# Patient Record
Sex: MALE | Race: WHITE | NOT HISPANIC OR LATINO | Employment: OTHER | URBAN - METROPOLITAN AREA
[De-identification: names, ages, dates, MRNs, and addresses within clinical notes are randomized per-mention and may not be internally consistent; named-entity substitution may affect disease eponyms.]

---

## 2017-03-07 ENCOUNTER — OFFICE VISIT (OUTPATIENT)
Dept: FAMILY MEDICINE | Facility: CLINIC | Age: 77
End: 2017-03-07
Payer: MEDICARE

## 2017-03-07 VITALS
OXYGEN SATURATION: 98 % | WEIGHT: 177.25 LBS | HEIGHT: 71 IN | SYSTOLIC BLOOD PRESSURE: 136 MMHG | TEMPERATURE: 99 F | HEART RATE: 77 BPM | BODY MASS INDEX: 24.81 KG/M2 | DIASTOLIC BLOOD PRESSURE: 76 MMHG

## 2017-03-07 DIAGNOSIS — J01.00 ACUTE NON-RECURRENT MAXILLARY SINUSITIS: Primary | ICD-10-CM

## 2017-03-07 PROCEDURE — 1159F MED LIST DOCD IN RCRD: CPT | Mod: S$GLB,,, | Performed by: NURSE PRACTITIONER

## 2017-03-07 PROCEDURE — 3078F DIAST BP <80 MM HG: CPT | Mod: S$GLB,,, | Performed by: NURSE PRACTITIONER

## 2017-03-07 PROCEDURE — 1160F RVW MEDS BY RX/DR IN RCRD: CPT | Mod: S$GLB,,, | Performed by: NURSE PRACTITIONER

## 2017-03-07 PROCEDURE — 3075F SYST BP GE 130 - 139MM HG: CPT | Mod: S$GLB,,, | Performed by: NURSE PRACTITIONER

## 2017-03-07 PROCEDURE — 96372 THER/PROPH/DIAG INJ SC/IM: CPT | Mod: S$GLB,,, | Performed by: NURSE PRACTITIONER

## 2017-03-07 PROCEDURE — 1126F AMNT PAIN NOTED NONE PRSNT: CPT | Mod: S$GLB,,, | Performed by: NURSE PRACTITIONER

## 2017-03-07 PROCEDURE — 99213 OFFICE O/P EST LOW 20 MIN: CPT | Mod: 25,S$GLB,, | Performed by: NURSE PRACTITIONER

## 2017-03-07 PROCEDURE — 1157F ADVNC CARE PLAN IN RCRD: CPT | Mod: S$GLB,,, | Performed by: NURSE PRACTITIONER

## 2017-03-07 RX ORDER — TRIAMCINOLONE ACETONIDE 40 MG/ML
80 INJECTION, SUSPENSION INTRA-ARTICULAR; INTRAMUSCULAR
Status: COMPLETED | OUTPATIENT
Start: 2017-03-07 | End: 2017-03-07

## 2017-03-07 RX ORDER — AMOXICILLIN AND CLAVULANATE POTASSIUM 875; 125 MG/1; MG/1
1 TABLET, FILM COATED ORAL EVERY 12 HOURS
Qty: 20 TABLET | Refills: 0 | Status: SHIPPED | OUTPATIENT
Start: 2017-03-07 | End: 2017-10-11

## 2017-03-07 RX ADMIN — TRIAMCINOLONE ACETONIDE 80 MG: 40 INJECTION, SUSPENSION INTRA-ARTICULAR; INTRAMUSCULAR at 09:03

## 2017-03-07 NOTE — PROGRESS NOTES
Subjective:       Patient ID: Cale Harding is a 76 y.o. male.    Chief Complaint: Sinus Problem    Sinus Problem   This is a new problem. The current episode started 1 to 4 weeks ago (a month). The problem is unchanged. There has been no fever. He is experiencing no pain. Associated symptoms include congestion, coughing, headaches, a hoarse voice and sinus pressure. Pertinent negatives include no chills, diaphoresis, ear pain, neck pain, shortness of breath, sneezing, sore throat or swollen glands. Treatments tried: nyquill, tylenol. The treatment provided no relief.     Review of Systems   Constitutional: Positive for fatigue. Negative for chills, diaphoresis and fever.   HENT: Positive for congestion, hoarse voice, postnasal drip, rhinorrhea, sinus pressure and voice change. Negative for ear discharge, ear pain, sneezing and sore throat.    Eyes: Negative for photophobia and visual disturbance.   Respiratory: Positive for cough. Negative for chest tightness, shortness of breath and wheezing.    Cardiovascular: Negative for chest pain, palpitations and leg swelling.   Musculoskeletal: Negative for neck pain.   Skin: Negative for color change, pallor, rash and wound.   Neurological: Positive for headaches. Negative for dizziness, weakness and light-headedness.       Objective:      Physical Exam   Constitutional: He is oriented to person, place, and time. He appears well-developed and well-nourished. No distress.   HENT:   Right Ear: Tympanic membrane and external ear normal.   Left Ear: Tympanic membrane and external ear normal.   Nose: Mucosal edema and rhinorrhea present. Right sinus exhibits maxillary sinus tenderness. Right sinus exhibits no frontal sinus tenderness. Left sinus exhibits maxillary sinus tenderness. Left sinus exhibits no frontal sinus tenderness.   Mouth/Throat: No oropharyngeal exudate, posterior oropharyngeal edema or posterior oropharyngeal erythema.   Cardiovascular: Normal rate,  regular rhythm and normal heart sounds.    Pulmonary/Chest: Effort normal and breath sounds normal.   Neurological: He is alert and oriented to person, place, and time.   Skin: Skin is warm and dry. He is not diaphoretic.   Psychiatric: He has a normal mood and affect. His behavior is normal. Judgment and thought content normal.   Vitals reviewed.      Assessment:       1. Acute non-recurrent maxillary sinusitis        Plan:       Acute non-recurrent maxillary sinusitis  -     amoxicillin-clavulanate 875-125mg (AUGMENTIN) 875-125 mg per tablet; Take 1 tablet by mouth every 12 (twelve) hours.  Dispense: 20 tablet; Refill: 0    Other orders  -     triamcinolone acetonide injection 80 mg; Inject 2 mLs (80 mg total) into the muscle one time.

## 2017-04-12 NOTE — TELEPHONE ENCOUNTER
----- Message from Eliane Green sent at 4/12/2017  3:50 PM CDT -----  Patient is requesting a medication refill.     RX name: enalapril (VASOTEC) 5 MG tablet  Strength:   Quantity: 90 day with refill  Directions: Take 1 tablet (5 mg total) by mouth 2 (two) times daily    Pharmacy name: [facility]

## 2017-04-13 RX ORDER — ENALAPRIL MALEATE 5 MG/1
5 TABLET ORAL 2 TIMES DAILY
Qty: 180 TABLET | Refills: 3 | Status: SHIPPED | OUTPATIENT
Start: 2017-04-13 | End: 2018-05-02 | Stop reason: SDUPTHER

## 2017-04-19 ENCOUNTER — HOSPITAL ENCOUNTER (OUTPATIENT)
Dept: RADIOLOGY | Facility: HOSPITAL | Age: 77
Discharge: HOME | End: 2017-04-19
Attending: INTERNAL MEDICINE
Payer: MEDICARE

## 2017-04-19 DIAGNOSIS — R91.8 LUNG MASS: ICD-10-CM

## 2017-04-19 PROCEDURE — 71250 CT THORAX DX C-: CPT | Mod: TC,PO

## 2017-10-11 ENCOUNTER — OFFICE VISIT (OUTPATIENT)
Dept: FAMILY MEDICINE | Facility: CLINIC | Age: 77
End: 2017-10-11
Payer: MEDICARE

## 2017-10-11 VITALS
TEMPERATURE: 98 F | HEIGHT: 70 IN | SYSTOLIC BLOOD PRESSURE: 128 MMHG | WEIGHT: 176.19 LBS | OXYGEN SATURATION: 100 % | HEART RATE: 68 BPM | BODY MASS INDEX: 25.22 KG/M2 | DIASTOLIC BLOOD PRESSURE: 70 MMHG

## 2017-10-11 DIAGNOSIS — K63.5 POLYP OF COLON, UNSPECIFIED PART OF COLON, UNSPECIFIED TYPE: ICD-10-CM

## 2017-10-11 DIAGNOSIS — Z85.46 HISTORY OF PROSTATE CANCER: ICD-10-CM

## 2017-10-11 DIAGNOSIS — Z12.11 SCREEN FOR COLON CANCER: ICD-10-CM

## 2017-10-11 DIAGNOSIS — Z00.00 WELLNESS EXAMINATION: Primary | ICD-10-CM

## 2017-10-11 DIAGNOSIS — Z87.19 HISTORY OF COLONIC DIVERTICULITIS: ICD-10-CM

## 2017-10-11 DIAGNOSIS — Z87.828 HISTORY OF DISLOCATION OF ELBOW: ICD-10-CM

## 2017-10-11 DIAGNOSIS — R73.9 HYPERGLYCEMIA: ICD-10-CM

## 2017-10-11 DIAGNOSIS — R89.6 ABNORMAL BLADDER CYTOLOGY: ICD-10-CM

## 2017-10-11 DIAGNOSIS — I65.23 CALCIFICATION OF BOTH CAROTID ARTERIES: ICD-10-CM

## 2017-10-11 DIAGNOSIS — L30.8 OTHER ECZEMA: ICD-10-CM

## 2017-10-11 DIAGNOSIS — K21.9 GASTROESOPHAGEAL REFLUX DISEASE, ESOPHAGITIS PRESENCE NOT SPECIFIED: ICD-10-CM

## 2017-10-11 DIAGNOSIS — Z77.098 CONTACT WITH AND (SUSPECTED) EXPOSURE TO OTHER HAZARDOUS, CHIEFLY NONMEDICINAL, CHEMICALS: ICD-10-CM

## 2017-10-11 LAB
ALBUMIN SERPL-MCNC: 4.5 G/DL (ref 3.6–5.1)
ALBUMIN/GLOB SERPL: 1.7 (CALC) (ref 1–2.5)
ALP SERPL-CCNC: 52 U/L (ref 40–115)
ALT SERPL-CCNC: 15 U/L (ref 9–46)
AST SERPL-CCNC: 18 U/L (ref 10–35)
BASOPHILS # BLD AUTO: 51 CELLS/UL (ref 0–200)
BASOPHILS NFR BLD AUTO: 0.8 %
BILIRUB SERPL-MCNC: 0.7 MG/DL (ref 0.2–1.2)
BUN SERPL-MCNC: 15 MG/DL (ref 7–25)
BUN/CREAT SERPL: ABNORMAL (CALC) (ref 6–22)
CALCIUM SERPL-MCNC: 9.4 MG/DL (ref 8.6–10.3)
CHLORIDE SERPL-SCNC: 104 MMOL/L (ref 98–110)
CHOLEST SERPL-MCNC: 214 MG/DL
CHOLEST/HDLC SERPL: 4.4 (CALC)
CO2 SERPL-SCNC: 25 MMOL/L (ref 20–31)
CREAT SERPL-MCNC: 0.83 MG/DL (ref 0.7–1.18)
EOSINOPHIL # BLD AUTO: 77 CELLS/UL (ref 15–500)
EOSINOPHIL NFR BLD AUTO: 1.2 %
ERYTHROCYTE [DISTWIDTH] IN BLOOD BY AUTOMATED COUNT: 11.9 % (ref 11–15)
GFR SERPL CREATININE-BSD FRML MDRD: 85 ML/MIN/1.73M2
GLOBULIN SER CALC-MCNC: 2.6 G/DL (CALC) (ref 1.9–3.7)
GLUCOSE SERPL-MCNC: 116 MG/DL (ref 65–99)
HBA1C MFR BLD: 5.9 % OF TOTAL HGB
HCT VFR BLD AUTO: 42.1 % (ref 38.5–50)
HDLC SERPL-MCNC: 49 MG/DL
HGB BLD-MCNC: 14.3 G/DL (ref 13.2–17.1)
LDLC SERPL CALC-MCNC: 137 MG/DL (CALC)
LYMPHOCYTES # BLD AUTO: 1568 CELLS/UL (ref 850–3900)
LYMPHOCYTES NFR BLD AUTO: 24.5 %
MCH RBC QN AUTO: 30.8 PG (ref 27–33)
MCHC RBC AUTO-ENTMCNC: 34 G/DL (ref 32–36)
MCV RBC AUTO: 90.5 FL (ref 80–100)
MONOCYTES # BLD AUTO: 557 CELLS/UL (ref 200–950)
MONOCYTES NFR BLD AUTO: 8.7 %
NEUTROPHILS # BLD AUTO: 4147 CELLS/UL (ref 1500–7800)
NEUTROPHILS NFR BLD AUTO: 64.8 %
NONHDLC SERPL-MCNC: 165 MG/DL (CALC)
PLATELET # BLD AUTO: 194 THOUSAND/UL (ref 140–400)
PMV BLD REES-ECKER: 10.4 FL (ref 7.5–12.5)
POTASSIUM SERPL-SCNC: 4.6 MMOL/L (ref 3.5–5.3)
PROT SERPL-MCNC: 7.1 G/DL (ref 6.1–8.1)
RBC # BLD AUTO: 4.65 MILLION/UL (ref 4.2–5.8)
SODIUM SERPL-SCNC: 140 MMOL/L (ref 135–146)
TRIGL SERPL-MCNC: 152 MG/DL
WBC # BLD AUTO: 6.4 THOUSAND/UL (ref 3.8–10.8)

## 2017-10-11 PROCEDURE — 99499 UNLISTED E&M SERVICE: CPT | Mod: S$GLB,,, | Performed by: FAMILY MEDICINE

## 2017-10-11 PROCEDURE — 99397 PER PM REEVAL EST PAT 65+ YR: CPT | Mod: S$GLB,,, | Performed by: FAMILY MEDICINE

## 2017-10-11 PROCEDURE — 90662 IIV NO PRSV INCREASED AG IM: CPT | Mod: S$GLB,,, | Performed by: FAMILY MEDICINE

## 2017-10-11 PROCEDURE — 90732 PPSV23 VACC 2 YRS+ SUBQ/IM: CPT | Mod: S$GLB,,, | Performed by: FAMILY MEDICINE

## 2017-10-11 PROCEDURE — G0009 ADMIN PNEUMOCOCCAL VACCINE: HCPCS | Mod: S$GLB,,, | Performed by: FAMILY MEDICINE

## 2017-10-11 PROCEDURE — G0008 ADMIN INFLUENZA VIRUS VAC: HCPCS | Mod: S$GLB,,, | Performed by: FAMILY MEDICINE

## 2017-10-11 RX ORDER — HYDROCORTISONE VALERATE CREAM 2 MG/G
CREAM TOPICAL 2 TIMES DAILY
Qty: 60 G | Refills: 3 | Status: SHIPPED | OUTPATIENT
Start: 2017-10-11 | End: 2018-02-06

## 2017-10-11 RX ORDER — CIPROFLOXACIN 500 MG/1
500 TABLET ORAL 2 TIMES DAILY
Qty: 30 TABLET | Refills: 1 | Status: SHIPPED | OUTPATIENT
Start: 2017-10-11 | End: 2017-10-16

## 2017-10-11 NOTE — PROGRESS NOTES
Subjective:      Patient ID: Cale Harding is a 77 y.o. male.    Chief Complaint: Annual Exam    Wellness; diverticulits in August cleared with ABX; resolved; first visit last year; left lower leg rash persists    heres swooshing sound in left ear moving left foot; hist prostate cancer, sees Brogle; had polyps, due colonoscopy; needs immunizations, flu and prevnar 13      Review of Systems   Constitutional: Negative for appetite change, fatigue, fever and unexpected weight change.   HENT: Negative for congestion, ear pain, sinus pressure and sore throat.    Eyes: Negative for pain and visual disturbance.   Respiratory: Negative for shortness of breath.    Cardiovascular: Negative for chest pain.   Gastrointestinal: Negative for abdominal pain, constipation and diarrhea.   Endocrine: Negative for polyuria.   Genitourinary: Negative for difficulty urinating and frequency.   Musculoskeletal: Negative for arthralgias, back pain and myalgias.   Skin: Negative for color change.   Allergic/Immunologic: Negative.    Neurological: Negative for syncope, weakness and headaches.   Hematological: Does not bruise/bleed easily.   Psychiatric/Behavioral: Negative for dysphoric mood and suicidal ideas. The patient is not nervous/anxious.    All other systems reviewed and are negative.    Objective:     Physical Exam   Constitutional: He is oriented to person, place, and time. He appears well-developed and well-nourished. No distress.   HENT:   Head: Normocephalic and atraumatic.   Right Ear: External ear normal.   Left Ear: External ear normal.   Mouth/Throat: Oropharynx is clear and moist. No oropharyngeal exudate.   [place]   Eyes: Conjunctivae and EOM are normal. Pupils are equal, round, and reactive to light. Right eye exhibits no discharge. Left eye exhibits no discharge. No scleral icterus.   Neck: Normal range of motion. Neck supple. No JVD present. No tracheal deviation present. No thyromegaly present.   Cardiovascular:  Normal rate and regular rhythm.  Exam reveals no gallop and no friction rub.    No murmur heard.  Pulmonary/Chest: Effort normal and breath sounds normal. No stridor. No respiratory distress. He has no wheezes. He has no rales. He exhibits no tenderness.   Abdominal: Soft. He exhibits no distension and no mass. There is no tenderness. There is no rebound and no guarding.   Musculoskeletal: Normal range of motion. He exhibits no edema or tenderness.   Lymphadenopathy:     He has no cervical adenopathy.   Neurological: He is alert and oriented to person, place, and time. He has normal reflexes. He displays normal reflexes. No cranial nerve deficit. He exhibits normal muscle tone. Coordination normal.   Skin: Skin is warm and dry. No rash noted. He is not diaphoretic. No erythema. No pallor.   Psychiatric: He has a normal mood and affect. His behavior is normal. Judgment and thought content normal.   Nursing note and vitals reviewed.    Assessment:     1. Wellness examination    2. Other eczema    3. Abnormal bladder cytology    4. Contact with and (suspected) exposure to other hazardous, chiefly nonmedicinal, chemicals    5. History of colonic diverticulitis    6. Gastroesophageal reflux disease, esophagitis presence not specified    7. History of prostate cancer    8. Calcification of both carotid arteries    9. Hyperglycemia    10. History of dislocation of elbow    11. Screen for colon cancer    12. Polyp of colon, unspecified part of colon, unspecified type      Plan:     New Prescriptions    HYDROCORTISONE (WESTCORT) 0.2 % CREAM    Apply topically 2 (two) times daily.     Discontinued Medications    AMOXICILLIN-CLAVULANATE 875-125MG (AUGMENTIN) 875-125 MG PER TABLET    Take 1 tablet by mouth every 12 (twelve) hours.    CIPROFLOXACIN HCL (CIPRO) 500 MG TABLET    Take 1 tablet (500 mg total) by mouth 2 (two) times daily.    DESONIDE (DESOWEN) 0.05 % LOTION    Apply topically 2 (two) times daily.     Modified  Medications    No medications on file       Wellness examination    Other eczema  -     US Carotid Bilateral; Future; Expected date: 10/11/2017  -     CBC auto differential; Future; Expected date: 10/11/2017  -     Comprehensive metabolic panel; Future; Expected date: 10/11/2017  -     Hemoglobin A1c; Future  -     Lipid panel; Future    Abnormal bladder cytology  -     ciprofloxacin HCl (CIPRO) 500 MG tablet; Take 1 tablet (500 mg total) by mouth 2 (two) times daily.  Dispense: 30 tablet; Refill: 1  -     US Carotid Bilateral; Future; Expected date: 10/11/2017  -     CBC auto differential; Future; Expected date: 10/11/2017  -     Comprehensive metabolic panel; Future; Expected date: 10/11/2017  -     Hemoglobin A1c; Future  -     Lipid panel; Future    Contact with and (suspected) exposure to other hazardous, chiefly nonmedicinal, chemicals  -     ciprofloxacin HCl (CIPRO) 500 MG tablet; Take 1 tablet (500 mg total) by mouth 2 (two) times daily.  Dispense: 30 tablet; Refill: 1  -     US Carotid Bilateral; Future; Expected date: 10/11/2017  -     CBC auto differential; Future; Expected date: 10/11/2017  -     Comprehensive metabolic panel; Future; Expected date: 10/11/2017  -     Hemoglobin A1c; Future  -     Lipid panel; Future    History of colonic diverticulitis  -     Cancel: Ambulatory referral to Gastroenterology  -     US Carotid Bilateral; Future; Expected date: 10/11/2017  -     CBC auto differential; Future; Expected date: 10/11/2017  -     Comprehensive metabolic panel; Future; Expected date: 10/11/2017  -     Hemoglobin A1c; Future  -     Lipid panel; Future    Gastroesophageal reflux disease, esophagitis presence not specified  -     Cancel: Ambulatory referral to Gastroenterology  -     US Carotid Bilateral; Future; Expected date: 10/11/2017  -     CBC auto differential; Future; Expected date: 10/11/2017  -     Comprehensive metabolic panel; Future; Expected date: 10/11/2017  -     Hemoglobin A1c;  Future  -     Lipid panel; Future    History of prostate cancer  -     Ambulatory referral to Urology  -     US Carotid Bilateral; Future; Expected date: 10/11/2017  -     CBC auto differential; Future; Expected date: 10/11/2017  -     Comprehensive metabolic panel; Future; Expected date: 10/11/2017  -     Hemoglobin A1c; Future  -     Lipid panel; Future    Calcification of both carotid arteries  -     US Carotid Bilateral; Future; Expected date: 10/11/2017  -     CBC auto differential; Future; Expected date: 10/11/2017  -     Comprehensive metabolic panel; Future; Expected date: 10/11/2017  -     Hemoglobin A1c; Future  -     Lipid panel; Future    Hyperglycemia  -     US Carotid Bilateral; Future; Expected date: 10/11/2017  -     CBC auto differential; Future; Expected date: 10/11/2017  -     Comprehensive metabolic panel; Future; Expected date: 10/11/2017  -     Hemoglobin A1c; Future  -     Lipid panel; Future    History of dislocation of elbow  -     US Carotid Bilateral; Future; Expected date: 10/11/2017  -     CBC auto differential; Future; Expected date: 10/11/2017  -     Comprehensive metabolic panel; Future; Expected date: 10/11/2017  -     Hemoglobin A1c; Future  -     Lipid panel; Future    Screen for colon cancer  -     Cancel: Case request GI: COLONOSCOPY    Polyp of colon, unspecified part of colon, unspecified type  -     Cancel: Case request GI: COLONOSCOPY    Other orders  -     hydrocortisone (WESTCORT) 0.2 % cream; Apply topically 2 (two) times daily.  Dispense: 60 g; Refill: 3  -     Influenza - High Dose (65+) (PF) (IM)  -     Pneumococcal Polysaccharide Vaccine (23 Valent) (SQ/IM)

## 2017-10-25 ENCOUNTER — HOSPITAL ENCOUNTER (OUTPATIENT)
Dept: RADIOLOGY | Facility: HOSPITAL | Age: 77
Discharge: HOME | End: 2017-10-25
Attending: FAMILY MEDICINE
Payer: MEDICARE

## 2017-10-25 DIAGNOSIS — I65.23 CALCIFICATION OF BOTH CAROTID ARTERIES: ICD-10-CM

## 2017-10-25 DIAGNOSIS — Z85.46 HISTORY OF PROSTATE CANCER: ICD-10-CM

## 2017-10-25 DIAGNOSIS — Z87.828 HISTORY OF DISLOCATION OF ELBOW: ICD-10-CM

## 2017-10-25 DIAGNOSIS — K21.9 GASTROESOPHAGEAL REFLUX DISEASE, ESOPHAGITIS PRESENCE NOT SPECIFIED: ICD-10-CM

## 2017-10-25 DIAGNOSIS — R89.6 ABNORMAL BLADDER CYTOLOGY: ICD-10-CM

## 2017-10-25 DIAGNOSIS — Z77.098 CONTACT WITH AND (SUSPECTED) EXPOSURE TO OTHER HAZARDOUS, CHIEFLY NONMEDICINAL, CHEMICALS: ICD-10-CM

## 2017-10-25 DIAGNOSIS — Z87.19 HISTORY OF COLONIC DIVERTICULITIS: ICD-10-CM

## 2017-10-25 DIAGNOSIS — L30.8 OTHER ECZEMA: ICD-10-CM

## 2017-10-25 DIAGNOSIS — R73.9 HYPERGLYCEMIA: ICD-10-CM

## 2017-10-25 PROCEDURE — 93880 EXTRACRANIAL BILAT STUDY: CPT | Mod: TC,PO

## 2017-11-09 ENCOUNTER — TELEPHONE (OUTPATIENT)
Dept: FAMILY MEDICINE | Facility: CLINIC | Age: 77
End: 2017-11-09

## 2017-11-09 NOTE — TELEPHONE ENCOUNTER
----- Message from Jj Escobedo MD sent at 10/29/2017  4:50 PM CDT -----    U/S carotid - Some plaque, no blockage in carotid arteries    I left message for the pt to rtn call to discuss results ( pt wife has results also)

## 2017-11-16 ENCOUNTER — OFFICE VISIT (OUTPATIENT)
Dept: FAMILY MEDICINE | Facility: CLINIC | Age: 77
End: 2017-11-16
Payer: MEDICARE

## 2017-11-16 VITALS
HEART RATE: 90 BPM | WEIGHT: 176 LBS | TEMPERATURE: 99 F | BODY MASS INDEX: 25.2 KG/M2 | HEIGHT: 70 IN | SYSTOLIC BLOOD PRESSURE: 140 MMHG | DIASTOLIC BLOOD PRESSURE: 80 MMHG | OXYGEN SATURATION: 98 %

## 2017-11-16 DIAGNOSIS — R07.81 RIB PAIN ON LEFT SIDE: Primary | ICD-10-CM

## 2017-11-16 PROCEDURE — 99213 OFFICE O/P EST LOW 20 MIN: CPT | Mod: S$GLB,,, | Performed by: NURSE PRACTITIONER

## 2017-11-16 RX ORDER — NAPROXEN 500 MG/1
500 TABLET ORAL 2 TIMES DAILY PRN
Qty: 30 TABLET | Refills: 0 | Status: SHIPPED | OUTPATIENT
Start: 2017-11-16 | End: 2018-02-06 | Stop reason: ALTCHOICE

## 2017-11-16 NOTE — PROGRESS NOTES
Subjective:       Patient ID: Cale Harding is a 77 y.o. male.    Chief Complaint: Chest Pain    Chest Pain    This is a new problem. The current episode started in the past 7 days (sunday). The onset quality is undetermined. The problem occurs intermittently (only feels it when he is laying on his left side at night or he turns over from being on his left side). The problem has been unchanged. Pain location: left side under rib. The pain is at a severity of 2/10. The pain is mild. The quality of the pain is described as sharp. The pain does not radiate. Pertinent negatives include no abdominal pain, back pain, claudication, cough, diaphoresis, dizziness, exertional chest pressure, fever, headaches, hemoptysis, irregular heartbeat, leg pain, lower extremity edema, malaise/fatigue, nausea, near-syncope, numbness, orthopnea, palpitations, PND, shortness of breath, sputum production, syncope, vomiting or weakness. Associated with: laying on left side or moving from laying on the left side. He has tried nothing for the symptoms.     Review of Systems   Constitutional: Negative for chills, diaphoresis, fatigue, fever and malaise/fatigue.   Eyes: Negative for photophobia and visual disturbance.   Respiratory: Negative for cough, hemoptysis, sputum production, chest tightness and shortness of breath.    Cardiovascular: Positive for chest pain. Negative for palpitations, orthopnea, claudication, leg swelling, syncope, PND and near-syncope.        Left sided rib pain     Gastrointestinal: Negative for abdominal pain, nausea and vomiting.   Musculoskeletal: Negative for back pain.   Neurological: Negative for dizziness, weakness, numbness and headaches.       Objective:      Physical Exam   Constitutional: He is oriented to person, place, and time. He appears well-developed and well-nourished. No distress.   HENT:   Right Ear: External ear normal.   Left Ear: External ear normal.   Cardiovascular: Normal rate, regular  rhythm and normal heart sounds.    Pulmonary/Chest: Effort normal and breath sounds normal. No respiratory distress. He has no wheezes.   Musculoskeletal: Normal range of motion. He exhibits no edema, tenderness or deformity.        Arms:  Neurological: He is alert and oriented to person, place, and time.   Skin: Skin is warm and dry. No rash noted. He is not diaphoretic. No erythema. No pallor.   Psychiatric: He has a normal mood and affect. His behavior is normal. Judgment and thought content normal.   Vitals reviewed.      Assessment:       1. Rib pain on left side        Plan:       Rib pain on left side  -     X-Ray Ribs 3 Views Left; Future    Other orders  -     naproxen (NAPROSYN) 500 MG tablet; Take 1 tablet (500 mg total) by mouth 2 (two) times daily as needed.  Dispense: 30 tablet; Refill: 0      States he will try the anti inflammatory first if no relief from that he will have the xray done

## 2017-11-20 ENCOUNTER — TELEPHONE (OUTPATIENT)
Dept: FAMILY MEDICINE | Facility: CLINIC | Age: 77
End: 2017-11-20

## 2017-11-20 ENCOUNTER — PATIENT MESSAGE (OUTPATIENT)
Dept: FAMILY MEDICINE | Facility: CLINIC | Age: 77
End: 2017-11-20

## 2017-11-20 ENCOUNTER — HOSPITAL ENCOUNTER (OUTPATIENT)
Dept: RADIOLOGY | Facility: HOSPITAL | Age: 77
Discharge: HOME | End: 2017-11-20
Attending: NURSE PRACTITIONER
Payer: MEDICARE

## 2017-11-20 DIAGNOSIS — R93.89 ABNORMAL CHEST X-RAY: Primary | ICD-10-CM

## 2017-11-20 DIAGNOSIS — R07.81 RIB PAIN ON LEFT SIDE: ICD-10-CM

## 2017-11-20 PROCEDURE — 71101 X-RAY EXAM UNILAT RIBS/CHEST: CPT | Mod: TC,PO

## 2017-11-20 RX ORDER — PREDNISONE 20 MG/1
20 TABLET ORAL DAILY
Qty: 7 TABLET | Refills: 0 | Status: SHIPPED | OUTPATIENT
Start: 2017-11-20 | End: 2017-11-30

## 2017-11-20 NOTE — TELEPHONE ENCOUNTER
Spoke with patient let him know shows pleural effusion reviewed with Dr Escobedo recommend chest CT and prednisone to the pharmacy

## 2017-11-21 ENCOUNTER — HOSPITAL ENCOUNTER (OUTPATIENT)
Dept: RADIOLOGY | Facility: HOSPITAL | Age: 77
Discharge: HOME | End: 2017-11-21
Attending: NURSE PRACTITIONER
Payer: MEDICARE

## 2017-11-21 ENCOUNTER — PATIENT MESSAGE (OUTPATIENT)
Dept: FAMILY MEDICINE | Facility: CLINIC | Age: 77
End: 2017-11-21

## 2017-11-21 DIAGNOSIS — R93.89 ABNORMAL CHEST X-RAY: ICD-10-CM

## 2017-11-21 PROCEDURE — 71250 CT THORAX DX C-: CPT | Mod: TC,PO

## 2017-11-22 ENCOUNTER — PROCEDURE VISIT (OUTPATIENT)
Dept: UROLOGY | Facility: CLINIC | Age: 77
End: 2017-11-22
Payer: MEDICARE

## 2017-11-22 ENCOUNTER — LAB VISIT (OUTPATIENT)
Dept: LAB | Facility: HOSPITAL | Age: 77
End: 2017-11-22
Attending: UROLOGY
Payer: MEDICARE

## 2017-11-22 VITALS — HEIGHT: 70 IN | WEIGHT: 176 LBS | BODY MASS INDEX: 25.2 KG/M2

## 2017-11-22 DIAGNOSIS — R35.1 NOCTURIA: ICD-10-CM

## 2017-11-22 DIAGNOSIS — Z77.098 CONTACT WITH AND (SUSPECTED) EXPOSURE TO OTHER HAZARDOUS, CHIEFLY NONMEDICINAL, CHEMICALS: ICD-10-CM

## 2017-11-22 DIAGNOSIS — R35.0 URINARY FREQUENCY: ICD-10-CM

## 2017-11-22 DIAGNOSIS — R89.6 ABNORMAL BLADDER CYTOLOGY: Primary | ICD-10-CM

## 2017-11-22 DIAGNOSIS — R89.6 ABNORMAL BLADDER CYTOLOGY: ICD-10-CM

## 2017-11-22 LAB
BILIRUB UR QL STRIP: NEGATIVE
CLARITY UR REFRACT.AUTO: CLEAR
COLOR UR AUTO: YELLOW
GLUCOSE UR QL STRIP: NEGATIVE
HGB UR QL STRIP: NEGATIVE
KETONES UR QL STRIP: NEGATIVE
LEUKOCYTE ESTERASE UR QL STRIP: NEGATIVE
NITRITE UR QL STRIP: NEGATIVE
PH UR STRIP: 7 [PH] (ref 5–8)
PROT UR QL STRIP: NEGATIVE
SP GR UR STRIP: 1.01 (ref 1–1.03)
URN SPEC COLLECT METH UR: NORMAL
UROBILINOGEN UR STRIP-ACNC: NEGATIVE EU/DL

## 2017-11-22 PROCEDURE — 88120 CYTP URNE 3-5 PROBES EA SPEC: CPT

## 2017-11-22 PROCEDURE — 88112 CYTOPATH CELL ENHANCE TECH: CPT | Performed by: PATHOLOGY

## 2017-11-22 PROCEDURE — 81003 URINALYSIS AUTO W/O SCOPE: CPT

## 2017-11-22 NOTE — PROGRESS NOTES
Subjective:       Patient ID: Cale Harding is a 77 y.o. male.    Chief Complaint: Follow-up    76 yo WM with history of 4 ADP exposure at [place] here for Bladder Cancer screening program. 1 year and 5 months ago under went Cysto for abnormal cytology. Here for f/u. Wants to do urine tests and only do Cysto if abnormal.      Other   This is a chronic (History of Carcinogen exposure) problem. The current episode started more than 1 year ago. The problem occurs constantly. The problem has been unchanged. Associated symptoms include urinary symptoms (Nocturia x 1 and frequency, no dysuria or gross hematuria). Pertinent negatives include no abdominal pain, anorexia, arthralgias, change in bowel habit, chest pain, chills, congestion, coughing, diaphoresis, fatigue, fever, headaches, joint swelling, myalgias, nausea, neck pain, numbness, rash, sore throat, swollen glands, vertigo, visual change, vomiting or weakness. Nothing aggravates the symptoms. He has tried nothing for the symptoms.     Review of Systems   Constitutional: Negative for activity change, appetite change, chills, diaphoresis, fatigue, fever and unexpected weight change.   HENT: Negative for congestion, hearing loss, sinus pressure, sore throat and trouble swallowing.    Eyes: Negative for photophobia, pain, discharge and visual disturbance.   Respiratory: Negative for apnea, cough and shortness of breath.    Cardiovascular: Negative for chest pain, palpitations and leg swelling.   Gastrointestinal: Negative for abdominal distention, abdominal pain, anal bleeding, anorexia, blood in stool, change in bowel habit, constipation, diarrhea, nausea, rectal pain and vomiting.   Endocrine: Negative for cold intolerance, heat intolerance, polydipsia, polyphagia and polyuria.   Genitourinary: Negative for decreased urine volume, difficulty urinating, discharge, dysuria, enuresis, flank pain, frequency, genital sores, hematuria, penile pain, penile swelling,  scrotal swelling, testicular pain and urgency.   Musculoskeletal: Negative for arthralgias, back pain, joint swelling, myalgias and neck pain.   Skin: Negative for color change, pallor, rash and wound.   Allergic/Immunologic: Negative for environmental allergies, food allergies and immunocompromised state.   Neurological: Negative for dizziness, vertigo, seizures, weakness, numbness and headaches.   Hematological: Negative for adenopathy. Does not bruise/bleed easily.   Psychiatric/Behavioral: Negative.        Objective:      Physical Exam   Nursing note and vitals reviewed.  Constitutional: He is oriented to person, place, and time. He appears well-developed and well-nourished.   HENT:   Head: Normocephalic.   Nose: Nose normal.   Mouth/Throat: Oropharynx is clear and moist.   Eyes: Conjunctivae and EOM are normal. Pupils are equal, round, and reactive to light.   Neck: Normal range of motion. Neck supple.   Cardiovascular: Normal rate, regular rhythm, normal heart sounds and intact distal pulses.    Pulmonary/Chest: Effort normal and breath sounds normal.   Abdominal: Soft. Bowel sounds are normal.   Genitourinary: Testes normal and penis normal.   Genitourinary Comments: Prostate is surgically absent.   Musculoskeletal: Normal range of motion.   Neurological: He is alert and oriented to person, place, and time. He has normal reflexes.   Skin: Skin is warm and dry.     Psychiatric: He has a normal mood and affect. His behavior is normal. Judgment and thought content normal.       Assessment:       1. Abnormal bladder cytology    2. Contact with and (suspected) exposure to other hazardous, chiefly nonmedicinal, chemicals    3. Nocturia    4. Urinary frequency        Plan:       Patient Instructions   Check U/A, Urine Cytology and Urine FISH test.   F/U Yearly for evaluation and if symptoms occur.  Cysto if symptoms warrant.

## 2017-11-22 NOTE — PATIENT INSTRUCTIONS
Check U/A, Urine Cytology and Urine FISH test.   F/U Yearly for evaluation and if symptoms occur.  Cysto if symptoms warrant.

## 2017-11-28 ENCOUNTER — PATIENT MESSAGE (OUTPATIENT)
Dept: FAMILY MEDICINE | Facility: CLINIC | Age: 77
End: 2017-11-28

## 2017-12-01 LAB
FUROC - INTERPRETATION: NORMAL
FUROC - REASON FOR REFERRAL: NORMAL
FUROC - RELEASED BY: NORMAL
FUROC - RESULT SUMMARY: NEGATIVE
FUROC - RESULT: NORMAL
FUROC - SPECIMEN: NORMAL
SPECIMEN SOURCE: NORMAL

## 2017-12-07 ENCOUNTER — OFFICE VISIT (OUTPATIENT)
Dept: GASTROENTEROLOGY | Facility: CLINIC | Age: 77
End: 2017-12-07
Payer: MEDICARE

## 2017-12-07 VITALS
DIASTOLIC BLOOD PRESSURE: 86 MMHG | HEART RATE: 86 BPM | BODY MASS INDEX: 25.37 KG/M2 | WEIGHT: 177.19 LBS | HEIGHT: 70 IN | SYSTOLIC BLOOD PRESSURE: 160 MMHG

## 2017-12-07 DIAGNOSIS — Z86.010 HISTORY OF ADENOMATOUS POLYP OF COLON: ICD-10-CM

## 2017-12-07 DIAGNOSIS — R12 HEARTBURN SYMPTOM: Primary | ICD-10-CM

## 2017-12-07 PROCEDURE — 99999 PR PBB SHADOW E&M-EST. PATIENT-LVL III: CPT | Mod: PBBFAC,,, | Performed by: INTERNAL MEDICINE

## 2017-12-07 PROCEDURE — 99204 OFFICE O/P NEW MOD 45 MIN: CPT | Mod: S$GLB,,, | Performed by: INTERNAL MEDICINE

## 2017-12-07 NOTE — PROGRESS NOTES
"Subjective:      Patient ID: Cale Harding is a 77 y.o. male.    Chief Complaint: Colonoscopy    HPI:    Patient is 77-year-old male presenting for GI follow-up.  He is troubled with heartburn several times per week or less.  Takes Tums occasionally.  Recently treated for left lower quadrant pain presumed to be diverticulitis.  Symptoms have resolved.  Also has a history of colon adenomas.  3 colonoscopy reports dating back as far as 2007 reviewed.  He has had small adenomas on occasion.  Colonoscopy 2014 a 4 mm adenoma was removed.  I advised that he could wait for his follow-up colonoscopy until about October 2018.      This mildly obese white male presents for evaluation because of reports of a changes stool color. He indicates that on approximately 16 June the patient soft "dark gray stool" and this alternating with "normal color stool". His stool consistency and bowel pattern did not altered. Only the color has been changing. The changes are not associated with abdominal pain, or change in bowel habits. He also reports no significant change in his diet, or in his medications. He also reports no significant over-the-counter medications being taken.    The patient has a history of recurrent bouts of "diverticulitis". He is seen and evaluated by a surgeon in the past year but was told it is symptoms "weren't bad enough".    The patient denies any history of hepatitis, jaundice, or transfusion. He is status post treatment for prostate cancer 16 years ago; status post left her to recover para; status post left elbow dislocation repair; history of diverticulitis intermittently, with the last episode requiring treatment with antibiotics in April 2015. He has had no diverticulitis issues since that time.    The patient quit smoking 40 years ago, previously smoked 2 packs cigarettes per day for 10 years. He consumes one to 5 beers per day. 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  Currently under evaluation for a very small left pleural effusion    Review of patient's allergies indicates:   Allergen Reactions    Bactrim [sulfamethoxazole-trimethoprim]      Past Medical History:   Diagnosis Date    Cancer     prostate    Diverticulitis     Hypertension     Urinary tract infection      Past Surgical History:   Procedure Laterality Date    COLONOSCOPY  10/20/2012    COLONOSCOPY  11/19/2014    dr machado ( repeat in 3 years per the pt )    ELBOW SURGERY Left 2013    dislocation    PROSTATE SURGERY      SHOULDER ARTHROSCOPY W/ ROTATOR CUFF REPAIR Right      Family History   Problem Relation Age of Onset    Heart disease Father     Heart disease Brother     Cancer Mother      [facility]    No Known Problems Son     No Known Problems Son     No Known Problems Son     Prostate cancer Neg Hx     Kidney disease Neg Hx      Social History     Social History    Marital status:      Spouse name: N/A    Number of children: N/A    Years of education: N/A     Occupational History    Not on file.     Social History Main Topics    Smoking status: Former Smoker     Packs/day: 3.00     Types: Cigarettes     Quit date: 1970    Smokeless tobacco: Former User      Comment: pt quit 40 years ago    Alcohol use 16.8 oz/week     28 Cans of beer per week      Comment: occ    Drug use: No    Sexual activity: Not Currently     Other Topics Concern    Not on file     Social History Narrative    No narrative on file       Review of Systems:  Constitutional: Negative for appetite change.   HENT: Negative for trouble swallowing.   Eyes: Negative for photophobia.   Respiratory: Negative for cough and shortness of breath.   Cardiovascular: Negative for palpitations.   Gastrointestinal: See HPI for details.  Genitourinary: Negative for frequency and hematuria.   Skin: Negative for rash.   Neurological: Negative for weakness and headaches.  "  Hematological: Negative.   Psychiatric/Behavioral: Negative for suicidal ideas and behavioral problems.     Objective:     BP (!) 160/86 (BP Location: Right arm, Patient Position: Sitting)   Pulse 86   Ht 5' 10" (1.778 m)   Wt 80.4 kg (177 lb 3.2 oz)   BMI 25.43 kg/m²     Physical Exam:  Eyes: Pupils are equal, round, and reactive to light.   Neck: Supple. No mass  Cardiovascular: Regular rhythm . No murmur   Pulmonary/Chest: Lungs clear   Abdominal: Soft. No mass palpated. Nontender, no guarding. Positive bowel sounds   Musculoskeletal: No deformity. No edema.   Psychiatric: Alert and oriented    Assessment:     1. Heartburn symptom    2. History of adenomatous polyp of colon      Plan:     Cale was seen today for colonoscopy.    Diagnoses and all orders for this visit:    Heartburn symptom  -     ranitidine (ZANTAC) 300 MG tablet; Take 1 tablet (300 mg total) by mouth every evening.    History of adenomatous polyp of colon      Plan:  Antireflux measures  Ranitidine daily or when necessary  Follow-up colonoscopy October 2018    "

## 2017-12-07 NOTE — PATIENT INSTRUCTIONS

## 2017-12-12 DIAGNOSIS — J90 PLEURAL EFFUSION: Primary | ICD-10-CM

## 2017-12-22 ENCOUNTER — HOSPITAL ENCOUNTER (OUTPATIENT)
Facility: HOSPITAL | Age: 77
Discharge: HOME | End: 2017-12-22
Attending: INTERNAL MEDICINE | Admitting: INTERNAL MEDICINE
Payer: MEDICARE

## 2017-12-22 ENCOUNTER — HOSPITAL ENCOUNTER (OUTPATIENT)
Dept: INTERVENTIONAL RADIOLOGY/VASCULAR | Facility: HOSPITAL | Age: 77
Discharge: HOME | End: 2017-12-22
Attending: INTERNAL MEDICINE
Payer: MEDICARE

## 2017-12-22 VITALS
RESPIRATION RATE: 16 BRPM | OXYGEN SATURATION: 97 % | TEMPERATURE: 99 F | HEART RATE: 64 BPM | SYSTOLIC BLOOD PRESSURE: 102 MMHG | BODY MASS INDEX: 25.2 KG/M2 | WEIGHT: 176 LBS | HEIGHT: 70 IN | DIASTOLIC BLOOD PRESSURE: 55 MMHG

## 2017-12-22 DIAGNOSIS — J90 PLEURAL EFFUSION: ICD-10-CM

## 2017-12-22 DIAGNOSIS — J90 PLEURAL EFFUSION ON LEFT: ICD-10-CM

## 2017-12-22 LAB
APPEARANCE FLD: NORMAL
BODY FLD TYPE: NORMAL
COLOR FLD: NORMAL
GRAM STN SPEC: NORMAL
GRAM STN SPEC: NORMAL
LYMPHOCYTES NFR FLD MANUAL: 86 %
MONOS+MACROS NFR FLD MANUAL: 4 %
NEUTROPHILS NFR FLD MANUAL: 10 %
WBC # FLD: 2160 /CU MM

## 2017-12-22 PROCEDURE — 87102 FUNGUS ISOLATION CULTURE: CPT

## 2017-12-22 PROCEDURE — 87015 SPECIMEN INFECT AGNT CONCNTJ: CPT

## 2017-12-22 PROCEDURE — 89051 BODY FLUID CELL COUNT: CPT

## 2017-12-22 PROCEDURE — 87116 MYCOBACTERIA CULTURE: CPT

## 2017-12-22 PROCEDURE — 99000 SPECIMEN HANDLING OFFICE-LAB: CPT

## 2017-12-22 PROCEDURE — C1729 CATH, DRAINAGE: HCPCS

## 2017-12-22 PROCEDURE — 87205 SMEAR GRAM STAIN: CPT

## 2017-12-22 PROCEDURE — 87075 CULTR BACTERIA EXCEPT BLOOD: CPT

## 2017-12-22 PROCEDURE — 32555 ASPIRATE PLEURA W/ IMAGING: CPT | Mod: LT,,, | Performed by: RADIOLOGY

## 2017-12-22 PROCEDURE — 87070 CULTURE OTHR SPECIMN AEROBIC: CPT

## 2017-12-22 NOTE — H&P
Consult/H&P Note  Interventional Radiology    Reason for Consult: L pleural effusion    SUBJECTIVE:     Chief Complaint: L pleural effusion    History of Present Illness: 76 yo M with recently found pleural effusion on L.    Past Medical History:   Diagnosis Date    Cancer     prostate    Diverticulitis     Hypertension     Urinary tract infection      Past Surgical History:   Procedure Laterality Date    COLONOSCOPY  10/20/2012    COLONOSCOPY  11/19/2014    dr machado ( repeat in 3 years per the pt )    ELBOW SURGERY Left 2013    dislocation    PROSTATE SURGERY      SHOULDER ARTHROSCOPY W/ ROTATOR CUFF REPAIR Right      Family History   Problem Relation Age of Onset    Heart disease Father     Heart disease Brother     Cancer Mother      [facility]    No Known Problems Son     No Known Problems Son     No Known Problems Son     Prostate cancer Neg Hx     Kidney disease Neg Hx      Social History   Substance Use Topics    Smoking status: Former Smoker     Packs/day: 3.00     Types: Cigarettes     Quit date: 1970    Smokeless tobacco: Former User      Comment: pt quit 40 years ago    Alcohol use 16.8 oz/week     28 Cans of beer per week      Comment: occ       Review of Systems:  Constitutional/General:No fever, chills, change in appetite or weight loss.  Hematological/Immuno: no known coagulopathies  Respiratory: no shortness of breath  Cardiovascular: L lower chest pain  Gastrointestinal: no abdominal pain  Neurological: no TIA or stroke symptoms  Psychiatric: normal mood/affect, good insight/judgement      OBJECTIVE:     Vital Signs Range (Last 24H):  Temp:  [99 °F (37.2 °C)]   Pulse:  [83]   Resp:  [16]   BP: (152)/(66)   SpO2:  [98 %]     Physical Exam:  General- Patient alert and oriented x3 in NAD  ENT- PERRLA,  Neck- No masses  CV- Regular rate and rhythm  Resp-  Decreased BS at L lung base  GI- Non tender/non-distended  Extrem- No cyanosis, clubbing, edema.   Derm- No  rashes, masses, or lesions noted  Neuro-  No focal deficits noted.     Physical Exam  Body mass index is 25.25 kg/m².    Scheduled Meds:   Continuous Infusions:   PRN Meds:    Allergies:   Review of patient's allergies indicates:   Allergen Reactions    Bactrim [sulfamethoxazole-trimethoprim]        Labs:  No results for input(s): INR in the last 168 hours.    Invalid input(s):  PT,  PTT  No results for input(s): WBC, HGB, HCT, MCV, PLT in the last 168 hours. No results for input(s): GLU, NA, K, CL, CO2, BUN, CREATININE, CALCIUM, MG, ALT, AST, ALBUMIN, BILITOT, BILIDIR in the last 168 hours.    Vitals (Most Recent):  Temp: 99 °F (37.2 °C) (12/22/17 0905)  Pulse: 83 (12/22/17 0905)  Resp: 16 (12/22/17 0905)  BP: (!) 152/66 (12/22/17 0905)  SpO2: 98 % (12/22/17 0905)    ASA: 2  Mallampati: 2    Consent obtained    ASSESSMENT/PLAN:     L thoracentesis.  Local anesthesia.    Active Hospital Problems    Diagnosis  POA    Pleural effusion [J90]  Yes      Resolved Hospital Problems    Diagnosis Date Resolved POA   No resolved problems to display.           Alfredo Nagel MD

## 2017-12-22 NOTE — NURSING
Discharge instructions reviewed with patient. Verbalized understanding, no questions at this time.  Procedure sites are DSI. VSS. No acute distress noted. Staff at bedside to help pt change. Pt able to ambulate on his own.

## 2017-12-22 NOTE — DISCHARGE SUMMARY
[facility]  Discharge Summary      Admit Date: 12/22/2017    Discharge Date and Time:  12/22/2017 11:34 AM    Attending Physician: Karan Diamond MD     Reason for Admission: thoracentesis    Procedures Performed: * No surgery found *    Hospital Course (synopsis of major diagnoses, care, treatment, and services provided during the course of the hospital stay): L thoracentesis     Final Diagnoses:    Principal Problem: <principal problem not specified>   Secondary Diagnoses:   Active Hospital Problems    Diagnosis  POA    Pleural effusion [J90]  Yes      Resolved Hospital Problems    Diagnosis Date Resolved POA   No resolved problems to display.       Discharged Condition: good    Disposition: Home or Self Care    Follow Up/Patient Instructions:     Medications:  Reconciled Home Medications:   Patient's Medications   New Prescriptions    No medications on file   Previous Medications    ASPIRIN (ECOTRIN) 81 MG EC TABLET    Take 81 mg by mouth once daily.    ENALAPRIL (VASOTEC) 5 MG TABLET    Take 1 tablet (5 mg total) by mouth 2 (two) times daily.    HYDROCORTISONE (WESTCORT) 0.2 % CREAM    Apply topically 2 (two) times daily.    LACTOBACILLUS ACIDOPHILUS (ACIDOPHILUS ORAL)    Take 1 tablet by mouth once daily.    NAPROXEN (NAPROSYN) 500 MG TABLET    Take 1 tablet (500 mg total) by mouth 2 (two) times daily as needed.    PSYLLIUM (METAMUCIL) PACKET    Take 1 packet by mouth daily as needed.     RANITIDINE (ZANTAC) 300 MG TABLET    Take 1 tablet (300 mg total) by mouth every evening.    VITAMIN D 1000 UNITS TAB    Take 1,000 mg by mouth once daily.    Modified Medications    No medications on file   Discontinued Medications    No medications on file       Discharge Procedure Orders  Diet general     Activity as tolerated     Call MD for:  redness, tenderness, or signs of infection (pain, swelling, redness, odor or green/yellow discharge around incision site)     Remove dressing in 24 hours

## 2017-12-22 NOTE — PROCEDURES
Radiology Post-Procedure Note    Pre Op Diagnosis: L effusion  Post Op Diagnosis: Same    Procedure: thoracentesis.    Procedure performed by: Alfredo Nagel MD    Written Informed Consent Obtained: Yes  Specimen Removed: YES 1.25 L sanguinous effute  Estimated Blood Loss: Minimal    Findings:   Successful R thoracentesis.  1.25 L removed.  Drainage terminated prior to complete drainage due to significant chest discomfort    Patient tolerated procedure well.    @SIG@

## 2017-12-22 NOTE — NURSING
Pt had a left thoracentesis performed with bloody drainage.1.25L removed. Bandaid applied to site. Vitals stable and no noted distress. Pt to have chest xray and sent home following xray.

## 2017-12-24 ENCOUNTER — PATIENT MESSAGE (OUTPATIENT)
Dept: FAMILY MEDICINE | Facility: CLINIC | Age: 77
End: 2017-12-24

## 2017-12-26 LAB — BACTERIA SPEC AEROBE CULT: NO GROWTH

## 2017-12-29 ENCOUNTER — TELEPHONE (OUTPATIENT)
Dept: INTERVENTIONAL RADIOLOGY/VASCULAR | Facility: HOSPITAL | Age: 77
End: 2017-12-29

## 2017-12-29 LAB — BACTERIA SPEC ANAEROBE CULT: NORMAL

## 2018-01-01 ENCOUNTER — OFFICE VISIT (OUTPATIENT)
Dept: FAMILY MEDICINE | Facility: CLINIC | Age: 78
End: 2018-01-01
Payer: MEDICARE

## 2018-01-01 ENCOUNTER — PATIENT MESSAGE (OUTPATIENT)
Dept: FAMILY MEDICINE | Facility: CLINIC | Age: 78
End: 2018-01-01

## 2018-01-01 ENCOUNTER — HOSPITAL ENCOUNTER (OUTPATIENT)
Dept: RADIOLOGY | Facility: HOSPITAL | Age: 78
Discharge: HOME | End: 2018-12-13
Attending: INTERNAL MEDICINE
Payer: MEDICARE

## 2018-01-01 ENCOUNTER — INFUSION (OUTPATIENT)
Dept: INFUSION THERAPY | Facility: HOSPITAL | Age: 78
End: 2018-01-01
Attending: INTERNAL MEDICINE
Payer: MEDICARE

## 2018-01-01 ENCOUNTER — PATIENT MESSAGE (OUTPATIENT)
Dept: HEMATOLOGY/ONCOLOGY | Facility: CLINIC | Age: 78
End: 2018-01-01

## 2018-01-01 ENCOUNTER — LAB VISIT (OUTPATIENT)
Dept: LAB | Facility: HOSPITAL | Age: 78
End: 2018-01-01
Attending: INTERNAL MEDICINE
Payer: MEDICARE

## 2018-01-01 ENCOUNTER — OFFICE VISIT (OUTPATIENT)
Dept: GASTROENTEROLOGY | Facility: CLINIC | Age: 78
End: 2018-01-01
Payer: MEDICARE

## 2018-01-01 ENCOUNTER — PATIENT MESSAGE (OUTPATIENT)
Dept: GASTROENTEROLOGY | Facility: CLINIC | Age: 78
End: 2018-01-01

## 2018-01-01 ENCOUNTER — IMMUNIZATION (OUTPATIENT)
Dept: PHARMACY | Facility: HOSPITAL | Age: 78
End: 2018-01-01
Payer: MEDICARE

## 2018-01-01 ENCOUNTER — TELEPHONE (OUTPATIENT)
Dept: FAMILY MEDICINE | Facility: CLINIC | Age: 78
End: 2018-01-01

## 2018-01-01 ENCOUNTER — TELEPHONE (OUTPATIENT)
Dept: GASTROENTEROLOGY | Facility: CLINIC | Age: 78
End: 2018-01-01

## 2018-01-01 ENCOUNTER — OFFICE VISIT (OUTPATIENT)
Dept: HEMATOLOGY/ONCOLOGY | Facility: CLINIC | Age: 78
End: 2018-01-01
Payer: MEDICARE

## 2018-01-01 ENCOUNTER — PATIENT MESSAGE (OUTPATIENT)
Dept: ADMINISTRATIVE | Facility: HOSPITAL | Age: 78
End: 2018-01-01

## 2018-01-01 ENCOUNTER — TELEPHONE (OUTPATIENT)
Dept: HEMATOLOGY/ONCOLOGY | Facility: CLINIC | Age: 78
End: 2018-01-01

## 2018-01-01 ENCOUNTER — HOSPITAL ENCOUNTER (OUTPATIENT)
Dept: RADIOLOGY | Facility: HOSPITAL | Age: 78
Discharge: HOME | End: 2018-10-11
Attending: INTERNAL MEDICINE
Payer: MEDICARE

## 2018-01-01 VITALS
DIASTOLIC BLOOD PRESSURE: 66 MMHG | HEART RATE: 66 BPM | RESPIRATION RATE: 18 BRPM | SYSTOLIC BLOOD PRESSURE: 149 MMHG | HEIGHT: 67 IN | SYSTOLIC BLOOD PRESSURE: 153 MMHG | WEIGHT: 164.69 LBS | HEART RATE: 59 BPM | DIASTOLIC BLOOD PRESSURE: 75 MMHG | BODY MASS INDEX: 25.85 KG/M2 | RESPIRATION RATE: 18 BRPM

## 2018-01-01 VITALS
DIASTOLIC BLOOD PRESSURE: 82 MMHG | TEMPERATURE: 99 F | SYSTOLIC BLOOD PRESSURE: 152 MMHG | HEIGHT: 70 IN | HEART RATE: 98 BPM | WEIGHT: 162.13 LBS | BODY MASS INDEX: 23.21 KG/M2 | OXYGEN SATURATION: 97 %

## 2018-01-01 VITALS
HEIGHT: 67 IN | HEART RATE: 67 BPM | TEMPERATURE: 98 F | OXYGEN SATURATION: 99 % | BODY MASS INDEX: 24.53 KG/M2 | DIASTOLIC BLOOD PRESSURE: 83 MMHG | WEIGHT: 156.31 LBS | RESPIRATION RATE: 20 BRPM | SYSTOLIC BLOOD PRESSURE: 167 MMHG

## 2018-01-01 VITALS
OXYGEN SATURATION: 97 % | DIASTOLIC BLOOD PRESSURE: 80 MMHG | HEART RATE: 97 BPM | BODY MASS INDEX: 24.94 KG/M2 | TEMPERATURE: 99 F | WEIGHT: 158.94 LBS | SYSTOLIC BLOOD PRESSURE: 134 MMHG | HEIGHT: 67 IN

## 2018-01-01 VITALS
SYSTOLIC BLOOD PRESSURE: 180 MMHG | BODY MASS INDEX: 25.67 KG/M2 | DIASTOLIC BLOOD PRESSURE: 91 MMHG | HEIGHT: 67 IN | OXYGEN SATURATION: 97 % | TEMPERATURE: 98 F | HEART RATE: 57 BPM | WEIGHT: 163.56 LBS

## 2018-01-01 VITALS
RESPIRATION RATE: 18 BRPM | SYSTOLIC BLOOD PRESSURE: 135 MMHG | HEART RATE: 68 BPM | TEMPERATURE: 99 F | OXYGEN SATURATION: 100 % | DIASTOLIC BLOOD PRESSURE: 75 MMHG

## 2018-01-01 VITALS
HEART RATE: 59 BPM | RESPIRATION RATE: 18 BRPM | TEMPERATURE: 98 F | SYSTOLIC BLOOD PRESSURE: 153 MMHG | DIASTOLIC BLOOD PRESSURE: 69 MMHG

## 2018-01-01 VITALS
DIASTOLIC BLOOD PRESSURE: 81 MMHG | WEIGHT: 160.25 LBS | BODY MASS INDEX: 25.15 KG/M2 | TEMPERATURE: 98 F | SYSTOLIC BLOOD PRESSURE: 176 MMHG | HEIGHT: 67 IN | RESPIRATION RATE: 18 BRPM | HEART RATE: 61 BPM | OXYGEN SATURATION: 99 %

## 2018-01-01 VITALS
RESPIRATION RATE: 20 BRPM | HEIGHT: 67 IN | BODY MASS INDEX: 24.85 KG/M2 | SYSTOLIC BLOOD PRESSURE: 171 MMHG | WEIGHT: 160.25 LBS | DIASTOLIC BLOOD PRESSURE: 71 MMHG | BODY MASS INDEX: 25.15 KG/M2 | RESPIRATION RATE: 18 BRPM | HEART RATE: 63 BPM | DIASTOLIC BLOOD PRESSURE: 72 MMHG | OXYGEN SATURATION: 98 % | HEIGHT: 67 IN | WEIGHT: 158.31 LBS | TEMPERATURE: 98 F | TEMPERATURE: 98 F | OXYGEN SATURATION: 99 % | SYSTOLIC BLOOD PRESSURE: 172 MMHG | HEART RATE: 60 BPM

## 2018-01-01 VITALS
SYSTOLIC BLOOD PRESSURE: 135 MMHG | DIASTOLIC BLOOD PRESSURE: 75 MMHG | HEART RATE: 65 BPM | SYSTOLIC BLOOD PRESSURE: 156 MMHG | WEIGHT: 160.5 LBS | HEART RATE: 62 BPM | RESPIRATION RATE: 18 BRPM | BODY MASS INDEX: 25.14 KG/M2 | DIASTOLIC BLOOD PRESSURE: 63 MMHG

## 2018-01-01 VITALS
WEIGHT: 164.69 LBS | SYSTOLIC BLOOD PRESSURE: 140 MMHG | HEART RATE: 60 BPM | RESPIRATION RATE: 19 BRPM | BODY MASS INDEX: 25.85 KG/M2 | DIASTOLIC BLOOD PRESSURE: 78 MMHG | HEIGHT: 67 IN | OXYGEN SATURATION: 98 % | TEMPERATURE: 98 F

## 2018-01-01 DIAGNOSIS — Z09 CHEMOTHERAPY FOLLOW-UP EXAMINATION: ICD-10-CM

## 2018-01-01 DIAGNOSIS — C45.9 MESOTHELIOMA: Primary | ICD-10-CM

## 2018-01-01 DIAGNOSIS — C45.7 MESOTHELIOMA OF LEFT LUNG: ICD-10-CM

## 2018-01-01 DIAGNOSIS — J01.00 ACUTE NON-RECURRENT MAXILLARY SINUSITIS: ICD-10-CM

## 2018-01-01 DIAGNOSIS — C45.9 MESOTHELIOMA: ICD-10-CM

## 2018-01-01 DIAGNOSIS — C45.7 MESOTHELIOMA OF LEFT LUNG: Primary | ICD-10-CM

## 2018-01-01 DIAGNOSIS — K63.5 POLYP OF COLON, UNSPECIFIED PART OF COLON, UNSPECIFIED TYPE: ICD-10-CM

## 2018-01-01 DIAGNOSIS — Z87.19 HISTORY OF COLONIC DIVERTICULITIS: Primary | ICD-10-CM

## 2018-01-01 DIAGNOSIS — I10 ESSENTIAL HYPERTENSION: Primary | ICD-10-CM

## 2018-01-01 DIAGNOSIS — Z87.19 HISTORY OF COLONIC DIVERTICULITIS: ICD-10-CM

## 2018-01-01 DIAGNOSIS — K63.5 POLYP OF COLON, UNSPECIFIED PART OF COLON, UNSPECIFIED TYPE: Primary | ICD-10-CM

## 2018-01-01 LAB
ALBUMIN SERPL BCP-MCNC: 3.7 G/DL
ALBUMIN SERPL BCP-MCNC: 3.8 G/DL
ALBUMIN SERPL BCP-MCNC: 4.2 G/DL
ALBUMIN SERPL BCP-MCNC: 4.2 G/DL
ALP SERPL-CCNC: 62 U/L
ALP SERPL-CCNC: 70 U/L
ALP SERPL-CCNC: 70 U/L
ALP SERPL-CCNC: 74 U/L
ALT SERPL W/O P-5'-P-CCNC: 13 U/L
ALT SERPL W/O P-5'-P-CCNC: 22 U/L
ALT SERPL W/O P-5'-P-CCNC: 23 U/L
ALT SERPL W/O P-5'-P-CCNC: 33 U/L
ANION GAP SERPL CALC-SCNC: 6 MMOL/L
ANION GAP SERPL CALC-SCNC: 8 MMOL/L
ANION GAP SERPL CALC-SCNC: 8 MMOL/L
ANION GAP SERPL CALC-SCNC: 9 MMOL/L
AST SERPL-CCNC: 20 U/L
AST SERPL-CCNC: 32 U/L
AST SERPL-CCNC: 37 U/L
AST SERPL-CCNC: 47 U/L
BILIRUB SERPL-MCNC: 0.4 MG/DL
BILIRUB SERPL-MCNC: 0.5 MG/DL
BILIRUB SERPL-MCNC: 0.5 MG/DL
BILIRUB SERPL-MCNC: 0.6 MG/DL
BILIRUB UR QL STRIP: NEGATIVE
BUN SERPL-MCNC: 13 MG/DL
BUN SERPL-MCNC: 17 MG/DL
BUN SERPL-MCNC: 19 MG/DL
BUN SERPL-MCNC: 20 MG/DL
CALCIUM SERPL-MCNC: 9 MG/DL
CALCIUM SERPL-MCNC: 9.2 MG/DL
CALCIUM SERPL-MCNC: 9.2 MG/DL
CALCIUM SERPL-MCNC: 9.6 MG/DL
CHLORIDE SERPL-SCNC: 102 MMOL/L
CHLORIDE SERPL-SCNC: 102 MMOL/L
CHLORIDE SERPL-SCNC: 103 MMOL/L
CHLORIDE SERPL-SCNC: 104 MMOL/L
CLARITY UR REFRACT.AUTO: CLEAR
CO2 SERPL-SCNC: 24 MMOL/L
CO2 SERPL-SCNC: 28 MMOL/L
CO2 SERPL-SCNC: 28 MMOL/L
CO2 SERPL-SCNC: 29 MMOL/L
COLOR UR AUTO: ABNORMAL
CREAT SERPL-MCNC: 0.84 MG/DL
CREAT SERPL-MCNC: 0.87 MG/DL
CREAT SERPL-MCNC: 0.87 MG/DL
CREAT SERPL-MCNC: 0.9 MG/DL
ERYTHROCYTE [DISTWIDTH] IN BLOOD BY AUTOMATED COUNT: 16.4 %
ERYTHROCYTE [DISTWIDTH] IN BLOOD BY AUTOMATED COUNT: 16.8 %
ERYTHROCYTE [DISTWIDTH] IN BLOOD BY AUTOMATED COUNT: 17 %
ERYTHROCYTE [DISTWIDTH] IN BLOOD BY AUTOMATED COUNT: 17 %
EST. GFR  (AFRICAN AMERICAN): >60 ML/MIN/1.73 M^2
EST. GFR  (NON AFRICAN AMERICAN): >60 ML/MIN/1.73 M^2
GLUCOSE SERPL-MCNC: 107 MG/DL
GLUCOSE SERPL-MCNC: 108 MG/DL
GLUCOSE SERPL-MCNC: 130 MG/DL
GLUCOSE SERPL-MCNC: 90 MG/DL
GLUCOSE UR QL STRIP: NEGATIVE
HCT VFR BLD AUTO: 36.1 %
HCT VFR BLD AUTO: 36.7 %
HCT VFR BLD AUTO: 37.2 %
HCT VFR BLD AUTO: 37.5 %
HGB BLD-MCNC: 11 G/DL
HGB BLD-MCNC: 11.6 G/DL
HGB BLD-MCNC: 11.7 G/DL
HGB BLD-MCNC: 11.8 G/DL
HGB UR QL STRIP: NEGATIVE
IMM GRANULOCYTES # BLD AUTO: 0.04 K/UL
KETONES UR QL STRIP: NEGATIVE
LEUKOCYTE ESTERASE UR QL STRIP: ABNORMAL
MCH RBC QN AUTO: 29.9 PG
MCH RBC QN AUTO: 30.1 PG
MCH RBC QN AUTO: 30.2 PG
MCH RBC QN AUTO: 31 PG
MCHC RBC AUTO-ENTMCNC: 30.5 G/DL
MCHC RBC AUTO-ENTMCNC: 31.2 G/DL
MCHC RBC AUTO-ENTMCNC: 31.5 G/DL
MCHC RBC AUTO-ENTMCNC: 31.9 G/DL
MCV RBC AUTO: 96 FL
MCV RBC AUTO: 97 FL
MCV RBC AUTO: 97 FL
MCV RBC AUTO: 98 FL
MICROSCOPIC COMMENT: NORMAL
NEUTROPHILS # BLD AUTO: 3 K/UL
NEUTROPHILS # BLD AUTO: 3.3 K/UL
NEUTROPHILS # BLD AUTO: 4.2 K/UL
NEUTROPHILS # BLD AUTO: 5.1 K/UL
NITRITE UR QL STRIP: NEGATIVE
PH UR STRIP: 5 [PH] (ref 5–8)
PLATELET # BLD AUTO: 192 K/UL
PLATELET # BLD AUTO: 193 K/UL
PLATELET # BLD AUTO: 206 K/UL
PLATELET # BLD AUTO: 317 K/UL
PMV BLD AUTO: 9.6 FL
PMV BLD AUTO: 9.7 FL
PMV BLD AUTO: 9.7 FL
PMV BLD AUTO: 9.8 FL
POCT GLUCOSE: 105 MG/DL (ref 70–110)
POCT GLUCOSE: 93 MG/DL (ref 70–110)
POTASSIUM SERPL-SCNC: 3.8 MMOL/L
POTASSIUM SERPL-SCNC: 3.9 MMOL/L
POTASSIUM SERPL-SCNC: 4.2 MMOL/L
POTASSIUM SERPL-SCNC: 4.5 MMOL/L
PROT SERPL-MCNC: 7.2 G/DL
PROT SERPL-MCNC: 7.7 G/DL
PROT SERPL-MCNC: 7.8 G/DL
PROT SERPL-MCNC: 7.9 G/DL
PROT UR QL STRIP: ABNORMAL
RBC # BLD AUTO: 3.68 M/UL
RBC # BLD AUTO: 3.78 M/UL
RBC # BLD AUTO: 3.84 M/UL
RBC # BLD AUTO: 3.92 M/UL
SODIUM SERPL-SCNC: 135 MMOL/L
SODIUM SERPL-SCNC: 138 MMOL/L
SODIUM SERPL-SCNC: 139 MMOL/L
SODIUM SERPL-SCNC: 139 MMOL/L
SP GR UR STRIP: 1.02 (ref 1–1.03)
URN SPEC COLLECT METH UR: ABNORMAL
UROBILINOGEN UR STRIP-ACNC: NEGATIVE EU/DL
WBC # BLD AUTO: 4.6 K/UL
WBC # BLD AUTO: 5.22 K/UL
WBC # BLD AUTO: 5.51 K/UL
WBC # BLD AUTO: 7.82 K/UL
WBC #/AREA URNS AUTO: 3 /HPF (ref 0–5)

## 2018-01-01 PROCEDURE — 99999 PR PBB SHADOW E&M-EST. PATIENT-LVL III: CPT | Mod: PBBFAC,,, | Performed by: INTERNAL MEDICINE

## 2018-01-01 PROCEDURE — 96411 CHEMO IV PUSH ADDL DRUG: CPT

## 2018-01-01 PROCEDURE — 85027 COMPLETE CBC AUTOMATED: CPT | Mod: PO

## 2018-01-01 PROCEDURE — 25000003 PHARM REV CODE 250: Performed by: INTERNAL MEDICINE

## 2018-01-01 PROCEDURE — 99215 OFFICE O/P EST HI 40 MIN: CPT | Mod: S$GLB,,, | Performed by: INTERNAL MEDICINE

## 2018-01-01 PROCEDURE — A4216 STERILE WATER/SALINE, 10 ML: HCPCS | Performed by: INTERNAL MEDICINE

## 2018-01-01 PROCEDURE — 1101F PT FALLS ASSESS-DOCD LE1/YR: CPT | Mod: CPTII,S$GLB,, | Performed by: INTERNAL MEDICINE

## 2018-01-01 PROCEDURE — 81000 URINALYSIS NONAUTO W/SCOPE: CPT | Mod: PO

## 2018-01-01 PROCEDURE — 99213 OFFICE O/P EST LOW 20 MIN: CPT | Mod: S$GLB,,, | Performed by: FAMILY MEDICINE

## 2018-01-01 PROCEDURE — 3078F DIAST BP <80 MM HG: CPT | Mod: CPTII,S$GLB,, | Performed by: INTERNAL MEDICINE

## 2018-01-01 PROCEDURE — 96367 TX/PROPH/DG ADDL SEQ IV INF: CPT

## 2018-01-01 PROCEDURE — 99999 PR PBB SHADOW E&M-EST. PATIENT-LVL IV: CPT | Mod: PBBFAC,,, | Performed by: INTERNAL MEDICINE

## 2018-01-01 PROCEDURE — 99215 OFFICE O/P EST HI 40 MIN: CPT | Mod: S$PBB,,, | Performed by: INTERNAL MEDICINE

## 2018-01-01 PROCEDURE — 63600175 PHARM REV CODE 636 W HCPCS: Mod: JG | Performed by: INTERNAL MEDICINE

## 2018-01-01 PROCEDURE — A9552 F18 FDG: HCPCS

## 2018-01-01 PROCEDURE — 99213 OFFICE O/P EST LOW 20 MIN: CPT | Mod: PBBFAC | Performed by: INTERNAL MEDICINE

## 2018-01-01 PROCEDURE — 80053 COMPREHEN METABOLIC PANEL: CPT | Mod: PO

## 2018-01-01 PROCEDURE — 96413 CHEMO IV INFUSION 1 HR: CPT

## 2018-01-01 PROCEDURE — 96417 CHEMO IV INFUS EACH ADDL SEQ: CPT

## 2018-01-01 PROCEDURE — 3079F DIAST BP 80-89 MM HG: CPT | Mod: CPTII,,, | Performed by: INTERNAL MEDICINE

## 2018-01-01 PROCEDURE — 96365 THER/PROPH/DIAG IV INF INIT: CPT

## 2018-01-01 PROCEDURE — 1101F PT FALLS ASSESS-DOCD LE1/YR: CPT | Mod: CPTII,,, | Performed by: INTERNAL MEDICINE

## 2018-01-01 PROCEDURE — 3077F SYST BP >= 140 MM HG: CPT | Mod: CPTII,S$GLB,, | Performed by: INTERNAL MEDICINE

## 2018-01-01 PROCEDURE — 78815 PET IMAGE W/CT SKULL-THIGH: CPT | Mod: 26,PS,, | Performed by: RADIOLOGY

## 2018-01-01 PROCEDURE — 3074F SYST BP LT 130 MM HG: CPT | Mod: CPTII,S$GLB,, | Performed by: INTERNAL MEDICINE

## 2018-01-01 PROCEDURE — 3079F DIAST BP 80-89 MM HG: CPT | Mod: CPTII,S$GLB,, | Performed by: INTERNAL MEDICINE

## 2018-01-01 PROCEDURE — 63600175 PHARM REV CODE 636 W HCPCS: Performed by: INTERNAL MEDICINE

## 2018-01-01 PROCEDURE — 80053 COMPREHEN METABOLIC PANEL: CPT

## 2018-01-01 PROCEDURE — 3077F SYST BP >= 140 MM HG: CPT | Mod: CPTII,,, | Performed by: INTERNAL MEDICINE

## 2018-01-01 PROCEDURE — 3075F SYST BP GE 130 - 139MM HG: CPT | Mod: CPTII,S$GLB,, | Performed by: FAMILY MEDICINE

## 2018-01-01 PROCEDURE — 36415 COLL VENOUS BLD VENIPUNCTURE: CPT | Mod: PO

## 2018-01-01 PROCEDURE — 78815 PET IMAGE W/CT SKULL-THIGH: CPT | Mod: TC

## 2018-01-01 PROCEDURE — 3080F DIAST BP >= 90 MM HG: CPT | Mod: CPTII,,, | Performed by: INTERNAL MEDICINE

## 2018-01-01 PROCEDURE — 3077F SYST BP >= 140 MM HG: CPT | Mod: CPTII,S$GLB,, | Performed by: FAMILY MEDICINE

## 2018-01-01 PROCEDURE — 99214 OFFICE O/P EST MOD 30 MIN: CPT | Mod: S$GLB,,, | Performed by: INTERNAL MEDICINE

## 2018-01-01 PROCEDURE — 96372 THER/PROPH/DIAG INJ SC/IM: CPT | Mod: 59

## 2018-01-01 PROCEDURE — 96366 THER/PROPH/DIAG IV INF ADDON: CPT

## 2018-01-01 PROCEDURE — 1101F PT FALLS ASSESS-DOCD LE1/YR: CPT | Mod: CPTII,S$GLB,, | Performed by: FAMILY MEDICINE

## 2018-01-01 PROCEDURE — 99213 OFFICE O/P EST LOW 20 MIN: CPT | Mod: PBBFAC,25 | Performed by: INTERNAL MEDICINE

## 2018-01-01 PROCEDURE — 3079F DIAST BP 80-89 MM HG: CPT | Mod: CPTII,S$GLB,, | Performed by: FAMILY MEDICINE

## 2018-01-01 PROCEDURE — 85027 COMPLETE CBC AUTOMATED: CPT

## 2018-01-01 PROCEDURE — 99214 OFFICE O/P EST MOD 30 MIN: CPT | Mod: PBBFAC | Performed by: INTERNAL MEDICINE

## 2018-01-01 PROCEDURE — 3078F DIAST BP <80 MM HG: CPT | Mod: CPTII,,, | Performed by: INTERNAL MEDICINE

## 2018-01-01 PROCEDURE — 78815 PET IMAGE W/CT SKULL-THIGH: CPT | Mod: 26,PI,, | Performed by: RADIOLOGY

## 2018-01-01 PROCEDURE — 78815 PET IMAGE W/CT SKULL-THIGH: CPT | Mod: TC,PS

## 2018-01-01 PROCEDURE — 63600175 PHARM REV CODE 636 W HCPCS: Mod: JW,JG | Performed by: INTERNAL MEDICINE

## 2018-01-01 PROCEDURE — 36415 COLL VENOUS BLD VENIPUNCTURE: CPT

## 2018-01-01 RX ORDER — HEPARIN 100 UNIT/ML
500 SYRINGE INTRAVENOUS
Status: CANCELLED | OUTPATIENT
Start: 2018-01-01

## 2018-01-01 RX ORDER — DEXAMETHASONE 6 MG/1
TABLET ORAL
Qty: 30 TABLET | Refills: 3 | Status: ON HOLD | OUTPATIENT
Start: 2018-01-01 | End: 2019-01-01 | Stop reason: CLARIF

## 2018-01-01 RX ORDER — SODIUM CHLORIDE 0.9 % (FLUSH) 0.9 %
10 SYRINGE (ML) INJECTION
Status: DISCONTINUED | OUTPATIENT
Start: 2018-01-01 | End: 2018-01-01 | Stop reason: HOSPADM

## 2018-01-01 RX ORDER — HEPARIN 100 UNIT/ML
500 SYRINGE INTRAVENOUS
Status: DISCONTINUED | OUTPATIENT
Start: 2018-01-01 | End: 2018-01-01 | Stop reason: HOSPADM

## 2018-01-01 RX ORDER — AMOXICILLIN AND CLAVULANATE POTASSIUM 875; 125 MG/1; MG/1
1 TABLET, FILM COATED ORAL EVERY 12 HOURS
Qty: 20 TABLET | Refills: 0 | Status: SHIPPED | OUTPATIENT
Start: 2018-01-01 | End: 2018-01-01 | Stop reason: SDUPTHER

## 2018-01-01 RX ORDER — SODIUM CHLORIDE 0.9 % (FLUSH) 0.9 %
10 SYRINGE (ML) INJECTION
Status: CANCELLED | OUTPATIENT
Start: 2018-01-01

## 2018-01-01 RX ORDER — CLONIDINE HYDROCHLORIDE 0.1 MG/1
0.1 TABLET ORAL
Status: COMPLETED | OUTPATIENT
Start: 2018-01-01 | End: 2018-01-01

## 2018-01-01 RX ORDER — CYANOCOBALAMIN 1000 UG/ML
1000 INJECTION, SOLUTION INTRAMUSCULAR; SUBCUTANEOUS ONCE
Status: CANCELLED
Start: 2018-01-01

## 2018-01-01 RX ORDER — CYANOCOBALAMIN 1000 UG/ML
1000 INJECTION, SOLUTION INTRAMUSCULAR; SUBCUTANEOUS ONCE
Status: COMPLETED | OUTPATIENT
Start: 2018-01-01 | End: 2018-01-01

## 2018-01-01 RX ORDER — CIPROFLOXACIN 500 MG/1
TABLET ORAL
Qty: 30 TABLET | Refills: 0 | Status: SHIPPED | OUTPATIENT
Start: 2018-01-01 | End: 2018-01-01

## 2018-01-01 RX ORDER — AMOXICILLIN AND CLAVULANATE POTASSIUM 875; 125 MG/1; MG/1
1 TABLET, FILM COATED ORAL EVERY 12 HOURS
Qty: 20 TABLET | Refills: 0 | Status: SHIPPED | OUTPATIENT
Start: 2018-01-01 | End: 2018-01-01

## 2018-01-01 RX ORDER — FOLIC ACID 1 MG/1
1 TABLET ORAL DAILY
Qty: 60 TABLET | Refills: 3 | Status: SHIPPED | OUTPATIENT
Start: 2018-01-01 | End: 2019-01-01

## 2018-01-01 RX ADMIN — CYANOCOBALAMIN 1000 MCG: 1000 INJECTION, SOLUTION INTRAMUSCULAR at 11:09

## 2018-01-01 RX ADMIN — SODIUM CHLORIDE 950 MG: 9 INJECTION, SOLUTION INTRAVENOUS at 02:11

## 2018-01-01 RX ADMIN — SODIUM CHLORIDE: 0.9 INJECTION, SOLUTION INTRAVENOUS at 12:12

## 2018-01-01 RX ADMIN — PALONOSETRON HYDROCHLORIDE 0.25 MG: 0.25 INJECTION INTRAVENOUS at 12:12

## 2018-01-01 RX ADMIN — BEVACIZUMAB 1120 MG: 400 INJECTION, SOLUTION INTRAVENOUS at 01:11

## 2018-01-01 RX ADMIN — DEXAMETHASONE SODIUM PHOSPHATE 10 MG: 4 INJECTION, SOLUTION INTRA-ARTICULAR; INTRALESIONAL; INTRAMUSCULAR; INTRAVENOUS; SOFT TISSUE at 10:10

## 2018-01-01 RX ADMIN — DEXAMETHASONE SODIUM PHOSPHATE 10 MG: 4 INJECTION, SOLUTION INTRA-ARTICULAR; INTRALESIONAL; INTRAMUSCULAR; INTRAVENOUS; SOFT TISSUE at 12:12

## 2018-01-01 RX ADMIN — DEXAMETHASONE SODIUM PHOSPHATE 10 MG: 4 INJECTION, SOLUTION INTRA-ARTICULAR; INTRALESIONAL; INTRAMUSCULAR; INTRAVENOUS; SOFT TISSUE at 01:11

## 2018-01-01 RX ADMIN — PALONOSETRON HYDROCHLORIDE 0.25 MG: 0.25 INJECTION, SOLUTION INTRAVENOUS at 11:09

## 2018-01-01 RX ADMIN — SODIUM CHLORIDE 950 MG: 9 INJECTION, SOLUTION INTRAVENOUS at 01:12

## 2018-01-01 RX ADMIN — BEVACIZUMAB 1120 MG: 400 INJECTION, SOLUTION INTRAVENOUS at 11:10

## 2018-01-01 RX ADMIN — CYANOCOBALAMIN 1000 MCG: 1000 INJECTION, SOLUTION INTRAMUSCULAR at 12:12

## 2018-01-01 RX ADMIN — SODIUM CHLORIDE: 0.9 INJECTION, SOLUTION INTRAVENOUS at 01:11

## 2018-01-01 RX ADMIN — SODIUM CHLORIDE 950 MG: 9 INJECTION, SOLUTION INTRAVENOUS at 11:09

## 2018-01-01 RX ADMIN — PALONOSETRON HYDROCHLORIDE 0.25 MG: 0.25 INJECTION, SOLUTION INTRAVENOUS at 11:10

## 2018-01-01 RX ADMIN — CLONIDINE HYDROCHLORIDE 0.1 MG: 0.1 TABLET ORAL at 01:11

## 2018-01-01 RX ADMIN — Medication 10 ML: at 11:09

## 2018-01-01 RX ADMIN — SODIUM CHLORIDE 950 MG: 9 INJECTION, SOLUTION INTRAVENOUS at 12:10

## 2018-01-01 RX ADMIN — SODIUM CHLORIDE: 0.9 INJECTION, SOLUTION INTRAVENOUS at 09:09

## 2018-01-01 RX ADMIN — PALONOSETRON HYDROCHLORIDE 0.25 MG: 0.25 INJECTION, SOLUTION INTRAVENOUS at 10:09

## 2018-01-01 RX ADMIN — PALONOSETRON 0.25 MG: 0.25 INJECTION, SOLUTION INTRAVENOUS at 12:12

## 2018-01-01 RX ADMIN — DEXAMETHASONE SODIUM PHOSPHATE: 4 INJECTION, SOLUTION INTRA-ARTICULAR; INTRALESIONAL; INTRAMUSCULAR; INTRAVENOUS; SOFT TISSUE at 01:11

## 2018-01-01 RX ADMIN — BEVACIZUMAB 1120 MG: 400 INJECTION, SOLUTION INTRAVENOUS at 10:09

## 2018-01-01 RX ADMIN — DEXAMETHASONE SODIUM PHOSPHATE 10 MG: 4 INJECTION, SOLUTION INTRA-ARTICULAR; INTRALESIONAL; INTRAMUSCULAR; INTRAVENOUS; SOFT TISSUE at 09:09

## 2018-01-01 RX ADMIN — BEVACIZUMAB 1120 MG: 400 INJECTION, SOLUTION INTRAVENOUS at 01:12

## 2018-01-01 RX ADMIN — SODIUM CHLORIDE 950 MG: 9 INJECTION, SOLUTION INTRAVENOUS at 01:11

## 2018-01-01 RX ADMIN — PALONOSETRON HYDROCHLORIDE 0.25 MG: 0.25 INJECTION, SOLUTION INTRAVENOUS at 01:11

## 2018-01-01 RX ADMIN — BEVACIZUMAB 1120 MG: 400 INJECTION, SOLUTION INTRAVENOUS at 11:09

## 2018-01-01 RX ADMIN — DEXAMETHASONE SODIUM PHOSPHATE 10 MG: 4 INJECTION, SOLUTION INTRA-ARTICULAR; INTRALESIONAL; INTRAMUSCULAR; INTRAVENOUS; SOFT TISSUE at 11:09

## 2018-01-04 ENCOUNTER — PATIENT MESSAGE (OUTPATIENT)
Dept: FAMILY MEDICINE | Facility: CLINIC | Age: 78
End: 2018-01-04

## 2018-01-05 ENCOUNTER — HOSPITAL ENCOUNTER (EMERGENCY)
Facility: HOSPITAL | Age: 78
Discharge: HOME | End: 2018-01-05
Attending: EMERGENCY MEDICINE
Payer: MEDICARE

## 2018-01-05 VITALS
HEART RATE: 77 BPM | TEMPERATURE: 98 F | WEIGHT: 176 LBS | BODY MASS INDEX: 25.2 KG/M2 | DIASTOLIC BLOOD PRESSURE: 85 MMHG | HEIGHT: 70 IN | SYSTOLIC BLOOD PRESSURE: 150 MMHG | OXYGEN SATURATION: 97 % | RESPIRATION RATE: 17 BRPM

## 2018-01-05 DIAGNOSIS — J90 PLEURAL EFFUSION: Primary | ICD-10-CM

## 2018-01-05 DIAGNOSIS — R06.02 SHORTNESS OF BREATH: ICD-10-CM

## 2018-01-05 LAB
ANION GAP SERPL CALC-SCNC: 10 MMOL/L
BUN SERPL-MCNC: 13 MG/DL
CALCIUM SERPL-MCNC: 9.3 MG/DL
CHLORIDE SERPL-SCNC: 102 MMOL/L
CO2 SERPL-SCNC: 25 MMOL/L
CREAT SERPL-MCNC: 0.8 MG/DL
ERYTHROCYTE [DISTWIDTH] IN BLOOD BY AUTOMATED COUNT: 12.8 %
EST. GFR  (AFRICAN AMERICAN): >60 ML/MIN/1.73 M^2
EST. GFR  (NON AFRICAN AMERICAN): >60 ML/MIN/1.73 M^2
GLUCOSE SERPL-MCNC: 111 MG/DL
HCT VFR BLD AUTO: 35.1 %
HGB BLD-MCNC: 11.4 G/DL
MCH RBC QN AUTO: 28.6 PG
MCHC RBC AUTO-ENTMCNC: 32.5 G/DL
MCV RBC AUTO: 88 FL
PLATELET # BLD AUTO: 291 K/UL
PMV BLD AUTO: 9 FL
POTASSIUM SERPL-SCNC: 4.2 MMOL/L
RBC # BLD AUTO: 3.98 M/UL
SODIUM SERPL-SCNC: 137 MMOL/L
WBC # BLD AUTO: 7.09 K/UL

## 2018-01-05 PROCEDURE — 99284 EMERGENCY DEPT VISIT MOD MDM: CPT

## 2018-01-05 PROCEDURE — 80048 BASIC METABOLIC PNL TOTAL CA: CPT

## 2018-01-05 PROCEDURE — 85027 COMPLETE CBC AUTOMATED: CPT

## 2018-01-05 NOTE — H&P
Inpatient Radiology Pre-procedure Note    History of Present Illness:  Cale Harding is a 77 y.o. male who presents for thoracentesis.  Admission H&P reviewed.  Past Medical History:   Diagnosis Date    Cancer     prostate    Diverticulitis     Hypertension     Urinary tract infection      Past Surgical History:   Procedure Laterality Date    COLONOSCOPY  10/20/2012    COLONOSCOPY  11/19/2014    dr machado ( repeat in 3 years per the pt )    ELBOW SURGERY Left 2013    dislocation    PROSTATE SURGERY      SHOULDER ARTHROSCOPY W/ ROTATOR CUFF REPAIR Right        Review of Systems:   As documented in primary team H&P    Home Meds:   Prior to Admission medications    Medication Sig Start Date End Date Taking? Authorizing Provider   aspirin (ECOTRIN) 81 MG EC tablet Take 81 mg by mouth once daily.    Historical Provider, MD   enalapril (VASOTEC) 5 MG tablet Take 1 tablet (5 mg total) by mouth 2 (two) times daily. 4/13/17   Jj Escobedo MD   hydrocortisone (WESTCORT) 0.2 % cream Apply topically 2 (two) times daily. 10/11/17 10/21/17  Jj Escobedo MD   LACTOBACILLUS ACIDOPHILUS (ACIDOPHILUS ORAL) Take 1 tablet by mouth once daily.    Historical Provider, MD   naproxen (NAPROSYN) 500 MG tablet Take 1 tablet (500 mg total) by mouth 2 (two) times daily as needed. 11/16/17   Alma Mercado, NP   psyllium (METAMUCIL) packet Take 1 packet by mouth daily as needed.     Historical Provider, MD   ranitidine (ZANTAC) 300 MG tablet Take 1 tablet (300 mg total) by mouth every evening. 12/7/17 12/7/18  Sreedhar Padilla Jr., MD   vitamin D 1000 units Tab Take 1,000 mg by mouth once daily.     Historical Provider, MD     Scheduled Meds:   Continuous Infusions:   PRN Meds:  Anticoagulants/Antiplatelets: aspirin    Allergies:   Review of patient's allergies indicates:   Allergen Reactions    Bactrim [sulfamethoxazole-trimethoprim]      Sedation Hx: have not been any systemic reactions    Labs:  No results  for input(s): INR in the last 168 hours.    Invalid input(s):  PT,  PTT    Recent Labs  Lab 01/05/18  1550   WBC 7.09   HGB 11.4*   HCT 35.1*   MCV 88         Recent Labs  Lab 01/05/18  1550   *      K 4.2      CO2 25   BUN 13   CREATININE 0.8   CALCIUM 9.3         Vitals:  Temp: 98.2 °F (36.8 °C) (01/05/18 1157)  Pulse: 92 (01/05/18 1700)  Resp: 16 (01/05/18 1700)  BP: (!) 150/75 (01/05/18 1700)  SpO2: 99 % (01/05/18 1700)     Physical Exam:      General: no acute distress  Mental Status: alert and oriented to person, place and time  HEENT: normocephalic, atraumatic  Chest: unlabored breathing  Heart: regular heart rate  Abdomen: nondistended  Extremity: moves all extremities    Plan: US guided thoracentesis. Informed consent obtained.  Sedation Plan: Local    Yosef Abreu MD  Department of Radiology  Pager: [phone]

## 2018-01-05 NOTE — ED NOTES
Pt reports hx of pleural effusion, pt reports he had some fluid drained previously but procedure was stopped due to pain, pt reports increased sob on exertion for the past 2 weeks that is getting worse, pt denies chest pain currently, denies abd pain, denies fever, denies n/v/d, family in room with pt

## 2018-01-05 NOTE — CONSULTS
Radiology Post-Procedure Note    Pre Op Diagnosis: Left Pleural effusion  Post Op Diagnosis: Same    Procedure: US guided left thoracentesis    Procedure performed by: Yosef Abreu MD    Written Informed Consent Obtained: Yes  Specimen Removed:  cc sanguinous fluid  Estimated Blood Loss: Minimal    Findings:   US demonstrated a complex septated pleural fluid collection. Real time US guidance was performed and a 5 Fr catheter was advanced into the pleural space. A wire was advanced to try to break up the loculations. Only 400 cc of sanguinous fluid could be obtained likely secondary to the complex nature of the fluid. Case discussed with Dr. Singh of the emergency department.    Patient tolerated procedure well.    Yosef Abreu MD  Department of Radiology  Pager: [phone]

## 2018-01-05 NOTE — ED PROVIDER NOTES
Encounter Date: 1/5/2018    SCRIBE #1 NOTE: I, Kathleen Paiz, am scribing for, and in the presence of,  Dr. Singh. I have scribed the entire note.       History     Chief Complaint   Patient presents with    Fluid in Lungs     Had IR thoracenteisis by Dr Nagel on 12/22, 1.25L taken off but could not tolerate entire procedure.  Follow-up today, told to come to ED for repeat thoracentesis     Time patient was seen by the provider: 2:52 PM    The patient is a 77 y.o. male with hx of prostate cancer and HTN who presents to the ED as referred by PCP with thoracentesis follow up. Pt had IR thoracenteisis by Dr Nagel on 12/22. 1.25 L was taken off at that time. Pt states that he felt excruciating pain and could not finish the entire procedure. He notes that he was told to follow up with pulmonologist for next appointment. However, he has not been able to get in touch with his pulmonologist. He went to see his PCP today, who sent him here to the ED for further evaluation. Pt endorses shortness of breath that has been progressing over the last week after the procedure. Pt is not currently on O2 at home. Pt denies chest pain at this time. He denies abdominal pain, fever, or N/V/D.       The history is provided by the patient.     Review of patient's allergies indicates:   Allergen Reactions    Bactrim [sulfamethoxazole-trimethoprim]      Past Medical History:   Diagnosis Date    Cancer     prostate    Diverticulitis     Hypertension     Urinary tract infection      Past Surgical History:   Procedure Laterality Date    COLONOSCOPY  10/20/2012    COLONOSCOPY  11/19/2014    dr machado ( repeat in 3 years per the pt )    ELBOW SURGERY Left 2013    dislocation    PROSTATE SURGERY      SHOULDER ARTHROSCOPY W/ ROTATOR CUFF REPAIR Right      Family History   Problem Relation Age of Onset    Heart disease Father     Heart disease Brother     [facility] Mother      [facility]    No Known Problems Son      No Known Problems Son     No Known Problems Son     Prostate cancer Neg Hx     Kidney disease Neg Hx      Social History   Substance Use Topics    Smoking status: Former Smoker     Packs/day: 3.00     Types: Cigarettes     Quit date: 1970    Smokeless tobacco: Former User      Comment: pt quit 40 years ago    Alcohol use 16.8 oz/week     28 Cans of beer per week      Comment: occ     Review of Systems   Constitutional: Negative for chills, diaphoresis, fatigue and fever.   HENT: Negative for congestion, rhinorrhea, sneezing, sore throat, trouble swallowing and voice change.    Eyes: Negative for pain and visual disturbance.   Respiratory: Positive for shortness of breath. Negative for cough and choking.    Cardiovascular: Negative for chest pain, palpitations and leg swelling.   Gastrointestinal: Negative for abdominal distention, abdominal pain, constipation, diarrhea, nausea and vomiting.   Genitourinary: Negative for difficulty urinating, dysuria, frequency and hematuria.   Musculoskeletal: Negative for arthralgias, back pain, gait problem, myalgias, neck pain and neck stiffness.   Skin: Negative for color change, pallor and rash.   Neurological: Negative for dizziness, seizures, speech difficulty, weakness, numbness and headaches.   Hematological: Does not bruise/bleed easily.   Psychiatric/Behavioral: Negative for confusion.       Physical Exam     Initial Vitals [01/05/18 1157]   BP Pulse Resp Temp SpO2   137/87 98 19 98.2 °F (36.8 °C) 99 %      MAP       103.67         Physical Exam    Nursing note and vitals reviewed.  Constitutional: He appears well-developed and well-nourished. No distress.   HENT:   Head: Normocephalic and atraumatic.   Mouth/Throat: Oropharynx is clear and moist.   Eyes: EOM are normal. Pupils are equal, round, and reactive to light.   Neck: Normal range of motion. Neck supple. No tracheal deviation present.   Cardiovascular: Normal rate, regular rhythm, normal heart sounds and  intact distal pulses.   Pulmonary/Chest: No stridor. No respiratory distress. He has decreased breath sounds in the left middle field and the left lower field. He has no wheezes.   No acute distress. 2L O2 in place via NC.    Abdominal: Soft. Bowel sounds are normal. He exhibits no distension and no mass. There is no tenderness.   Musculoskeletal: Normal range of motion. He exhibits no edema.   Neurological: He is alert and oriented to person, place, and time. He has normal strength. No cranial nerve deficit or sensory deficit.   Skin: Skin is warm and dry. No rash noted.   Psychiatric: He has a normal mood and affect. His behavior is normal.         ED Course   Procedures  Labs Reviewed   CBC WITHOUT DIFFERENTIAL - Abnormal; Notable for the following:        Result Value    RBC 3.98 (*)     Hemoglobin 11.4 (*)     Hematocrit 35.1 (*)     MPV 9.0 (*)     All other components within normal limits   BASIC METABOLIC PANEL - Abnormal; Notable for the following:     Glucose 111 (*)     All other components within normal limits        Imaging Results          X-Ray Chest 1 View (In process)                X-Ray Chest PA And Lateral (Final result)  Result time 01/05/18 14:57:26    Final result by Jaime Herring MD (01/05/18 14:57:26)                 Impression:     Mild-to-moderate left pleural effusion, compression atelectasis left lower lobe, and lingula segment left upper lobe stable.      Electronically signed by: KERI HERRING MD  Date:     01/05/18  Time:    14:57              Narrative:    2 views of chest, comparison 2017.  Normal size heart obscured by left mild/moderate pleural effusion.  Compression atelectasis adjacent lung bases and base lingula upper lobe and base segment left lower lobe.  Right lung clear.                                 Medical Decision Making:   History:   Old Medical Records: I decided to obtain old medical records.  Initial Assessment:   77 y.o. Male presents to the ED as  referred by PCP for worsening SOB, evaluation for possible thoracentesis. Will order CXR to evaluate.   Differential Diagnosis:   Differential Diagnosis includes, but is not limited to:  PE, MI/ACS, pneumothorax, pericardial effusion/tamonade, pneumonia, lung abscess, pericarditis/myocarditis, pleural effusion, lung mass, CHF exacerbation, asthma exacerbation, COPD exacerbation, aspirated/ingested foreign body, airway obstruction, CO poisoning, anemia, metabolic derangement, allergy/atopy, influenza, viral URI, viral syndrome.    Clinical Tests:   Lab Tests: Ordered and Reviewed  Radiological Study: Ordered and Reviewed  ED Management:  CXR with persistent pleural effusion.     Given increasing symptoms, will discuss with IR for possible repeat thoracentesis.    5:24 PM   Pt taken to IR.   Per IR, only able to drain approximately 400 cc mostly bloody fluid.   Felt the pleural fluid was loculated and unable to completely drain.   Will order another CXR for further evaluation.      Repeat CXR with slight improvement in pleural effusion. No pneumothorax appreciated.    At time of shift change, patient's ED workup incomplete. Oncoming ED physician to continue care. All relevant details were discussed, including pending workup and planned disposition. See summary below.    Pending: ambulatory pulse ox  Disposition: pending patient's symptoms, place in observation vs discharge with close f/u                     ED Course      Clinical Impression:   The primary encounter diagnosis was Pleural effusion. A diagnosis of Shortness of breath was also pertinent to this visit.                           Lester Singh MD  01/23/18 1040

## 2018-01-05 NOTE — ED NOTES
IR reports 400ml fluid drained and pt remained stable during process on room air, X ray confirmed procedure successful

## 2018-01-05 NOTE — NURSING
Called report to [facility] (ED). Pt stable. See flowsheet. Pt transported back to ED 01 for discharge. Post-thoracentesis chest x-ray performed. Read by MD. No s/s of pleural effusion noted at this time. Pt. ok'd by MD for be transferred back to ED.

## 2018-01-06 ENCOUNTER — PATIENT MESSAGE (OUTPATIENT)
Dept: FAMILY MEDICINE | Facility: CLINIC | Age: 78
End: 2018-01-06

## 2018-01-06 NOTE — PROVIDER PROGRESS NOTES - EMERGENCY DEPT.
Encounter Date: 1/5/2018    ED Physician Progress Notes           I exited handoff of this patient from Dr. Singh for discharge.  Patient came to the ER today for recurrent pleural effusion.  He was seen by IR today and had fluid drained.  He has just completed a walking trial with pulse ox and his oxygen saturations did not go below 95%.  He denies any discomfort after a few laps around the emergency room. Dr. Singh had discussed discharge with the patient including follow-up with his primary care physician and pulmonologist.  I will include their contact information on his discharge reported.  Patient comfortable with discharge at this time.

## 2018-01-11 ENCOUNTER — PATIENT MESSAGE (OUTPATIENT)
Dept: FAMILY MEDICINE | Facility: CLINIC | Age: 78
End: 2018-01-11

## 2018-01-11 ENCOUNTER — TELEPHONE (OUTPATIENT)
Dept: FAMILY MEDICINE | Facility: CLINIC | Age: 78
End: 2018-01-11

## 2018-01-11 DIAGNOSIS — J90 PLEURAL EFFUSION: Primary | ICD-10-CM

## 2018-01-11 NOTE — TELEPHONE ENCOUNTER
----- Message from Eliane Green sent at 1/11/2018  1:31 PM CST -----  Patient requesting a returned call from Dr Escobedo. Would like to discuss plueral effusion

## 2018-01-15 ENCOUNTER — HOSPITAL ENCOUNTER (OUTPATIENT)
Dept: RADIOLOGY | Facility: HOSPITAL | Age: 78
Discharge: HOME | End: 2018-01-15
Attending: INTERNAL MEDICINE
Payer: MEDICARE

## 2018-01-15 ENCOUNTER — OFFICE VISIT (OUTPATIENT)
Dept: PULMONOLOGY | Facility: CLINIC | Age: 78
End: 2018-01-15
Payer: MEDICARE

## 2018-01-15 ENCOUNTER — HOSPITAL ENCOUNTER (OUTPATIENT)
Dept: PULMONOLOGY | Facility: CLINIC | Age: 78
Discharge: HOME | End: 2018-01-15
Payer: MEDICARE

## 2018-01-15 VITALS
BODY MASS INDEX: 24.83 KG/M2 | RESPIRATION RATE: 14 BRPM | WEIGHT: 163.81 LBS | SYSTOLIC BLOOD PRESSURE: 138 MMHG | DIASTOLIC BLOOD PRESSURE: 76 MMHG | HEART RATE: 106 BPM | HEIGHT: 68 IN | OXYGEN SATURATION: 95 %

## 2018-01-15 DIAGNOSIS — J92.9 PLEURAL PLAQUE: ICD-10-CM

## 2018-01-15 DIAGNOSIS — Z77.090 HISTORY OF ASBESTOS EXPOSURE: ICD-10-CM

## 2018-01-15 DIAGNOSIS — J90 PLEURAL EFFUSION: ICD-10-CM

## 2018-01-15 DIAGNOSIS — I10 ESSENTIAL HYPERTENSION: ICD-10-CM

## 2018-01-15 DIAGNOSIS — J90 PLEURAL EFFUSION: Primary | ICD-10-CM

## 2018-01-15 DIAGNOSIS — Z85.46 HISTORY OF PROSTATE CANCER: ICD-10-CM

## 2018-01-15 LAB
PRE FEV1 FVC: 75
PRE FEV1: 2.29
PRE FVC: 3.04
PREDICTED FEV1 FVC: 78
PREDICTED FEV1: 2.77
PREDICTED FVC: 3.52

## 2018-01-15 PROCEDURE — 99499 UNLISTED E&M SERVICE: CPT | Mod: S$GLB,,, | Performed by: INTERNAL MEDICINE

## 2018-01-15 PROCEDURE — 71250 CT THORAX DX C-: CPT | Mod: TC

## 2018-01-15 PROCEDURE — 99204 OFFICE O/P NEW MOD 45 MIN: CPT | Mod: 25,S$GLB,, | Performed by: INTERNAL MEDICINE

## 2018-01-15 PROCEDURE — 94729 DIFFUSING CAPACITY: CPT | Mod: S$GLB,,, | Performed by: INTERNAL MEDICINE

## 2018-01-15 PROCEDURE — 99999 PR PBB SHADOW E&M-EST. PATIENT-LVL III: CPT | Mod: PBBFAC,,, | Performed by: INTERNAL MEDICINE

## 2018-01-15 PROCEDURE — 71250 CT THORAX DX C-: CPT | Mod: 26,,, | Performed by: RADIOLOGY

## 2018-01-15 PROCEDURE — 94010 BREATHING CAPACITY TEST: CPT | Mod: S$GLB,,, | Performed by: INTERNAL MEDICINE

## 2018-01-15 NOTE — PATIENT INSTRUCTIONS
Spirometry, DLCO, and CT Chest (phone report).  Final plan regarding further investigation based on CT findings.

## 2018-01-15 NOTE — PROGRESS NOTES
Subjective:       Patient ID: Cale Harding is a 77 y.o. male.    Chief Complaint: Pleural Effusion    HPI HISTORY OF PRESENT ILLNESS:  Mr. Harding is a 77-year-old remote former smoker,   who comes for assessment of a left pleural effusion.  He reports left thoracic   pain beginning in November of last year.  Radiographic studies at that time   revealed a moderate left pleural effusion.  He had a thoracentesis in December   with drainage over a liter of fluid (procedure ended due to thoracic pain).  He   then had a second thoracentesis earlier this month due to recurrent shortness of   breath.  At this time, only 400 mL of fluid could be removed.    At present, Mr. Harding has only fleeting discomfort in the left side of his   chest.  He has been aware of shortness of breath with moderate exertion.  He   feels that he struggles more with his breathing at the end of the day compared   to the morning hours.  He also reports a cough without any associated sputum   production.  He has not had fever, chills, or night sweats.    Mr. Harding does not know of any proven past lung diseases.  He takes   medication for control of high blood pressure.  He has generalized arthritis   symptoms which have never been investigated in a formal way.  He does not know   of any  history for cardiac dysfunction.  He has not had any ongoing upper GI   problems.  He had past surgery for prostate cancer.  He has regular monitoring,    and there has been no suspicion for recurrent disease.    Mr. Harding estimates that he smoked as much as two packs of cigarettes per day   for 50 years.  He discontinued smoking in the 1970s.  He believes his family   history is negative for lung diseases.  His work history is of note in that he   worked at [employer] between the years 1970 and 2004.  He believes that he had   intermittent exposure to asbestos during that period.  He also was in the [employer]   prior to that, and believes that he may have  had asbestos exposure there as well.    DATA:  I reviewed the images from the chest x-ray done earlier this month along   with the CT scan from November.  The cardiac silhouette is not enlarged.  These   studies show opacification at the left lung base (seen posteriorly in the     lateral view).  The right lung appears clear.  Findings are consistent with   a chronic left pleural effusion.  The CT images show modest compressive   atelectasis of the left lower lobe.  This pleural effusion was not present in a   CT done in April of last year.  There are several small subcentimeter nodular   densities present in the lungs.  These have been noted to be stable over a   period of two to three years by serial scans.      CB/HN  dd: 01/15/2018 19:48:21 (CST)  td: 01/16/2018 10:10:09 (CST)  Doc ID   #[id number]  Job ID #[id number]    CC:       Review of Systems   Constitutional: Negative for fever and fatigue.   HENT: Negative for postnasal drip, sinus pressure, voice change and congestion.    Respiratory: Positive for cough, pleurisy and dyspnea on extertion. Negative for sputum production, shortness of breath and wheezing.    Cardiovascular: Negative for chest pain and leg swelling.   Genitourinary: Negative for difficulty urinating.   Musculoskeletal: Positive for arthralgias. Negative for back pain.   Skin: Negative for rash.   Gastrointestinal: Negative for abdominal pain and acid reflux.   Neurological: Negative for dizziness and weakness.   Hematological: Negative for adenopathy.       Objective:      Physical Exam   Constitutional: He is oriented to person, place, and time. He appears well-developed and well-nourished.   HENT:   Head: Normocephalic.   Mouth/Throat: Oropharynx is clear and moist. No oropharyngeal exudate.   Neck: Normal range of motion. Neck supple. No JVD present. No tracheal deviation present. No thyromegaly present.   Cardiovascular: Normal rate, regular rhythm and normal heart sounds.    No murmur  heard.  Pulmonary/Chest: Normal expansion. No stridor. He has decreased breath sounds (decreased at L base). He has no wheezes. He has no rhonchi. He has no rales. He exhibits no tenderness.   Abdominal: Soft.   Musculoskeletal: He exhibits no edema.   Lymphadenopathy:     He has no cervical adenopathy.   Neurological: He is alert and oriented to person, place, and time.   Skin: Skin is warm and dry. No rash noted. No erythema. Nails show no clubbing.   Psychiatric: He has a normal mood and affect.   Vitals reviewed.    Personal Diagnostic Review    No flowsheet data found.      Assessment:       1. Pleural effusion    2. Pleural plaque    3. History of prostate cancer    4. Essential hypertension    5. History of asbestos exposure        Outpatient Encounter Prescriptions as of 1/15/2018   Medication Sig Dispense Refill    aspirin (ECOTRIN) 81 MG EC tablet Take 81 mg by mouth once daily.      enalapril (VASOTEC) 5 MG tablet Take 1 tablet (5 mg total) by mouth 2 (two) times daily. 180 tablet 3    hydrocortisone (WESTCORT) 0.2 % cream Apply topically 2 (two) times daily. 60 g 3    LACTOBACILLUS ACIDOPHILUS (ACIDOPHILUS ORAL) Take 1 tablet by mouth once daily.      naproxen (NAPROSYN) 500 MG tablet Take 1 tablet (500 mg total) by mouth 2 (two) times daily as needed. 30 tablet 0    psyllium (METAMUCIL) packet Take 1 packet by mouth daily as needed.       ranitidine (ZANTAC) 300 MG tablet Take 1 tablet (300 mg total) by mouth every evening. 30 tablet 11    vitamin D 1000 units Tab Take 1,000 mg by mouth once daily.        No facility-administered encounter medications on file as of 1/15/2018.      Orders Placed This Encounter   Procedures    CT Chest Without Contrast     Standing Status:   Future     Standing Expiration Date:   1/15/2019    Spirometry without bronchodilator     Standing Status:   Future     Number of Occurrences:   1     Standing Expiration Date:   1/15/2019    DLCO-Carbon Monoxide Diffusing  Capacity     Standing Status:   Future     Number of Occurrences:   1     Standing Expiration Date:   1/15/2019     Plan:     Spirometry, DLCO, and CT Chest (phone report).  Final plan regarding further investigation based on CT findings.

## 2018-01-16 ENCOUNTER — PATIENT MESSAGE (OUTPATIENT)
Dept: FAMILY MEDICINE | Facility: CLINIC | Age: 78
End: 2018-01-16

## 2018-01-20 ENCOUNTER — PATIENT MESSAGE (OUTPATIENT)
Dept: PULMONOLOGY | Facility: CLINIC | Age: 78
End: 2018-01-20

## 2018-01-22 ENCOUNTER — TELEPHONE (OUTPATIENT)
Dept: PULMONOLOGY | Facility: CLINIC | Age: 78
End: 2018-01-22

## 2018-01-22 ENCOUNTER — PATIENT MESSAGE (OUTPATIENT)
Dept: FAMILY MEDICINE | Facility: CLINIC | Age: 78
End: 2018-01-22

## 2018-01-22 DIAGNOSIS — J94.8 PLEURAL SCARRING: ICD-10-CM

## 2018-01-22 DIAGNOSIS — R89.9 ABNORMAL PLEURAL FLUID: Primary | ICD-10-CM

## 2018-01-22 PROCEDURE — 99499 UNLISTED E&M SERVICE: CPT | Mod: S$GLB,,, | Performed by: INTERNAL MEDICINE

## 2018-01-22 NOTE — TELEPHONE ENCOUNTER
Findings from recent CT discussed with patient.  There is pleural thickening and a small collection of fluid present at L base.  I am asking IR to see for biopsy of pleural abnormalities.  May need thoracic surgery eval if this fails to yield a clear diagnosis.

## 2018-01-25 LAB — FUNGUS SPEC CULT: NORMAL

## 2018-01-26 ENCOUNTER — TELEPHONE (OUTPATIENT)
Dept: FAMILY MEDICINE | Facility: CLINIC | Age: 78
End: 2018-01-26

## 2018-01-26 NOTE — TELEPHONE ENCOUNTER
I spoke to pt; numbers are good; she had trouble hearing due to screaming children in the background

## 2018-01-29 DIAGNOSIS — R89.9 ABNORMAL PLEURAL FLUID: Primary | ICD-10-CM

## 2018-01-31 DIAGNOSIS — R89.9 ABNORMAL PLEURAL FLUID: Primary | ICD-10-CM

## 2018-02-01 ENCOUNTER — HOSPITAL ENCOUNTER (OUTPATIENT)
Facility: HOSPITAL | Age: 78
Discharge: HOME | End: 2018-02-01
Attending: INTERNAL MEDICINE | Admitting: INTERNAL MEDICINE
Payer: MEDICARE

## 2018-02-01 VITALS
BODY MASS INDEX: 26.07 KG/M2 | HEART RATE: 65 BPM | TEMPERATURE: 98 F | SYSTOLIC BLOOD PRESSURE: 118 MMHG | HEIGHT: 68 IN | DIASTOLIC BLOOD PRESSURE: 65 MMHG | WEIGHT: 172 LBS | RESPIRATION RATE: 20 BRPM | OXYGEN SATURATION: 98 %

## 2018-02-01 DIAGNOSIS — Z77.090 HISTORY OF ASBESTOS EXPOSURE: Primary | ICD-10-CM

## 2018-02-01 DIAGNOSIS — R89.9 ABNORMAL PLEURAL FLUID: ICD-10-CM

## 2018-02-01 LAB
INR PPP: 1
PROTHROMBIN TIME: 10.7 SEC

## 2018-02-01 PROCEDURE — 25000003 PHARM REV CODE 250: Performed by: INTERNAL MEDICINE

## 2018-02-01 PROCEDURE — 88342 IMHCHEM/IMCYTCHM 1ST ANTB: CPT | Mod: 26,,, | Performed by: PATHOLOGY

## 2018-02-01 PROCEDURE — 88305 TISSUE EXAM BY PATHOLOGIST: CPT | Performed by: PATHOLOGY

## 2018-02-01 PROCEDURE — 88112 CYTOPATH CELL ENHANCE TECH: CPT | Mod: 26,,, | Performed by: PATHOLOGY

## 2018-02-01 PROCEDURE — 85610 PROTHROMBIN TIME: CPT

## 2018-02-01 PROCEDURE — 88333 PATH CONSLTJ SURG CYTO XM 1: CPT | Mod: 26,,, | Performed by: PATHOLOGY

## 2018-02-01 PROCEDURE — 63600175 PHARM REV CODE 636 W HCPCS: Performed by: RADIOLOGY

## 2018-02-01 RX ORDER — MIDAZOLAM HYDROCHLORIDE 1 MG/ML
1 INJECTION INTRAMUSCULAR; INTRAVENOUS
Status: DISCONTINUED | OUTPATIENT
Start: 2018-02-01 | End: 2018-02-01 | Stop reason: HOSPADM

## 2018-02-01 RX ORDER — MIDAZOLAM HYDROCHLORIDE 1 MG/ML
INJECTION INTRAMUSCULAR; INTRAVENOUS CODE/TRAUMA/SEDATION MEDICATION
Status: DISCONTINUED | OUTPATIENT
Start: 2018-02-01 | End: 2018-02-01 | Stop reason: HOSPADM

## 2018-02-01 RX ORDER — FENTANYL CITRATE 50 UG/ML
50 INJECTION, SOLUTION INTRAMUSCULAR; INTRAVENOUS
Status: DISCONTINUED | OUTPATIENT
Start: 2018-02-01 | End: 2018-02-01 | Stop reason: HOSPADM

## 2018-02-01 RX ORDER — LIDOCAINE HYDROCHLORIDE 10 MG/ML
1 INJECTION, SOLUTION EPIDURAL; INFILTRATION; INTRACAUDAL; PERINEURAL ONCE
Status: COMPLETED | OUTPATIENT
Start: 2018-02-01 | End: 2018-02-01

## 2018-02-01 RX ORDER — SODIUM CHLORIDE 9 MG/ML
500 INJECTION, SOLUTION INTRAVENOUS ONCE
Status: DISCONTINUED | OUTPATIENT
Start: 2018-02-01 | End: 2018-02-01 | Stop reason: HOSPADM

## 2018-02-01 RX ORDER — FENTANYL CITRATE 50 UG/ML
INJECTION, SOLUTION INTRAMUSCULAR; INTRAVENOUS CODE/TRAUMA/SEDATION MEDICATION
Status: DISCONTINUED | OUTPATIENT
Start: 2018-02-01 | End: 2018-02-01 | Stop reason: HOSPADM

## 2018-02-01 RX ADMIN — FENTANYL CITRATE 50 MCG: 50 INJECTION, SOLUTION INTRAMUSCULAR; INTRAVENOUS at 09:02

## 2018-02-01 RX ADMIN — LIDOCAINE HYDROCHLORIDE 10 MG: 10 INJECTION, SOLUTION EPIDURAL; INFILTRATION; INTRACAUDAL; PERINEURAL at 07:02

## 2018-02-01 RX ADMIN — MIDAZOLAM HYDROCHLORIDE 0.5 MG: 1 INJECTION, SOLUTION INTRAMUSCULAR; INTRAVENOUS at 09:02

## 2018-02-01 NOTE — PROGRESS NOTES
Pt and wife verbalized understanding of discharge (AVS) instructions.VSS. No complaints at this time. IV Dc'd. Pt ambulated to garage to garage to travel home with wife.

## 2018-02-01 NOTE — PROGRESS NOTES
Pt to [facility]. Report received from JAJA Angeles. No complaints or signs of discomfort at this time. Will continue to monitor.  Family at bedside.

## 2018-02-01 NOTE — H&P
Radiology History & Physical      SUBJECTIVE:     Chief Complaint: Left pleural mass    History of Present Illness:  Cale Harding is a 77 y.o. male who presents for CT-guided left pleural lesion biopsy.     Past Medical History:   Diagnosis Date    Cancer     prostate    Diverticulitis     Hypertension     Urinary tract infection      Past Surgical History:   Procedure Laterality Date    COLONOSCOPY  10/20/2012    COLONOSCOPY  11/19/2014    dr machado ( repeat in 3 years per the pt )    ELBOW SURGERY Left 2013    dislocation    PROSTATE SURGERY      SHOULDER ARTHROSCOPY W/ ROTATOR CUFF REPAIR Right        Home Meds:   Prior to Admission medications    Medication Sig Start Date End Date Taking? Authorizing Provider   aspirin (ECOTRIN) 81 MG EC tablet Take 81 mg by mouth once daily.   Yes Historical Provider, MD   enalapril (VASOTEC) 5 MG tablet Take 1 tablet (5 mg total) by mouth 2 (two) times daily. 4/13/17  Yes Jj Escobedo MD   LACTOBACILLUS ACIDOPHILUS (ACIDOPHILUS ORAL) Take 1 tablet by mouth once daily.   Yes Historical Provider, MD   naproxen (NAPROSYN) 500 MG tablet Take 1 tablet (500 mg total) by mouth 2 (two) times daily as needed. 11/16/17  Yes Alma Mercado NP   psyllium (METAMUCIL) packet Take 1 packet by mouth daily as needed.    Yes Historical Provider, MD   ranitidine (ZANTAC) 300 MG tablet Take 1 tablet (300 mg total) by mouth every evening. 12/7/17 12/7/18 Yes Sreedhar Padilla Jr., MD   hydrocortisone (WESTCORT) 0.2 % cream Apply topically 2 (two) times daily. 10/11/17 10/21/17  Jj Escobedo MD   vitamin D 1000 units Tab Take 1,000 mg by mouth once daily.     Historical Provider, MD     Anticoagulants/Antiplatelets: aspirin 81 mg - held >5 days    Allergies:   Review of patient's allergies indicates:   Allergen Reactions    Bactrim [sulfamethoxazole-trimethoprim]      Sedation History:  no adverse reactions    Review of Systems:   Hematological: no known  coagulopathies  Respiratory: no shortness of breath  Cardiovascular: no chest pain  Gastrointestinal: no abdominal pain  Genito-Urinary: no dysuria  Musculoskeletal: negative  Neurological: no TIA or stroke symptoms         OBJECTIVE:     Vital Signs (Most Recent)  Temp: 98.9 °F (37.2 °C) (02/01/18 0650)  Pulse: 76 (02/01/18 0650)  Resp: 18 (02/01/18 0650)  BP: 107/64 (02/01/18 0650)  SpO2: 99 % (02/01/18 0650)    Physical Exam:  ASA: 2  Mallampati: 2    General: no acute distress  Mental Status: alert and oriented to person, place and time  HEENT: normocephalic, atraumatic  Chest: unlabored breathing  Abdomen: nondistended  Extremity: moves all extremities    Laboratory  Lab Results   Component Value Date    INR 1.0 02/01/2018       Lab Results   Component Value Date    WBC 7.09 01/05/2018    HGB 11.4 (L) 01/05/2018    HCT 35.1 (L) 01/05/2018    MCV 88 01/05/2018     01/05/2018      Lab Results   Component Value Date     (H) 01/05/2018     01/05/2018    K 4.2 01/05/2018     01/05/2018    CO2 25 01/05/2018    BUN 13 01/05/2018    CREATININE 0.8 01/05/2018    CALCIUM 9.3 01/05/2018    ALT 15 10/11/2017    AST 18 10/11/2017    ALBUMIN 4.5 10/11/2017    BILITOT 0.7 10/11/2017       ASSESSMENT/PLAN:     Sedation Plan: Moderate.   Patient will undergo CT-guided left pleural lesion biopsy.    Prateek Wolfe M.D.  Radiology, PGY-V  [phone]

## 2018-02-01 NOTE — DISCHARGE SUMMARY
Radiology Discharge Summary      Hospital Course: No complications    Admit Date: 2/1/2018  Discharge Date: 02/01/2018     Instructions Given to Patient: Yes  Diet: Resume prior diet  Activity: activity as tolerated    Description of Condition on Discharge: Stable  Vital Signs (Most Recent): Temp: 98.9 °F (37.2 °C) (02/01/18 0650)  Pulse: 96 (02/01/18 0950)  Resp: 14 (02/01/18 0950)  BP: 123/64 (02/01/18 0950)  SpO2: (!) 94 % (02/01/18 0950)    Discharge Disposition: Home    Discharge Diagnosis: pleural mass     Follow-up: per referring    @SIG@

## 2018-02-01 NOTE — DISCHARGE INSTRUCTIONS
Interventional Radiology [phone]  Mon-Fri  8A-4P  24hr Radiology Resident on call [phone]

## 2018-02-04 ENCOUNTER — NURSE TRIAGE (OUTPATIENT)
Dept: ADMINISTRATIVE | Facility: CLINIC | Age: 78
End: 2018-02-04

## 2018-02-06 ENCOUNTER — OFFICE VISIT (OUTPATIENT)
Dept: FAMILY MEDICINE | Facility: CLINIC | Age: 78
End: 2018-02-06
Payer: MEDICARE

## 2018-02-06 ENCOUNTER — PATIENT MESSAGE (OUTPATIENT)
Dept: FAMILY MEDICINE | Facility: CLINIC | Age: 78
End: 2018-02-06

## 2018-02-06 VITALS
DIASTOLIC BLOOD PRESSURE: 80 MMHG | WEIGHT: 165.38 LBS | OXYGEN SATURATION: 95 % | HEART RATE: 101 BPM | SYSTOLIC BLOOD PRESSURE: 132 MMHG | TEMPERATURE: 99 F | HEIGHT: 68 IN | BODY MASS INDEX: 25.07 KG/M2

## 2018-02-06 DIAGNOSIS — J92.9 PLEURAL PLAQUE: ICD-10-CM

## 2018-02-06 DIAGNOSIS — G57.10 MERALGIA PARESTHETICA, UNSPECIFIED LATERALITY: Primary | ICD-10-CM

## 2018-02-06 DIAGNOSIS — J90 PLEURAL EFFUSION: ICD-10-CM

## 2018-02-06 DIAGNOSIS — R50.81 FEVER IN OTHER DISEASES: ICD-10-CM

## 2018-02-06 DIAGNOSIS — R63.4 WEIGHT LOSS: ICD-10-CM

## 2018-02-06 PROCEDURE — 1125F AMNT PAIN NOTED PAIN PRSNT: CPT | Mod: S$GLB,,, | Performed by: FAMILY MEDICINE

## 2018-02-06 PROCEDURE — 3008F BODY MASS INDEX DOCD: CPT | Mod: S$GLB,,, | Performed by: FAMILY MEDICINE

## 2018-02-06 PROCEDURE — 99214 OFFICE O/P EST MOD 30 MIN: CPT | Mod: S$GLB,,, | Performed by: FAMILY MEDICINE

## 2018-02-06 PROCEDURE — 1159F MED LIST DOCD IN RCRD: CPT | Mod: S$GLB,,, | Performed by: FAMILY MEDICINE

## 2018-02-06 NOTE — PROGRESS NOTES
Subjective:      Patient ID: Cale Harding is a 77 y.o. male.    Chief Complaint: Fever    Low grade fever around 100, sweats of head and chest, minial left pleuritici type pain; had a recent thoracentesisi; VALENCIA, lost weight; dry cough; poor appetite; lost weight; 12 pound weight loss since November; pt has a themomter to check temp;     Numbness right anterlateral thigh; left OK; no back pain; reviewed cytology neg; bx pending      Fever        Review of Systems   Constitutional: Positive for fever and unexpected weight change.   Respiratory: Positive for shortness of breath.    All other systems reviewed and are negative.    Objective:     Physical Exam   Constitutional: He is oriented to person, place, and time. He appears well-developed and well-nourished. No distress.   HENT:   Head: Normocephalic and atraumatic.   Right Ear: External ear normal.   Left Ear: External ear normal.   Mouth/Throat: Oropharynx is clear and moist. No oropharyngeal exudate.   Eyes: Conjunctivae and EOM are normal. Pupils are equal, round, and reactive to light. Right eye exhibits no discharge. Left eye exhibits no discharge. No scleral icterus.   Neck: Normal range of motion. Neck supple. No JVD present. No tracheal deviation present. No thyromegaly present.   Cardiovascular: Normal rate and regular rhythm.  Exam reveals no gallop and no friction rub.    No murmur heard.  Pulmonary/Chest: Effort normal. No stridor. No respiratory distress. He has no wheezes. He has no rales. He exhibits no tenderness.   dminished left lower   Abdominal: Soft. He exhibits no distension and no mass. There is no tenderness. There is no rebound and no guarding.   Musculoskeletal: Normal range of motion. He exhibits no edema or tenderness.   Lymphadenopathy:     He has no cervical adenopathy.   Neurological: He is alert and oriented to person, place, and time. He has normal reflexes. He displays normal reflexes. No cranial nerve deficit. He  exhibits normal muscle tone. Coordination normal.   Skin: Skin is warm and dry. No rash noted. He is not diaphoretic. No erythema. No pallor.   Psychiatric: He has a normal mood and affect. His behavior is normal. Judgment and thought content normal.   Nursing note and vitals reviewed.    Assessment:     1. Meralgia paresthetica, unspecified laterality    2. Pleural plaque    3. Pleural effusion    4. Fever in other diseases    5. Weight loss      Plan:     New Prescriptions    No medications on file     Discontinued Medications    HYDROCORTISONE (WESTCORT) 0.2 % CREAM    Apply topically 2 (two) times daily.    NAPROXEN (NAPROSYN) 500 MG TABLET    Take 1 tablet (500 mg total) by mouth 2 (two) times daily as needed.     Modified Medications    No medications on file       Meralgia paresthetica, unspecified laterality  Comments:  right  Orders:  -     CBC auto differential; Future; Expected date: 02/06/2018  -     Comprehensive metabolic panel; Future; Expected date: 02/06/2018  -     Sedimentation rate, manual; Future  -     TSH; Future  -     Urinalysis; Future  -     Hepatitis C antibody; Future; Expected date: 02/06/2018  -     Sedimentation rate, manual; Future; Expected date: 02/06/2018  -     APOORVA; Future; Expected date: 02/06/2018  -     C-reactive protein; Future; Expected date: 02/06/2018    Pleural plaque  -     CBC auto differential; Future; Expected date: 02/06/2018  -     Comprehensive metabolic panel; Future; Expected date: 02/06/2018  -     Sedimentation rate, manual; Future  -     TSH; Future  -     Urinalysis; Future  -     Hepatitis C antibody; Future; Expected date: 02/06/2018  -     Sedimentation rate, manual; Future; Expected date: 02/06/2018  -     APOORVA; Future; Expected date: 02/06/2018  -     C-reactive protein; Future; Expected date: 02/06/2018    Pleural effusion  -     CBC auto differential; Future; Expected date: 02/06/2018  -     Comprehensive metabolic panel; Future; Expected date:  02/06/2018  -     Sedimentation rate, manual; Future  -     TSH; Future  -     Urinalysis; Future  -     Hepatitis C antibody; Future; Expected date: 02/06/2018  -     Sedimentation rate, manual; Future; Expected date: 02/06/2018  -     APOORVA; Future; Expected date: 02/06/2018  -     C-reactive protein; Future; Expected date: 02/06/2018    Fever in other diseases  -     CBC auto differential; Future; Expected date: 02/06/2018  -     Comprehensive metabolic panel; Future; Expected date: 02/06/2018  -     Sedimentation rate, manual; Future  -     TSH; Future  -     Urinalysis; Future  -     Hepatitis C antibody; Future; Expected date: 02/06/2018  -     Sedimentation rate, manual; Future; Expected date: 02/06/2018  -     APOORVA; Future; Expected date: 02/06/2018  -     C-reactive protein; Future; Expected date: 02/06/2018    Weight loss

## 2018-02-07 ENCOUNTER — PATIENT MESSAGE (OUTPATIENT)
Dept: FAMILY MEDICINE | Facility: CLINIC | Age: 78
End: 2018-02-07

## 2018-02-07 ENCOUNTER — PATIENT MESSAGE (OUTPATIENT)
Dept: PULMONOLOGY | Facility: CLINIC | Age: 78
End: 2018-02-07

## 2018-02-07 ENCOUNTER — TELEPHONE (OUTPATIENT)
Dept: PULMONOLOGY | Facility: CLINIC | Age: 78
End: 2018-02-07

## 2018-02-07 ENCOUNTER — LAB VISIT (OUTPATIENT)
Dept: LAB | Facility: HOSPITAL | Age: 78
End: 2018-02-07
Attending: FAMILY MEDICINE
Payer: MEDICARE

## 2018-02-07 DIAGNOSIS — G57.10 MERALGIA PARESTHETICA, UNSPECIFIED LATERALITY: ICD-10-CM

## 2018-02-07 DIAGNOSIS — R50.81 FEVER IN OTHER DISEASES: ICD-10-CM

## 2018-02-07 DIAGNOSIS — J92.9 PLEURAL PLAQUE: ICD-10-CM

## 2018-02-07 DIAGNOSIS — J90 PLEURAL EFFUSION: ICD-10-CM

## 2018-02-07 DIAGNOSIS — J94.8 PLEURAL MASS: ICD-10-CM

## 2018-02-07 LAB
ALBUMIN SERPL BCP-MCNC: 4 G/DL
ALP SERPL-CCNC: 77 U/L
ALT SERPL W/O P-5'-P-CCNC: 29 U/L
ANION GAP SERPL CALC-SCNC: 12 MMOL/L
AST SERPL-CCNC: 19 U/L
BASOPHILS # BLD AUTO: 0.03 K/UL
BASOPHILS NFR BLD: 0.5 %
BILIRUB SERPL-MCNC: 0.4 MG/DL
BUN SERPL-MCNC: 16 MG/DL
CALCIUM SERPL-MCNC: 8.9 MG/DL
CHLORIDE SERPL-SCNC: 100 MMOL/L
CO2 SERPL-SCNC: 28 MMOL/L
CREAT SERPL-MCNC: 0.73 MG/DL
DIFFERENTIAL METHOD: ABNORMAL
EOSINOPHIL # BLD AUTO: 0.1 K/UL
EOSINOPHIL NFR BLD: 0.8 %
ERYTHROCYTE [DISTWIDTH] IN BLOOD BY AUTOMATED COUNT: 14.1 %
ERYTHROCYTE [SEDIMENTATION RATE] IN BLOOD BY WESTERGREN METHOD: 119 MM/HR
ERYTHROCYTE [SEDIMENTATION RATE] IN BLOOD BY WESTERGREN METHOD: 119 MM/HR
EST. GFR  (AFRICAN AMERICAN): >60 ML/MIN/1.73 M^2
EST. GFR  (NON AFRICAN AMERICAN): >60 ML/MIN/1.73 M^2
GLUCOSE SERPL-MCNC: 93 MG/DL
HCT VFR BLD AUTO: 36 %
HGB BLD-MCNC: 10.9 G/DL
LYMPHOCYTES # BLD AUTO: 1.2 K/UL
LYMPHOCYTES NFR BLD: 18.5 %
MCH RBC QN AUTO: 26.7 PG
MCHC RBC AUTO-ENTMCNC: 30.3 G/DL
MCV RBC AUTO: 88 FL
MONOCYTES # BLD AUTO: 0.8 K/UL
MONOCYTES NFR BLD: 12.6 %
NEUTROPHILS # BLD AUTO: 4.4 K/UL
NEUTROPHILS NFR BLD: 67.3 %
PLATELET # BLD AUTO: 324 K/UL
PMV BLD AUTO: 10.3 FL
POTASSIUM SERPL-SCNC: 3.9 MMOL/L
PROT SERPL-MCNC: 7.7 G/DL
RBC # BLD AUTO: 4.09 M/UL
SODIUM SERPL-SCNC: 140 MMOL/L
TSH SERPL DL<=0.005 MIU/L-ACNC: 2.31 UIU/ML
WBC # BLD AUTO: 6.49 K/UL

## 2018-02-07 PROCEDURE — 36415 COLL VENOUS BLD VENIPUNCTURE: CPT | Mod: PO

## 2018-02-07 PROCEDURE — 85652 RBC SED RATE AUTOMATED: CPT

## 2018-02-07 PROCEDURE — 86140 C-REACTIVE PROTEIN: CPT | Mod: PO

## 2018-02-07 PROCEDURE — 86038 ANTINUCLEAR ANTIBODIES: CPT | Mod: PO

## 2018-02-07 PROCEDURE — 84443 ASSAY THYROID STIM HORMONE: CPT | Mod: PO

## 2018-02-07 PROCEDURE — 80053 COMPREHEN METABOLIC PANEL: CPT | Mod: PO

## 2018-02-07 PROCEDURE — 85025 COMPLETE CBC W/AUTO DIFF WBC: CPT | Mod: PO

## 2018-02-07 PROCEDURE — 86803 HEPATITIS C AB TEST: CPT | Mod: PO

## 2018-02-07 NOTE — TELEPHONE ENCOUNTER
Pt informed that the recent pleural biopsy shows evidence for malignancy.  Special stains in progress to determine the histology (discussed with Pathology today).  I am asking him to do PET scan as a staging study.  Final plan regarding management will be determined after this and final path studies completed.

## 2018-02-08 ENCOUNTER — TELEPHONE (OUTPATIENT)
Dept: FAMILY MEDICINE | Facility: CLINIC | Age: 78
End: 2018-02-08

## 2018-02-08 LAB
ANA SER QL IF: NORMAL
HCV AB SERPL QL IA: NEGATIVE

## 2018-02-08 NOTE — TELEPHONE ENCOUNTER
Patient reviewed results via my oc[facility]ner    ----- Message from Jj Escobedo MD sent at 2/8/2018  7:06 AM CST -----  CALL PT TESTS ARE NORMAL

## 2018-02-09 ENCOUNTER — HOSPITAL ENCOUNTER (OUTPATIENT)
Dept: RADIOLOGY | Facility: HOSPITAL | Age: 78
Discharge: HOME | End: 2018-02-09
Attending: INTERNAL MEDICINE
Payer: MEDICARE

## 2018-02-09 DIAGNOSIS — J94.8 PLEURAL MASS: ICD-10-CM

## 2018-02-09 LAB — CRP SERPL-MCNC: >9 MG/DL

## 2018-02-09 PROCEDURE — 78815 PET IMAGE W/CT SKULL-THIGH: CPT | Mod: TC,PI

## 2018-02-09 PROCEDURE — 78815 PET IMAGE W/CT SKULL-THIGH: CPT | Mod: 26,PI,, | Performed by: RADIOLOGY

## 2018-02-09 PROCEDURE — A9552 F18 FDG: HCPCS

## 2018-02-11 ENCOUNTER — PATIENT MESSAGE (OUTPATIENT)
Dept: UROLOGY | Facility: CLINIC | Age: 78
End: 2018-02-11

## 2018-02-11 ENCOUNTER — PATIENT MESSAGE (OUTPATIENT)
Dept: PULMONOLOGY | Facility: CLINIC | Age: 78
End: 2018-02-11

## 2018-02-11 ENCOUNTER — PATIENT MESSAGE (OUTPATIENT)
Dept: FAMILY MEDICINE | Facility: CLINIC | Age: 78
End: 2018-02-11

## 2018-02-12 ENCOUNTER — TELEPHONE (OUTPATIENT)
Dept: PULMONOLOGY | Facility: CLINIC | Age: 78
End: 2018-02-12

## 2018-02-12 NOTE — TELEPHONE ENCOUNTER
----- Message from Sirena Figueroa sent at 2/12/2018  3:43 PM CST -----  Contact: pt  Pt calling for results      -          Did biopsy   About 2 weeks ago  -    Has cancer    Pt upset  Nothing be done    Was suppose to have an appt with oncology?      Last week    Labs  And  PetScan     Please call   [phone]    Thanks

## 2018-02-12 NOTE — TELEPHONE ENCOUNTER
Discussed results of PET study in detail.  Areas of abnormality in L pleural cavity, but elsewhere no abnormal findings.  Biopsy submitted by Pathology for outside opinion regarding histology, and this has not returned.  Will need consult with CTS or Medical Oncology once this is available.

## 2018-02-15 ENCOUNTER — TELEPHONE (OUTPATIENT)
Dept: CARDIOTHORACIC SURGERY | Facility: CLINIC | Age: 78
End: 2018-02-15

## 2018-02-15 ENCOUNTER — TELEPHONE (OUTPATIENT)
Dept: PULMONOLOGY | Facility: CLINIC | Age: 78
End: 2018-02-15

## 2018-02-15 ENCOUNTER — PATIENT MESSAGE (OUTPATIENT)
Dept: FAMILY MEDICINE | Facility: CLINIC | Age: 78
End: 2018-02-15

## 2018-02-15 DIAGNOSIS — C45.7 MESOTHELIOMA OF LEFT LUNG: ICD-10-CM

## 2018-02-15 NOTE — TELEPHONE ENCOUNTER
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  Special stains are somewhat equivocal, but the radiographic features and pet findings are much more consistent with this than a primary lung carcinoma metastatic to pleural cavity.  He has good lung function and no pet evidence for disease outside the chest.  I am askking him to have CT Surgery consult to consider surgical treatment.

## 2018-02-15 NOTE — TELEPHONE ENCOUNTER
"Received a referral from Dr. Moreno to Dr. Fang re: epithelioid mesothelioma. Pt presented in 2017 with a pleural effusion,  thoracentesis performed on 12/22/17 and 1/5/18.   CT chest performed on 11/21/17 revealed "Pleura: Small left-sided pleural effusion with mild left lower lobe atelectasis. Noncalcified pleural plaque is seen within the right hemithorax anteriorly."  Repeat CT chest on 1/15/18 revealed "Left pleural fluid with thickening of left parietal pleura concerning for malignant effusion, mesothelioma, or empyema."  IR biopsy ordered by Dr. Moreno on 2/1/18, positive for epithelioid mesothelioma. Path reviewed at [facility] and confirmed.  Pt had PFT's on 1/15/18. PET on 2/9/18 revealed "There are 3 left pleural-based masses. The index lesion posteriorly SUV max 7.61. There is a left pleural effusion possibly malignant there is left lung base consolidation be chronic."    Spoke with the pt, he is agreeable to meet with Dr. Fang on 2/21/18 at 1015a. Notified Dr. Moreno' staff of the confirmed appt.   "

## 2018-02-21 ENCOUNTER — DOCUMENTATION ONLY (OUTPATIENT)
Dept: CARDIOTHORACIC SURGERY | Facility: CLINIC | Age: 78
End: 2018-02-21

## 2018-02-21 ENCOUNTER — OFFICE VISIT (OUTPATIENT)
Dept: CARDIOTHORACIC SURGERY | Facility: CLINIC | Age: 78
End: 2018-02-21
Payer: MEDICARE

## 2018-02-21 VITALS
HEART RATE: 79 BPM | OXYGEN SATURATION: 96 % | BODY MASS INDEX: 24.59 KG/M2 | DIASTOLIC BLOOD PRESSURE: 70 MMHG | WEIGHT: 162.25 LBS | SYSTOLIC BLOOD PRESSURE: 123 MMHG | HEIGHT: 68 IN

## 2018-02-21 DIAGNOSIS — Z01.818 PREOP TESTING: Primary | ICD-10-CM

## 2018-02-21 DIAGNOSIS — C45.9 MESOTHELIOMA, MALIGNANT: ICD-10-CM

## 2018-02-21 PROCEDURE — 1159F MED LIST DOCD IN RCRD: CPT | Mod: S$GLB,,, | Performed by: THORACIC SURGERY (CARDIOTHORACIC VASCULAR SURGERY)

## 2018-02-21 PROCEDURE — 3008F BODY MASS INDEX DOCD: CPT | Mod: S$GLB,,, | Performed by: THORACIC SURGERY (CARDIOTHORACIC VASCULAR SURGERY)

## 2018-02-21 PROCEDURE — 99499 UNLISTED E&M SERVICE: CPT | Mod: S$GLB,,, | Performed by: THORACIC SURGERY (CARDIOTHORACIC VASCULAR SURGERY)

## 2018-02-21 PROCEDURE — 99204 OFFICE O/P NEW MOD 45 MIN: CPT | Mod: S$GLB,,, | Performed by: THORACIC SURGERY (CARDIOTHORACIC VASCULAR SURGERY)

## 2018-02-21 PROCEDURE — 1126F AMNT PAIN NOTED NONE PRSNT: CPT | Mod: S$GLB,,, | Performed by: THORACIC SURGERY (CARDIOTHORACIC VASCULAR SURGERY)

## 2018-02-21 PROCEDURE — 99999 PR PBB SHADOW E&M-EST. PATIENT-LVL IV: CPT | Mod: PBBFAC,,, | Performed by: THORACIC SURGERY (CARDIOTHORACIC VASCULAR SURGERY)

## 2018-02-21 NOTE — LETTER
[facility]  [address]  [facility] [address]  Phone: [phone]  Fax: [phone] February 21, 2018        PACO Moreno MD  [address]  [facility] [address]    Patient: Cale Harding   MR Number: [id number]   YOB: 1940   Date of Visit: 2/21/2018     Dear Dr. Moreno:    Thank you for referring Cale Harding to me for evaluation.     I evaluated Mr. Harding in the office today. 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  I recommend a left VATS to r/o muscular extension of pleural disease and a pleural biopsy with frozen section analysis.  If positive for malignant mesothelioma and no evidence of extension into the chest wall muscle, I will convert to a left  thoracotomy, radical pleurectomy/decortication with HIPEC. Mr. Harding's case will be presented at multidisciplinary lung conference and medical oncology will be consulted to provide the chemotherapy bath.    If you have questions, please do not hesitate to call me. I look forward to following Cale along with you.    Sincerely,      Galdino Fang MD   Department of Surgery  Section of Cardiothoracic Surgery        CC  Jj Escobedo MD

## 2018-02-21 NOTE — PATIENT CARE CONFERENCE
[facility]      THORACIC MULTIDISCIPLINARY TUMOR BOARD  PATIENT REVIEW FORM  ________________________________________________________________________    CLINIC #: [id number]  DATE: 02/21/2018    TUMOR SITE: Mesothelioma    PRESENTER: Dr. Fang     PATIENT SUMMARY:   77 y.o. male presents with HTN, and hx prostate cancer s/p prostatectomy and radiation presenting with left epithelioid mesothelioma. History dates back Nov 2017 with onset of left chest wall discomfort which prompted CXR. After several thoracenteses for recurrent effusions,   IR biopsy of left pleural thickening positive for epithelioid mesothelioma (confirmed at [facility]). PET 2/9/18 with 3 left pleural based masses with SUV max 7.61 and small pleural effusion. Patient notes occasional drenching night sweats, 20lb weight loss over the last 3 months, and intermittent low grade fevers over last month but none in the last week. Also c/o VALENCIA. Infrequent chest wall sharp pain per patient but does not seem to be constant. Former smoker. [employer]ired [employer]. Worked on ships with asbestos. 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    BOARD RECOMMENDATIONS: L VATS to r/o muscular extension of pleural disease and pleural bx with frozen section analysis.  If positive for malignant mesothelioma and no evidence of extension into the chest wall muscle, convert to Left  thoracotomy, radical pleurectomy/decortication with HIPEC.  If there is evidence of extensive chest wall invasion, the procedure will be aborted. Refer to medical oncology as well.     CONSULT NEEDED:     [] Surgery    [x] Hem/Onc    [] Rad/Onc    [] Dietary                 [] Social Service    [] Psychology       [] Pulmonology    Clinical Stage: Tumor  Node(s)  Metastasis   Pathologic Stage: Tumor  Node(s)  Metastasis      # of nodes removed  stations sampled     Estrogen receptor (ER)  Progesterone receptor (LA)  Ki 67      EGFR  status   EML/ALK  Thyroid transcription factor (TTF)  CK7      Other     GROUP STAGE:     [] O    [] 1A    [] IB    [] IIA    [] IIB     [] IIIA     [] IIIB     [] IIIC    [] IV                               [] Local recurrence     [] Regional recurrence     [] Distant recurrence                   [] NSCLC     [] SCLC     Tumor type Mesothelioma    Unstageable:      [] Yes     [] No  Metastatic site(s):          [x] Rashida'l Treatment Guidelines reviewed and care planned is consistent with guidelines.         (i.e., NCCN, NCI, PD, ACO, AUA, etc.)    PRESENTATION AT CANCER CONFERENCE:         [] Prospective    [] Retrospective     [] Follow-Up          [] Eligible for clinical trial

## 2018-02-21 NOTE — PROGRESS NOTES
History & Physical    SUBJECTIVE:     History of Present Illness:    Patient is a 77 y.o. male presents with HTN, and hx prostate cancer s/p prostatectomy and radiation presenting with left epithelioid mesothelioma. History dates back Nov 2017 with onset of left chest wall discomfort which prompted CXR. CXR with pleural effusion. He was referred to IR for thoracentesis which removed apx 1.2L (cytology negative). Noted increasing SOB and again noted to have pleural effusion. Repeat thoracentesis with ~400cc removed. IR biopsy of left pleural thickening positive for epithelioid mesothelioma (confirmed at [facility]). PET 2/9/18 with 3 left pleural based masses with SUV max 7.61 and small pleural effusion. Patient notes occasional drenching night sweats, 20lb weight loss over the last 3 months, and intermittent low grade fevers over last month but none in the last week. Also c/o VALENCIA. Infrequent chest wall sharp pain per patient but does not seem to be constant. He denies chills, cardiac CP, SOB at rest, nausea, vomiting, dysphagia, appetite changes, and changes in bowel/bladder function. No prior cardiac history. ASA 81. No other blood thinners     Former smoker. 30 pack year history. Prior nightly beer drinker but has not consumed beer since onset of effusion.   PSH: prostatectomy, Left elbow surgery, Right rotator cuff repair  [employer]ired [employer]. Worked on ships with asbestos. 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     Chief Complaint   Patient presents with    Consult       Review of patient's allergies indicates:   Allergen Reactions    Bactrim [sulfamethoxazole-trimethoprim]        Current Outpatient Prescriptions   Medication Sig Dispense Refill    aspirin (ECOTRIN) 81 MG EC tablet Take 81 mg by mouth once daily.      enalapril (VASOTEC) 5 MG tablet Take 1 tablet (5 mg total) by mouth 2 (two) times daily. 180 tablet 3    LACTOBACILLUS ACIDOPHILUS  (ACIDOPHILUS ORAL) Take 1 tablet by mouth once daily.      psyllium (METAMUCIL) packet Take 1 packet by mouth daily as needed.       ranitidine (ZANTAC) 300 MG tablet Take 1 tablet (300 mg total) by mouth every evening. 30 tablet 11    vitamin D 1000 units Tab Take 1,000 mg by mouth once daily.        No current facility-administered medications for this visit.        Past Medical History:   Diagnosis Date    Cancer     prostate    Diverticulitis     Hypertension     Urinary tract infection      Past Surgical History:   Procedure Laterality Date    COLONOSCOPY  10/20/2012    COLONOSCOPY  11/19/2014    dr machado ( repeat in 3 years per the pt )    ELBOW SURGERY Left 2013    dislocation    PROSTATE SURGERY      SHOULDER ARTHROSCOPY W/ ROTATOR CUFF REPAIR Right      Family History   Problem Relation Age of Onset    Heart disease Father     Heart disease Brother     Cancer Mother      [facility]    No Known Problems Son     No Known Problems Son     No Known Problems Son     Prostate cancer Neg Hx     Kidney disease Neg Hx     Asthma Neg Hx     Emphysema Neg Hx      Social History   Substance Use Topics    Smoking status: Former Smoker     Packs/day: 2.00     Years: 15.00     Types: Cigarettes     Quit date: 1970    Smokeless tobacco: Never Used      Comment: pt quit 40 years ago    Alcohol use 16.8 oz/week     28 Cans of beer per week      Comment: occ        Review of Systems:  Review of Systems   Constitutional: Positive for diaphoresis (night sweats), fatigue, fever (low grade, intermittent ) and unexpected weight change. Negative for chills.   HENT: Negative for congestion.    Eyes: Negative for pain.   Respiratory: Positive for cough (non-productive) and shortness of breath.    Cardiovascular: Positive for chest pain (infrequent chest wall sharp pain ). Negative for palpitations.   Gastrointestinal: Negative for abdominal pain, nausea and vomiting.   Genitourinary:  "Negative for difficulty urinating.   Musculoskeletal: Positive for arthralgias.   Skin: Negative for color change and rash.   Neurological: Negative for syncope.   Psychiatric/Behavioral: Negative for agitation. The patient is not nervous/anxious.        OBJECTIVE:     Vital Signs (Most Recent)  Pulse: 79 (02/21/18 1008)  BP: 123/70 (02/21/18 1008)  SpO2: 96 % (02/21/18 1008)  5' 8" (1.727 m)  73.6 kg (162 lb 4.1 oz)     Physical Exam:  Physical Exam   Constitutional: He is oriented to person, place, and time. He appears well-developed and well-nourished.   HENT:   Head: Normocephalic and atraumatic.   Eyes: EOM are normal. Pupils are equal, round, and reactive to light.   Neck: Normal range of motion. Neck supple. No tracheal deviation present.   Cardiovascular: Normal rate, regular rhythm and normal heart sounds.    Pulmonary/Chest: Effort normal. He has decreased breath sounds in the left lower field. He has no wheezes.   Abdominal: Soft. Bowel sounds are normal.   Musculoskeletal: Normal range of motion.   Lymphadenopathy:     He has no cervical adenopathy.   Neurological: He is alert and oriented to person, place, and time.   Skin: Skin is warm and dry.   Psychiatric: He has a normal mood and affect. Thought content normal.   Vitals reviewed.      Laboratory  CBC: Reviewed  BMP: Reviewed    Chest CT 1/15/18:  Left pleural fluid with thickening of left parietal pleura concerning for malignant effusion, mesothelioma, or empyema.  Multiple pulmonary nodules, stable since prior exam.    PFTs   FEV 1 - 2.29  83%  DLCO - 17.10 85%    Pathology   Lung, left, needle core biopsy ([id number]; 2/1/2018):  Malignant epithelioid neoplasm with an indistinct immunophenotype (see letter).    PET 2/9/18:   There are 3 left pleural-based masses. The index lesion posteriorly SUV max 7.61. There is a left pleural effusion possibly malignant there is left lung base consolidation be chronic.    ASSESSMENT/PLAN:     Patient is a 77 " y.o. male presents with HTN and hx prostate cancer s/p prostatectomy/radiation presenting left epithelioid mesothelioma here today for surgical consultation.     PLAN:Plan      Will present at multidisciplinary lung tumor board.  Needs DSE prior to surgery.   Referral to Dr. Foster  Pending above, proceed to OR for left VATS, Left thoracotomy with radical pleurectomy, decortication and HIPEC.  Appropriate patient education regarding the tana-operative period as well as intraperative details were discussed. Risks, including but not limited to, bleeding, infection, pain and anesthetic complication were discussed. Patient was given the opportunity to ask questions and to have those questions answered to their satisfaction. Patient verbalized understanding to both procedure and associated risks. Consent was obtained.      ATTENDING ATTESTATION:    I evaluated the patient and I agree with the assessment and plan.  I recommend L VATS to r/o muscular extension of pleural disease and pleural bx with frozen section analysis.  If positive for malignant mesothelioma and no evidence of extension into the chest wall muscle, convert to Left  thoracotomy, radical pleurectomy/decortication with HIPEC.  If there is evidence of extensive chest wall invasion, the procedure will be aborted.  There is a risk of catastrophic intraoperative hemorrhage, postop pneumonia, cardiac arrhythmia, wound infection, renal failure and respiratory failure.  The patient will require ICU admission postop and we will request epidural placement.  Adjuvant chemotherapy will be needed.  The patient's case will be presented at multidisciplinary lung conference and medical oncology will be consulted.

## 2018-02-21 NOTE — PROGRESS NOTES
Distress Screening Results: Psychosocial Distress screening score of Distress Score: 0 {AMB ONC DISTRESS SCORE:14323}

## 2018-02-22 ENCOUNTER — PATIENT MESSAGE (OUTPATIENT)
Dept: FAMILY MEDICINE | Facility: CLINIC | Age: 78
End: 2018-02-22

## 2018-02-22 ENCOUNTER — PATIENT MESSAGE (OUTPATIENT)
Dept: UROLOGY | Facility: CLINIC | Age: 78
End: 2018-02-22

## 2018-02-22 ENCOUNTER — ANESTHESIA EVENT (OUTPATIENT)
Dept: SURGERY | Facility: HOSPITAL | Age: 78
End: 2018-02-22

## 2018-02-22 DIAGNOSIS — Z01.818 PREOP TESTING: Primary | ICD-10-CM

## 2018-02-22 NOTE — ANESTHESIA PREPROCEDURE EVALUATION
Anesthesia Assessment: Preoperative EQUATION    Planned Procedure: Procedure(s) (LRB):  THORACOSCOPY-VIDEO ASSISTED (VATS) W/PLEURECTOMY (Left)  CHEMOTHERAPY-HEATED-INTRAOPERATIVE (Left)  DECORTICATION-LUNG (Left)  Requested Anesthesia Type:Epidural  Surgeon: Galdino Fang MD  Service: Thoracic  Known or anticipated Date of Surgery:3/19/2018    Surgeon notes: reviewed and Mesothelioma, malignant  Previous anesthesia records:Not available- in EPIC-Media tab-s/p Colonoscopy-11/19/14, done at [facility]  -No Anesthesia records found in [facility] system-patient has had previous surgeries:s/px2 Colonoscopies/ s/p-Left Elbow Dislocation Surgery/  S/P-Prostate surgery/ S/P-right Shoulder A-Scope w/Rotator Cuff Repair    Last PCP note: within 1 month , within [facility] , focused visit   Subspecialty notes: Gastroenterology, Pulmonary, Urology  Tests already available:  Results have been reviewed.Labs-2/7/18  &  1/5/18/ CXR-2/1/18                Plan:    Testing:  PTT, T&S and EKG   Pre-anesthesia  visit       Visit focus: concerns in complex and/or prolonged anesthesia, SUKHJINDER Beckford     Consultation:IM Perioperative Hospitalist SUKHJINDER Beckford     Patient  has previously scheduled Medical Appointment:Appointment on 2/26/18 & 3/8/18, prior to surgery date.    Navigation: Tests Scheduled. Labs-APTT & T&S & EKG on 3/8/18 @ 12:15p &2:30p             Consults scheduled.POC & Perioperative IM Consult on 3/8/18 @ 11a & 1p             Results will be tracked by Preop Clinic.               Corry Garcia RN  2/22/18 02/22/2018  Cale Harding is a 77 y.o., male.    Pre-op Assessment         Review of Systems

## 2018-02-23 LAB
ACID FAST MOD KINY STN SPEC: NORMAL
MYCOBACTERIUM SPEC QL CULT: NORMAL

## 2018-02-26 ENCOUNTER — HOSPITAL ENCOUNTER (OUTPATIENT)
Dept: CARDIOLOGY | Facility: CLINIC | Age: 78
Discharge: HOME | End: 2018-02-26
Attending: THORACIC SURGERY (CARDIOTHORACIC VASCULAR SURGERY)
Payer: MEDICARE

## 2018-02-26 DIAGNOSIS — I35.9 NONRHEUMATIC AORTIC VALVE DISORDER: ICD-10-CM

## 2018-02-26 DIAGNOSIS — Z01.818 PREOP TESTING: ICD-10-CM

## 2018-02-26 LAB
DIASTOLIC DYSFUNCTION: NO
ESTIMATED PA SYSTOLIC PRESSURE: 26.83
RETIRED EF AND QEF - SEE NOTES: 55 (ref 55–65)
TRICUSPID VALVE REGURGITATION: NORMAL

## 2018-02-26 PROCEDURE — 93351 STRESS TTE COMPLETE: CPT | Mod: S$GLB,,, | Performed by: INTERNAL MEDICINE

## 2018-02-26 PROCEDURE — 93320 DOPPLER ECHO COMPLETE: CPT | Mod: S$GLB,,, | Performed by: INTERNAL MEDICINE

## 2018-02-26 PROCEDURE — 93352 ADMIN ECG CONTRAST AGENT: CPT | Mod: S$GLB,,, | Performed by: INTERNAL MEDICINE

## 2018-02-26 PROCEDURE — 93325 DOPPLER ECHO COLOR FLOW MAPG: CPT | Mod: S$GLB,,, | Performed by: INTERNAL MEDICINE

## 2018-02-26 NOTE — PROGRESS NOTES
Patient identified by 2 patient identifiers. Denies reactions to blood transfusions. Procedure explained and informed consent obtained.  IV started to right a/c, positive blood return and flushed. 3cc optison administered and echo images obtained for DSE..  IV discontinued intact with pressure dsg applied. Patient tolerated well.

## 2018-02-27 ENCOUNTER — PATIENT MESSAGE (OUTPATIENT)
Dept: SURGERY | Facility: HOSPITAL | Age: 78
End: 2018-02-27

## 2018-02-28 ENCOUNTER — TELEPHONE (OUTPATIENT)
Dept: CARDIOTHORACIC SURGERY | Facility: CLINIC | Age: 78
End: 2018-02-28

## 2018-02-28 DIAGNOSIS — J94.8 PLEURAL MASS: Primary | ICD-10-CM

## 2018-03-01 ENCOUNTER — HOSPITAL ENCOUNTER (OUTPATIENT)
Dept: RADIOLOGY | Facility: HOSPITAL | Age: 78
Discharge: HOME | End: 2018-03-01
Attending: THORACIC SURGERY (CARDIOTHORACIC VASCULAR SURGERY)
Payer: MEDICARE

## 2018-03-01 DIAGNOSIS — J94.8 PLEURAL MASS: ICD-10-CM

## 2018-03-01 PROCEDURE — 71552 MRI CHEST W/O & W/DYE: CPT | Mod: 26,,, | Performed by: RADIOLOGY

## 2018-03-01 PROCEDURE — 25500020 PHARM REV CODE 255: Performed by: THORACIC SURGERY (CARDIOTHORACIC VASCULAR SURGERY)

## 2018-03-01 PROCEDURE — 71552 MRI CHEST W/O & W/DYE: CPT | Mod: TC

## 2018-03-01 PROCEDURE — A9585 GADOBUTROL INJECTION: HCPCS | Performed by: THORACIC SURGERY (CARDIOTHORACIC VASCULAR SURGERY)

## 2018-03-01 RX ORDER — GADOBUTROL 604.72 MG/ML
8 INJECTION INTRAVENOUS
Status: COMPLETED | OUTPATIENT
Start: 2018-03-01 | End: 2018-03-01

## 2018-03-01 RX ADMIN — GADOBUTROL 8 ML: 604.72 INJECTION INTRAVENOUS at 11:03

## 2018-03-03 ENCOUNTER — PATIENT MESSAGE (OUTPATIENT)
Dept: SURGERY | Facility: HOSPITAL | Age: 78
End: 2018-03-03

## 2018-03-05 DIAGNOSIS — J94.8 PLEURAL MASS: Primary | ICD-10-CM

## 2018-03-08 ENCOUNTER — ANESTHESIA EVENT (OUTPATIENT)
Dept: SURGERY | Facility: HOSPITAL | Age: 78
End: 2018-03-08
Payer: MEDICARE

## 2018-03-08 ENCOUNTER — INITIAL CONSULT (OUTPATIENT)
Dept: HEMATOLOGY/ONCOLOGY | Facility: CLINIC | Age: 78
End: 2018-03-08
Payer: MEDICARE

## 2018-03-08 ENCOUNTER — INITIAL CONSULT (OUTPATIENT)
Dept: INTERNAL MEDICINE | Facility: CLINIC | Age: 78
End: 2018-03-08
Payer: MEDICARE

## 2018-03-08 ENCOUNTER — HOSPITAL ENCOUNTER (OUTPATIENT)
Dept: CARDIOLOGY | Facility: CLINIC | Age: 78
Discharge: HOME | End: 2018-03-08
Payer: MEDICARE

## 2018-03-08 ENCOUNTER — HOSPITAL ENCOUNTER (OUTPATIENT)
Dept: PREADMISSION TESTING | Facility: HOSPITAL | Age: 78
Discharge: HOME | End: 2018-03-08
Attending: ANESTHESIOLOGY
Payer: MEDICARE

## 2018-03-08 VITALS
OXYGEN SATURATION: 98 % | BODY MASS INDEX: 24.52 KG/M2 | DIASTOLIC BLOOD PRESSURE: 65 MMHG | HEART RATE: 84 BPM | WEIGHT: 161.81 LBS | SYSTOLIC BLOOD PRESSURE: 138 MMHG | HEIGHT: 68 IN | TEMPERATURE: 99 F | RESPIRATION RATE: 19 BRPM

## 2018-03-08 VITALS
DIASTOLIC BLOOD PRESSURE: 70 MMHG | TEMPERATURE: 98 F | HEART RATE: 81 BPM | HEIGHT: 70 IN | WEIGHT: 161.19 LBS | OXYGEN SATURATION: 97 % | SYSTOLIC BLOOD PRESSURE: 121 MMHG | BODY MASS INDEX: 23.07 KG/M2

## 2018-03-08 DIAGNOSIS — G89.3 NEOPLASM RELATED PAIN: ICD-10-CM

## 2018-03-08 DIAGNOSIS — Z01.818 PREOP TESTING: ICD-10-CM

## 2018-03-08 DIAGNOSIS — R42 POSTURAL DIZZINESS: ICD-10-CM

## 2018-03-08 DIAGNOSIS — C45.7 MESOTHELIOMA OF LEFT LUNG: Primary | ICD-10-CM

## 2018-03-08 DIAGNOSIS — R07.89 ATYPICAL CHEST PAIN: ICD-10-CM

## 2018-03-08 DIAGNOSIS — I10 ESSENTIAL HYPERTENSION: ICD-10-CM

## 2018-03-08 DIAGNOSIS — R63.4 WEIGHT LOSS: ICD-10-CM

## 2018-03-08 DIAGNOSIS — R61 NIGHT SWEATS: ICD-10-CM

## 2018-03-08 DIAGNOSIS — C45.7 MESOTHELIOMA OF LEFT LUNG: ICD-10-CM

## 2018-03-08 DIAGNOSIS — R73.03 PREDIABETES: ICD-10-CM

## 2018-03-08 DIAGNOSIS — K21.9 GASTROESOPHAGEAL REFLUX DISEASE, ESOPHAGITIS PRESENCE NOT SPECIFIED: ICD-10-CM

## 2018-03-08 DIAGNOSIS — Z01.818 PREOP EXAMINATION: Primary | ICD-10-CM

## 2018-03-08 PROCEDURE — 99499 UNLISTED E&M SERVICE: CPT | Mod: S$GLB,,, | Performed by: HOSPITALIST

## 2018-03-08 PROCEDURE — 99999 PR PBB SHADOW E&M-EST. PATIENT-LVL III: CPT | Mod: PBBFAC,,, | Performed by: HOSPITALIST

## 2018-03-08 PROCEDURE — 99999 PR PBB SHADOW E&M-EST. PATIENT-LVL III: CPT | Mod: PBBFAC,,, | Performed by: INTERNAL MEDICINE

## 2018-03-08 PROCEDURE — 99499 UNLISTED E&M SERVICE: CPT | Mod: S$GLB,,, | Performed by: INTERNAL MEDICINE

## 2018-03-08 PROCEDURE — 93000 ELECTROCARDIOGRAM COMPLETE: CPT | Mod: S$GLB,,, | Performed by: INTERNAL MEDICINE

## 2018-03-08 PROCEDURE — 3074F SYST BP LT 130 MM HG: CPT | Mod: S$GLB,,, | Performed by: HOSPITALIST

## 2018-03-08 PROCEDURE — 99214 OFFICE O/P EST MOD 30 MIN: CPT | Mod: S$GLB,,, | Performed by: HOSPITALIST

## 2018-03-08 PROCEDURE — 3078F DIAST BP <80 MM HG: CPT | Mod: S$GLB,,, | Performed by: HOSPITALIST

## 2018-03-08 PROCEDURE — 3078F DIAST BP <80 MM HG: CPT | Mod: S$GLB,,, | Performed by: INTERNAL MEDICINE

## 2018-03-08 PROCEDURE — 99205 OFFICE O/P NEW HI 60 MIN: CPT | Mod: S$GLB,,, | Performed by: INTERNAL MEDICINE

## 2018-03-08 PROCEDURE — 3075F SYST BP GE 130 - 139MM HG: CPT | Mod: S$GLB,,, | Performed by: INTERNAL MEDICINE

## 2018-03-08 RX ORDER — CALCIUM CARBONATE 200(500)MG
1 TABLET,CHEWABLE ORAL
Status: ON HOLD | COMMUNITY
End: 2019-01-01 | Stop reason: HOSPADM

## 2018-03-08 RX ORDER — ACETAMINOPHEN 500 MG
500 TABLET ORAL EVERY 6 HOURS PRN
COMMUNITY
End: 2018-07-17

## 2018-03-08 RX ORDER — HYDROCODONE BITARTRATE AND ACETAMINOPHEN 5; 325 MG/1; MG/1
1 TABLET ORAL EVERY 6 HOURS PRN
Qty: 60 TABLET | Refills: 0 | Status: SHIPPED | OUTPATIENT
Start: 2018-03-08 | End: 2019-01-01 | Stop reason: SDUPTHER

## 2018-03-08 NOTE — DISCHARGE INSTRUCTIONS
Your surgery has been scheduled for:__________________________________________    You should report to:  ____[facility], located on the [place] side of the first floor of the           [facility] ([phone])  ____[facility], located on the [facility] side of the            Second floor of the [facility] ([phone])  ____3rd Floor [facility] located on the [facility] side of the [facility] [phone]  Please Note   - Tell your doctor if you take Aspirin, products containing Aspirin, herbal medications  or blood thinners, such as Coumadin, Ticlid, or Plavix.  (Consult your provider regarding holding or stopping before surgery).  - Arrange for someone to drive you home following surgery.  You will not be allowed to leave the surgical facility alone or drive yourself home following sedation and anesthesia.  Before Surgery  - Stop taking all herbal medications 14days prior to surgery  - No Motrin/Advil (Ibuprofen) 7 days before surgery  - No Aleve (Naproxen) 7 days before surgery  - Stop Taking Asprin, products containing Asprin _____days before surgery  - Stop taking blood thinners_______days before surgery  - Refrain from drinking alcoholic beverages for 24hours before and after surgery  - Stop or limit smoking _________days before surgery  Night before Surgery  - DO NOT EAT OR DRINK ANYTHING AFTER MIDNIGHT, INCLUDING GUM, HARD CANDY, MINTS, OR CHEWING TOBACCO.  - Take a shower or bath (shower is recommended).  Bathe with Hibiclens soap or an antibacterial soap from the neck down.  If not supplied by your surgeon, hibiclens soap will need to be purchased over the counter in pharmacy.  Rinse soap off thoroughly.  - Shampoo your hair with your regular shampoo  The Day of Surgery  - Take another bath or shower with hibiclens or any antibacterial soap, to reduce the chance of infection.  - Take heart and blood  pressure medications with a small sip of water, as advised by the perioperative team.  - Do not take fluid pills  - You may brush your teeth and rinse your mouth, but do not swall any additional water.   - Do not apply perfumes, powder, body lotions or deodorant on the day of surgery.  - Nail polish should be removed.  - Do not wear makeup or moisturizer  - Wear comfortable clothes, such as a button front shirt and loose fitting pants.  - Leave all jewelry, including body piercings, and valuables at home.    - Bring any devices you will neeed after surgery such as crutches or canes.  - If you have sleep apnea, please bring your CPAP machine  In the event that your physical condition changes including the onset of a cold or respiratory illness, or if you have to delay or cancel your surgery, please notify your surgeon.Anesthesia: General Anesthesia  Youre due to have surgery. During surgery, youll be given medication called anesthesia. (It is also called anesthetic.) This will keep you comfortable and pain-free. Your anesthesia provider will use general anesthesia. This sheet tells you more about it.  What is general anesthesia?     You are watched continuously during your procedure by the anesthesia provider   General anesthesia puts you into a state like deep sleep. It goes into the bloodstream (IV anesthetics), into the lungs (gas anesthetics), or both. You feel nothing during the procedure. You will not remember it. During the procedure, the anesthesia provider monitors you continuously. He or she checks your heart rate and rhythm, blood pressure, breathing, and blood oxygen.  · IV Anesthetics. IV anesthetics are given through an IV line in your arm. Theyre often given first. This is so you are asleep before a gas anesthetic is started. Some kinds of IV anesthetics relieve pain. Others relax you. Your doctor will decide which kind is best in your case.  · Gas Anesthetics. Gas anesthetics are breathed into the  lungs. They are often used to keep you asleep. They can be given through a facemask or a tube placed in your larynx or trachea (breathing tube).  ? If you have a facemask, your anesthesia provider will most likely place it over your nose and mouth while youre still awake. Youll breathe oxygen through the mask as your IV anesthetic is started. Gas anesthetic may be added through the mask.  ? If you have a tube in the larynx or trachea, it will be inserted into your throat after youre asleep.  Anesthesia tools and medications  You will likely have:  · IV anesthetics. These are put into an IV line into your bloodstream.  · Gas anesthetics. You breathe these anesthetics into your lungs, where they pass into your bloodstream.  · Pulse oximeter. This is a small clip that is attached to the end of your finger. This measures your blood oxygen level.  · Electrocardiography leads (electrodes). These are small sticky pads that are placed on your chest. They record your heart rate and rhythm.  · Blood pressure cuff. This reads your blood pressure.  Risks and possible complications  General anesthesia has some risks. These include:  · Breathing problems  · Nausea and vomiting  · Sore throat or hoarseness (usually temporary)  · Allergic reaction to the anesthetic  · Irregular heartbeat (rare)  · Cardiac arrest (rare)   Anesthesia safety  · Follow all instructions you are given for how long not to eat or drink before your procedure.  · Be sure your doctor knows what medications and drugs you take. This includes over-the-counter medications, herbs, supplements, alcohol or other drugs. You will be asked when those were last taken.  · Have an adult family member or friend drive you home after the procedure.  · For the first 24 hours after your surgery:  ? Do not drive or use heavy equipment.  ? Have a trusted family member or spouse make important decisions or sign documents.  ? Avoid alcohol.  ? Have a responsible adult stay with  you. He or she can watch for problems and help keep you safe.  Date Last Reviewed: 10/16/2014  © 5269-9217 [employer]. [address] [place] [address], [place] [address]. All rights reserved. This information is not intended as a substitute for professional medical care. Always follow your healthcare professional's instructions.

## 2018-03-08 NOTE — ANESTHESIA PREPROCEDURE EVALUATION
03/08/2018  Cale Harding is a 77 y.o., male.    Pre-operative evaluation for Procedure(s) (LRB):  diagnostic laparoscopy (N/A)  THORACOSCOPY-VIDEO ASSISTED (VATS) W/PLEURAL BIOPSY - exploratory (Left)    Cale Harding is a 77 y.o. male w/ h/o controlled HTN, prediabetes and prostate cancer s/p prostatectomy about 16 years ago and radiation 1 year later w/ h/o asbestos exposure with left epithelioid mesothelioma going for the above procedure.     Prev airway: none in system    Patient Active Problem List   Diagnosis    Abnormal bladder cytology    History of prostate cancer    Contact with and (suspected) exposure to other hazardous, chiefly nonmedicinal, chemicals    Pleural plaque    Eczema    Essential hypertension    Gastroesophageal reflux disease    History of colonic diverticulitis    Prediabetes    History of dislocation of elbow    Nocturia    Urinary frequency    Pleural effusion    History of asbestos exposure    Pleural scarring    Abnormal pleural fluid    Pleural mass    Mesothelioma of left lung    Atypical chest pain    Night sweats    Weight loss    Postural dizziness       Review of patient's allergies indicates:   Allergen Reactions    Bactrim [sulfamethoxazole-trimethoprim] Other (See Comments)     Joint pains        No current facility-administered medications on file prior to encounter.      Current Outpatient Prescriptions on File Prior to Encounter   Medication Sig Dispense Refill    aspirin (ECOTRIN) 81 MG EC tablet Take 81 mg by mouth every morning.       enalapril (VASOTEC) 5 MG tablet Take 1 tablet (5 mg total) by mouth 2 (two) times daily. 180 tablet 3    LACTOBACILLUS ACIDOPHILUS (ACIDOPHILUS ORAL) Take 1 tablet by mouth once daily.      psyllium (METAMUCIL) packet Take 1 packet by mouth daily as needed.       ranitidine (ZANTAC) 300 MG  tablet Take 1 tablet (300 mg total) by mouth every evening. (Patient taking differently: Take 300 mg by mouth nightly as needed. ) 30 tablet 11    vitamin D 1000 units Tab Take 2,000 Units by mouth once daily.          Past Surgical History:   Procedure Laterality Date    COLONOSCOPY  10/20/2012    COLONOSCOPY  11/19/2014    dr machado ( repeat in 3 years per the pt )    ELBOW SURGERY Left 2013    dislocation    PROCTECTOMY  2002    SHOULDER ARTHROSCOPY W/ ROTATOR CUFF REPAIR Right 2008       Social History     Social History    Marital status:      Spouse name: N/A    Number of children: N/A    Years of education: N/A     Occupational History    Not on file.     Social History Main Topics    Smoking status: Former Smoker     Packs/day: 2.00     Years: 15.00     Types: Cigarettes     Quit date: 1970    Smokeless tobacco: Never Used      Comment: pt quit 40 years ago    Alcohol use No      Comment: None since Nov 15 th 2017    Drug use: No    Sexual activity: Not Currently     Other Topics Concern    Not on file     Social History Narrative    No narrative on file         Vital Signs Range (Last 24H):           Diagnostic Studies:      EKG:  Vent. Rate : 068 BPM     Atrial Rate : 068 BPM     P-R Int : 144 ms          QRS Dur : 082 ms      QT Int : 394 ms       P-R-T Axes : 055 044 058 degrees     QTc Int : 418 ms    Normal sinus rhythm  Normal ECG  When compared with ECG of 26-FEB-2018 14:55,  No significant change was found  Confirmed by BASSEM WEBB MD ([id number]) on 3/8/2018 5:56:27 PM    Pharm Stress Echo (2/26/18):  EKG Conclusions:    1. The EKG portion of this study is negative for ischemia at a peak heart rate of 131 bpm (91% of predicted).   2. Blood pressure remained stable throughout the protocol  (Presenting BP: 137/69 Peak BP: 137/40).   3. The following arrhythmias were present: occasional PACs & PVCs.   4. There were no symptoms of chest discomfort or significant dyspnea throughout  the protocol.     CONCLUSIONS     1 - Normal left ventricular systolic function (EF 55-60%).     2 - Normal right ventricular systolic function .     3 - Normal left ventricular diastolic function.     No evidence of stress induced myocardial ischemia.     Anesthesia Evaluation    I have reviewed the Patient Summary Reports.    I have reviewed the Nursing Notes.   I have reviewed the Medications.     Review of Systems  Anesthesia Hx:  No problems with previous Anesthesia  History of prior surgery of interest to airway management or planning: Previous anesthesia: General Prostatectomy 16 yrs ago with general anesthesia.   Denies Personal Hx of Anesthesia complications.   Social:  Former Smoker, No Alcohol Use    Hematology/Oncology:  Hematology Normal       -- Cancer in past history (prostated):  surgery and radiation    EENT/Dental:   Glasses, [place] (doesn't wear his hearing aids)   Cardiovascular:   Hypertension    Pulmonary:   Denies Asthma. Shortness of breath Dry cough   Hepatic/GI:   GERD (states no problems since stopped drinking beer)    Musculoskeletal:   Arthritis (left elbow)     Neurological:   Denies CVA. Denies Seizures.  Pain , onset is acute , location of left chest  , quality of sharp , severity is a 10    Endocrine:   Denies Diabetes.    Psych:  Psychiatric Normal           Physical Exam  General:  Well nourished    Airway/Jaw/Neck:  Airway Findings: Mouth Opening: Normal Tongue: Normal  General Airway Assessment: Adult  Mallampati: II  Improves to I with phonation.  TM Distance: Normal, at least 6 cm  Jaw/Neck Findings:     Neck ROM: Normal ROM      Dental:  Dental Findings: molar caps, In tact    Chest/Lungs:  Chest/Lungs Findings: Clear to auscultation, Normal Respiratory Rate     Heart/Vascular:  Heart Findings: Rate: Normal  Rhythm: Regular Rhythm     Abdomen:  Abdomen Findings: Normal    Musculoskeletal:  Musculoskeletal Findings: Normal   Skin:  Skin Findings: Normal    Mental Status:  Mental  Status Findings:  Cooperative, Alert and Oriented       Pt was seen in POC 3/8/18; Medical optimization: please see EPIC notes for recommendations of pre-op medical consultant, Dr Anthony, for perioperative medical management./rIma Summers RN           Anesthesia Plan  Type of Anesthesia, risks & benefits discussed:  Anesthesia Type:  general  Patient's Preference:   Intra-op Monitoring Plan: standard ASA monitors and arterial line  Intra-op Monitoring Plan Comments:   Post Op Pain Control Plan: multimodal analgesia and per primary service following discharge from PACU  Post Op Pain Control Plan Comments:   Induction:   IV  Beta Blocker:  Patient is not currently on a Beta-Blocker (No further documentation required).       Informed Consent: Patient understands risks and agrees with Anesthesia plan.  Questions answered. Anesthesia consent signed with patient.  ASA Score: 3     Day of Surgery Review of History & Physical:    H&P update referred to the surgeon.         Ready For Surgery From Anesthesia Perspective.

## 2018-03-08 NOTE — HPI
"History of present illness- I had the pleasure of meeting this pleasant 77 y.o. gentleman in the pre op clinic prior to his elective chest  surgery. The patient is new to me . Cale was accompanied by wife Irma.    I have obtained the history by speaking to the patient and by reviewing the electronic health records.    Events leading up to surgery / History of presenting illness -    Woke up one morning with Left sided chest pain in the lower rib cage in Non 2017    which prompted CXR. CXR with pleural effusion. He was referred to IR for thoracentesis which removed apx 1.2L (cytology negative). Noted increasing SOB and again noted to have pleural effusion. Repeat thoracentesis with ~400cc removed. 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   Has been having chest pain for 2-3 weeks between lower rib cage to the precordial area ( some times sharp severe  rest of the times dull ) relief with Acetaminophen   No radiation . Pain increases increases on deep breathing , sneezing     Appetite low     History of Asbestos exposure as a         Relevant health conditions of significance for the perioperative period/ History of presenting illness -      Essential hypertension - usual -130/ 80    Gastroesophageal reflux disease   Since quitting beer , in Nov 2017 , no longer having as much of  acid reflux  Some times certain foods cause acid reflux    History of " Border line Diabetes" about 20 years ago   Was not on treatment . Gets annual physicals and to his understanding no diabetes    History of prostate cancer s/p prostatectomy about 16 years ago and radiation 1 year later ( Not seeds)     ASA - Preventive reason   Can hold 1 week pre op  Had held for procedures in the recent past with " no problem     Not known to have heart disease

## 2018-03-08 NOTE — LETTER
March 8, 2018      Galdino Fang MD  [address]  [facility] [address]           [place] [facility] Hematology Oncology  [address]  [facility] [address]  Phone: [phone]          Patient: Cale Harding   MR Number: [id number]   YOB: 1940   Date of Visit: 3/8/2018       Dear Dr. Galdino Fang:    Thank you for referring Cale Harding to me for evaluation. Attached you will find relevant portions of my assessment and plan of care.    If you have questions, please do not hesitate to call me. I look forward to following Cale Harding along with you.    Sincerely,    Sintia Foster MD    Enclosure  CC:  No Recipients    If you would like to receive this communication electronically, please contact [email or web address] or [phone] to request more information on [email or web address] Link access.    For providers and/or their staff who would like to refer a patient to [facility], please contact us through our one-stop-shop provider referral line, [facility], at [phone].    If you feel you have received this communication in error or would no longer like to receive these types of communications, please e-mail [email or web address]

## 2018-03-08 NOTE — PATIENT INSTRUCTIONS
PreOp Instructions given:    -- Medication information (what to hold and what to take)   -- NPO guidelines -- DO NOT EAT ANYTHING AFTER MIDNIGHT, INCLUDING GUM, HARD CANDY, MINTS, OR CHEWING TOBACCO, unless otherwise instructed by surgeon.  -- Arrival place and directions given; time to be given the day before procedure by the Surgeon's Office   -- Bathing with antibacterial soap   -- Don't wear any jewelry or bring any valuables AM of surgery   -- No makeup or moisturizer to face   -- No perfume/cologne, powder, lotions or aftershave     Pt and wife verbalized understanding.

## 2018-03-08 NOTE — ASSESSMENT & PLAN NOTE
Hypertension-  Blood pressure is acceptable .I suggest holding Enalapril on the morning of the surgery and can continue that  post operatively under blood pressure, electrolyte and renal function monitoring as long as they are acceptable.I suggest addressing pain control as uncontrolled pain can increased blood pressure

## 2018-03-08 NOTE — PROGRESS NOTES
REASON FOR VISIT:  New diagnosis of mesothelioma.    REFERRING PHYSICIAN:  Galdino Fang M.D. with Thoracic Surgery.    HISTORY OF PRESENT ILLNESS:  Mr. Cale Harding is a 77-year-old male with a   history of prostate cancer status post prostatectomy and radiation, now with a   new diagnosis of left epithelioid mesothelioma.  He started having left chest   wall discomfort in November 2017, which prompted a chest x-ray.  He underwent a   thoracentesis with 1.2 liters removed and apparently cytology was negative;   although, I do not have that path in the system and then he noted increasing   shortness of breath and repeat pleural effusion and fluid was removed.  He then   underwent IR biopsy of the left pleural thickening, which was positive for   epithelioid mesothelioma confirmed at [facility].  He underwent PET scan on   02/09/2018, which revealed three left pleural based masses with an SUV max of   7.6 and a small pleural effusion.  He has had night sweats and about 20-pound   weight loss in the last three months, low-grade fevers also, occasional   shortness of breath and he has chest wall pain, which is not frequent.  Plan   originally was to proceed with VATS, left thoracotomy and radical pleurectomy,   decortication and HIPEC.  However, after the patient complained of worsening   pain, an MRI of the chest was done on 03/01/2018 and that revealed loculated   complex pleural fluid collection with marked pleural thickening and internal   septation.  The largest and more superior anterior of the two pleural masses   measured 7 x 4.3 cm.  The smaller and more inferior one measured 3.9 x 2.2.  The   mass abuts the pleural surface and the larger of the two masses abuts the   pericardium and the chest wall, involvement or invasion of the structures is not   excluded.  The lower mass abuts and possibly invades the diaphragm.  He comes   in to discuss further management.  As discussed above, all he has is  some   intermittent chest wall pain, which does not last long, but it is pretty severe   when it happens.  He is accompanied to the clinic with his wife.  ECOG   performance status is 0.    REVIEW OF SYSTEMS:  CONSTITUTIONAL:  Reports decreased weight and fatigued.  Denies any appetite   issues.  HEENT:  Negative for mouth sores.  EYES:  Negative for visual disturbance.  RESPIRATORY:  Reports some shortness of breath and chest wall pain.  CARDIOVASCULAR:  Negative for chest pain.  GASTROINTESTINAL:  Negative for diarrhea, nausea or vomiting.  GENITOURINARY:  Negative for frequency.  MUSCULOSKELETAL:  Negative for back pain.  SKIN:  Negative for rash.  NEUROLOGIC:  Negative for headaches.  HEMATOLOGIC:  Negative for adenopathy.  PSYCHIATRIC AND BEHAVIORAL:  He is not nervous or anxious.    COMORBID MEDICAL CONDITIONS:  Include diverticulosis, hypertension, prostate   cancer, urinary tract infection.    PAST SURGICAL HISTORY:  Colonoscopy, elbow surgery, shoulder arthroscopy, cuff   repair, colonoscopy and proctectomy.    FAMILY HISTORY:  Brother  with heart disease and prostate cancer.    Father  with heart problems.  Mother  with brain tumor in   [facility].  Sister with heart problems.    SOCIAL HISTORY:  Former smoker, quit in , smoked 2 packs a day for 15 years.    Drinks alcohol occasionally.  He did work in asbestos related activities.  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    PHYSICAL EXAMINATION:  VITAL SIGNS:  Review in EPIC.  GENERAL:  The patient is oriented to person, place and time, appears moderately   built, moderately nourished, in no acute distress.  HEENT:  External ears are normal.  Oropharynx is clear and moist.  No   oropharyngeal  exudate.  Teeth, gums and lips are normal.  No sinus tenderness.    Palate, tongue, posterior pharynx are normal.  EYES:  Conjunctivae and lids are normal.  NECK:  Supple, no JVD.  No thyromegaly.  CARDIOVASCULAR:  Regular rate and rhythm.  Intact distal pulses.  No murmurs   heard.  No edema or tenderness in the extremities.  PULMONARY AND CHEST:  Bilateral equal breath sounds heard.  There is some   tenderness to palpation in the left mid chest on the anterior aspect.  No   accessory muscle usage, no wheezes.  ABDOMEN:  Soft, normal appearance.  Bowel sounds are normal.  No distention, no   mass. No hepatomegaly.  Gait is normal.  No clubbing or cyanosis.  LYMPHADENOPATHY:  No submental, submandibular, cervical, supraclavicular lymph   nodes noted.  NEUROLOGIC:  Alert and oriented to person, place and time.  He has normal   strength and normal reflexes.  No sensory deficit.  Gait is normal.  SKIN:  Warm, dry and intact.  No bruising, no lesions, no rashes.  No cyanosis.    Nails show no clubbing.    LABORATORY DATA:  From 02/07/2018 reveals a CBC with hemoglobin of 10.9,   hematocrit 36, WBC 6.4, and platelets 324.  CMP is within normal limits.    Sedimentation rate is 119.    IMAGING:  As discussed above, which includes PET scan as well as MRI chest.    ASSESSMENT AND PLAN:  Mr. Cale Harding is a 77-year-old male with a new   diagnosis of mesothelioma involving his left chest.  MRI chest is definitely   concerning for pericardial as well as diaphragm invasion.  He is being taken for   a VATS procedure as well as laparoscopy on 03/19/2018 and if there is evidence   of diaphragm involvement as well as pericardial involvement, then he would not   undergo pleurectomy and would be referred for systemic chemotherapy with   platinum and Alimta, most likely carboplatin given his age.  However, if it   appears to be resectable then a second procedure will be scheduled to undergo   resection, pleurectomy, decortication,  and at that time, he will receive HIPEC   cisplatin.  The rationale for both these approaches was extensively discussed   with the patient.  I will plan on hearing back from Thoracic Surgery on the 19th   to make a final decision.  I did have him sign consents for HIPEC cisplatin,   which would be used in case that approach is going to be taken to make it   convenient for the patient to not drive back to sign consents.  All of this was   extensively discussed with the patient and his accompanying wife.  Also   e-scribed Merna for his neoplasm related pain.  Distress score is 0 and no   intervention is indicated.      SPS/HN  dd: 03/08/2018 10:24:32 (CST)  td: 03/09/2018 01:52:28 (CST)  Doc ID   #[id number]  Job ID #[id number]    CC:       Dictated # [id number]  Distress Score    Distress Score: 0        Practical Problems Physical Problems                                                   Family Problems                                         Emotional Problems                                                         Spiritual/Religions Concerns               Other Problems

## 2018-03-08 NOTE — PROGRESS NOTES
[facility] - Pre Op Consult  Progress Note    Patient Name: Cale Harding  MRN: [id number]  Date of Evaluation- 03/08/2018  PCP- Jj Escobedo MD    Future cases for Cale Harding [[id number]]     Case ID Status Date Time Carlos Alberto Procedure Provider Location    [id number] [facility] 3/19/2018  7:00  diagnostic laparoscopy Galdino Fang MD [[id number]] [facility] OR 2ND FLR          HPI:  History of present illness- I had the pleasure of meeting this pleasant 77 y.o. gentleman in the pre op clinic prior to his elective chest  surgery. The patient is new to me . Cale was accompanied by wife Irma.    I have obtained the history by speaking to the patient and by reviewing the electronic health records.    Events leading up to surgery / History of presenting illness -    Woke up one morning with Left sided chest pain in the lower rib cage in Non 2017    which prompted CXR. CXR with pleural effusion. He was referred to IR for thoracentesis which removed apx 1.2L (cytology negative). Noted increasing SOB and again noted to have pleural effusion. Repeat thoracentesis with ~400cc removed. 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   Has been having chest pain for 2-3 weeks between lower rib cage to the precordial area ( some times sharp severe  rest of the times dull ) relief with Acetaminophen   No radiation . Pain increases increases on deep breathing , sneezing     Appetite low     History of Asbestos exposure as a         Relevant health conditions of significance for the perioperative period/ History of presenting illness -      Essential hypertension - usual -130/ 80    Gastroesophageal reflux disease   Since quitting beer , in Nov 2017 , no longer having as much of  acid reflux  Some  "times certain foods cause acid reflux    History of " Border line Diabetes" about 20 years ago   Was not on treatment . Gets annual physicals and to his understanding no diabetes    History of prostate cancer s/p prostatectomy about 16 years ago and radiation 1 year later ( Not seeds)     ASA - Preventive reason   Can hold 1 week pre op  Had held for procedures in the recent past with no problem     Not known to have heart disease        Subjective/ Objective:          Chief complaint-Preoperative evaluation, Perioperative Medical management, complication reduction plan     Active cardiac conditions- none    Revised cardiac risk index predictors- high-risk type of surgery    Functional capacity -Examples of physical activity , was very active until Nov 2017 , did remodeling of kitchen, put up cabinets,  can take 1 flight of stairs----- He can undertake all the above activities without  chest pain,chest tightness making his exercise tolerance more  than 4 Mets.   Getting light headed on changing positions fast- Hydration , slow position changes discussed       Review of Systems   Constitutional: Positive for chills and fever.   HENT:        STOPBANG 4/8     Witnessed stopping of breathing ? Suggested follow up   Elevated BP  Age over 50 years  Male gender   Eyes:        No unusual vision changes   Respiratory:        Dry Cough   No hemotysis   Cardiovascular:        As noted   Gastrointestinal:        Bowels- Regular  No overt GI/ blood losses   Endocrine:        Recent steroid use- None   Genitourinary: Negative for dysuria.        No urinary hesitancy   Under yearly urology follow up    Musculoskeletal:        Arthritis   Skin: Negative for rash.   Neurological: Negative for syncope.        No unilateral weakness   Hematological:        Current use of Anticoagulants- none    Psychiatric/Behavioral:        Depression  Anxiety    None     No vascular stenting   No past medical history pertinent negatives.    Past " Surgical History:   Procedure Laterality Date    COLONOSCOPY  10/20/2012    COLONOSCOPY  11/19/2014    dr machado ( repeat in 3 years per the pt )    ELBOW SURGERY Left 2013    dislocation    PROCTECTOMY  2002    SHOULDER ARTHROSCOPY W/ ROTATOR CUFF REPAIR Right 2008       No anesthesia, bleeding, cardiac problems, PONV with previous surgeries/procedures.  Medications and Allergies reviewed in epic.   FH- No anesthesia, thrombosis/ bleeding   in family   Lives with wife , help available post op   Had breast surgery - benign to his understanding     Physical Exam   HENT:   Head: Normocephalic.       Physical Exam   HENT:   Head: Normocephalic.     Constitutional- Vitals - Body mass index is 23.13 kg/m².,   Vitals:    03/08/18 1103   BP: 121/70   Pulse: 81   Temp: 98.4 °F (36.9 °C)     General appearance-Conscious,Coherent  Eyes- No conjunctival icterus,pupils  round  and reactive to light   ENT-Oral cavity- moist  , Hearing grossly normal   Neck- No thyromegaly ,Trachea -central, No jugular venous distension,   No Carotid Bruit   Cardiovascular -Heart Sounds- Normal  and  no murmur   , No gallop rhythm   Respiratory - decreased air entry ;eft side , Decreased vocal resonance , fremitus , Reduced percussion Left side , Normal Respiratory Effort,  no wheeze  and  no forced expiratory wheeze    Peripheral pitting pedal edema-- none  and  bilateral lower extremity telangiectasia , no calf pain   Gastrointestinal -Soft abdomen, No palpable masses, Non Tender,Liver,Spleen not palpable. No-- free fluid and shifting dullness  Musculoskeletal- No finger Clubbing. Strength grossly normal   Lymphatic-No Palpable cervical, axillary,Inguinal lymphadenopathy   Psychiatric - normal effect,Orientation  Rt Dorsalis pedis pulses-palpable    Lt Dorsalis pedis pulses- palpable   Rt Posterior tibial pulses -palpable   Left posterior tibial pulses -palpable   Miscellaneous -  no breast lumps and  no renal bruit  Blood pressure  "121/70, pulse 81, temperature 98.4 °F (36.9 °C), temperature source Oral, height 5' 10" (1.778 m), weight 73.1 kg (161 lb 3.2 oz), SpO2 97 %.      Investigations  Lab and Imaging have been reviewed in [facility].    Review of Medicine tests    EKG- I had independently reviewed the EKG from--2/26/2018     Oct 2017     No hemodynamically significant stenosis.  Mild plaque formation is present bilaterally    Nil acute     Stress test 2/26/2018      1 - Normal left ventricular systolic function (EF 55-60%).     2 - Normal right ventricular systolic function .     3 - Normal left ventricular diastolic function.     No evidence of stress induced myocardial ischemia.     Review of clinical lab tests-Date--2/7/2018 - Creatinine-0.73  Date--2/7/2018 Hemoglobin--10.9  Platelet count--N      Review of old records- Was done and information gathered regards to events leading to surgery and health conditions of significance in the perioperative period.        Preoperative cardiac risk assessment-  The patient does not have any active cardiac conditions . Revised cardiac risk index predictors- 1---.Functional capacity is more than 4 Mets. He will be undergoing a chest  procedure that carries a high risk     The estimated risk of the rate of adverse cardiac outcomes  0.9%    No further cardiac work up is indicated prior to proceeding with the surgery          American Society of Anesthesiologists Physical status classification ( ASA ) class: 3     Postoperative pulmonary complication risk assessment:      ARISCAT ( Canet) risk index- risk class -  Intermediate , if duration of surgery is under 3 hours, higher, if duration of surgery is over 3 hours      Felicity Respiratory failure index- percentage risk of respiratory failure: 4.2 %     Assessment/Plan:     Essential hypertension  Hypertension-  Blood pressure is acceptable .I suggest holding Enalapril on the morning of the surgery and can continue that  post operatively under blood " pressure, electrolyte and renal function monitoring as long as they are acceptable.I suggest addressing pain control as uncontrolled pain can increased blood pressure     Mesothelioma of left lung  Planned for surgery   Had Oncology evaluation    Prediabetes  Prediabetes- I suggest monitoring the glucose in the perioperative period as  glucose may be high from stress hyperglycemia in which case Insulin may be required      Gastroesophageal reflux disease  Controlled    I suggest aspiration precautions    Atypical chest pain  Does not sound cardiac in nature     Night sweats  Likely from cancer     Weight loss  Likely from cancer   Having protein supplements    Postural dizziness  Getting light headed on changing positions fast- Hydration , slow position changes discussed         Preventive perioperative care    Thromboembolic prophylaxis:  His risk factors for thrombosis include cancer surgical procedure and age.I suggest  thromboembolic prophylaxis ( mechanical/pharmacological, weighing the risk benefits of pharmacological agent use considering tana procedural bleeding )  during the perioperative period.I suggested being active in the post operative period.      Postoperative pulmonary complication prophylaxis-Risk factors for post operative pulmonary complications include age over 65 years, surgery lasting over 3 hours, ASA class >2 and proximity of the surgical site to the lungs- I suggest incentive spirometry use, early ambulation, end tidal carbon dioxide monitoring and pain control so as to avoid diaphragmatic splinting  , oral care , head end of bed elevation     Renal complication prophylaxis-Risk factors for renal complications include hypertension . I suggest keeping him well hydrated.I suggested drinking 2 litre's of water a day      Surgical site Infection Prophylaxis-I  suggest appropriate antibiotic for Prophylaxis against Surgical site infections     Delirium prophylaxis-Risk factors - Advanced Age -  I suggest avoidance / minimizing the use of  Benzodiazepines ( unless the patient has been taking it on a regular basis ),Anticholinergic medication,Antihistamines ( like  Benadryl).I suggest minimizing the use of opioid medication and use of IV tylenol,if it is appropriate. I suggest using the lowest possible dose of opioids for the shortest duration possible in the perioperative period. I suggest to Keep shades/blinds open during the day, lights off and shades closed at night to encourage normal sleep/wake cycle.I encourage the presence of the family member with the patient at all times, if at all possible as mental status changes can be picked up early by the family members and they help with reorientation. I encouraged the presence of family to help with orientation in the perioperative period. Benadryl avoidance suggested     This visit was focused on Preoperative evaluation, Perioperative Medical management, complication reduction plans. I suggest that the patient follows up with primary care or relevant sub specialists for ongoing health care.    I appreciate the opportunity to be involved in this patients care. Please feel free to contact me if there were any questions about this consultation.    Patient is optimized     Patient was instructed to call and update me about any changes to health,Testing out side of pre op center changes to medication, office visits , hospitalizations between now and surgery     Marina Anthony MD  Perioperative Medicine  [facility]   Pager [phone]    Abiodun 15 th 2018     Normal spirometry. Normal diffusion capacity  --------    3/8- 16 57        APTT -N  CMP   EKG   -------  3/9- 18 47     EKG from 3/8/2018 reportedly showed   Normal sinus rhythm  Normal ECG  When compared with ECG of 26-FEB-2018 14:55,  No significant change was found    Had thoracic surgery evaluation on 3/9     Plan is to  begin with a laparoscopy to r/o transdiaphragmatic tumor  extension.  If it is present, there is no need to perform a VATS as the malignancy will be deemed unresectable.  He will be managed with chemotherapy +/- targeted radiation therapy.  If there is no intraabdominal tumor extension and no multifocal  Extension into the chest wall muscle identified at thoracoscopy, the patient will undergo a formal left thoracotomy radical pleurectomy with decortication and heated intrapleural chemolavage under a separate OR date.  ----    3/16- 14 23     CMP 2/7/2018 - un remarkable   Called to follow up   Last took ASA yesterday   No vascular stenting   Low grade fever yesterday afternoon    None today   Feels much better today   No phlegm, no dysuria,no rash, diarrhea   Fever could be from Mesothelioma   No changes to Medication, Health   Taking stool softener for constipation  Enalapril tana op Instructions discussed

## 2018-03-08 NOTE — ASSESSMENT & PLAN NOTE
Prediabetes- I suggest monitoring the glucose in the perioperative period as  glucose may be high from stress hyperglycemia in which case Insulin may be required

## 2018-03-08 NOTE — LETTER
March 8, 2018      Rhonda G Leopold, MD  [address]  [place] [address]           [facility] - Pre Op Consult  [address] [facility] [address]  Phone: [phone]          Patient: Cale Harding   MR Number: [id number]   YOB: 1940   Date of Visit: 3/8/2018       Dear Dr. Rhonda G Leopold:    Thank you for referring Cale Harding to me for evaluation. Attached you will find relevant portions of my assessment and plan of care.    If you have questions, please do not hesitate to call me. I look forward to following Cale Harding along with you.    Sincerely,    Marina Anthony MD    Enclosure  CC:  MD Sintia López MD    If you would like to receive this communication electronically, please contact [email or web address] or [phone] to request more information on [employer] Link access.    For providers and/or their staff who would like to refer a patient to [facility], please contact us through our one-stop-shop provider referral line, [facility], at [phone].    If you feel you have received this communication in error or would no longer like to receive these types of communications, please e-mail [email or web address]

## 2018-03-08 NOTE — OUTPATIENT SUBJECTIVE & OBJECTIVE
Outpatient Subjective & Objective     Chief complaint-Preoperative evaluation, Perioperative Medical management, complication reduction plan     Active cardiac conditions- none    Revised cardiac risk index predictors- high-risk type of surgery    Functional capacity -Examples of physical activity , was very active until Nov 2017 , did remodeling of kitchen, put up cabinets,  can take 1 flight of stairs----- He can undertake all the above activities without  chest pain,chest tightness making his exercise tolerance more  than 4 Mets.   Getting light headed on changing positions fast- Hydration , slow position changes discussed       Review of Systems   Constitutional: Positive for chills and fever.   HENT:        STOPBANG 4/8     Witnessed stopping of breathing ? Suggested follow up   Elevated BP  Age over 50 years  Male gender   Eyes:        No unusual vision changes   Respiratory:        Dry Cough   No hemotysis   Cardiovascular:        As noted   Gastrointestinal:        Bowels- Regular  No overt GI/ blood losses   Endocrine:        Recent steroid use- None   Genitourinary: Negative for dysuria.        No urinary hesitancy   Under yearly urology follow up    Musculoskeletal:        Arthritis   Skin: Negative for rash.   Neurological: Negative for syncope.        No unilateral weakness   Hematological:        Current use of Anticoagulants- none    Psychiatric/Behavioral:        Depression  Anxiety    None     No vascular stenting   No past medical history pertinent negatives.    Past Surgical History:   Procedure Laterality Date    COLONOSCOPY  10/20/2012    COLONOSCOPY  11/19/2014    dr machado ( repeat in 3 years per the pt )    ELBOW SURGERY Left 2013    dislocation    PROCTECTOMY  2002    SHOULDER ARTHROSCOPY W/ ROTATOR CUFF REPAIR Right 2008       No anesthesia, bleeding, cardiac problems, PONV with previous surgeries/procedures.  Medications and Allergies reviewed in epic.   FH- No anesthesia,  "thrombosis/ bleeding   in family   Lives with wife , help available post op   Had breast surgery - benign to his understanding     Physical Exam   HENT:   Head: Normocephalic.       Physical Exam   HENT:   Head: Normocephalic.     Constitutional- Vitals - Body mass index is 23.13 kg/m².,   Vitals:    03/08/18 1103   BP: 121/70   Pulse: 81   Temp: 98.4 °F (36.9 °C)     General appearance-Conscious,Coherent  Eyes- No conjunctival icterus,pupils  round  and reactive to light   ENT-Oral cavity- moist  , Hearing grossly normal   Neck- No thyromegaly ,Trachea -central, No jugular venous distension,   No Carotid Bruit   Cardiovascular -Heart Sounds- Normal  and  no murmur   , No gallop rhythm   Respiratory - decreased air entry ;eft side , Decreased vocal resonance , fremitus , Reduced percussion Left side , Normal Respiratory Effort,  no wheeze  and  no forced expiratory wheeze    Peripheral pitting pedal edema-- none  and  bilateral lower extremity telangiectasia , no calf pain   Gastrointestinal -Soft abdomen, No palpable masses, Non Tender,Liver,Spleen not palpable. No-- free fluid and shifting dullness  Musculoskeletal- No finger Clubbing. Strength grossly normal   Lymphatic-No Palpable cervical, axillary,Inguinal lymphadenopathy   Psychiatric - normal effect,Orientation  Rt Dorsalis pedis pulses-palpable    Lt Dorsalis pedis pulses- palpable   Rt Posterior tibial pulses -palpable   Left posterior tibial pulses -palpable   Miscellaneous -  no breast lumps and  no renal bruit  Blood pressure 121/70, pulse 81, temperature 98.4 °F (36.9 °C), temperature source Oral, height 5' 10" (1.778 m), weight 73.1 kg (161 lb 3.2 oz), SpO2 97 %.      Investigations  Lab and Imaging have been reviewed in [facility].    Review of Medicine tests    EKG- I had independently reviewed the EKG from--2/26/2018     Oct 2017     No hemodynamically significant stenosis.  Mild plaque formation is present bilaterally    Nil acute     Stress test " 2/26/2018      1 - Normal left ventricular systolic function (EF 55-60%).     2 - Normal right ventricular systolic function .     3 - Normal left ventricular diastolic function.     No evidence of stress induced myocardial ischemia.     Review of clinical lab tests-Date--2/7/2018 - Creatinine-0.73  Date--2/7/2018 Hemoglobin--10.9  Platelet count--N      Review of old records- Was done and information gathered regards to events leading to surgery and health conditions of significance in the perioperative period.    Outpatient Subjective & Objective

## 2018-03-09 ENCOUNTER — OFFICE VISIT (OUTPATIENT)
Dept: CARDIOTHORACIC SURGERY | Facility: CLINIC | Age: 78
End: 2018-03-09
Payer: MEDICARE

## 2018-03-09 VITALS
HEIGHT: 68 IN | DIASTOLIC BLOOD PRESSURE: 70 MMHG | BODY MASS INDEX: 24.63 KG/M2 | WEIGHT: 162.5 LBS | OXYGEN SATURATION: 95 % | TEMPERATURE: 100 F | SYSTOLIC BLOOD PRESSURE: 126 MMHG | HEART RATE: 85 BPM

## 2018-03-09 DIAGNOSIS — C45.9 MESOTHELIOMA, MALIGNANT: Primary | ICD-10-CM

## 2018-03-09 PROCEDURE — 99999 PR PBB SHADOW E&M-EST. PATIENT-LVL III: CPT | Mod: PBBFAC,,, | Performed by: THORACIC SURGERY (CARDIOTHORACIC VASCULAR SURGERY)

## 2018-03-09 PROCEDURE — 3074F SYST BP LT 130 MM HG: CPT | Mod: S$GLB,,, | Performed by: THORACIC SURGERY (CARDIOTHORACIC VASCULAR SURGERY)

## 2018-03-09 PROCEDURE — 99499 UNLISTED E&M SERVICE: CPT | Mod: S$GLB,,, | Performed by: THORACIC SURGERY (CARDIOTHORACIC VASCULAR SURGERY)

## 2018-03-09 PROCEDURE — 99213 OFFICE O/P EST LOW 20 MIN: CPT | Mod: S$GLB,,, | Performed by: THORACIC SURGERY (CARDIOTHORACIC VASCULAR SURGERY)

## 2018-03-09 PROCEDURE — 3078F DIAST BP <80 MM HG: CPT | Mod: S$GLB,,, | Performed by: THORACIC SURGERY (CARDIOTHORACIC VASCULAR SURGERY)

## 2018-03-09 NOTE — PROGRESS NOTES
Subjective:       Patient ID: Cale Harding is a 77 y.o. male.    Chief Complaint: Follow-up    HPI   Patient is a 77 y.o. male presents with HTN, and hx prostate cancer s/p prostatectomy and radiation presenting with left epithelioid mesothelioma. History dates back Nov 2017 with onset of left chest wall discomfort which prompted CXR. CXR with pleural effusion. He was referred to IR for thoracentesis which removed apx 1.2L (cytology negative). Noted increasing SOB and again noted to have pleural effusion. Repeat thoracentesis with ~400cc removed. IR biopsy of left pleural thickening positive for epithelioid mesothelioma (confirmed at [facility]). PET 2/9/18 with 3 left pleural based masses with SUV max 7.61 and small pleural effusion. Patient notes occasional drenching night sweats, 20lb weight loss over the last 3 months, and intermittent low grade fevers over last month but none in the last week. Also c/o VALENCIA. Infrequent chest wall sharp pain per patient but does not seem to be constant. He denies chills, cardiac CP, SOB at rest, nausea, vomiting, dysphagia, appetite changes, and changes in bowel/bladder function. No prior cardiac history. ASA 81. No other blood thinners      Former smoker. 30 pack year history. Prior nightly beer drinker but has not consumed beer since onset of effusion.   PSH: prostatectomy, Left elbow surgery, Right rotator cuff repair  Retired [employer]. Worked on ships with asbestos. 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     Interval:   Patient called and reported slight increase in chest wall pain. Thus, scheduled him for an MRI chest which is concerning for possible chest wall, pericardium, and diaphragm invasion. He is here today to discuss exploratory VATS and laparoscopy. He met with Dr. Foster yesterday to discuss both HIPEC (sgned consents) as well as standard chemo regimen.     Review of Systems   Constitutional:  "Negative for activity change, chills and fever.   HENT: Negative for congestion.    Eyes: Negative for pain.   Respiratory: Positive for chest tightness (intermittent chest wall pain). Negative for cough and shortness of breath.    Cardiovascular: Negative for palpitations and leg swelling.   Gastrointestinal: Negative for abdominal distention, abdominal pain, nausea and vomiting.   Genitourinary: Negative for difficulty urinating.   Musculoskeletal: Negative for arthralgias.   Skin: Negative for color change and rash.   Neurological: Negative for dizziness and syncope.   Psychiatric/Behavioral: Negative for agitation. The patient is not nervous/anxious.          Objective:       Visit Vitals  /70 (BP Location: Right arm, Patient Position: Sitting, BP Method: Medium (Automatic))   Pulse 85   Temp 99.6 °F (37.6 °C) (Oral)   Ht 5' 8" (1.727 m)   Wt 73.7 kg (162 lb 7.7 oz)   SpO2 95%   BMI 24.70 kg/m²         Physical Exam   Constitutional: He is oriented to person, place, and time. He appears well-developed and well-nourished.   HENT:   Head: Normocephalic and atraumatic.   Eyes: EOM are normal.   Neck: Normal range of motion. Neck supple. No tracheal deviation present.   Cardiovascular: Normal rate, regular rhythm, normal heart sounds and intact distal pulses.    Pulmonary/Chest: Effort normal and breath sounds normal.   Abdominal: Soft. Bowel sounds are normal. There is no tenderness.   Musculoskeletal: Normal range of motion.   Lymphadenopathy:     He has no cervical adenopathy.   Neurological: He is alert and oriented to person, place, and time.   Skin: Skin is warm and dry.   Psychiatric: He has a normal mood and affect. Thought content normal.   Vitals reviewed.      Chest CT 1/15/18:  Left pleural fluid with thickening of left parietal pleura concerning for malignant effusion, mesothelioma, or empyema.  Multiple pulmonary nodules, stable since prior exam.     PFTs   FEV 1 - 2.29  83%  DLCO - 17.10 " 85%     Pathology   Lung, left, needle core biopsy ([id number]; 2/1/2018):  Malignant epithelioid neoplasm with an indistinct immunophenotype (see letter).     PET 2/9/18:   There are 3 left pleural-based masses. The index lesion posteriorly SUV max 7.61. There is a left pleural effusion possibly malignant there is left lung base consolidation be chronic.    MRI chest:   Loculated complex left pleural fluid collection with marked pleural thickening and internal septation.  Two solid pleural based masses within the collection with abutment and possible invasion of pericardium, chest wall, and diaphragm.  There is adjacent left lung consolidation/atelectasis as well as several pulmonary nodules.  Overall appearance consistent with malignancy, possibly mesothelioma.    Assessment:       Patient is a 77 y.o. male presents with HTN and hx prostate cancer s/p prostatectomy/radiation presenting left epithelioid mesothelioma here today to discuss surgery. Recent imaging concerning for invasion of pericardium, chest wall, and diaphragm. Functionally, good candidate for resection.     Plan:       Previously clearance for surgery.   Will need to assess for resectability with imaging concerning for invasion through diaphragm.   Will proceed to OR on 3/19/18 for exploratory left VATS and exploratory laparoscopy for possible biopsy.   Appropriate patient education regarding the tana-operative period as well as intraperative details were discussed. Risks, including but not limited to, bleeding, infection, pain and anesthetic complication were discussed. Patient was given the opportunity to ask questions and to have those questions answered to their satisfaction. Patient verbalized understanding to both procedure and associated risks. Consent was obtained.    If still deemed a resection candidate will plan to proceed to OR on 3/29/18 for left thoracotomy with radial pleurectomy, decortication, HIPEC, possible chest wall  reconstruction, possible diaphragm reconstruction.    ATTENDING ATTESTATION:    I evaluated the patient and I agree with the assessment and plan.  I will begin with a laparoscopy to r/o transdiaphragmatic tumor extension.  If it is present, there is no need to perform a VATS as the malignancy will be deemed unresectable.  He will be managed with chemotherapy +/- targeted radiation therapy.  If there is no intraabdominal tumor extension and no multifocal  Extension into the chest wall muscle identified at thoracoscopy, the patient will undergo a formal left thoracotomy radical pleurectomy with decortication and heated intrapleural chemolavage under a separate OR date.

## 2018-03-16 ENCOUNTER — TELEPHONE (OUTPATIENT)
Dept: CARDIOTHORACIC SURGERY | Facility: CLINIC | Age: 78
End: 2018-03-16

## 2018-03-19 ENCOUNTER — ANESTHESIA (OUTPATIENT)
Dept: SURGERY | Facility: HOSPITAL | Age: 78
End: 2018-03-19

## 2018-03-19 ENCOUNTER — ANESTHESIA (OUTPATIENT)
Dept: SURGERY | Facility: HOSPITAL | Age: 78
End: 2018-03-19
Payer: MEDICARE

## 2018-03-19 ENCOUNTER — SURGERY (OUTPATIENT)
Age: 78
End: 2018-03-19

## 2018-03-19 ENCOUNTER — HOSPITAL ENCOUNTER (OUTPATIENT)
Facility: HOSPITAL | Age: 78
LOS: 1 days | Discharge: HOME | End: 2018-03-20
Attending: THORACIC SURGERY (CARDIOTHORACIC VASCULAR SURGERY) | Admitting: THORACIC SURGERY (CARDIOTHORACIC VASCULAR SURGERY)
Payer: MEDICARE

## 2018-03-19 ENCOUNTER — TELEPHONE (OUTPATIENT)
Dept: HEMATOLOGY/ONCOLOGY | Facility: CLINIC | Age: 78
End: 2018-03-19

## 2018-03-19 DIAGNOSIS — C45.9 MESOTHELIOMA: ICD-10-CM

## 2018-03-19 DIAGNOSIS — J92.9 PLEURAL PLAQUE: ICD-10-CM

## 2018-03-19 DIAGNOSIS — C45.7 MESOTHELIOMA OF LEFT LUNG: Primary | ICD-10-CM

## 2018-03-19 PROCEDURE — 63600175 PHARM REV CODE 636 W HCPCS: Performed by: STUDENT IN AN ORGANIZED HEALTH CARE EDUCATION/TRAINING PROGRAM

## 2018-03-19 PROCEDURE — 94799 UNLISTED PULMONARY SVC/PX: CPT

## 2018-03-19 PROCEDURE — 88305 TISSUE EXAM BY PATHOLOGIST: CPT | Performed by: PATHOLOGY

## 2018-03-19 PROCEDURE — 71000039 HC RECOVERY, EACH ADD'L HOUR: Performed by: THORACIC SURGERY (CARDIOTHORACIC VASCULAR SURGERY)

## 2018-03-19 PROCEDURE — 25000003 PHARM REV CODE 250: Performed by: SURGERY

## 2018-03-19 PROCEDURE — 88331 PATH CONSLTJ SURG 1 BLK 1SPC: CPT | Mod: 26,,, | Performed by: PATHOLOGY

## 2018-03-19 PROCEDURE — 71000033 HC RECOVERY, INTIAL HOUR: Performed by: THORACIC SURGERY (CARDIOTHORACIC VASCULAR SURGERY)

## 2018-03-19 PROCEDURE — 36000711: Performed by: THORACIC SURGERY (CARDIOTHORACIC VASCULAR SURGERY)

## 2018-03-19 PROCEDURE — 25000003 PHARM REV CODE 250: Performed by: STUDENT IN AN ORGANIZED HEALTH CARE EDUCATION/TRAINING PROGRAM

## 2018-03-19 PROCEDURE — C9290 INJ, BUPIVACAINE LIPOSOME: HCPCS | Performed by: THORACIC SURGERY (CARDIOTHORACIC VASCULAR SURGERY)

## 2018-03-19 PROCEDURE — 63600175 PHARM REV CODE 636 W HCPCS: Performed by: THORACIC SURGERY (CARDIOTHORACIC VASCULAR SURGERY)

## 2018-03-19 PROCEDURE — A4216 STERILE WATER/SALINE, 10 ML: HCPCS | Performed by: SURGERY

## 2018-03-19 PROCEDURE — 36000710: Performed by: THORACIC SURGERY (CARDIOTHORACIC VASCULAR SURGERY)

## 2018-03-19 PROCEDURE — D9220A PRA ANESTHESIA: Mod: ,,, | Performed by: ANESTHESIOLOGY

## 2018-03-19 PROCEDURE — 88305 TISSUE EXAM BY PATHOLOGIST: CPT | Mod: 26,,, | Performed by: PATHOLOGY

## 2018-03-19 PROCEDURE — 63600175 PHARM REV CODE 636 W HCPCS: Performed by: PHYSICIAN ASSISTANT

## 2018-03-19 PROCEDURE — 37000009 HC ANESTHESIA EA ADD 15 MINS: Performed by: THORACIC SURGERY (CARDIOTHORACIC VASCULAR SURGERY)

## 2018-03-19 PROCEDURE — 37000008 HC ANESTHESIA 1ST 15 MINUTES: Performed by: THORACIC SURGERY (CARDIOTHORACIC VASCULAR SURGERY)

## 2018-03-19 PROCEDURE — 32609 THORACOSCOPY W/BX PLEURA: CPT | Mod: 51,,, | Performed by: THORACIC SURGERY (CARDIOTHORACIC VASCULAR SURGERY)

## 2018-03-19 PROCEDURE — 94761 N-INVAS EAR/PLS OXIMETRY MLT: CPT

## 2018-03-19 PROCEDURE — 49320 DIAG LAPARO SEPARATE PROC: CPT | Mod: ,,, | Performed by: THORACIC SURGERY (CARDIOTHORACIC VASCULAR SURGERY)

## 2018-03-19 RX ORDER — FENTANYL CITRATE 50 UG/ML
INJECTION, SOLUTION INTRAMUSCULAR; INTRAVENOUS
Status: DISCONTINUED | OUTPATIENT
Start: 2018-03-19 | End: 2018-03-19

## 2018-03-19 RX ORDER — FAMOTIDINE 20 MG/1
20 TABLET, FILM COATED ORAL 2 TIMES DAILY
Status: DISCONTINUED | OUTPATIENT
Start: 2018-03-19 | End: 2018-03-20 | Stop reason: HOSPADM

## 2018-03-19 RX ORDER — CEFAZOLIN SODIUM 1 G/3ML
2 INJECTION, POWDER, FOR SOLUTION INTRAMUSCULAR; INTRAVENOUS
Status: COMPLETED | OUTPATIENT
Start: 2018-03-19 | End: 2018-03-19

## 2018-03-19 RX ORDER — CALCIUM CARBONATE 200(500)MG
1 TABLET,CHEWABLE ORAL
Status: DISCONTINUED | OUTPATIENT
Start: 2018-03-19 | End: 2018-03-20 | Stop reason: HOSPADM

## 2018-03-19 RX ORDER — HYDROCODONE BITARTRATE AND ACETAMINOPHEN 5; 325 MG/1; MG/1
TABLET ORAL
Status: COMPLETED
Start: 2018-03-19 | End: 2018-03-19

## 2018-03-19 RX ORDER — ENALAPRIL MALEATE 5 MG/1
5 TABLET ORAL 2 TIMES DAILY
Status: DISCONTINUED | OUTPATIENT
Start: 2018-03-19 | End: 2018-03-20 | Stop reason: HOSPADM

## 2018-03-19 RX ORDER — SODIUM CHLORIDE 0.9 % (FLUSH) 0.9 %
3 SYRINGE (ML) INJECTION
Status: DISCONTINUED | OUTPATIENT
Start: 2018-03-19 | End: 2018-03-19

## 2018-03-19 RX ORDER — GLYCOPYRROLATE 0.2 MG/ML
INJECTION INTRAMUSCULAR; INTRAVENOUS
Status: DISCONTINUED | OUTPATIENT
Start: 2018-03-19 | End: 2018-03-19

## 2018-03-19 RX ORDER — MIDAZOLAM HYDROCHLORIDE 5 MG/ML
INJECTION INTRAMUSCULAR; INTRAVENOUS
Status: DISCONTINUED | OUTPATIENT
Start: 2018-03-19 | End: 2018-03-19

## 2018-03-19 RX ORDER — PROPOFOL 10 MG/ML
VIAL (ML) INTRAVENOUS
Status: DISCONTINUED | OUTPATIENT
Start: 2018-03-19 | End: 2018-03-19

## 2018-03-19 RX ORDER — LIDOCAINE HYDROCHLORIDE 10 MG/ML
1 INJECTION, SOLUTION EPIDURAL; INFILTRATION; INTRACAUDAL; PERINEURAL ONCE
Status: DISCONTINUED | OUTPATIENT
Start: 2018-03-19 | End: 2018-03-19

## 2018-03-19 RX ORDER — SODIUM CHLORIDE 0.9 % (FLUSH) 0.9 %
3 SYRINGE (ML) INJECTION EVERY 8 HOURS
Status: DISCONTINUED | OUTPATIENT
Start: 2018-03-19 | End: 2018-03-20 | Stop reason: HOSPADM

## 2018-03-19 RX ORDER — ENOXAPARIN SODIUM 100 MG/ML
40 INJECTION SUBCUTANEOUS DAILY
Status: DISCONTINUED | OUTPATIENT
Start: 2018-03-20 | End: 2018-03-20 | Stop reason: HOSPADM

## 2018-03-19 RX ORDER — ROCURONIUM BROMIDE 10 MG/ML
INJECTION, SOLUTION INTRAVENOUS
Status: DISCONTINUED | OUTPATIENT
Start: 2018-03-19 | End: 2018-03-19

## 2018-03-19 RX ORDER — SODIUM CHLORIDE 9 MG/ML
INJECTION, SOLUTION INTRAVENOUS CONTINUOUS
Status: DISCONTINUED | OUTPATIENT
Start: 2018-03-19 | End: 2018-03-19

## 2018-03-19 RX ORDER — MEPERIDINE HYDROCHLORIDE 50 MG/ML
25 INJECTION INTRAMUSCULAR; INTRAVENOUS; SUBCUTANEOUS EVERY 4 HOURS PRN
Status: DISCONTINUED | OUTPATIENT
Start: 2018-03-19 | End: 2018-03-20 | Stop reason: HOSPADM

## 2018-03-19 RX ORDER — LIDOCAINE HCL/PF 100 MG/5ML
SYRINGE (ML) INTRAVENOUS
Status: DISCONTINUED | OUTPATIENT
Start: 2018-03-19 | End: 2018-03-19

## 2018-03-19 RX ORDER — ONDANSETRON 2 MG/ML
4 INJECTION INTRAMUSCULAR; INTRAVENOUS EVERY 6 HOURS PRN
Status: DISCONTINUED | OUTPATIENT
Start: 2018-03-19 | End: 2018-03-20 | Stop reason: HOSPADM

## 2018-03-19 RX ORDER — FENTANYL CITRATE 50 UG/ML
25 INJECTION, SOLUTION INTRAMUSCULAR; INTRAVENOUS EVERY 5 MIN PRN
Status: COMPLETED | OUTPATIENT
Start: 2018-03-19 | End: 2018-03-19

## 2018-03-19 RX ORDER — ASPIRIN 81 MG/1
81 TABLET ORAL EVERY MORNING
Status: DISCONTINUED | OUTPATIENT
Start: 2018-03-20 | End: 2018-03-20 | Stop reason: HOSPADM

## 2018-03-19 RX ORDER — HYDROCODONE BITARTRATE AND ACETAMINOPHEN 5; 325 MG/1; MG/1
1 TABLET ORAL EVERY 6 HOURS PRN
Status: DISCONTINUED | OUTPATIENT
Start: 2018-03-19 | End: 2018-03-20 | Stop reason: HOSPADM

## 2018-03-19 RX ORDER — HYDROCODONE BITARTRATE AND ACETAMINOPHEN 5; 325 MG/1; MG/1
2 TABLET ORAL EVERY 4 HOURS PRN
Status: DISCONTINUED | OUTPATIENT
Start: 2018-03-19 | End: 2018-03-20 | Stop reason: HOSPADM

## 2018-03-19 RX ORDER — ONDANSETRON 2 MG/ML
8 INJECTION INTRAMUSCULAR; INTRAVENOUS ONCE
Status: COMPLETED | OUTPATIENT
Start: 2018-03-19 | End: 2018-03-19

## 2018-03-19 RX ORDER — NEOSTIGMINE METHYLSULFATE 1 MG/ML
INJECTION, SOLUTION INTRAVENOUS
Status: DISCONTINUED | OUTPATIENT
Start: 2018-03-19 | End: 2018-03-19

## 2018-03-19 RX ADMIN — LIDOCAINE HYDROCHLORIDE 100 MG: 20 INJECTION, SOLUTION INTRAVENOUS at 07:03

## 2018-03-19 RX ADMIN — SODIUM CHLORIDE, SODIUM GLUCONATE, SODIUM ACETATE, POTASSIUM CHLORIDE, MAGNESIUM CHLORIDE, SODIUM PHOSPHATE, DIBASIC, AND POTASSIUM PHOSPHATE: .53; .5; .37; .037; .03; .012; .00082 INJECTION, SOLUTION INTRAVENOUS at 08:03

## 2018-03-19 RX ADMIN — GLYCOPYRROLATE 0.6 MG: 0.2 INJECTION, SOLUTION INTRAMUSCULAR; INTRAVENOUS at 09:03

## 2018-03-19 RX ADMIN — PROPOFOL 50 MG: 10 INJECTION, EMULSION INTRAVENOUS at 09:03

## 2018-03-19 RX ADMIN — FENTANYL CITRATE 50 MCG: 50 INJECTION, SOLUTION INTRAMUSCULAR; INTRAVENOUS at 08:03

## 2018-03-19 RX ADMIN — Medication 3 ML: at 10:03

## 2018-03-19 RX ADMIN — PROPOFOL 30 MG: 10 INJECTION, EMULSION INTRAVENOUS at 07:03

## 2018-03-19 RX ADMIN — CEFAZOLIN 2 G: 330 INJECTION, POWDER, FOR SOLUTION INTRAMUSCULAR; INTRAVENOUS at 07:03

## 2018-03-19 RX ADMIN — PROPOFOL 120 MG: 10 INJECTION, EMULSION INTRAVENOUS at 07:03

## 2018-03-19 RX ADMIN — HYDROCODONE BITARTRATE AND ACETAMINOPHEN 2 TABLET: 5; 325 TABLET ORAL at 01:03

## 2018-03-19 RX ADMIN — ROCURONIUM BROMIDE 30 MG: 10 INJECTION, SOLUTION INTRAVENOUS at 07:03

## 2018-03-19 RX ADMIN — HYDROCODONE BITARTRATE AND ACETAMINOPHEN 2 TABLET: 5; 325 TABLET ORAL at 06:03

## 2018-03-19 RX ADMIN — ROCURONIUM BROMIDE 10 MG: 10 INJECTION, SOLUTION INTRAVENOUS at 08:03

## 2018-03-19 RX ADMIN — ENALAPRIL MALEATE 5 MG: 5 TABLET ORAL at 10:03

## 2018-03-19 RX ADMIN — Medication 3 ML: at 01:03

## 2018-03-19 RX ADMIN — FENTANYL CITRATE 25 MCG: 50 INJECTION, SOLUTION INTRAMUSCULAR; INTRAVENOUS at 10:03

## 2018-03-19 RX ADMIN — SODIUM CHLORIDE 10 ML/HR: 0.9 INJECTION, SOLUTION INTRAVENOUS at 06:03

## 2018-03-19 RX ADMIN — FENTANYL CITRATE 25 MCG: 50 INJECTION, SOLUTION INTRAMUSCULAR; INTRAVENOUS at 11:03

## 2018-03-19 RX ADMIN — ROCURONIUM BROMIDE 10 MG: 10 INJECTION, SOLUTION INTRAVENOUS at 07:03

## 2018-03-19 RX ADMIN — SODIUM CHLORIDE: 0.9 INJECTION, SOLUTION INTRAVENOUS at 07:03

## 2018-03-19 RX ADMIN — BUPIVACAINE 20 ML: 13.3 INJECTION, SUSPENSION, LIPOSOMAL INFILTRATION at 09:03

## 2018-03-19 RX ADMIN — FAMOTIDINE 20 MG: 20 TABLET, FILM COATED ORAL at 10:03

## 2018-03-19 RX ADMIN — ONDANSETRON HYDROCHLORIDE 8 MG: 2 INJECTION, SOLUTION INTRAMUSCULAR; INTRAVENOUS at 09:03

## 2018-03-19 RX ADMIN — NEOSTIGMINE METHYLSULFATE 5 MG: 1 INJECTION INTRAVENOUS at 09:03

## 2018-03-19 RX ADMIN — FENTANYL CITRATE 50 MCG: 50 INJECTION, SOLUTION INTRAMUSCULAR; INTRAVENOUS at 07:03

## 2018-03-19 RX ADMIN — MIDAZOLAM 1 MG: 5 INJECTION INTRAMUSCULAR; INTRAVENOUS at 07:03

## 2018-03-19 RX ADMIN — HYDROCODONE BITARTRATE AND ACETAMINOPHEN 2 TABLET: 5; 325 TABLET ORAL at 09:03

## 2018-03-19 RX ADMIN — FAMOTIDINE 20 MG: 20 TABLET, FILM COATED ORAL at 09:03

## 2018-03-19 NOTE — ANESTHESIA RELEASE NOTE
"Anesthesia Release from PACU Note    Patient: Cale Harding    Procedure(s) Performed: Procedure(s) (LRB):  LAPAROSCOPY-DIAGNOSTIC (N/A)  THORACOSCOPY-VIDEO ASSISTED (VATS) W/PLEURAL BIOPSY (Left)  BIOPSY-DIAPHRAGM (N/A)    Anesthesia type: general    Post pain: Adequate analgesia    Post assessment: no apparent anesthetic complications    Last Vitals:   Visit Vitals  BP (!) 162/75 (BP Location: Left arm, Patient Position: Lying)   Pulse 72   Temp 36.6 °C (97.8 °F) (Oral)   Resp 16   Ht 5' 10" (1.778 m)   Wt 73.5 kg (162 lb)   SpO2 95%   BMI 23.24 kg/m²       Post vital signs: stable    Level of consciousness: awake, alert  and oriented    Nausea/Vomiting: no nausea/no vomiting    Complications: none    Airway Patency: patent    Respiratory: unassisted    Cardiovascular: stable and blood pressure at baseline    Hydration: euvolemic  "

## 2018-03-19 NOTE — TRANSFER OF CARE
"Anesthesia Transfer of Care Note    Patient: Cale Harding    Procedure(s) Performed: Procedure(s) (LRB):  LAPAROSCOPY-DIAGNOSTIC (N/A)  THORACOSCOPY-VIDEO ASSISTED (VATS) W/PLEURAL BIOPSY (Left)  BIOPSY-DIAPHRAGM (N/A)    Patient location: PACU    Anesthesia Type: general    Transport from OR: Transported from OR on 6-10 L/min O2 by face mask with adequate spontaneous ventilation    Post pain: adequate analgesia    Post assessment: no apparent anesthetic complications    Post vital signs: stable    Level of consciousness: awake, alert and oriented    Nausea/Vomiting: no nausea/vomiting    Complications: none    Transfer of care protocol was followed      Last vitals:   Visit Vitals  BP (!) 144/66   Pulse 73   Temp 36.2 °C (97.2 °F) (Temporal)   Resp (!) 24   Ht 5' 10" (1.778 m)   Wt 73.5 kg (162 lb)   SpO2 100%   BMI 23.24 kg/m²     "

## 2018-03-19 NOTE — NURSING TRANSFER
Nursing Transfer Note      3/19/2018     Transfer To: 658    Transfer via bed    Transfer with n/a    Transported by JAJA Lai and BRIANNA Packer    Medicines sent: none    Chart send with patient: Yes    Notified: spouse    Patient reassessed at: 1300 3/19/18

## 2018-03-19 NOTE — OP NOTE
DATE OF PROCEDURE:  03/19/2018    PREOPERATIVE DIAGNOSIS:  Epithelioid mesothelioma, left.    POSTOPERATIVE DIAGNOSIS:  Epithelioid mesothelioma, left.    PROCEDURES PERFORMED:  1.  Diagnostic laparoscopy with biopsy of diaphragm.  2.  Left VATS thoracoscopy.    SURGEON:  Galdino Fang M.D.    ASSISTANT:  Simba Ha M.D. (RES)    ANESTHESIA:  General endotracheal anesthesia.    ESTIMATED BLOOD LOSS:  75 mL.    SPECIMENS:  1.  Left diaphragm biopsy.  2.  Left chest wall biopsy.    COMPLICATIONS:  None.    INDICATIONS:  Mr. Harding is a pleasant 77-year-old gentleman with biopsy   proven left epithelioid mesothelioma with a history pertinent for asbestos   exposure, shortness of breath and recurrent left-sided pleural effusions.  He   presents today for abdominal and thoracic exploration to determine candidacy for   potential future definitive surgical options and HIPEC.  The proposed procedure   was discussed with him and his family in detail, informed consent was obtained   and he elected to proceed.    DESCRIPTION OF PROCEDURE IN DETAIL:  The patient was identified in the   preoperative holding area by name, medical record number and date of birth and   his left chest wall marked.  He was taken to the Operating Room and placed on   the OR table in supine position.  General endotracheal anesthesia was induced   without difficulty by the Anesthesia Service.  Antibiotics were confirmed to   have been administered.  His abdomen was prepped and draped in standard sterile   surgical fashion and a timeout called.    A small incision was made in the left upper quadrant in the midclavicular line   with a scalpel following which, a 5 mm Optiview trocar was introduced into the peritoneal   cavity under direct visualization. Pneumoperitoneum was established without difficulty and the abdomen surveyed and we confirmed no injury to abdominal structures.  An additional 5 mm trocar was   placed in the supraumbilical  midline.  The abdomen was surveyed and no gross   evidence of malignancy identified.  Owing to body habitus and omentum in the   left upper quadrant, an additional 5 mm port was placed laterally in the left   upper quadrant.  The left hemidiaphragm just superior to the spleen appeared   mildly thickened; however, without discrete mass or nodularity.  A small biopsy   was obtained for frozen section, keeping the specimen limited owing to proximity   of the pericardium.  At this time, trocars were removed under direct vision.    Port sites were infiltrated with Exparel and closed with 4-0 Monocryl sutures.    Sterile dressings were applied.  The patient was then repositioned in right   lateral decubitus with the left chest up.  He was padded and prepped   appropriately following which his left chest wall was prepped and draped in   standard sterile surgical fashion.  At this time, Pathology reported frozen   section to have a group of atypical cells that could be reactive.  However,   owing to the small specimen size, malignancy was difficult to exclude.  We   proceeded with thoracoscopy.  A small incision was made in the sixth intercostal   space in the midaxillary line with a scalpel and taken to the level of the   intercostal muscles under direct vision.  Of note, the chest wall was quite   contracted owing to the underlying mesothelioma and space between ribs was very   limited.  During dissection down to the level of the intercostals, we   encountered a chunk of grossly abnormal appearing tissue concerning for   malignancy spread through the chest wall.  The specimen was sent for pathology which subsequently confirmed this to be malignant. A port was placed. Visualization was extremely limited due to the   dense burden of disease and bleeding from friable tissue.  A second thoracoscopy   incision was made more posteriorly in the sixth intercostal space from where we   were able to pass a suction catheter  anteriorly in the direction of our first   port to create a space for our camera.  We were able to eventually find a small   operating space, which demonstrated a significant tumor burden.  However, we   were unable to adequately survey the rest of the chest.  We placed a red rubber   catheter into the chest and performed an air leak test with Valsalva maneuvers,   which was negative.  Both port sites were infiltrated with Exparel and closed in multiple layers, cleaned and   covered with sterile dressings.  The patient was awoken from general anesthesia   without having suffered any apparent immediate postoperative complications.    DICTATED BY:  Simba Ha M.D. (RES)      SH/IN  dd: 03/19/2018 10:13:41 (CDT)  td: 03/19/2018 14:31:47 (CDT)  Doc ID   #[id number]  Job ID #[id number]    CC:

## 2018-03-19 NOTE — TELEPHONE ENCOUNTER
So he needs to start Carboplatin and ALimta. Orders are in needs auth,. He needs B12, folic acid and his other scripts.  So can you get auth and maybe bring him in this week to get scripts

## 2018-03-19 NOTE — PLAN OF CARE
Problem: Patient Care Overview  Goal: Plan of Care Review  Outcome: Ongoing (interventions implemented as appropriate)  POC reviewed with patient who verbalized understanding. AAOX4. Pt O2 sats stable on RA. Right wrist IV intact and patent. Pt has voided 150cc since arriving on my floor @ 1400. Pt tolerating diet well with no nausea. Pt walked in room.  Lateral lap sites with occlusive dressing intact with small amount of serosanguinous drainage. Fluid is pooling around that site. MD aware. Pain being controlled with PRN pain medication. Pt remained free from falls throughout the shift. VSS. Will continue to monitor.

## 2018-03-19 NOTE — TELEPHONE ENCOUNTER
We can see on Friday at 230pm---3/23    I see he is in the hospital still---will the team let him know?  errol

## 2018-03-19 NOTE — ANESTHESIA POSTPROCEDURE EVALUATION
"Anesthesia Post Evaluation    Patient: Cale Harding    Procedure(s) Performed: Procedure(s) (LRB):  LAPAROSCOPY-DIAGNOSTIC (N/A)  THORACOSCOPY-VIDEO ASSISTED (VATS) W/PLEURAL BIOPSY (Left)  BIOPSY-DIAPHRAGM (N/A)    Final Anesthesia Type: general  Patient location during evaluation: PACU  Patient participation: Yes- Able to Participate  Level of consciousness: awake and alert  Post-procedure vital signs: reviewed and stable  Pain management: adequate  Airway patency: patent  PONV status at discharge: No PONV  Anesthetic complications: no      Cardiovascular status: blood pressure returned to baseline  Respiratory status: unassisted  Hydration status: euvolemic  Follow-up not needed.        Visit Vitals  BP (!) 162/75 (BP Location: Left arm, Patient Position: Lying)   Pulse 72   Temp 36.6 °C (97.8 °F) (Oral)   Resp 16   Ht 5' 10" (1.778 m)   Wt 73.5 kg (162 lb)   SpO2 95%   BMI 23.24 kg/m²       Pain/Theresa Score: Pain Assessment Performed: Yes (3/19/2018  1:35 PM)  Presence of Pain: complains of pain/discomfort (3/19/2018  1:35 PM)  Pain Rating Prior to Med Admin: 7 (3/19/2018  1:19 PM)  Pain Rating Post Med Admin: 0 (3/19/2018  5:25 AM)  Theresa Score: 10 (3/19/2018  1:00 PM)      "

## 2018-03-19 NOTE — BRIEF OP NOTE
[facility]  Brief Operative Note    SUMMARY     Surgery Date: 3/19/2018     Surgeon(s) and Role:     * Galdino Fang MD - Primary     * Simba Ha MD - Resident - Assisting      Pre-op Diagnosis:  Pleural mass [J94.9]    Post-op Diagnosis:  Post-Op Diagnosis Codes:     * Pleural mass [J94.9]    Procedure(s) (LRB):  LAPAROSCOPY-DIAGNOSTIC (N/A)  THORACOSCOPY-VIDEO ASSISTED (VATS) W/PLEURAL BIOPSY (Left)  BIOPSY-DIAPHRAGM (N/A)    Anesthesia: General    Description of Procedure: Diagnostic laparoscopy with biopsy of left hemidiaphragm.  VATS thoracoscopy with biopsy of chest wall.    Description of the findings of the procedure:   Laparoscopy without omental / liver lesions or gross evidence of malignancy. Some peritoneal thickening of the undersurface of the left hemidiaphragm without nodularity. Frozen section reportedly with a group of atypical cells that may be malignant but could be reactive.  Left VATS with very limited field of view due to gross tumor burden. Specimen obtained from chest wall superficial to intercostals at the time of port insertion consistent with cancer.    Estimated Blood Loss: 75 mL         Specimens:   Specimen (12h ago through future)    Start     Ordered    03/19/18 0935  Specimen to Pathology - Surgery  Once     Comments:  1. Left diaphragm - frozen2. Chest wall tissue - frozen3. Chest wall tissue - perm      03/19/18 0934    03/19/18 0915  Specimen to Pathology - Surgery  Once     Comments:  1. Left diaphragm - frozen2. Chest wall tissue - frozen      03/19/18 0915

## 2018-03-19 NOTE — TELEPHONE ENCOUNTER
----- Message from Rach Brown RN sent at 3/19/2018  1:12 PM CDT -----  Mr. Harding will need a f/u appointment with Dr. Foster to further discuss treatment options.  He had surgery today and will not be a candidate for additional surgery.    Thanks  Rach

## 2018-03-19 NOTE — H&P (VIEW-ONLY)
Subjective:       Patient ID: Cale Harding is a 77 y.o. male.    Chief Complaint: Follow-up    HPI   Patient is a 77 y.o. male presents with HTN, and hx prostate cancer s/p prostatectomy and radiation presenting with left epithelioid mesothelioma. History dates back Nov 2017 with onset of left chest wall discomfort which prompted CXR. CXR with pleural effusion. He was referred to IR for thoracentesis which removed apx 1.2L (cytology negative). Noted increasing SOB and again noted to have pleural effusion. Repeat thoracentesis with ~400cc removed. IR biopsy of left pleural thickening positive for epithelioid mesothelioma (confirmed at [facility]). PET 2/9/18 with 3 left pleural based masses with SUV max 7.61 and small pleural effusion. Patient notes occasional drenching night sweats, 20lb weight loss over the last 3 months, and intermittent low grade fevers over last month but none in the last week. Also c/o VALENCIA. Infrequent chest wall sharp pain per patient but does not seem to be constant. He denies chills, cardiac CP, SOB at rest, nausea, vomiting, dysphagia, appetite changes, and changes in bowel/bladder function. No prior cardiac history. ASA 81. No other blood thinners      Former smoker. 30 pack year history. Prior nightly beer drinker but has not consumed beer since onset of effusion.   PSH: prostatectomy, Left elbow surgery, Right rotator cuff repair  Retired [employer]. Worked on ships with asbestos. 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     Interval:   Patient called and reported slight increase in chest wall pain. Thus, scheduled him for an MRI chest which is concerning for possible chest wall, pericardium, and diaphragm invasion. He is here today to discuss exploratory VATS and laparoscopy. He met with Dr. Foster yesterday to discuss both HIPEC (sgned consents) as well as standard chemo regimen.     Review of Systems   Constitutional:  "Negative for activity change, chills and fever.   HENT: Negative for congestion.    Eyes: Negative for pain.   Respiratory: Positive for chest tightness (intermittent chest wall pain). Negative for cough and shortness of breath.    Cardiovascular: Negative for palpitations and leg swelling.   Gastrointestinal: Negative for abdominal distention, abdominal pain, nausea and vomiting.   Genitourinary: Negative for difficulty urinating.   Musculoskeletal: Negative for arthralgias.   Skin: Negative for color change and rash.   Neurological: Negative for dizziness and syncope.   Psychiatric/Behavioral: Negative for agitation. The patient is not nervous/anxious.          Objective:       Visit Vitals  /70 (BP Location: Right arm, Patient Position: Sitting, BP Method: Medium (Automatic))   Pulse 85   Temp 99.6 °F (37.6 °C) (Oral)   Ht 5' 8" (1.727 m)   Wt 73.7 kg (162 lb 7.7 oz)   SpO2 95%   BMI 24.70 kg/m²         Physical Exam   Constitutional: He is oriented to person, place, and time. He appears well-developed and well-nourished.   HENT:   Head: Normocephalic and atraumatic.   Eyes: EOM are normal.   Neck: Normal range of motion. Neck supple. No tracheal deviation present.   Cardiovascular: Normal rate, regular rhythm, normal heart sounds and intact distal pulses.    Pulmonary/Chest: Effort normal and breath sounds normal.   Abdominal: Soft. Bowel sounds are normal. There is no tenderness.   Musculoskeletal: Normal range of motion.   Lymphadenopathy:     He has no cervical adenopathy.   Neurological: He is alert and oriented to person, place, and time.   Skin: Skin is warm and dry.   Psychiatric: He has a normal mood and affect. Thought content normal.   Vitals reviewed.      Chest CT 1/15/18:  Left pleural fluid with thickening of left parietal pleura concerning for malignant effusion, mesothelioma, or empyema.  Multiple pulmonary nodules, stable since prior exam.     PFTs   FEV 1 - 2.29  83%  DLCO - 17.10 " 85%     Pathology   Lung, left, needle core biopsy ([id number]; 2/1/2018):  Malignant epithelioid neoplasm with an indistinct immunophenotype (see letter).     PET 2/9/18:   There are 3 left pleural-based masses. The index lesion posteriorly SUV max 7.61. There is a left pleural effusion possibly malignant there is left lung base consolidation be chronic.    MRI chest:   Loculated complex left pleural fluid collection with marked pleural thickening and internal septation.  Two solid pleural based masses within the collection with abutment and possible invasion of pericardium, chest wall, and diaphragm.  There is adjacent left lung consolidation/atelectasis as well as several pulmonary nodules.  Overall appearance consistent with malignancy, possibly mesothelioma.    Assessment:       Patient is a 77 y.o. male presents with HTN and hx prostate cancer s/p prostatectomy/radiation presenting left epithelioid mesothelioma here today to discuss surgery. Recent imaging concerning for invasion of pericardium, chest wall, and diaphragm. Functionally, good candidate for resection.     Plan:       Previously clearance for surgery.   Will need to assess for resectability with imaging concerning for invasion through diaphragm.   Will proceed to OR on 3/19/18 for exploratory left VATS and exploratory laparoscopy for possible biopsy.   Appropriate patient education regarding the tana-operative period as well as intraperative details were discussed. Risks, including but not limited to, bleeding, infection, pain and anesthetic complication were discussed. Patient was given the opportunity to ask questions and to have those questions answered to their satisfaction. Patient verbalized understanding to both procedure and associated risks. Consent was obtained.    If still deemed a resection candidate will plan to proceed to OR on 3/29/18 for left thoracotomy with radial pleurectomy, decortication, HIPEC, possible chest wall  reconstruction, possible diaphragm reconstruction.    ATTENDING ATTESTATION:    I evaluated the patient and I agree with the assessment and plan.  I will begin with a laparoscopy to r/o transdiaphragmatic tumor extension.  If it is present, there is no need to perform a VATS as the malignancy will be deemed unresectable.  He will be managed with chemotherapy +/- targeted radiation therapy.  If there is no intraabdominal tumor extension and no multifocal  Extension into the chest wall muscle identified at thoracoscopy, the patient will undergo a formal left thoracotomy radical pleurectomy with decortication and heated intrapleural chemolavage under a separate OR date.

## 2018-03-19 NOTE — PLAN OF CARE
VSS. Patient states pain is tolerable. No N&V. Teds/SCD's in place throughout duration in PACU. Transferred toroom [id number], report given to RN. Pts wife and family aware of transfer.

## 2018-03-19 NOTE — NURSING TRANSFER
Nursing Transfer Note      3/19/2018     Transfer From: PACU    Transfer via stretcher    Transfer with N/A    Transported by Transport    Medicines sent: No    Chart send with patient: Yes    Notified: spouse    Patient reassessed at: 1400 on 3/19/2018    Upon arrival to floor: patient oriented to room, call bell in reach and bed in lowest position

## 2018-03-20 VITALS
HEART RATE: 74 BPM | TEMPERATURE: 98 F | HEIGHT: 70 IN | WEIGHT: 162 LBS | DIASTOLIC BLOOD PRESSURE: 62 MMHG | OXYGEN SATURATION: 95 % | BODY MASS INDEX: 23.19 KG/M2 | RESPIRATION RATE: 18 BRPM | SYSTOLIC BLOOD PRESSURE: 120 MMHG

## 2018-03-20 PROCEDURE — 63600175 PHARM REV CODE 636 W HCPCS: Performed by: PHYSICIAN ASSISTANT

## 2018-03-20 PROCEDURE — 25000003 PHARM REV CODE 250: Performed by: SURGERY

## 2018-03-20 RX ADMIN — HYDROCODONE BITARTRATE AND ACETAMINOPHEN 1 TABLET: 5; 325 TABLET ORAL at 04:03

## 2018-03-20 RX ADMIN — ENALAPRIL MALEATE 5 MG: 5 TABLET ORAL at 10:03

## 2018-03-20 RX ADMIN — ASPIRIN 81 MG: 81 TABLET, COATED ORAL at 08:03

## 2018-03-20 RX ADMIN — HYDROCODONE BITARTRATE AND ACETAMINOPHEN 2 TABLET: 5; 325 TABLET ORAL at 10:03

## 2018-03-20 RX ADMIN — FAMOTIDINE 20 MG: 20 TABLET, FILM COATED ORAL at 08:03

## 2018-03-20 RX ADMIN — ENOXAPARIN SODIUM 40 MG: 100 INJECTION SUBCUTANEOUS at 08:03

## 2018-03-20 NOTE — PLAN OF CARE
Problem: Patient Care Overview  Goal: Plan of Care Review  Outcome: Ongoing (interventions implemented as appropriate)  Plan of care reviewed, patient verbalizes understanding. AAOx4. VSS at this time. Pain managed with PRN meds throughout shift. Patient on regular diet, denies N/V. Patient is up ad josie and remains free of falls/injuries. No acute distress at this time. Will continue to monitor.

## 2018-03-20 NOTE — DISCHARGE SUMMARY
[facility]  Thoracic Surgery  Discharge Summary    Patient Name: Cale Harding  MRN: [id number]  Admission Date: 3/19/2018  Hospital Length of Stay: 1 days  Discharge Date and Time:  03/20/2018 7:40 AM  Attending Physician: Galdino Fang MD   Discharging Provider: Anju Fuller PA-C  Primary Care Provider: Jj Escobedo MD    HPI:   Patient is a 77 y.o. male presents with HTN, and hx prostate cancer s/p prostatectomy and radiation presenting with left epithelioid mesothelioma. History dates back Nov 2017 with onset of left chest wall discomfort which prompted CXR. CXR with pleural effusion. He was referred to IR for thoracentesis which removed apx 1.2L (cytology negative). Noted increasing SOB and again noted to have pleural effusion. Repeat thoracentesis with ~400cc removed. IR biopsy of left pleural thickening positive for epithelioid mesothelioma (confirmed at [facility]). PET 2/9/18 with 3 left pleural based masses with SUV max 7.61 and small pleural effusion. Patient notes occasional drenching night sweats, 20lb weight loss over the last 3 months, and intermittent low grade fevers over last month but none in the last week. Also c/o VALENCIA. Chest wall sharp pain per patient but is not always constant. He denies chills, cardiac CP, SOB at rest, nausea, vomiting, dysphagia, appetite changes, and changes in bowel/bladder function. No prior cardiac history. ASA 81. No other blood thinners      Former smoker. 30 pack year history. Prior nightly beer drinker but has not consumed beer since onset of effusion.   PSH: prostatectomy, Left elbow surgery, Right rotator cuff repair  Retired [employer]. 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 CLD. Advanced to regular diet.   3/20/18 - Ambulating and voiding. Tolerating regular diet. Pain controlled. VSS. Stable for discharge.         Significant Diagnostic Studies: Radiology: X-Ray: CXR: X-Ray Chest 1 View (CXR):   Results for orders placed or performed during the hospital encounter of 03/19/18   X-Ray Chest 1 View    Narrative    EXAMINATION:  XR CHEST 1 VIEW    CLINICAL HISTORY:  s/p left VATS. h/o mesothelioma;    COMPARISON:  Comparison is made to 03/19/2018 at 10:30 a.m..    FINDINGS:  Opacity in the inferior hemothorax on the left side is again noted, representing a combination of basilar airspace consolidation/volume loss and pleural fluid.  Heart size remains normal.  Right lung and the mid and upper lung zones on the left side remain clear, and are free of significant airspace consolidation or volume loss.  No pleural fluid on the right.  No pneumothorax.      Impression    Continued demonstration of opacity in the inferior hemothorax on the left consistent with a combination of basilar airspace consolidation/volume loss and pleural fluid on this side.  There has been little interval change in the appearance of the chest since 03/19/2018 at 10:30 a.m., with the current examination demonstrating an improved inspiratory depth.      Electronically signed by: Sreedhar Stacy MD  Date:    03/20/2018  Time:    07:16       Pending Diagnostic Studies:     None        Final Active Diagnoses:    Diagnosis Date Noted POA    PRINCIPAL PROBLEM:  Mesothelioma [C45.9] 03/19/2018 Yes      Problems Resolved During this Admission:    Diagnosis Date Noted Date Resolved POA      Discharged Condition: good    Disposition: Home or Self Care    Follow Up:  Follow-up Information     Sintia Foster MD.    Specialties:  Hematology and Oncology, Hematology  Contact information:  [address] [facility] [id number] [phone]                  Patient Instructions:     Diet Adult Regular     Activity as tolerated     Shower on day dressing removed (No bath)     Notify your health care provider if you experience any of the following:  temperature >100.4     Notify your health care provider if you experience any of the following:  persistent nausea and vomiting or diarrhea     Notify your health care provider if you experience any of the following:  severe uncontrolled pain     Notify your health care provider if you experience any of the following:  redness, tenderness, or signs of infection (pain, swelling, redness, odor or green/yellow discharge around incision site)     Notify your health care provider if you experience any of the following:  difficulty breathing or increased cough     Notify your health care provider if you experience any of the following:  severe persistent headache     Notify your health care provider if you experience any of the following:  worsening rash     Notify your health care provider if you experience any of the following:  persistent dizziness, light-headedness, or visual disturbances     Notify your health care provider if you experience any of the following:  increased confusion or weakness     Remove dressing in 24 hours       Medications:  Reconciled Home Medications:   Current Discharge Medication List      CONTINUE these medications which have NOT CHANGED    Details   acetaminophen (TYLENOL) 500 MG tablet Take 500 mg by mouth every 6 (six) hours as needed for Pain.      aspirin (ECOTRIN) 81 MG EC tablet Take 81 mg by mouth every morning.       calcium carbonate (TUMS) 200 mg calcium (500 mg) chewable tablet Take 1 tablet by mouth as needed for Heartburn.      enalapril (VASOTEC) 5 MG tablet Take 1 tablet (5 mg total) by mouth 2 (two) times daily.  Qty: 180 tablet, Refills: 3      LACTOBACILLUS ACIDOPHILUS (ACIDOPHILUS ORAL) Take 1 tablet by mouth once daily.      psyllium (METAMUCIL) packet Take 1 packet by mouth  daily as needed.       ranitidine (ZANTAC) 300 MG tablet Take 1 tablet (300 mg total) by mouth every evening.  Qty: 30 tablet, Refills: 11    Associated Diagnoses: Heartburn symptom      vitamin D 1000 units Tab Take 2,000 Units by mouth once daily.       hydrocodone-acetaminophen 5-325mg (NORCO) 5-325 mg per tablet Take 1 tablet by mouth every 6 (six) hours as needed for Pain.  Qty: 60 tablet, Refills: 0    Associated Diagnoses: Neoplasm related pain             Anju Fuller PA-C  Thoracic Surgery  [facility]

## 2018-03-20 NOTE — PROGRESS NOTES
Discharge instructions given to Pt and spouse. They verbalized understanding. Right wrist IV removed. Pt denied transport and walked out with spouse.

## 2018-03-20 NOTE — HPI
Patient is a 77 y.o. male presents with HTN, and hx prostate cancer s/p prostatectomy and radiation presenting with left epithelioid mesothelioma. History dates back Nov 2017 with onset of left chest wall discomfort which prompted CXR. CXR with pleural effusion. He was referred to IR for thoracentesis which removed apx 1.2L (cytology negative). Noted increasing SOB and again noted to have pleural effusion. Repeat thoracentesis with ~400cc removed. IR biopsy of left pleural thickening positive for epithelioid mesothelioma (confirmed at [facility]). PET 2/9/18 with 3 left pleural based masses with SUV max 7.61 and small pleural effusion. Patient notes occasional drenching night sweats, 20lb weight loss over the last 3 months, and intermittent low grade fevers over last month but none in the last week. Also c/o VALENCIA. Chest wall sharp pain per patient but is not always constant. He denies chills, cardiac CP, SOB at rest, nausea, vomiting, dysphagia, appetite changes, and changes in bowel/bladder function. No prior cardiac history. ASA 81. No other blood thinners      Former smoker. 30 pack year history. Prior nightly beer drinker but has not consumed beer since onset of effusion.   PSH: prostatectomy, Left elbow surgery, Right rotator cuff repair  Retired [employer]. 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

## 2018-03-20 NOTE — HOSPITAL COURSE
3/19/18 - Laparoscopy with diaphragm biopsy, thoracoscopy with chest wall biopsy. CLD. Advanced to regular diet.   3/20/18 - Ambulating and voiding. Tolerating regular diet. Pain controlled. VSS. Stable for discharge.

## 2018-03-21 ENCOUNTER — PATIENT MESSAGE (OUTPATIENT)
Dept: CARDIOTHORACIC SURGERY | Facility: CLINIC | Age: 78
End: 2018-03-21

## 2018-03-21 ENCOUNTER — PATIENT MESSAGE (OUTPATIENT)
Dept: HEMATOLOGY/ONCOLOGY | Facility: CLINIC | Age: 78
End: 2018-03-21

## 2018-03-22 ENCOUNTER — NURSE TRIAGE (OUTPATIENT)
Dept: ADMINISTRATIVE | Facility: CLINIC | Age: 78
End: 2018-03-22

## 2018-03-22 NOTE — TELEPHONE ENCOUNTER
Pt had surgical procedure on Monday and has been taking Norco, and is constipated.  Last BM was on Sunday, pt has the urge to go, but is unable to produce stool, some liquid stool leaking when he attempts to have a bowel movement.  He has tried a suppository and an enema this am without success.    Warm transfer to Dr. Jj Escobedo's office, there is a nurse practitioner with availability today, but pt has other commitments today,  if they cannot find an appt that works, then the pt was directed to go to the [facility].      Reason for Disposition   Last bowel movement (BM) > 4 days ago    Protocols used: ST CONSTIPATION-A-[place]

## 2018-03-23 ENCOUNTER — LAB VISIT (OUTPATIENT)
Dept: LAB | Facility: HOSPITAL | Age: 78
End: 2018-03-23
Attending: INTERNAL MEDICINE
Payer: MEDICARE

## 2018-03-23 ENCOUNTER — OFFICE VISIT (OUTPATIENT)
Dept: HEMATOLOGY/ONCOLOGY | Facility: CLINIC | Age: 78
End: 2018-03-23
Payer: MEDICARE

## 2018-03-23 VITALS
RESPIRATION RATE: 16 BRPM | OXYGEN SATURATION: 99 % | WEIGHT: 159.63 LBS | BODY MASS INDEX: 25.06 KG/M2 | TEMPERATURE: 98 F | HEART RATE: 97 BPM | SYSTOLIC BLOOD PRESSURE: 142 MMHG | DIASTOLIC BLOOD PRESSURE: 71 MMHG | HEIGHT: 67 IN

## 2018-03-23 DIAGNOSIS — C45.9 MESOTHELIOMA: Primary | ICD-10-CM

## 2018-03-23 DIAGNOSIS — C45.9 MESOTHELIOMA: ICD-10-CM

## 2018-03-23 DIAGNOSIS — G89.3 NEOPLASM RELATED PAIN: ICD-10-CM

## 2018-03-23 LAB
ALBUMIN SERPL BCP-MCNC: 3.1 G/DL
ALP SERPL-CCNC: 72 U/L
ALT SERPL W/O P-5'-P-CCNC: 9 U/L
ANION GAP SERPL CALC-SCNC: 10 MMOL/L
AST SERPL-CCNC: 13 U/L
BILIRUB SERPL-MCNC: 0.7 MG/DL
BUN SERPL-MCNC: 13 MG/DL
CALCIUM SERPL-MCNC: 9.5 MG/DL
CHLORIDE SERPL-SCNC: 100 MMOL/L
CO2 SERPL-SCNC: 25 MMOL/L
CREAT SERPL-MCNC: 0.7 MG/DL
ERYTHROCYTE [DISTWIDTH] IN BLOOD BY AUTOMATED COUNT: 16 %
EST. GFR  (AFRICAN AMERICAN): >60 ML/MIN/1.73 M^2
EST. GFR  (NON AFRICAN AMERICAN): >60 ML/MIN/1.73 M^2
GLUCOSE SERPL-MCNC: 116 MG/DL
HCT VFR BLD AUTO: 33.2 %
HGB BLD-MCNC: 10.3 G/DL
IMM GRANULOCYTES # BLD AUTO: 0.09 K/UL
MCH RBC QN AUTO: 25.2 PG
MCHC RBC AUTO-ENTMCNC: 31 G/DL
MCV RBC AUTO: 81 FL
NEUTROPHILS # BLD AUTO: 10.2 K/UL
PLATELET # BLD AUTO: 344 K/UL
PMV BLD AUTO: 9.2 FL
POTASSIUM SERPL-SCNC: 4 MMOL/L
PROT SERPL-MCNC: 8 G/DL
RBC # BLD AUTO: 4.08 M/UL
SODIUM SERPL-SCNC: 135 MMOL/L
WBC # BLD AUTO: 12.23 K/UL

## 2018-03-23 PROCEDURE — 99999 PR PBB SHADOW E&M-EST. PATIENT-LVL IV: CPT | Mod: PBBFAC,,, | Performed by: INTERNAL MEDICINE

## 2018-03-23 PROCEDURE — 96372 THER/PROPH/DIAG INJ SC/IM: CPT | Mod: S$GLB,,, | Performed by: INTERNAL MEDICINE

## 2018-03-23 PROCEDURE — 85027 COMPLETE CBC AUTOMATED: CPT

## 2018-03-23 PROCEDURE — 3078F DIAST BP <80 MM HG: CPT | Mod: CPTII,S$GLB,, | Performed by: INTERNAL MEDICINE

## 2018-03-23 PROCEDURE — 3077F SYST BP >= 140 MM HG: CPT | Mod: CPTII,S$GLB,, | Performed by: INTERNAL MEDICINE

## 2018-03-23 PROCEDURE — 80053 COMPREHEN METABOLIC PANEL: CPT

## 2018-03-23 PROCEDURE — 36415 COLL VENOUS BLD VENIPUNCTURE: CPT

## 2018-03-23 PROCEDURE — 99215 OFFICE O/P EST HI 40 MIN: CPT | Mod: 25,S$GLB,, | Performed by: INTERNAL MEDICINE

## 2018-03-23 RX ORDER — SODIUM CHLORIDE 0.9 % (FLUSH) 0.9 %
10 SYRINGE (ML) INJECTION
Status: CANCELLED | OUTPATIENT
Start: 2018-03-23

## 2018-03-23 RX ORDER — ONDANSETRON HYDROCHLORIDE 8 MG/1
8 TABLET, FILM COATED ORAL 4 TIMES DAILY PRN
Qty: 60 TABLET | Refills: 2 | Status: SHIPPED | OUTPATIENT
Start: 2018-03-23 | End: 2018-03-26 | Stop reason: SDUPTHER

## 2018-03-23 RX ORDER — FOLIC ACID 1 MG/1
1 TABLET ORAL DAILY
Qty: 60 TABLET | Refills: 3 | Status: SHIPPED | OUTPATIENT
Start: 2018-03-23 | End: 2018-01-01 | Stop reason: SDUPTHER

## 2018-03-23 RX ORDER — DEXAMETHASONE 6 MG/1
6 TABLET ORAL 2 TIMES DAILY WITH MEALS
Qty: 42 TABLET | Refills: 3 | Status: SHIPPED | OUTPATIENT
Start: 2018-03-23 | End: 2018-03-23 | Stop reason: SDUPTHER

## 2018-03-23 RX ORDER — HEPARIN 100 UNIT/ML
500 SYRINGE INTRAVENOUS
Status: CANCELLED | OUTPATIENT
Start: 2018-03-23

## 2018-03-23 RX ORDER — DEXAMETHASONE 6 MG/1
6 TABLET ORAL 2 TIMES DAILY WITH MEALS
Qty: 42 TABLET | Refills: 3 | Status: SHIPPED | OUTPATIENT
Start: 2018-03-23 | End: 2018-03-26 | Stop reason: SDUPTHER

## 2018-03-23 RX ORDER — PROMETHAZINE HYDROCHLORIDE 25 MG/1
25 TABLET ORAL EVERY 6 HOURS PRN
Qty: 60 TABLET | Refills: 7 | Status: SHIPPED | OUTPATIENT
Start: 2018-03-23 | End: 2018-03-26 | Stop reason: SDUPTHER

## 2018-03-23 RX ORDER — CYANOCOBALAMIN 1000 UG/ML
1000 INJECTION, SOLUTION INTRAMUSCULAR; SUBCUTANEOUS ONCE
Status: COMPLETED | OUTPATIENT
Start: 2018-03-23 | End: 2018-03-23

## 2018-03-23 RX ADMIN — CYANOCOBALAMIN 1000 MCG: 1000 INJECTION, SOLUTION INTRAMUSCULAR; SUBCUTANEOUS at 11:03

## 2018-03-23 NOTE — PROGRESS NOTES
Subjective:       Patient ID: Cale Harding is a 77 y.o. male.    Chief Complaint: Mesothelioma of left lung  ONCOLOGIC HISTORY: chidi Harding is a 77-year-old male with a history of prostate cancer status post prostatectomy and radiation, now with a new diagnosis of left epithelioid mesothelioma.  He started having left chest wall discomfort in November 2017, which prompted a chest x-ray.  He underwent a thoracentesis with 1.2 liters removed and apparently cytology was negative; although, I do not have that path in the system and then he noted increasing shortness of breath and repeat pleural effusion and fluid was removed.  He then underwent IR biopsy of the left pleural thickening, which was positive for epithelioid mesothelioma confirmed at [facility].  He underwent PET scan on 02/09/2018, which revealed three left pleural based masses with an SUV max of 7.6 and a small pleural effusion.  He has had night sweats and about 20-pound weight loss in the last three months, low-grade fevers also, occasional shortness of breath and he has chest wall pain, which is not frequent.  Plan originally was to proceed with VATS, left thoracotomy and radical pleurectomy, decortication and HIPEC.  However, after the patient complained of worsening pain, an MRI of the chest was done on 03/01/2018 and that revealed loculated complex pleural fluid collection with marked pleural thickening and internal septation.  The largest and more superior anterior of the two pleural masses measured 7 x 4.3 cm.  The smaller and more inferior one measured 3.9 x 2.2.  The mass abuts the pleural surface and the larger of the two masses abuts the pericardium and the chest wall, involvement or invasion of the structures is not excluded.  The lower mass abuts and possibly invades the diaphragm.      HPISince initial visit with me he underwent Diagnostic laparoscopy with biopsy of diaphragm. Left VATS thoracoscopy on 3/19/18. Final  path is pending. Due to amount of involvemt surgery was not done so he comes in to discuss starting Carboplatin and ALimta      Review of Systems   Constitutional: Positive for appetite change and unexpected weight change.   Eyes: Negative for visual disturbance.   Respiratory: Positive for cough and shortness of breath.    Cardiovascular: Positive for chest pain.   Gastrointestinal: Positive for abdominal pain and diarrhea.   Genitourinary: Positive for frequency.   Musculoskeletal: Positive for back pain.   Skin: Negative for rash.   Neurological: Positive for headaches.   Hematological: Negative for adenopathy.   Psychiatric/Behavioral: The patient is not nervous/anxious.        PMFSH: all information reviewed and updated as relevant to today's visit  Objective:      Physical Exam   Constitutional: He is oriented to person, place, and time. He appears well-developed and well-nourished.   HENT:   Mouth/Throat: No oropharyngeal exudate.   Cardiovascular: Normal rate and normal heart sounds.    Pulmonary/Chest: Effort normal and breath sounds normal. He has no wheezes.   Abdominal: Soft. Bowel sounds are normal. There is no tenderness.   Musculoskeletal: He exhibits no edema or tenderness.   Lymphadenopathy:     He has no cervical adenopathy.   Neurological: He is alert and oriented to person, place, and time. Coordination normal.   Skin: Skin is warm and dry. No rash noted.   Psychiatric: He has a normal mood and affect. Judgment and thought content normal.   Vitals reviewed.        LABS:  WBC   Date Value Ref Range Status   03/23/2018 12.23 3.90 - 12.70 K/uL Final     Hemoglobin   Date Value Ref Range Status   03/23/2018 10.3 (L) 14.0 - 18.0 g/dL Final     Hematocrit   Date Value Ref Range Status   03/23/2018 33.2 (L) 40.0 - 54.0 % Final     Platelets   Date Value Ref Range Status   03/23/2018 344 150 - 350 K/uL Final     Gran # (ANC)   Date Value Ref Range Status   03/23/2018 10.2 (H) 1.8 - 7.7 K/uL Final      Comment:     The ANC is based on a white cell differential from an   automated cell counter. It has not been microscopically   reviewed for the presence of abnormal cells. Clinical   correlation is required.         Chemistry        Component Value Date/Time     (L) 03/23/2018 1124    K 4.0 03/23/2018 1124     03/23/2018 1124    CO2 25 03/23/2018 1124    BUN 13 03/23/2018 1124    CREATININE 0.7 03/23/2018 1124     (H) 03/23/2018 1124        Component Value Date/Time    CALCIUM 9.5 03/23/2018 1124    ALKPHOS 72 03/23/2018 1124    AST 13 03/23/2018 1124    ALT 9 (L) 03/23/2018 1124    BILITOT 0.7 03/23/2018 1124    ESTGFRAFRICA >60.0 03/23/2018 1124    EGFRNONAA >60.0 03/23/2018 1124          Assessment:       1. Mesothelioma    2. Neoplasm related pain        Plan:        1,2. He will proceed with Carboplatin and ALimta as soon as it is approved. He also received B12 injection and scripts for folic acid, dexamethasone, Zofran and Pheneregan   Patient was consented for chemotherapy today 3/23/2018 .   An extensive discussion was had which included a thorough discussion of the risk and benefits of treatment and alternatives.  Risks, including but not limited to, possible hair loss, bone marrow damage (anemia, thrombocytopenia, immune suppression, neutropenia), damage to body organs (brain, heart, liver, kidney, lungs, nervous system, skin, and others), allergic reactions, sterility, nausea/vomiting, constipation/diarrhea, sores in the mouth, secondary cancers, local damage at possible injection sites, and rarely death were all discussed.  The patient agrees with the plan, and all questions have been answered to their satisfaction.  Consent was signed the patient, provider, and a third party witness.     He will return in 3 weeks for next cycle    Above care plan was discussed with patient and accompanying wife and all questions were addressed to their satisfaction

## 2018-03-23 NOTE — PATIENT INSTRUCTIONS
TAKE FOLIC ACID 1 TABLET DAILY  DEXAMETHASONE: TAKE 1 TABLET TWICE A DAY ON DAY THE DAY BEFORE AND FOR 2 DAYS AFTER CHEMO.  DO NOT TAKE ON THE DAY OF CHEMO  TAKE ZOFRAN OR PHENERGAN FOR NAUSEA/VOITING  TAKE ZOFRAN FIRST AND THEN PHENERGAN IF ZOFRAN DOES NOT HELP IN 30 minutes.  YOU CAN REPEAT ZOFRAN AND PHENERGAN EVERY 6 HOURS AS NEEDED   Carboplatin injection  What is this medicine?  CARBOPLATIN (AUDI nel damaris tin) is a chemotherapy drug. It targets fast dividing cells, like cancer cells, and causes these cells to die. This medicine is used to treat ovarian cancer and many other cancers.  How should I use this medicine?  This drug is usually given as an infusion into a vein. It is administered in a hospital or clinic by a specially trained health care professional.  Talk to your pediatrician regarding the use of this medicine in children. Special care may be needed.  What side effects may I notice from receiving this medicine?  Side effects that you should report to your doctor or health care professional as soon as possible:  · allergic reactions like skin rash, itching or hives, swelling of the face, lips, or tongue  · signs of infection - fever or chills, cough, sore throat, pain or difficulty passing urine  · signs of decreased platelets or bleeding - bruising, pinpoint red spots on the skin, black, tarry stools, nosebleeds  · signs of decreased red blood cells - unusually weak or tired, fainting spells, lightheadedness  · breathing problems  · changes in hearing  · changes in vision  · chest pain  · high blood pressure  · low blood counts - This drug may decrease the number of white blood cells, red blood cells and platelets. You may be at increased risk for infections and bleeding.  · nausea and vomiting  · pain, swelling, redness or irritation at the injection site  · pain, tingling, numbness in the hands or feet  · problems with balance, talking, walking  · trouble passing urine or change in the amount of  urine  Side effects that usually do not require medical attention (report to your doctor or health care professional if they continue or are bothersome):  · hair loss  · loss of appetite  · metallic taste in the mouth or changes in taste  What may interact with this medicine?  · medicines for seizures  · medicines to increase blood counts like filgrastim, pegfilgrastim, sargramostim  · some antibiotics like amikacin, gentamicin, neomycin, streptomycin, tobramycin  · vaccines  Talk to your doctor or health care professional before taking any of these medicines:  · acetaminophen  · aspirin  · ibuprofen  · ketoprofen  · naproxen  What if I miss a dose?  It is important not to miss a dose. Call your doctor or health care professional if you are unable to keep an appointment.  Where should I keep my medicine?  This drug is given in a hospital or clinic and will not be stored at home.  What should I tell my health care provider before I take this medicine?  They need to know if you have any of these conditions:  · blood disorders  · hearing problems  · kidney disease  · recent or ongoing radiation therapy  · an unusual or allergic reaction to carboplatin, cisplatin, other chemotherapy, other medicines, foods, dyes, or preservatives  · pregnant or trying to get pregnant  · breast-feeding  What should I watch for while using this medicine?  Your condition will be monitored carefully while you are receiving this medicine. You will need important blood work done while you are taking this medicine.  This drug may make you feel generally unwell. This is not uncommon, as chemotherapy can affect healthy cells as well as cancer cells. Report any side effects. Continue your course of treatment even though you feel ill unless your doctor tells you to stop.  In some cases, you may be given additional medicines to help with side effects. Follow all directions for their use.  Call your doctor or health care professional for advice if  you get a fever, chills or sore throat, or other symptoms of a cold or flu. Do not treat yourself. This drug decreases your body's ability to fight infections. Try to avoid being around people who are sick.  This medicine may increase your risk to bruise or bleed. Call your doctor or health care professional if you notice any unusual bleeding.  Be careful brushing and flossing your teeth or using a toothpick because you may get an infection or bleed more easily. If you have any dental work done, tell your dentist you are receiving this medicine.  Avoid taking products that contain aspirin, acetaminophen, ibuprofen, naproxen, or ketoprofen unless instructed by your doctor. These medicines may hide a fever.  Do not become pregnant while taking this medicine. Women should inform their doctor if they wish to become pregnant or think they might be pregnant. There is a potential for serious side effects to an unborn child. Talk to your health care professional or pharmacist for more information. Do not breast-feed an infant while taking this medicine.  NOTE:This sheet is a summary. It may not cover all possible information. If you have questions about this medicine, talk to your doctor, pharmacist, or health care provider. Copyright© 2017 [employer]        Pemetrexed injection  What is this medicine?  PEMETREXED (PEM e TREX ed) is a chemotherapy drug. This medicine affects cells that are rapidly growing, such as cancer cells and cells in your mouth and stomach. It is usually used to treat lung cancers like non-small cell lung cancer and mesothelioma. It may also be used to treat other cancers.  How should I use this medicine?  This drug is given as an infusion into a vein. It is administered in a hospital or clinic by a specially trained health care professional.  Talk to your pediatrician regarding the use of this medicine in children. Special care may be needed.  What side effects may I notice from receiving this  medicine?  Side effects that you should report to your doctor or health care professional as soon as possible:  · allergic reactions like skin rash, itching or hives, swelling of the face, lips, or tongue  · low blood counts - this medicine may decrease the number of white blood cells, red blood cells and platelets. You may be at increased risk for infections and bleeding.  · signs of infection - fever or chills, cough, sore throat, pain or difficulty passing urine  · signs of decreased platelets or bleeding - bruising, pinpoint red spots on the skin, black, tarry stools, blood in the urine  · signs of decreased red blood cells - unusually weak or tired, fainting spells, lightheadedness  · breathing problems, like a dry cough  · changes in emotions or moods  · chest pain  · confusion  · diarrhea  · high blood pressure  · mouth or throat sores or ulcers  · pain, swelling, warmth in the leg  · pain on swallowing  · swelling of the ankles, feet, hands  · trouble passing urine or change in the amount of urine  · vomiting  · yellowing of the eyes or skin  Side effects that usually do not require medical attention (report to your doctor or health care professional if they continue or are bothersome):  · hair loss  · loss of appetite  · nausea  · stomach upset  What may interact with this medicine?  · aspirin and aspirin-like medicines  · medicines to increase blood counts like filgrastim, pegfilgrastim, sargramostim  · methotrexate  · NSAIDS, medicines for pain and inflammation, like ibuprofen or naproxen  · probenecid  · pyrimethamine  · vaccines  Talk to your doctor or health care professional before taking any of these medicines:  · acetaminophen  · aspirin  · ibuprofen  · ketoprofen  · naproxen  What if I miss a dose?  It is important not to miss your dose. Call your doctor or health care professional if you are unable to keep an appointment.  Where should I keep my medicine?  This drug is given in a hospital or  clinic and will not be stored at home.  What should I tell my health care provider before I take this medicine?  They need to know if you have any of these conditions:  · if you frequently drink alcohol containing beverages  · infection (especially a virus infection such as chickenpox, cold sores, or herpes)  · kidney disease  · liver disease  · low blood counts, like low platelets, red bloods, or white blood cells  · an unusual or allergic reaction to pemetrexed, mannitol, other medicines, foods, dyes, or preservatives  · pregnant or trying to get pregnant  · breast-feeding  What should I watch for while using this medicine?  Visit your doctor for checks on your progress. This drug may make you feel generally unwell. This is not uncommon, as chemotherapy can affect healthy cells as well as cancer cells. Report any side effects. Continue your course of treatment even though you feel ill unless your doctor tells you to stop.  In some cases, you may be given additional medicines to help with side effects. Follow all directions for their use.  Call your doctor or health care professional for advice if you get a fever, chills or sore throat, or other symptoms of a cold or flu. Do not treat yourself. This drug decreases your body's ability to fight infections. Try to avoid being around people who are sick.  This medicine may increase your risk to bruise or bleed. Call your doctor or health care professional if you notice any unusual bleeding.  Be careful brushing and flossing your teeth or using a toothpick because you may get an infection or bleed more easily. If you have any dental work done, tell your dentist you are receiving this medicine.  Avoid taking products that contain aspirin, acetaminophen, ibuprofen, naproxen, or ketoprofen unless instructed by your doctor. These medicines may hide a fever.  Call your doctor or health care professional if you get diarrhea or mouth sores. Do not treat yourself.  To protect  your kidneys, drink water or other fluids as directed while you are taking this medicine.  Men and women must use effective birth control while taking this medicine. You may also need to continue using effective birth control for a time after stopping this medicine. Do not become pregnant while taking this medicine. Tell your doctor right away if you think that you or your partner might be pregnant. There is a potential for serious side effects to an unborn child. Talk to your health care professional or pharmacist for more information. Do not breast-feed an infant while taking this medicine. This medicine may lower sperm counts.  NOTE:This sheet is a summary. It may not cover all possible information. If you have questions about this medicine, talk to your doctor, pharmacist, or health care provider. Copyright© 2017 [employer]        Nutrition During Chemotherapy     Drink plenty of liquids, such as water.     During chemotherapy, the energy provided by a healthy diet can help you rebuild normal cells. It can also help you keep up your strength and fight infection. As a result, you may feel better and be more able to cope with side effects. Ask your doctor about your nutrition needs.  Drink plenty of fluids  · Fluids help the body produce urine and decrease constipation. They help prevent kidney and bladder problems. They also help replace fluids lost from vomiting and diarrhea.  · Try water, unsweetened juices, and other flavored drinks without caffeine. They flush toxins from the body.  Get enough calories  · Calories are fuel. The body uses this fuel to perform all of its functions, including healing.  · Its OK to be lean, but be sure you are not underweight. If you are, try eating more calories.  · Eat calorie-dense foods such as avocados, peanut butter, eggs, and ice cream.  · If you need extra calories, add butter, gravy, and sauces to foods (if tolerated).  · If you don't need the extra calories, try to  limit foods that are fried, greasy, or high in fat or added sugar.  Eat protein, fruits, and vegetables  · Protein builds muscle, bone, skin, and blood. It helps your body heal and fight infection. It also helps boost your energy level.  · Good protein choices include yogurt, eggs, chicken, lean meats, beans, and peanut butter.  · Fruits and vegetables are full of important vitamins, minerals, and fiber to help your body function properly.  · Try to eat a variety of vegetables, fruits, whole grains, and beans.  · Ask your doctor about instant protein powder or other supplements.  Eating right during treatment  Side effects may make it a little harder to eat well on some days. The following tips will help you continue to get the nutrition you need:  · Be open to new foods and recipes.  · Eat small portions often and slowly.  · Have a healthy snack instead of a meal if you are not very hungry.  · Try eating in a new setting.  · Physical activity, such as walking, can help increase your appetite. Try to be active for at least 30 minutes each day.  · Boost your diet by getting the vitamins and minerals you need from fruits, vegetables, and whole grains.  · If you live alone and are not up to cooking, ask your healthcare provider about Meals on Wheels or other outreach programs.  · Sometimes, it is best to follow your appetitie. Eat when you are hungry, but when you ar enot, forcing yourself to eat can make you feel bad, nauseated, or even cause you to vomit.   Date Last Reviewed: 1/6/2016  © 7695-6998 [employer]. [address] [place] [address], [place] [address]. All rights reserved. This information is not intended as a substitute for professional medical care. Always follow your healthcare professional's instructions.        Mouth Care During Chemotherapy     Brush gently with an extra soft toothbrush and mild toothpaste.     Mouth sores (stomatitis) and dry mouth are common side effects of chemotherapy and  radiation therapy. These side effects occur because these treatments affect normal cells as well as cancer cells. Using the tips on this handout may help you feel better.   Remedies that help  · Rinse with 1/2 teaspoon baking soda and 1/4 teaspoon salt mixed in 1 cup of warm water. This helps keep your mouth free of germs.  · Use products that coat and protect the mouth and throat. Or use medications that coat and soothe mouth sores themselves.  · Numb your mouth and throat with special sprays or lozenges to make eating easier.  Prevent mouth sores  · Buy an extra soft toothbrush and mild toothpaste.  · Gently brush your teeth and gums.  · Have your dentist treat any dental problems before your therapy begins.  Moisten a dry mouth  · Drink plenty of water. Take frequent sips or suck on ice chips.  · Suck on sugar-free candy and lozenges. Chew sugar-free gum.  · Use products that moisten the mouth if your doctor prescribes them.  · Apply lip balm to help prevent dry lips.  · Avoid mouthwash that contains alcohol.  Choose foods less likely to irritate  Try foods that are:  · Soft and go down smoothly, such as a milkshake or food puréed with a   · Served cold or at room temperature  · Cooked until tender and cut into small pieces  Avoid foods that are:  · Sharp or crunchy  · Hot, salty, or spicy  · Acidic, such as citrus juices  When to seek medical advice  · You develop mouth sores  · Mouth pain keeps you from eating or resting   Date Last Reviewed: 1/5/2016  © 0474-6377 [employer]. [address] [place] [address], [place] [address]. All rights reserved. This information is not intended as a substitute for professional medical care. Always follow your healthcare professional's instructions.        Discharge Instructions for Chemotherapy  Your healthcare provider prescribed a type of medicine therapy for you called chemotherapy. Healthcare providers prescribe chemotherapy for many different types of  illnesses, including cancer. There are many types of chemotherapy. This sheet provides general guidelines on how you can take care of yourself after your chemotherapy.  Mouth care  Dont be discouraged if you get mouth sores, even if you are following all your healthcare providers instructions. Many people get mouth sores as a side effect of chemotherapy. Heres what you can do to prevent mouth sores:  · Keep your mouth clean. Brush your teeth with a soft-bristle toothbrush after every meal.  · Ask if you should use a toothpaste with fluoride, or a mixture of 1 teaspoon of salt in 8-ounces of water to brush your teeth.   · Use an oral swab or special soft toothbrush if your gums bleed during regular brushing.  · Don't use dental floss if it causes your gums to bleed.  · Use any mouthwashes given to you as directed.  · If you cant tolerate regular methods, use salt and baking soda to clean your mouth. Mix 1 teaspoon of salt and 1 teaspoon of baking soda into an 8-ounce glass of warm water. Swish and spit.  · If you wear dentures, you may be told to wear them only when you eat, ask your healthcare provider. Clean dentures twice a day and soak in antimicrobial solution when you aren't wearing them. Rinse your mouth after each meal.   · Watch your mouth and tongue for white patches. This may be a sign of a type of yeast infection (thrush), a common side effect of chemotherapy. Be sure to tell your healthcare provider about these patches. Medicine can be prescribed to treat it.  Other home care  Here's what else you can do:  · Try to exercise. Exercise keeps you strong and keeps your heart and lungs active. Walking and yoga are good types of exercise.   · Keep clean. During chemotherapy, your body cant fight infection very well. Take short baths or showers.  ¨ Wash your hands before you eat and after going to the bathroom.  ¨ Use moisturizing soap. Chemotherapy can make your skin dry.  ¨ Apply moisturizing lotion  several times a day to help relieve dry skin.  ¨ Dont take very hot or very cold showers or baths.  · Dont be surprised if your chemotherapy causes slight burns to your skin--usually on the hands and feet. Some medicines used in high doses cause this to happen. Ask for a special cream to help relieve the burn and protect your skin.  · Avoid people who are sick with illnesses and diseases you could catch, such as colds, flu, measles, or chicken pox as well as people who have recently had vaccinations for these illnesses.   · Let your healthcare provider know if your throat is sore. You may have an infection that needs treatment.  · Remember, many patients feel sick and lose their appetites during treatment. Eat small meals several times a day to keep your strength up:  ¨ Choose bland foods with little taste or smell if you are reacting strongly to food.  ¨ Be sure to cook all food thoroughly. This kills bacteria and helps you avoid infection.  ¨ Eat foods that are soft. Soft foods are less likely to cause stomach irritation.  ¨ Try to eat a variety of foods for a well-balanced diet. Drink plenty of fluids and eat foods with fiber to avoid constipation.      When to call your healthcare provider  Call your healthcare provider right away if you have any of the following:  · Unexplained bleeding  · Trouble concentrating  · Ongoing fatigue  · Shortness of breath, wheezing, trouble breathing, or bad cough  · Rapid, irregular heartbeat, or chest pain  · Dizziness, lightheadedness  · Constant feeling of being cold  · Hives or a cut or rash that swells, turns red, feels hot or painful, or begins to ooze  · Burning when you urinate  · Fever of 100.4°F (38°C) or higher, or chills   Date Last Reviewed: 5/1/2016  © 7487-4933 [employer]. [address] [place] [address], [place] [address]. All rights reserved. This information is not intended as a substitute for professional medical care. Always follow your healthcare  professional's instructions.        Chemotherapy: Common Questions About Activity During Treatment     Working from home may be an option.   You may have questions about how chemotherapy could affect the things you take for granted in everyday life. Here are some answers to common questions, and some of the adjustments you may need to make.  Will I still be able to work?  Many people do still work during chemotherapy. If you find you have less energy, you may need to talk with your employer about adjusting your schedule:  · Work at home or reduce the number of hours you work.  · Plan time off that fits best with your treatment cycle.  Should I exercise?  Ask your healthcare provider about starting an exercise program. It may help you sleep better and sometimes even helps control your appetite. It is also good for your sense of well-being:  · Exercise when you feel most energetic.  · Keep the pace moderate. Even small amounts of exercise can help. Instead of jogging, walk or ride a stationary bike.  Will I be affected sexually?  Chemotherapy can cause sexual changes in men and women:  · You may notice changes in your desire to have sex. Hugging and cuddling may seem more important now.  · Chemotherapy can cause short-term or permanent infertility. Talk to your healthcare provider if you are planning on having children. Men may want to bank, or freeze, sperm.  · Most chemotherapy drugs can cause birth defects if taken during pregnancy. Talk to your healthcare provider about whether you need to use birth control throughout treatment.  Date Last Reviewed: 12/27/2015  © 0397-3262 [employer]. [address] [place] [address], [place] [address]. All rights reserved. This information is not intended as a substitute for professional medical care. Always follow your healthcare professional's instructions.

## 2018-03-23 NOTE — Clinical Note
Schedule CARBOPLATIN AND ALIMTA as soon as insurance approves Labs today for CBC, CMP  Schedule CBC,CMP and see me in 3 weeks after above chemo and for CArboplatin and Alimta.

## 2018-03-26 ENCOUNTER — PATIENT MESSAGE (OUTPATIENT)
Dept: HEMATOLOGY/ONCOLOGY | Facility: CLINIC | Age: 78
End: 2018-03-26

## 2018-03-26 ENCOUNTER — TELEPHONE (OUTPATIENT)
Dept: HEMATOLOGY/ONCOLOGY | Facility: CLINIC | Age: 78
End: 2018-03-26

## 2018-03-26 DIAGNOSIS — C45.9 MESOTHELIOMA: ICD-10-CM

## 2018-03-26 RX ORDER — DEXAMETHASONE 6 MG/1
6 TABLET ORAL 2 TIMES DAILY WITH MEALS
Qty: 42 TABLET | Refills: 3 | Status: SHIPPED | OUTPATIENT
Start: 2018-03-26 | End: 2018-03-27 | Stop reason: SDUPTHER

## 2018-03-26 RX ORDER — PROMETHAZINE HYDROCHLORIDE 25 MG/1
25 TABLET ORAL EVERY 6 HOURS PRN
Qty: 60 TABLET | Refills: 7 | Status: SHIPPED | OUTPATIENT
Start: 2018-03-26 | End: 2018-03-27 | Stop reason: SDUPTHER

## 2018-03-26 RX ORDER — ONDANSETRON HYDROCHLORIDE 8 MG/1
8 TABLET, FILM COATED ORAL 4 TIMES DAILY PRN
Qty: 60 TABLET | Refills: 2 | Status: SHIPPED | OUTPATIENT
Start: 2018-03-26 | End: 2018-03-27 | Stop reason: SDUPTHER

## 2018-03-26 NOTE — TELEPHONE ENCOUNTER
Been communicating with patient via [employer].  Waiting on response from bro lawrence in pre service before calling patient to discuss.

## 2018-03-26 NOTE — TELEPHONE ENCOUNTER
"Spoke with patient. He states this is not workers comp---he is not certain how that came about. He is requesting an urgent request to be sent to [employer] [facility] for chemo authorization. "i do not want to wait another 2 weeks."  Nurse Jenifer lawrence and anjelica doyle notifying each of them of the update.    Message routed to pre-sevice   "

## 2018-03-26 NOTE — TELEPHONE ENCOUNTER
----- Message from Vangie Noriega sent at 3/26/2018  9:55 AM CDT -----  Contact: self  Pt is calling in regards to having an urgent message sent to the office. Per pt is calling to have a request put to [employer] for Chemotherapy as soon as possible. Pt would like a call back in regards to this matter.    Pt can be reached at [phone].    Thank you

## 2018-03-27 ENCOUNTER — PATIENT MESSAGE (OUTPATIENT)
Dept: HEMATOLOGY/ONCOLOGY | Facility: CLINIC | Age: 78
End: 2018-03-27

## 2018-03-27 RX ORDER — PROMETHAZINE HYDROCHLORIDE 25 MG/1
25 TABLET ORAL EVERY 6 HOURS PRN
Qty: 60 TABLET | Refills: 7 | Status: SHIPPED | OUTPATIENT
Start: 2018-03-27 | End: 2018-04-03

## 2018-03-27 RX ORDER — DEXAMETHASONE 6 MG/1
6 TABLET ORAL 2 TIMES DAILY WITH MEALS
Qty: 42 TABLET | Refills: 3 | Status: SHIPPED | OUTPATIENT
Start: 2018-03-27 | End: 2018-04-06

## 2018-03-27 RX ORDER — ONDANSETRON HYDROCHLORIDE 8 MG/1
8 TABLET, FILM COATED ORAL 4 TIMES DAILY PRN
Qty: 60 TABLET | Refills: 2 | Status: SHIPPED | OUTPATIENT
Start: 2018-03-27 | End: 2019-01-01

## 2018-03-27 NOTE — TELEPHONE ENCOUNTER
----- Message from Will Cuellar sent at 3/27/2018  9:03 AM CDT -----  Contact: [facility]   Will like a call regarding prior auth for Rx Zofran and Promethazine     Contact:[phone]

## 2018-03-28 ENCOUNTER — PATIENT MESSAGE (OUTPATIENT)
Dept: HEMATOLOGY/ONCOLOGY | Facility: CLINIC | Age: 78
End: 2018-03-28

## 2018-03-28 ENCOUNTER — TELEPHONE (OUTPATIENT)
Dept: HEMATOLOGY/ONCOLOGY | Facility: CLINIC | Age: 78
End: 2018-03-28

## 2018-03-28 NOTE — TELEPHONE ENCOUNTER
----- Message from Judy Steele sent at 3/28/2018  2:17 PM CDT -----  Contact: [facility] requesting a medical necessity form and clinicals to approve chemo    Fax number [phone]    Mika call back number [phone]

## 2018-03-28 NOTE — TELEPHONE ENCOUNTER
Spoke with [employer]. Provided them with all the medical records and medical necessity form they needed.  Will follow up with [employer] tomorrow.

## 2018-03-29 ENCOUNTER — PATIENT MESSAGE (OUTPATIENT)
Dept: HEMATOLOGY/ONCOLOGY | Facility: CLINIC | Age: 78
End: 2018-03-29

## 2018-03-29 NOTE — TELEPHONE ENCOUNTER
Spoke with patient on phone.  Offered to send him copy of urgent request form---that we sent his insurance company. Patient declined for nurse to send it.  Explained insurance process to patient as best as nurse could.  Patient understands nurse will contact him once his chemo is approved.

## 2018-03-30 ENCOUNTER — PATIENT MESSAGE (OUTPATIENT)
Dept: HEMATOLOGY/ONCOLOGY | Facility: CLINIC | Age: 78
End: 2018-03-30

## 2018-04-02 ENCOUNTER — PATIENT MESSAGE (OUTPATIENT)
Dept: HEMATOLOGY/ONCOLOGY | Facility: CLINIC | Age: 78
End: 2018-04-02

## 2018-04-02 NOTE — TELEPHONE ENCOUNTER
Echo,  Please schedule him asap.    Pre-service team,  Please confirm what the patient is saying is accurate. Our referral shows pending.    ~lexis

## 2018-04-03 ENCOUNTER — PATIENT MESSAGE (OUTPATIENT)
Dept: HEMATOLOGY/ONCOLOGY | Facility: CLINIC | Age: 78
End: 2018-04-03

## 2018-04-04 ENCOUNTER — PATIENT MESSAGE (OUTPATIENT)
Dept: HEMATOLOGY/ONCOLOGY | Facility: CLINIC | Age: 78
End: 2018-04-04

## 2018-04-05 ENCOUNTER — INFUSION (OUTPATIENT)
Dept: INFUSION THERAPY | Facility: HOSPITAL | Age: 78
End: 2018-04-05
Attending: INTERNAL MEDICINE
Payer: MEDICARE

## 2018-04-05 VITALS
HEART RATE: 81 BPM | RESPIRATION RATE: 16 BRPM | DIASTOLIC BLOOD PRESSURE: 63 MMHG | WEIGHT: 158.94 LBS | TEMPERATURE: 98 F | BODY MASS INDEX: 24.94 KG/M2 | SYSTOLIC BLOOD PRESSURE: 126 MMHG | HEIGHT: 67 IN

## 2018-04-05 DIAGNOSIS — C45.9 MESOTHELIOMA: Primary | ICD-10-CM

## 2018-04-05 PROCEDURE — 96413 CHEMO IV INFUSION 1 HR: CPT

## 2018-04-05 PROCEDURE — 25000003 PHARM REV CODE 250: Performed by: INTERNAL MEDICINE

## 2018-04-05 PROCEDURE — 96411 CHEMO IV PUSH ADDL DRUG: CPT

## 2018-04-05 PROCEDURE — 63600175 PHARM REV CODE 636 W HCPCS: Performed by: INTERNAL MEDICINE

## 2018-04-05 PROCEDURE — 96367 TX/PROPH/DG ADDL SEQ IV INF: CPT

## 2018-04-05 RX ORDER — SODIUM CHLORIDE 0.9 % (FLUSH) 0.9 %
10 SYRINGE (ML) INJECTION
Status: DISCONTINUED | OUTPATIENT
Start: 2018-04-05 | End: 2018-04-05 | Stop reason: HOSPADM

## 2018-04-05 RX ADMIN — PALONOSETRON HYDROCHLORIDE 0.25 MG: 0.25 INJECTION INTRAVENOUS at 01:04

## 2018-04-05 RX ADMIN — DEXAMETHASONE SODIUM PHOSPHATE 10 MG: 4 INJECTION, SOLUTION INTRAMUSCULAR; INTRAVENOUS at 01:04

## 2018-04-05 RX ADMIN — SODIUM CHLORIDE: 9 INJECTION, SOLUTION INTRAVENOUS at 01:04

## 2018-04-05 RX ADMIN — SODIUM CHLORIDE 925 MG: 9 INJECTION, SOLUTION INTRAVENOUS at 02:04

## 2018-04-05 RX ADMIN — SODIUM CHLORIDE 485 MG: 9 INJECTION, SOLUTION INTRAVENOUS at 02:04

## 2018-04-05 NOTE — PLAN OF CARE
Problem: Patient Care Overview  Goal: Plan of Care Review  Outcome: Ongoing (interventions implemented as appropriate)  Pt tolerated D1C1 alimta/carbo without adverse effects. VSS. Provided AVS & verbalized understanding of RTC date. DC with family ambulating independently.

## 2018-04-05 NOTE — PLAN OF CARE
Problem: Chemotherapy Effects (Adult)  Goal: Signs and Symptoms of Listed Potential Problems Will be Absent, Minimized or Managed (Chemotherapy Effects)  Signs and symptoms of listed potential problems will be absent, minimized or managed by discharge/transition of care (reference Chemotherapy Effects (Adult) CPG).  Outcome: Ongoing (interventions implemented as appropriate)  Patient arrived, assisted to chair, for cycle one day one of alimta/carbo. Oriented patient to unit, reviewed unit regulations with patient. Provided patient with chemo binder, including side effect managements, dietary guidelines, chemocare.[employer] print out on each chemotherapy to be given. Reviewed specific side effects of chemotherapy agents, when to seek medical attention, assured patient has digital thermometer. Access obtained via PIV, flushed with good blood return, currently infusing. Plan of care reviewed with patient, questions encouraged. Patient verbalized understanding.

## 2018-04-06 ENCOUNTER — PATIENT MESSAGE (OUTPATIENT)
Dept: HEMATOLOGY/ONCOLOGY | Facility: CLINIC | Age: 78
End: 2018-04-06

## 2018-04-07 ENCOUNTER — HOSPITAL ENCOUNTER (EMERGENCY)
Facility: HOSPITAL | Age: 78
Discharge: HOME | End: 2018-04-07
Attending: EMERGENCY MEDICINE
Payer: MEDICARE

## 2018-04-07 ENCOUNTER — NURSE TRIAGE (OUTPATIENT)
Dept: ADMINISTRATIVE | Facility: CLINIC | Age: 78
End: 2018-04-07

## 2018-04-07 ENCOUNTER — PATIENT MESSAGE (OUTPATIENT)
Dept: HEMATOLOGY/ONCOLOGY | Facility: CLINIC | Age: 78
End: 2018-04-07

## 2018-04-07 VITALS
SYSTOLIC BLOOD PRESSURE: 150 MMHG | OXYGEN SATURATION: 98 % | DIASTOLIC BLOOD PRESSURE: 67 MMHG | HEIGHT: 68 IN | WEIGHT: 168 LBS | BODY MASS INDEX: 25.46 KG/M2 | RESPIRATION RATE: 19 BRPM | HEART RATE: 50 BPM | TEMPERATURE: 98 F

## 2018-04-07 DIAGNOSIS — K57.30 DIVERTICULOSIS OF LARGE INTESTINE WITHOUT HEMORRHAGE: Primary | ICD-10-CM

## 2018-04-07 DIAGNOSIS — K62.5 BRBPR (BRIGHT RED BLOOD PER RECTUM): ICD-10-CM

## 2018-04-07 LAB
ALBUMIN SERPL BCP-MCNC: 3.2 G/DL
ALP SERPL-CCNC: 60 U/L
ALT SERPL W/O P-5'-P-CCNC: 14 U/L
ANION GAP SERPL CALC-SCNC: 11 MMOL/L
AST SERPL-CCNC: 17 U/L
BASOPHILS # BLD AUTO: 0 K/UL
BASOPHILS NFR BLD: 0 %
BILIRUB SERPL-MCNC: 0.5 MG/DL
BILIRUB UR QL STRIP: NEGATIVE
BUN SERPL-MCNC: 26 MG/DL
CALCIUM SERPL-MCNC: 9 MG/DL
CHLORIDE SERPL-SCNC: 103 MMOL/L
CLARITY UR REFRACT.AUTO: CLEAR
CO2 SERPL-SCNC: 20 MMOL/L
COLOR UR AUTO: NORMAL
CREAT SERPL-MCNC: 0.8 MG/DL
DIFFERENTIAL METHOD: ABNORMAL
EOSINOPHIL # BLD AUTO: 0 K/UL
EOSINOPHIL NFR BLD: 0 %
ERYTHROCYTE [DISTWIDTH] IN BLOOD BY AUTOMATED COUNT: 16 %
EST. GFR  (AFRICAN AMERICAN): >60 ML/MIN/1.73 M^2
EST. GFR  (NON AFRICAN AMERICAN): >60 ML/MIN/1.73 M^2
GLUCOSE SERPL-MCNC: 129 MG/DL
GLUCOSE UR QL STRIP: NEGATIVE
HCT VFR BLD AUTO: 31.2 %
HGB BLD-MCNC: 9.7 G/DL
HGB UR QL STRIP: NEGATIVE
IMM GRANULOCYTES # BLD AUTO: 0.07 K/UL
IMM GRANULOCYTES NFR BLD AUTO: 0.6 %
KETONES UR QL STRIP: NEGATIVE
LEUKOCYTE ESTERASE UR QL STRIP: NEGATIVE
LYMPHOCYTES # BLD AUTO: 0.5 K/UL
LYMPHOCYTES NFR BLD: 4.4 %
MCH RBC QN AUTO: 24.7 PG
MCHC RBC AUTO-ENTMCNC: 31.1 G/DL
MCV RBC AUTO: 80 FL
MONOCYTES # BLD AUTO: 0.3 K/UL
MONOCYTES NFR BLD: 2.8 %
NEUTROPHILS # BLD AUTO: 10.1 K/UL
NEUTROPHILS NFR BLD: 92.2 %
NITRITE UR QL STRIP: NEGATIVE
NRBC BLD-RTO: 0 /100 WBC
PH UR STRIP: 7 [PH] (ref 5–8)
PLATELET # BLD AUTO: 388 K/UL
PMV BLD AUTO: 9.3 FL
POTASSIUM SERPL-SCNC: 4.2 MMOL/L
PROT SERPL-MCNC: 7.5 G/DL
PROT UR QL STRIP: NEGATIVE
RBC # BLD AUTO: 3.92 M/UL
SODIUM SERPL-SCNC: 134 MMOL/L
SP GR UR STRIP: 1 (ref 1–1.03)
URN SPEC COLLECT METH UR: NORMAL
UROBILINOGEN UR STRIP-ACNC: NEGATIVE EU/DL
WBC # BLD AUTO: 10.9 K/UL

## 2018-04-07 PROCEDURE — 99285 EMERGENCY DEPT VISIT HI MDM: CPT | Mod: ,,, | Performed by: PHYSICIAN ASSISTANT

## 2018-04-07 PROCEDURE — 81003 URINALYSIS AUTO W/O SCOPE: CPT

## 2018-04-07 PROCEDURE — 80053 COMPREHEN METABOLIC PANEL: CPT

## 2018-04-07 PROCEDURE — 85025 COMPLETE CBC W/AUTO DIFF WBC: CPT

## 2018-04-07 PROCEDURE — 99284 EMERGENCY DEPT VISIT MOD MDM: CPT

## 2018-04-07 PROCEDURE — 25500020 PHARM REV CODE 255: Performed by: EMERGENCY MEDICINE

## 2018-04-07 RX ADMIN — IOHEXOL 100 ML: 350 INJECTION, SOLUTION INTRAVENOUS at 12:04

## 2018-04-07 NOTE — ED NOTES
Assumed patient care. Pt resting on stretcher in NAD, respirations even and unlabored. Stretcher locked and in lowest position, side rails x 2, call bell within reach. BP cuff, cardiac monitor and pulse ox applied cycling Q 30 minutes. Wife at the bedside, pt states no needs at this time. Pt updated on wait for CT scan and lab results. Will continue to monitor and update pt on plan of care.

## 2018-04-07 NOTE — ED NOTES
Pt c/o rectal bleeding this am.  States he started chemo recently and has been constipated.  Pt was told to take stool softeners which he did.  Pt states he had a small BM this am after he strained.   States he had this x 3 episodes this am.   Pt states he has hx hemorrhoids.

## 2018-04-07 NOTE — ED PROVIDER NOTES
"Encounter Date: 4/7/2018    SCRIBE #1 NOTE: I, Hoa Jackson, am scribing for, and in the presence of,  Dr. Salinas. I have scribed the following portions of the note - the APC attestation.       History     Chief Complaint   Patient presents with    Rectal Bleeding     bright red blood in stool this morning two to three episodes; first chemo treatment for lung cancer yesterday      76 yo M with hx significant for mesothelioma recently started chemotherapy, hx of prostate CA s/p prostatectomy and local radiation, diverticulitis, HTN presents to the ED with a cc of rectal bleeding. Pt reports 3 episodes of BRBPR with a few small clots this morning around 6am. He reports constipation for the past few days and had to strain to have a BM this morning. He reports very mild lower abdominal discomfort that he describes as "indigestion".  Pt has had mild rectal bleeding in the past associated with hemorrhoids.  He also notes that he was told that he had "inflammation" around his colon that was attributed to prior radiation therapy. Pt also reports some mild dysuria this morning. He feels mildly lightheaded, but denies weakness, fever, worsening SOB, n/v, rectal pain.           Review of patient's allergies indicates:   Allergen Reactions    Bactrim [sulfamethoxazole-trimethoprim] Other (See Comments)     Joint pains     Past Medical History:   Diagnosis Date    Diverticulitis     Hypertension     Lung cancer     Prostate cancer 2002    Urinary tract infection      Past Surgical History:   Procedure Laterality Date    COLONOSCOPY  10/20/2012    COLONOSCOPY  11/19/2014    dr machado ( repeat in 3 years per the pt )    ELBOW SURGERY Left 2013    dislocation    PROCTECTOMY  2002    SHOULDER ARTHROSCOPY W/ ROTATOR CUFF REPAIR Right 2008     Family History   Problem Relation Age of Onset    Heart disease Father     Heart disease Brother     Prostate cancer Brother     [facility]    Heart disease Sister     Heart attack Sister     No Known Problems Son     Heart disease Brother     Prostate cancer Brother     Heart disease Sister     Heart attack Sister     No Known Problems Son     No Known Problems Son     Heart disease Brother     Prostate cancer Brother     Kidney disease Neg Hx     Asthma Neg Hx     Emphysema Neg Hx      Social History   Substance Use Topics    Smoking status: Former Smoker     Packs/day: 2.00     Years: 15.00     Types: Cigarettes     Quit date: 1970    Smokeless tobacco: Never Used      Comment: pt quit 40 years ago    Alcohol use No      Comment: None since Nov 15 th 2017     Review of Systems   Constitutional: Negative for fever.   Respiratory: Negative for shortness of breath.    Cardiovascular: Negative for chest pain.   Gastrointestinal: Positive for abdominal pain (mild), anal bleeding, blood in stool and constipation. Negative for nausea and vomiting.   Genitourinary: Positive for dysuria.   Neurological: Positive for light-headedness. Negative for weakness.       Physical Exam     Initial Vitals [04/07/18 0957]   BP Pulse Resp Temp SpO2   (!) 155/78 78 16 98.2 °F (36.8 °C) 98 %      MAP       103.67         Physical Exam    Nursing note and vitals reviewed.  Constitutional: He appears well-developed and well-nourished.  Non-toxic appearance. He does not appear ill. No distress.   HENT:   Head: Normocephalic and atraumatic.   Neck: Normal range of motion. Neck supple.   Cardiovascular: Normal rate and regular rhythm. Exam reveals no gallop, no distant heart sounds and no friction rub.    No murmur heard.  Pulmonary/Chest: Effort normal and breath sounds normal. No accessory muscle usage. No tachypnea. No respiratory distress. He has no decreased breath sounds. He has no wheezes. He has no rhonchi. He has no rales.   Abdominal: He exhibits no distension. There is tenderness (mild) in the suprapubic area.   Genitourinary: Rectal exam shows  internal hemorrhoid. Rectal exam shows no tenderness.   Genitourinary Comments: Scant bright red blood noted on TAL. Heme positive. Small internal hemorrhoid. No significant tenderness.    Musculoskeletal: Normal range of motion.   Neurological: He is alert.   Skin: Skin is warm and dry. No rash noted. No pallor.   Psychiatric: He has a normal mood and affect. His behavior is normal.         ED Course   Procedures  Labs Reviewed   CBC W/ AUTO DIFFERENTIAL - Abnormal; Notable for the following:        Result Value    RBC 3.92 (*)     Hemoglobin 9.7 (*)     Hematocrit 31.2 (*)     MCV 80 (*)     MCH 24.7 (*)     MCHC 31.1 (*)     RDW 16.0 (*)     Platelets 388 (*)     Immature Granulocytes 0.6 (*)     Gran # (ANC) 10.1 (*)     Immature Grans (Abs) 0.07 (*)     Lymph # 0.5 (*)     Gran% 92.2 (*)     Lymph% 4.4 (*)     Mono% 2.8 (*)     All other components within normal limits   COMPREHENSIVE METABOLIC PANEL - Abnormal; Notable for the following:     Sodium 134 (*)     CO2 20 (*)     Glucose 129 (*)     BUN, Bld 26 (*)     Albumin 3.2 (*)     All other components within normal limits   URINALYSIS, REFLEX TO URINE CULTURE          X-Rays:   Independently Interpreted Readings:   Other Readings:  CT shows o evidence of GI bleed. extensive colonic diverticulosis without evidence of diverticulitis. Stable L1 transverse process fracture.  Left posterior pleural fluid.      Medical Decision Making:   History:   Old Medical Records: I decided to obtain old medical records.  Differential Diagnosis:   My differential diagnosis includes but is not limited to:  Diverticular bleed, hemorrhoid, malignancy, colitis  Clinical Tests:   Lab Tests: Ordered and Reviewed  Radiological Study: Ordered and Reviewed       APC / Resident Notes:   77-year-old male BRBPR x 3 this morning.  He is well-appearing.  He is hemodynamically stable.  Scant bright red blood and small internal hemorrhoid noted on TAL. Mild suprapubic TTP.     CTA of the  abd/pelvis revealed no active extravasation.  Will d/c pt in stable condition. Advised follow up with PCP or heme/onc in 3-4 days for reevaluation.  Return precautions given. I have reviewed the patient's records and discussed this case with my supervising physician.         Teriibe Attestation:   Scribe #1: I performed the above scribed service and the documentation accurately describes the services I performed. I attest to the accuracy of the note.    Attending Attestation:     Physician Attestation Statement for NP/PA:   I have conducted a face to face encounter with this patient in addition to the NP/PA, due to Medical Complexity    Other NP/PA Attestation Additions:    History of Present Illness: 77 y.o. male presenting with three episodes of bright red blood per rectum.  History of diverticulosis and diverticulitis.  Labs and CT abdomen/pelvis ordered.         Physical Exam: Abdomen is soft nontender.  Well-appearing in NAD.     Medical Decision Makin:30 PM Labs reviewed sig for hemoglobin of 9.7.  CT shows no acute bleed.  Pt is stable for discharge.        Physician Attestation for Scribe:      Comments: I, Dr. Noemy Salinas, personally performed the services described in this documentation. All medical record entries made by the scribe were at my direction and in my presence.  I have reviewed the chart and agree that the record reflects my personal performance and is accurate and complete. Noemy Salinas MD.                 Clinical Impression:   The primary encounter diagnosis was Diverticulosis of large intestine without hemorrhage. A diagnosis of BRBPR (bright red blood per rectum) was also pertinent to this visit.    Disposition:   Disposition: Discharged  Condition: Stable                        Savanna Platt PA-C  [id number]       Noemy Salinas MD  [id number]

## 2018-04-07 NOTE — TELEPHONE ENCOUNTER
"    Reason for Disposition   MODERATE rectal bleeding (small blood clots, passing blood without stool, or toilet water turns red)    Answer Assessment - Initial Assessment Questions  1. APPEARANCE of BLOOD: "What color is it?" "Is it passed separately, on the surface of the stool, or mixed in with the stool?"       Bright blood     2. AMOUNT: "How much blood was passed?"       5-10 ml    3. FREQUENCY: "How many times has blood been passed with the stools?"       I would say roughly about 3 times since 6 am     4. ONSET: "When was the blood first seen in the stools?" (Days or weeks)       6am     5. DIARRHEA: "Is there also some diarrhea?" If so, ask: "How many diarrhea stools were passed in past 24 hours?"       Denies    6. CONSTIPATION: "Do you have constipation?" If so, "How bad is it?"      Patient was constipated     7. RECURRENT SYMPTOMS: "Have you had blood in your stools before?" If so, ask: "When was the last time?" and "What happened that time?"         8. BLOOD THINNERS: "Do you take any blood thinners?" (e.g., Coumadin/warfarin, Pradaxa/dabigatran, aspirin)    Denies    9. OTHER SYMPTOMS: "Do you have any other symptoms?"  (e.g., abdominal pain, vomiting, dizziness, fever)      Patient reports hemorrhoids in the past, slight indigestion, denies fever, new c/o slight dizziness    10. PREGNANCY: "Is there any chance you are pregnant?" "When was your last menstrual period?"       N/A    Protocols used: ST RECTAL BLEEDING-A-AH    Patient reports BRBPR bleeding 3 x since 6am this morning. Patient denies abd pain or fever. Patient reports 1 small blood clot. Dr. Teran notified per  Disposition and instructed patient to report to ED Education completed per [facility] Care Advice including reporting to ED patient will report to main campus. Patient verbalized understanding.      "

## 2018-04-07 NOTE — ED NOTES
Pt resting on stretcher in NAD, respirations even and unlabored. Stretcher locked and in lowest position, side rails x 2, call bell within reach. Cardiac monitor, pulse ox and BP cuff applied cycling Q 30 minute vitals. Pt offered restroom assistance, pt states no needs at this time. Wife at the bedside, updated pt on CT results complete and wait for MD to reevaluate results. Will continue to monitor and update pt on plan of care.

## 2018-04-08 ENCOUNTER — PATIENT MESSAGE (OUTPATIENT)
Dept: FAMILY MEDICINE | Facility: CLINIC | Age: 78
End: 2018-04-08

## 2018-04-08 ENCOUNTER — PATIENT MESSAGE (OUTPATIENT)
Dept: HEMATOLOGY/ONCOLOGY | Facility: CLINIC | Age: 78
End: 2018-04-08

## 2018-04-08 ENCOUNTER — PATIENT MESSAGE (OUTPATIENT)
Dept: GASTROENTEROLOGY | Facility: CLINIC | Age: 78
End: 2018-04-08

## 2018-04-09 ENCOUNTER — TELEPHONE (OUTPATIENT)
Dept: FAMILY MEDICINE | Facility: CLINIC | Age: 78
End: 2018-04-09

## 2018-04-09 ENCOUNTER — PATIENT MESSAGE (OUTPATIENT)
Dept: GASTROENTEROLOGY | Facility: CLINIC | Age: 78
End: 2018-04-09

## 2018-04-09 ENCOUNTER — TELEPHONE (OUTPATIENT)
Dept: GASTROENTEROLOGY | Facility: CLINIC | Age: 78
End: 2018-04-09

## 2018-04-09 ENCOUNTER — PATIENT MESSAGE (OUTPATIENT)
Dept: HEMATOLOGY/ONCOLOGY | Facility: CLINIC | Age: 78
End: 2018-04-09

## 2018-04-09 NOTE — TELEPHONE ENCOUNTER
----- Message from April Rocha sent at 4/9/2018 10:44 AM CDT -----  Contact: self, [phone]  Pt states he is no longer passing blood. Scheduled him for [facility] on 4-18-18      ----- Message -----  From: Mandy Fernandez MA  Sent: 4/9/2018  10:33 AM  To: April Rocha    Can you please schedule this patient. Can you offer [facility] and [facility]. Thanks.  ----- Message -----  From: Shakira Watson  Sent: 4/9/2018   9:57 AM  To: Valerie Otero ([place])    Patient states he started chemo on 4/5, passed blood in stools on 4/7, went to the emergency department and was diagnosed with diverticulitis and was told to follow up with you ASAP. Please advise.

## 2018-04-09 NOTE — TELEPHONE ENCOUNTER
FYI-   Patient reports BRBPR bleeding 3 x since 6am this morning. Patient denies abd pain or fever. Patient reports 1 small blood clot. Dr. Teran notified per  Disposition and instructed patient to report to ED Education completed per [facility] Care Advice including reporting to ED patient will report to main campus. Patient verbalized understanding.     Thank you,   Ruth Faciane, RN [facility] (Routing comment)

## 2018-04-09 NOTE — TELEPHONE ENCOUNTER
Spoke with patient and all questions was answered, pt is scheduled for 4/18/18 with  in [place]. Patient also stated that he is not having and rectal bleeding and aware that if he start's to have any problems that he should go to the ER.

## 2018-04-11 ENCOUNTER — PATIENT MESSAGE (OUTPATIENT)
Dept: FAMILY MEDICINE | Facility: CLINIC | Age: 78
End: 2018-04-11

## 2018-04-18 ENCOUNTER — TELEPHONE (OUTPATIENT)
Dept: GASTROENTEROLOGY | Facility: CLINIC | Age: 78
End: 2018-04-18

## 2018-04-18 ENCOUNTER — PATIENT MESSAGE (OUTPATIENT)
Dept: CARDIOTHORACIC SURGERY | Facility: CLINIC | Age: 78
End: 2018-04-18

## 2018-04-18 ENCOUNTER — OFFICE VISIT (OUTPATIENT)
Dept: GASTROENTEROLOGY | Facility: CLINIC | Age: 78
End: 2018-04-18
Payer: MEDICARE

## 2018-04-18 VITALS
DIASTOLIC BLOOD PRESSURE: 76 MMHG | HEIGHT: 68 IN | SYSTOLIC BLOOD PRESSURE: 135 MMHG | BODY MASS INDEX: 23.72 KG/M2 | HEART RATE: 68 BPM | WEIGHT: 156.5 LBS

## 2018-04-18 DIAGNOSIS — K21.9 GASTROESOPHAGEAL REFLUX DISEASE, ESOPHAGITIS PRESENCE NOT SPECIFIED: ICD-10-CM

## 2018-04-18 DIAGNOSIS — Z86.010 HISTORY OF ADENOMATOUS POLYP OF COLON: ICD-10-CM

## 2018-04-18 DIAGNOSIS — C45.7 MESOTHELIOMA OF LEFT LUNG: ICD-10-CM

## 2018-04-18 DIAGNOSIS — K59.01 CONSTIPATION BY DELAYED COLONIC TRANSIT: Primary | ICD-10-CM

## 2018-04-18 PROCEDURE — 99213 OFFICE O/P EST LOW 20 MIN: CPT | Mod: S$GLB,,, | Performed by: INTERNAL MEDICINE

## 2018-04-18 PROCEDURE — 99999 PR PBB SHADOW E&M-EST. PATIENT-LVL III: CPT | Mod: PBBFAC,,, | Performed by: INTERNAL MEDICINE

## 2018-04-18 PROCEDURE — 3078F DIAST BP <80 MM HG: CPT | Mod: CPTII,S$GLB,, | Performed by: INTERNAL MEDICINE

## 2018-04-18 PROCEDURE — 3075F SYST BP GE 130 - 139MM HG: CPT | Mod: CPTII,S$GLB,, | Performed by: INTERNAL MEDICINE

## 2018-04-18 NOTE — TELEPHONE ENCOUNTER
----- Message from Pamela Ryan sent at 4/18/2018  1:07 PM CDT -----  Contact: Self [phone]  Patient Returning Your Phone Call

## 2018-04-18 NOTE — PROGRESS NOTES
Subjective:      Patient ID: Cale Harding is a 77 y.o. male.    Chief Complaint: Diverticulosis; Rectal Bleeding; and Constipation    HPI:   Patient presents with a history of acute constipation.  After straining with a hard bowel movement he passed bright blood per rectum ×2.  Symptoms have cleared.  He has started taking MiraLAX.  He's undergone one treatment for recently diagnosed mesothelioma.  Is due for follow-up colonoscopy in October 2018 having had previous polyps.  Known diverticulosis  Most recent colonoscopy in 2014 demonstrated a 4 mm adenoma which was removed.  I advised him to keep us updated but no need to proceed with colonoscopy at this time.  If bleeding continues to be problem, we will do his colonoscopy prior to the scheduled date.  Recommend he continue MiraLAX.     The patient denies any history of hepatitis, jaundice, or transfusion. He is status post treatment for prostate cancer 16 years ago; status post left her to recover para; status post left elbow dislocation repair; history of diverticulitis intermittently, with the last episode requiring treatment with antibiotics in April 2015. He has had no diverticulitis issues since that time.     The patient quit smoking 40 years ago, previously smoked 2 packs cigarettes per day for 10 years. He consumes one to 5 beers per day.   Currently under evaluation for a very small left pleural effusion    Review of patient's allergies indicates:   Allergen Reactions    Bactrim [sulfamethoxazole-trimethoprim] Other (See Comments)     Joint pains     Past Medical History:   Diagnosis Date    Diverticulitis     Hypertension     Lung cancer     Prostate cancer 2002    Urinary tract infection      Past Surgical History:   Procedure Laterality Date    COLONOSCOPY  10/20/2012    COLONOSCOPY  11/19/2014    dr machado ( repeat in 3 years per the pt )    ELBOW SURGERY Left 2013    dislocation    PROCTECTOMY  2002    SHOULDER ARTHROSCOPY W/  "ROTATOR CUFF REPAIR Right 2008     Family History   Problem Relation Age of Onset    Heart disease Father     Heart disease Brother     Prostate cancer Brother     [facility]    Heart disease Sister     Heart attack Sister     No Known Problems Son     Heart disease Brother     Prostate cancer Brother     Heart disease Sister     Heart attack Sister     No Known Problems Son     No Known Problems Son     Heart disease Brother     Prostate cancer Brother     Kidney disease Neg Hx     Asthma Neg Hx     Emphysema Neg Hx      Social History     Social History    Marital status:      Spouse name: N/A    Number of children: N/A    Years of education: N/A     Occupational History    Not on file.     Social History Main Topics    Smoking status: Former Smoker     Packs/day: 2.00     Years: 15.00     Types: Cigarettes     Quit date: 1970    Smokeless tobacco: Former User      Comment: pt quit 40 years ago    Alcohol use No      Comment: None since Nov 15 th 2017    Drug use: No    Sexual activity: Not Currently     Other Topics Concern    Not on file     Social History Narrative    No narrative on file       Review of Systems:  Constitutional: Negative for appetite change.  Weight loss  HENT: Negative for trouble swallowing.  Sinus congestion  Eyes: Negative for photophobia.   Respiratory: Negative for cough and shortness of breath.   Cardiovascular: Negative for palpitations.   Gastrointestinal: See HPI for details.  Genitourinary: Negative for frequency and hematuria.   Skin: Negative for rash.   Neurological: Negative for weakness and headaches.   Hematological: Negative.   Psychiatric/Behavioral: Negative for suicidal ideas and behavioral problems.     Objective:     /76 (BP Location: Right arm, Patient Position: Sitting)   Pulse 68   Ht 5' 8" (1.727 m)   Wt 71 kg (156 lb 8.4 oz)   BMI 23.80 kg/m²     Physical Exam:  Alert no distress.  Eyes: " Pupils are equal, round, and reactive to light.   Neck: Supple. No mass  Cardiovascular: Regular rhythm . No murmur   Pulmonary/Chest: Lungs clear   Abdominal: Soft. No mass palpated. Nontender, no guarding. Positive bowel sounds   Musculoskeletal: No deformity. No edema.   Psychiatric: Alert and oriented    Assessment:     1. Constipation by delayed colonic transit    2. History of adenomatous polyp of colon    3. Gastroesophageal reflux disease, esophagitis presence not specified    4. Mesothelioma of left lung      Plan:     Cale was seen today for diverticulosis, rectal bleeding and constipation.    Diagnoses and all orders for this visit:    Constipation by delayed colonic transit    History of adenomatous polyp of colon    Gastroesophageal reflux disease, esophagitis presence not specified    Mesothelioma of left lung      Plan:  Daily MiraLAX  Follow symptoms  If bleeding persists, colonoscopy.  If no further symptoms, routine follow-up colonoscopy late 2018.

## 2018-04-18 NOTE — PATIENT INSTRUCTIONS
Polyethylene Glycol powder  What is this medicine?  POLYETHYLENE GLYCOL 3350 (donna ee ETH i fátima; GLYE col) powder is a laxative used to treat constipation. It increases the amount of water in the stool. Bowel movements become easier and more frequent.  How should I use this medicine?  Take this medicine by mouth. The bottle has a measuring cap that is marked with a line. Pour the powder into the cap up to the marked line (the dose is about 1 heaping tablespoon). Add the powder in the cap to a full glass (4 to 8 ounces or 120 to 240 ml) of water, juice, soda, coffee or tea. Mix the powder well. Drink the solution. Take exactly as directed. Do not take your medicine more often than directed.  Talk to your pediatrician regarding the use of this medicine in children. Special care may be needed.  What side effects may I notice from receiving this medicine?  Side effects that you should report to your doctor or health care professional as soon as possible:  · diarrhea  · difficulty breathing  · itching of the skin, hives, or skin rash  · severe bloating, pain, or distension of the stomach  · vomiting  Side effects that usually do not require medical attention (report to your doctor or health care professional if they continue or are bothersome):  · bloating or gas  · lower abdominal discomfort or cramps  · nausea  What may interact with this medicine?  Interactions are not expected.  What if I miss a dose?  If you miss a dose, take it as soon as you can. If it is almost time for your next dose, take only that dose. Do not take double or extra doses.  Where should I keep my medicine?  Keep out of the reach of children.  Store between 15 and 30 degrees C (59 and 86 degrees F). Throw away any unused medicine after the expiration date.  What should I tell my health care provider before I take this medicine?  They need to know if you have any of these conditions:  · a history of blockage of the stomach or  intestine  · current abdomen distension or pain  · difficulty swallowing  · diverticulitis, ulcerative colitis, or other chronic bowel disease  · phenylketonuria  · an unusual or allergic reaction to polyethylene glycol, other medicines, dyes, or preservatives  · pregnant or trying to get pregnant  · breast-feeding  What should I watch for while using this medicine?  Do not use for more than 2 weeks without advice from your doctor or health care professional. It can take 2 to 4 days to have a bowel movement and to experience improvement in constipation. See your health care professional for any changes in bowel habits, including constipation, that are severe or last longer than three weeks.  Always take this medicine with plenty of water.  NOTE:This sheet is a summary. It may not cover all possible information. If you have questions about this medicine, talk to your doctor, pharmacist, or health care provider. Copyright© 2017 [employer]        Constipation (Adult)  Constipation means that you have bowel movements that are less frequent than usual. Stools often become very hard and difficult to pass.  Constipation is very common. At some point in life it affects almost everyone. Since everyone's bowel habits are different, what is constipation to one person may not be to another. Your healthcare provider may do tests to diagnose constipation. It depends on what he or she finds when evaluating you.    Symptoms of constipation include:  · Abdominal pain  · Bloating  · Vomiting  · Painful bowel movements  · Itching, swelling, bleeding, or pain around the anus  Causes  Constipation can have many causes. These include:  · Diet low in fiber  · Too much dairy  · Not drinking enough liquids  · Lack of exercise or physical activity. This is especially true for older adults.  · Changes in lifestyle or daily routine, including pregnancy, aging, work, and travel  · Frequent use or misuse of laxatives  · Ignoring the urge to  have a bowel movement or delaying it until later  · Medicines, such as certain prescription pain medicines, iron supplements, antacids, certain antidepressants, and calcium supplements  · Diseases like irritable bowel syndrome, bowel obstructions, stroke, diabetes, thyroid disease, Parkinson disease, hemorrhoids, and colon cancer  Complications  Potential complications of constipation can include:  · Hemorrhoids  · Rectal bleeding from hemorrhoids or anal fissures (skin tears)  · Hernias  · Dependency on laxatives  · Chronic constipation  · Fecal impaction  · Bowel obstruction or perforation  Home care  All treatment should be done after talking with your healthcare provider. This is especially true if you have another medical problems, are taking prescription medicines, or are an older adult. Treatment most often involves lifestyle changes. You may also need medicines. Your healthcare provider will tell you which will work best for you. Follow the advice below to help avoid this problem in the future.  Lifestyle changes  These lifestyle changes can help prevent constipation:  · Diet. Eat a high-fiber diet, with fresh fruit and vegetables, and reduce dairy intake, meats, and processed foods  · Fluids. It's important to get enough fluids each day. Drink plenty of water when you eat more fiber. If you are on diet that limits the amount of fluid you can have, talk about this with your healthcare provider.  · Regular exercise. Check with your healthcare provider first.  Medications  Take any medicines as directed. Some laxatives are safe to use only every now and then. Others can be taken on a regular basis. Talk with your doctor or pharmacist if you have questions.  Prescription pain medicines can cause constipation. If you are taking this kind of medicine, ask your healthcare provider if you should also take a stool softener.  Medicines you may take to treat constipation include:  · Fiber supplements  · Stool  softeners  · Laxatives  · Enemas  · Rectal suppositories  Follow-up care  Follow up with your healthcare provider if symptoms don't get better in the next few days. You may need to have more tests or see a specialist.  Call 911  Call 911 if any of these occur:  · Trouble breathing  · Stiff, rigid abdomen that is severely painful to touch  · Confusion  · Fainting or loss of consciousness  · Rapid heart rate  · Chest pain  When to seek medical advice  Call your healthcare provider right away if any of these occur:  · Fever over 100.4°F (38°C)  · Failure to resume normal bowel movements  · Pain in your abdomen or back gets worse  · Nausea or vomiting  · Swelling in your abdomen  · Blood in the stool  · Black, tarry stool  · Involuntary weight loss  · Weakness  Date Last Reviewed: 12/30/2015  © 2315-1058 [employer]. [address] [place] [address], [place] [address]. All rights reserved. This information is not intended as a substitute for professional medical care. Always follow your healthcare professional's instructions.

## 2018-04-23 ENCOUNTER — LAB VISIT (OUTPATIENT)
Dept: LAB | Facility: HOSPITAL | Age: 78
End: 2018-04-23
Attending: INTERNAL MEDICINE
Payer: MEDICARE

## 2018-04-23 DIAGNOSIS — C45.9 MESOTHELIOMA: ICD-10-CM

## 2018-04-23 LAB
ALBUMIN SERPL BCP-MCNC: 4.3 G/DL
ALP SERPL-CCNC: 103 U/L
ALT SERPL W/O P-5'-P-CCNC: 28 U/L
ANION GAP SERPL CALC-SCNC: 14 MMOL/L
AST SERPL-CCNC: 24 U/L
BILIRUB SERPL-MCNC: 0.4 MG/DL
BUN SERPL-MCNC: 18 MG/DL
CALCIUM SERPL-MCNC: 9.6 MG/DL
CHLORIDE SERPL-SCNC: 101 MMOL/L
CO2 SERPL-SCNC: 25 MMOL/L
CREAT SERPL-MCNC: 0.68 MG/DL
ERYTHROCYTE [DISTWIDTH] IN BLOOD BY AUTOMATED COUNT: 18.1 %
EST. GFR  (AFRICAN AMERICAN): >60 ML/MIN/1.73 M^2
EST. GFR  (NON AFRICAN AMERICAN): >60 ML/MIN/1.73 M^2
GLUCOSE SERPL-MCNC: 157 MG/DL
HCT VFR BLD AUTO: 36.6 %
HGB BLD-MCNC: 11.1 G/DL
MCH RBC QN AUTO: 25.6 PG
MCHC RBC AUTO-ENTMCNC: 30.3 G/DL
MCV RBC AUTO: 84 FL
NEUTROPHILS # BLD AUTO: 4.1 K/UL
PLATELET # BLD AUTO: 249 K/UL
PMV BLD AUTO: 9.6 FL
POTASSIUM SERPL-SCNC: 4.7 MMOL/L
PROT SERPL-MCNC: 8 G/DL
RBC # BLD AUTO: 4.34 M/UL
SODIUM SERPL-SCNC: 140 MMOL/L
WBC # BLD AUTO: 4.74 K/UL

## 2018-04-23 PROCEDURE — 85027 COMPLETE CBC AUTOMATED: CPT | Mod: PO

## 2018-04-23 PROCEDURE — 36415 COLL VENOUS BLD VENIPUNCTURE: CPT | Mod: PO

## 2018-04-23 PROCEDURE — 80053 COMPREHEN METABOLIC PANEL: CPT | Mod: PO

## 2018-04-24 ENCOUNTER — OFFICE VISIT (OUTPATIENT)
Dept: HEMATOLOGY/ONCOLOGY | Facility: CLINIC | Age: 78
End: 2018-04-24
Payer: MEDICARE

## 2018-04-24 ENCOUNTER — INFUSION (OUTPATIENT)
Dept: INFUSION THERAPY | Facility: HOSPITAL | Age: 78
End: 2018-04-24
Attending: INTERNAL MEDICINE
Payer: MEDICARE

## 2018-04-24 VITALS
DIASTOLIC BLOOD PRESSURE: 64 MMHG | WEIGHT: 159.38 LBS | HEIGHT: 67 IN | RESPIRATION RATE: 18 BRPM | SYSTOLIC BLOOD PRESSURE: 134 MMHG | BODY MASS INDEX: 25.01 KG/M2 | OXYGEN SATURATION: 96 % | TEMPERATURE: 98 F | HEART RATE: 73 BPM

## 2018-04-24 DIAGNOSIS — C45.9 MESOTHELIOMA: Primary | ICD-10-CM

## 2018-04-24 DIAGNOSIS — C45.7 MESOTHELIOMA OF LEFT LUNG: Primary | ICD-10-CM

## 2018-04-24 DIAGNOSIS — Z09 CHEMOTHERAPY FOLLOW-UP EXAMINATION: ICD-10-CM

## 2018-04-24 PROCEDURE — 99999 PR PBB SHADOW E&M-EST. PATIENT-LVL IV: CPT | Mod: PBBFAC,,, | Performed by: INTERNAL MEDICINE

## 2018-04-24 PROCEDURE — 96367 TX/PROPH/DG ADDL SEQ IV INF: CPT

## 2018-04-24 PROCEDURE — 25000003 PHARM REV CODE 250: Performed by: INTERNAL MEDICINE

## 2018-04-24 PROCEDURE — 96413 CHEMO IV INFUSION 1 HR: CPT

## 2018-04-24 PROCEDURE — 3075F SYST BP GE 130 - 139MM HG: CPT | Mod: CPTII,S$GLB,, | Performed by: INTERNAL MEDICINE

## 2018-04-24 PROCEDURE — 99215 OFFICE O/P EST HI 40 MIN: CPT | Mod: S$GLB,,, | Performed by: INTERNAL MEDICINE

## 2018-04-24 PROCEDURE — 96411 CHEMO IV PUSH ADDL DRUG: CPT

## 2018-04-24 PROCEDURE — 63600175 PHARM REV CODE 636 W HCPCS: Mod: JG | Performed by: INTERNAL MEDICINE

## 2018-04-24 PROCEDURE — 3078F DIAST BP <80 MM HG: CPT | Mod: CPTII,S$GLB,, | Performed by: INTERNAL MEDICINE

## 2018-04-24 RX ORDER — HEPARIN 100 UNIT/ML
500 SYRINGE INTRAVENOUS
Status: DISCONTINUED | OUTPATIENT
Start: 2018-04-24 | End: 2018-04-24 | Stop reason: HOSPADM

## 2018-04-24 RX ORDER — SODIUM CHLORIDE 0.9 % (FLUSH) 0.9 %
10 SYRINGE (ML) INJECTION
Status: DISCONTINUED | OUTPATIENT
Start: 2018-04-24 | End: 2018-04-24 | Stop reason: HOSPADM

## 2018-04-24 RX ORDER — SODIUM CHLORIDE 0.9 % (FLUSH) 0.9 %
10 SYRINGE (ML) INJECTION
Status: CANCELLED | OUTPATIENT
Start: 2018-04-24

## 2018-04-24 RX ORDER — HEPARIN 100 UNIT/ML
500 SYRINGE INTRAVENOUS
Status: CANCELLED | OUTPATIENT
Start: 2018-04-24

## 2018-04-24 RX ADMIN — SODIUM CHLORIDE 925 MG: 9 INJECTION, SOLUTION INTRAVENOUS at 01:04

## 2018-04-24 RX ADMIN — CARBOPLATIN 600 MG: 10 INJECTION INTRAVENOUS at 01:04

## 2018-04-24 RX ADMIN — SODIUM CHLORIDE: 9 INJECTION, SOLUTION INTRAVENOUS at 12:04

## 2018-04-24 RX ADMIN — PALONOSETRON HYDROCHLORIDE 0.25 MG: 0.25 INJECTION INTRAVENOUS at 12:04

## 2018-04-24 RX ADMIN — DEXAMETHASONE SODIUM PHOSPHATE 10 MG: 4 INJECTION, SOLUTION INTRAMUSCULAR; INTRAVENOUS at 12:04

## 2018-04-24 NOTE — PLAN OF CARE
Problem: Patient Care Overview  Goal: Plan of Care Review  Outcome: Ongoing (interventions implemented as appropriate)  Tolerated chemo infusion without difficulty. No complaints voiced. AVS given to pt. Instructed to notify MD with any concerns or problems. Pt verbalized understanding.

## 2018-04-24 NOTE — PROGRESS NOTES
Subjective:       Patient ID: Cale Harding is a 77 y.o. male.    Chief Complaint: Mesothelioma  ONCOLOGIC HISTORY: Mr. 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  He started having left chest wall discomfort in November 2017, which prompted a chest x-ray.  He underwent a thoracentesis with 1.2 liters removed and apparently cytology was negative; although, I do not have that path in the system and then he noted increasing shortness of breath and repeat pleural effusion and fluid was removed.  He then underwent IR biopsy of the left pleural thickening, which was positive for epithelioid mesothelioma confirmed at [facility].  He underwent PET scan on 02/09/2018, which revealed three left pleural based masses with an SUV max of 7.6 and a small pleural effusion.  He has had night sweats and about 20-pound weight loss in the last three months, low-grade fevers also, occasional shortness of breath and he has chest wall pain, which is not frequent.  Plan originally was to proceed with VATS, left thoracotomy and radical pleurectomy, decortication and HIPEC.  However, after the patient complained of worsening pain, an MRI of the chest was done on 03/01/2018 and that revealed loculated complex pleural fluid collection with marked pleural thickening and internal septation.  The largest and more superior anterior of the two pleural masses measured 7 x 4.3 cm.  The smaller and more inferior one measured 3.9 x 2.2.  The mass abuts the pleural surface and the larger of the two masses abuts the pericardium and the chest wall, involvement or invasion of the structures is not excluded.  The lower mass abuts and possibly invades the diaphragm.     Since initial visit with me he underwent Diagnostic laparoscopy with biopsy of diaphragm. Left VATS thoracoscopy on 3/19/18. Final path is pending.  Due to amount of involvemt surgery was not done so is on Carboplatin and ALimta    HPIHe comes in for cycle 2 of Carboplatin and Alimta.  He denies any nausea, vomiting, diarrhea, constipation, abdominal pain, weight loss or loss of appetite, chest pain, shortness of breath, leg swelling, fatigue, pain, headache, dizziness, or mood changes. His ECOG PS is zero. He is accompanied by his wife.          Review of Systems   Constitutional: Positive for fatigue. Negative for appetite change and unexpected weight change.   HENT: Negative for mouth sores.    Eyes: Negative for visual disturbance.   Respiratory: Negative for cough and shortness of breath.    Cardiovascular: Negative for chest pain.   Gastrointestinal: Negative for abdominal pain and diarrhea.   Genitourinary: Negative for frequency.   Musculoskeletal: Negative for back pain.   Skin: Negative for rash.   Neurological: Negative for headaches.   Hematological: Negative for adenopathy.   Psychiatric/Behavioral: The patient is not nervous/anxious.    All other systems reviewed and are negative.      PMFSH: all information reviewed and updated as relevant to today's visit  Objective:      Physical Exam   Constitutional: He is oriented to person, place, and time. He appears well-developed and well-nourished.   HENT:   Mouth/Throat: No oropharyngeal exudate.   Cardiovascular: Normal rate and normal heart sounds.    Pulmonary/Chest: Effort normal and breath sounds normal. He has no wheezes.   Abdominal: Soft. Bowel sounds are normal. There is no tenderness.   Musculoskeletal: He exhibits no edema or tenderness.   Lymphadenopathy:     He has no cervical adenopathy.   Neurological: He is alert and oriented to person, place, and time. Coordination normal.   Skin: Skin is warm and dry. No rash noted.   Psychiatric: He has a normal mood and affect. Judgment and thought content normal.   Vitals reviewed.      LABS:  WBC   Date Value Ref Range Status   04/23/2018 4.74 3.90  - 12.70 K/uL Final     Hemoglobin   Date Value Ref Range Status   04/23/2018 11.1 (L) 14.0 - 18.0 g/dL Final     Hematocrit   Date Value Ref Range Status   04/23/2018 36.6 (L) 40.0 - 54.0 % Final     Platelets   Date Value Ref Range Status   04/23/2018 249 150 - 350 K/uL Final     Gran # (ANC)   Date Value Ref Range Status   04/23/2018 4.1 1.8 - 7.7 K/uL Final     Comment:     The ANC is based on a white cell differential from an   automated cell counter. It has not been microscopically   reviewed for the presence of abnormal cells. Clinical   correlation is required.         Chemistry        Component Value Date/Time     04/23/2018 1051    K 4.7 04/23/2018 1051     04/23/2018 1051    CO2 25 04/23/2018 1051    BUN 18 04/23/2018 1051    CREATININE 0.68 04/23/2018 1051     (H) 04/23/2018 1051        Component Value Date/Time    CALCIUM 9.6 04/23/2018 1051    ALKPHOS 103 04/23/2018 1051    AST 24 04/23/2018 1051    ALT 28 04/23/2018 1051    BILITOT 0.4 04/23/2018 1051    ESTGFRAFRICA >60.0 04/23/2018 1051    EGFRNONAA >60.0 04/23/2018 1051          Assessment:       1. Mesothelioma of left lung    2. Chemotherapy follow-up examination        Plan:        1,2. He will proceed with cycle 2 of Alimta and Avastin and will return in 3 weeks for next cycle with restaging PET scan    Above care plan was discussed with patient and accompanying wife and all questions were addressed to their satisfaction

## 2018-05-01 ENCOUNTER — PATIENT MESSAGE (OUTPATIENT)
Dept: HEMATOLOGY/ONCOLOGY | Facility: CLINIC | Age: 78
End: 2018-05-01

## 2018-05-01 ENCOUNTER — PATIENT MESSAGE (OUTPATIENT)
Dept: FAMILY MEDICINE | Facility: CLINIC | Age: 78
End: 2018-05-01

## 2018-05-02 ENCOUNTER — OFFICE VISIT (OUTPATIENT)
Dept: FAMILY MEDICINE | Facility: CLINIC | Age: 78
End: 2018-05-02
Payer: MEDICARE

## 2018-05-02 ENCOUNTER — PATIENT MESSAGE (OUTPATIENT)
Dept: HEMATOLOGY/ONCOLOGY | Facility: CLINIC | Age: 78
End: 2018-05-02

## 2018-05-02 VITALS
DIASTOLIC BLOOD PRESSURE: 60 MMHG | BODY MASS INDEX: 24.74 KG/M2 | WEIGHT: 157.63 LBS | SYSTOLIC BLOOD PRESSURE: 130 MMHG | HEART RATE: 108 BPM | TEMPERATURE: 99 F | OXYGEN SATURATION: 98 % | HEIGHT: 67 IN

## 2018-05-02 DIAGNOSIS — K57.92 DIVERTICULITIS: Primary | ICD-10-CM

## 2018-05-02 DIAGNOSIS — C45.9 MESOTHELIOMA: ICD-10-CM

## 2018-05-02 DIAGNOSIS — G57.11 MERALGIA PARAESTHETICA, RIGHT: ICD-10-CM

## 2018-05-02 PROCEDURE — 99214 OFFICE O/P EST MOD 30 MIN: CPT | Mod: S$GLB,,, | Performed by: FAMILY MEDICINE

## 2018-05-02 PROCEDURE — 3075F SYST BP GE 130 - 139MM HG: CPT | Mod: CPTII,S$GLB,, | Performed by: FAMILY MEDICINE

## 2018-05-02 PROCEDURE — 3078F DIAST BP <80 MM HG: CPT | Mod: CPTII,S$GLB,, | Performed by: FAMILY MEDICINE

## 2018-05-02 RX ORDER — CIPROFLOXACIN 500 MG/5ML
500 KIT ORAL 2 TIMES DAILY
COMMUNITY
End: 2018-05-02

## 2018-05-02 RX ORDER — CIPROFLOXACIN 500 MG/1
500 TABLET ORAL 2 TIMES DAILY
Qty: 60 TABLET | Refills: 0 | Status: SHIPPED | OUTPATIENT
Start: 2018-05-02 | End: 2018-05-12

## 2018-05-02 RX ORDER — ENALAPRIL MALEATE 5 MG/1
5 TABLET ORAL 2 TIMES DAILY
Qty: 180 TABLET | Refills: 3 | Status: SHIPPED | OUTPATIENT
Start: 2018-05-02 | End: 2019-01-01 | Stop reason: SDUPTHER

## 2018-05-02 NOTE — PROGRESS NOTES
Subjective:      Patient ID: Cale Harding is a 77 y.o. male.    Chief Complaint: Constipation (due for C- scope); Gas; Abdominal Pain; and Medication Refill (Enalapril)      HPI   Diverticulitis again; pt has mesothelioma and is under treatment; has been constipated; few weeks ago had several large hard BM and bled and went to ER and followed Dr Padilla; now miralax 1/2 dose daily and started cipro 500 yesterday had on hand and feels much better; stomach doeson't ache; no temp over 100    Review of Systems   Constitutional: Positive for fatigue and unexpected weight change.   Respiratory: Positive for shortness of breath.    Gastrointestinal: Positive for abdominal pain, blood in stool and constipation.   Neurological: Positive for weakness and numbness.        Right ant thigh with pain     Objective:     Physical Exam   Constitutional: He is oriented to person, place, and time. He appears well-developed and well-nourished. No distress.   Pale, appears to have lost weight   HENT:   Head: Normocephalic and atraumatic.   Right Ear: External ear normal.   Left Ear: External ear normal.   Mouth/Throat: Oropharynx is clear and moist. No oropharyngeal exudate.   Eyes: Conjunctivae and EOM are normal. Pupils are equal, round, and reactive to light. Right eye exhibits no discharge. Left eye exhibits no discharge. No scleral icterus.   Neck: Normal range of motion. Neck supple. No JVD present. No tracheal deviation present. No thyromegaly present.   Cardiovascular: Normal rate and regular rhythm.  Exam reveals no gallop and no friction rub.    No murmur heard.  Pulmonary/Chest: Effort normal. No stridor. No respiratory distress. He has no wheezes. He has no rales. He exhibits no tenderness.   Diminished left post   Abdominal: Soft. Bowel sounds are normal. He exhibits no distension and no mass. There is tenderness. There is no rebound and no guarding.   LLQ   Musculoskeletal: Normal range of motion. He exhibits  no edema or tenderness.   Lymphadenopathy:     He has no cervical adenopathy.   Neurological: He is alert and oriented to person, place, and time. He has normal reflexes. He displays normal reflexes. No cranial nerve deficit. He exhibits normal muscle tone. Coordination normal.   Skin: Skin is warm and dry. No rash noted. He is not diaphoretic. No erythema. No pallor.   Psychiatric: He has a normal mood and affect. His behavior is normal. Judgment and thought content normal.   Nursing note and vitals reviewed.    Assessment:     1. Diverticulitis    2. Meralgia paraesthetica, right    3. Mesothelioma      Plan:     New Prescriptions    CIPROFLOXACIN HCL (CIPRO) 500 MG TABLET    Take 1 tablet (500 mg total) by mouth 2 (two) times daily. For diverticulitis     Discontinued Medications    CIPROFLOXACIN (CIPRO) 500 MG/5 ML SUSPENSION    Take 500 mg by mouth 2 (two) times daily.    RANITIDINE (ZANTAC) 300 MG TABLET    Take 1 tablet (300 mg total) by mouth every evening.     Modified Medications    Modified Medication Previous Medication    ENALAPRIL (VASOTEC) 5 MG TABLET enalapril (VASOTEC) 5 MG tablet       Take 1 tablet (5 mg total) by mouth 2 (two) times daily.    Take 1 tablet (5 mg total) by mouth 2 (two) times daily.       Diverticulitis    Meralgia paraesthetica, right    Mesothelioma    Other orders  -     enalapril (VASOTEC) 5 MG tablet; Take 1 tablet (5 mg total) by mouth 2 (two) times daily.  Dispense: 180 tablet; Refill: 3  -     ciprofloxacin HCl (CIPRO) 500 MG tablet; Take 1 tablet (500 mg total) by mouth 2 (two) times daily. For diverticulitis  Dispense: 60 tablet; Refill: 0    cipro until pain resolved of abd from diverticulitis  I reviewed CTA done one month ago, he was asking about getting another; recommonded no;

## 2018-05-08 ENCOUNTER — HOSPITAL ENCOUNTER (OUTPATIENT)
Dept: RADIOLOGY | Facility: HOSPITAL | Age: 78
Discharge: HOME | End: 2018-05-08
Attending: INTERNAL MEDICINE
Payer: MEDICARE

## 2018-05-08 DIAGNOSIS — C45.7 MESOTHELIOMA OF LEFT LUNG: ICD-10-CM

## 2018-05-08 LAB — POCT GLUCOSE: 101 MG/DL (ref 70–110)

## 2018-05-08 PROCEDURE — A9552 F18 FDG: HCPCS

## 2018-05-08 PROCEDURE — 78815 PET IMAGE W/CT SKULL-THIGH: CPT | Mod: 26,PS,, | Performed by: RADIOLOGY

## 2018-05-08 PROCEDURE — 78815 PET IMAGE W/CT SKULL-THIGH: CPT | Mod: TC,PS

## 2018-05-15 ENCOUNTER — OFFICE VISIT (OUTPATIENT)
Dept: HEMATOLOGY/ONCOLOGY | Facility: CLINIC | Age: 78
End: 2018-05-15
Payer: MEDICARE

## 2018-05-15 ENCOUNTER — INFUSION (OUTPATIENT)
Dept: INFUSION THERAPY | Facility: HOSPITAL | Age: 78
End: 2018-05-15
Attending: INTERNAL MEDICINE
Payer: MEDICARE

## 2018-05-15 ENCOUNTER — PATIENT MESSAGE (OUTPATIENT)
Dept: HEMATOLOGY/ONCOLOGY | Facility: CLINIC | Age: 78
End: 2018-05-15

## 2018-05-15 VITALS
RESPIRATION RATE: 16 BRPM | DIASTOLIC BLOOD PRESSURE: 70 MMHG | HEIGHT: 67 IN | SYSTOLIC BLOOD PRESSURE: 163 MMHG | WEIGHT: 161.19 LBS | OXYGEN SATURATION: 99 % | BODY MASS INDEX: 25.3 KG/M2 | TEMPERATURE: 98 F | HEART RATE: 63 BPM

## 2018-05-15 VITALS — DIASTOLIC BLOOD PRESSURE: 67 MMHG | HEART RATE: 65 BPM | SYSTOLIC BLOOD PRESSURE: 143 MMHG | TEMPERATURE: 98 F

## 2018-05-15 DIAGNOSIS — C45.9 MESOTHELIOMA: Primary | ICD-10-CM

## 2018-05-15 PROCEDURE — 96413 CHEMO IV INFUSION 1 HR: CPT

## 2018-05-15 PROCEDURE — 96409 CHEMO IV PUSH SNGL DRUG: CPT

## 2018-05-15 PROCEDURE — 3078F DIAST BP <80 MM HG: CPT | Mod: CPTII,S$GLB,, | Performed by: INTERNAL MEDICINE

## 2018-05-15 PROCEDURE — 25000003 PHARM REV CODE 250: Performed by: INTERNAL MEDICINE

## 2018-05-15 PROCEDURE — 96367 TX/PROPH/DG ADDL SEQ IV INF: CPT

## 2018-05-15 PROCEDURE — 3077F SYST BP >= 140 MM HG: CPT | Mod: CPTII,S$GLB,, | Performed by: INTERNAL MEDICINE

## 2018-05-15 PROCEDURE — 63600175 PHARM REV CODE 636 W HCPCS: Performed by: INTERNAL MEDICINE

## 2018-05-15 PROCEDURE — A4216 STERILE WATER/SALINE, 10 ML: HCPCS | Performed by: INTERNAL MEDICINE

## 2018-05-15 PROCEDURE — 96372 THER/PROPH/DIAG INJ SC/IM: CPT

## 2018-05-15 PROCEDURE — 96411 CHEMO IV PUSH ADDL DRUG: CPT

## 2018-05-15 PROCEDURE — 99999 PR PBB SHADOW E&M-EST. PATIENT-LVL III: CPT | Mod: PBBFAC,,, | Performed by: INTERNAL MEDICINE

## 2018-05-15 PROCEDURE — 99215 OFFICE O/P EST HI 40 MIN: CPT | Mod: S$GLB,,, | Performed by: INTERNAL MEDICINE

## 2018-05-15 RX ORDER — HEPARIN 100 UNIT/ML
500 SYRINGE INTRAVENOUS
Status: CANCELLED | OUTPATIENT
Start: 2018-05-15

## 2018-05-15 RX ORDER — CYANOCOBALAMIN 1000 UG/ML
1000 INJECTION, SOLUTION INTRAMUSCULAR; SUBCUTANEOUS
Status: CANCELLED | OUTPATIENT
Start: 2018-05-15

## 2018-05-15 RX ORDER — CYANOCOBALAMIN 1000 UG/ML
1000 INJECTION, SOLUTION INTRAMUSCULAR; SUBCUTANEOUS
Status: COMPLETED | OUTPATIENT
Start: 2018-05-15 | End: 2018-05-15

## 2018-05-15 RX ORDER — SODIUM CHLORIDE 0.9 % (FLUSH) 0.9 %
10 SYRINGE (ML) INJECTION
Status: DISCONTINUED | OUTPATIENT
Start: 2018-05-15 | End: 2018-05-15 | Stop reason: HOSPADM

## 2018-05-15 RX ORDER — SODIUM CHLORIDE 0.9 % (FLUSH) 0.9 %
10 SYRINGE (ML) INJECTION
Status: CANCELLED | OUTPATIENT
Start: 2018-05-15

## 2018-05-15 RX ADMIN — CYANOCOBALAMIN 1000 MCG: 1000 INJECTION, SOLUTION INTRAMUSCULAR at 11:05

## 2018-05-15 RX ADMIN — DEXAMETHASONE SODIUM PHOSPHATE: 4 INJECTION, SOLUTION INTRA-ARTICULAR; INTRALESIONAL; INTRAMUSCULAR; INTRAVENOUS; SOFT TISSUE at 11:05

## 2018-05-15 RX ADMIN — CARBOPLATIN 525 MG: 10 INJECTION, SOLUTION INTRAVENOUS at 12:05

## 2018-05-15 RX ADMIN — SODIUM CHLORIDE 925 MG: 9 INJECTION, SOLUTION INTRAVENOUS at 12:05

## 2018-05-15 RX ADMIN — SODIUM CHLORIDE: 9 INJECTION, SOLUTION INTRAVENOUS at 11:05

## 2018-05-15 RX ADMIN — SODIUM CHLORIDE, PRESERVATIVE FREE 10 ML: 5 INJECTION INTRAVENOUS at 01:05

## 2018-05-15 NOTE — PLAN OF CARE
Problem: Patient Care Overview  Goal: Discharge Needs Assessment  Outcome: Ongoing (interventions implemented as appropriate)  Pt tolerated infusion well no reaction b-12 administered to l upper arm, pt leaves clinic accompanied by wife no assistance needed, NAD noted.

## 2018-05-15 NOTE — Clinical Note
Schedule CBC, CMP, UA and see me in 3 weeks and for Carboplatin, ALimta and Avastin. Avastin needs auth in 3 weeks

## 2018-05-16 ENCOUNTER — PATIENT MESSAGE (OUTPATIENT)
Dept: HEMATOLOGY/ONCOLOGY | Facility: CLINIC | Age: 78
End: 2018-05-16

## 2018-05-16 ENCOUNTER — PATIENT MESSAGE (OUTPATIENT)
Dept: FAMILY MEDICINE | Facility: CLINIC | Age: 78
End: 2018-05-16

## 2018-05-17 ENCOUNTER — PATIENT MESSAGE (OUTPATIENT)
Dept: HEMATOLOGY/ONCOLOGY | Facility: CLINIC | Age: 78
End: 2018-05-17

## 2018-05-21 ENCOUNTER — PATIENT MESSAGE (OUTPATIENT)
Dept: HEMATOLOGY/ONCOLOGY | Facility: CLINIC | Age: 78
End: 2018-05-21

## 2018-05-23 ENCOUNTER — PATIENT MESSAGE (OUTPATIENT)
Dept: HEMATOLOGY/ONCOLOGY | Facility: CLINIC | Age: 78
End: 2018-05-23

## 2018-05-23 DIAGNOSIS — J06.9 UPPER RESPIRATORY TRACT INFECTION, UNSPECIFIED TYPE: Primary | ICD-10-CM

## 2018-05-23 RX ORDER — AZITHROMYCIN 250 MG/1
TABLET, FILM COATED ORAL
Qty: 6 TABLET | Refills: 0 | Status: SHIPPED | OUTPATIENT
Start: 2018-05-23 | End: 2018-05-28

## 2018-06-05 ENCOUNTER — OFFICE VISIT (OUTPATIENT)
Dept: HEMATOLOGY/ONCOLOGY | Facility: CLINIC | Age: 78
End: 2018-06-05
Payer: MEDICARE

## 2018-06-05 ENCOUNTER — INFUSION (OUTPATIENT)
Dept: INFUSION THERAPY | Facility: HOSPITAL | Age: 78
End: 2018-06-05
Attending: INTERNAL MEDICINE
Payer: MEDICARE

## 2018-06-05 VITALS
HEIGHT: 67 IN | HEART RATE: 58 BPM | OXYGEN SATURATION: 98 % | TEMPERATURE: 98 F | WEIGHT: 160.06 LBS | BODY MASS INDEX: 25.12 KG/M2 | RESPIRATION RATE: 16 BRPM | DIASTOLIC BLOOD PRESSURE: 71 MMHG | SYSTOLIC BLOOD PRESSURE: 144 MMHG

## 2018-06-05 VITALS
DIASTOLIC BLOOD PRESSURE: 67 MMHG | SYSTOLIC BLOOD PRESSURE: 137 MMHG | TEMPERATURE: 98 F | HEART RATE: 56 BPM | RESPIRATION RATE: 18 BRPM

## 2018-06-05 DIAGNOSIS — C45.9 MESOTHELIOMA: Primary | ICD-10-CM

## 2018-06-05 DIAGNOSIS — C45.7 MESOTHELIOMA OF LEFT LUNG: Primary | ICD-10-CM

## 2018-06-05 PROCEDURE — 99999 PR PBB SHADOW E&M-EST. PATIENT-LVL III: CPT | Mod: PBBFAC,,, | Performed by: INTERNAL MEDICINE

## 2018-06-05 PROCEDURE — 3077F SYST BP >= 140 MM HG: CPT | Mod: CPTII,S$GLB,, | Performed by: INTERNAL MEDICINE

## 2018-06-05 PROCEDURE — 96413 CHEMO IV INFUSION 1 HR: CPT

## 2018-06-05 PROCEDURE — 25000003 PHARM REV CODE 250: Performed by: INTERNAL MEDICINE

## 2018-06-05 PROCEDURE — 99215 OFFICE O/P EST HI 40 MIN: CPT | Mod: S$GLB,,, | Performed by: INTERNAL MEDICINE

## 2018-06-05 PROCEDURE — 96415 CHEMO IV INFUSION ADDL HR: CPT

## 2018-06-05 PROCEDURE — 3078F DIAST BP <80 MM HG: CPT | Mod: CPTII,S$GLB,, | Performed by: INTERNAL MEDICINE

## 2018-06-05 PROCEDURE — 96417 CHEMO IV INFUS EACH ADDL SEQ: CPT

## 2018-06-05 PROCEDURE — 63600175 PHARM REV CODE 636 W HCPCS: Mod: JG | Performed by: INTERNAL MEDICINE

## 2018-06-05 PROCEDURE — 96367 TX/PROPH/DG ADDL SEQ IV INF: CPT

## 2018-06-05 PROCEDURE — 96411 CHEMO IV PUSH ADDL DRUG: CPT

## 2018-06-05 RX ORDER — HEPARIN 100 UNIT/ML
500 SYRINGE INTRAVENOUS
Status: CANCELLED | OUTPATIENT
Start: 2018-06-05

## 2018-06-05 RX ORDER — DEXAMETHASONE 6 MG/1
TABLET ORAL
COMMUNITY
End: 2018-01-01 | Stop reason: SDUPTHER

## 2018-06-05 RX ORDER — SODIUM CHLORIDE 0.9 % (FLUSH) 0.9 %
10 SYRINGE (ML) INJECTION
Status: DISCONTINUED | OUTPATIENT
Start: 2018-06-05 | End: 2018-06-05 | Stop reason: HOSPADM

## 2018-06-05 RX ORDER — SODIUM CHLORIDE 0.9 % (FLUSH) 0.9 %
10 SYRINGE (ML) INJECTION
Status: CANCELLED | OUTPATIENT
Start: 2018-06-05

## 2018-06-05 RX ORDER — HEPARIN 100 UNIT/ML
500 SYRINGE INTRAVENOUS
Status: DISCONTINUED | OUTPATIENT
Start: 2018-06-05 | End: 2018-06-05 | Stop reason: HOSPADM

## 2018-06-05 RX ORDER — DIPHENHYDRAMINE HCL 25 MG
25 CAPSULE ORAL DAILY
COMMUNITY
End: 2019-01-01

## 2018-06-05 RX ADMIN — BEVACIZUMAB 1085 MG: 100 INJECTION, SOLUTION INTRAVENOUS at 12:06

## 2018-06-05 RX ADMIN — SODIUM CHLORIDE 925 MG: 9 INJECTION, SOLUTION INTRAVENOUS at 12:06

## 2018-06-05 RX ADMIN — SODIUM CHLORIDE: 9 INJECTION, SOLUTION INTRAVENOUS at 11:06

## 2018-06-05 RX ADMIN — DEXAMETHASONE SODIUM PHOSPHATE 10 MG: 4 INJECTION, SOLUTION INTRA-ARTICULAR; INTRALESIONAL; INTRAMUSCULAR; INTRAVENOUS; SOFT TISSUE at 11:06

## 2018-06-05 RX ADMIN — PALONOSETRON HYDROCHLORIDE 0.25 MG: 0.25 INJECTION INTRAVENOUS at 11:06

## 2018-06-05 RX ADMIN — CARBOPLATIN 485 MG: 10 INJECTION, SOLUTION INTRAVENOUS at 02:06

## 2018-06-05 NOTE — PLAN OF CARE
Problem: Patient Care Overview  Goal: Individualization & Mutuality  Pt free from pain and has no signs of bleeding from puncture/incision site.     Outcome: Ongoing (interventions implemented as appropriate)  1141 -- Patient's labs, history, allergies, and medication reviewed.  Assessment complete.  Vital signs stable.  Patient to receive Alimta/Carbo/Avastin.  Discussed plan of care with patient.  Patient in agreement. Chair reclined.  Warm blanket and snack offered.  Will monitor.

## 2018-06-05 NOTE — PROGRESS NOTES
Subjective:       Patient ID: Cale Harding is a 77 y.o. male.    Chief Complaint: Mesothelioma  ONCOLOGIC HISTORY: Mr. 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  He started having left chest wall discomfort in November 2017, which prompted a chest x-ray.  He underwent a thoracentesis with 1.2 liters removed and apparently cytology was negative; although, I do not have that path in the system and then he noted increasing shortness of breath and repeat pleural effusion and fluid was removed.  He then underwent IR biopsy of the left pleural thickening, which was positive for epithelioid mesothelioma confirmed at [facility].  He underwent PET scan on 02/09/2018, which revealed three left pleural based masses with an SUV max of 7.6 and a small pleural effusion.  He has had night sweats and about 20-pound weight loss in the last three months, low-grade fevers also, occasional shortness of breath and he has chest wall pain, which is not frequent.  Plan originally was to proceed with VATS, left thoracotomy and radical pleurectomy, decortication and HIPEC.  However, after the patient complained of worsening pain, an MRI of the chest was done on 03/01/2018 and that revealed loculated complex pleural fluid collection with marked pleural thickening and internal septation.  The largest and more superior anterior of the two pleural masses measured 7 x 4.3 cm.  The smaller and more inferior one measured 3.9 x 2.2.  The mass abuts the pleural surface and the larger of the two masses abuts the pericardium and the chest wall, involvement or invasion of the structures is not excluded.  The lower mass abuts and possibly invades the diaphragm.     Since initial visit with me he underwent Diagnostic laparoscopy with biopsy of diaphragm. Left VATS thoracoscopy on 3/19/18. Final path is pending.  "Due to amount of involvemt surgery was not done so is on Carboplatin and ALimta       HPI He comes in for cycle 4 of Carboplatin and Alimta. His PET scan from 5/8/18 after 2 cycles of chemo reveals "Significant improvement in pleural disease.  This suggest a favorable response to therapy"  He feels well for the most part      Review of Systems   Constitutional: Negative for appetite change, fatigue and unexpected weight change.   HENT: Negative for mouth sores.    Eyes: Negative for visual disturbance.   Respiratory: Negative for cough and shortness of breath.    Cardiovascular: Negative for chest pain.   Gastrointestinal: Negative for abdominal pain and diarrhea.   Genitourinary: Negative for frequency.   Musculoskeletal: Negative for back pain.   Skin: Negative for rash.   Neurological: Negative for headaches.   Hematological: Negative for adenopathy.   Psychiatric/Behavioral: The patient is not nervous/anxious.    All other systems reviewed and are negative.      PMFSH: all information reviewed and updated as relevant to today's visit  Objective:      Physical Exam   Constitutional: He is oriented to person, place, and time. He appears well-developed and well-nourished.   HENT:   Mouth/Throat: No oropharyngeal exudate.   Cardiovascular: Normal rate and normal heart sounds.    Pulmonary/Chest: Effort normal and breath sounds normal. He has no wheezes.   Abdominal: Soft. Bowel sounds are normal. There is no tenderness.   Musculoskeletal: He exhibits no edema or tenderness.   Lymphadenopathy:     He has no cervical adenopathy.   Neurological: He is alert and oriented to person, place, and time. Coordination normal.   Skin: Skin is warm and dry. No rash noted.   Psychiatric: He has a normal mood and affect. Judgment and thought content normal.   Vitals reviewed.        LABS:  WBC   Date Value Ref Range Status   06/05/2018 5.85 3.90 - 12.70 K/uL Final     Hemoglobin   Date Value Ref Range Status   06/05/2018 10.4 (L) " 14.0 - 18.0 g/dL Final     Hematocrit   Date Value Ref Range Status   06/05/2018 32.9 (L) 40.0 - 54.0 % Final     Platelets   Date Value Ref Range Status   06/05/2018 283 150 - 350 K/uL Final     Gran # (ANC)   Date Value Ref Range Status   06/05/2018 4.5 1.8 - 7.7 K/uL Final     Comment:     The ANC is based on a white cell differential from an   automated cell counter. It has not been microscopically   reviewed for the presence of abnormal cells. Clinical   correlation is required.         Chemistry        Component Value Date/Time     (L) 06/05/2018 0832    K 3.8 06/05/2018 0832     06/05/2018 0832    CO2 22 (L) 06/05/2018 0832    BUN 22 06/05/2018 0832    CREATININE 0.9 06/05/2018 0832     (H) 06/05/2018 0832        Component Value Date/Time    CALCIUM 10.0 06/05/2018 0832    ALKPHOS 79 06/05/2018 0832    AST 27 06/05/2018 0832    ALT 13 06/05/2018 0832    BILITOT 0.4 06/05/2018 0832    ESTGFRAFRICA >60.0 06/05/2018 0832    EGFRNONAA >60.0 06/05/2018 0832          Assessment:       1. Mesothelioma of left lung        Plan:        1. He is doing well clinically and will proceed with carboplatin, Alimta and Avastin.  Consents signed for Avastin.  He will return in 3 weeks for next cycle with restaging scans prior.    Above care plan was discussed with patient and accompanying wife and all questions were addressed to their satisfaction

## 2018-06-07 ENCOUNTER — PATIENT MESSAGE (OUTPATIENT)
Dept: HEMATOLOGY/ONCOLOGY | Facility: CLINIC | Age: 78
End: 2018-06-07

## 2018-06-18 ENCOUNTER — PATIENT MESSAGE (OUTPATIENT)
Dept: HEMATOLOGY/ONCOLOGY | Facility: CLINIC | Age: 78
End: 2018-06-18

## 2018-06-18 DIAGNOSIS — C34.90 MALIGNANT NEOPLASM OF LUNG, UNSPECIFIED LATERALITY, UNSPECIFIED PART OF LUNG: Primary | ICD-10-CM

## 2018-06-19 ENCOUNTER — HOSPITAL ENCOUNTER (OUTPATIENT)
Dept: RADIOLOGY | Facility: HOSPITAL | Age: 78
Discharge: HOME | End: 2018-06-19
Attending: INTERNAL MEDICINE
Payer: MEDICARE

## 2018-06-19 DIAGNOSIS — C45.7 MESOTHELIOMA OF LEFT LUNG: ICD-10-CM

## 2018-06-19 LAB — POCT GLUCOSE: 98 MG/DL (ref 70–110)

## 2018-06-19 PROCEDURE — 78815 PET IMAGE W/CT SKULL-THIGH: CPT | Mod: 26,PS,, | Performed by: RADIOLOGY

## 2018-06-19 PROCEDURE — A9552 F18 FDG: HCPCS

## 2018-06-19 PROCEDURE — 78815 PET IMAGE W/CT SKULL-THIGH: CPT | Mod: TC,PS

## 2018-06-20 ENCOUNTER — PATIENT MESSAGE (OUTPATIENT)
Dept: HEMATOLOGY/ONCOLOGY | Facility: CLINIC | Age: 78
End: 2018-06-20

## 2018-06-21 ENCOUNTER — PATIENT MESSAGE (OUTPATIENT)
Dept: HEMATOLOGY/ONCOLOGY | Facility: CLINIC | Age: 78
End: 2018-06-21

## 2018-06-26 ENCOUNTER — INFUSION (OUTPATIENT)
Dept: INFUSION THERAPY | Facility: HOSPITAL | Age: 78
End: 2018-06-26
Attending: INTERNAL MEDICINE
Payer: MEDICARE

## 2018-06-26 ENCOUNTER — OFFICE VISIT (OUTPATIENT)
Dept: HEMATOLOGY/ONCOLOGY | Facility: CLINIC | Age: 78
End: 2018-06-26
Payer: MEDICARE

## 2018-06-26 ENCOUNTER — PATIENT MESSAGE (OUTPATIENT)
Dept: HEMATOLOGY/ONCOLOGY | Facility: CLINIC | Age: 78
End: 2018-06-26

## 2018-06-26 VITALS
RESPIRATION RATE: 17 BRPM | BODY MASS INDEX: 25.11 KG/M2 | TEMPERATURE: 98 F | SYSTOLIC BLOOD PRESSURE: 160 MMHG | DIASTOLIC BLOOD PRESSURE: 70 MMHG | HEART RATE: 60 BPM | WEIGHT: 160 LBS | HEIGHT: 67 IN

## 2018-06-26 VITALS
DIASTOLIC BLOOD PRESSURE: 75 MMHG | WEIGHT: 160.94 LBS | TEMPERATURE: 98 F | HEART RATE: 65 BPM | SYSTOLIC BLOOD PRESSURE: 152 MMHG | RESPIRATION RATE: 18 BRPM | OXYGEN SATURATION: 99 % | HEIGHT: 67 IN | BODY MASS INDEX: 25.26 KG/M2

## 2018-06-26 DIAGNOSIS — C45.7 MESOTHELIOMA OF LEFT LUNG: Primary | ICD-10-CM

## 2018-06-26 DIAGNOSIS — Z09 CHEMOTHERAPY FOLLOW-UP EXAMINATION: ICD-10-CM

## 2018-06-26 DIAGNOSIS — C45.9 MESOTHELIOMA: Primary | ICD-10-CM

## 2018-06-26 PROCEDURE — 3078F DIAST BP <80 MM HG: CPT | Mod: CPTII,S$GLB,, | Performed by: INTERNAL MEDICINE

## 2018-06-26 PROCEDURE — 96411 CHEMO IV PUSH ADDL DRUG: CPT

## 2018-06-26 PROCEDURE — 96417 CHEMO IV INFUS EACH ADDL SEQ: CPT

## 2018-06-26 PROCEDURE — 25000003 PHARM REV CODE 250: Performed by: INTERNAL MEDICINE

## 2018-06-26 PROCEDURE — 96367 TX/PROPH/DG ADDL SEQ IV INF: CPT

## 2018-06-26 PROCEDURE — 99999 PR PBB SHADOW E&M-EST. PATIENT-LVL III: CPT | Mod: PBBFAC,,, | Performed by: INTERNAL MEDICINE

## 2018-06-26 PROCEDURE — 3077F SYST BP >= 140 MM HG: CPT | Mod: CPTII,S$GLB,, | Performed by: INTERNAL MEDICINE

## 2018-06-26 PROCEDURE — 63600175 PHARM REV CODE 636 W HCPCS: Performed by: INTERNAL MEDICINE

## 2018-06-26 PROCEDURE — 96413 CHEMO IV INFUSION 1 HR: CPT

## 2018-06-26 PROCEDURE — 99215 OFFICE O/P EST HI 40 MIN: CPT | Mod: S$GLB,,, | Performed by: INTERNAL MEDICINE

## 2018-06-26 RX ORDER — HEPARIN 100 UNIT/ML
500 SYRINGE INTRAVENOUS
Status: CANCELLED | OUTPATIENT
Start: 2018-06-26

## 2018-06-26 RX ORDER — SODIUM CHLORIDE 0.9 % (FLUSH) 0.9 %
10 SYRINGE (ML) INJECTION
Status: CANCELLED | OUTPATIENT
Start: 2018-06-26

## 2018-06-26 RX ORDER — HEPARIN 100 UNIT/ML
500 SYRINGE INTRAVENOUS
Status: DISCONTINUED | OUTPATIENT
Start: 2018-06-26 | End: 2018-06-26 | Stop reason: HOSPADM

## 2018-06-26 RX ORDER — SODIUM CHLORIDE 0.9 % (FLUSH) 0.9 %
10 SYRINGE (ML) INJECTION
Status: DISCONTINUED | OUTPATIENT
Start: 2018-06-26 | End: 2018-06-26 | Stop reason: HOSPADM

## 2018-06-26 RX ADMIN — PALONOSETRON HYDROCHLORIDE 0.25 MG: 0.25 INJECTION, SOLUTION INTRAVENOUS at 11:06

## 2018-06-26 RX ADMIN — BEVACIZUMAB 1085 MG: 400 INJECTION, SOLUTION INTRAVENOUS at 11:06

## 2018-06-26 RX ADMIN — SODIUM CHLORIDE: 9 INJECTION, SOLUTION INTRAVENOUS at 11:06

## 2018-06-26 RX ADMIN — SODIUM CHLORIDE 925 MG: 9 INJECTION, SOLUTION INTRAVENOUS at 01:06

## 2018-06-26 RX ADMIN — CARBOPLATIN 525 MG: 10 INJECTION, SOLUTION INTRAVENOUS at 01:06

## 2018-06-26 RX ADMIN — DEXAMETHASONE SODIUM PHOSPHATE 10 MG: 4 INJECTION, SOLUTION INTRA-ARTICULAR; INTRALESIONAL; INTRAMUSCULAR; INTRAVENOUS; SOFT TISSUE at 11:06

## 2018-06-26 NOTE — PROGRESS NOTES
Subjective:       Patient ID: Cale Harding is a 77 y.o. male.    Chief Complaint: Mesothelioma of left lung  ONCOLOGIC HISTORY: Mr. 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  He started having left chest wall discomfort in November 2017, which prompted a chest x-ray.  He underwent a thoracentesis with 1.2 liters removed and apparently cytology was negative; although, I do not have that path in the system and then he noted increasing shortness of breath and repeat pleural effusion and fluid was removed.  He then underwent IR biopsy of the left pleural thickening, which was positive for epithelioid mesothelioma confirmed at [facility].  He underwent PET scan on 02/09/2018, which revealed three left pleural based masses with an SUV max of 7.6 and a small pleural effusion.  He has had night sweats and about 20-pound weight loss in the last three months, low-grade fevers also, occasional shortness of breath and he has chest wall pain, which is not frequent.  Plan originally was to proceed with VATS, left thoracotomy and radical pleurectomy, decortication and HIPEC.  However, after the patient complained of worsening pain, an MRI of the chest was done on 03/01/2018 and that revealed loculated complex pleural fluid collection with marked pleural thickening and internal septation.  The largest and more superior anterior of the two pleural masses measured 7 x 4.3 cm.  The smaller and more inferior one measured 3.9 x 2.2.  The mass abuts the pleural surface and the larger of the two masses abuts the pericardium and the chest wall, involvement or invasion of the structures is not excluded.  The lower mass abuts and possibly invades the diaphragm.     Since initial visit with me he underwent Diagnostic laparoscopy with biopsy of diaphragm. Left VATS thoracoscopy on 3/19/18. Final path  "is pending. Due to amount of involvemt surgery was not done so is on Carboplatin and ALimta         HPIHe comes in for cycle 5 of chemo with Carboplatin,. Alimta and Avastin. PET scan reveals "There are scattered regions of uptake surrounding the left pleural space with max SUV of 4.9.  On prior exam these regions showed max SUV of 5.1.  No other abnormal regions of uptake are seen"      Review of Systems   Constitutional: Negative for appetite change, fatigue and unexpected weight change.   HENT: Negative for mouth sores.    Eyes: Negative for visual disturbance.   Respiratory: Positive for shortness of breath. Negative for cough.    Cardiovascular: Positive for chest pain.   Gastrointestinal: Negative for abdominal pain and diarrhea.   Genitourinary: Positive for frequency.   Musculoskeletal: Negative for back pain.   Skin: Negative for rash.   Neurological: Negative for headaches.   Hematological: Negative for adenopathy.   Psychiatric/Behavioral: The patient is not nervous/anxious.    All other systems reviewed and are negative.      PMFSH: all information reviewed and updated as relevant to today's visit    Objective:      Physical Exam   Constitutional: He is oriented to person, place, and time. He appears well-developed and well-nourished.   HENT:   Mouth/Throat: No oropharyngeal exudate.   Cardiovascular: Normal rate and normal heart sounds.    Pulmonary/Chest: Effort normal and breath sounds normal. He has no wheezes.   Abdominal: Soft. Bowel sounds are normal. There is no tenderness.   Musculoskeletal: He exhibits no edema or tenderness.   Lymphadenopathy:     He has no cervical adenopathy.   Neurological: He is alert and oriented to person, place, and time. Coordination normal.   Skin: Skin is warm and dry. No rash noted.   Psychiatric: He has a normal mood and affect. Judgment and thought content normal.   Vitals reviewed.        LABS:  WBC   Date Value Ref Range Status   06/26/2018 7.22 3.90 - 12.70 " K/uL Final     Hemoglobin   Date Value Ref Range Status   06/26/2018 10.9 (L) 14.0 - 18.0 g/dL Final     Hematocrit   Date Value Ref Range Status   06/26/2018 33.3 (L) 40.0 - 54.0 % Final     Platelets   Date Value Ref Range Status   06/26/2018 323 150 - 350 K/uL Final     Gran # (ANC)   Date Value Ref Range Status   06/26/2018 5.9 1.8 - 7.7 K/uL Final     Comment:     The ANC is based on a white cell differential from an   automated cell counter. It has not been microscopically   reviewed for the presence of abnormal cells. Clinical   correlation is required.         Chemistry        Component Value Date/Time     (L) 06/26/2018 0849    K 4.2 06/26/2018 0849     06/26/2018 0849    CO2 21 (L) 06/26/2018 0849    BUN 21 06/26/2018 0849    CREATININE 0.8 06/26/2018 0849     (H) 06/26/2018 0849        Component Value Date/Time    CALCIUM 10.1 06/26/2018 0849    ALKPHOS 81 06/26/2018 0849    AST 17 06/26/2018 0849    ALT 12 06/26/2018 0849    BILITOT 0.4 06/26/2018 0849    ESTGFRAFRICA >60.0 06/26/2018 0849    EGFRNONAA >60.0 06/26/2018 0849          Assessment:       1. Mesothelioma of left lung    2. Chemotherapy follow-up examination        Plan:        1,2. PET scan reveals stable disease. He will proceed with Carboplatin, Alimta and Avastin and will return in 3 weeks for next cycle    Above care plan was discussed with patient and accompanying wife and all questions were addressed to their satisfaction

## 2018-06-26 NOTE — PLAN OF CARE
Problem: Patient Care Overview  Goal: Plan of Care Review  Outcome: Ongoing (interventions implemented as appropriate)  Pt tolerated avastin, alimta, carbo without issue, avs given but pt has no upcoming appts scheduled pt aware, no distress noted upon d/c to home

## 2018-06-26 NOTE — PLAN OF CARE
June 13, 2017        MARLEN Ray  [address] [facility] [facility] [address]             [facility] P[facility] Cardiology  [address]  [place] [address]  Phone: [phone]  Fax: [phone]   Patient: Mary Schroeder   MR Number: [id number]   YOB: 1998   Date of Visit: 6/9/2017       Dear Dr. Floyd:    Thank you for referring Mary Schroeder to me for evaluation. Attached you will find relevant portions of my assessment and plan of care.    If you have questions, please do not hesitate to call me. I look forward to following Mary Schroeder along with you.    Sincerely,      Noe Sharp MD            CC  No Recipients    Enclosure          Problem: Chemotherapy Effects (Adult)  Goal: Signs and Symptoms of Listed Potential Problems Will be Absent, Minimized or Managed (Chemotherapy Effects)  Signs and symptoms of listed potential problems will be absent, minimized or managed by discharge/transition of care (reference Chemotherapy Effects (Adult) CPG).   Outcome: Ongoing (interventions implemented as appropriate)  Pt here for avastin,alimta, carbo infusion, labs, hx, meds, allergies reviewed, pt with no complaints at this time, reclined in chair, warm blanket provided, continue to monitor

## 2018-06-28 ENCOUNTER — PATIENT MESSAGE (OUTPATIENT)
Dept: GASTROENTEROLOGY | Facility: CLINIC | Age: 78
End: 2018-06-28

## 2018-07-07 ENCOUNTER — PATIENT MESSAGE (OUTPATIENT)
Dept: HEMATOLOGY/ONCOLOGY | Facility: CLINIC | Age: 78
End: 2018-07-07

## 2018-07-09 ENCOUNTER — PATIENT MESSAGE (OUTPATIENT)
Dept: HEMATOLOGY/ONCOLOGY | Facility: CLINIC | Age: 78
End: 2018-07-09

## 2018-07-09 NOTE — TELEPHONE ENCOUNTER
He is 2 weeks from last chemo so he should be able to do it this week. We can delay treatment for 1 week after that will hold Avastin for at least 3 weeks

## 2018-07-09 NOTE — TELEPHONE ENCOUNTER
Ok---so he can't have the extraction this week---so he wants to keep his chemo as is, minus the avastin.     His next opportunity for extraction would be 2 weeks post that...  ~lexis

## 2018-07-09 NOTE — TELEPHONE ENCOUNTER
One of his molars needs root canal--  Seeing his dentist today to come up with game plan.  Just have him stay in touch---because he needs to get that infection under control first?  ~lexis

## 2018-07-10 ENCOUNTER — PATIENT MESSAGE (OUTPATIENT)
Dept: HEMATOLOGY/ONCOLOGY | Facility: CLINIC | Age: 78
End: 2018-07-10

## 2018-07-17 ENCOUNTER — OFFICE VISIT (OUTPATIENT)
Dept: HEMATOLOGY/ONCOLOGY | Facility: CLINIC | Age: 78
End: 2018-07-17
Payer: MEDICARE

## 2018-07-17 ENCOUNTER — INFUSION (OUTPATIENT)
Dept: INFUSION THERAPY | Facility: HOSPITAL | Age: 78
End: 2018-07-17
Attending: INTERNAL MEDICINE
Payer: MEDICARE

## 2018-07-17 VITALS
TEMPERATURE: 98 F | SYSTOLIC BLOOD PRESSURE: 162 MMHG | RESPIRATION RATE: 18 BRPM | DIASTOLIC BLOOD PRESSURE: 67 MMHG | HEART RATE: 60 BPM

## 2018-07-17 VITALS
DIASTOLIC BLOOD PRESSURE: 73 MMHG | SYSTOLIC BLOOD PRESSURE: 158 MMHG | BODY MASS INDEX: 25.37 KG/M2 | WEIGHT: 161.63 LBS | RESPIRATION RATE: 20 BRPM | HEIGHT: 67 IN | OXYGEN SATURATION: 98 % | TEMPERATURE: 98 F | HEART RATE: 72 BPM

## 2018-07-17 DIAGNOSIS — C45.9 MESOTHELIOMA: Primary | ICD-10-CM

## 2018-07-17 DIAGNOSIS — Z09 CHEMOTHERAPY FOLLOW-UP EXAMINATION: ICD-10-CM

## 2018-07-17 PROCEDURE — 96372 THER/PROPH/DIAG INJ SC/IM: CPT

## 2018-07-17 PROCEDURE — 99999 PR PBB SHADOW E&M-EST. PATIENT-LVL IV: CPT | Mod: PBBFAC,,, | Performed by: INTERNAL MEDICINE

## 2018-07-17 PROCEDURE — 96417 CHEMO IV INFUS EACH ADDL SEQ: CPT

## 2018-07-17 PROCEDURE — 99215 OFFICE O/P EST HI 40 MIN: CPT | Mod: S$GLB,,, | Performed by: INTERNAL MEDICINE

## 2018-07-17 PROCEDURE — 96413 CHEMO IV INFUSION 1 HR: CPT

## 2018-07-17 PROCEDURE — 96411 CHEMO IV PUSH ADDL DRUG: CPT

## 2018-07-17 PROCEDURE — 25000003 PHARM REV CODE 250: Performed by: INTERNAL MEDICINE

## 2018-07-17 PROCEDURE — 96367 TX/PROPH/DG ADDL SEQ IV INF: CPT

## 2018-07-17 PROCEDURE — 3078F DIAST BP <80 MM HG: CPT | Mod: CPTII,S$GLB,, | Performed by: INTERNAL MEDICINE

## 2018-07-17 PROCEDURE — 3077F SYST BP >= 140 MM HG: CPT | Mod: CPTII,S$GLB,, | Performed by: INTERNAL MEDICINE

## 2018-07-17 PROCEDURE — 63600175 PHARM REV CODE 636 W HCPCS: Performed by: INTERNAL MEDICINE

## 2018-07-17 RX ORDER — CYANOCOBALAMIN 1000 UG/ML
1000 INJECTION, SOLUTION INTRAMUSCULAR; SUBCUTANEOUS
Status: CANCELLED | OUTPATIENT
Start: 2018-07-17

## 2018-07-17 RX ORDER — CYANOCOBALAMIN 1000 UG/ML
1000 INJECTION, SOLUTION INTRAMUSCULAR; SUBCUTANEOUS
Status: COMPLETED | OUTPATIENT
Start: 2018-07-17 | End: 2018-07-17

## 2018-07-17 RX ORDER — POLYETHYLENE GLYCOL 3350 17 G/17G
17 POWDER, FOR SOLUTION ORAL DAILY
COMMUNITY

## 2018-07-17 RX ORDER — HEPARIN 100 UNIT/ML
500 SYRINGE INTRAVENOUS
Status: DISCONTINUED | OUTPATIENT
Start: 2018-07-17 | End: 2018-07-17 | Stop reason: HOSPADM

## 2018-07-17 RX ORDER — AMOXICILLIN 500 MG/1
500 CAPSULE ORAL
COMMUNITY
Start: 2018-07-09 | End: 2018-08-14 | Stop reason: ALTCHOICE

## 2018-07-17 RX ORDER — HEPARIN 100 UNIT/ML
500 SYRINGE INTRAVENOUS
Status: CANCELLED | OUTPATIENT
Start: 2018-07-17

## 2018-07-17 RX ORDER — SODIUM CHLORIDE 0.9 % (FLUSH) 0.9 %
10 SYRINGE (ML) INJECTION
Status: CANCELLED | OUTPATIENT
Start: 2018-07-17

## 2018-07-17 RX ORDER — SODIUM CHLORIDE 0.9 % (FLUSH) 0.9 %
10 SYRINGE (ML) INJECTION
Status: DISCONTINUED | OUTPATIENT
Start: 2018-07-17 | End: 2018-07-17 | Stop reason: HOSPADM

## 2018-07-17 RX ADMIN — SODIUM CHLORIDE: 0.9 INJECTION, SOLUTION INTRAVENOUS at 10:07

## 2018-07-17 RX ADMIN — CARBOPLATIN 525 MG: 10 INJECTION, SOLUTION INTRAVENOUS at 01:07

## 2018-07-17 RX ADMIN — DEXAMETHASONE SODIUM PHOSPHATE 10 MG: 4 INJECTION, SOLUTION INTRA-ARTICULAR; INTRALESIONAL; INTRAMUSCULAR; INTRAVENOUS; SOFT TISSUE at 11:07

## 2018-07-17 RX ADMIN — CYANOCOBALAMIN 1000 MCG: 1000 INJECTION, SOLUTION INTRAMUSCULAR at 11:07

## 2018-07-17 RX ADMIN — BEVACIZUMAB 1085 MG: 400 INJECTION, SOLUTION INTRAVENOUS at 12:07

## 2018-07-17 RX ADMIN — PALONOSETRON 0.25 MG: 0.05 INJECTION, SOLUTION INTRAVENOUS at 11:07

## 2018-07-17 RX ADMIN — SODIUM CHLORIDE 925 MG: 9 INJECTION, SOLUTION INTRAVENOUS at 12:07

## 2018-07-17 NOTE — NURSING
1427  Prior LFA IV site bandage removed, no swelling, pain or discharge noted, pt has no complaints or concerns

## 2018-07-17 NOTE — PLAN OF CARE
Problem: Patient Care Overview  Goal: Plan of Care Review  Outcome: Ongoing (interventions implemented as appropriate)  Infusion completed, pt tolerated well; pt instructed to remain well hydrated, to contact MD for any needs or concerns; printed AVS given to and reviewed with pt, pt verbalized understanding of all discussed and when to report next

## 2018-07-17 NOTE — PROGRESS NOTES
Subjective:       Patient ID: Cale Harding is a 77 y.o. male.    Chief Complaint: Mesothelioma of left lung  ONCOLOGIC HISTORY: Mr. 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  He started having left chest wall discomfort in November 2017, which prompted a chest x-ray.  He underwent a thoracentesis with 1.2 liters removed and apparently cytology was negative; although, I do not have that path in the system and then he noted increasing shortness of breath and repeat pleural effusion and fluid was removed.  He then underwent IR biopsy of the left pleural thickening, which was positive for epithelioid mesothelioma confirmed at [facility].  He underwent PET scan on 02/09/2018, which revealed three left pleural based masses with an SUV max of 7.6 and a small pleural effusion.  He has had night sweats and about 20-pound weight loss in the last three months, low-grade fevers also, occasional shortness of breath and he has chest wall pain, which is not frequent.  Plan originally was to proceed with VATS, left thoracotomy and radical pleurectomy, decortication and HIPEC.  However, after the patient complained of worsening pain, an MRI of the chest was done on 03/01/2018 and that revealed loculated complex pleural fluid collection with marked pleural thickening and internal septation.  The largest and more superior anterior of the two pleural masses measured 7 x 4.3 cm.  The smaller and more inferior one measured 3.9 x 2.2.  The mass abuts the pleural surface and the larger of the two masses abuts the pericardium and the chest wall, involvement or invasion of the structures is not excluded.  The lower mass abuts and possibly invades the diaphragm.     Since initial visit with me he underwent Diagnostic laparoscopy with biopsy of diaphragm. Left VATS thoracoscopy on 3/19/18. Final path  "is pending. Due to amount of involvemt surgery was not done so is on Carboplatin and ALimta              HPI He comes in for cycle 6 of chemo with Carboplatin, Alimta and Avastin. PET scan reveals "There are scattered regions of uptake surrounding the left pleural space with max SUV of 4.9.  On prior exam these regions showed max SUV of 5.1.  No other abnormal regions of uptake are seen"  He denies any new complaints. He has a dental extraction scheduled for 8/1/18      Review of Systems   Constitutional: Negative for fatigue and unexpected weight change.   HENT: Negative for mouth sores.    Eyes: Positive for visual disturbance.   Respiratory: Negative for cough and shortness of breath.    Cardiovascular: Positive for chest pain.   Gastrointestinal: Negative for abdominal pain and diarrhea.   Genitourinary: Negative for frequency.   Musculoskeletal: Negative for back pain.   Skin: Negative for rash.   Neurological: Negative for headaches.   Hematological: Negative for adenopathy.   Psychiatric/Behavioral: The patient is not nervous/anxious.    All other systems reviewed and are negative.      Objective:      Physical Exam   Constitutional: He is oriented to person, place, and time. He appears well-developed and well-nourished.   HENT:   Mouth/Throat: No oropharyngeal exudate.   Cardiovascular: Normal rate and normal heart sounds.    Pulmonary/Chest: Effort normal and breath sounds normal. He has no wheezes.   Abdominal: Soft. Bowel sounds are normal. There is no tenderness.   Musculoskeletal: He exhibits no edema or tenderness.   Lymphadenopathy:     He has no cervical adenopathy.   Neurological: He is alert and oriented to person, place, and time. Coordination normal.   Skin: Skin is warm and dry. No rash noted.   Psychiatric: He has a normal mood and affect. Judgment and thought content normal.   Vitals reviewed.        LABS:  WBC   Date Value Ref Range Status   07/17/2018 6.24 3.90 - 12.70 K/uL Final "     Hemoglobin   Date Value Ref Range Status   07/17/2018 10.6 (L) 14.0 - 18.0 g/dL Final     Hematocrit   Date Value Ref Range Status   07/17/2018 32.7 (L) 40.0 - 54.0 % Final     Platelets   Date Value Ref Range Status   07/17/2018 191 150 - 350 K/uL Final     Gran # (ANC)   Date Value Ref Range Status   07/17/2018 5.0 1.8 - 7.7 K/uL Final     Comment:     The ANC is based on a white cell differential from an   automated cell counter. It has not been microscopically   reviewed for the presence of abnormal cells. Clinical   correlation is required.         Chemistry        Component Value Date/Time     07/17/2018 0854    K 4.3 07/17/2018 0854     07/17/2018 0854    CO2 22 (L) 07/17/2018 0854    BUN 20 07/17/2018 0854    CREATININE 0.8 07/17/2018 0854     (H) 07/17/2018 0854        Component Value Date/Time    CALCIUM 9.8 07/17/2018 0854    ALKPHOS 72 07/17/2018 0854    AST 16 07/17/2018 0854    ALT 12 07/17/2018 0854    BILITOT 0.5 07/17/2018 0854    ESTGFRAFRICA >60.0 07/17/2018 0854    EGFRNONAA >60.0 07/17/2018 0854          Assessment:       1. Mesothelioma    2. Chemotherapy follow-up examination        Plan:        1,2. He will proceed with Carboplatin, Alimta and Avastin and will return in 4 weeks for maintenance chemo with Alimta and Avastin with a PET scan prior.   He has dental extraction on 8/1/18. Labs for 7/30/18    Above care plan was discussed with patient and accompanying wife and all questions were addressed to their satisfaction

## 2018-07-17 NOTE — Clinical Note
Schedule CBC,CMP in [place] on 7/30/18  Schedule CBC,CMP, PET scan and see me in 4 weeks and for Alimta and Avastin (1 week delay due to dental procedure on 8/1/18)

## 2018-07-17 NOTE — PLAN OF CARE
Problem: Chemotherapy Effects (Adult)  Goal: Signs and Symptoms of Listed Potential Problems Will be Absent, Minimized or Managed (Chemotherapy Effects)  Signs and symptoms of listed potential problems will be absent, minimized or managed by discharge/transition of care (reference Chemotherapy Effects (Adult) CPG).   Outcome: Ongoing (interventions implemented as appropriate)  Pt here for Avastin, Alimta, Carbo infusion, also B12 injection, accompanied by spouse, no new complaints or concerns at present, reports tolerating treatment; discussed treatment plan for today, all questions answered and pt agrees to proceed

## 2018-07-17 NOTE — NURSING
1202  Per MATT Negrete RN (p/ MD Foster), OK to proceed with Avastin today, no UA lab result as yet

## 2018-07-18 ENCOUNTER — PATIENT MESSAGE (OUTPATIENT)
Dept: HEMATOLOGY/ONCOLOGY | Facility: CLINIC | Age: 78
End: 2018-07-18

## 2018-07-19 ENCOUNTER — PATIENT MESSAGE (OUTPATIENT)
Dept: HEMATOLOGY/ONCOLOGY | Facility: CLINIC | Age: 78
End: 2018-07-19

## 2018-07-19 ENCOUNTER — TELEPHONE (OUTPATIENT)
Dept: HEMATOLOGY/ONCOLOGY | Facility: CLINIC | Age: 78
End: 2018-07-19

## 2018-07-19 DIAGNOSIS — C45.9 MESOTHELIOMA: Primary | ICD-10-CM

## 2018-07-23 ENCOUNTER — PATIENT MESSAGE (OUTPATIENT)
Dept: HEMATOLOGY/ONCOLOGY | Facility: CLINIC | Age: 78
End: 2018-07-23

## 2018-07-30 ENCOUNTER — PATIENT MESSAGE (OUTPATIENT)
Dept: HEMATOLOGY/ONCOLOGY | Facility: CLINIC | Age: 78
End: 2018-07-30

## 2018-07-30 ENCOUNTER — LAB VISIT (OUTPATIENT)
Dept: LAB | Facility: HOSPITAL | Age: 78
End: 2018-07-30
Attending: INTERNAL MEDICINE
Payer: MEDICARE

## 2018-07-30 DIAGNOSIS — C45.9 MESOTHELIOMA: ICD-10-CM

## 2018-07-30 LAB
ALBUMIN SERPL BCP-MCNC: 4.1 G/DL
ALP SERPL-CCNC: 63 U/L
ALT SERPL W/O P-5'-P-CCNC: 17 U/L
ANION GAP SERPL CALC-SCNC: 7 MMOL/L
AST SERPL-CCNC: 26 U/L
BILIRUB SERPL-MCNC: 0.5 MG/DL
BUN SERPL-MCNC: 12 MG/DL
CALCIUM SERPL-MCNC: 9.1 MG/DL
CHLORIDE SERPL-SCNC: 103 MMOL/L
CO2 SERPL-SCNC: 28 MMOL/L
CREAT SERPL-MCNC: 0.71 MG/DL
ERYTHROCYTE [DISTWIDTH] IN BLOOD BY AUTOMATED COUNT: 18 %
EST. GFR  (AFRICAN AMERICAN): >60 ML/MIN/1.73 M^2
EST. GFR  (NON AFRICAN AMERICAN): >60 ML/MIN/1.73 M^2
GLUCOSE SERPL-MCNC: 108 MG/DL
HCT VFR BLD AUTO: 33.9 %
HGB BLD-MCNC: 11 G/DL
MCH RBC QN AUTO: 30.1 PG
MCHC RBC AUTO-ENTMCNC: 32.4 G/DL
MCV RBC AUTO: 93 FL
NEUTROPHILS # BLD AUTO: 3.1 K/UL
PLATELET # BLD AUTO: 86 K/UL
PMV BLD AUTO: 9.6 FL
POTASSIUM SERPL-SCNC: 4.7 MMOL/L
PROT SERPL-MCNC: 7.6 G/DL
RBC # BLD AUTO: 3.66 M/UL
SODIUM SERPL-SCNC: 138 MMOL/L
WBC # BLD AUTO: 4.81 K/UL

## 2018-07-30 PROCEDURE — 36415 COLL VENOUS BLD VENIPUNCTURE: CPT | Mod: PO

## 2018-07-30 PROCEDURE — 80053 COMPREHEN METABOLIC PANEL: CPT | Mod: PO

## 2018-07-30 PROCEDURE — 85027 COMPLETE CBC AUTOMATED: CPT | Mod: PO

## 2018-08-01 ENCOUNTER — PATIENT MESSAGE (OUTPATIENT)
Dept: HEMATOLOGY/ONCOLOGY | Facility: CLINIC | Age: 78
End: 2018-08-01

## 2018-08-02 ENCOUNTER — PATIENT MESSAGE (OUTPATIENT)
Dept: HEMATOLOGY/ONCOLOGY | Facility: CLINIC | Age: 78
End: 2018-08-02

## 2018-08-02 NOTE — TELEPHONE ENCOUNTER
Lily,  Yes I see it as approved as well. Mr. Harding just called me regarding this issue. I provided him with the 's contact information. Hopefully she will be able to answer his questions regarding this visit and his insurance coverage.

## 2018-08-09 ENCOUNTER — CLINICAL SUPPORT (OUTPATIENT)
Dept: HEMATOLOGY/ONCOLOGY | Facility: CLINIC | Age: 78
End: 2018-08-09
Payer: MEDICARE

## 2018-08-09 ENCOUNTER — HOSPITAL ENCOUNTER (OUTPATIENT)
Dept: RADIOLOGY | Facility: HOSPITAL | Age: 78
Discharge: HOME | End: 2018-08-09
Attending: INTERNAL MEDICINE
Payer: MEDICARE

## 2018-08-09 VITALS — WEIGHT: 160.94 LBS | HEIGHT: 67 IN | BODY MASS INDEX: 25.26 KG/M2

## 2018-08-09 DIAGNOSIS — C45.7 MESOTHELIOMA OF LEFT LUNG: ICD-10-CM

## 2018-08-09 DIAGNOSIS — Z71.3 NUTRITIONAL COUNSELING: Primary | ICD-10-CM

## 2018-08-09 DIAGNOSIS — C45.9 MESOTHELIOMA: ICD-10-CM

## 2018-08-09 LAB — POCT GLUCOSE: 120 MG/DL (ref 70–110)

## 2018-08-09 PROCEDURE — 78815 PET IMAGE W/CT SKULL-THIGH: CPT | Mod: 26,PI,, | Performed by: RADIOLOGY

## 2018-08-09 PROCEDURE — 78815 PET IMAGE W/CT SKULL-THIGH: CPT | Mod: TC,PS

## 2018-08-09 PROCEDURE — 97802 MEDICAL NUTRITION INDIV IN: CPT | Mod: S$GLB,,, | Performed by: DIETITIAN, REGISTERED

## 2018-08-09 PROCEDURE — A9552 F18 FDG: HCPCS

## 2018-08-09 PROCEDURE — 99999 PR PBB SHADOW E&M-EST. PATIENT-LVL II: CPT | Mod: PBBFAC,,, | Performed by: DIETITIAN, REGISTERED

## 2018-08-09 NOTE — PROGRESS NOTES
"Medical Nutrition Therapy Oncology Progress Note    Name: Cale Harding MRN: [id number]  : 1940    Age: 77 y.o.  Ethnicity: /White Language: English    Diagnosis: No diagnosis found.    Chemo Regimen: Carboplatin, Alimta, Avastin   Referring MD: Dr. Foster Frequency:  Radiation:            Goal of Cancer treatment n/a         Nutrition Assessment     Chief Complaint:   Chief Complaint   Patient presents with    Nutrition Counseling        Anthropometric Measurements:  Height: 5' 7" (1.702 m)  Current Weight: 73 kg (160 lb 15 oz)  Ideal Body Weight: 148#  Percent Ideal Body Weight:: 108%  BMI: Body mass index is 25.21 kg/m².     Weight History:   Wt Readings from Last 8 Encounters:   18 73 kg (160 lb 15 oz)   18 73.3 kg (161 lb 9.6 oz)   18 72.6 kg (160 lb)   18 73 kg (160 lb 15 oz)   18 72.6 kg (160 lb 0.9 oz)   05/15/18 73.1 kg (161 lb 2.5 oz)   18 71.5 kg (157 lb 10.1 oz)   18 72.3 kg (159 lb 6.3 oz)     Medical Health History:  Feeding tube placed: No  Pre-treatment: No    Past Surgical History:   Procedure Laterality Date    COLONOSCOPY  10/20/2012    COLONOSCOPY  2014    dr machado ( repeat in 3 years per the pt )    ELBOW SURGERY Left 2013    dislocation    PROCTECTOMY  2002    SHOULDER ARTHROSCOPY W/ ROTATOR CUFF REPAIR Right         Medications:   Current Outpatient Prescriptions:     amoxicillin (AMOXIL) 500 MG capsule, Take 500 mg by mouth., Disp: , Rfl:     calcium carbonate (TUMS) 200 mg calcium (500 mg) chewable tablet, Take 1 tablet by mouth as needed for Heartburn., Disp: , Rfl:     dexamethasone (DECADRON) 6 MG tablet, Take 6mg (1 tablet) by mouth twice daily with meals. Take the day before and for 2 days after chemo. DO not take on the day of chemo, Disp: , Rfl:     diphenhydrAMINE (BENADRYL) 25 mg capsule, Take 25 mg by mouth once daily., Disp: , Rfl:     enalapril (VASOTEC) 5 MG tablet, Take 1 tablet (5 mg " total) by mouth 2 (two) times daily., Disp: 180 tablet, Rfl: 3    folic acid (FOLVITE) 1 MG tablet, Take 1 tablet (1 mg total) by mouth once daily. Start today, Disp: 60 tablet, Rfl: 3    hydrocodone-acetaminophen 5-325mg (NORCO) 5-325 mg per tablet, Take 1 tablet by mouth every 6 (six) hours as needed for Pain., Disp: 60 tablet, Rfl: 0    LACTOBACILLUS ACIDOPHILUS (ACIDOPHILUS ORAL), Take 1 tablet by mouth once daily., Disp: , Rfl:     ondansetron (ZOFRAN) 8 MG tablet, Take 1 tablet (8 mg total) by mouth 4 (four) times daily as needed for Nausea., Disp: 60 tablet, Rfl: 2    polyethylene glycol (MIRALAX) 17 gram/dose powder, Take 17 g by mouth once daily., Disp: , Rfl:     vitamin D 1000 units Tab, Take 2,000 Units by mouth once daily. , Disp: , Rfl:     Last Labs:  Last Labs:  Glucose   Date Value Ref Range Status   07/30/2018 108 70 - 110 mg/dL Final   07/17/2018 147 (H) 70 - 110 mg/dL Final     BUN, Bld   Date Value Ref Range Status   07/30/2018 12 2 - 20 mg/dL Final   07/17/2018 20 8 - 23 mg/dL Final     Creatinine   Date Value Ref Range Status   07/30/2018 0.71 0.50 - 1.40 mg/dL Final   07/17/2018 0.8 0.5 - 1.4 mg/dL Final     Sodium   Date Value Ref Range Status   07/30/2018 138 136 - 145 mmol/L Final   07/17/2018 137 136 - 145 mmol/L Final     Potassium   Date Value Ref Range Status   07/30/2018 4.7 3.5 - 5.1 mmol/L Final   07/17/2018 4.3 3.5 - 5.1 mmol/L Final     No results found for: PHOS  Calcium   Date Value Ref Range Status   07/30/2018 9.1 8.7 - 10.5 mg/dL Final   07/17/2018 9.8 8.7 - 10.5 mg/dL Final     No results found for: PREALBUMIN  Total Protein   Date Value Ref Range Status   07/30/2018 7.6 6.0 - 8.4 g/dL Final   07/17/2018 7.3 6.0 - 8.4 g/dL Final     Cholesterol   Date Value Ref Range Status   10/11/2017 214 (H) <200 mg/dL Final   09/30/2016 191 120 - 199 mg/dL Final     Comment:     The National Cholesterol Education Program (NCEP) has set the  following guidelines (reference ranges)  for Cholesterol:  Optimal.....................<200 mg/dL  Borderline High.............200-239 mg/dL  High........................> or = 240 mg/dL       Hemoglobin A1C   Date Value Ref Range Status   09/30/2016 5.9 4.5 - 6.2 % Final     Comment:     According to ADA guidelines, hemoglobin A1C <7.0% represents  optimal control in non-pregnant diabetic patients.  Different  metrics may apply to specific populations.   Standards of Medical Care in Diabetes - 2016.  For the purpose of screening for the presence of diabetes:  <5.7%     Consistent with the absence of diabetes  5.7-6.4%  Consistent with increasing risk for diabetes   (prediabetes)  >or=6.5%  Consistent with diabetes  Currently no consensus exists for use of hemoglobin A1C  for diagnosis of diabetes for children.       Hemoglobin   Date Value Ref Range Status   07/30/2018 11.0 (L) 14.0 - 18.0 g/dL Final   07/17/2018 10.6 (L) 14.0 - 18.0 g/dL Final     Hematocrit   Date Value Ref Range Status   07/30/2018 33.9 (L) 40.0 - 54.0 % Final   07/17/2018 32.7 (L) 40.0 - 54.0 % Final     No results found for: IRON  No components found for: FROLATE  Vit D, 25-Hydroxy   Date Value Ref Range Status   09/04/2015 36 30 - 96 ng/mL Final     Comment:     Vitamin D deficiency.........<10 ng/mL                              Vitamin D insufficiency......10-29 ng/mL       Vitamin D sufficiency........> or equal to 30 ng/mL  Vitamin D toxicity............>100 ng/mL       WBC   Date Value Ref Range Status   07/30/2018 4.81 3.90 - 12.70 K/uL Final   07/17/2018 6.24 3.90 - 12.70 K/uL Final         Client History/Food Access:    Living Situation: Lives with spouse   Who: Shops for Groceries? Spouse   Who : Prepares meals? Spouse   Who: Fills prescriptions? Patient and Spouse   Are there financial difficulties purchasing food? No   Personal History (occupation, physical activity level, exercise):      Baseline for Outcomes Monitoring  Food and Nutrition History: Pt here with wife for  nutrition counseling. Pt with current weight of 160# which is stable over 1 month. Pt reports -180# before treatment and dropped as low as 155# during treatment. Overall, pt has good appetite, eating all throughout the day. Pt does complain of taste changes and early satiety and occasionally has poor appetite. Pt drinking Premier Protein on days he has poor appetite. Encouraged pt to switch to Boost Plus as high calorie, high protein oral supplement option. Also encouraged small, frequent meals when appetite is poor. Stressed importance of adequate calories and protein to maintain weight.   24-hour recall:  Breakfast: cereal with whole milk or frozen waffles with butter, syrup, and jam and coffee with creamer and sugar  Lunch: ham and cheese sandwich or leftovers  Dinner: easy mac or beans and rice with pork or chicken spaghetti  Snack: fruit, yogurt, pudding, peanut butter on crackers, crackers with cheese and syrup, chips, vanilla wafers    Nutritional Needs:  Estimated Needs Method Use    2200 kcal/day [] Predictive Equation: Lobo-Thornton   [x]  30 kcal/kg   Protein 90 g 1.2 gm/kg/day   Fluid 2200 ml 30 ml/kg/day     Food/Nutrient Intake (oral, enteral or parenteral) and Patient Behaviors     Calorie intake: Adequate   Protein intake: Adequate     Yes/No    Yes Uses medical food supplements   Yes Cooking techniques to minimize fatigue   No Currently modifying food textures   Yes Able to maintain usual physical actiivty     Nutrition Diagnosis     Nutrition Diagnosis Related to (Etiology) As Evidenced By (Signs/Symptoms)   Increased nutrient needs Increased demand for nutrients On chemo with mesothelioma of left lung                 Nutritional Intervention and Prescription        Nutrition Intervention Medical food supplement: Commercial beverage   Goals/Expected Outcomes Pt to drink Boost Plus as high calorie, high protein oral nutrition supplement especially on days when appetite is poor.   Progress  Initial     Nutrition Intervention Schedule of food/fluids   Goals/Expected Outcomes Pt to continue eating small meals frequently throughout the day for adequate intake.   Progress Progressing towards goal     Nutrition Intervention Strategies  Self-monitoring   Goals/Expected Outcomes Pt to use tips discussed today to manage side effects of treatment.   Progress Initial     Pt needs education? yes (see intervention)    Coordination of Nutrition Care: Comments:   Collaboration with other providers MD         Monitoring and Evaluation     Monitor: diet education needs    Next Visit: PRN    Materials Provided:  1. RD contact information 2. Taste and smell changes               Total time: 60 minutes    Nutrition Score:      ©2010 [employer] Oncology Toolkit   Answers for HPI/ROS submitted by the patient on 8/6/2018   appetite change : Yes  unexpected weight change: No  visual disturbance: No  cough: No  shortness of breath: Yes  chest pain: Yes  abdominal pain: Yes  diarrhea: No  frequency: Yes  back pain: Yes  rash: No  headaches: No  adenopathy: No

## 2018-08-13 ENCOUNTER — PATIENT MESSAGE (OUTPATIENT)
Dept: HEMATOLOGY/ONCOLOGY | Facility: CLINIC | Age: 78
End: 2018-08-13

## 2018-08-14 ENCOUNTER — OFFICE VISIT (OUTPATIENT)
Dept: HEMATOLOGY/ONCOLOGY | Facility: CLINIC | Age: 78
End: 2018-08-14
Payer: MEDICARE

## 2018-08-14 ENCOUNTER — INFUSION (OUTPATIENT)
Dept: INFUSION THERAPY | Facility: HOSPITAL | Age: 78
End: 2018-08-14
Attending: INTERNAL MEDICINE
Payer: MEDICARE

## 2018-08-14 VITALS
DIASTOLIC BLOOD PRESSURE: 74 MMHG | SYSTOLIC BLOOD PRESSURE: 154 MMHG | OXYGEN SATURATION: 99 % | TEMPERATURE: 98 F | HEIGHT: 67 IN | RESPIRATION RATE: 20 BRPM | BODY MASS INDEX: 25.85 KG/M2 | HEART RATE: 75 BPM | WEIGHT: 164.69 LBS

## 2018-08-14 VITALS
SYSTOLIC BLOOD PRESSURE: 153 MMHG | DIASTOLIC BLOOD PRESSURE: 69 MMHG | WEIGHT: 164.69 LBS | HEART RATE: 73 BPM | HEIGHT: 67 IN | TEMPERATURE: 98 F | RESPIRATION RATE: 18 BRPM | BODY MASS INDEX: 25.85 KG/M2

## 2018-08-14 DIAGNOSIS — C45.9 MESOTHELIOMA: Primary | ICD-10-CM

## 2018-08-14 DIAGNOSIS — Z09 CHEMOTHERAPY FOLLOW-UP EXAMINATION: ICD-10-CM

## 2018-08-14 DIAGNOSIS — C45.7 MESOTHELIOMA OF LEFT LUNG: Primary | ICD-10-CM

## 2018-08-14 PROCEDURE — 96367 TX/PROPH/DG ADDL SEQ IV INF: CPT

## 2018-08-14 PROCEDURE — 3078F DIAST BP <80 MM HG: CPT | Mod: CPTII,S$GLB,, | Performed by: INTERNAL MEDICINE

## 2018-08-14 PROCEDURE — 63600175 PHARM REV CODE 636 W HCPCS: Performed by: INTERNAL MEDICINE

## 2018-08-14 PROCEDURE — 25000003 PHARM REV CODE 250: Performed by: INTERNAL MEDICINE

## 2018-08-14 PROCEDURE — 99215 OFFICE O/P EST HI 40 MIN: CPT | Mod: S$GLB,,, | Performed by: INTERNAL MEDICINE

## 2018-08-14 PROCEDURE — 96413 CHEMO IV INFUSION 1 HR: CPT

## 2018-08-14 PROCEDURE — 99999 PR PBB SHADOW E&M-EST. PATIENT-LVL III: CPT | Mod: PBBFAC,,, | Performed by: INTERNAL MEDICINE

## 2018-08-14 PROCEDURE — 3077F SYST BP >= 140 MM HG: CPT | Mod: CPTII,S$GLB,, | Performed by: INTERNAL MEDICINE

## 2018-08-14 PROCEDURE — 96411 CHEMO IV PUSH ADDL DRUG: CPT

## 2018-08-14 RX ORDER — HEPARIN 100 UNIT/ML
500 SYRINGE INTRAVENOUS
Status: CANCELLED | OUTPATIENT
Start: 2018-08-14

## 2018-08-14 RX ORDER — HEPARIN 100 UNIT/ML
500 SYRINGE INTRAVENOUS
Status: DISCONTINUED | OUTPATIENT
Start: 2018-08-14 | End: 2018-08-14 | Stop reason: HOSPADM

## 2018-08-14 RX ORDER — SODIUM CHLORIDE 0.9 % (FLUSH) 0.9 %
10 SYRINGE (ML) INJECTION
Status: DISCONTINUED | OUTPATIENT
Start: 2018-08-14 | End: 2018-08-14 | Stop reason: HOSPADM

## 2018-08-14 RX ORDER — SODIUM CHLORIDE 0.9 % (FLUSH) 0.9 %
10 SYRINGE (ML) INJECTION
Status: CANCELLED | OUTPATIENT
Start: 2018-08-14

## 2018-08-14 RX ADMIN — BEVACIZUMAB 1120 MG: 400 INJECTION, SOLUTION INTRAVENOUS at 10:08

## 2018-08-14 RX ADMIN — SODIUM CHLORIDE: 0.9 INJECTION, SOLUTION INTRAVENOUS at 10:08

## 2018-08-14 RX ADMIN — SODIUM CHLORIDE 950 MG: 9 INJECTION, SOLUTION INTRAVENOUS at 11:08

## 2018-08-14 RX ADMIN — DEXAMETHASONE SODIUM PHOSPHATE: 4 INJECTION, SOLUTION INTRA-ARTICULAR; INTRALESIONAL; INTRAMUSCULAR; INTRAVENOUS; SOFT TISSUE at 10:08

## 2018-08-14 NOTE — PLAN OF CARE
Problem: Patient Care Overview  Goal: Plan of Care Review  Outcome: Ongoing (interventions implemented as appropriate)  Pt tolerated C7 alimta/avastin without adverse effects. VSS. Provided AVS & verbalized understanding of RTC date. DC with family ambulating independently.

## 2018-08-14 NOTE — PROGRESS NOTES
Subjective:       Patient ID: Cale Harding is a 77 y.o. male.    Chief Complaint: Mesothelioma  ONCOLOGIC HISTORY: Mr. 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  He started having left chest wall discomfort in November 2017, which prompted a chest x-ray.  He underwent a thoracentesis with 1.2 liters removed and apparently cytology was negative; although, I do not have that path in the system and then he noted increasing shortness of breath and repeat pleural effusion and fluid was removed.  He then underwent IR biopsy of the left pleural thickening, which was positive for epithelioid mesothelioma confirmed at [facility].  He underwent PET scan on 02/09/2018, which revealed three left pleural based masses with an SUV max of 7.6 and a small pleural effusion.  He has had night sweats and about 20-pound weight loss in the last three months, low-grade fevers also, occasional shortness of breath and he has chest wall pain, which is not frequent.  Plan originally was to proceed with VATS, left thoracotomy and radical pleurectomy, decortication and HIPEC.  However, after the patient complained of worsening pain, an MRI of the chest was done on 03/01/2018 and that revealed loculated complex pleural fluid collection with marked pleural thickening and internal septation.  The largest and more superior anterior of the two pleural masses measured 7 x 4.3 cm.  The smaller and more inferior one measured 3.9 x 2.2.  The mass abuts the pleural surface and the larger of the two masses abuts the pericardium and the chest wall, involvement or invasion of the structures is not excluded.  The lower mass abuts and possibly invades the diaphragm.     Since initial visit with me he underwent Diagnostic laparoscopy with biopsy of diaphragm. Left VATS thoracoscopy on 3/19/18. Final path is pending.  Due to amount of involvemt surgery was not done so is on Carboplatin and ALimta               HPI He completed 6 cycles of chemo with Carboplatin, Alimta and Avastin. He comes in today to start Alimta and Avastin maintenance  He feels well and denies any new complaints.  PET scan reveal stable findings  Review of Systems   Constitutional: Positive for appetite change. Negative for unexpected weight change.   Eyes: Negative for visual disturbance.   Respiratory: Positive for shortness of breath. Negative for cough.    Cardiovascular: Positive for chest pain.   Gastrointestinal: Negative for abdominal pain and diarrhea.   Genitourinary: Positive for frequency.   Musculoskeletal: Negative for back pain.   Skin: Negative for rash.   Neurological: Positive for headaches.   Hematological: Negative for adenopathy.   Psychiatric/Behavioral: The patient is not nervous/anxious.        Objective:      Physical Exam   Constitutional: He is oriented to person, place, and time. He appears well-developed and well-nourished.   HENT:   Mouth/Throat: No oropharyngeal exudate.   Cardiovascular: Normal rate and normal heart sounds.   Pulmonary/Chest: Effort normal. He has decreased breath sounds in the left middle field and the left lower field. He has no wheezes.   Abdominal: Soft. Bowel sounds are normal. There is no tenderness.   Musculoskeletal: He exhibits no edema or tenderness.   Lymphadenopathy:     He has no cervical adenopathy.   Neurological: He is alert and oriented to person, place, and time. Coordination normal.   Skin: Skin is warm and dry. No rash noted.   Psychiatric: He has a normal mood and affect. Judgment and thought content normal.   Vitals reviewed.      LABS:  WBC   Date Value Ref Range Status   08/14/2018 9.82 3.90 - 12.70 K/uL Final     Hemoglobin   Date Value Ref Range Status   08/14/2018 11.8 (L) 14.0 - 18.0 g/dL Final     Hematocrit   Date Value Ref Range Status   08/14/2018 36.1 (L) 40.0 - 54.0 % Final      Platelets   Date Value Ref Range Status   08/14/2018 240 150 - 350 K/uL Final     Gran # (ANC)   Date Value Ref Range Status   08/14/2018 8.1 (H) 1.8 - 7.7 K/uL Final     Comment:     The ANC is based on a white cell differential from an   automated cell counter. It has not been microscopically   reviewed for the presence of abnormal cells. Clinical   correlation is required.         Chemistry        Component Value Date/Time     08/14/2018 0824    K 3.9 08/14/2018 0824     08/14/2018 0824    CO2 23 08/14/2018 0824    BUN 19 08/14/2018 0824    CREATININE 0.8 08/14/2018 0824     (H) 08/14/2018 0824        Component Value Date/Time    CALCIUM 10.2 08/14/2018 0824    ALKPHOS 77 08/14/2018 0824    AST 17 08/14/2018 0824    ALT 10 08/14/2018 0824    BILITOT 0.5 08/14/2018 0824    ESTGFRAFRICA >60.0 08/14/2018 0824    EGFRNONAA >60.0 08/14/2018 0824          Assessment:       1. Mesothelioma of left lung    2. Chemotherapy follow-up examination        Plan:        1,2. PET scan reveals stable disease. He will proceed with Alimta and Avastin maintenance and will return in 3 weeks for next cycle    Above care plan was discussed with patient and accompanying wife and all questions were addressed to their satisfaction

## 2018-08-15 ENCOUNTER — PATIENT MESSAGE (OUTPATIENT)
Dept: HEMATOLOGY/ONCOLOGY | Facility: CLINIC | Age: 78
End: 2018-08-15

## 2018-09-04 PROBLEM — Z09 CHEMOTHERAPY FOLLOW-UP EXAMINATION: Status: ACTIVE | Noted: 2018-01-01

## 2018-09-04 NOTE — PROGRESS NOTES
Subjective:       Patient ID: Cale Harding is a 77 y.o. male.    Chief Complaint: Lung Cancer and interm left chest pain x 1 week  ONCOLOGIC HISTORY: Mr. 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  He started having left chest wall discomfort in November 2017, which prompted a chest x-ray.  He underwent a thoracentesis with 1.2 liters removed and apparently cytology was negative; although, I do not have that path in the system and then he noted increasing shortness of breath and repeat pleural effusion and fluid was removed.  He then underwent IR biopsy of the left pleural thickening, which was positive for epithelioid mesothelioma confirmed at [facility].  He underwent PET scan on 02/09/2018, which revealed three left pleural based masses with an SUV max of 7.6 and a small pleural effusion.  He has had night sweats and about 20-pound weight loss in the last three months, low-grade fevers also, occasional shortness of breath and he has chest wall pain, which is not frequent.  Plan originally was to proceed with VATS, left thoracotomy and radical pleurectomy, decortication and HIPEC.  However, after the patient complained of worsening pain, an MRI of the chest was done on 03/01/2018 and that revealed loculated complex pleural fluid collection with marked pleural thickening and internal septation.  The largest and more superior anterior of the two pleural masses measured 7 x 4.3 cm.  The smaller and more inferior one measured 3.9 x 2.2.  The mass abuts the pleural surface and the larger of the two masses abuts the pericardium and the chest wall, involvement or invasion of the structures is not excluded.  The lower mass abuts and possibly invades the diaphragm.     Since initial visit with me he underwent Diagnostic laparoscopy with biopsy of diaphragm. Left VATS thoracoscopy on  3/19/18. Final path is pending. Due to amount of involvemt surgery was not done so is on Carboplatin and ALimta                HPI He completed 6 cycles of chemo with Carboplatin, Alimta and Avastin. He comes in today for Alimta and Avastin maintenance  HE notes ocassional left chest tenderness and lasts few seconds and then resolves  Review of Systems   Constitutional: Negative for appetite change and unexpected weight change.   Eyes: Negative for visual disturbance.   Respiratory: Negative for cough and shortness of breath.    Cardiovascular: Negative for chest pain.   Gastrointestinal: Positive for abdominal pain. Negative for diarrhea.   Genitourinary: Negative for frequency.   Musculoskeletal: Positive for back pain.   Skin: Negative for rash.   Neurological: Negative for headaches.   Hematological: Negative for adenopathy.   Psychiatric/Behavioral: The patient is not nervous/anxious.        Objective:      Physical Exam   Constitutional: He is oriented to person, place, and time. He appears well-developed and well-nourished.   HENT:   Mouth/Throat: No oropharyngeal exudate.   Cardiovascular: Normal rate and normal heart sounds.   Pulmonary/Chest: Effort normal and breath sounds normal. He has no wheezes.   Abdominal: Soft. Bowel sounds are normal. There is no tenderness.   Musculoskeletal: He exhibits no edema or tenderness.   Lymphadenopathy:     He has no cervical adenopathy.   Neurological: He is alert and oriented to person, place, and time. Coordination normal.   Skin: Skin is warm and dry. No rash noted.   Psychiatric: He has a normal mood and affect. Judgment and thought content normal.   Vitals reviewed.      LABS  WBC   Date Value Ref Range Status   09/04/2018 8.02 3.90 - 12.70 K/uL Final     Hemoglobin   Date Value Ref Range Status   09/04/2018 11.6 (L) 14.0 - 18.0 g/dL Final     Hematocrit   Date Value Ref Range Status   09/04/2018 36.1 (L) 40.0 - 54.0 % Final     Platelets   Date Value Ref Range  Status   09/04/2018 263 150 - 350 K/uL Final     Gran # (ANC)   Date Value Ref Range Status   09/04/2018 5.6 1.8 - 7.7 K/uL Final     Comment:     The ANC is based on a white cell differential from an   automated cell counter. It has not been microscopically   reviewed for the presence of abnormal cells. Clinical   correlation is required.         Chemistry        Component Value Date/Time     09/04/2018 0654    K 4.2 09/04/2018 0654     09/04/2018 0654    CO2 24 09/04/2018 0654    BUN 17 09/04/2018 0654    CREATININE 0.8 09/04/2018 0654    GLU 72 09/04/2018 0654        Component Value Date/Time    CALCIUM 10.0 09/04/2018 0654    ALKPHOS 73 09/04/2018 0654    AST 19 09/04/2018 0654    ALT 15 09/04/2018 0654    BILITOT 0.4 09/04/2018 0654    ESTGFRAFRICA >60.0 09/04/2018 0654    EGFRNONAA >60.0 09/04/2018 0654            Assessment:       1. Mesothelioma    2. Chemotherapy follow-up examination        Plan:        1,2. He is doing well clinically and will proceed with Alimta and Avastin maintenance and will return in 3 weeks for next cycle.    Above care plan was discussed with patient and all questions were addressed to his satisfaction

## 2018-09-04 NOTE — PLAN OF CARE
Problem: Patient Care Overview  Goal: Plan of Care Review  Outcome: Ongoing (interventions implemented as appropriate)  Pt tolerated D1C8 avastin/alimta without complications. VSS. No s/s of reaction. Instructed to contact MD with any questions. PIV removed and AVS given to patient.

## 2018-09-25 NOTE — PROGRESS NOTES
Subjective:       Patient ID: Cale Harding is a 77 y.o. male.    Chief Complaint: Mesothelioma  ONCOLOGIC HISTORY: Mr. 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  He started having left chest wall discomfort in November 2017, which prompted a chest x-ray.  He underwent a thoracentesis with 1.2 liters removed and apparently cytology was negative; although, I do not have that path in the system and then he noted increasing shortness of breath and repeat pleural effusion and fluid was removed.  He then underwent IR biopsy of the left pleural thickening, which was positive for epithelioid mesothelioma confirmed at [facility].  He underwent PET scan on 02/09/2018, which revealed three left pleural based masses with an SUV max of 7.6 and a small pleural effusion.  He has had night sweats and about 20-pound weight loss in the last three months, low-grade fevers also, occasional shortness of breath and he has chest wall pain, which is not frequent.  Plan originally was to proceed with VATS, left thoracotomy and radical pleurectomy, decortication and HIPEC.  However, after the patient complained of worsening pain, an MRI of the chest was done on 03/01/2018 and that revealed loculated complex pleural fluid collection with marked pleural thickening and internal septation.  The largest and more superior anterior of the two pleural masses measured 7 x 4.3 cm.  The smaller and more inferior one measured 3.9 x 2.2.  The mass abuts the pleural surface and the larger of the two masses abuts the pericardium and the chest wall, involvement or invasion of the structures is not excluded.  The lower mass abuts and possibly invades the diaphragm.     Since initial visit with me he underwent Diagnostic laparoscopy with biopsy of diaphragm. Left VATS thoracoscopy on 3/19/18. Final path is pending.  Due to amount of involvemt surgery was not done so is on Carboplatin and ALimta                 He completed 6 cycles of chemo with Carboplatin, Alimta and Avastin. He is now on Alimta and Avastin maintenance    HPIHe comes in today for ALimta and Avastin maintenance., He notes cough and cold symptoms, no post nasal drip. Denies any shortness of breath. Denies any fever    Review of Systems   Constitutional: Negative for appetite change and unexpected weight change.   Eyes: Negative for visual disturbance.   Respiratory: Positive for shortness of breath. Negative for cough.    Cardiovascular: Positive for chest pain.   Gastrointestinal: Negative for abdominal pain and diarrhea.   Genitourinary: Negative for frequency.   Musculoskeletal: Positive for back pain.   Skin: Negative for rash.   Neurological: Negative for headaches.   Hematological: Negative for adenopathy.   Psychiatric/Behavioral: The patient is not nervous/anxious.        Objective:      Physical Exam   Constitutional: He is oriented to person, place, and time. He appears well-developed and well-nourished.   HENT:   Mouth/Throat: No oropharyngeal exudate.   Cardiovascular: Normal rate and normal heart sounds.   Pulmonary/Chest: Effort normal and breath sounds normal. He has no wheezes.   Abdominal: Soft. Bowel sounds are normal. There is no tenderness.   Musculoskeletal: He exhibits no edema or tenderness.   Lymphadenopathy:     He has no cervical adenopathy.   Neurological: He is alert and oriented to person, place, and time. Coordination normal.   Skin: Skin is warm and dry. No rash noted.   Psychiatric: He has a normal mood and affect. Judgment and thought content normal.   Vitals reviewed.      LABS:  WBC   Date Value Ref Range Status   09/25/2018 8.03 3.90 - 12.70 K/uL Final     Hemoglobin   Date Value Ref Range Status   09/25/2018 11.6 (L) 14.0 - 18.0 g/dL Final     Hematocrit   Date Value Ref Range Status   09/25/2018 36.1 (L) 40.0 - 54.0 % Final      Platelets   Date Value Ref Range Status   09/25/2018 269 150 - 350 K/uL Final     Gran # (ANC)   Date Value Ref Range Status   09/25/2018 6.0 1.8 - 7.7 K/uL Final     Comment:     The ANC is based on a white cell differential from an   automated cell counter. It has not been microscopically   reviewed for the presence of abnormal cells. Clinical   correlation is required.         Chemistry        Component Value Date/Time     09/25/2018 0840    K 4.1 09/25/2018 0840     09/25/2018 0840    CO2 26 09/25/2018 0840    BUN 20 09/25/2018 0840    CREATININE 1.0 09/25/2018 0840    GLU 92 09/25/2018 0840        Component Value Date/Time    CALCIUM 9.5 09/25/2018 0840    ALKPHOS 75 09/25/2018 0840    AST 18 09/25/2018 0840    ALT 14 09/25/2018 0840    BILITOT 0.3 09/25/2018 0840    ESTGFRAFRICA >60.0 09/25/2018 0840    EGFRNONAA >60.0 09/25/2018 0840          Assessment:       1. Mesothelioma    2. Chemotherapy follow-up examination        Plan:        1,2. He will proceed with Alimta and Avastin maintenance and will return in 3 weeks for next cycle with restaging PET scan    Above care plan was discussed with patient and accompanying wife and all questions were addressed to their satisfaction

## 2018-09-25 NOTE — PLAN OF CARE
Problem: Patient Care Overview  Goal: Plan of Care Review  Outcome: Ongoing (interventions implemented as appropriate)  Pt tolerated Alimta/avastin well.  No s/s of reaction. Vitals stable, NAD.

## 2018-10-01 PROBLEM — Z09 CHEMOTHERAPY FOLLOW-UP EXAMINATION: Status: RESOLVED | Noted: 2018-06-26 | Resolved: 2018-01-01

## 2018-10-01 NOTE — PROGRESS NOTES
Subjective:      Patient ID: Cale Harding is a 77 y.o. male.    Chief Complaint: URI and Toe Pain (Right 5th toe)      HPI   Sick since last Sat with tickle frankie throat, cough,drainage, left throat pain in tonsil area, spitting up phlegm, To to 99.0; onco said to got to ER for 100.4; has mesothelioma, hoarsness is new; has taken mucinex and aleve;     Also, little toe with athletes foot that won't clear up, right    bp up at home and takes enalapril 2x a day; occ down to 130 area;   Left sided chest pain from mesolthelioma    Review of Systems   HENT: Positive for congestion and sore throat. Negative for drooling, ear discharge, ear pain and trouble swallowing.    Respiratory: Positive for cough and shortness of breath. Negative for stridor.    Gastrointestinal: Negative for abdominal pain, diarrhea and vomiting.   Musculoskeletal: Negative for neck pain.   Neurological: Positive for headaches.   All other systems reviewed and are negative.    Objective:     Physical Exam   Constitutional: He is oriented to person, place, and time. He appears well-developed and well-nourished. No distress.   HENT:   Head: Normocephalic and atraumatic.   Right Ear: External ear normal.   Left Ear: External ear normal.   Mouth/Throat: Oropharynx is clear and moist. No oropharyngeal exudate.   Hoarse  Wad of mucous back of throat   Eyes: Conjunctivae and EOM are normal. Pupils are equal, round, and reactive to light. Right eye exhibits no discharge. Left eye exhibits no discharge. No scleral icterus.   Neck: Normal range of motion. Neck supple. No JVD present. No tracheal deviation present. No thyromegaly present.   Cardiovascular: Normal rate and regular rhythm. Exam reveals no gallop and no friction rub.   No murmur heard.  Pulmonary/Chest: Effort normal and breath sounds normal. No stridor. No respiratory distress. He has no wheezes. He has no rales. He exhibits no tenderness.   Abdominal: Soft. He exhibits no distension and  no mass. There is no tenderness. There is no rebound and no guarding.   Musculoskeletal: Normal range of motion. He exhibits no edema or tenderness.   Lymphadenopathy:     He has no cervical adenopathy.   Neurological: He is alert and oriented to person, place, and time. He has normal reflexes. He displays normal reflexes. No cranial nerve deficit. He exhibits normal muscle tone. Coordination normal.   Skin: Skin is warm and dry. No rash noted. He is not diaphoretic. No erythema. No pallor.   Psychiatric: He has a normal mood and affect. His behavior is normal. Judgment and thought content normal.   Nursing note and vitals reviewed.    Assessment:     1. Essential hypertension    2. Mesothelioma of left lung    3. Acute non-recurrent maxillary sinusitis      Plan:        Medication List           Accurate as of 10/1/18 11:59 PM. If you have any questions, ask your nurse or doctor.               START taking these medications    amoxicillin-clavulanate 875-125mg 875-125 mg per tablet  Commonly known as:  AUGMENTIN  Take 1 tablet by mouth every 12 (twelve) hours.  Started by:  Jj Escobedo MD        CONTINUE taking these medications    ACIDOPHILUS ORAL     calcium carbonate 200 mg calcium (500 mg) chewable tablet  Commonly known as:  TUMS     dexamethasone 6 MG tablet  Commonly known as:  DECADRON  Take 6mg (1 tablet) by mouth twice daily with meals. Take the day before and the day after chemo. DO not take on the day of chemo     diphenhydrAMINE 25 mg capsule  Commonly known as:  BENADRYL     enalapril 5 MG tablet  Commonly known as:  VASOTEC  Take 1 tablet (5 mg total) by mouth 2 (two) times daily.     folic acid 1 MG tablet  Commonly known as:  FOLVITE  Take 1 tablet (1 mg total) by mouth once daily. Start today     HYDROcodone-acetaminophen 5-325 mg per tablet  Commonly known as:  NORCO  Take 1 tablet by mouth every 6 (six) hours as needed for Pain.     MIRALAX 17 gram/dose powder  Generic drug:  polyethylene  glycol     ondansetron 8 MG tablet  Commonly known as:  ZOFRAN  Take 1 tablet (8 mg total) by mouth 4 (four) times daily as needed for Nausea.     vitamin D 1000 units Tab  Commonly known as:  VITAMIN D3           Where to Get Your Medications      You can get these medications from any pharmacy    Bring a paper prescription for each of these medications  · amoxicillin-clavulanate 875-125mg 875-125 mg per tablet       Essential hypertension    Mesothelioma of left lung    Acute non-recurrent maxillary sinusitis  -     amoxicillin-clavulanate 875-125mg (AUGMENTIN) 875-125 mg per tablet; Take 1 tablet by mouth every 12 (twelve) hours.  Dispense: 20 tablet; Refill: 0

## 2018-10-02 NOTE — TELEPHONE ENCOUNTER
----- Message from Pamela Ryan sent at 10/2/2018 10:01 AM CDT -----  Contact: Self [phone]  Patient is calling to see if his medication was sent to the pharmacy amoxicillin-clavulanate 875-125mg (AUGMENTIN) 875-125 mg per tablet 20 tablet.  [facility] [address] [place] [address] [address] [phone] (Phone)  [phone] (Fax)

## 2018-10-16 PROBLEM — K63.5 COLON POLYP: Status: ACTIVE | Noted: 2018-01-01

## 2018-10-16 NOTE — Clinical Note
Schedule CBC,CMP and see me in 3 weeks and for Alimta and Avastin.(Also he might get a colonoscopy in 2 weeks which will be 2 weeks after his current Avastin dose, if he does that then the next chemo should be 4 weeks from now)

## 2018-10-16 NOTE — PLAN OF CARE
Problem: Chemotherapy Effects (Adult)  Goal: Signs and Symptoms of Listed Potential Problems Will be Absent, Minimized or Managed (Chemotherapy Effects)  Signs and symptoms of listed potential problems will be absent, minimized or managed by discharge/transition of care (reference Chemotherapy Effects (Adult) CPG).   Outcome: Ongoing (interventions implemented as appropriate)  Patient here for Avastin/Alimta.  Assessment completed and labs reviewed.  VSS.  No needs at this time.  Chair reclined and blanket offered.  Will continue to monitor.

## 2018-10-16 NOTE — PROGRESS NOTES
Subjective:       Patient ID: Cale Harding is a 78 y.o. male.    Chief Complaint: Mesothelioma  ONCOLOGIC HISTORY: Mr. 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  He started having left chest wall discomfort in November 2017, which prompted a chest x-ray.  He underwent a thoracentesis with 1.2 liters removed and apparently cytology was negative; although, I do not have that path in the system and then he noted increasing shortness of breath and repeat pleural effusion and fluid was removed.  He then underwent IR biopsy of the left pleural thickening, which was positive for epithelioid mesothelioma confirmed at [facility].  He underwent PET scan on 02/09/2018, which revealed three left pleural based masses with an SUV max of 7.6 and a small pleural effusion.  He has had night sweats and about 20-pound weight loss in the last three months, low-grade fevers also, occasional shortness of breath and he has chest wall pain, which is not frequent.  Plan originally was to proceed with VATS, left thoracotomy and radical pleurectomy, decortication and HIPEC.  However, after the patient complained of worsening pain, an MRI of the chest was done on 03/01/2018 and that revealed loculated complex pleural fluid collection with marked pleural thickening and internal septation.  The largest and more superior anterior of the two pleural masses measured 7 x 4.3 cm.  The smaller and more inferior one measured 3.9 x 2.2.  The mass abuts the pleural surface and the larger of the two masses abuts the pericardium and the chest wall, involvement or invasion of the structures is not excluded.  The lower mass abuts and possibly invades the diaphragm.     Since initial visit with me he underwent Diagnostic laparoscopy with biopsy of diaphragm. Left VATS thoracoscopy on 3/19/18. Final path is pending.  "Due to amount of involvemt surgery was not done so is on Carboplatin and ALimta                 He completed 6 cycles of chemo with Carboplatin, Alimta and Avastin. He is now on Alimta and Avastin maintenance       HPI He comes in today for Alimta and Avastin maintenance  He feels well except sinus complaints.  PET scan reveals "Persistent hypermetabolic activity throughout the left pleura, minimally improved since the prior exam. Small focus of hypermetabolic activity at the junction of the sigmoid and descending colon is favored to represent focal inflammatory change related to diverticular disease, though focal neoplasm could appear similarly, and correlation with prior/future routine GI screening is recommended"    Review of Systems   Constitutional: Negative for appetite change, fatigue and unexpected weight change.   HENT: Negative for mouth sores.    Eyes: Negative for visual disturbance.   Respiratory: Negative for cough and shortness of breath.    Cardiovascular: Negative for chest pain.   Gastrointestinal: Negative for abdominal pain and diarrhea.   Genitourinary: Negative for frequency.   Musculoskeletal: Negative for back pain.   Skin: Negative for rash.   Neurological: Negative for headaches.   Hematological: Negative for adenopathy.   Psychiatric/Behavioral: The patient is not nervous/anxious.    All other systems reviewed and are negative.      Objective:      Physical Exam   Constitutional: He is oriented to person, place, and time. He appears well-developed and well-nourished.   HENT:   Mouth/Throat: No oropharyngeal exudate.   Cardiovascular: Normal rate and normal heart sounds.   Pulmonary/Chest: Effort normal. He has decreased breath sounds in the left lower field. He has no wheezes.   Abdominal: Soft. Bowel sounds are normal. There is no tenderness.   Musculoskeletal: He exhibits no edema or tenderness.   Lymphadenopathy:     He has no cervical adenopathy.   Neurological: He is alert and " oriented to person, place, and time. Coordination normal.   Skin: Skin is warm and dry. No rash noted.   Psychiatric: He has a normal mood and affect. Judgment and thought content normal.   Vitals reviewed.      LABS:  WBC   Date Value Ref Range Status   10/16/2018 7.82 3.90 - 12.70 K/uL Final     Hemoglobin   Date Value Ref Range Status   10/16/2018 11.7 (L) 14.0 - 18.0 g/dL Final     Hematocrit   Date Value Ref Range Status   10/16/2018 36.7 (L) 40.0 - 54.0 % Final     Platelets   Date Value Ref Range Status   10/16/2018 317 150 - 350 K/uL Final     Gran # (ANC)   Date Value Ref Range Status   10/16/2018 5.1 1.8 - 7.7 K/uL Final     Comment:     The ANC is based on a white cell differential from an   automated cell counter. It has not been microscopically   reviewed for the presence of abnormal cells. Clinical   correlation is required.         Chemistry        Component Value Date/Time     (L) 10/16/2018 0830    K 4.2 10/16/2018 0830     10/16/2018 0830    CO2 24 10/16/2018 0830    BUN 20 10/16/2018 0830    CREATININE 0.9 10/16/2018 0830    GLU 90 10/16/2018 0830        Component Value Date/Time    CALCIUM 9.6 10/16/2018 0830    ALKPHOS 74 10/16/2018 0830    AST 20 10/16/2018 0830    ALT 13 10/16/2018 0830    BILITOT 0.4 10/16/2018 0830    ESTGFRAFRICA >60.0 10/16/2018 0830    EGFRNONAA >60.0 10/16/2018 0830        TSH   Date Value Ref Range Status   02/07/2018 2.310 0.400 - 4.000 uIU/mL Final     Comment:     Warning:  Heterophilic antibodies in serum or plasma of   certain individuals are known to cause interference with   immunoassays. These antibodies may be present in blood samples   from individuals regularly exposed to animal or who have been   treated with animal products.          Assessment:       1. Mesothelioma of left lung    2. Chemotherapy follow-up examination    3. Polyp of colon, unspecified part of colon, unspecified type        Plan:        1,2. He will proceed with Alimta and  Avastin and will return in 3 weeks for next cycle. His PET scan continues to reveal response. Advised him to undergo a colonoscopy to evaluate uptake noted in colon.    Above care plan was discussed with patient and accompanying wife and all questions were addressed to their satisfaction

## 2018-10-17 NOTE — TELEPHONE ENCOUNTER
----- Message from Margie Hurtado sent at 10/16/2018  5:11 PM CDT -----  Contact: self / [phone]  Patient is requesting a call back regarding, his colonoscopy. Please advise

## 2018-10-24 NOTE — PROGRESS NOTES
Subjective:      Patient ID: Cale Harding is a 78 y.o. male.    Chief Complaint: Diverticulitis      HPI   LLQ pain again similar to diverticulitis pain he has had before; just had PET/CT scan 2 weeks ago for follow up of mesothelioma; last ep;isdoe was December    Review of Systems   Constitutional: Negative for appetite change, fatigue, fever and unexpected weight change.   HENT: Negative for congestion, ear pain, sinus pressure and sore throat.    Eyes: Negative for pain and visual disturbance.   Respiratory: Positive for shortness of breath.    Cardiovascular: Negative for chest pain.   Gastrointestinal: Positive for abdominal pain. Negative for constipation and diarrhea.   Endocrine: Negative for polyuria.   Genitourinary: Negative for difficulty urinating and frequency.   Musculoskeletal: Negative for arthralgias, back pain and myalgias.   Skin: Negative for color change.   Allergic/Immunologic: Negative.    Neurological: Negative for syncope, weakness and headaches.   Hematological: Does not bruise/bleed easily.   Psychiatric/Behavioral: Negative for dysphoric mood and suicidal ideas. The patient is not nervous/anxious.    All other systems reviewed and are negative.    Objective:     Physical Exam   Constitutional: He is oriented to person, place, and time. He appears well-developed and well-nourished. No distress.   HENT:   Head: Normocephalic and atraumatic.   Right Ear: External ear normal.   Left Ear: External ear normal.   Mouth/Throat: Oropharynx is clear and moist. No oropharyngeal exudate.   Eyes: Conjunctivae and EOM are normal. Pupils are equal, round, and reactive to light. Right eye exhibits no discharge. Left eye exhibits no discharge. No scleral icterus.   Neck: Normal range of motion. Neck supple. No JVD present. No tracheal deviation present. No thyromegaly present.   Cardiovascular: Normal rate and regular rhythm. Exam reveals no gallop and no friction rub.   No murmur  heard.  Pulmonary/Chest: Effort normal and breath sounds normal. No stridor. No respiratory distress. He has no wheezes. He has no rales. He exhibits no tenderness.   Abdominal: Soft. He exhibits no distension and no mass. There is tenderness. There is no rebound and no guarding.   LLQ   Musculoskeletal: Normal range of motion. He exhibits no edema or tenderness.   Lymphadenopathy:     He has no cervical adenopathy.   Neurological: He is alert and oriented to person, place, and time. He has normal reflexes. He displays normal reflexes. No cranial nerve deficit. He exhibits normal muscle tone. Coordination normal.   Skin: Skin is warm and dry. No rash noted. He is not diaphoretic. No erythema. No pallor.   Psychiatric: He has a normal mood and affect. His behavior is normal. Judgment and thought content normal.   Nursing note and vitals reviewed.    Assessment:     1. History of colonic diverticulitis      Plan:        Medication List           Accurate as of 10/24/18  5:02 PM. If you have any questions, ask your nurse or doctor.               START taking these medications    ciprofloxacin HCl 500 MG tablet  Commonly known as:  CIPRO  One bid for infection  Started by:  Jj Escobedo MD        CONTINUE taking these medications    ACIDOPHILUS ORAL     calcium carbonate 200 mg calcium (500 mg) chewable tablet  Commonly known as:  TUMS     dexamethasone 6 MG tablet  Commonly known as:  DECADRON  Take 6mg (1 tablet) by mouth twice daily with meals. Take the day before and the day after chemo. DO not take on the day of chemo     diphenhydrAMINE 25 mg capsule  Commonly known as:  BENADRYL     enalapril 5 MG tablet  Commonly known as:  VASOTEC  Take 1 tablet (5 mg total) by mouth 2 (two) times daily.     folic acid 1 MG tablet  Commonly known as:  FOLVITE  Take 1 tablet (1 mg total) by mouth once daily. Start today     HYDROcodone-acetaminophen 5-325 mg per tablet  Commonly known as:  NORCO  Take 1 tablet by mouth every  6 (six) hours as needed for Pain.     MIRALAX 17 gram/dose powder  Generic drug:  polyethylene glycol     ondansetron 8 MG tablet  Commonly known as:  ZOFRAN  Take 1 tablet (8 mg total) by mouth 4 (four) times daily as needed for Nausea.     vitamin D 1000 units Tab  Commonly known as:  VITAMIN D3           Where to Get Your Medications      These medications were sent to [facility] [address] Herrera  [id number] [facility]  [address] [place] [address]    Phone:  [phone]   · ciprofloxacin HCl 500 MG tablet       History of colonic diverticulitis  Comments:  acute exacerbation    Other orders  -     ciprofloxacin HCl (CIPRO) 500 MG tablet; One bid for infection  Dispense: 30 tablet; Refill: 0

## 2018-11-06 NOTE — PLAN OF CARE
Problem: Patient Care Overview (Adult)  Goal: Plan of Care Review  Patient tolerated treatment well. AVS provided and discharged to home with wife.

## 2018-11-06 NOTE — PROGRESS NOTES
Subjective:       Patient ID: Cale Harding is a 78 y.o. male.    Chief Complaint: Mesothelioma of left lung  ONCOLOGIC HISTORY: Mr. 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  He started having left chest wall discomfort in November 2017, which prompted a chest x-ray.  He underwent a thoracentesis with 1.2 liters removed and apparently cytology was negative; although, I do not have that path in the system and then he noted increasing shortness of breath and repeat pleural effusion and fluid was removed.  He then underwent IR biopsy of the left pleural thickening, which was positive for epithelioid mesothelioma confirmed at [facility]  He underwent PET scan on 02/09/2018, which revealed three left pleural based masses with an SUV max of 7.6 and a small pleural effusion.  He has had night sweats and about 20-pound weight loss in the last three months, low-grade fevers also, occasional shortness of breath and he has chest wall pain, which is not frequent.  Plan originally was to proceed with VATS, left thoracotomy and radical pleurectomy, decortication and HIPEC.  However, after the patient complained of worsening pain, an MRI of the chest was done on 03/01/2018 and that revealed loculated complex pleural fluid collection with marked pleural thickening and internal septation.  The largest and more superior anterior of the two pleural masses measured 7 x 4.3 cm.  The smaller and more inferior one measured 3.9 x 2.2.  The mass abuts the pleural surface and the larger of the two masses abuts the pericardium and the chest wall, involvement or invasion of the structures is not excluded.  The lower mass abuts and possibly invades the diaphragm.     Since initial visit with me he underwent Diagnostic laparoscopy with biopsy of diaphragm. Left VATS thoracoscopy on 3/19/18. Final path  is pending. Due to amount of involvemt surgery was not done so is on Carboplatin and ALimta                 He completed 6 cycles of chemo with Carboplatin, Alimta and Avastin. He is now on Alimta and Avastin maintenance         HPI He comes in today for Alimta and Avastin maintenance. He feels well now but recently had diverticulitis and has completed antibiotics.     Review of Systems   Constitutional: Negative for appetite change, fatigue and unexpected weight change.   HENT: Negative for mouth sores.    Eyes: Negative for visual disturbance.   Respiratory: Negative for cough and shortness of breath.    Cardiovascular: Negative for chest pain.   Gastrointestinal: Negative for abdominal pain and diarrhea.   Genitourinary: Negative for frequency.   Musculoskeletal: Negative for back pain.   Skin: Negative for rash.   Neurological: Negative for headaches.   Hematological: Negative for adenopathy.   Psychiatric/Behavioral: The patient is not nervous/anxious.    All other systems reviewed and are negative.      Objective:      Physical Exam   Constitutional: He is oriented to person, place, and time. He appears well-developed and well-nourished.   HENT:   Mouth/Throat: No oropharyngeal exudate.   Cardiovascular: Normal rate and normal heart sounds.   Pulmonary/Chest: Effort normal and breath sounds normal. He has no wheezes.   Abdominal: Soft. Bowel sounds are normal. There is no tenderness.   Musculoskeletal: He exhibits no edema or tenderness.   Lymphadenopathy:     He has no cervical adenopathy.   Neurological: He is alert and oriented to person, place, and time. Coordination normal.   Skin: Skin is warm and dry. No rash noted.   Psychiatric: He has a normal mood and affect. Judgment and thought content normal.   Vitals reviewed.      LABS:  WBC   Date Value Ref Range Status   11/02/2018 5.51 3.90 - 12.70 K/uL Final     Hemoglobin   Date Value Ref Range Status   11/02/2018 11.6 (L) 14.0 - 18.0 g/dL Final      Hematocrit   Date Value Ref Range Status   11/02/2018 37.2 (L) 40.0 - 54.0 % Final     Platelets   Date Value Ref Range Status   11/02/2018 193 150 - 350 K/uL Final     Gran # (ANC)   Date Value Ref Range Status   11/02/2018 4.2 1.8 - 7.7 K/uL Final     Comment:     The ANC is based on a white cell differential from an   automated cell counter. It has not been microscopically   reviewed for the presence of abnormal cells. Clinical   correlation is required.         Chemistry        Component Value Date/Time     11/02/2018 1015    K 4.5 11/02/2018 1015     11/02/2018 1015    CO2 28 11/02/2018 1015    BUN 17 11/02/2018 1015    CREATININE 0.87 11/02/2018 1015     11/02/2018 1015        Component Value Date/Time    CALCIUM 9.2 11/02/2018 1015    ALKPHOS 70 11/02/2018 1015    AST 37 11/02/2018 1015    ALT 23 11/02/2018 1015    BILITOT 0.6 11/02/2018 1015    ESTGFRAFRICA >60.0 11/02/2018 1015    EGFRNONAA >60.0 11/02/2018 1015           Assessment:       1. Mesothelioma of left lung    2. Chemotherapy follow-up examination        Plan:        1,2. He is doing well clinically and will proceed with Alimta and Avastin maintenance and will return in 3 weeks for next cycle.    Above care plan was discussed with patient and accompanying wife and all questions were addressed to their satisfaction

## 2018-11-06 NOTE — Clinical Note
Schedule CBC, CMP, UA in [place] on 11/23Schedule to see me and Alimta and Avastin on 11/26 here at main

## 2018-11-08 NOTE — PROGRESS NOTES
Subjective:       Patient ID: Cale Harding is a 78 y.o. male.    Chief Complaint: Advice Only and Colonoscopy    This is a 78-year-old male here for he a follow-up visit.  His history of diverticulosis with multiple episodes of diverticulitis, presenting as left lower quadrant pain which is acute, sharp and nonradiating.  His most recent episode was within the last month.  He was treated effectively with antibiotics and is not on any further symptoms.  He estimates this was approximately his 7-8 episode of diverticulitis.  Since his last visit he has also been undergoing chemotherapy for mesothelioma.  No nausea, vomiting. No melena or hematochezia. No change in stool caliber or unintentional weight loss.  No other exacerbating or relieving factors or other associated symptoms.    The following portions of the patient's history were reviewed and updated as appropriate: allergies, current medications, past family history, past medical history, past social history, past surgical history and problem list.    (Portions of this note were dictated using voice recognition software and may contain dictation related errors in spelling/grammar/syntax not found on text review)    HPI  Review of Systems   Constitutional: Negative for appetite change and unexpected weight change.   Cardiovascular: Negative for chest pain and palpitations.   Gastrointestinal: Negative for abdominal pain, constipation and diarrhea.       Objective:      Physical Exam   Constitutional: He is oriented to person, place, and time. He appears well-developed and well-nourished. No distress.   HENT:   Head: Normocephalic and atraumatic.   Eyes: Conjunctivae are normal. No scleral icterus.   Neck: No tracheal deviation present. No thyromegaly present.   Pulmonary/Chest: Effort normal. No respiratory distress. He has no wheezes.   Abdominal: Soft. Bowel sounds are normal. He exhibits no distension. There is no tenderness. There is no rebound and  no guarding.   Musculoskeletal: Normal range of motion. He exhibits no edema or tenderness.   Neurological: He is alert and oriented to person, place, and time.   Skin: He is not diaphoretic.   Psychiatric: He has a normal mood and affect. His behavior is normal.   Nursing note and vitals reviewed.      Labs/imaging: reviewed  Assessment:       1. Polyp of colon, unspecified part of colon, unspecified type    2. History of colonic diverticulitis        Plan:   1. Diverticulitis clinically resolved; if in better health we would consider elective resection given the number of episodes he's experience  2. Given the aforementioned issues, messaged his oncologist regarding the clinical utility of further colonoscopies. He did have a PET scan with an abnormal area in the sigmoid

## 2018-11-09 NOTE — TELEPHONE ENCOUNTER
----- Message from Eber Valenzuela MD sent at 11/9/2018  2:15 PM CST -----  Can we let him know that it was discussed with oncology, will hold off on further colonoscopies  ----- Message -----  From: Sintia oFster MD  Sent: 11/8/2018   4:02 PM  To: Eber Valenzuela MD    He is doing well from mesothelioma standpoint but I don't see the value for a colonoscopy unless he has symptoms?    ----- Message -----  From: Eber Valenzuela MD  Sent: 11/8/2018   3:15 PM  To: Sintia Foster MD    Hi Dr. Foster,  I saw out mutual patient Mr. Harding in GI clinic today. He was previously a patient of my partners and they noted that he was due for a surveillance colonoscopy with a h/o polyps. The polyps in the past were, while adenomatous, diminutive in nature. It seems he has also developed other significant issues since then undergoing chemo, etc. I told him I would check with you regarding the benefit of further colonoscopies.   Thanks in advance  Eber

## 2018-11-27 NOTE — PROGRESS NOTES
Subjective:       Patient ID: Cale Harding is a 78 y.o. male.    Chief Complaint: Mesothelioma of left lung  ONCOLOGIC HISTORY: Mr. 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  He started having left chest wall discomfort in November 2017, which prompted a chest x-ray.  He underwent a thoracentesis with 1.2 liters removed and apparently cytology was negative; although, I do not have that path in the system and then he noted increasing shortness of breath and repeat pleural effusion and fluid was removed.  He then underwent IR biopsy of the left pleural thickening, which was positive for epithelioid mesothelioma confirmed at [facility].  He underwent PET scan on 02/09/2018, which revealed three left pleural based masses with an SUV max of 7.6 and a small pleural effusion.  He has had night sweats and about 20-pound weight loss in the last three months, low-grade fevers also, occasional shortness of breath and he has chest wall pain, which is not frequent.  Plan originally was to proceed with VATS, left thoracotomy and radical pleurectomy, decortication and HIPEC.  However, after the patient complained of worsening pain, an MRI of the chest was done on 03/01/2018 and that revealed loculated complex pleural fluid collection with marked pleural thickening and internal septation.  The largest and more superior anterior of the two pleural masses measured 7 x 4.3 cm.  The smaller and more inferior one measured 3.9 x 2.2.  The mass abuts the pleural surface and the larger of the two masses abuts the pericardium and the chest wall, involvement or invasion of the structures is not excluded.  The lower mass abuts and possibly invades the diaphragm.     Since initial visit with me he underwent Diagnostic laparoscopy with biopsy of diaphragm. Left VATS thoracoscopy on 3/19/18. Final path  is pending. Due to amount of involvemt surgery was not done so is on Carboplatin and ALimta                 He completed 6 cycles of chemo with Carboplatin, Alimta and Avastin. He is now on Alimta and Avastin maintenance         HPI He comes in today for Alimta and Avastin maintenance.  He feels well overall, he has been able to mow his yard. He notes a mole on his right temple is little bit bigger.      Review of Systems   Constitutional: Negative for appetite change, fatigue and unexpected weight change.   HENT: Negative for mouth sores.    Eyes: Negative for visual disturbance.   Respiratory: Negative for cough and shortness of breath.    Cardiovascular: Negative for chest pain.   Gastrointestinal: Negative for abdominal pain and diarrhea.   Genitourinary: Negative for frequency.   Musculoskeletal: Negative for back pain.   Skin: Negative for rash.   Neurological: Negative for headaches.   Hematological: Negative for adenopathy.   Psychiatric/Behavioral: The patient is not nervous/anxious.    All other systems reviewed and are negative.      Objective:      Physical Exam   Constitutional: He is oriented to person, place, and time. He appears well-developed and well-nourished.   HENT:   Mouth/Throat: No oropharyngeal exudate.   Cardiovascular: Normal rate and normal heart sounds.   Pulmonary/Chest: Effort normal and breath sounds normal. He has no wheezes.   Abdominal: Soft. Bowel sounds are normal. There is no tenderness.   Musculoskeletal: He exhibits no edema or tenderness.   Lymphadenopathy:     He has no cervical adenopathy.   Neurological: He is alert and oriented to person, place, and time. Coordination normal.   Skin: Skin is warm and dry. No rash noted.   Psychiatric: He has a normal mood and affect. Judgment and thought content normal.   Vitals reviewed.      LABS:  WBC   Date Value Ref Range Status   11/23/2018 4.60 3.90 - 12.70 K/uL Final     Hemoglobin   Date Value Ref Range Status   11/23/2018 11.8  (L) 14.0 - 18.0 g/dL Final     Hematocrit   Date Value Ref Range Status   11/23/2018 37.5 (L) 40.0 - 54.0 % Final     Platelets   Date Value Ref Range Status   11/23/2018 192 150 - 350 K/uL Final     Gran # (ANC)   Date Value Ref Range Status   11/23/2018 3.0 1.8 - 7.7 K/uL Final     Comment:     The ANC is based on a white cell differential from an   automated cell counter. It has not been microscopically   reviewed for the presence of abnormal cells. Clinical   correlation is required.         Chemistry        Component Value Date/Time     11/23/2018 1010    K 3.8 11/23/2018 1010     11/23/2018 1010    CO2 28 11/23/2018 1010    BUN 19 11/23/2018 1010    CREATININE 0.87 11/23/2018 1010     (H) 11/23/2018 1010        Component Value Date/Time    CALCIUM 9.2 11/23/2018 1010    ALKPHOS 70 11/23/2018 1010    AST 47 (H) 11/23/2018 1010    ALT 33 11/23/2018 1010    BILITOT 0.5 11/23/2018 1010    ESTGFRAFRICA >60.0 11/23/2018 1010    EGFRNONAA >60.0 11/23/2018 1010             Assessment:       1. Mesothelioma of left lung    2. Chemotherapy follow-up examination        Plan:        1,2 He will proceed with Alimta and Avastin maintenance and will return in 3 weeks with restaging PET scans and next cycle.    Above care plan was discussed with patient and accompanying wife and all questions were addressed to their satisfaction

## 2018-11-27 NOTE — PLAN OF CARE
Problem: Patient Care Overview (Adult)  Goal: Plan of Care Review  Outcome: Ongoing (interventions implemented as appropriate)  Pt tolerated alimta/avastin well.  Clonidine given for BP. No s/s of reaction. Vitals stable, NAD.

## 2018-12-10 PROBLEM — Z09 CHEMOTHERAPY FOLLOW-UP EXAMINATION: Status: RESOLVED | Noted: 2018-01-01 | Resolved: 2018-01-01

## 2018-12-18 NOTE — TELEPHONE ENCOUNTER
Called Dr. Edwards spoke to nurse. They are only doing crown so no bleeding issues so should be OK to proceed.

## 2018-12-18 NOTE — TELEPHONE ENCOUNTER
----- Message from Shirley Villatoro sent at 12/18/2018 10:10 AM CST -----  Contact: Dr. Grace Edwards called to speak with Dr Foster about Pt have some questions   Callback#[phone]  Thank You  EDU Villatoro

## 2018-12-19 NOTE — PROGRESS NOTES
Subjective:       Patient ID: Cale Harding is a 78 y.o. male.    Chief Complaint: Mesothelioma of left lung  ONCOLOGIC HISTORY: Mr. 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  He started having left chest wall discomfort in November 2017, which prompted a chest x-ray.  He underwent a thoracentesis with 1.2 liters removed and apparently cytology was negative; although, I do not have that path in the system and then he noted increasing shortness of breath and repeat pleural effusion and fluid was removed.  He then underwent IR biopsy of the left pleural thickening, which was positive for epithelioid mesothelioma confirmed at [facility].  He underwent PET scan on 02/09/2018, which revealed three left pleural based masses with an SUV max of 7.6 and a small pleural effusion.  He has had night sweats and about 20-pound weight loss in the last three months, low-grade fevers also, occasional shortness of breath and he has chest wall pain, which is not frequent.  Plan originally was to proceed with VATS, left thoracotomy and radical pleurectomy, decortication and HIPEC.  However, after the patient complained of worsening pain, an MRI of the chest was done on 03/01/2018 and that revealed loculated complex pleural fluid collection with marked pleural thickening and internal septation.  The largest and more superior anterior of the two pleural masses measured 7 x 4.3 cm.  The smaller and more inferior one measured 3.9 x 2.2.  The mass abuts the pleural surface and the larger of the two masses abuts the pericardium and the chest wall, involvement or invasion of the structures is not excluded.  The lower mass abuts and possibly invades the diaphragm.     Since initial visit with me he underwent Diagnostic laparoscopy with biopsy of diaphragm. Left VATS thoracoscopy on 3/19/18. Final path  "is pending. Due to amount of involvemt surgery was not done so is on Carboplatin and ALimta                 He completed 6 cycles of chemo with Carboplatin, Alimta and Avastin. He is now on Alimta and Avastin maintenance         HPI He comes in today for Alimta and Avastin maintenance. PET scan reveals "Small new left hypermetabolic pleural nodule left upper lobe medially adjacent to the aorta.  This is worrisome for malignancy. Stable left pleural fluid and hypermetabolic left pleural thickening most likely infectious inflammatory. Index new left upper pleural nodule SUV max 3.54"  He feels well and denies any new issues      Review of Systems   Constitutional: Negative for appetite change, fatigue and unexpected weight change.   HENT: Negative for mouth sores.    Eyes: Negative for visual disturbance.   Respiratory: Positive for shortness of breath. Negative for cough.    Cardiovascular: Negative for chest pain.   Gastrointestinal: Negative for abdominal pain and diarrhea.   Genitourinary: Negative for frequency.   Musculoskeletal: Negative for back pain.   Skin: Negative for rash.   Neurological: Negative for headaches.   Hematological: Negative for adenopathy.   Psychiatric/Behavioral: The patient is not nervous/anxious.    All other systems reviewed and are negative.      Objective:      Physical Exam   Constitutional: He is oriented to person, place, and time. He appears well-developed and well-nourished.   HENT:   Mouth/Throat: No oropharyngeal exudate.   Cardiovascular: Normal rate and normal heart sounds.   Pulmonary/Chest: Effort normal. He has decreased breath sounds in the left lower field. He has no wheezes.   Abdominal: Soft. Bowel sounds are normal. There is no tenderness.   Musculoskeletal: He exhibits no edema or tenderness.   Lymphadenopathy:     He has no cervical adenopathy.   Neurological: He is alert and oriented to person, place, and time. Coordination normal.   Skin: Skin is warm and dry. No " rash noted.   Psychiatric: He has a normal mood and affect. Judgment and thought content normal.   Vitals reviewed.      LABS:  WBC   Date Value Ref Range Status   12/14/2018 5.22 3.90 - 12.70 K/uL Final     Hemoglobin   Date Value Ref Range Status   12/14/2018 11.0 (L) 14.0 - 18.0 g/dL Final     Hematocrit   Date Value Ref Range Status   12/14/2018 36.1 (L) 40.0 - 54.0 % Final     Platelets   Date Value Ref Range Status   12/14/2018 206 150 - 350 K/uL Final     Gran # (ANC)   Date Value Ref Range Status   12/14/2018 3.3 1.8 - 7.7 K/uL Final     Comment:     The ANC is based on a white cell differential from an   automated cell counter. It has not been microscopically   reviewed for the presence of abnormal cells. Clinical   correlation is required.         Chemistry        Component Value Date/Time     12/14/2018 0947    K 3.9 12/14/2018 0947     12/14/2018 0947    CO2 29 12/14/2018 0947    BUN 13 12/14/2018 0947    CREATININE 0.84 12/14/2018 0947     12/14/2018 0947        Component Value Date/Time    CALCIUM 9.0 12/14/2018 0947    ALKPHOS 62 12/14/2018 0947    AST 32 12/14/2018 0947    ALT 22 12/14/2018 0947    BILITOT 0.5 12/14/2018 0947    ESTGFRAFRICA >60.0 12/14/2018 0947    EGFRNONAA >60.0 12/14/2018 0947          Assessment:       1. Mesothelioma of left lung    2. Chemotherapy follow-up examination        Plan:         1,2. Personally reviewed PET images, overall stable. He will proceed with Alimta and Avastin maintenance and will return in 3 weeks for next cycle    Above care plan was discussed with patient and accompanying wife and all questions were addressed to their satisfaction

## 2018-12-19 NOTE — Clinical Note
Schedule labs in [place] 2 days prior to appointment with me in 3 weeksAbenao wants morning appointments with me.

## 2018-12-19 NOTE — PLAN OF CARE
Problem: Adult Inpatient Plan of Care  Goal: Plan of Care Review  Outcome: Ongoing (interventions implemented as appropriate)  Pt admitted @ 1130am for Avastin/Alimta infusion and Vitamin B12 Injection, ambulted onto unit unassisted , accompanied by wife. Side effects and self-care tips  of chemo discussed, labs reviewed and IV started. Pt tolerated tx well, plan of care reviewed and Pt instructed to contact MD with any further concerns or questions, he verbalized understanding. AVS given to pt and pt discharged home @ 14:20, ambulated off unit unassisted, accompanied by wife.

## 2019-01-01 ENCOUNTER — INFUSION (OUTPATIENT)
Dept: INFUSION THERAPY | Facility: HOSPITAL | Age: 79
End: 2019-01-01
Attending: INTERNAL MEDICINE
Payer: MEDICARE

## 2019-01-01 ENCOUNTER — TELEPHONE (OUTPATIENT)
Dept: INTERVENTIONAL RADIOLOGY/VASCULAR | Facility: HOSPITAL | Age: 79
End: 2019-01-01

## 2019-01-01 ENCOUNTER — PATIENT MESSAGE (OUTPATIENT)
Dept: PULMONOLOGY | Facility: CLINIC | Age: 79
End: 2019-01-01

## 2019-01-01 ENCOUNTER — OFFICE VISIT (OUTPATIENT)
Dept: PULMONOLOGY | Facility: CLINIC | Age: 79
End: 2019-01-01
Payer: MEDICARE

## 2019-01-01 ENCOUNTER — PATIENT MESSAGE (OUTPATIENT)
Dept: HEMATOLOGY/ONCOLOGY | Facility: CLINIC | Age: 79
End: 2019-01-01

## 2019-01-01 ENCOUNTER — HOSPITAL ENCOUNTER (OUTPATIENT)
Dept: RADIOLOGY | Facility: HOSPITAL | Age: 79
Discharge: HOME | End: 2019-02-11
Attending: INTERNAL MEDICINE
Payer: MEDICARE

## 2019-01-01 ENCOUNTER — LAB VISIT (OUTPATIENT)
Dept: LAB | Facility: HOSPITAL | Age: 79
End: 2019-01-01
Attending: INTERNAL MEDICINE
Payer: MEDICARE

## 2019-01-01 ENCOUNTER — TELEPHONE (OUTPATIENT)
Dept: UROLOGY | Facility: CLINIC | Age: 79
End: 2019-01-01

## 2019-01-01 ENCOUNTER — PATIENT MESSAGE (OUTPATIENT)
Dept: UROLOGY | Facility: CLINIC | Age: 79
End: 2019-01-01

## 2019-01-01 ENCOUNTER — TELEPHONE (OUTPATIENT)
Dept: HEMATOLOGY/ONCOLOGY | Facility: CLINIC | Age: 79
End: 2019-01-01

## 2019-01-01 ENCOUNTER — DOCUMENTATION ONLY (OUTPATIENT)
Dept: HEMATOLOGY/ONCOLOGY | Facility: CLINIC | Age: 79
End: 2019-01-01

## 2019-01-01 ENCOUNTER — HOSPITAL ENCOUNTER (OUTPATIENT)
Dept: CARDIOLOGY | Facility: HOSPITAL | Age: 79
Discharge: HOME | End: 2019-08-09
Attending: INTERNAL MEDICINE
Payer: MEDICARE

## 2019-01-01 ENCOUNTER — OFFICE VISIT (OUTPATIENT)
Dept: HEMATOLOGY/ONCOLOGY | Facility: CLINIC | Age: 79
End: 2019-01-01
Payer: MEDICARE

## 2019-01-01 ENCOUNTER — TELEPHONE (OUTPATIENT)
Dept: FAMILY MEDICINE | Facility: CLINIC | Age: 79
End: 2019-01-01

## 2019-01-01 ENCOUNTER — HOSPITAL ENCOUNTER (OUTPATIENT)
Dept: RADIOLOGY | Facility: HOSPITAL | Age: 79
Discharge: HOME | End: 2019-02-15
Attending: INTERNAL MEDICINE
Payer: MEDICARE

## 2019-01-01 ENCOUNTER — OFFICE VISIT (OUTPATIENT)
Dept: UROLOGY | Facility: CLINIC | Age: 79
End: 2019-01-01
Payer: MEDICARE

## 2019-01-01 ENCOUNTER — PATIENT MESSAGE (OUTPATIENT)
Dept: FAMILY MEDICINE | Facility: CLINIC | Age: 79
End: 2019-01-01

## 2019-01-01 ENCOUNTER — HOSPITAL ENCOUNTER (OUTPATIENT)
Dept: INTERVENTIONAL RADIOLOGY/VASCULAR | Facility: HOSPITAL | Age: 79
Discharge: HOME | End: 2019-03-20
Attending: INTERNAL MEDICINE
Payer: MEDICARE

## 2019-01-01 ENCOUNTER — HOSPITAL ENCOUNTER (OUTPATIENT)
Dept: RADIOLOGY | Facility: HOSPITAL | Age: 79
Discharge: HOME | End: 2019-05-15
Attending: INTERNAL MEDICINE
Payer: MEDICARE

## 2019-01-01 ENCOUNTER — HOSPITAL ENCOUNTER (EMERGENCY)
Facility: HOSPITAL | Age: 79
Discharge: HOME | End: 2019-05-14
Attending: EMERGENCY MEDICINE
Payer: MEDICARE

## 2019-01-01 ENCOUNTER — LAB VISIT (OUTPATIENT)
Dept: LAB | Facility: HOSPITAL | Age: 79
End: 2019-01-01
Attending: UROLOGY
Payer: MEDICARE

## 2019-01-01 ENCOUNTER — TELEPHONE (OUTPATIENT)
Dept: PHARMACY | Facility: CLINIC | Age: 79
End: 2019-01-01

## 2019-01-01 ENCOUNTER — HOSPITAL ENCOUNTER (OUTPATIENT)
Dept: RADIOLOGY | Facility: HOSPITAL | Age: 79
Discharge: HOME | End: 2019-05-23
Attending: INTERNAL MEDICINE
Payer: MEDICARE

## 2019-01-01 ENCOUNTER — HOSPITAL ENCOUNTER (OUTPATIENT)
Facility: HOSPITAL | Age: 79
Discharge: HOME | End: 2019-06-03
Attending: INTERNAL MEDICINE | Admitting: INTERNAL MEDICINE
Payer: MEDICARE

## 2019-01-01 ENCOUNTER — HOSPITAL ENCOUNTER (OUTPATIENT)
Dept: RADIOLOGY | Facility: HOSPITAL | Age: 79
Discharge: HOME | End: 2019-03-22
Attending: INTERNAL MEDICINE
Payer: MEDICARE

## 2019-01-01 ENCOUNTER — TELEPHONE (OUTPATIENT)
Dept: PULMONOLOGY | Facility: CLINIC | Age: 79
End: 2019-01-01

## 2019-01-01 ENCOUNTER — HOSPITAL ENCOUNTER (OUTPATIENT)
Dept: CARDIOLOGY | Facility: CLINIC | Age: 79
Discharge: HOME | End: 2019-02-11
Payer: MEDICARE

## 2019-01-01 ENCOUNTER — HOSPITAL ENCOUNTER (OUTPATIENT)
Dept: RADIOLOGY | Facility: HOSPITAL | Age: 79
Discharge: HOME | End: 2019-07-09
Attending: INTERNAL MEDICINE
Payer: MEDICARE

## 2019-01-01 ENCOUNTER — HOSPITAL ENCOUNTER (OUTPATIENT)
Dept: RADIOLOGY | Facility: HOSPITAL | Age: 79
Discharge: HOME | End: 2019-06-27
Attending: INTERNAL MEDICINE
Payer: MEDICARE

## 2019-01-01 ENCOUNTER — HOSPITAL ENCOUNTER (INPATIENT)
Facility: HOSPITAL | Age: 79
LOS: 19 days | Discharge: HOME HEALTH | DRG: 314 | End: 2019-08-05
Attending: INTERNAL MEDICINE | Admitting: INTERNAL MEDICINE
Payer: MEDICARE

## 2019-01-01 ENCOUNTER — HOSPITAL ENCOUNTER (INPATIENT)
Facility: HOSPITAL | Age: 79
LOS: 2 days | DRG: 871 | End: 2019-08-19
Attending: EMERGENCY MEDICINE | Admitting: INTERNAL MEDICINE
Payer: MEDICARE

## 2019-01-01 ENCOUNTER — HOSPITAL ENCOUNTER (OUTPATIENT)
Dept: CARDIOLOGY | Facility: HOSPITAL | Age: 79
Discharge: HOME | End: 2019-07-05
Attending: INTERNAL MEDICINE
Payer: MEDICARE

## 2019-01-01 ENCOUNTER — HOSPITAL ENCOUNTER (OUTPATIENT)
Dept: RADIOLOGY | Facility: HOSPITAL | Age: 79
Discharge: HOME | End: 2019-03-20
Attending: STUDENT IN AN ORGANIZED HEALTH CARE EDUCATION/TRAINING PROGRAM
Payer: MEDICARE

## 2019-01-01 ENCOUNTER — HOSPITAL ENCOUNTER (OUTPATIENT)
Facility: HOSPITAL | Age: 79
Discharge: HOME | End: 2019-06-20
Attending: INTERNAL MEDICINE | Admitting: INTERNAL MEDICINE
Payer: MEDICARE

## 2019-01-01 ENCOUNTER — HOSPITAL ENCOUNTER (OUTPATIENT)
Dept: RADIOLOGY | Facility: HOSPITAL | Age: 79
Discharge: HOME | End: 2019-03-13
Attending: INTERNAL MEDICINE
Payer: MEDICARE

## 2019-01-01 ENCOUNTER — PROCEDURE VISIT (OUTPATIENT)
Dept: UROLOGY | Facility: CLINIC | Age: 79
End: 2019-01-01
Payer: MEDICARE

## 2019-01-01 ENCOUNTER — HOSPITAL ENCOUNTER (OUTPATIENT)
Dept: RADIOLOGY | Facility: HOSPITAL | Age: 79
Discharge: HOME | End: 2019-04-25
Attending: INTERNAL MEDICINE
Payer: MEDICARE

## 2019-01-01 VITALS
RESPIRATION RATE: 17 BRPM | HEART RATE: 88 BPM | TEMPERATURE: 98 F | SYSTOLIC BLOOD PRESSURE: 162 MMHG | WEIGHT: 154 LBS | DIASTOLIC BLOOD PRESSURE: 79 MMHG | HEIGHT: 67 IN | BODY MASS INDEX: 24.17 KG/M2

## 2019-01-01 VITALS
RESPIRATION RATE: 18 BRPM | SYSTOLIC BLOOD PRESSURE: 137 MMHG | HEART RATE: 84 BPM | WEIGHT: 153.25 LBS | DIASTOLIC BLOOD PRESSURE: 70 MMHG | HEIGHT: 67 IN | TEMPERATURE: 98 F | BODY MASS INDEX: 24.05 KG/M2 | HEIGHT: 67 IN | WEIGHT: 154 LBS | BODY MASS INDEX: 24.17 KG/M2 | OXYGEN SATURATION: 98 %

## 2019-01-01 VITALS
TEMPERATURE: 97 F | HEART RATE: 82 BPM | SYSTOLIC BLOOD PRESSURE: 128 MMHG | HEIGHT: 67 IN | WEIGHT: 153.25 LBS | RESPIRATION RATE: 18 BRPM | BODY MASS INDEX: 24.05 KG/M2 | DIASTOLIC BLOOD PRESSURE: 63 MMHG

## 2019-01-01 VITALS
RESPIRATION RATE: 20 BRPM | SYSTOLIC BLOOD PRESSURE: 154 MMHG | RESPIRATION RATE: 18 BRPM | DIASTOLIC BLOOD PRESSURE: 71 MMHG | TEMPERATURE: 98 F | SYSTOLIC BLOOD PRESSURE: 139 MMHG | HEART RATE: 90 BPM | DIASTOLIC BLOOD PRESSURE: 65 MMHG | HEART RATE: 80 BPM

## 2019-01-01 VITALS
SYSTOLIC BLOOD PRESSURE: 147 MMHG | OXYGEN SATURATION: 99 % | RESPIRATION RATE: 18 BRPM | DIASTOLIC BLOOD PRESSURE: 72 MMHG | HEART RATE: 94 BPM

## 2019-01-01 VITALS
WEIGHT: 140.88 LBS | BODY MASS INDEX: 22.11 KG/M2 | WEIGHT: 162 LBS | TEMPERATURE: 98 F | RESPIRATION RATE: 18 BRPM | DIASTOLIC BLOOD PRESSURE: 80 MMHG | BODY MASS INDEX: 25.43 KG/M2 | HEIGHT: 67 IN | HEIGHT: 67 IN | SYSTOLIC BLOOD PRESSURE: 144 MMHG | HEART RATE: 90 BPM | HEIGHT: 67 IN | WEIGHT: 150.81 LBS | BODY MASS INDEX: 23.67 KG/M2

## 2019-01-01 VITALS
DIASTOLIC BLOOD PRESSURE: 65 MMHG | RESPIRATION RATE: 22 BRPM | HEART RATE: 107 BPM | TEMPERATURE: 98 F | SYSTOLIC BLOOD PRESSURE: 130 MMHG

## 2019-01-01 VITALS
OXYGEN SATURATION: 99 % | DIASTOLIC BLOOD PRESSURE: 93 MMHG | BODY MASS INDEX: 24.78 KG/M2 | RESPIRATION RATE: 20 BRPM | BODY MASS INDEX: 24.88 KG/M2 | HEART RATE: 68 BPM | SYSTOLIC BLOOD PRESSURE: 162 MMHG | RESPIRATION RATE: 20 BRPM | HEIGHT: 67 IN | HEIGHT: 67 IN | OXYGEN SATURATION: 99 % | TEMPERATURE: 98 F | SYSTOLIC BLOOD PRESSURE: 161 MMHG | WEIGHT: 157.88 LBS | HEART RATE: 63 BPM | DIASTOLIC BLOOD PRESSURE: 78 MMHG | TEMPERATURE: 98 F | WEIGHT: 158.5 LBS

## 2019-01-01 VITALS — BODY MASS INDEX: 24.17 KG/M2 | HEIGHT: 67 IN | WEIGHT: 154 LBS

## 2019-01-01 VITALS
HEART RATE: 78 BPM | RESPIRATION RATE: 18 BRPM | SYSTOLIC BLOOD PRESSURE: 151 MMHG | WEIGHT: 149 LBS | DIASTOLIC BLOOD PRESSURE: 75 MMHG | BODY MASS INDEX: 23.39 KG/M2 | HEIGHT: 67 IN | OXYGEN SATURATION: 96 % | TEMPERATURE: 98 F

## 2019-01-01 VITALS
DIASTOLIC BLOOD PRESSURE: 72 MMHG | RESPIRATION RATE: 20 BRPM | HEART RATE: 73 BPM | HEIGHT: 67 IN | SYSTOLIC BLOOD PRESSURE: 154 MMHG | BODY MASS INDEX: 24.12 KG/M2 | TEMPERATURE: 98 F | OXYGEN SATURATION: 97 % | WEIGHT: 153.69 LBS

## 2019-01-01 VITALS
HEIGHT: 67 IN | HEART RATE: 94 BPM | DIASTOLIC BLOOD PRESSURE: 80 MMHG | OXYGEN SATURATION: 95 % | SYSTOLIC BLOOD PRESSURE: 110 MMHG | WEIGHT: 147.69 LBS | BODY MASS INDEX: 23.18 KG/M2

## 2019-01-01 VITALS
HEART RATE: 86 BPM | OXYGEN SATURATION: 97 % | DIASTOLIC BLOOD PRESSURE: 66 MMHG | BODY MASS INDEX: 26.12 KG/M2 | SYSTOLIC BLOOD PRESSURE: 122 MMHG | WEIGHT: 166.44 LBS | TEMPERATURE: 98 F | RESPIRATION RATE: 20 BRPM | HEIGHT: 67 IN

## 2019-01-01 VITALS
BODY MASS INDEX: 25.3 KG/M2 | OXYGEN SATURATION: 48 % | SYSTOLIC BLOOD PRESSURE: 74 MMHG | WEIGHT: 161.19 LBS | TEMPERATURE: 98 F | DIASTOLIC BLOOD PRESSURE: 44 MMHG | HEIGHT: 67 IN

## 2019-01-01 VITALS
HEIGHT: 67 IN | HEART RATE: 77 BPM | RESPIRATION RATE: 18 BRPM | TEMPERATURE: 98 F | BODY MASS INDEX: 24.12 KG/M2 | SYSTOLIC BLOOD PRESSURE: 131 MMHG | DIASTOLIC BLOOD PRESSURE: 63 MMHG | WEIGHT: 153.69 LBS

## 2019-01-01 VITALS
RESPIRATION RATE: 20 BRPM | HEIGHT: 67 IN | HEART RATE: 89 BPM | HEIGHT: 67 IN | HEART RATE: 82 BPM | TEMPERATURE: 98 F | DIASTOLIC BLOOD PRESSURE: 68 MMHG | BODY MASS INDEX: 23.67 KG/M2 | SYSTOLIC BLOOD PRESSURE: 151 MMHG | RESPIRATION RATE: 20 BRPM | WEIGHT: 150.81 LBS | OXYGEN SATURATION: 95 % | SYSTOLIC BLOOD PRESSURE: 129 MMHG | TEMPERATURE: 99 F | WEIGHT: 162.5 LBS | TEMPERATURE: 98 F | BODY MASS INDEX: 23.67 KG/M2 | OXYGEN SATURATION: 95 % | HEART RATE: 104 BPM | DIASTOLIC BLOOD PRESSURE: 67 MMHG | RESPIRATION RATE: 22 BRPM | OXYGEN SATURATION: 96 % | SYSTOLIC BLOOD PRESSURE: 125 MMHG | HEIGHT: 67 IN | DIASTOLIC BLOOD PRESSURE: 66 MMHG | BODY MASS INDEX: 25.51 KG/M2 | WEIGHT: 150.81 LBS

## 2019-01-01 VITALS
RESPIRATION RATE: 18 BRPM | BODY MASS INDEX: 23.36 KG/M2 | DIASTOLIC BLOOD PRESSURE: 72 MMHG | SYSTOLIC BLOOD PRESSURE: 128 MMHG | OXYGEN SATURATION: 99 % | HEART RATE: 80 BPM | TEMPERATURE: 98 F | WEIGHT: 148.81 LBS | HEIGHT: 67 IN

## 2019-01-01 VITALS
DIASTOLIC BLOOD PRESSURE: 83 MMHG | WEIGHT: 156.5 LBS | SYSTOLIC BLOOD PRESSURE: 186 MMHG | BODY MASS INDEX: 24.56 KG/M2 | OXYGEN SATURATION: 98 % | HEIGHT: 67 IN | RESPIRATION RATE: 20 BRPM | TEMPERATURE: 98 F | HEART RATE: 67 BPM

## 2019-01-01 VITALS
HEART RATE: 85 BPM | RESPIRATION RATE: 17 BRPM | SYSTOLIC BLOOD PRESSURE: 140 MMHG | TEMPERATURE: 99 F | BODY MASS INDEX: 23.54 KG/M2 | DIASTOLIC BLOOD PRESSURE: 72 MMHG | HEIGHT: 67 IN | WEIGHT: 150 LBS | OXYGEN SATURATION: 97 %

## 2019-01-01 VITALS
SYSTOLIC BLOOD PRESSURE: 129 MMHG | OXYGEN SATURATION: 97 % | BODY MASS INDEX: 23.07 KG/M2 | TEMPERATURE: 99 F | RESPIRATION RATE: 20 BRPM | DIASTOLIC BLOOD PRESSURE: 65 MMHG | HEART RATE: 82 BPM | WEIGHT: 147 LBS | HEIGHT: 67 IN

## 2019-01-01 VITALS
SYSTOLIC BLOOD PRESSURE: 151 MMHG | TEMPERATURE: 98 F | DIASTOLIC BLOOD PRESSURE: 73 MMHG | HEART RATE: 87 BPM | OXYGEN SATURATION: 98 % | BODY MASS INDEX: 24.29 KG/M2 | WEIGHT: 154.75 LBS | RESPIRATION RATE: 20 BRPM | HEIGHT: 67 IN

## 2019-01-01 VITALS
HEIGHT: 67 IN | HEIGHT: 67 IN | BODY MASS INDEX: 23.53 KG/M2 | WEIGHT: 149.94 LBS | WEIGHT: 148.13 LBS | OXYGEN SATURATION: 96 % | DIASTOLIC BLOOD PRESSURE: 78 MMHG | SYSTOLIC BLOOD PRESSURE: 140 MMHG | HEART RATE: 90 BPM | BODY MASS INDEX: 23.25 KG/M2

## 2019-01-01 VITALS
WEIGHT: 161 LBS | DIASTOLIC BLOOD PRESSURE: 69 MMHG | BODY MASS INDEX: 22.11 KG/M2 | HEART RATE: 82 BPM | BODY MASS INDEX: 25.27 KG/M2 | WEIGHT: 140.88 LBS | HEART RATE: 94 BPM | BODY MASS INDEX: 25.27 KG/M2 | WEIGHT: 161 LBS | RESPIRATION RATE: 17 BRPM | DIASTOLIC BLOOD PRESSURE: 79 MMHG | HEIGHT: 67 IN | HEIGHT: 67 IN | RESPIRATION RATE: 18 BRPM | SYSTOLIC BLOOD PRESSURE: 130 MMHG | OXYGEN SATURATION: 97 % | SYSTOLIC BLOOD PRESSURE: 147 MMHG | HEIGHT: 67 IN | TEMPERATURE: 98 F

## 2019-01-01 VITALS
BODY MASS INDEX: 22.13 KG/M2 | SYSTOLIC BLOOD PRESSURE: 145 MMHG | OXYGEN SATURATION: 98 % | WEIGHT: 141 LBS | HEIGHT: 67 IN | RESPIRATION RATE: 20 BRPM | DIASTOLIC BLOOD PRESSURE: 65 MMHG | HEART RATE: 96 BPM | TEMPERATURE: 98 F

## 2019-01-01 VITALS
TEMPERATURE: 98 F | HEART RATE: 81 BPM | TEMPERATURE: 98 F | BODY MASS INDEX: 22.91 KG/M2 | WEIGHT: 149.94 LBS | SYSTOLIC BLOOD PRESSURE: 146 MMHG | HEIGHT: 67 IN | BODY MASS INDEX: 23.36 KG/M2 | WEIGHT: 145.94 LBS | HEIGHT: 67 IN | TEMPERATURE: 98 F | SYSTOLIC BLOOD PRESSURE: 114 MMHG | HEIGHT: 67 IN | DIASTOLIC BLOOD PRESSURE: 67 MMHG | WEIGHT: 148.81 LBS | HEIGHT: 67 IN | OXYGEN SATURATION: 97 % | OXYGEN SATURATION: 97 % | HEART RATE: 92 BPM | SYSTOLIC BLOOD PRESSURE: 127 MMHG | RESPIRATION RATE: 18 BRPM | RESPIRATION RATE: 20 BRPM | HEART RATE: 90 BPM | BODY MASS INDEX: 22.91 KG/M2 | HEIGHT: 67 IN | BODY MASS INDEX: 22.91 KG/M2 | DIASTOLIC BLOOD PRESSURE: 63 MMHG | DIASTOLIC BLOOD PRESSURE: 74 MMHG | WEIGHT: 145.94 LBS | WEIGHT: 145.94 LBS | BODY MASS INDEX: 23.53 KG/M2 | RESPIRATION RATE: 20 BRPM

## 2019-01-01 VITALS — WEIGHT: 145.94 LBS | HEIGHT: 67 IN | BODY MASS INDEX: 22.91 KG/M2

## 2019-01-01 VITALS — BODY MASS INDEX: 25.4 KG/M2 | HEIGHT: 67 IN | WEIGHT: 161.81 LBS

## 2019-01-01 VITALS
HEIGHT: 67 IN | TEMPERATURE: 98 F | DIASTOLIC BLOOD PRESSURE: 68 MMHG | HEART RATE: 70 BPM | BODY MASS INDEX: 24.36 KG/M2 | HEIGHT: 67 IN | RESPIRATION RATE: 20 BRPM | SYSTOLIC BLOOD PRESSURE: 143 MMHG | BODY MASS INDEX: 24.22 KG/M2 | WEIGHT: 155.19 LBS | WEIGHT: 154.31 LBS

## 2019-01-01 VITALS
DIASTOLIC BLOOD PRESSURE: 70 MMHG | BODY MASS INDEX: 23.63 KG/M2 | TEMPERATURE: 98 F | WEIGHT: 150.56 LBS | OXYGEN SATURATION: 96 % | RESPIRATION RATE: 18 BRPM | HEIGHT: 67 IN | HEART RATE: 71 BPM | SYSTOLIC BLOOD PRESSURE: 124 MMHG

## 2019-01-01 VITALS
BODY MASS INDEX: 24.64 KG/M2 | HEART RATE: 62 BPM | TEMPERATURE: 98 F | WEIGHT: 157 LBS | DIASTOLIC BLOOD PRESSURE: 79 MMHG | RESPIRATION RATE: 18 BRPM | SYSTOLIC BLOOD PRESSURE: 136 MMHG | HEIGHT: 67 IN

## 2019-01-01 VITALS
DIASTOLIC BLOOD PRESSURE: 67 MMHG | HEART RATE: 74 BPM | TEMPERATURE: 98 F | SYSTOLIC BLOOD PRESSURE: 138 MMHG | RESPIRATION RATE: 18 BRPM

## 2019-01-01 VITALS — HEIGHT: 67 IN | BODY MASS INDEX: 25.27 KG/M2 | WEIGHT: 161 LBS

## 2019-01-01 VITALS
BODY MASS INDEX: 23.58 KG/M2 | WEIGHT: 159.19 LBS | RESPIRATION RATE: 19 BRPM | HEART RATE: 68 BPM | TEMPERATURE: 98 F | DIASTOLIC BLOOD PRESSURE: 63 MMHG | HEIGHT: 69 IN | OXYGEN SATURATION: 96 % | SYSTOLIC BLOOD PRESSURE: 115 MMHG

## 2019-01-01 VITALS
WEIGHT: 154.31 LBS | HEIGHT: 67 IN | WEIGHT: 153.69 LBS | BODY MASS INDEX: 24.12 KG/M2 | HEIGHT: 67 IN | BODY MASS INDEX: 24.22 KG/M2

## 2019-01-01 VITALS
TEMPERATURE: 98 F | SYSTOLIC BLOOD PRESSURE: 125 MMHG | RESPIRATION RATE: 20 BRPM | HEART RATE: 81 BPM | HEIGHT: 67 IN | OXYGEN SATURATION: 98 % | BODY MASS INDEX: 24.12 KG/M2 | DIASTOLIC BLOOD PRESSURE: 76 MMHG | WEIGHT: 153.69 LBS

## 2019-01-01 VITALS — RESPIRATION RATE: 18 BRPM | HEART RATE: 73 BPM | DIASTOLIC BLOOD PRESSURE: 67 MMHG | SYSTOLIC BLOOD PRESSURE: 138 MMHG

## 2019-01-01 VITALS
WEIGHT: 158.5 LBS | HEART RATE: 60 BPM | HEIGHT: 67 IN | DIASTOLIC BLOOD PRESSURE: 72 MMHG | RESPIRATION RATE: 18 BRPM | BODY MASS INDEX: 24.88 KG/M2 | SYSTOLIC BLOOD PRESSURE: 139 MMHG | TEMPERATURE: 98 F

## 2019-01-01 DIAGNOSIS — C45.7 MESOTHELIOMA OF LEFT LUNG: Primary | ICD-10-CM

## 2019-01-01 DIAGNOSIS — R60.9 EDEMA, UNSPECIFIED TYPE: ICD-10-CM

## 2019-01-01 DIAGNOSIS — L27.0 DRUG RASH: ICD-10-CM

## 2019-01-01 DIAGNOSIS — C45.9 MESOTHELIOMA: Primary | ICD-10-CM

## 2019-01-01 DIAGNOSIS — J90 PLEURAL EFFUSION: ICD-10-CM

## 2019-01-01 DIAGNOSIS — D49.9 NEOPLASM: ICD-10-CM

## 2019-01-01 DIAGNOSIS — C45.7 MESOTHELIOMA OF LEFT LUNG: ICD-10-CM

## 2019-01-01 DIAGNOSIS — J90 RECURRENT RIGHT PLEURAL EFFUSION: Primary | ICD-10-CM

## 2019-01-01 DIAGNOSIS — I31.39 PERICARDIAL EFFUSION: ICD-10-CM

## 2019-01-01 DIAGNOSIS — G89.3 NEOPLASM RELATED PAIN: ICD-10-CM

## 2019-01-01 DIAGNOSIS — C45.9 MESOTHELIOMA: ICD-10-CM

## 2019-01-01 DIAGNOSIS — Z45.2 ENCOUNTER FOR CARE RELATED TO VASCULAR ACCESS PORT: ICD-10-CM

## 2019-01-01 DIAGNOSIS — R06.02 SHORTNESS OF BREATH: ICD-10-CM

## 2019-01-01 DIAGNOSIS — N32.0 ACQUIRED CONTRACTURE OF BLADDER NECK: Primary | ICD-10-CM

## 2019-01-01 DIAGNOSIS — J90 PLEURAL EFFUSION: Primary | ICD-10-CM

## 2019-01-01 DIAGNOSIS — N50.89 SCROTAL EDEMA: ICD-10-CM

## 2019-01-01 DIAGNOSIS — E87.5 HYPERKALEMIA: ICD-10-CM

## 2019-01-01 DIAGNOSIS — R06.02 SHORTNESS OF BREATH: Primary | ICD-10-CM

## 2019-01-01 DIAGNOSIS — R07.9 CHEST PAIN: ICD-10-CM

## 2019-01-01 DIAGNOSIS — J91.0 MALIGNANT PLEURAL EFFUSION: ICD-10-CM

## 2019-01-01 DIAGNOSIS — E86.0 DEHYDRATION: ICD-10-CM

## 2019-01-01 DIAGNOSIS — Z85.46 HISTORY OF PROSTATE CANCER: Primary | ICD-10-CM

## 2019-01-01 DIAGNOSIS — J32.9 SINUSITIS, UNSPECIFIED CHRONICITY, UNSPECIFIED LOCATION: ICD-10-CM

## 2019-01-01 DIAGNOSIS — Z98.890 POST-OPERATIVE STATE: ICD-10-CM

## 2019-01-01 DIAGNOSIS — N30.00 ACUTE CYSTITIS WITHOUT HEMATURIA: Primary | ICD-10-CM

## 2019-01-01 DIAGNOSIS — T45.1X5A CHEMOTHERAPY INDUCED NEUTROPENIA: ICD-10-CM

## 2019-01-01 DIAGNOSIS — J69.0 ASPIRATION PNEUMONIA, UNSPECIFIED ASPIRATION PNEUMONIA TYPE, UNSPECIFIED LATERALITY, UNSPECIFIED PART OF LUNG: ICD-10-CM

## 2019-01-01 DIAGNOSIS — Z09 CHEMOTHERAPY FOLLOW-UP EXAMINATION: ICD-10-CM

## 2019-01-01 DIAGNOSIS — R07.9 CHEST PAIN, UNSPECIFIED TYPE: ICD-10-CM

## 2019-01-01 DIAGNOSIS — I87.1 COMPRESSION OF VEIN: ICD-10-CM

## 2019-01-01 DIAGNOSIS — J01.01 ACUTE RECURRENT MAXILLARY SINUSITIS: ICD-10-CM

## 2019-01-01 DIAGNOSIS — G89.3 NEOPLASM RELATED PAIN: Primary | ICD-10-CM

## 2019-01-01 DIAGNOSIS — J92.9 PLEURAL PLAQUE: Primary | ICD-10-CM

## 2019-01-01 DIAGNOSIS — N32.0 ACQUIRED CONTRACTURE OF BLADDER NECK: ICD-10-CM

## 2019-01-01 DIAGNOSIS — D70.1 CHEMOTHERAPY INDUCED NEUTROPENIA: ICD-10-CM

## 2019-01-01 DIAGNOSIS — R06.03 RESPIRATORY DISTRESS: ICD-10-CM

## 2019-01-01 DIAGNOSIS — Z98.890 STATUS POST THORACENTESIS: ICD-10-CM

## 2019-01-01 DIAGNOSIS — I48.20 CHRONIC ATRIAL FIBRILLATION: ICD-10-CM

## 2019-01-01 DIAGNOSIS — M79.89 LEG SWELLING: ICD-10-CM

## 2019-01-01 DIAGNOSIS — R35.1 NOCTURIA: ICD-10-CM

## 2019-01-01 DIAGNOSIS — R65.20 SEVERE SEPSIS: ICD-10-CM

## 2019-01-01 DIAGNOSIS — I31.39 PERICARDIAL EFFUSION WITHOUT CARDIAC TAMPONADE: ICD-10-CM

## 2019-01-01 DIAGNOSIS — I48.91 ATRIAL FIBRILLATION WITH RVR: ICD-10-CM

## 2019-01-01 DIAGNOSIS — C45.0 MESOTHELIOMA OF PLEURA: ICD-10-CM

## 2019-01-01 DIAGNOSIS — R07.9 CHEST PAIN, UNSPECIFIED TYPE: Primary | ICD-10-CM

## 2019-01-01 DIAGNOSIS — J96.01 ACUTE RESPIRATORY FAILURE WITH HYPOXIA: Primary | ICD-10-CM

## 2019-01-01 DIAGNOSIS — R07.9 CHEST PAIN AT REST: ICD-10-CM

## 2019-01-01 DIAGNOSIS — I50.33 ACUTE ON CHRONIC DIASTOLIC HEART FAILURE: ICD-10-CM

## 2019-01-01 DIAGNOSIS — Z85.46 HISTORY OF PROSTATE CANCER: ICD-10-CM

## 2019-01-01 DIAGNOSIS — A41.9 SEVERE SEPSIS: ICD-10-CM

## 2019-01-01 DIAGNOSIS — N50.89 SWELLING OF THE TESTICLES: Primary | ICD-10-CM

## 2019-01-01 DIAGNOSIS — L30.9 DERMATITIS: ICD-10-CM

## 2019-01-01 DIAGNOSIS — R39.198 SLOW URINARY STREAM: ICD-10-CM

## 2019-01-01 DIAGNOSIS — R35.0 URINARY FREQUENCY: ICD-10-CM

## 2019-01-01 DIAGNOSIS — R89.6 ABNORMAL BLADDER CYTOLOGY: Primary | ICD-10-CM

## 2019-01-01 DIAGNOSIS — J96.91 RESPIRATORY FAILURE WITH HYPOXIA: ICD-10-CM

## 2019-01-01 DIAGNOSIS — R06.02 SOB (SHORTNESS OF BREATH) ON EXERTION: ICD-10-CM

## 2019-01-01 DIAGNOSIS — J91.0 MALIGNANT PLEURAL EFFUSION: Primary | ICD-10-CM

## 2019-01-01 DIAGNOSIS — N17.9 AKI (ACUTE KIDNEY INJURY): ICD-10-CM

## 2019-01-01 DIAGNOSIS — I50.9 CONGESTIVE HEART FAILURE, UNSPECIFIED HF CHRONICITY, UNSPECIFIED HEART FAILURE TYPE: ICD-10-CM

## 2019-01-01 DIAGNOSIS — N30.01 ACUTE CYSTITIS WITH HEMATURIA: ICD-10-CM

## 2019-01-01 DIAGNOSIS — R60.9 EDEMA, UNSPECIFIED TYPE: Primary | ICD-10-CM

## 2019-01-01 DIAGNOSIS — R35.1 NOCTURIA: Primary | ICD-10-CM

## 2019-01-01 DIAGNOSIS — I10 ESSENTIAL HYPERTENSION: ICD-10-CM

## 2019-01-01 LAB
ABO + RH BLD: NORMAL
ADA SOURCE: NORMAL
ADENOSINE DEAMINASE PCAR-CCNC: 3.5 U/L (ref 0–11.3)
ALBUMIN SERPL BCP-MCNC: 1.9 G/DL (ref 3.5–5.2)
ALBUMIN SERPL BCP-MCNC: 2 G/DL (ref 3.5–5.2)
ALBUMIN SERPL BCP-MCNC: 2.1 G/DL (ref 3.5–5.2)
ALBUMIN SERPL BCP-MCNC: 2.2 G/DL (ref 3.5–5.2)
ALBUMIN SERPL BCP-MCNC: 2.3 G/DL (ref 3.5–5.2)
ALBUMIN SERPL BCP-MCNC: 2.4 G/DL (ref 3.5–5.2)
ALBUMIN SERPL BCP-MCNC: 2.9 G/DL (ref 3.5–5.2)
ALBUMIN SERPL BCP-MCNC: 3 G/DL (ref 3.5–5.2)
ALBUMIN SERPL BCP-MCNC: 3.1 G/DL (ref 3.5–5.2)
ALBUMIN SERPL BCP-MCNC: 3.1 G/DL (ref 3.5–5.2)
ALBUMIN SERPL BCP-MCNC: 3.3 G/DL (ref 3.5–5.2)
ALBUMIN SERPL BCP-MCNC: 3.5 G/DL (ref 3.5–5.2)
ALBUMIN SERPL BCP-MCNC: 3.5 G/DL (ref 3.5–5.2)
ALBUMIN SERPL BCP-MCNC: 3.6 G/DL
ALBUMIN SERPL BCP-MCNC: 3.6 G/DL (ref 3.5–5.2)
ALBUMIN SERPL BCP-MCNC: 3.7 G/DL (ref 3.5–5.2)
ALBUMIN SERPL BCP-MCNC: 3.8 G/DL
ALBUMIN SERPL BCP-MCNC: 3.8 G/DL
ALBUMIN SERPL BCP-MCNC: 3.8 G/DL (ref 3.5–5.2)
ALBUMIN SERPL BCP-MCNC: 3.9 G/DL
ALBUMIN SERPL BCP-MCNC: 4 G/DL
ALLENS TEST: ABNORMAL
ALLENS TEST: ABNORMAL
ALP SERPL-CCNC: 100 U/L (ref 38–126)
ALP SERPL-CCNC: 100 U/L (ref 38–126)
ALP SERPL-CCNC: 100 U/L (ref 55–135)
ALP SERPL-CCNC: 107 U/L (ref 55–135)
ALP SERPL-CCNC: 108 U/L (ref 38–126)
ALP SERPL-CCNC: 113 U/L (ref 55–135)
ALP SERPL-CCNC: 118 U/L (ref 55–135)
ALP SERPL-CCNC: 120 U/L (ref 55–135)
ALP SERPL-CCNC: 120 U/L (ref 55–135)
ALP SERPL-CCNC: 152 U/L (ref 55–135)
ALP SERPL-CCNC: 65 U/L
ALP SERPL-CCNC: 69 U/L
ALP SERPL-CCNC: 70 U/L
ALP SERPL-CCNC: 70 U/L
ALP SERPL-CCNC: 70 U/L (ref 55–135)
ALP SERPL-CCNC: 75 U/L (ref 38–126)
ALP SERPL-CCNC: 78 U/L (ref 55–135)
ALP SERPL-CCNC: 79 U/L (ref 38–126)
ALP SERPL-CCNC: 81 U/L (ref 38–126)
ALP SERPL-CCNC: 82 U/L (ref 38–126)
ALP SERPL-CCNC: 82 U/L (ref 55–135)
ALP SERPL-CCNC: 82 U/L (ref 55–135)
ALP SERPL-CCNC: 83 U/L (ref 38–126)
ALP SERPL-CCNC: 83 U/L (ref 55–135)
ALP SERPL-CCNC: 84 U/L (ref 55–135)
ALP SERPL-CCNC: 85 U/L (ref 55–135)
ALP SERPL-CCNC: 88 U/L
ALP SERPL-CCNC: 88 U/L (ref 55–135)
ALP SERPL-CCNC: 89 U/L (ref 55–135)
ALP SERPL-CCNC: 93 U/L (ref 38–126)
ALP SERPL-CCNC: 93 U/L (ref 55–135)
ALP SERPL-CCNC: 93 U/L (ref 55–135)
ALP SERPL-CCNC: 95 U/L (ref 55–135)
ALP SERPL-CCNC: 96 U/L (ref 55–135)
ALP SERPL-CCNC: 99 U/L (ref 55–135)
ALP SERPL-CCNC: 99 U/L (ref 55–135)
ALT SERPL W/O P-5'-P-CCNC: 10 U/L (ref 10–44)
ALT SERPL W/O P-5'-P-CCNC: 11 U/L (ref 10–44)
ALT SERPL W/O P-5'-P-CCNC: 12 U/L (ref 10–44)
ALT SERPL W/O P-5'-P-CCNC: 13 U/L
ALT SERPL W/O P-5'-P-CCNC: 13 U/L (ref 10–44)
ALT SERPL W/O P-5'-P-CCNC: 13 U/L (ref 10–44)
ALT SERPL W/O P-5'-P-CCNC: 14 U/L (ref 10–44)
ALT SERPL W/O P-5'-P-CCNC: 15 U/L
ALT SERPL W/O P-5'-P-CCNC: 15 U/L (ref 10–44)
ALT SERPL W/O P-5'-P-CCNC: 17 U/L
ALT SERPL W/O P-5'-P-CCNC: 17 U/L
ALT SERPL W/O P-5'-P-CCNC: 19 U/L (ref 10–44)
ALT SERPL W/O P-5'-P-CCNC: 21 U/L
ALT SERPL W/O P-5'-P-CCNC: 21 U/L (ref 10–44)
ALT SERPL W/O P-5'-P-CCNC: 25 U/L (ref 10–44)
ALT SERPL W/O P-5'-P-CCNC: 27 U/L (ref 10–44)
ALT SERPL W/O P-5'-P-CCNC: 30 U/L (ref 10–44)
ALT SERPL W/O P-5'-P-CCNC: 34 U/L (ref 10–44)
ALT SERPL W/O P-5'-P-CCNC: 441 U/L (ref 10–44)
ALT SERPL W/O P-5'-P-CCNC: 6 U/L (ref 10–44)
ALT SERPL W/O P-5'-P-CCNC: 7 U/L (ref 10–44)
ALT SERPL W/O P-5'-P-CCNC: 8 U/L (ref 10–44)
ANION GAP SERPL CALC-SCNC: 10 MMOL/L (ref 8–16)
ANION GAP SERPL CALC-SCNC: 11 MMOL/L (ref 8–16)
ANION GAP SERPL CALC-SCNC: 12 MMOL/L (ref 8–16)
ANION GAP SERPL CALC-SCNC: 14 MMOL/L (ref 8–16)
ANION GAP SERPL CALC-SCNC: 14 MMOL/L (ref 8–16)
ANION GAP SERPL CALC-SCNC: 17 MMOL/L (ref 8–16)
ANION GAP SERPL CALC-SCNC: 17 MMOL/L (ref 8–16)
ANION GAP SERPL CALC-SCNC: 18 MMOL/L (ref 8–16)
ANION GAP SERPL CALC-SCNC: 20 MMOL/L (ref 8–16)
ANION GAP SERPL CALC-SCNC: 21 MMOL/L (ref 8–16)
ANION GAP SERPL CALC-SCNC: 4 MMOL/L (ref 8–16)
ANION GAP SERPL CALC-SCNC: 5 MMOL/L (ref 8–16)
ANION GAP SERPL CALC-SCNC: 6 MMOL/L
ANION GAP SERPL CALC-SCNC: 7 MMOL/L
ANION GAP SERPL CALC-SCNC: 7 MMOL/L (ref 8–16)
ANION GAP SERPL CALC-SCNC: 8 MMOL/L
ANION GAP SERPL CALC-SCNC: 8 MMOL/L (ref 8–16)
ANION GAP SERPL CALC-SCNC: 9 MMOL/L (ref 8–16)
ANISOCYTOSIS BLD QL SMEAR: SLIGHT
AORTIC ROOT ANNULUS: 2.7 CM
AORTIC ROOT ANNULUS: 3.21 CM
AORTIC VALVE CUSP SEPERATION: 1.69 CM
APPEARANCE FLD: CLEAR
APPEARANCE FLD: NORMAL
APTT BLDCRRT: 26.6 SEC (ref 21–32)
APTT BLDCRRT: 39.1 SEC (ref 21–32)
APTT BLDCRRT: 43.4 SEC (ref 21–32)
ASCENDING AORTA: 3.02 CM
ASCENDING AORTA: 3.02 CM
AST SERPL-CCNC: 10 U/L (ref 10–40)
AST SERPL-CCNC: 11 U/L (ref 10–40)
AST SERPL-CCNC: 12 U/L (ref 10–40)
AST SERPL-CCNC: 12 U/L (ref 10–40)
AST SERPL-CCNC: 13 U/L (ref 10–40)
AST SERPL-CCNC: 14 U/L (ref 10–40)
AST SERPL-CCNC: 15 U/L (ref 10–40)
AST SERPL-CCNC: 16 U/L (ref 10–40)
AST SERPL-CCNC: 16 U/L (ref 10–40)
AST SERPL-CCNC: 17 U/L (ref 10–40)
AST SERPL-CCNC: 17 U/L (ref 10–40)
AST SERPL-CCNC: 18 U/L (ref 15–46)
AST SERPL-CCNC: 20 U/L (ref 10–40)
AST SERPL-CCNC: 20 U/L (ref 15–46)
AST SERPL-CCNC: 20 U/L (ref 15–46)
AST SERPL-CCNC: 21 U/L (ref 15–46)
AST SERPL-CCNC: 24 U/L
AST SERPL-CCNC: 24 U/L
AST SERPL-CCNC: 25 U/L (ref 10–40)
AST SERPL-CCNC: 26 U/L (ref 10–40)
AST SERPL-CCNC: 27 U/L
AST SERPL-CCNC: 28 U/L (ref 15–46)
AST SERPL-CCNC: 30 U/L
AST SERPL-CCNC: 31 U/L (ref 10–40)
AST SERPL-CCNC: 34 U/L (ref 10–40)
AST SERPL-CCNC: 35 U/L (ref 15–46)
AST SERPL-CCNC: 37 U/L
AST SERPL-CCNC: 40 U/L (ref 15–46)
AST SERPL-CCNC: 632 U/L (ref 10–40)
AV INDEX (PROSTH): 0.52
AV INDEX (PROSTH): 0.62
AV INDEX (PROSTH): 0.67
AV INDEX (PROSTH): 0.68
AV INDEX (PROSTH): 0.68
AV MEAN GRADIENT: 3 MMHG
AV MEAN GRADIENT: 3 MMHG
AV MEAN GRADIENT: 4 MMHG
AV MEAN GRADIENT: 5 MMHG
AV MEAN GRADIENT: 6 MMHG
AV PEAK GRADIENT: 10 MMHG
AV PEAK GRADIENT: 5 MMHG
AV PEAK GRADIENT: 6 MMHG
AV PEAK GRADIENT: 6 MMHG
AV PEAK GRADIENT: 8 MMHG
AV VALVE AREA: 1.71 CM2
AV VALVE AREA: 2.39 CM2
AV VALVE AREA: 2.56 CM2
AV VALVE AREA: 2.64 CM2
AV VALVE AREA: 2.69 CM2
AV VELOCITY RATIO: 0.53
AV VELOCITY RATIO: 0.55
AV VELOCITY RATIO: 0.56
AV VELOCITY RATIO: 0.57
AV VELOCITY RATIO: 0.57
BACTERIA #/AREA URNS AUTO: ABNORMAL /HPF
BACTERIA #/AREA URNS AUTO: ABNORMAL /HPF
BACTERIA #/AREA URNS HPF: ABNORMAL /HPF
BACTERIA FLD AEROBE CULT: NO GROWTH
BACTERIA SPEC AEROBE CULT: NO GROWTH
BACTERIA SPEC ANAEROBE CULT: NORMAL
BACTERIA UR CULT: ABNORMAL
BACTERIA UR CULT: NO GROWTH
BACTERIA UR CULT: NORMAL
BASO STIPL BLD QL SMEAR: ABNORMAL
BASOPHILS # BLD AUTO: 0 K/UL (ref 0–0.2)
BASOPHILS # BLD AUTO: 0.01 K/UL (ref 0–0.2)
BASOPHILS # BLD AUTO: 0.02 K/UL (ref 0–0.2)
BASOPHILS # BLD AUTO: 0.03 K/UL (ref 0–0.2)
BASOPHILS # BLD AUTO: 0.03 K/UL (ref 0–0.2)
BASOPHILS # BLD AUTO: 0.04 K/UL (ref 0–0.2)
BASOPHILS # BLD AUTO: 0.04 K/UL (ref 0–0.2)
BASOPHILS # BLD AUTO: 0.05 K/UL (ref 0–0.2)
BASOPHILS # BLD AUTO: 0.06 K/UL (ref 0–0.2)
BASOPHILS # BLD AUTO: 0.07 K/UL (ref 0–0.2)
BASOPHILS # BLD AUTO: 0.07 K/UL (ref 0–0.2)
BASOPHILS # BLD AUTO: 0.08 K/UL (ref 0–0.2)
BASOPHILS # BLD AUTO: 0.08 K/UL (ref 0–0.2)
BASOPHILS # BLD AUTO: 0.11 K/UL (ref 0–0.2)
BASOPHILS # BLD AUTO: 0.17 K/UL (ref 0–0.2)
BASOPHILS # BLD AUTO: ABNORMAL K/UL (ref 0–0.2)
BASOPHILS # BLD AUTO: ABNORMAL K/UL (ref 0–0.2)
BASOPHILS NFR BLD: 0 % (ref 0–1.9)
BASOPHILS NFR BLD: 0.1 % (ref 0–1.9)
BASOPHILS NFR BLD: 0.2 % (ref 0–1.9)
BASOPHILS NFR BLD: 0.3 % (ref 0–1.9)
BASOPHILS NFR BLD: 0.4 % (ref 0–1.9)
BASOPHILS NFR BLD: 0.5 % (ref 0–1.9)
BASOPHILS NFR BLD: 0.6 % (ref 0–1.9)
BILIRUB SERPL-MCNC: 0.3 MG/DL (ref 0.1–1)
BILIRUB SERPL-MCNC: 0.4 MG/DL
BILIRUB SERPL-MCNC: 0.4 MG/DL
BILIRUB SERPL-MCNC: 0.4 MG/DL (ref 0.1–1)
BILIRUB SERPL-MCNC: 0.5 MG/DL
BILIRUB SERPL-MCNC: 0.5 MG/DL (ref 0.1–1)
BILIRUB SERPL-MCNC: 0.7 MG/DL (ref 0.1–1)
BILIRUB SERPL-MCNC: 0.8 MG/DL (ref 0.1–1)
BILIRUB SERPL-MCNC: 0.9 MG/DL (ref 0.1–1)
BILIRUB SERPL-MCNC: 1.1 MG/DL (ref 0.1–1)
BILIRUB SERPL-MCNC: 1.6 MG/DL (ref 0.1–1)
BILIRUB SERPL-MCNC: NORMAL MG/DL
BILIRUB UR QL STRIP: ABNORMAL
BILIRUB UR QL STRIP: NEGATIVE
BLD GP AB SCN CELLS X3 SERPL QL: NORMAL
BLOOD URINE, POC: NORMAL
BNP SERPL-MCNC: 374 PG/ML (ref 0–99)
BNP SERPL-MCNC: 639 PG/ML (ref 0–99)
BODY FLD TYPE: NORMAL
BODY FLD TYPE: NORMAL
BODY FLUID COMMENTS: NORMAL
BODY FLUID SOURCE, LDH: NORMAL
BSA FOR ECHO PROCEDURE: 1.79 M2
BSA FOR ECHO PROCEDURE: 1.92 M2
BSA FOR ECHO PROCEDURE: 1.97 M2
BSA FOR ECHO PROCEDURE: 2 M2
BSA FOR ECHO PROCEDURE: 2.02 M2
BUN SERPL-MCNC: 10 MG/DL (ref 2–20)
BUN SERPL-MCNC: 11 MG/DL
BUN SERPL-MCNC: 11 MG/DL (ref 2–20)
BUN SERPL-MCNC: 12 MG/DL (ref 2–20)
BUN SERPL-MCNC: 14 MG/DL (ref 2–20)
BUN SERPL-MCNC: 15 MG/DL
BUN SERPL-MCNC: 15 MG/DL (ref 2–20)
BUN SERPL-MCNC: 16 MG/DL
BUN SERPL-MCNC: 16 MG/DL (ref 2–20)
BUN SERPL-MCNC: 16 MG/DL (ref 8–23)
BUN SERPL-MCNC: 17 MG/DL
BUN SERPL-MCNC: 18 MG/DL
BUN SERPL-MCNC: 18 MG/DL (ref 2–20)
BUN SERPL-MCNC: 19 MG/DL (ref 8–23)
BUN SERPL-MCNC: 20 MG/DL (ref 8–23)
BUN SERPL-MCNC: 22 MG/DL (ref 8–23)
BUN SERPL-MCNC: 24 MG/DL (ref 8–23)
BUN SERPL-MCNC: 24 MG/DL (ref 8–23)
BUN SERPL-MCNC: 25 MG/DL (ref 8–23)
BUN SERPL-MCNC: 25 MG/DL (ref 8–23)
BUN SERPL-MCNC: 26 MG/DL (ref 8–23)
BUN SERPL-MCNC: 27 MG/DL (ref 8–23)
BUN SERPL-MCNC: 27 MG/DL (ref 8–23)
BUN SERPL-MCNC: 28 MG/DL (ref 8–23)
BUN SERPL-MCNC: 35 MG/DL (ref 8–23)
BUN SERPL-MCNC: 41 MG/DL (ref 8–23)
BUN SERPL-MCNC: 50 MG/DL (ref 8–23)
BUN SERPL-MCNC: 53 MG/DL (ref 8–23)
BUN SERPL-MCNC: 57 MG/DL (ref 8–23)
BUN SERPL-MCNC: 60 MG/DL (ref 8–23)
BUN SERPL-MCNC: 67 MG/DL (ref 8–23)
BUN SERPL-MCNC: 68 MG/DL (ref 8–23)
BUN SERPL-MCNC: 70 MG/DL (ref 8–23)
BUN SERPL-MCNC: 72 MG/DL (ref 8–23)
BUN SERPL-MCNC: 87 MG/DL (ref 8–23)
BUN SERPL-MCNC: 9 MG/DL (ref 2–20)
BUN SERPL-MCNC: 90 MG/DL (ref 8–23)
BUN SERPL-MCNC: 90 MG/DL (ref 8–23)
BUN SERPL-MCNC: 91 MG/DL (ref 8–23)
BUN SERPL-MCNC: 91 MG/DL (ref 8–23)
BUN SERPL-MCNC: 92 MG/DL (ref 8–23)
BUN SERPL-MCNC: 93 MG/DL (ref 8–23)
BUN SERPL-MCNC: 94 MG/DL (ref 8–23)
BUN SERPL-MCNC: 95 MG/DL (ref 8–23)
BURR CELLS BLD QL SMEAR: ABNORMAL
CALCIUM SERPL-MCNC: 7.5 MG/DL (ref 8.7–10.5)
CALCIUM SERPL-MCNC: 7.9 MG/DL (ref 8.7–10.5)
CALCIUM SERPL-MCNC: 7.9 MG/DL (ref 8.7–10.5)
CALCIUM SERPL-MCNC: 8 MG/DL (ref 8.7–10.5)
CALCIUM SERPL-MCNC: 8.1 MG/DL (ref 8.7–10.5)
CALCIUM SERPL-MCNC: 8.2 MG/DL (ref 8.7–10.5)
CALCIUM SERPL-MCNC: 8.3 MG/DL (ref 8.7–10.5)
CALCIUM SERPL-MCNC: 8.4 MG/DL (ref 8.7–10.5)
CALCIUM SERPL-MCNC: 8.5 MG/DL (ref 8.7–10.5)
CALCIUM SERPL-MCNC: 8.5 MG/DL (ref 8.7–10.5)
CALCIUM SERPL-MCNC: 8.6 MG/DL (ref 8.7–10.5)
CALCIUM SERPL-MCNC: 8.7 MG/DL (ref 8.7–10.5)
CALCIUM SERPL-MCNC: 8.8 MG/DL (ref 8.7–10.5)
CALCIUM SERPL-MCNC: 8.9 MG/DL
CALCIUM SERPL-MCNC: 8.9 MG/DL
CALCIUM SERPL-MCNC: 8.9 MG/DL (ref 8.7–10.5)
CALCIUM SERPL-MCNC: 9.1 MG/DL
CALCIUM SERPL-MCNC: 9.1 MG/DL (ref 8.7–10.5)
CALCIUM SERPL-MCNC: 9.2 MG/DL
CALCIUM SERPL-MCNC: 9.2 MG/DL
CALCIUM SERPL-MCNC: 9.3 MG/DL (ref 8.7–10.5)
CHLORIDE SERPL-SCNC: 100 MMOL/L (ref 95–110)
CHLORIDE SERPL-SCNC: 101 MMOL/L
CHLORIDE SERPL-SCNC: 101 MMOL/L (ref 95–110)
CHLORIDE SERPL-SCNC: 102 MMOL/L (ref 95–110)
CHLORIDE SERPL-SCNC: 103 MMOL/L
CHLORIDE SERPL-SCNC: 103 MMOL/L (ref 95–110)
CHLORIDE SERPL-SCNC: 104 MMOL/L
CHLORIDE SERPL-SCNC: 105 MMOL/L (ref 95–110)
CHLORIDE SERPL-SCNC: 105 MMOL/L (ref 95–110)
CHLORIDE SERPL-SCNC: 107 MMOL/L (ref 95–110)
CHLORIDE SERPL-SCNC: 92 MMOL/L (ref 95–110)
CHLORIDE SERPL-SCNC: 93 MMOL/L (ref 95–110)
CHLORIDE SERPL-SCNC: 94 MMOL/L (ref 95–110)
CHLORIDE SERPL-SCNC: 95 MMOL/L (ref 95–110)
CHLORIDE SERPL-SCNC: 96 MMOL/L (ref 95–110)
CHLORIDE SERPL-SCNC: 96 MMOL/L (ref 95–110)
CHLORIDE SERPL-SCNC: 97 MMOL/L (ref 95–110)
CHLORIDE SERPL-SCNC: 98 MMOL/L (ref 95–110)
CHLORIDE SERPL-SCNC: 99 MMOL/L (ref 95–110)
CHLORIDE UR-SCNC: <20 MMOL/L (ref 25–200)
CK SERPL-CCNC: 24 U/L (ref 20–200)
CLARITY UR REFRACT.AUTO: ABNORMAL
CLARITY UR REFRACT.AUTO: ABNORMAL
CLARITY UR REFRACT.AUTO: CLEAR
CLARITY UR: ABNORMAL
CO2 SERPL-SCNC: 12 MMOL/L (ref 23–29)
CO2 SERPL-SCNC: 13 MMOL/L (ref 23–29)
CO2 SERPL-SCNC: 15 MMOL/L (ref 23–29)
CO2 SERPL-SCNC: 15 MMOL/L (ref 23–29)
CO2 SERPL-SCNC: 16 MMOL/L (ref 23–29)
CO2 SERPL-SCNC: 16 MMOL/L (ref 23–29)
CO2 SERPL-SCNC: 17 MMOL/L (ref 23–29)
CO2 SERPL-SCNC: 18 MMOL/L (ref 23–29)
CO2 SERPL-SCNC: 18 MMOL/L (ref 23–29)
CO2 SERPL-SCNC: 20 MMOL/L (ref 23–29)
CO2 SERPL-SCNC: 21 MMOL/L (ref 23–29)
CO2 SERPL-SCNC: 22 MMOL/L (ref 23–29)
CO2 SERPL-SCNC: 23 MMOL/L (ref 23–29)
CO2 SERPL-SCNC: 24 MMOL/L (ref 23–29)
CO2 SERPL-SCNC: 25 MMOL/L (ref 23–29)
CO2 SERPL-SCNC: 25 MMOL/L (ref 23–29)
CO2 SERPL-SCNC: 26 MMOL/L
CO2 SERPL-SCNC: 26 MMOL/L (ref 23–29)
CO2 SERPL-SCNC: 26 MMOL/L (ref 23–29)
CO2 SERPL-SCNC: 27 MMOL/L
CO2 SERPL-SCNC: 27 MMOL/L
CO2 SERPL-SCNC: 27 MMOL/L (ref 23–29)
CO2 SERPL-SCNC: 28 MMOL/L
CO2 SERPL-SCNC: 28 MMOL/L (ref 23–29)
CO2 SERPL-SCNC: 29 MMOL/L
COLOR FLD: NORMAL
COLOR FLD: YELLOW
COLOR UR AUTO: ABNORMAL
COLOR UR AUTO: YELLOW
COLOR UR AUTO: YELLOW
COLOR UR: YELLOW
COLOR, POC UA: YELLOW
CREAT SERPL-MCNC: 0.6 MG/DL (ref 0.5–1.4)
CREAT SERPL-MCNC: 0.62 MG/DL (ref 0.5–1.4)
CREAT SERPL-MCNC: 0.63 MG/DL (ref 0.5–1.4)
CREAT SERPL-MCNC: 0.67 MG/DL (ref 0.5–1.4)
CREAT SERPL-MCNC: 0.69 MG/DL (ref 0.5–1.4)
CREAT SERPL-MCNC: 0.69 MG/DL (ref 0.5–1.4)
CREAT SERPL-MCNC: 0.7 MG/DL (ref 0.5–1.4)
CREAT SERPL-MCNC: 0.73 MG/DL (ref 0.5–1.4)
CREAT SERPL-MCNC: 0.74 MG/DL
CREAT SERPL-MCNC: 0.74 MG/DL (ref 0.5–1.4)
CREAT SERPL-MCNC: 0.77 MG/DL (ref 0.5–1.4)
CREAT SERPL-MCNC: 0.8 MG/DL (ref 0.5–1.4)
CREAT SERPL-MCNC: 0.84 MG/DL
CREAT SERPL-MCNC: 0.86 MG/DL
CREAT SERPL-MCNC: 0.86 MG/DL (ref 0.5–1.4)
CREAT SERPL-MCNC: 0.89 MG/DL
CREAT SERPL-MCNC: 0.9 MG/DL (ref 0.5–1.4)
CREAT SERPL-MCNC: 0.98 MG/DL
CREAT SERPL-MCNC: 1 MG/DL (ref 0.5–1.4)
CREAT SERPL-MCNC: 1.1 MG/DL (ref 0.5–1.4)
CREAT SERPL-MCNC: 1.1 MG/DL (ref 0.5–1.4)
CREAT SERPL-MCNC: 1.2 MG/DL (ref 0.5–1.4)
CREAT SERPL-MCNC: 1.3 MG/DL (ref 0.5–1.4)
CREAT SERPL-MCNC: 1.3 MG/DL (ref 0.5–1.4)
CREAT SERPL-MCNC: 1.4 MG/DL (ref 0.5–1.4)
CREAT SERPL-MCNC: 1.5 MG/DL (ref 0.5–1.4)
CREAT SERPL-MCNC: 2.2 MG/DL (ref 0.5–1.4)
CREAT SERPL-MCNC: 2.2 MG/DL (ref 0.5–1.4)
CREAT SERPL-MCNC: 2.4 MG/DL (ref 0.5–1.4)
CREAT SERPL-MCNC: 2.5 MG/DL (ref 0.5–1.4)
CREAT SERPL-MCNC: 3 MG/DL (ref 0.5–1.4)
CREAT SERPL-MCNC: 3.1 MG/DL (ref 0.5–1.4)
CREAT UR-MCNC: 121.4 MG/DL (ref 23–375)
CV ECHO LV RWT: 0.36 CM
CV ECHO LV RWT: 0.48 CM
CV ECHO LV RWT: 0.51 CM
CV ECHO LV RWT: 0.54 CM
CV ECHO LV RWT: 0.63 CM
CV ECHO LV RWT: 0.9 CM
DELSYS: ABNORMAL
DELSYS: ABNORMAL
DIFFERENTIAL METHOD: ABNORMAL
DOHLE BOD BLD QL SMEAR: PRESENT
DOP CALC AO PEAK VEL: 1.14 M/S
DOP CALC AO PEAK VEL: 1.24 M/S
DOP CALC AO PEAK VEL: 1.27 M/S
DOP CALC AO PEAK VEL: 1.41 M/S
DOP CALC AO PEAK VEL: 1.62 M/S
DOP CALC AO VTI: 15.9 CM
DOP CALC AO VTI: 18.06 CM
DOP CALC AO VTI: 24.66 CM
DOP CALC AO VTI: 25.71 CM
DOP CALC AO VTI: 26.69 CM
DOP CALC LVOT AREA: 3.3 CM2
DOP CALC LVOT AREA: 3.5 CM2
DOP CALC LVOT AREA: 3.9 CM2
DOP CALC LVOT AREA: 4 CM2
DOP CALC LVOT AREA: 4.2 CM2
DOP CALC LVOT DIAMETER: 2.04 CM
DOP CALC LVOT DIAMETER: 2.11 CM
DOP CALC LVOT DIAMETER: 2.23 CM
DOP CALC LVOT DIAMETER: 2.26 CM
DOP CALC LVOT DIAMETER: 2.3 CM
DOP CALC LVOT PEAK VEL: 0.6 M/S
DOP CALC LVOT PEAK VEL: 0.69 M/S
DOP CALC LVOT PEAK VEL: 0.72 M/S
DOP CALC LVOT PEAK VEL: 0.77 M/S
DOP CALC LVOT PEAK VEL: 0.93 M/S
DOP CALC LVOT STROKE VOLUME: 40.7 CM3
DOP CALC LVOT STROKE VOLUME: 42.24 CM3
DOP CALC LVOT STROKE VOLUME: 47.7 CM3
DOP CALC LVOT STROKE VOLUME: 63.85 CM3
DOP CALC LVOT STROKE VOLUME: 69.08 CM3
DOP CALC MV VTI: 21 CM
DOP CALCLVOT PEAK VEL VTI: 12.22 CM
DOP CALCLVOT PEAK VEL VTI: 12.93 CM
DOP CALCLVOT PEAK VEL VTI: 17.23 CM
DOP CALCLVOT PEAK VEL VTI: 18.27 CM
DOP CALCLVOT PEAK VEL VTI: 9.8 CM
E WAVE DECELERATION TIME: 122.99 MSEC
E WAVE DECELERATION TIME: 148.46 MSEC
E WAVE DECELERATION TIME: 186.7 MSEC
E WAVE DECELERATION TIME: 198.37 MSEC
E/A RATIO: 0.77
E/A RATIO: 0.96
E/A RATIO: 1.01
E/A RATIO: 1.17
E/E' RATIO: 10.71 M/S
E/E' RATIO: 12.35 M/S
E/E' RATIO: 12.59 M/S
ECHO LV POSTERIOR WALL: 0.71 CM (ref 0.6–1.1)
ECHO LV POSTERIOR WALL: 0.84 CM (ref 0.6–1.1)
ECHO LV POSTERIOR WALL: 0.97 CM (ref 0.6–1.1)
ECHO LV POSTERIOR WALL: 1 CM (ref 0.6–1.1)
ECHO LV POSTERIOR WALL: 1.04 CM (ref 0.6–1.1)
ECHO LV POSTERIOR WALL: 1.14 CM (ref 0.6–1.1)
EOSINOPHIL # BLD AUTO: 0 K/UL (ref 0–0.5)
EOSINOPHIL # BLD AUTO: 0.1 K/UL (ref 0–0.5)
EOSINOPHIL # BLD AUTO: ABNORMAL K/UL (ref 0–0.5)
EOSINOPHIL # BLD AUTO: ABNORMAL K/UL (ref 0–0.5)
EOSINOPHIL NFR BLD: 0 % (ref 0–8)
EOSINOPHIL NFR BLD: 0.1 % (ref 0–8)
EOSINOPHIL NFR BLD: 0.2 % (ref 0–8)
EOSINOPHIL NFR BLD: 0.3 % (ref 0–8)
EOSINOPHIL NFR BLD: 0.3 % (ref 0–8)
EOSINOPHIL NFR BLD: 0.4 % (ref 0–8)
EOSINOPHIL NFR BLD: 0.5 % (ref 0–8)
EOSINOPHIL NFR BLD: 0.6 % (ref 0–8)
EOSINOPHIL NFR BLD: 0.7 % (ref 0–8)
EOSINOPHIL URNS QL WRIGHT STN: NORMAL
EP: 5
ERYTHROCYTE [DISTWIDTH] IN BLOOD BY AUTOMATED COUNT: 16.1 %
ERYTHROCYTE [DISTWIDTH] IN BLOOD BY AUTOMATED COUNT: 16.6 %
ERYTHROCYTE [DISTWIDTH] IN BLOOD BY AUTOMATED COUNT: 16.9 %
ERYTHROCYTE [DISTWIDTH] IN BLOOD BY AUTOMATED COUNT: 17 %
ERYTHROCYTE [DISTWIDTH] IN BLOOD BY AUTOMATED COUNT: 17.1 % (ref 11.5–14.5)
ERYTHROCYTE [DISTWIDTH] IN BLOOD BY AUTOMATED COUNT: 17.6 %
ERYTHROCYTE [DISTWIDTH] IN BLOOD BY AUTOMATED COUNT: 17.9 % (ref 11.5–14.5)
ERYTHROCYTE [DISTWIDTH] IN BLOOD BY AUTOMATED COUNT: 18.5 % (ref 11.5–14.5)
ERYTHROCYTE [DISTWIDTH] IN BLOOD BY AUTOMATED COUNT: 18.6 % (ref 11.5–14.5)
ERYTHROCYTE [DISTWIDTH] IN BLOOD BY AUTOMATED COUNT: 19.2 % (ref 11.5–14.5)
ERYTHROCYTE [DISTWIDTH] IN BLOOD BY AUTOMATED COUNT: 19.3 % (ref 11.5–14.5)
ERYTHROCYTE [DISTWIDTH] IN BLOOD BY AUTOMATED COUNT: 19.6 % (ref 11.5–14.5)
ERYTHROCYTE [DISTWIDTH] IN BLOOD BY AUTOMATED COUNT: 19.6 % (ref 11.5–14.5)
ERYTHROCYTE [DISTWIDTH] IN BLOOD BY AUTOMATED COUNT: 19.7 % (ref 11.5–14.5)
ERYTHROCYTE [DISTWIDTH] IN BLOOD BY AUTOMATED COUNT: 19.8 % (ref 11.5–14.5)
ERYTHROCYTE [DISTWIDTH] IN BLOOD BY AUTOMATED COUNT: 19.8 % (ref 11.5–14.5)
ERYTHROCYTE [DISTWIDTH] IN BLOOD BY AUTOMATED COUNT: 19.9 % (ref 11.5–14.5)
ERYTHROCYTE [DISTWIDTH] IN BLOOD BY AUTOMATED COUNT: 20 % (ref 11.5–14.5)
ERYTHROCYTE [DISTWIDTH] IN BLOOD BY AUTOMATED COUNT: 20.1 % (ref 11.5–14.5)
ERYTHROCYTE [DISTWIDTH] IN BLOOD BY AUTOMATED COUNT: 20.2 % (ref 11.5–14.5)
ERYTHROCYTE [DISTWIDTH] IN BLOOD BY AUTOMATED COUNT: 20.2 % (ref 11.5–14.5)
ERYTHROCYTE [DISTWIDTH] IN BLOOD BY AUTOMATED COUNT: 20.3 % (ref 11.5–14.5)
ERYTHROCYTE [DISTWIDTH] IN BLOOD BY AUTOMATED COUNT: 20.5 % (ref 11.5–14.5)
ERYTHROCYTE [DISTWIDTH] IN BLOOD BY AUTOMATED COUNT: 20.6 % (ref 11.5–14.5)
ERYTHROCYTE [DISTWIDTH] IN BLOOD BY AUTOMATED COUNT: 20.6 % (ref 11.5–14.5)
ERYTHROCYTE [DISTWIDTH] IN BLOOD BY AUTOMATED COUNT: 20.8 % (ref 11.5–14.5)
ERYTHROCYTE [DISTWIDTH] IN BLOOD BY AUTOMATED COUNT: 21.2 % (ref 11.5–14.5)
ERYTHROCYTE [DISTWIDTH] IN BLOOD BY AUTOMATED COUNT: 21.3 % (ref 11.5–14.5)
ERYTHROCYTE [DISTWIDTH] IN BLOOD BY AUTOMATED COUNT: 21.4 % (ref 11.5–14.5)
ERYTHROCYTE [DISTWIDTH] IN BLOOD BY AUTOMATED COUNT: 21.4 % (ref 11.5–14.5)
ERYTHROCYTE [DISTWIDTH] IN BLOOD BY AUTOMATED COUNT: 21.5 % (ref 11.5–14.5)
ERYTHROCYTE [DISTWIDTH] IN BLOOD BY AUTOMATED COUNT: 21.6 % (ref 11.5–14.5)
ERYTHROCYTE [DISTWIDTH] IN BLOOD BY AUTOMATED COUNT: 21.7 % (ref 11.5–14.5)
ERYTHROCYTE [DISTWIDTH] IN BLOOD BY AUTOMATED COUNT: 21.8 % (ref 11.5–14.5)
ERYTHROCYTE [DISTWIDTH] IN BLOOD BY AUTOMATED COUNT: 23.6 % (ref 11.5–14.5)
ERYTHROCYTE [SEDIMENTATION RATE] IN BLOOD BY WESTERGREN METHOD: 5 MM/H
EST. GFR  (AFRICAN AMERICAN): 21 ML/MIN/1.73 M^2
EST. GFR  (AFRICAN AMERICAN): 22 ML/MIN/1.73 M^2
EST. GFR  (AFRICAN AMERICAN): 28 ML/MIN/1.73 M^2
EST. GFR  (AFRICAN AMERICAN): 29 ML/MIN/1.73 M^2
EST. GFR  (AFRICAN AMERICAN): 32 ML/MIN/1.73 M^2
EST. GFR  (AFRICAN AMERICAN): 32 ML/MIN/1.73 M^2
EST. GFR  (AFRICAN AMERICAN): 51.2 ML/MIN/1.73 M^2
EST. GFR  (AFRICAN AMERICAN): 55.6 ML/MIN/1.73 M^2
EST. GFR  (AFRICAN AMERICAN): >60 ML/MIN/1.73 M^2
EST. GFR  (NON AFRICAN AMERICAN): 18 ML/MIN/1.73 M^2
EST. GFR  (NON AFRICAN AMERICAN): 19 ML/MIN/1.73 M^2
EST. GFR  (NON AFRICAN AMERICAN): 24 ML/MIN/1.73 M^2
EST. GFR  (NON AFRICAN AMERICAN): 25 ML/MIN/1.73 M^2
EST. GFR  (NON AFRICAN AMERICAN): 28 ML/MIN/1.73 M^2
EST. GFR  (NON AFRICAN AMERICAN): 28 ML/MIN/1.73 M^2
EST. GFR  (NON AFRICAN AMERICAN): 44.3 ML/MIN/1.73 M^2
EST. GFR  (NON AFRICAN AMERICAN): 48.1 ML/MIN/1.73 M^2
EST. GFR  (NON AFRICAN AMERICAN): 52.6 ML/MIN/1.73 M^2
EST. GFR  (NON AFRICAN AMERICAN): 52.6 ML/MIN/1.73 M^2
EST. GFR  (NON AFRICAN AMERICAN): 58 ML/MIN/1.73 M^2
EST. GFR  (NON AFRICAN AMERICAN): >60 ML/MIN/1.73 M^2
FERRITIN SERPL-MCNC: 2912 NG/ML (ref 20–300)
FIO2: 32
FIO2: 50
FLOW: 3
FOLATE SERPL-MCNC: 10.2 NG/ML (ref 4–24)
FRACTIONAL SHORTENING: 13 % (ref 28–44)
FRACTIONAL SHORTENING: 29 % (ref 28–44)
FRACTIONAL SHORTENING: 29 % (ref 28–44)
FRACTIONAL SHORTENING: 31 % (ref 28–44)
FRACTIONAL SHORTENING: 37 % (ref 28–44)
FRACTIONAL SHORTENING: 47 % (ref 28–44)
FUROC - INTERPRETATION: NORMAL
FUROC - REASON FOR REFERRAL: NORMAL
FUROC - RELEASED BY: NORMAL
FUROC - RESULT SUMMARY: NEGATIVE
FUROC - RESULT: NORMAL
FUROC - SPECIMEN: NORMAL
GIANT PLATELETS BLD QL SMEAR: PRESENT
GIANT PLATELETS BLD QL SMEAR: PRESENT
GLUCOSE FLD-MCNC: 142 MG/DL
GLUCOSE FLD-MCNC: 61 MG/DL
GLUCOSE SERPL-MCNC: 102 MG/DL (ref 70–110)
GLUCOSE SERPL-MCNC: 102 MG/DL (ref 70–110)
GLUCOSE SERPL-MCNC: 103 MG/DL
GLUCOSE SERPL-MCNC: 104 MG/DL
GLUCOSE SERPL-MCNC: 105 MG/DL (ref 70–110)
GLUCOSE SERPL-MCNC: 108 MG/DL (ref 70–110)
GLUCOSE SERPL-MCNC: 110 MG/DL
GLUCOSE SERPL-MCNC: 110 MG/DL (ref 70–110)
GLUCOSE SERPL-MCNC: 110 MG/DL (ref 70–110)
GLUCOSE SERPL-MCNC: 111 MG/DL (ref 70–110)
GLUCOSE SERPL-MCNC: 112 MG/DL (ref 70–110)
GLUCOSE SERPL-MCNC: 112 MG/DL (ref 70–110)
GLUCOSE SERPL-MCNC: 113 MG/DL (ref 70–110)
GLUCOSE SERPL-MCNC: 115 MG/DL (ref 70–110)
GLUCOSE SERPL-MCNC: 118 MG/DL (ref 70–110)
GLUCOSE SERPL-MCNC: 118 MG/DL (ref 70–110)
GLUCOSE SERPL-MCNC: 120 MG/DL (ref 70–110)
GLUCOSE SERPL-MCNC: 121 MG/DL
GLUCOSE SERPL-MCNC: 121 MG/DL
GLUCOSE SERPL-MCNC: 121 MG/DL (ref 70–110)
GLUCOSE SERPL-MCNC: 124 MG/DL (ref 70–110)
GLUCOSE SERPL-MCNC: 125 MG/DL (ref 70–110)
GLUCOSE SERPL-MCNC: 131 MG/DL (ref 70–110)
GLUCOSE SERPL-MCNC: 133 MG/DL (ref 70–110)
GLUCOSE SERPL-MCNC: 133 MG/DL (ref 70–110)
GLUCOSE SERPL-MCNC: 136 MG/DL (ref 70–110)
GLUCOSE SERPL-MCNC: 139 MG/DL (ref 70–110)
GLUCOSE SERPL-MCNC: 143 MG/DL (ref 70–110)
GLUCOSE SERPL-MCNC: 144 MG/DL (ref 70–110)
GLUCOSE SERPL-MCNC: 144 MG/DL (ref 70–110)
GLUCOSE SERPL-MCNC: 148 MG/DL (ref 70–110)
GLUCOSE SERPL-MCNC: 152 MG/DL (ref 70–110)
GLUCOSE SERPL-MCNC: 155 MG/DL (ref 70–110)
GLUCOSE SERPL-MCNC: 159 MG/DL (ref 70–110)
GLUCOSE SERPL-MCNC: 161 MG/DL (ref 70–110)
GLUCOSE SERPL-MCNC: 168 MG/DL (ref 70–110)
GLUCOSE SERPL-MCNC: 168 MG/DL (ref 70–110)
GLUCOSE SERPL-MCNC: 93 MG/DL (ref 70–110)
GLUCOSE SERPL-MCNC: 95 MG/DL (ref 70–110)
GLUCOSE SERPL-MCNC: 98 MG/DL (ref 70–110)
GLUCOSE SERPL-MCNC: 98 MG/DL (ref 70–110)
GLUCOSE UR QL STRIP: NEGATIVE
GLUCOSE UR QL STRIP: NORMAL
GRAM STN SPEC: NORMAL
HCO3 UR-SCNC: 13.1 MMOL/L (ref 24–28)
HCO3 UR-SCNC: 18 MMOL/L (ref 24–28)
HCT VFR BLD AUTO: 29 % (ref 40–54)
HCT VFR BLD AUTO: 29.6 % (ref 40–54)
HCT VFR BLD AUTO: 29.8 % (ref 40–54)
HCT VFR BLD AUTO: 30.2 % (ref 40–54)
HCT VFR BLD AUTO: 30.6 % (ref 40–54)
HCT VFR BLD AUTO: 30.7 % (ref 40–54)
HCT VFR BLD AUTO: 30.7 % (ref 40–54)
HCT VFR BLD AUTO: 30.8 % (ref 40–54)
HCT VFR BLD AUTO: 30.9 % (ref 40–54)
HCT VFR BLD AUTO: 31 % (ref 40–54)
HCT VFR BLD AUTO: 31.4 % (ref 40–54)
HCT VFR BLD AUTO: 31.5 % (ref 40–54)
HCT VFR BLD AUTO: 31.7 % (ref 40–54)
HCT VFR BLD AUTO: 31.8 % (ref 40–54)
HCT VFR BLD AUTO: 31.9 % (ref 40–54)
HCT VFR BLD AUTO: 32 % (ref 40–54)
HCT VFR BLD AUTO: 32 % (ref 40–54)
HCT VFR BLD AUTO: 32.4 % (ref 40–54)
HCT VFR BLD AUTO: 32.4 % (ref 40–54)
HCT VFR BLD AUTO: 32.8 % (ref 40–54)
HCT VFR BLD AUTO: 32.8 % (ref 40–54)
HCT VFR BLD AUTO: 32.9 % (ref 40–54)
HCT VFR BLD AUTO: 33 %
HCT VFR BLD AUTO: 33.1 % (ref 40–54)
HCT VFR BLD AUTO: 33.4 % (ref 40–54)
HCT VFR BLD AUTO: 33.6 % (ref 40–54)
HCT VFR BLD AUTO: 33.7 % (ref 40–54)
HCT VFR BLD AUTO: 34.1 % (ref 40–54)
HCT VFR BLD AUTO: 34.2 % (ref 40–54)
HCT VFR BLD AUTO: 34.4 % (ref 40–54)
HCT VFR BLD AUTO: 34.7 % (ref 40–54)
HCT VFR BLD AUTO: 35 %
HCT VFR BLD AUTO: 35 %
HCT VFR BLD AUTO: 35.1 % (ref 40–54)
HCT VFR BLD AUTO: 35.3 % (ref 40–54)
HCT VFR BLD AUTO: 35.8 % (ref 40–54)
HCT VFR BLD AUTO: 36 %
HCT VFR BLD AUTO: 36.2 %
HCT VFR BLD AUTO: 36.4 % (ref 40–54)
HCT VFR BLD AUTO: 38 % (ref 40–54)
HCT VFR BLD AUTO: 40.8 % (ref 40–54)
HCT VFR BLD AUTO: 41.2 % (ref 40–54)
HCT VFR BLD AUTO: 41.4 % (ref 40–54)
HCT VFR BLD AUTO: 44.6 % (ref 40–54)
HCT VFR BLD AUTO: 44.8 % (ref 40–54)
HCT VFR BLD CALC: 39 %PCV (ref 36–54)
HCT VFR BLD CALC: 40 %PCV (ref 36–54)
HGB BLD-MCNC: 10 G/DL
HGB BLD-MCNC: 10.2 G/DL (ref 14–18)
HGB BLD-MCNC: 10.2 G/DL (ref 14–18)
HGB BLD-MCNC: 10.3 G/DL (ref 14–18)
HGB BLD-MCNC: 10.3 G/DL (ref 14–18)
HGB BLD-MCNC: 10.4 G/DL (ref 14–18)
HGB BLD-MCNC: 10.5 G/DL (ref 14–18)
HGB BLD-MCNC: 10.7 G/DL
HGB BLD-MCNC: 10.8 G/DL
HGB BLD-MCNC: 10.8 G/DL
HGB BLD-MCNC: 10.8 G/DL (ref 14–18)
HGB BLD-MCNC: 10.9 G/DL (ref 14–18)
HGB BLD-MCNC: 10.9 G/DL (ref 14–18)
HGB BLD-MCNC: 11 G/DL
HGB BLD-MCNC: 11.1 G/DL (ref 14–18)
HGB BLD-MCNC: 11.9 G/DL (ref 14–18)
HGB BLD-MCNC: 12 G/DL (ref 14–18)
HGB BLD-MCNC: 12.1 G/DL (ref 14–18)
HGB BLD-MCNC: 13 G/DL
HGB BLD-MCNC: 13.1 G/DL (ref 14–18)
HGB BLD-MCNC: 13.3 G/DL (ref 14–18)
HGB BLD-MCNC: 14 G/DL
HGB BLD-MCNC: 8.7 G/DL (ref 14–18)
HGB BLD-MCNC: 8.9 G/DL (ref 14–18)
HGB BLD-MCNC: 9 G/DL (ref 14–18)
HGB BLD-MCNC: 9.1 G/DL (ref 14–18)
HGB BLD-MCNC: 9.2 G/DL (ref 14–18)
HGB BLD-MCNC: 9.2 G/DL (ref 14–18)
HGB BLD-MCNC: 9.3 G/DL (ref 14–18)
HGB BLD-MCNC: 9.4 G/DL (ref 14–18)
HGB BLD-MCNC: 9.5 G/DL (ref 14–18)
HGB BLD-MCNC: 9.6 G/DL (ref 14–18)
HGB BLD-MCNC: 9.7 G/DL (ref 14–18)
HGB BLD-MCNC: 9.8 G/DL (ref 14–18)
HGB BLD-MCNC: 9.9 G/DL (ref 14–18)
HGB UR QL STRIP: ABNORMAL
HGB UR QL STRIP: NEGATIVE
HYALINE CASTS #/AREA URNS LPF: 3 /LPF
HYALINE CASTS UR QL AUTO: 0 /LPF
HYALINE CASTS UR QL AUTO: 4 /LPF
HYPOCHROMIA BLD QL SMEAR: ABNORMAL
IMM GRANULOCYTES # BLD AUTO: 0.11 K/UL (ref 0–0.04)
IMM GRANULOCYTES # BLD AUTO: 0.11 K/UL (ref 0–0.04)
IMM GRANULOCYTES # BLD AUTO: 0.13 K/UL (ref 0–0.04)
IMM GRANULOCYTES # BLD AUTO: 0.13 K/UL (ref 0–0.04)
IMM GRANULOCYTES # BLD AUTO: 0.14 K/UL (ref 0–0.04)
IMM GRANULOCYTES # BLD AUTO: 0.15 K/UL (ref 0–0.04)
IMM GRANULOCYTES # BLD AUTO: 0.16 K/UL (ref 0–0.04)
IMM GRANULOCYTES # BLD AUTO: 0.19 K/UL (ref 0–0.04)
IMM GRANULOCYTES # BLD AUTO: 0.19 K/UL (ref 0–0.04)
IMM GRANULOCYTES # BLD AUTO: 0.22 K/UL (ref 0–0.04)
IMM GRANULOCYTES # BLD AUTO: 0.28 K/UL (ref 0–0.04)
IMM GRANULOCYTES # BLD AUTO: 0.54 K/UL (ref 0–0.04)
IMM GRANULOCYTES # BLD AUTO: 0.81 K/UL (ref 0–0.04)
IMM GRANULOCYTES # BLD AUTO: 0.91 K/UL (ref 0–0.04)
IMM GRANULOCYTES # BLD AUTO: 0.96 K/UL (ref 0–0.04)
IMM GRANULOCYTES # BLD AUTO: 1.13 K/UL (ref 0–0.04)
IMM GRANULOCYTES # BLD AUTO: 1.25 K/UL (ref 0–0.04)
IMM GRANULOCYTES # BLD AUTO: 1.3 K/UL (ref 0–0.04)
IMM GRANULOCYTES # BLD AUTO: 1.98 K/UL (ref 0–0.04)
IMM GRANULOCYTES # BLD AUTO: ABNORMAL K/UL (ref 0–0.04)
IMM GRANULOCYTES # BLD AUTO: ABNORMAL K/UL (ref 0–0.04)
IMM GRANULOCYTES NFR BLD AUTO: 0.9 % (ref 0–0.5)
IMM GRANULOCYTES NFR BLD AUTO: 0.9 % (ref 0–0.5)
IMM GRANULOCYTES NFR BLD AUTO: 1.1 % (ref 0–0.5)
IMM GRANULOCYTES NFR BLD AUTO: 1.2 % (ref 0–0.5)
IMM GRANULOCYTES NFR BLD AUTO: 1.3 % (ref 0–0.5)
IMM GRANULOCYTES NFR BLD AUTO: 1.3 % (ref 0–0.5)
IMM GRANULOCYTES NFR BLD AUTO: 1.4 % (ref 0–0.5)
IMM GRANULOCYTES NFR BLD AUTO: 1.5 % (ref 0–0.5)
IMM GRANULOCYTES NFR BLD AUTO: 1.6 % (ref 0–0.5)
IMM GRANULOCYTES NFR BLD AUTO: 1.8 % (ref 0–0.5)
IMM GRANULOCYTES NFR BLD AUTO: 2.2 % (ref 0–0.5)
IMM GRANULOCYTES NFR BLD AUTO: 2.7 % (ref 0–0.5)
IMM GRANULOCYTES NFR BLD AUTO: 2.7 % (ref 0–0.5)
IMM GRANULOCYTES NFR BLD AUTO: 2.8 % (ref 0–0.5)
IMM GRANULOCYTES NFR BLD AUTO: 2.9 % (ref 0–0.5)
IMM GRANULOCYTES NFR BLD AUTO: 3.1 % (ref 0–0.5)
IMM GRANULOCYTES NFR BLD AUTO: 3.3 % (ref 0–0.5)
IMM GRANULOCYTES NFR BLD AUTO: ABNORMAL % (ref 0–0.5)
IMM GRANULOCYTES NFR BLD AUTO: ABNORMAL % (ref 0–0.5)
INFLUENZA A, MOLECULAR: NEGATIVE
INFLUENZA B, MOLECULAR: NEGATIVE
INR PPP: 0.9 (ref 0.8–1.2)
INR PPP: 1.1 (ref 0.8–1.2)
INR PPP: 1.1 (ref 0.8–1.2)
INR PPP: 1.2 (ref 0.8–1.2)
INTERVENTRICULAR SEPTUM: 0.75 CM (ref 0.6–1.1)
INTERVENTRICULAR SEPTUM: 0.76 CM (ref 0.6–1.1)
INTERVENTRICULAR SEPTUM: 0.95 CM (ref 0.6–1.1)
INTERVENTRICULAR SEPTUM: 1 CM (ref 0.6–1.1)
INTERVENTRICULAR SEPTUM: 1.08 CM (ref 0.6–1.1)
INTERVENTRICULAR SEPTUM: 1.2 CM (ref 0.6–1.1)
IP: 10
IRON SERPL-MCNC: 22 UG/DL (ref 45–160)
IVC PROX: 1.5 CM
IVRT: 0.07 MSEC
KETONES UR QL STRIP: NEGATIVE
KETONES UR QL STRIP: NORMAL
LA MAJOR: 4.76 CM
LA MAJOR: 4.84 CM
LA MAJOR: 4.95 CM
LA MAJOR: 4.99 CM
LA MAJOR: 5.28 CM
LA MINOR: 4.76 CM
LA MINOR: 4.94 CM
LA MINOR: 5.1 CM
LA MINOR: 5.19 CM
LA MINOR: 5.19 CM
LA WIDTH: 3.65 CM
LA WIDTH: 3.71 CM
LA WIDTH: 3.9 CM
LA WIDTH: 3.9 CM
LACTATE SERPL-SCNC: 5.4 MMOL/L (ref 0.5–2.2)
LACTATE SERPL-SCNC: 7 MMOL/L (ref 0.5–2.2)
LACTATE SERPL-SCNC: 9.4 MMOL/L (ref 0.5–2.2)
LACTATE SERPL-SCNC: 9.6 MMOL/L (ref 0.5–2.2)
LACTATE SERPL-SCNC: 9.6 MMOL/L (ref 0.5–2.2)
LDH FLD L TO P-CCNC: 176 U/L
LDH FLD L TO P-CCNC: 2013 U/L
LEFT ATRIUM SIZE: 3.29 CM
LEFT ATRIUM SIZE: 3.43 CM
LEFT ATRIUM SIZE: 3.59 CM
LEFT ATRIUM SIZE: 3.6 CM
LEFT ATRIUM SIZE: 3.74 CM
LEFT ATRIUM SIZE: 3.87 CM
LEFT ATRIUM VOLUME INDEX: 28.6 ML/M2
LEFT ATRIUM VOLUME INDEX: 29 ML/M2
LEFT ATRIUM VOLUME INDEX: 32.3 ML/M2
LEFT ATRIUM VOLUME: 54.15 CM3
LEFT ATRIUM VOLUME: 55.49 CM3
LEFT ATRIUM VOLUME: 56.61 CM3
LEFT ATRIUM VOLUME: 64.45 CM3
LEFT INTERNAL DIMENSION IN SYSTOLE: 1.33 CM (ref 2.1–4)
LEFT INTERNAL DIMENSION IN SYSTOLE: 2.3 CM (ref 2.1–4)
LEFT INTERNAL DIMENSION IN SYSTOLE: 2.42 CM (ref 2.1–4)
LEFT INTERNAL DIMENSION IN SYSTOLE: 2.77 CM (ref 2.1–4)
LEFT INTERNAL DIMENSION IN SYSTOLE: 2.77 CM (ref 2.1–4)
LEFT INTERNAL DIMENSION IN SYSTOLE: 2.84 CM (ref 2.1–4)
LEFT VENTRICLE DIASTOLIC VOLUME INDEX: 11.98 ML/M2
LEFT VENTRICLE DIASTOLIC VOLUME INDEX: 22.25 ML/M2
LEFT VENTRICLE DIASTOLIC VOLUME INDEX: 32.74 ML/M2
LEFT VENTRICLE DIASTOLIC VOLUME INDEX: 33.11 ML/M2
LEFT VENTRICLE DIASTOLIC VOLUME INDEX: 38.54 ML/M2
LEFT VENTRICLE DIASTOLIC VOLUME: 22.67 ML
LEFT VENTRICLE DIASTOLIC VOLUME: 44.47 ML
LEFT VENTRICLE DIASTOLIC VOLUME: 63.9 ML
LEFT VENTRICLE DIASTOLIC VOLUME: 65.71 ML
LEFT VENTRICLE DIASTOLIC VOLUME: 68.97 ML
LEFT VENTRICLE DIASTOLIC VOLUME: 71 ML
LEFT VENTRICLE MASS INDEX: 31 G/M2
LEFT VENTRICLE MASS INDEX: 40 G/M2
LEFT VENTRICLE MASS INDEX: 45 G/M2
LEFT VENTRICLE MASS INDEX: 50 G/M2
LEFT VENTRICLE MASS INDEX: 61 G/M2
LEFT VENTRICLE SYSTOLIC VOLUME INDEX: 10.6 ML/M2
LEFT VENTRICLE SYSTOLIC VOLUME INDEX: 14.5 ML/M2
LEFT VENTRICLE SYSTOLIC VOLUME INDEX: 16 ML/M2
LEFT VENTRICLE SYSTOLIC VOLUME INDEX: 2.3 ML/M2
LEFT VENTRICLE SYSTOLIC VOLUME INDEX: 9.1 ML/M2
LEFT VENTRICLE SYSTOLIC VOLUME: 18.12 ML
LEFT VENTRICLE SYSTOLIC VOLUME: 20.66 ML
LEFT VENTRICLE SYSTOLIC VOLUME: 28.69 ML
LEFT VENTRICLE SYSTOLIC VOLUME: 28.81 ML
LEFT VENTRICLE SYSTOLIC VOLUME: 30.58 ML
LEFT VENTRICLE SYSTOLIC VOLUME: 4.4 ML
LEFT VENTRICULAR INTERNAL DIMENSION IN DIASTOLE: 2.52 CM (ref 3.5–6)
LEFT VENTRICULAR INTERNAL DIMENSION IN DIASTOLE: 3.2 CM (ref 3.5–6)
LEFT VENTRICULAR INTERNAL DIMENSION IN DIASTOLE: 3.31 CM (ref 3.5–6)
LEFT VENTRICULAR INTERNAL DIMENSION IN DIASTOLE: 3.85 CM (ref 3.5–6)
LEFT VENTRICULAR INTERNAL DIMENSION IN DIASTOLE: 3.9 CM (ref 3.5–6)
LEFT VENTRICULAR INTERNAL DIMENSION IN DIASTOLE: 4.02 CM (ref 3.5–6)
LEFT VENTRICULAR MASS: 118.84 G
LEFT VENTRICULAR MASS: 143.88 G
LEFT VENTRICULAR MASS: 58.64 G
LEFT VENTRICULAR MASS: 80.09 G
LEFT VENTRICULAR MASS: 89.45 G
LEFT VENTRICULAR MASS: 90.32 G
LEUKOCYTE ESTERASE UR QL STRIP: ABNORMAL
LEUKOCYTE ESTERASE UR QL STRIP: NEGATIVE
LEUKOCYTE ESTERASE URINE, POC: NORMAL
LV LATERAL E/E' RATIO: 15 M/S
LV LATERAL E/E' RATIO: 15.17 M/S
LV LATERAL E/E' RATIO: 15.29 M/S
LV SEPTAL E/E' RATIO: 10.5 M/S
LV SEPTAL E/E' RATIO: 10.7 M/S
LV SEPTAL E/E' RATIO: 8.27 M/S
LYMPHOCYTES # BLD AUTO: 0.2 K/UL (ref 1–4.8)
LYMPHOCYTES # BLD AUTO: 0.4 K/UL (ref 1–4.8)
LYMPHOCYTES # BLD AUTO: 0.4 K/UL (ref 1–4.8)
LYMPHOCYTES # BLD AUTO: 0.5 K/UL (ref 1–4.8)
LYMPHOCYTES # BLD AUTO: 0.6 K/UL (ref 1–4.8)
LYMPHOCYTES # BLD AUTO: 0.7 K/UL (ref 1–4.8)
LYMPHOCYTES # BLD AUTO: 0.8 K/UL (ref 1–4.8)
LYMPHOCYTES # BLD AUTO: 0.9 K/UL (ref 1–4.8)
LYMPHOCYTES # BLD AUTO: 1 K/UL (ref 1–4.8)
LYMPHOCYTES # BLD AUTO: ABNORMAL K/UL (ref 1–4.8)
LYMPHOCYTES # BLD AUTO: ABNORMAL K/UL (ref 1–4.8)
LYMPHOCYTES NFR BLD: 0.5 % (ref 18–48)
LYMPHOCYTES NFR BLD: 1 % (ref 18–48)
LYMPHOCYTES NFR BLD: 1 % (ref 18–48)
LYMPHOCYTES NFR BLD: 1.6 % (ref 18–48)
LYMPHOCYTES NFR BLD: 1.7 % (ref 18–48)
LYMPHOCYTES NFR BLD: 1.7 % (ref 18–48)
LYMPHOCYTES NFR BLD: 1.9 % (ref 18–48)
LYMPHOCYTES NFR BLD: 12.1 % (ref 18–48)
LYMPHOCYTES NFR BLD: 2.1 % (ref 18–48)
LYMPHOCYTES NFR BLD: 2.1 % (ref 18–48)
LYMPHOCYTES NFR BLD: 2.2 % (ref 18–48)
LYMPHOCYTES NFR BLD: 2.4 % (ref 18–48)
LYMPHOCYTES NFR BLD: 2.4 % (ref 18–48)
LYMPHOCYTES NFR BLD: 3.1 % (ref 18–48)
LYMPHOCYTES NFR BLD: 3.4 % (ref 18–48)
LYMPHOCYTES NFR BLD: 4 % (ref 18–48)
LYMPHOCYTES NFR BLD: 5 % (ref 18–48)
LYMPHOCYTES NFR BLD: 5.2 % (ref 18–48)
LYMPHOCYTES NFR BLD: 5.5 % (ref 18–48)
LYMPHOCYTES NFR BLD: 5.7 % (ref 18–48)
LYMPHOCYTES NFR BLD: 5.9 % (ref 18–48)
LYMPHOCYTES NFR BLD: 6.6 % (ref 18–48)
LYMPHOCYTES NFR BLD: 7.1 % (ref 18–48)
LYMPHOCYTES NFR BLD: 7.2 % (ref 18–48)
LYMPHOCYTES NFR BLD: 8 % (ref 18–48)
LYMPHOCYTES NFR BLD: 9.2 % (ref 18–48)
LYMPHOCYTES NFR BLD: 9.6 % (ref 18–48)
LYMPHOCYTES NFR BLD: 9.9 % (ref 18–48)
LYMPHOCYTES NFR BLD: 9.9 % (ref 18–48)
LYMPHOCYTES NFR FLD MANUAL: 25 %
LYMPHOCYTES NFR FLD MANUAL: 60 %
MAGNESIUM SERPL-MCNC: 1.8 MG/DL (ref 1.6–2.6)
MAGNESIUM SERPL-MCNC: 1.8 MG/DL (ref 1.6–2.6)
MAGNESIUM SERPL-MCNC: 1.9 MG/DL (ref 1.6–2.6)
MAGNESIUM SERPL-MCNC: 2 MG/DL (ref 1.6–2.6)
MAGNESIUM SERPL-MCNC: 2.1 MG/DL (ref 1.6–2.6)
MAGNESIUM SERPL-MCNC: 2.2 MG/DL (ref 1.6–2.6)
MAGNESIUM SERPL-MCNC: 2.5 MG/DL (ref 1.6–2.6)
MAGNESIUM SERPL-MCNC: 2.6 MG/DL (ref 1.6–2.6)
MCH RBC QN AUTO: 26.1 PG (ref 27–31)
MCH RBC QN AUTO: 26.2 PG (ref 27–31)
MCH RBC QN AUTO: 26.3 PG (ref 27–31)
MCH RBC QN AUTO: 26.4 PG (ref 27–31)
MCH RBC QN AUTO: 26.5 PG (ref 27–31)
MCH RBC QN AUTO: 26.6 PG (ref 27–31)
MCH RBC QN AUTO: 26.7 PG (ref 27–31)
MCH RBC QN AUTO: 26.8 PG (ref 27–31)
MCH RBC QN AUTO: 26.9 PG (ref 27–31)
MCH RBC QN AUTO: 26.9 PG (ref 27–31)
MCH RBC QN AUTO: 27 PG (ref 27–31)
MCH RBC QN AUTO: 27.1 PG (ref 27–31)
MCH RBC QN AUTO: 27.1 PG (ref 27–31)
MCH RBC QN AUTO: 27.3 PG (ref 27–31)
MCH RBC QN AUTO: 27.4 PG (ref 27–31)
MCH RBC QN AUTO: 27.5 PG (ref 27–31)
MCH RBC QN AUTO: 27.5 PG (ref 27–31)
MCH RBC QN AUTO: 28.8 PG (ref 27–31)
MCH RBC QN AUTO: 29 PG (ref 27–31)
MCH RBC QN AUTO: 29.1 PG (ref 27–31)
MCH RBC QN AUTO: 29.1 PG (ref 27–31)
MCH RBC QN AUTO: 29.5 PG
MCH RBC QN AUTO: 29.5 PG (ref 27–31)
MCH RBC QN AUTO: 29.8 PG
MCH RBC QN AUTO: 29.8 PG (ref 27–31)
MCH RBC QN AUTO: 30.1 PG (ref 27–31)
MCH RBC QN AUTO: 30.2 PG
MCH RBC QN AUTO: 30.3 PG
MCH RBC QN AUTO: 30.4 PG
MCHC RBC AUTO-ENTMCNC: 28.7 G/DL (ref 32–36)
MCHC RBC AUTO-ENTMCNC: 29.1 G/DL (ref 32–36)
MCHC RBC AUTO-ENTMCNC: 29.2 G/DL (ref 32–36)
MCHC RBC AUTO-ENTMCNC: 29.3 G/DL (ref 32–36)
MCHC RBC AUTO-ENTMCNC: 29.4 G/DL (ref 32–36)
MCHC RBC AUTO-ENTMCNC: 29.6 G/DL (ref 32–36)
MCHC RBC AUTO-ENTMCNC: 29.7 G/DL (ref 32–36)
MCHC RBC AUTO-ENTMCNC: 29.7 G/DL (ref 32–36)
MCHC RBC AUTO-ENTMCNC: 29.8 G/DL (ref 32–36)
MCHC RBC AUTO-ENTMCNC: 29.9 G/DL (ref 32–36)
MCHC RBC AUTO-ENTMCNC: 30 G/DL
MCHC RBC AUTO-ENTMCNC: 30 G/DL (ref 32–36)
MCHC RBC AUTO-ENTMCNC: 30.2 G/DL (ref 32–36)
MCHC RBC AUTO-ENTMCNC: 30.2 G/DL (ref 32–36)
MCHC RBC AUTO-ENTMCNC: 30.3 G/DL
MCHC RBC AUTO-ENTMCNC: 30.3 G/DL (ref 32–36)
MCHC RBC AUTO-ENTMCNC: 30.4 G/DL
MCHC RBC AUTO-ENTMCNC: 30.4 G/DL (ref 32–36)
MCHC RBC AUTO-ENTMCNC: 30.4 G/DL (ref 32–36)
MCHC RBC AUTO-ENTMCNC: 30.6 G/DL
MCHC RBC AUTO-ENTMCNC: 30.6 G/DL (ref 32–36)
MCHC RBC AUTO-ENTMCNC: 30.7 G/DL (ref 32–36)
MCHC RBC AUTO-ENTMCNC: 30.8 G/DL (ref 32–36)
MCHC RBC AUTO-ENTMCNC: 30.8 G/DL (ref 32–36)
MCHC RBC AUTO-ENTMCNC: 30.9 G/DL
MCHC RBC AUTO-ENTMCNC: 31.1 G/DL (ref 32–36)
MCHC RBC AUTO-ENTMCNC: 31.4 G/DL (ref 32–36)
MCHC RBC AUTO-ENTMCNC: 31.6 G/DL (ref 32–36)
MCV RBC AUTO: 100 FL
MCV RBC AUTO: 85 FL (ref 82–98)
MCV RBC AUTO: 86 FL (ref 82–98)
MCV RBC AUTO: 87 FL (ref 82–98)
MCV RBC AUTO: 88 FL (ref 82–98)
MCV RBC AUTO: 89 FL (ref 82–98)
MCV RBC AUTO: 90 FL (ref 82–98)
MCV RBC AUTO: 91 FL (ref 82–98)
MCV RBC AUTO: 92 FL (ref 82–98)
MCV RBC AUTO: 92 FL (ref 82–98)
MCV RBC AUTO: 93 FL (ref 82–98)
MCV RBC AUTO: 94 FL (ref 82–98)
MCV RBC AUTO: 95 FL (ref 82–98)
MCV RBC AUTO: 96 FL (ref 82–98)
MCV RBC AUTO: 97 FL (ref 82–98)
MCV RBC AUTO: 98 FL
MCV RBC AUTO: 98 FL (ref 82–98)
MCV RBC AUTO: 99 FL
MCV RBC AUTO: 99 FL (ref 82–98)
MICROSCOPIC COMMENT: ABNORMAL
MODE: ABNORMAL
MODE: ABNORMAL
MONOCYTES # BLD AUTO: 0.2 K/UL (ref 0.3–1)
MONOCYTES # BLD AUTO: 0.2 K/UL (ref 0.3–1)
MONOCYTES # BLD AUTO: 0.4 K/UL (ref 0.3–1)
MONOCYTES # BLD AUTO: 0.6 K/UL (ref 0.3–1)
MONOCYTES # BLD AUTO: 0.9 K/UL (ref 0.3–1)
MONOCYTES # BLD AUTO: 1 K/UL (ref 0.3–1)
MONOCYTES # BLD AUTO: 1.2 K/UL (ref 0.3–1)
MONOCYTES # BLD AUTO: 1.5 K/UL (ref 0.3–1)
MONOCYTES # BLD AUTO: 1.6 K/UL (ref 0.3–1)
MONOCYTES # BLD AUTO: 1.7 K/UL (ref 0.3–1)
MONOCYTES # BLD AUTO: 1.8 K/UL (ref 0.3–1)
MONOCYTES # BLD AUTO: 1.8 K/UL (ref 0.3–1)
MONOCYTES # BLD AUTO: 1.9 K/UL (ref 0.3–1)
MONOCYTES # BLD AUTO: 2 K/UL (ref 0.3–1)
MONOCYTES # BLD AUTO: 2.1 K/UL (ref 0.3–1)
MONOCYTES # BLD AUTO: 2.2 K/UL (ref 0.3–1)
MONOCYTES # BLD AUTO: 2.2 K/UL (ref 0.3–1)
MONOCYTES # BLD AUTO: 2.3 K/UL (ref 0.3–1)
MONOCYTES # BLD AUTO: ABNORMAL K/UL (ref 0.3–1)
MONOCYTES # BLD AUTO: ABNORMAL K/UL (ref 0.3–1)
MONOCYTES NFR BLD: 0 % (ref 4–15)
MONOCYTES NFR BLD: 1 % (ref 4–15)
MONOCYTES NFR BLD: 1.2 % (ref 4–15)
MONOCYTES NFR BLD: 12.3 % (ref 4–15)
MONOCYTES NFR BLD: 12.5 % (ref 4–15)
MONOCYTES NFR BLD: 12.7 % (ref 4–15)
MONOCYTES NFR BLD: 14.7 % (ref 4–15)
MONOCYTES NFR BLD: 17.5 % (ref 4–15)
MONOCYTES NFR BLD: 18.8 % (ref 4–15)
MONOCYTES NFR BLD: 2 % (ref 4–15)
MONOCYTES NFR BLD: 2.6 % (ref 4–15)
MONOCYTES NFR BLD: 2.9 % (ref 4–15)
MONOCYTES NFR BLD: 20 % (ref 4–15)
MONOCYTES NFR BLD: 20 % (ref 4–15)
MONOCYTES NFR BLD: 24.2 % (ref 4–15)
MONOCYTES NFR BLD: 24.4 % (ref 4–15)
MONOCYTES NFR BLD: 25.1 % (ref 4–15)
MONOCYTES NFR BLD: 25.5 % (ref 4–15)
MONOCYTES NFR BLD: 25.5 % (ref 4–15)
MONOCYTES NFR BLD: 3.1 % (ref 4–15)
MONOCYTES NFR BLD: 3.5 % (ref 4–15)
MONOCYTES NFR BLD: 4.1 % (ref 4–15)
MONOCYTES NFR BLD: 4.4 % (ref 4–15)
MONOCYTES NFR BLD: 4.9 % (ref 4–15)
MONOCYTES NFR BLD: 5 % (ref 4–15)
MONOCYTES NFR BLD: 5.3 % (ref 4–15)
MONOCYTES NFR BLD: 5.5 % (ref 4–15)
MONOCYTES NFR BLD: 6.3 % (ref 4–15)
MONOCYTES NFR BLD: 7.9 % (ref 4–15)
MONOS+MACROS NFR FLD MANUAL: 27 %
MONOS+MACROS NFR FLD MANUAL: 32 %
MV A" WAVE DURATION": 104 MSEC
MV PEAK A VEL: 0.76 M/S
MV PEAK A VEL: 0.9 M/S
MV PEAK A VEL: 0.95 M/S
MV PEAK A VEL: 1.11 M/S
MV PEAK E VEL: 0.77 M/S
MV PEAK E VEL: 0.86 M/S
MV PEAK E VEL: 0.91 M/S
MV PEAK E VEL: 1.05 M/S
MV PEAK E VEL: 1.07 M/S
MV STENOSIS PRESSURE HALF TIME: 43 MS
MV VALVE AREA BY CONTINUITY EQUATION: 3.04 CM2
MV VALVE AREA P 1/2 METHOD: 5.12 CM2
NEUTROPHILS # BLD AUTO: 1.2 K/UL
NEUTROPHILS # BLD AUTO: 10.6 K/UL (ref 1.8–7.7)
NEUTROPHILS # BLD AUTO: 11.3 K/UL (ref 1.8–7.7)
NEUTROPHILS # BLD AUTO: 11.9 K/UL (ref 1.8–7.7)
NEUTROPHILS # BLD AUTO: 12.7 K/UL (ref 1.8–7.7)
NEUTROPHILS # BLD AUTO: 12.8 K/UL (ref 1.8–7.7)
NEUTROPHILS # BLD AUTO: 13.6 K/UL (ref 1.8–7.7)
NEUTROPHILS # BLD AUTO: 14.9 K/UL (ref 1.8–7.7)
NEUTROPHILS # BLD AUTO: 15 K/UL (ref 1.8–7.7)
NEUTROPHILS # BLD AUTO: 15.5 K/UL (ref 1.8–7.7)
NEUTROPHILS # BLD AUTO: 16.9 K/UL (ref 1.8–7.7)
NEUTROPHILS # BLD AUTO: 18.9 K/UL (ref 1.8–7.7)
NEUTROPHILS # BLD AUTO: 22.6 K/UL (ref 1.8–7.7)
NEUTROPHILS # BLD AUTO: 25.7 K/UL (ref 1.8–7.7)
NEUTROPHILS # BLD AUTO: 3 K/UL (ref 1.8–7.7)
NEUTROPHILS # BLD AUTO: 3.3 K/UL
NEUTROPHILS # BLD AUTO: 3.4 K/UL (ref 1.8–7.7)
NEUTROPHILS # BLD AUTO: 3.5 K/UL
NEUTROPHILS # BLD AUTO: 3.8 K/UL
NEUTROPHILS # BLD AUTO: 32.7 K/UL (ref 1.8–7.7)
NEUTROPHILS # BLD AUTO: 33.5 K/UL (ref 1.8–7.7)
NEUTROPHILS # BLD AUTO: 34.7 K/UL (ref 1.8–7.7)
NEUTROPHILS # BLD AUTO: 37 K/UL (ref 1.8–7.7)
NEUTROPHILS # BLD AUTO: 37.7 K/UL (ref 1.8–7.7)
NEUTROPHILS # BLD AUTO: 4.2 K/UL
NEUTROPHILS # BLD AUTO: 4.8 K/UL (ref 1.8–7.7)
NEUTROPHILS # BLD AUTO: 4.9 K/UL (ref 1.8–7.7)
NEUTROPHILS # BLD AUTO: 4.9 K/UL (ref 1.8–7.7)
NEUTROPHILS # BLD AUTO: 42 K/UL (ref 1.8–7.7)
NEUTROPHILS # BLD AUTO: 5 K/UL (ref 1.8–7.7)
NEUTROPHILS # BLD AUTO: 5.1 K/UL (ref 1.8–7.7)
NEUTROPHILS # BLD AUTO: 5.8 K/UL (ref 1.8–7.7)
NEUTROPHILS # BLD AUTO: 57.9 K/UL (ref 1.8–7.7)
NEUTROPHILS # BLD AUTO: 6.1 K/UL (ref 1.8–7.7)
NEUTROPHILS # BLD AUTO: 6.4 K/UL (ref 1.8–7.7)
NEUTROPHILS # BLD AUTO: 6.6 K/UL (ref 1.8–7.7)
NEUTROPHILS # BLD AUTO: 7 K/UL (ref 1.8–7.7)
NEUTROPHILS # BLD AUTO: 7.1 K/UL (ref 1.8–7.7)
NEUTROPHILS # BLD AUTO: 7.1 K/UL (ref 1.8–7.7)
NEUTROPHILS # BLD AUTO: 7.6 K/UL (ref 1.8–7.7)
NEUTROPHILS # BLD AUTO: 8 K/UL (ref 1.8–7.7)
NEUTROPHILS # BLD AUTO: 8.7 K/UL (ref 1.8–7.7)
NEUTROPHILS NFR BLD: 60.2 % (ref 38–73)
NEUTROPHILS NFR BLD: 62.4 % (ref 38–73)
NEUTROPHILS NFR BLD: 65.8 % (ref 38–73)
NEUTROPHILS NFR BLD: 65.9 % (ref 38–73)
NEUTROPHILS NFR BLD: 67.9 % (ref 38–73)
NEUTROPHILS NFR BLD: 67.9 % (ref 38–73)
NEUTROPHILS NFR BLD: 69.8 % (ref 38–73)
NEUTROPHILS NFR BLD: 70.1 % (ref 38–73)
NEUTROPHILS NFR BLD: 72.9 % (ref 38–73)
NEUTROPHILS NFR BLD: 76.9 % (ref 38–73)
NEUTROPHILS NFR BLD: 79.6 % (ref 38–73)
NEUTROPHILS NFR BLD: 80 % (ref 38–73)
NEUTROPHILS NFR BLD: 80.2 % (ref 38–73)
NEUTROPHILS NFR BLD: 88 % (ref 38–73)
NEUTROPHILS NFR BLD: 88.3 % (ref 38–73)
NEUTROPHILS NFR BLD: 89.3 % (ref 38–73)
NEUTROPHILS NFR BLD: 89.8 % (ref 38–73)
NEUTROPHILS NFR BLD: 90.7 % (ref 38–73)
NEUTROPHILS NFR BLD: 91.1 % (ref 38–73)
NEUTROPHILS NFR BLD: 91.1 % (ref 38–73)
NEUTROPHILS NFR BLD: 91.5 % (ref 38–73)
NEUTROPHILS NFR BLD: 91.5 % (ref 38–73)
NEUTROPHILS NFR BLD: 92.2 % (ref 38–73)
NEUTROPHILS NFR BLD: 92.6 % (ref 38–73)
NEUTROPHILS NFR BLD: 93.4 % (ref 38–73)
NEUTROPHILS NFR BLD: 93.6 % (ref 38–73)
NEUTROPHILS NFR BLD: 93.8 % (ref 38–73)
NEUTROPHILS NFR BLD: 94.3 % (ref 38–73)
NEUTROPHILS NFR BLD: 95.6 % (ref 38–73)
NEUTROPHILS NFR BLD: 96 % (ref 38–73)
NEUTROPHILS NFR BLD: 97 % (ref 38–73)
NEUTROPHILS NFR FLD MANUAL: 48 %
NEUTROPHILS NFR FLD MANUAL: 8 %
NEUTS BAND NFR BLD MANUAL: 6 %
NITRITE UR QL STRIP: NEGATIVE
NITRITE, POC UA: NORMAL
NRBC BLD-RTO: 0 /100 WBC
NRBC BLD-RTO: ABNORMAL /100 WBC
OSMOLALITY SERPL: 270 MOSM/KG (ref 280–300)
OSMOLALITY UR: 376 MOSM/KG (ref 50–1200)
OVALOCYTES BLD QL SMEAR: ABNORMAL
PCO2 BLDA: 32.6 MMHG (ref 35–45)
PCO2 BLDA: 32.7 MMHG (ref 35–45)
PH SMN: 7.21 [PH] (ref 7.35–7.45)
PH SMN: 7.35 [PH] (ref 7.35–7.45)
PH UR STRIP: 5 [PH] (ref 5–8)
PH UR STRIP: 5 [PH] (ref 5–8)
PH UR STRIP: 6 [PH] (ref 5–8)
PH UR STRIP: 6 [PH] (ref 5–8)
PH, POC UA: 6
PHOSPHATE SERPL-MCNC: 2.6 MG/DL (ref 2.7–4.5)
PHOSPHATE SERPL-MCNC: 2.8 MG/DL (ref 2.7–4.5)
PHOSPHATE SERPL-MCNC: 2.8 MG/DL (ref 2.7–4.5)
PHOSPHATE SERPL-MCNC: 3 MG/DL (ref 2.7–4.5)
PHOSPHATE SERPL-MCNC: 3 MG/DL (ref 2.7–4.5)
PHOSPHATE SERPL-MCNC: 3.1 MG/DL (ref 2.7–4.5)
PHOSPHATE SERPL-MCNC: 3.1 MG/DL (ref 2.7–4.5)
PHOSPHATE SERPL-MCNC: 3.2 MG/DL (ref 2.7–4.5)
PHOSPHATE SERPL-MCNC: 3.3 MG/DL (ref 2.7–4.5)
PHOSPHATE SERPL-MCNC: 3.5 MG/DL (ref 2.7–4.5)
PHOSPHATE SERPL-MCNC: 3.5 MG/DL (ref 2.7–4.5)
PHOSPHATE SERPL-MCNC: 3.7 MG/DL (ref 2.7–4.5)
PHOSPHATE SERPL-MCNC: 3.9 MG/DL (ref 2.7–4.5)
PHOSPHATE SERPL-MCNC: 3.9 MG/DL (ref 2.7–4.5)
PHOSPHATE SERPL-MCNC: 4 MG/DL (ref 2.7–4.5)
PHOSPHATE SERPL-MCNC: 4.7 MG/DL (ref 2.7–4.5)
PHOSPHATE SERPL-MCNC: 4.8 MG/DL (ref 2.7–4.5)
PHOSPHATE SERPL-MCNC: 4.9 MG/DL (ref 2.7–4.5)
PISA TR MAX VEL: 2.02 M/S
PISA TR MAX VEL: 2.13 M/S
PISA TR MAX VEL: 2.3 M/S
PISA TR MAX VEL: 2.36 M/S
PISA TR MAX VEL: 2.56 M/S
PLATELET # BLD AUTO: 136 K/UL (ref 150–350)
PLATELET # BLD AUTO: 138 K/UL (ref 150–350)
PLATELET # BLD AUTO: 142 K/UL (ref 150–350)
PLATELET # BLD AUTO: 153 K/UL (ref 150–350)
PLATELET # BLD AUTO: 163 K/UL (ref 150–350)
PLATELET # BLD AUTO: 186 K/UL (ref 150–350)
PLATELET # BLD AUTO: 192 K/UL (ref 150–350)
PLATELET # BLD AUTO: 197 K/UL (ref 150–350)
PLATELET # BLD AUTO: 202 K/UL (ref 150–350)
PLATELET # BLD AUTO: 205 K/UL (ref 150–350)
PLATELET # BLD AUTO: 224 K/UL (ref 150–350)
PLATELET # BLD AUTO: 226 K/UL
PLATELET # BLD AUTO: 233 K/UL (ref 150–350)
PLATELET # BLD AUTO: 241 K/UL
PLATELET # BLD AUTO: 253 K/UL (ref 150–350)
PLATELET # BLD AUTO: 256 K/UL (ref 150–350)
PLATELET # BLD AUTO: 259 K/UL (ref 150–350)
PLATELET # BLD AUTO: 263 K/UL
PLATELET # BLD AUTO: 272 K/UL (ref 150–350)
PLATELET # BLD AUTO: 273 K/UL (ref 150–350)
PLATELET # BLD AUTO: 282 K/UL
PLATELET # BLD AUTO: 282 K/UL (ref 150–350)
PLATELET # BLD AUTO: 292 K/UL (ref 150–350)
PLATELET # BLD AUTO: 301 K/UL
PLATELET # BLD AUTO: 302 K/UL (ref 150–350)
PLATELET # BLD AUTO: 305 K/UL (ref 150–350)
PLATELET # BLD AUTO: 309 K/UL (ref 150–350)
PLATELET # BLD AUTO: 312 K/UL (ref 150–350)
PLATELET # BLD AUTO: 314 K/UL (ref 150–350)
PLATELET # BLD AUTO: 319 K/UL (ref 150–350)
PLATELET # BLD AUTO: 336 K/UL (ref 150–350)
PLATELET # BLD AUTO: 343 K/UL (ref 150–350)
PLATELET # BLD AUTO: 343 K/UL (ref 150–350)
PLATELET # BLD AUTO: 349 K/UL (ref 150–350)
PLATELET # BLD AUTO: 364 K/UL (ref 150–350)
PLATELET # BLD AUTO: 371 K/UL (ref 150–350)
PLATELET # BLD AUTO: 380 K/UL (ref 150–350)
PLATELET # BLD AUTO: 380 K/UL (ref 150–350)
PLATELET # BLD AUTO: 404 K/UL (ref 150–350)
PLATELET # BLD AUTO: 405 K/UL (ref 150–350)
PLATELET # BLD AUTO: 413 K/UL (ref 150–350)
PLATELET # BLD AUTO: 430 K/UL (ref 150–350)
PLATELET # BLD AUTO: 450 K/UL (ref 150–350)
PLATELET # BLD AUTO: 451 K/UL (ref 150–350)
PLATELET # BLD AUTO: 73 K/UL (ref 150–350)
PLATELET BLD QL SMEAR: ABNORMAL
PMV BLD AUTO: 10 FL (ref 9.2–12.9)
PMV BLD AUTO: 10.1 FL (ref 9.2–12.9)
PMV BLD AUTO: 10.2 FL (ref 9.2–12.9)
PMV BLD AUTO: 10.2 FL (ref 9.2–12.9)
PMV BLD AUTO: 10.3 FL (ref 9.2–12.9)
PMV BLD AUTO: 10.4 FL (ref 9.2–12.9)
PMV BLD AUTO: 10.5 FL (ref 9.2–12.9)
PMV BLD AUTO: 10.7 FL (ref 9.2–12.9)
PMV BLD AUTO: 10.8 FL (ref 9.2–12.9)
PMV BLD AUTO: 8.6 FL (ref 9.2–12.9)
PMV BLD AUTO: 8.8 FL (ref 9.2–12.9)
PMV BLD AUTO: 8.9 FL
PMV BLD AUTO: 9 FL (ref 9.2–12.9)
PMV BLD AUTO: 9.3 FL
PMV BLD AUTO: 9.4 FL
PMV BLD AUTO: 9.4 FL
PMV BLD AUTO: 9.4 FL (ref 9.2–12.9)
PMV BLD AUTO: 9.4 FL (ref 9.2–12.9)
PMV BLD AUTO: 9.5 FL (ref 9.2–12.9)
PMV BLD AUTO: 9.6 FL (ref 9.2–12.9)
PMV BLD AUTO: 9.7 FL
PMV BLD AUTO: 9.7 FL (ref 9.2–12.9)
PMV BLD AUTO: 9.8 FL (ref 9.2–12.9)
PMV BLD AUTO: 9.9 FL (ref 9.2–12.9)
PMV BLD AUTO: ABNORMAL FL (ref 9.2–12.9)
PO2 BLDA: 109 MMHG (ref 80–100)
PO2 BLDA: 71 MMHG (ref 80–100)
POC BE: -15 MMOL/L
POC BE: -8 MMOL/L
POC IONIZED CALCIUM: 1.18 MMOL/L (ref 1.06–1.42)
POC IONIZED CALCIUM: 1.19 MMOL/L (ref 1.06–1.42)
POC SATURATED O2: 90 % (ref 95–100)
POC SATURATED O2: 98 % (ref 95–100)
POC TCO2: 14 MMOL/L (ref 23–27)
POC TCO2: 19 MMOL/L (ref 23–27)
POCT GLUCOSE: 102 MG/DL (ref 70–110)
POCT GLUCOSE: 120 MG/DL (ref 70–110)
POCT GLUCOSE: 148 MG/DL (ref 70–110)
POCT GLUCOSE: 164 MG/DL (ref 70–110)
POCT GLUCOSE: 166 MG/DL (ref 70–110)
POCT GLUCOSE: 90 MG/DL (ref 70–110)
POIKILOCYTOSIS BLD QL SMEAR: SLIGHT
POLYCHROMASIA BLD QL SMEAR: ABNORMAL
POTASSIUM BLD-SCNC: 6.1 MMOL/L (ref 3.5–5.1)
POTASSIUM BLD-SCNC: 6.2 MMOL/L (ref 3.5–5.1)
POTASSIUM SERPL-SCNC: 3.9 MMOL/L (ref 3.5–5.1)
POTASSIUM SERPL-SCNC: 4 MMOL/L
POTASSIUM SERPL-SCNC: 4 MMOL/L (ref 3.5–5.1)
POTASSIUM SERPL-SCNC: 4.1 MMOL/L
POTASSIUM SERPL-SCNC: 4.1 MMOL/L (ref 3.5–5.1)
POTASSIUM SERPL-SCNC: 4.2 MMOL/L (ref 3.5–5.1)
POTASSIUM SERPL-SCNC: 4.3 MMOL/L
POTASSIUM SERPL-SCNC: 4.3 MMOL/L (ref 3.5–5.1)
POTASSIUM SERPL-SCNC: 4.4 MMOL/L
POTASSIUM SERPL-SCNC: 4.4 MMOL/L (ref 3.5–5.1)
POTASSIUM SERPL-SCNC: 4.5 MMOL/L (ref 3.5–5.1)
POTASSIUM SERPL-SCNC: 4.6 MMOL/L
POTASSIUM SERPL-SCNC: 4.6 MMOL/L (ref 3.5–5.1)
POTASSIUM SERPL-SCNC: 4.7 MMOL/L (ref 3.5–5.1)
POTASSIUM SERPL-SCNC: 4.7 MMOL/L (ref 3.5–5.1)
POTASSIUM SERPL-SCNC: 4.8 MMOL/L (ref 3.5–5.1)
POTASSIUM SERPL-SCNC: 4.9 MMOL/L (ref 3.5–5.1)
POTASSIUM SERPL-SCNC: 4.9 MMOL/L (ref 3.5–5.1)
POTASSIUM SERPL-SCNC: 5 MMOL/L (ref 3.5–5.1)
POTASSIUM SERPL-SCNC: 5.1 MMOL/L (ref 3.5–5.1)
POTASSIUM SERPL-SCNC: 5.2 MMOL/L (ref 3.5–5.1)
POTASSIUM SERPL-SCNC: 5.7 MMOL/L (ref 3.5–5.1)
POTASSIUM SERPL-SCNC: 5.8 MMOL/L (ref 3.5–5.1)
POTASSIUM SERPL-SCNC: 5.9 MMOL/L (ref 3.5–5.1)
POTASSIUM SERPL-SCNC: 6.3 MMOL/L (ref 3.5–5.1)
POTASSIUM SERPL-SCNC: 6.4 MMOL/L (ref 3.5–5.1)
POTASSIUM SERPL-SCNC: 6.4 MMOL/L (ref 3.5–5.1)
POTASSIUM SERPL-SCNC: 6.5 MMOL/L (ref 3.5–5.1)
POTASSIUM UR-SCNC: 60 MMOL/L (ref 15–95)
PROCALCITONIN SERPL IA-MCNC: 0.51 NG/ML
PROT FLD-MCNC: 1.1 G/DL
PROT FLD-MCNC: 3.2 G/DL
PROT SERPL-MCNC: 5.2 G/DL (ref 6–8.4)
PROT SERPL-MCNC: 5.3 G/DL (ref 6–8.4)
PROT SERPL-MCNC: 5.4 G/DL (ref 6–8.4)
PROT SERPL-MCNC: 5.5 G/DL (ref 6–8.4)
PROT SERPL-MCNC: 5.6 G/DL (ref 6–8.4)
PROT SERPL-MCNC: 5.6 G/DL (ref 6–8.4)
PROT SERPL-MCNC: 5.7 G/DL (ref 6–8.4)
PROT SERPL-MCNC: 5.8 G/DL (ref 6–8.4)
PROT SERPL-MCNC: 6 G/DL (ref 6–8.4)
PROT SERPL-MCNC: 6 G/DL (ref 6–8.4)
PROT SERPL-MCNC: 6.2 G/DL (ref 6–8.4)
PROT SERPL-MCNC: 6.4 G/DL (ref 6–8.4)
PROT SERPL-MCNC: 6.5 G/DL (ref 6–8.4)
PROT SERPL-MCNC: 6.7 G/DL (ref 6–8.4)
PROT SERPL-MCNC: 6.9 G/DL (ref 6–8.4)
PROT SERPL-MCNC: 6.9 G/DL (ref 6–8.4)
PROT SERPL-MCNC: 7 G/DL
PROT SERPL-MCNC: 7.1 G/DL
PROT SERPL-MCNC: 7.1 G/DL
PROT SERPL-MCNC: 7.1 G/DL (ref 6–8.4)
PROT SERPL-MCNC: 7.2 G/DL
PROT SERPL-MCNC: 7.2 G/DL (ref 6–8.4)
PROT SERPL-MCNC: 7.2 G/DL (ref 6–8.4)
PROT SERPL-MCNC: 7.5 G/DL
PROT UR QL STRIP: ABNORMAL
PROT UR QL STRIP: ABNORMAL
PROT UR QL STRIP: NEGATIVE
PROT UR QL STRIP: NEGATIVE
PROTEIN, POC: 30
PROTHROMBIN TIME: 11.2 SEC (ref 9–12.5)
PROTHROMBIN TIME: 11.4 SEC (ref 9–12.5)
PROTHROMBIN TIME: 12.7 SEC (ref 9–12.5)
PROTHROMBIN TIME: 9.8 SEC (ref 9–12.5)
PULM VEIN A" WAVE DURATION": 93 MSEC
PULM VEIN S/D RATIO: 1.82
PULM VEIN S/D RATIO: 2.69
PV PEAK D VEL: 0.26 M/S
PV PEAK D VEL: 0.33 M/S
PV PEAK S VEL: 0.6 M/S
PV PEAK S VEL: 0.7 M/S
PV PEAK VELOCITY: 0.61 CM/S
RA MAJOR: 4.56 CM
RA MAJOR: 4.61 CM
RA MAJOR: 4.79 CM
RA MAJOR: 5.05 CM
RA MAJOR: 5.21 CM
RA PRESSURE: 15 MMHG
RA PRESSURE: 3 MMHG
RA PRESSURE: 8 MMHG
RA WIDTH: 2.1 CM
RA WIDTH: 2.96 CM
RA WIDTH: 3.33 CM
RA WIDTH: 3.33 CM
RA WIDTH: 3.47 CM
RBC # BLD AUTO: 3.25 M/UL (ref 4.6–6.2)
RBC # BLD AUTO: 3.28 M/UL (ref 4.6–6.2)
RBC # BLD AUTO: 3.3 M/UL (ref 4.6–6.2)
RBC # BLD AUTO: 3.33 M/UL (ref 4.6–6.2)
RBC # BLD AUTO: 3.36 M/UL
RBC # BLD AUTO: 3.38 M/UL (ref 4.6–6.2)
RBC # BLD AUTO: 3.39 M/UL (ref 4.6–6.2)
RBC # BLD AUTO: 3.4 M/UL (ref 4.6–6.2)
RBC # BLD AUTO: 3.41 M/UL (ref 4.6–6.2)
RBC # BLD AUTO: 3.42 M/UL (ref 4.6–6.2)
RBC # BLD AUTO: 3.42 M/UL (ref 4.6–6.2)
RBC # BLD AUTO: 3.44 M/UL (ref 4.6–6.2)
RBC # BLD AUTO: 3.45 M/UL (ref 4.6–6.2)
RBC # BLD AUTO: 3.46 M/UL (ref 4.6–6.2)
RBC # BLD AUTO: 3.51 M/UL (ref 4.6–6.2)
RBC # BLD AUTO: 3.51 M/UL (ref 4.6–6.2)
RBC # BLD AUTO: 3.52 M/UL
RBC # BLD AUTO: 3.52 M/UL (ref 4.6–6.2)
RBC # BLD AUTO: 3.52 M/UL (ref 4.6–6.2)
RBC # BLD AUTO: 3.57 M/UL
RBC # BLD AUTO: 3.57 M/UL (ref 4.6–6.2)
RBC # BLD AUTO: 3.58 M/UL (ref 4.6–6.2)
RBC # BLD AUTO: 3.59 M/UL (ref 4.6–6.2)
RBC # BLD AUTO: 3.64 M/UL
RBC # BLD AUTO: 3.64 M/UL (ref 4.6–6.2)
RBC # BLD AUTO: 3.64 M/UL (ref 4.6–6.2)
RBC # BLD AUTO: 3.66 M/UL
RBC # BLD AUTO: 3.66 M/UL (ref 4.6–6.2)
RBC # BLD AUTO: 3.66 M/UL (ref 4.6–6.2)
RBC # BLD AUTO: 3.68 M/UL (ref 4.6–6.2)
RBC # BLD AUTO: 3.68 M/UL (ref 4.6–6.2)
RBC # BLD AUTO: 3.7 M/UL (ref 4.6–6.2)
RBC # BLD AUTO: 3.72 M/UL (ref 4.6–6.2)
RBC # BLD AUTO: 3.75 M/UL (ref 4.6–6.2)
RBC # BLD AUTO: 3.82 M/UL (ref 4.6–6.2)
RBC # BLD AUTO: 3.85 M/UL (ref 4.6–6.2)
RBC # BLD AUTO: 3.88 M/UL (ref 4.6–6.2)
RBC # BLD AUTO: 3.88 M/UL (ref 4.6–6.2)
RBC # BLD AUTO: 3.92 M/UL (ref 4.6–6.2)
RBC # BLD AUTO: 4.21 M/UL (ref 4.6–6.2)
RBC # BLD AUTO: 4.53 M/UL (ref 4.6–6.2)
RBC # BLD AUTO: 4.55 M/UL (ref 4.6–6.2)
RBC # BLD AUTO: 4.56 M/UL (ref 4.6–6.2)
RBC # BLD AUTO: 4.86 M/UL (ref 4.6–6.2)
RBC # BLD AUTO: 4.91 M/UL (ref 4.6–6.2)
RBC #/AREA URNS AUTO: 1 /HPF (ref 0–4)
RBC #/AREA URNS AUTO: >100 /HPF (ref 0–4)
RBC #/AREA URNS HPF: 4 /HPF (ref 0–4)
RIGHT VENTRICULAR END-DIASTOLIC DIMENSION: 2.31 CM
RIGHT VENTRICULAR END-DIASTOLIC DIMENSION: 2.4 CM
RIGHT VENTRICULAR END-DIASTOLIC DIMENSION: 2.83 CM
RIGHT VENTRICULAR END-DIASTOLIC DIMENSION: 3.13 CM
RIGHT VENTRICULAR END-DIASTOLIC DIMENSION: 3.14 CM
RV TISSUE DOPPLER FREE WALL SYSTOLIC VELOCITY 1 (APICAL 4 CHAMBER VIEW): 9.21 CM/S
RV TISSUE DOPPLER FREE WALL SYSTOLIC VELOCITY 1 (APICAL 4 CHAMBER VIEW): 9.51 CM/S
SAMPLE: ABNORMAL
SAMPLE: ABNORMAL
SATURATED IRON: 11 % (ref 20–50)
SCHISTOCYTES BLD QL SMEAR: ABNORMAL
SINUS: 2.44 CM
SINUS: 3.27 CM
SINUS: 3.32 CM
SINUS: 3.48 CM
SINUS: 3.59 CM
SITE: ABNORMAL
SITE: ABNORMAL
SODIUM BLD-SCNC: 132 MMOL/L (ref 136–145)
SODIUM BLD-SCNC: 135 MMOL/L (ref 136–145)
SODIUM SERPL-SCNC: 123 MMOL/L (ref 136–145)
SODIUM SERPL-SCNC: 124 MMOL/L (ref 136–145)
SODIUM SERPL-SCNC: 125 MMOL/L (ref 136–145)
SODIUM SERPL-SCNC: 125 MMOL/L (ref 136–145)
SODIUM SERPL-SCNC: 126 MMOL/L (ref 136–145)
SODIUM SERPL-SCNC: 126 MMOL/L (ref 136–145)
SODIUM SERPL-SCNC: 127 MMOL/L (ref 136–145)
SODIUM SERPL-SCNC: 129 MMOL/L (ref 136–145)
SODIUM SERPL-SCNC: 129 MMOL/L (ref 136–145)
SODIUM SERPL-SCNC: 130 MMOL/L (ref 136–145)
SODIUM SERPL-SCNC: 131 MMOL/L (ref 136–145)
SODIUM SERPL-SCNC: 132 MMOL/L (ref 136–145)
SODIUM SERPL-SCNC: 132 MMOL/L (ref 136–145)
SODIUM SERPL-SCNC: 133 MMOL/L (ref 136–145)
SODIUM SERPL-SCNC: 134 MMOL/L (ref 136–145)
SODIUM SERPL-SCNC: 135 MMOL/L (ref 136–145)
SODIUM SERPL-SCNC: 136 MMOL/L
SODIUM SERPL-SCNC: 136 MMOL/L (ref 136–145)
SODIUM SERPL-SCNC: 137 MMOL/L
SODIUM SERPL-SCNC: 137 MMOL/L
SODIUM SERPL-SCNC: 137 MMOL/L (ref 136–145)
SODIUM SERPL-SCNC: 138 MMOL/L
SODIUM SERPL-SCNC: 138 MMOL/L (ref 136–145)
SODIUM SERPL-SCNC: 140 MMOL/L
SODIUM UR-SCNC: <20 MMOL/L (ref 20–250)
SODIUM UR-SCNC: <20 MMOL/L (ref 20–250)
SP GR UR STRIP: 1.01 (ref 1–1.03)
SP GR UR STRIP: 1.02 (ref 1–1.03)
SP GR UR STRIP: 1.02 (ref 1–1.03)
SP GR UR STRIP: >=1.03 (ref 1–1.03)
SPECIFIC GRAVITY, POC UA: 1.01
SPECIMEN SOURCE: NORMAL
SQUAMOUS #/AREA URNS AUTO: 1 /HPF
STJ: 2.45 CM
STJ: 2.94 CM
TDI LATERAL: 0.06 M/S
TDI LATERAL: 0.06 M/S
TDI LATERAL: 0.07 M/S
TDI LATERAL: 0.07 M/S
TDI SEPTAL: 0.1 M/S
TDI SEPTAL: 0.11 M/S
TDI: 0.08 M/S
TDI: 0.09 M/S
TOTAL IRON BINDING CAPACITY: 203 UG/DL (ref 250–450)
TOXIC GRANULES BLD QL SMEAR: PRESENT
TR MAX PG: 16 MMHG
TR MAX PG: 18 MMHG
TR MAX PG: 21 MMHG
TR MAX PG: 22 MMHG
TR MAX PG: 26 MMHG
TRANSFERRIN SERPL-MCNC: 137 MG/DL (ref 200–375)
TRICUSPID ANNULAR PLANE SYSTOLIC EXCURSION: 1.3 CM
TRICUSPID ANNULAR PLANE SYSTOLIC EXCURSION: 1.35 CM
TRICUSPID ANNULAR PLANE SYSTOLIC EXCURSION: 1.41 CM
TRICUSPID ANNULAR PLANE SYSTOLIC EXCURSION: 1.72 CM
TROPONIN I SERPL DL<=0.01 NG/ML-MCNC: 0.01 NG/ML (ref 0–0.03)
TROPONIN I SERPL DL<=0.01 NG/ML-MCNC: 0.02 NG/ML (ref 0–0.03)
TROPONIN I SERPL DL<=0.01 NG/ML-MCNC: 0.04 NG/ML (ref 0–0.03)
TV REST PULMONARY ARTERY PRESSURE: 24 MMHG
TV REST PULMONARY ARTERY PRESSURE: 24 MMHG
TV REST PULMONARY ARTERY PRESSURE: 26 MMHG
TV REST PULMONARY ARTERY PRESSURE: 30 MMHG
TV REST PULMONARY ARTERY PRESSURE: 41 MMHG
URN SPEC COLLECT METH UR: ABNORMAL
URN SPEC COLLECT METH UR: NORMAL
UROBILINOGEN UR STRIP-ACNC: 1 EU/DL
UROBILINOGEN UR STRIP-ACNC: NEGATIVE EU/DL
UROBILINOGEN, POC UA: 2
UUN UR-MCNC: 571 MG/DL (ref 140–1050)
VANCOMYCIN SERPL-MCNC: 20 UG/ML
VIT B12 SERPL-MCNC: 1616 PG/ML (ref 210–950)
WBC # BLD AUTO: 10.38 K/UL (ref 3.9–12.7)
WBC # BLD AUTO: 10.45 K/UL (ref 3.9–12.7)
WBC # BLD AUTO: 10.45 K/UL (ref 3.9–12.7)
WBC # BLD AUTO: 10.83 K/UL (ref 3.9–12.7)
WBC # BLD AUTO: 11.89 K/UL (ref 3.9–12.7)
WBC # BLD AUTO: 13.24 K/UL (ref 3.9–12.7)
WBC # BLD AUTO: 13.61 K/UL (ref 3.9–12.7)
WBC # BLD AUTO: 13.66 K/UL (ref 3.9–12.7)
WBC # BLD AUTO: 14.07 K/UL (ref 3.9–12.7)
WBC # BLD AUTO: 14.46 K/UL (ref 3.9–12.7)
WBC # BLD AUTO: 14.97 K/UL (ref 3.9–12.7)
WBC # BLD AUTO: 15.62 K/UL (ref 3.9–12.7)
WBC # BLD AUTO: 16.22 K/UL (ref 3.9–12.7)
WBC # BLD AUTO: 16.86 K/UL (ref 3.9–12.7)
WBC # BLD AUTO: 17.08 K/UL (ref 3.9–12.7)
WBC # BLD AUTO: 18.92 K/UL (ref 3.9–12.7)
WBC # BLD AUTO: 2.51 K/UL
WBC # BLD AUTO: 21.36 K/UL (ref 3.9–12.7)
WBC # BLD AUTO: 25.19 K/UL (ref 3.9–12.7)
WBC # BLD AUTO: 28.77 K/UL (ref 3.9–12.7)
WBC # BLD AUTO: 33.15 K/UL (ref 3.9–12.7)
WBC # BLD AUTO: 35.91 K/UL (ref 3.9–12.7)
WBC # BLD AUTO: 36.1 K/UL (ref 3.9–12.7)
WBC # BLD AUTO: 38.18 K/UL (ref 3.9–12.7)
WBC # BLD AUTO: 4.74 K/UL
WBC # BLD AUTO: 40.45 K/UL (ref 3.9–12.7)
WBC # BLD AUTO: 41.44 K/UL (ref 3.9–12.7)
WBC # BLD AUTO: 45.01 K/UL (ref 3.9–12.7)
WBC # BLD AUTO: 5.64 K/UL
WBC # BLD AUTO: 5.76 K/UL (ref 3.9–12.7)
WBC # BLD AUTO: 5.94 K/UL
WBC # BLD AUTO: 51.34 K/UL (ref 3.9–12.7)
WBC # BLD AUTO: 6.11 K/UL
WBC # BLD AUTO: 6.58 K/UL (ref 3.9–12.7)
WBC # BLD AUTO: 6.6 K/UL (ref 3.9–12.7)
WBC # BLD AUTO: 6.98 K/UL (ref 3.9–12.7)
WBC # BLD AUTO: 63.29 K/UL (ref 3.9–12.7)
WBC # BLD AUTO: 7.6 K/UL (ref 3.9–12.7)
WBC # BLD AUTO: 8.12 K/UL (ref 3.9–12.7)
WBC # BLD AUTO: 8.19 K/UL (ref 3.9–12.7)
WBC # BLD AUTO: 8.48 K/UL (ref 3.9–12.7)
WBC # BLD AUTO: 8.85 K/UL (ref 3.9–12.7)
WBC # BLD AUTO: 9.08 K/UL (ref 3.9–12.7)
WBC # BLD AUTO: 9.14 K/UL (ref 3.9–12.7)
WBC # BLD AUTO: 9.3 K/UL (ref 3.9–12.7)
WBC # FLD: 13 /CU MM
WBC # FLD: 806 /CU MM
WBC #/AREA URNS AUTO: 43 /HPF (ref 0–5)
WBC #/AREA URNS AUTO: 49 /HPF (ref 0–5)
WBC #/AREA URNS HPF: 50 /HPF (ref 0–5)
WBC TOXIC VACUOLES BLD QL SMEAR: PRESENT
YEAST URNS QL MICRO: ABNORMAL

## 2019-01-01 PROCEDURE — 84100 ASSAY OF PHOSPHORUS: CPT

## 2019-01-01 PROCEDURE — 3077F SYST BP >= 140 MM HG: CPT | Mod: CPTII,S$GLB,, | Performed by: NURSE PRACTITIONER

## 2019-01-01 PROCEDURE — 3078F DIAST BP <80 MM HG: CPT | Mod: CPTII,S$GLB,, | Performed by: INTERNAL MEDICINE

## 2019-01-01 PROCEDURE — 25000003 PHARM REV CODE 250: Performed by: INTERNAL MEDICINE

## 2019-01-01 PROCEDURE — 93306 TRANSTHORACIC ECHO (TTE) COMPLETE (CUPID ONLY): ICD-10-PCS | Mod: 26,,, | Performed by: INTERNAL MEDICINE

## 2019-01-01 PROCEDURE — 63600175 PHARM REV CODE 636 W HCPCS: Performed by: INTERNAL MEDICINE

## 2019-01-01 PROCEDURE — 85027 COMPLETE CBC AUTOMATED: CPT | Mod: PO

## 2019-01-01 PROCEDURE — 20000000 HC ICU ROOM

## 2019-01-01 PROCEDURE — 80053 COMPREHEN METABOLIC PANEL: CPT

## 2019-01-01 PROCEDURE — 99232 SBSQ HOSP IP/OBS MODERATE 35: CPT | Mod: GC,,, | Performed by: INTERNAL MEDICINE

## 2019-01-01 PROCEDURE — 85025 COMPLETE CBC W/AUTO DIFF WBC: CPT

## 2019-01-01 PROCEDURE — 99900035 HC TECH TIME PER 15 MIN (STAT)

## 2019-01-01 PROCEDURE — 96413 CHEMO IV INFUSION 1 HR: CPT

## 2019-01-01 PROCEDURE — 63600175 PHARM REV CODE 636 W HCPCS: Mod: JG | Performed by: INTERNAL MEDICINE

## 2019-01-01 PROCEDURE — 80053 COMPREHEN METABOLIC PANEL: CPT | Mod: PO

## 2019-01-01 PROCEDURE — 88305 TISSUE EXAM BY PATHOLOGIST: CPT | Performed by: PATHOLOGY

## 2019-01-01 PROCEDURE — 94761 N-INVAS EAR/PLS OXIMETRY MLT: CPT

## 2019-01-01 PROCEDURE — 96375 TX/PRO/DX INJ NEW DRUG ADDON: CPT

## 2019-01-01 PROCEDURE — 25000003 PHARM REV CODE 250: Performed by: STUDENT IN AN ORGANIZED HEALTH CARE EDUCATION/TRAINING PROGRAM

## 2019-01-01 PROCEDURE — C1729 CATH, DRAINAGE: HCPCS | Performed by: EMERGENCY MEDICINE

## 2019-01-01 PROCEDURE — 25000242 PHARM REV CODE 250 ALT 637 W/ HCPCS: Performed by: STUDENT IN AN ORGANIZED HEALTH CARE EDUCATION/TRAINING PROGRAM

## 2019-01-01 PROCEDURE — 82436 ASSAY OF URINE CHLORIDE: CPT

## 2019-01-01 PROCEDURE — 3078F PR MOST RECENT DIASTOLIC BLOOD PRESSURE < 80 MM HG: ICD-10-PCS | Mod: CPTII,S$GLB,, | Performed by: INTERNAL MEDICINE

## 2019-01-01 PROCEDURE — 96372 THER/PROPH/DIAG INJ SC/IM: CPT

## 2019-01-01 PROCEDURE — 63600175 PHARM REV CODE 636 W HCPCS: Performed by: STUDENT IN AN ORGANIZED HEALTH CARE EDUCATION/TRAINING PROGRAM

## 2019-01-01 PROCEDURE — 3074F PR MOST RECENT SYSTOLIC BLOOD PRESSURE < 130 MM HG: ICD-10-PCS | Mod: CPTII,S$GLB,, | Performed by: INTERNAL MEDICINE

## 2019-01-01 PROCEDURE — 99499 UNLISTED E&M SERVICE: CPT | Mod: S$GLB,,, | Performed by: INTERNAL MEDICINE

## 2019-01-01 PROCEDURE — 11104 PUNCH BX SKIN SINGLE LESION: CPT | Mod: GC,,, | Performed by: DERMATOLOGY

## 2019-01-01 PROCEDURE — 82746 ASSAY OF FOLIC ACID SERUM: CPT

## 2019-01-01 PROCEDURE — 88312 SPECIAL STAINS GROUP 1: CPT | Performed by: PATHOLOGY

## 2019-01-01 PROCEDURE — 25000003 PHARM REV CODE 250: Performed by: NURSE PRACTITIONER

## 2019-01-01 PROCEDURE — 99999 PR PBB SHADOW E&M-EST. PATIENT-LVL IV: ICD-10-PCS | Mod: PBBFAC,,, | Performed by: INTERNAL MEDICINE

## 2019-01-01 PROCEDURE — 88341 CYTOLOGY SPECIMEN- MEDICAL CYTOLOGY (FLUID/WASH/BRUSH): ICD-10-PCS | Mod: 26,,, | Performed by: PATHOLOGY

## 2019-01-01 PROCEDURE — 88112 PR  CYTOPATH, CELL ENHANCE TECH: ICD-10-PCS | Mod: 26,,, | Performed by: PATHOLOGY

## 2019-01-01 PROCEDURE — 94640 AIRWAY INHALATION TREATMENT: CPT

## 2019-01-01 PROCEDURE — 63600175 PHARM REV CODE 636 W HCPCS: Performed by: EMERGENCY MEDICINE

## 2019-01-01 PROCEDURE — 99999 PR PBB SHADOW E&M-EST. PATIENT-LVL III: CPT | Mod: PBBFAC,,, | Performed by: UROLOGY

## 2019-01-01 PROCEDURE — 99215 OFFICE O/P EST HI 40 MIN: CPT | Mod: S$GLB,,, | Performed by: INTERNAL MEDICINE

## 2019-01-01 PROCEDURE — 1101F PT FALLS ASSESS-DOCD LE1/YR: CPT | Mod: CPTII,S$GLB,, | Performed by: INTERNAL MEDICINE

## 2019-01-01 PROCEDURE — 84540 ASSAY OF URINE/UREA-N: CPT

## 2019-01-01 PROCEDURE — 83735 ASSAY OF MAGNESIUM: CPT

## 2019-01-01 PROCEDURE — 20600001 HC STEP DOWN PRIVATE ROOM

## 2019-01-01 PROCEDURE — 71260 CT THORAX DX C+: CPT | Mod: 26,,, | Performed by: RADIOLOGY

## 2019-01-01 PROCEDURE — 25500020 PHARM REV CODE 255: Performed by: INTERNAL MEDICINE

## 2019-01-01 PROCEDURE — 74177 CT ABD & PELVIS W/CONTRAST: CPT | Mod: TC

## 2019-01-01 PROCEDURE — 99285 EMERGENCY DEPT VISIT HI MDM: CPT | Mod: ,,, | Performed by: EMERGENCY MEDICINE

## 2019-01-01 PROCEDURE — 32554 ASPIRATE PLEURA W/O IMAGING: CPT | Mod: RT,S$GLB,, | Performed by: INTERNAL MEDICINE

## 2019-01-01 PROCEDURE — 87116 MYCOBACTERIA CULTURE: CPT

## 2019-01-01 PROCEDURE — 81003 URINALYSIS AUTO W/O SCOPE: CPT | Mod: PO,ER

## 2019-01-01 PROCEDURE — 3074F SYST BP LT 130 MM HG: CPT | Mod: CPTII,S$GLB,, | Performed by: INTERNAL MEDICINE

## 2019-01-01 PROCEDURE — 80202 ASSAY OF VANCOMYCIN: CPT

## 2019-01-01 PROCEDURE — 81002 POCT URINE DIPSTICK WITHOUT MICROSCOPE: ICD-10-PCS | Mod: S$GLB,,, | Performed by: NURSE PRACTITIONER

## 2019-01-01 PROCEDURE — 99232 PR SUBSEQUENT HOSPITAL CARE,LEVL II: ICD-10-PCS | Mod: GC,,, | Performed by: INTERNAL MEDICINE

## 2019-01-01 PROCEDURE — 1101F PR PT FALLS ASSESS DOC 0-1 FALLS W/OUT INJ PAST YR: ICD-10-PCS | Mod: CPTII,S$GLB,, | Performed by: INTERNAL MEDICINE

## 2019-01-01 PROCEDURE — 27201423 OPTIME MED/SURG SUP & DEVICES STERILE SUPPLY: Performed by: INTERNAL MEDICINE

## 2019-01-01 PROCEDURE — 78815 PET IMAGE W/CT SKULL-THIGH: CPT | Mod: 26,PS,, | Performed by: RADIOLOGY

## 2019-01-01 PROCEDURE — 32557 IR THORACENTESIS WITH IMAGING: ICD-10-PCS | Mod: ,,, | Performed by: RADIOLOGY

## 2019-01-01 PROCEDURE — 97535 SELF CARE MNGMENT TRAINING: CPT

## 2019-01-01 PROCEDURE — 82945 GLUCOSE OTHER FLUID: CPT

## 2019-01-01 PROCEDURE — 99233 SBSQ HOSP IP/OBS HIGH 50: CPT | Mod: ,,, | Performed by: INTERNAL MEDICINE

## 2019-01-01 PROCEDURE — 84311 SPECTROPHOTOMETRY: CPT

## 2019-01-01 PROCEDURE — 89051 BODY FLUID CELL COUNT: CPT

## 2019-01-01 PROCEDURE — 82803 BLOOD GASES ANY COMBINATION: CPT

## 2019-01-01 PROCEDURE — 88342 CYTOLOGY SPECIMEN- MEDICAL CYTOLOGY (FLUID/WASH/BRUSH): ICD-10-PCS | Mod: 26,,, | Performed by: PATHOLOGY

## 2019-01-01 PROCEDURE — 81002 URINALYSIS NONAUTO W/O SCOPE: CPT | Mod: S$GLB,,, | Performed by: NURSE PRACTITIONER

## 2019-01-01 PROCEDURE — 32557 INSERT CATH PLEURA W/ IMAGE: CPT | Performed by: EMERGENCY MEDICINE

## 2019-01-01 PROCEDURE — 88341 IMHCHEM/IMCYTCHM EA ADD ANTB: CPT | Performed by: PATHOLOGY

## 2019-01-01 PROCEDURE — 99215 PR OFFICE/OUTPT VISIT, EST, LEVL V, 40-54 MIN: ICD-10-PCS | Mod: S$GLB,,, | Performed by: INTERNAL MEDICINE

## 2019-01-01 PROCEDURE — 99222 1ST HOSP IP/OBS MODERATE 55: CPT | Mod: 24,,, | Performed by: UROLOGY

## 2019-01-01 PROCEDURE — 99999 PR PBB SHADOW E&M-EST. PATIENT-LVL IV: CPT | Mod: PBBFAC,,, | Performed by: INTERNAL MEDICINE

## 2019-01-01 PROCEDURE — 99233 PR SUBSEQUENT HOSPITAL CARE,LEVL III: ICD-10-PCS | Mod: GC,,, | Performed by: INTERNAL MEDICINE

## 2019-01-01 PROCEDURE — 94660 CPAP INITIATION&MGMT: CPT

## 2019-01-01 PROCEDURE — 93005 ELECTROCARDIOGRAM TRACING: CPT

## 2019-01-01 PROCEDURE — 99214 PR OFFICE/OUTPT VISIT, EST, LEVL IV, 30-39 MIN: ICD-10-PCS | Mod: 25,S$GLB,, | Performed by: INTERNAL MEDICINE

## 2019-01-01 PROCEDURE — 36415 COLL VENOUS BLD VENIPUNCTURE: CPT | Mod: PO

## 2019-01-01 PROCEDURE — 27000221 HC OXYGEN, UP TO 24 HOURS

## 2019-01-01 PROCEDURE — 36600 WITHDRAWAL OF ARTERIAL BLOOD: CPT

## 2019-01-01 PROCEDURE — 88305 TISSUE EXAM BY PATHOLOGIST: CPT | Mod: 26,,, | Performed by: PATHOLOGY

## 2019-01-01 PROCEDURE — 82728 ASSAY OF FERRITIN: CPT

## 2019-01-01 PROCEDURE — 99213 PR OFFICE/OUTPT VISIT, EST, LEVL III, 20-29 MIN: ICD-10-PCS | Mod: S$GLB,,, | Performed by: INTERNAL MEDICINE

## 2019-01-01 PROCEDURE — 3077F SYST BP >= 140 MM HG: CPT | Mod: CPTII,S$GLB,, | Performed by: INTERNAL MEDICINE

## 2019-01-01 PROCEDURE — 99232 SBSQ HOSP IP/OBS MODERATE 35: CPT | Mod: ,,, | Performed by: INTERNAL MEDICINE

## 2019-01-01 PROCEDURE — 99497 PR ADVNCD CARE PLAN 30 MIN: ICD-10-PCS | Mod: S$GLB,,, | Performed by: INTERNAL MEDICINE

## 2019-01-01 PROCEDURE — 99499 RISK ADDL DX/OHS AUDIT: ICD-10-PCS | Mod: S$GLB,,, | Performed by: INTERNAL MEDICINE

## 2019-01-01 PROCEDURE — 71260 CT THORAX DX C+: CPT | Mod: TC

## 2019-01-01 PROCEDURE — 85025 COMPLETE CBC W/AUTO DIFF WBC: CPT | Mod: 91

## 2019-01-01 PROCEDURE — 84300 ASSAY OF URINE SODIUM: CPT

## 2019-01-01 PROCEDURE — 97116 GAIT TRAINING THERAPY: CPT

## 2019-01-01 PROCEDURE — 33010 PR DRAINAGE OF HEART SAC: ICD-10-PCS | Mod: ,,, | Performed by: INTERNAL MEDICINE

## 2019-01-01 PROCEDURE — 99223 PR INITIAL HOSPITAL CARE,LEVL III: ICD-10-PCS | Mod: ,,, | Performed by: INTERNAL MEDICINE

## 2019-01-01 PROCEDURE — 99233 PR SUBSEQUENT HOSPITAL CARE,LEVL III: ICD-10-PCS | Mod: ,,, | Performed by: INTERNAL MEDICINE

## 2019-01-01 PROCEDURE — 99222 1ST HOSP IP/OBS MODERATE 55: CPT | Mod: 25,GC,, | Performed by: DERMATOLOGY

## 2019-01-01 PROCEDURE — 84145 PROCALCITONIN (PCT): CPT

## 2019-01-01 PROCEDURE — 99222 PR INITIAL HOSPITAL CARE,LEVL II: ICD-10-PCS | Mod: 24,,, | Performed by: UROLOGY

## 2019-01-01 PROCEDURE — 81000 URINALYSIS NONAUTO W/SCOPE: CPT

## 2019-01-01 PROCEDURE — 1101F PT FALLS ASSESS-DOCD LE1/YR: CPT | Mod: CPTII,S$GLB,, | Performed by: NURSE PRACTITIONER

## 2019-01-01 PROCEDURE — 99497 ADVNCD CARE PLAN 30 MIN: CPT | Mod: S$GLB,,, | Performed by: INTERNAL MEDICINE

## 2019-01-01 PROCEDURE — 86901 BLOOD TYPING SEROLOGIC RH(D): CPT

## 2019-01-01 PROCEDURE — 97168 OT RE-EVAL EST PLAN CARE: CPT

## 2019-01-01 PROCEDURE — 3078F PR MOST RECENT DIASTOLIC BLOOD PRESSURE < 80 MM HG: ICD-10-PCS | Mod: CPTII,S$GLB,, | Performed by: NURSE PRACTITIONER

## 2019-01-01 PROCEDURE — 99223 1ST HOSP IP/OBS HIGH 75: CPT | Mod: AI,,, | Performed by: INTERNAL MEDICINE

## 2019-01-01 PROCEDURE — 88305 TISSUE SPECIMEN TO PATHOLOGY - SURGERY: ICD-10-PCS | Mod: 26,,, | Performed by: PATHOLOGY

## 2019-01-01 PROCEDURE — 97530 THERAPEUTIC ACTIVITIES: CPT

## 2019-01-01 PROCEDURE — 83615 LACTATE (LD) (LDH) ENZYME: CPT

## 2019-01-01 PROCEDURE — 96367 TX/PROPH/DG ADDL SEQ IV INF: CPT

## 2019-01-01 PROCEDURE — 93000 EKG 12-LEAD: ICD-10-PCS | Mod: S$GLB,,, | Performed by: INTERNAL MEDICINE

## 2019-01-01 PROCEDURE — 87075 CULTR BACTERIA EXCEPT BLOOD: CPT

## 2019-01-01 PROCEDURE — 93010 EKG 12-LEAD: ICD-10-PCS | Mod: ,,, | Performed by: INTERNAL MEDICINE

## 2019-01-01 PROCEDURE — 3078F DIAST BP <80 MM HG: CPT | Mod: CPTII,S$GLB,, | Performed by: NURSE PRACTITIONER

## 2019-01-01 PROCEDURE — A4216 STERILE WATER/SALINE, 10 ML: HCPCS | Performed by: INTERNAL MEDICINE

## 2019-01-01 PROCEDURE — 99214 OFFICE O/P EST MOD 30 MIN: CPT | Mod: 25,S$GLB,, | Performed by: INTERNAL MEDICINE

## 2019-01-01 PROCEDURE — 82962 GLUCOSE BLOOD TEST: CPT

## 2019-01-01 PROCEDURE — 96365 THER/PROPH/DIAG IV INF INIT: CPT

## 2019-01-01 PROCEDURE — 85007 BL SMEAR W/DIFF WBC COUNT: CPT

## 2019-01-01 PROCEDURE — 99233 SBSQ HOSP IP/OBS HIGH 50: CPT | Mod: GC,,, | Performed by: INTERNAL MEDICINE

## 2019-01-01 PROCEDURE — 99233 SBSQ HOSP IP/OBS HIGH 50: CPT | Mod: GC,,, | Performed by: DERMATOLOGY

## 2019-01-01 PROCEDURE — 52000 CYSTOSCOPY: ICD-10-PCS | Mod: S$GLB,,, | Performed by: UROLOGY

## 2019-01-01 PROCEDURE — 84484 ASSAY OF TROPONIN QUANT: CPT

## 2019-01-01 PROCEDURE — 85027 COMPLETE CBC AUTOMATED: CPT

## 2019-01-01 PROCEDURE — 3077F PR MOST RECENT SYSTOLIC BLOOD PRESSURE >= 140 MM HG: ICD-10-PCS | Mod: CPTII,S$GLB,, | Performed by: INTERNAL MEDICINE

## 2019-01-01 PROCEDURE — 32557 INSERT CATH PLEURA W/ IMAGE: CPT | Mod: ,,, | Performed by: RADIOLOGY

## 2019-01-01 PROCEDURE — 94644 CONT INHLJ TX 1ST HOUR: CPT

## 2019-01-01 PROCEDURE — 93306 TTE W/DOPPLER COMPLETE: CPT | Mod: PO

## 2019-01-01 PROCEDURE — 25000003 PHARM REV CODE 250

## 2019-01-01 PROCEDURE — 99223 1ST HOSP IP/OBS HIGH 75: CPT | Mod: ,,, | Performed by: PSYCHIATRY & NEUROLOGY

## 2019-01-01 PROCEDURE — 87632 RESP VIRUS 6-11 TARGETS: CPT

## 2019-01-01 PROCEDURE — 99223 1ST HOSP IP/OBS HIGH 75: CPT | Mod: ,,, | Performed by: INTERNAL MEDICINE

## 2019-01-01 PROCEDURE — 93010 ELECTROCARDIOGRAM REPORT: CPT | Mod: ,,, | Performed by: INTERNAL MEDICINE

## 2019-01-01 PROCEDURE — 82607 VITAMIN B-12: CPT

## 2019-01-01 PROCEDURE — 99999 PR PBB SHADOW E&M-EST. PATIENT-LVL III: ICD-10-PCS | Mod: PBBFAC,,, | Performed by: INTERNAL MEDICINE

## 2019-01-01 PROCEDURE — 99999 PR PBB SHADOW E&M-EST. PATIENT-LVL V: ICD-10-PCS | Mod: PBBFAC,,, | Performed by: INTERNAL MEDICINE

## 2019-01-01 PROCEDURE — A9552 F18 FDG: HCPCS

## 2019-01-01 PROCEDURE — 87086 URINE CULTURE/COLONY COUNT: CPT

## 2019-01-01 PROCEDURE — 71260 CT CHEST WITH CONTRAST: ICD-10-PCS | Mod: 26,,, | Performed by: RADIOLOGY

## 2019-01-01 PROCEDURE — 36415 COLL VENOUS BLD VENIPUNCTURE: CPT | Mod: PO,ER

## 2019-01-01 PROCEDURE — 78815 NM PET CT ROUTINE: ICD-10-PCS | Mod: 26,PS,, | Performed by: RADIOLOGY

## 2019-01-01 PROCEDURE — 99212 PR OFFICE/OUTPT VISIT, EST, LEVL II, 10-19 MIN: ICD-10-PCS | Mod: 25,S$GLB,, | Performed by: NURSE PRACTITIONER

## 2019-01-01 PROCEDURE — 99291 CRITICAL CARE FIRST HOUR: CPT | Mod: 25

## 2019-01-01 PROCEDURE — 99239 PR HOSPITAL DISCHARGE DAY,>30 MIN: ICD-10-PCS | Mod: GC,,, | Performed by: INTERNAL MEDICINE

## 2019-01-01 PROCEDURE — 87102 FUNGUS ISOLATION CULTURE: CPT

## 2019-01-01 PROCEDURE — 80053 COMPREHEN METABOLIC PANEL: CPT | Mod: 91

## 2019-01-01 PROCEDURE — 87088 URINE BACTERIA CULTURE: CPT

## 2019-01-01 PROCEDURE — 3075F SYST BP GE 130 - 139MM HG: CPT | Mod: CPTII,S$GLB,, | Performed by: INTERNAL MEDICINE

## 2019-01-01 PROCEDURE — 12000002 HC ACUTE/MED SURGE SEMI-PRIVATE ROOM

## 2019-01-01 PROCEDURE — 71046 X-RAY EXAM CHEST 2 VIEWS: CPT | Mod: TC

## 2019-01-01 PROCEDURE — 36415 COLL VENOUS BLD VENIPUNCTURE: CPT

## 2019-01-01 PROCEDURE — 87077 CULTURE AEROBIC IDENTIFY: CPT

## 2019-01-01 PROCEDURE — 63600175 PHARM REV CODE 636 W HCPCS

## 2019-01-01 PROCEDURE — 84157 ASSAY OF PROTEIN OTHER: CPT

## 2019-01-01 PROCEDURE — 96411 CHEMO IV PUSH ADDL DRUG: CPT

## 2019-01-01 PROCEDURE — 71045 X-RAY EXAM CHEST 1 VIEW: CPT | Mod: TC,FY,59

## 2019-01-01 PROCEDURE — 32554 PR THORACEN W/O IMAG GUIDANC: ICD-10-PCS | Mod: RT,S$GLB,, | Performed by: INTERNAL MEDICINE

## 2019-01-01 PROCEDURE — C1729 CATH, DRAINAGE: HCPCS | Performed by: INTERNAL MEDICINE

## 2019-01-01 PROCEDURE — 87206 SMEAR FLUORESCENT/ACID STAI: CPT

## 2019-01-01 PROCEDURE — 99214 OFFICE O/P EST MOD 30 MIN: CPT | Mod: S$GLB,,, | Performed by: UROLOGY

## 2019-01-01 PROCEDURE — 99214 PR OFFICE/OUTPT VISIT, EST, LEVL IV, 30-39 MIN: ICD-10-PCS | Mod: S$GLB,,, | Performed by: UROLOGY

## 2019-01-01 PROCEDURE — 96376 TX/PRO/DX INJ SAME DRUG ADON: CPT

## 2019-01-01 PROCEDURE — 85610 PROTHROMBIN TIME: CPT

## 2019-01-01 PROCEDURE — 71275 CTA CHEST NON CORONARY: ICD-10-PCS | Mod: 26,,, | Performed by: RADIOLOGY

## 2019-01-01 PROCEDURE — 3079F DIAST BP 80-89 MM HG: CPT | Mod: CPTII,S$GLB,, | Performed by: INTERNAL MEDICINE

## 2019-01-01 PROCEDURE — 93306 TTE W/DOPPLER COMPLETE: CPT | Mod: 26,,, | Performed by: INTERNAL MEDICINE

## 2019-01-01 PROCEDURE — 33010 PR DRAINAGE OF HEART SAC: CPT | Mod: ,,, | Performed by: INTERNAL MEDICINE

## 2019-01-01 PROCEDURE — 99152 MOD SED SAME PHYS/QHP 5/>YRS: CPT | Performed by: INTERNAL MEDICINE

## 2019-01-01 PROCEDURE — 88342 IMHCHEM/IMCYTCHM 1ST ANTB: CPT | Performed by: PATHOLOGY

## 2019-01-01 PROCEDURE — 71275 CT ANGIOGRAPHY CHEST: CPT | Mod: 26,,, | Performed by: RADIOLOGY

## 2019-01-01 PROCEDURE — 1101F PT FALLS ASSESS-DOCD LE1/YR: CPT | Mod: CPTII,S$GLB,, | Performed by: UROLOGY

## 2019-01-01 PROCEDURE — 99285 PR EMERGENCY DEPT VISIT,LEVEL V: ICD-10-PCS | Mod: ,,, | Performed by: EMERGENCY MEDICINE

## 2019-01-01 PROCEDURE — 83605 ASSAY OF LACTIC ACID: CPT | Mod: 91

## 2019-01-01 PROCEDURE — 81001 URINALYSIS AUTO W/SCOPE: CPT

## 2019-01-01 PROCEDURE — 71046 X-RAY EXAM CHEST 2 VIEWS: CPT | Mod: 26,76,, | Performed by: RADIOLOGY

## 2019-01-01 PROCEDURE — 88312 TISSUE SPECIMEN TO PATHOLOGY - SURGERY: ICD-10-PCS | Mod: 26,,, | Performed by: PATHOLOGY

## 2019-01-01 PROCEDURE — 88112 CYTOPATH CELL ENHANCE TECH: CPT | Mod: 26,,, | Performed by: PATHOLOGY

## 2019-01-01 PROCEDURE — 88342 IMHCHEM/IMCYTCHM 1ST ANTB: CPT | Mod: 26,,, | Performed by: PATHOLOGY

## 2019-01-01 PROCEDURE — 74177 CT ABD & PELVIS W/CONTRAST: CPT | Mod: 26,,, | Performed by: RADIOLOGY

## 2019-01-01 PROCEDURE — 80053 COMPREHEN METABOLIC PANEL: CPT | Mod: PO,ER

## 2019-01-01 PROCEDURE — 78815 PET IMAGE W/CT SKULL-THIGH: CPT | Mod: TC,PS

## 2019-01-01 PROCEDURE — 11104 PR PUNCH BIOPSY, SKIN, SINGLE LESION: ICD-10-PCS | Mod: GC,,, | Performed by: DERMATOLOGY

## 2019-01-01 PROCEDURE — 99999 PR PBB SHADOW E&M-EST. PATIENT-LVL III: CPT | Mod: PBBFAC,,, | Performed by: INTERNAL MEDICINE

## 2019-01-01 PROCEDURE — 87070 CULTURE OTHR SPECIMN AEROBIC: CPT

## 2019-01-01 PROCEDURE — 27000190 HC CPAP FULL FACE MASK W/VALVE

## 2019-01-01 PROCEDURE — 71046 X-RAY EXAM CHEST 2 VIEWS: CPT | Mod: 50,TC

## 2019-01-01 PROCEDURE — 52000 CYSTOURETHROSCOPY: CPT | Mod: S$GLB,,, | Performed by: UROLOGY

## 2019-01-01 PROCEDURE — 99212 OFFICE O/P EST SF 10 MIN: CPT | Mod: 25,S$GLB,, | Performed by: NURSE PRACTITIONER

## 2019-01-01 PROCEDURE — 88341 IMHCHEM/IMCYTCHM EA ADD ANTB: CPT | Mod: 26,,, | Performed by: PATHOLOGY

## 2019-01-01 PROCEDURE — 99999 PR PBB SHADOW E&M-EST. PATIENT-LVL V: CPT | Mod: PBBFAC,,, | Performed by: NURSE PRACTITIONER

## 2019-01-01 PROCEDURE — 99223 1ST HOSP IP/OBS HIGH 75: CPT | Mod: GC,,, | Performed by: INTERNAL MEDICINE

## 2019-01-01 PROCEDURE — 25000242 PHARM REV CODE 250 ALT 637 W/ HCPCS: Performed by: EMERGENCY MEDICINE

## 2019-01-01 PROCEDURE — 99285 EMERGENCY DEPT VISIT HI MDM: CPT | Mod: 25

## 2019-01-01 PROCEDURE — 87040 BLOOD CULTURE FOR BACTERIA: CPT | Mod: 59

## 2019-01-01 PROCEDURE — 93000 ELECTROCARDIOGRAM COMPLETE: CPT | Mod: S$GLB,,, | Performed by: INTERNAL MEDICINE

## 2019-01-01 PROCEDURE — 99222 PR INITIAL HOSPITAL CARE,LEVL II: ICD-10-PCS | Mod: 25,GC,, | Performed by: DERMATOLOGY

## 2019-01-01 PROCEDURE — 71046 X-RAY EXAM CHEST 2 VIEWS: CPT | Mod: TC,FY

## 2019-01-01 PROCEDURE — 71275 CT ANGIOGRAPHY CHEST: CPT | Mod: TC

## 2019-01-01 PROCEDURE — 3079F PR MOST RECENT DIASTOLIC BLOOD PRESSURE 80-89 MM HG: ICD-10-PCS | Mod: CPTII,S$GLB,, | Performed by: INTERNAL MEDICINE

## 2019-01-01 PROCEDURE — 85025 COMPLETE CBC W/AUTO DIFF WBC: CPT | Mod: PO

## 2019-01-01 PROCEDURE — 99999 PR PBB SHADOW E&M-EST. PATIENT-LVL V: CPT | Mod: PBBFAC,,, | Performed by: INTERNAL MEDICINE

## 2019-01-01 PROCEDURE — 33010 HC PERICARDIOCENTHESIS, INITIAL: CPT | Performed by: INTERNAL MEDICINE

## 2019-01-01 PROCEDURE — 71260 CT CHEST ABDOMEN PELVIS WITH CONTRAST (XPD): ICD-10-PCS | Mod: 26,,, | Performed by: RADIOLOGY

## 2019-01-01 PROCEDURE — 80048 BASIC METABOLIC PNL TOTAL CA: CPT

## 2019-01-01 PROCEDURE — 87186 SC STD MICRODIL/AGAR DIL: CPT

## 2019-01-01 PROCEDURE — 83880 ASSAY OF NATRIURETIC PEPTIDE: CPT

## 2019-01-01 PROCEDURE — 85730 THROMBOPLASTIN TIME PARTIAL: CPT

## 2019-01-01 PROCEDURE — 97166 OT EVAL MOD COMPLEX 45 MIN: CPT

## 2019-01-01 PROCEDURE — 71045 X-RAY EXAM CHEST 1 VIEW: CPT | Mod: 26,,, | Performed by: RADIOLOGY

## 2019-01-01 PROCEDURE — 88305 CYTOLOGY SPECIMEN- MEDICAL CYTOLOGY (FLUID/WASH/BRUSH): ICD-10-PCS | Mod: 26,,, | Performed by: PATHOLOGY

## 2019-01-01 PROCEDURE — 83930 ASSAY OF BLOOD OSMOLALITY: CPT

## 2019-01-01 PROCEDURE — 99232 PR SUBSEQUENT HOSPITAL CARE,LEVL II: ICD-10-PCS | Mod: ,,, | Performed by: INTERNAL MEDICINE

## 2019-01-01 PROCEDURE — 32550 INSERT PLEURAL CATH: CPT | Mod: RT,,, | Performed by: EMERGENCY MEDICINE

## 2019-01-01 PROCEDURE — 99213 OFFICE O/P EST LOW 20 MIN: CPT | Mod: S$GLB,,, | Performed by: INTERNAL MEDICINE

## 2019-01-01 PROCEDURE — 32550 INSERT PLEURAL CATH: CPT | Performed by: EMERGENCY MEDICINE

## 2019-01-01 PROCEDURE — 97161 PT EVAL LOW COMPLEX 20 MIN: CPT

## 2019-01-01 PROCEDURE — 87502 INFLUENZA DNA AMP PROBE: CPT

## 2019-01-01 PROCEDURE — 80053 COMPREHEN METABOLIC PANEL: CPT | Mod: PO,91

## 2019-01-01 PROCEDURE — 63600175 PHARM REV CODE 636 W HCPCS: Mod: JG | Performed by: STUDENT IN AN ORGANIZED HEALTH CARE EDUCATION/TRAINING PROGRAM

## 2019-01-01 PROCEDURE — 99239 HOSP IP/OBS DSCHRG MGMT >30: CPT | Mod: GC,,, | Performed by: INTERNAL MEDICINE

## 2019-01-01 PROCEDURE — A4216 STERILE WATER/SALINE, 10 ML: HCPCS | Performed by: STUDENT IN AN ORGANIZED HEALTH CARE EDUCATION/TRAINING PROGRAM

## 2019-01-01 PROCEDURE — 83935 ASSAY OF URINE OSMOLALITY: CPT

## 2019-01-01 PROCEDURE — 99024 PR POST-OP FOLLOW-UP VISIT: ICD-10-PCS | Mod: S$GLB,,, | Performed by: UROLOGY

## 2019-01-01 PROCEDURE — 92610 EVALUATE SWALLOWING FUNCTION: CPT

## 2019-01-01 PROCEDURE — 82570 ASSAY OF URINE CREATININE: CPT

## 2019-01-01 PROCEDURE — 83605 ASSAY OF LACTIC ACID: CPT

## 2019-01-01 PROCEDURE — 99153 MOD SED SAME PHYS/QHP EA: CPT | Performed by: EMERGENCY MEDICINE

## 2019-01-01 PROCEDURE — 88313 TISSUE SPECIMEN TO PATHOLOGY - SURGERY: ICD-10-PCS | Mod: 26,,, | Performed by: PATHOLOGY

## 2019-01-01 PROCEDURE — 63600175 PHARM REV CODE 636 W HCPCS: Performed by: RADIOLOGY

## 2019-01-01 PROCEDURE — 94618 PULMONARY STRESS TESTING: CPT | Performed by: INTERNAL MEDICINE

## 2019-01-01 PROCEDURE — 1101F PR PT FALLS ASSESS DOC 0-1 FALLS W/OUT INJ PAST YR: ICD-10-PCS | Mod: CPTII,S$GLB,, | Performed by: NURSE PRACTITIONER

## 2019-01-01 PROCEDURE — 71046 X-RAY EXAM CHEST 2 VIEWS: CPT | Mod: 26,,, | Performed by: RADIOLOGY

## 2019-01-01 PROCEDURE — 99999 PR PBB SHADOW E&M-EST. PATIENT-LVL V: ICD-10-PCS | Mod: PBBFAC,,, | Performed by: NURSE PRACTITIONER

## 2019-01-01 PROCEDURE — 88112 CYTOPATH CELL ENHANCE TECH: CPT | Performed by: PATHOLOGY

## 2019-01-01 PROCEDURE — 99999 PR PBB SHADOW E&M-EST. PATIENT-LVL III: ICD-10-PCS | Mod: PBBFAC,,, | Performed by: UROLOGY

## 2019-01-01 PROCEDURE — 87205 SMEAR GRAM STAIN: CPT

## 2019-01-01 PROCEDURE — 25000003 PHARM REV CODE 250: Performed by: EMERGENCY MEDICINE

## 2019-01-01 PROCEDURE — 96377 APPLICATON ON-BODY INJECTOR: CPT | Mod: 59

## 2019-01-01 PROCEDURE — 99024 POSTOP FOLLOW-UP VISIT: CPT | Mod: S$GLB,,, | Performed by: UROLOGY

## 2019-01-01 PROCEDURE — 88120 CYTP URNE 3-5 PROBES EA SPEC: CPT

## 2019-01-01 PROCEDURE — 88112 CYTOLOGY SPECIMEN- MEDICAL CYTOLOGY (FLUID/WASH/BRUSH): ICD-10-PCS | Mod: 26,,, | Performed by: PATHOLOGY

## 2019-01-01 PROCEDURE — 3077F PR MOST RECENT SYSTOLIC BLOOD PRESSURE >= 140 MM HG: ICD-10-PCS | Mod: CPTII,S$GLB,, | Performed by: NURSE PRACTITIONER

## 2019-01-01 PROCEDURE — 71046 XR CHEST PA AND LATERAL: ICD-10-PCS | Mod: 26,,, | Performed by: RADIOLOGY

## 2019-01-01 PROCEDURE — 83540 ASSAY OF IRON: CPT

## 2019-01-01 PROCEDURE — 74177 CT CHEST ABDOMEN PELVIS WITH CONTRAST (XPD): ICD-10-PCS | Mod: 26,,, | Performed by: RADIOLOGY

## 2019-01-01 PROCEDURE — 71046 XR CHEST LATERAL DECUBITUS BILAT: ICD-10-PCS | Mod: 26,76,, | Performed by: RADIOLOGY

## 2019-01-01 PROCEDURE — 99223 PR INITIAL HOSPITAL CARE,LEVL III: ICD-10-PCS | Mod: ,,, | Performed by: PSYCHIATRY & NEUROLOGY

## 2019-01-01 PROCEDURE — 88312 SPECIAL STAINS GROUP 1: CPT | Mod: 26,,, | Performed by: PATHOLOGY

## 2019-01-01 PROCEDURE — 80048 BASIC METABOLIC PNL TOTAL CA: CPT | Mod: PO

## 2019-01-01 PROCEDURE — 27000489 HC PLEURY PATIENT STARTER KIT: Performed by: EMERGENCY MEDICINE

## 2019-01-01 PROCEDURE — 88313 SPECIAL STAINS GROUP 2: CPT | Mod: 26,,, | Performed by: PATHOLOGY

## 2019-01-01 PROCEDURE — 99152 MOD SED SAME PHYS/QHP 5/>YRS: CPT | Performed by: EMERGENCY MEDICINE

## 2019-01-01 PROCEDURE — 71045 XR CHEST 1 VIEW: ICD-10-PCS | Mod: 26,,, | Performed by: RADIOLOGY

## 2019-01-01 PROCEDURE — 99223 PR INITIAL HOSPITAL CARE,LEVL III: ICD-10-PCS | Mod: GC,,, | Performed by: INTERNAL MEDICINE

## 2019-01-01 PROCEDURE — 3075F PR MOST RECENT SYSTOLIC BLOOD PRESS GE 130-139MM HG: ICD-10-PCS | Mod: CPTII,S$GLB,, | Performed by: INTERNAL MEDICINE

## 2019-01-01 PROCEDURE — 32550 PR INSERTION INDWELLING TUNNELED PLEURAL CATHETER: ICD-10-PCS | Mod: RT,,, | Performed by: EMERGENCY MEDICINE

## 2019-01-01 PROCEDURE — 84133 ASSAY OF URINE POTASSIUM: CPT

## 2019-01-01 PROCEDURE — 82550 ASSAY OF CK (CPK): CPT

## 2019-01-01 PROCEDURE — 85027 COMPLETE CBC AUTOMATED: CPT | Mod: PO,ER

## 2019-01-01 PROCEDURE — 93970 EXTREMITY STUDY: CPT | Mod: TC,PO

## 2019-01-01 PROCEDURE — 71046 XR CHEST PA AND LATERAL: ICD-10-PCS | Mod: 26,76,, | Performed by: RADIOLOGY

## 2019-01-01 PROCEDURE — C1894 INTRO/SHEATH, NON-LASER: HCPCS | Performed by: INTERNAL MEDICINE

## 2019-01-01 PROCEDURE — 74177 CT ABDOMEN PELVIS WITH CONTRAST: ICD-10-PCS | Mod: 26,,, | Performed by: RADIOLOGY

## 2019-01-01 PROCEDURE — 1101F PR PT FALLS ASSESS DOC 0-1 FALLS W/OUT INJ PAST YR: ICD-10-PCS | Mod: CPTII,S$GLB,, | Performed by: UROLOGY

## 2019-01-01 PROCEDURE — 32557 INSERT CATH PLEURA W/ IMAGE: CPT

## 2019-01-01 PROCEDURE — 80048 BASIC METABOLIC PNL TOTAL CA: CPT | Mod: 91

## 2019-01-01 PROCEDURE — 84484 ASSAY OF TROPONIN QUANT: CPT | Mod: 91

## 2019-01-01 PROCEDURE — 99223 PR INITIAL HOSPITAL CARE,LEVL III: ICD-10-PCS | Mod: AI,,, | Performed by: INTERNAL MEDICINE

## 2019-01-01 PROCEDURE — 99153 MOD SED SAME PHYS/QHP EA: CPT | Performed by: INTERNAL MEDICINE

## 2019-01-01 PROCEDURE — 99233 PR SUBSEQUENT HOSPITAL CARE,LEVL III: ICD-10-PCS | Mod: GC,,, | Performed by: DERMATOLOGY

## 2019-01-01 RX ORDER — FAMOTIDINE 20 MG/1
20 TABLET, FILM COATED ORAL DAILY
Status: DISCONTINUED | OUTPATIENT
Start: 2019-01-01 | End: 2019-01-01 | Stop reason: HOSPADM

## 2019-01-01 RX ORDER — SODIUM CHLORIDE 0.9 % (FLUSH) 0.9 %
10 SYRINGE (ML) INJECTION
Status: DISCONTINUED | OUTPATIENT
Start: 2019-01-01 | End: 2019-01-01 | Stop reason: HOSPADM

## 2019-01-01 RX ORDER — SODIUM CHLORIDE 0.9 % (FLUSH) 0.9 %
10 SYRINGE (ML) INJECTION
Status: CANCELLED | OUTPATIENT
Start: 2019-01-01

## 2019-01-01 RX ORDER — ACETAMINOPHEN 325 MG/1
650 TABLET ORAL EVERY 4 HOURS PRN
Status: DISCONTINUED | OUTPATIENT
Start: 2019-01-01 | End: 2019-01-01 | Stop reason: HOSPADM

## 2019-01-01 RX ORDER — SODIUM CHLORIDE 9 MG/ML
500 INJECTION, SOLUTION INTRAVENOUS ONCE
Status: COMPLETED | OUTPATIENT
Start: 2019-01-01 | End: 2019-01-01

## 2019-01-01 RX ORDER — FUROSEMIDE 10 MG/ML
40 INJECTION INTRAMUSCULAR; INTRAVENOUS DAILY
Status: DISCONTINUED | OUTPATIENT
Start: 2019-01-01 | End: 2019-01-01

## 2019-01-01 RX ORDER — FENTANYL CITRATE 50 UG/ML
12.5 INJECTION, SOLUTION INTRAMUSCULAR; INTRAVENOUS
Status: DISCONTINUED | OUTPATIENT
Start: 2019-01-01 | End: 2019-01-01 | Stop reason: HOSPADM

## 2019-01-01 RX ORDER — HEPARIN 100 UNIT/ML
500 SYRINGE INTRAVENOUS
Status: DISCONTINUED | OUTPATIENT
Start: 2019-01-01 | End: 2019-01-01 | Stop reason: HOSPADM

## 2019-01-01 RX ORDER — HALOPERIDOL 5 MG/ML
2 INJECTION INTRAMUSCULAR ONCE
Status: DISCONTINUED | OUTPATIENT
Start: 2019-01-01 | End: 2019-01-01

## 2019-01-01 RX ORDER — ONDANSETRON 2 MG/ML
8 INJECTION INTRAMUSCULAR; INTRAVENOUS
Status: CANCELLED | OUTPATIENT
Start: 2019-01-01

## 2019-01-01 RX ORDER — SODIUM CHLORIDE 9 MG/ML
INJECTION, SOLUTION INTRAVENOUS CONTINUOUS
Status: CANCELLED | OUTPATIENT
Start: 2019-01-01

## 2019-01-01 RX ORDER — ONDANSETRON HCL IN 0.9 % NACL 8 MG/50 ML
8 INTRAVENOUS SOLUTION, PIGGYBACK (ML) INTRAVENOUS
Status: COMPLETED | OUTPATIENT
Start: 2019-01-01 | End: 2019-01-01

## 2019-01-01 RX ORDER — TRIAMCINOLONE ACETONIDE 1 MG/G
CREAM TOPICAL 2 TIMES DAILY PRN
Qty: 453.6 G | Refills: 1 | Status: SHIPPED | OUTPATIENT
Start: 2019-01-01

## 2019-01-01 RX ORDER — ENALAPRIL MALEATE 5 MG/1
5 TABLET ORAL 2 TIMES DAILY
Qty: 180 TABLET | Refills: 3 | Status: ON HOLD | OUTPATIENT
Start: 2019-01-01 | End: 2019-01-01 | Stop reason: HOSPADM

## 2019-01-01 RX ORDER — DILTIAZEM HYDROCHLORIDE 60 MG/1
60 TABLET, FILM COATED ORAL EVERY 6 HOURS
Qty: 120 TABLET | Refills: 11 | Status: SHIPPED | OUTPATIENT
Start: 2019-01-01 | End: 2019-01-01 | Stop reason: HOSPADM

## 2019-01-01 RX ORDER — HEPARIN 100 UNIT/ML
500 SYRINGE INTRAVENOUS
Status: CANCELLED | OUTPATIENT
Start: 2019-01-01

## 2019-01-01 RX ORDER — ONDANSETRON 2 MG/ML
8 INJECTION INTRAMUSCULAR; INTRAVENOUS
Status: COMPLETED | OUTPATIENT
Start: 2019-01-01 | End: 2019-01-01

## 2019-01-01 RX ORDER — TAMSULOSIN HYDROCHLORIDE 0.4 MG/1
0.4 CAPSULE ORAL DAILY
Qty: 30 CAPSULE | Refills: 11 | Status: SHIPPED | OUTPATIENT
Start: 2019-01-01 | End: 2019-01-01 | Stop reason: ALTCHOICE

## 2019-01-01 RX ORDER — AMOXICILLIN AND CLAVULANATE POTASSIUM 500; 125 MG/1; MG/1
1 TABLET, FILM COATED ORAL 3 TIMES DAILY
Status: DISCONTINUED | OUTPATIENT
Start: 2019-01-01 | End: 2019-01-01

## 2019-01-01 RX ORDER — ONDANSETRON 8 MG/1
8 TABLET, ORALLY DISINTEGRATING ORAL EVERY 12 HOURS PRN
Qty: 90 TABLET | Refills: 1 | Status: SHIPPED | OUTPATIENT
Start: 2019-01-01 | End: 2019-01-01

## 2019-01-01 RX ORDER — HEPARIN SODIUM 200 [USP'U]/100ML
INJECTION, SOLUTION INTRAVENOUS
Status: DISCONTINUED | OUTPATIENT
Start: 2019-01-01 | End: 2019-01-01

## 2019-01-01 RX ORDER — DIPHENHYDRAMINE HCL 25 MG
25 CAPSULE ORAL EVERY 6 HOURS PRN
Status: DISCONTINUED | OUTPATIENT
Start: 2019-01-01 | End: 2019-01-01

## 2019-01-01 RX ORDER — PREDNISONE 20 MG/1
20 TABLET ORAL DAILY
Status: DISCONTINUED | OUTPATIENT
Start: 2019-01-01 | End: 2019-01-01 | Stop reason: HOSPADM

## 2019-01-01 RX ORDER — AMOXICILLIN 250 MG
1 CAPSULE ORAL 2 TIMES DAILY
Status: DISCONTINUED | OUTPATIENT
Start: 2019-01-01 | End: 2019-01-01 | Stop reason: HOSPADM

## 2019-01-01 RX ORDER — FUROSEMIDE 10 MG/ML
80 INJECTION INTRAMUSCULAR; INTRAVENOUS ONCE
Status: COMPLETED | OUTPATIENT
Start: 2019-01-01 | End: 2019-01-01

## 2019-01-01 RX ORDER — FUROSEMIDE 10 MG/ML
40 INJECTION INTRAMUSCULAR; INTRAVENOUS EVERY 8 HOURS
Status: DISCONTINUED | OUTPATIENT
Start: 2019-01-01 | End: 2019-01-01

## 2019-01-01 RX ORDER — DILTIAZEM HYDROCHLORIDE 60 MG/1
60 TABLET, FILM COATED ORAL EVERY 6 HOURS
Status: DISCONTINUED | OUTPATIENT
Start: 2019-01-01 | End: 2019-01-01

## 2019-01-01 RX ORDER — PREDNISONE 20 MG/1
60 TABLET ORAL DAILY
Status: DISCONTINUED | OUTPATIENT
Start: 2019-01-01 | End: 2019-01-01

## 2019-01-01 RX ORDER — HEPARIN 100 UNIT/ML
500 SYRINGE INTRAVENOUS EVERY 8 HOURS PRN
Status: DISCONTINUED | OUTPATIENT
Start: 2019-01-01 | End: 2019-01-01 | Stop reason: HOSPADM

## 2019-01-01 RX ORDER — SODIUM BICARBONATE 1 MEQ/ML
50 SYRINGE (ML) INTRAVENOUS ONCE
Status: DISCONTINUED | OUTPATIENT
Start: 2019-01-01 | End: 2019-01-01 | Stop reason: HOSPADM

## 2019-01-01 RX ORDER — FUROSEMIDE 10 MG/ML
40 INJECTION INTRAMUSCULAR; INTRAVENOUS ONCE
Status: COMPLETED | OUTPATIENT
Start: 2019-01-01 | End: 2019-01-01

## 2019-01-01 RX ORDER — FENTANYL CITRATE 50 UG/ML
INJECTION, SOLUTION INTRAMUSCULAR; INTRAVENOUS
Status: DISCONTINUED | OUTPATIENT
Start: 2019-01-01 | End: 2019-01-01 | Stop reason: HOSPADM

## 2019-01-01 RX ORDER — METOPROLOL SUCCINATE 50 MG/1
100 TABLET, EXTENDED RELEASE ORAL DAILY
Status: DISCONTINUED | OUTPATIENT
Start: 2019-01-01 | End: 2019-01-01

## 2019-01-01 RX ORDER — TRIAMCINOLONE ACETONIDE 0.25 MG/G
CREAM TOPICAL 2 TIMES DAILY
Status: CANCELLED | OUTPATIENT
Start: 2019-01-01

## 2019-01-01 RX ORDER — GUAIFENESIN 100 MG/5ML
200 SOLUTION ORAL EVERY 4 HOURS PRN
Status: DISCONTINUED | OUTPATIENT
Start: 2019-01-01 | End: 2019-01-01 | Stop reason: HOSPADM

## 2019-01-01 RX ORDER — FENTANYL CITRATE 50 UG/ML
INJECTION, SOLUTION INTRAMUSCULAR; INTRAVENOUS CODE/TRAUMA/SEDATION MEDICATION
Status: COMPLETED | OUTPATIENT
Start: 2019-01-01 | End: 2019-01-01

## 2019-01-01 RX ORDER — ENOXAPARIN SODIUM 100 MG/ML
1 INJECTION SUBCUTANEOUS
Status: DISCONTINUED | OUTPATIENT
Start: 2019-01-01 | End: 2019-01-01

## 2019-01-01 RX ORDER — DRONABINOL 2.5 MG/1
2.5 CAPSULE ORAL
Qty: 60 CAPSULE | Refills: 3 | Status: SHIPPED | OUTPATIENT
Start: 2019-01-01

## 2019-01-01 RX ORDER — METOPROLOL SUCCINATE 50 MG/1
200 TABLET, EXTENDED RELEASE ORAL DAILY
Status: DISCONTINUED | OUTPATIENT
Start: 2019-01-01 | End: 2019-01-01

## 2019-01-01 RX ORDER — ONDANSETRON 2 MG/ML
4 INJECTION INTRAMUSCULAR; INTRAVENOUS EVERY 8 HOURS PRN
Status: DISCONTINUED | OUTPATIENT
Start: 2019-01-01 | End: 2019-01-01 | Stop reason: HOSPADM

## 2019-01-01 RX ORDER — DEXTROSE 50 % IN WATER (D50W) INTRAVENOUS SYRINGE
25 ONCE
Status: COMPLETED | OUTPATIENT
Start: 2019-01-01 | End: 2019-01-01

## 2019-01-01 RX ORDER — FUROSEMIDE 40 MG/1
40 TABLET ORAL 2 TIMES DAILY
Qty: 60 TABLET | Refills: 11 | Status: SHIPPED | OUTPATIENT
Start: 2019-01-01 | End: 2019-01-01 | Stop reason: HOSPADM

## 2019-01-01 RX ORDER — FENTANYL CITRATE 50 UG/ML
50 INJECTION, SOLUTION INTRAMUSCULAR; INTRAVENOUS
Status: CANCELLED | OUTPATIENT
Start: 2019-01-01

## 2019-01-01 RX ORDER — SODIUM CHLORIDE FOR INHALATION 3 %
4 VIAL, NEBULIZER (ML) INHALATION
Status: DISCONTINUED | OUTPATIENT
Start: 2019-01-01 | End: 2019-01-01 | Stop reason: HOSPADM

## 2019-01-01 RX ORDER — METOPROLOL TARTRATE 1 MG/ML
5 INJECTION, SOLUTION INTRAVENOUS
Status: DISCONTINUED | OUTPATIENT
Start: 2019-01-01 | End: 2019-01-01

## 2019-01-01 RX ORDER — MEROPENEM AND SODIUM CHLORIDE 1 G/50ML
1 INJECTION, SOLUTION INTRAVENOUS
Status: DISCONTINUED | OUTPATIENT
Start: 2019-01-01 | End: 2019-01-01

## 2019-01-01 RX ORDER — IPRATROPIUM BROMIDE AND ALBUTEROL SULFATE 2.5; .5 MG/3ML; MG/3ML
3 SOLUTION RESPIRATORY (INHALATION)
Status: DISCONTINUED | OUTPATIENT
Start: 2019-01-01 | End: 2019-01-01

## 2019-01-01 RX ORDER — MAGNESIUM SULFATE HEPTAHYDRATE 40 MG/ML
2 INJECTION, SOLUTION INTRAVENOUS ONCE
Status: COMPLETED | OUTPATIENT
Start: 2019-01-01 | End: 2019-01-01

## 2019-01-01 RX ORDER — IBUPROFEN 200 MG
24 TABLET ORAL
Status: DISCONTINUED | OUTPATIENT
Start: 2019-01-01 | End: 2019-01-01 | Stop reason: HOSPADM

## 2019-01-01 RX ORDER — METOPROLOL TARTRATE 1 MG/ML
2.5 INJECTION, SOLUTION INTRAVENOUS
Status: COMPLETED | OUTPATIENT
Start: 2019-01-01 | End: 2019-01-01

## 2019-01-01 RX ORDER — DEXTROSE 50 % IN WATER (D50W) INTRAVENOUS SYRINGE
25
Status: COMPLETED | OUTPATIENT
Start: 2019-01-01 | End: 2019-01-01

## 2019-01-01 RX ORDER — SODIUM CHLORIDE 9 MG/ML
3 INJECTION, SOLUTION INTRAVENOUS CONTINUOUS
Status: CANCELLED | OUTPATIENT
Start: 2019-01-01 | End: 2019-01-01

## 2019-01-01 RX ORDER — LEVOFLOXACIN 500 MG/1
500 TABLET, FILM COATED ORAL DAILY
Qty: 10 TABLET | Refills: 0 | Status: SHIPPED | OUTPATIENT
Start: 2019-01-01 | End: 2019-01-01

## 2019-01-01 RX ORDER — ENOXAPARIN SODIUM 100 MG/ML
80 INJECTION SUBCUTANEOUS
Status: DISCONTINUED | OUTPATIENT
Start: 2019-01-01 | End: 2019-01-01

## 2019-01-01 RX ORDER — OXYCODONE AND ACETAMINOPHEN 5; 325 MG/1; MG/1
1 TABLET ORAL EVERY 4 HOURS PRN
Status: DISCONTINUED | OUTPATIENT
Start: 2019-01-01 | End: 2019-01-01 | Stop reason: HOSPADM

## 2019-01-01 RX ORDER — FUROSEMIDE 10 MG/ML
40 INJECTION INTRAMUSCULAR; INTRAVENOUS ONCE
Status: DISCONTINUED | OUTPATIENT
Start: 2019-01-01 | End: 2019-01-01

## 2019-01-01 RX ORDER — SODIUM BICARBONATE 1 MEQ/ML
SYRINGE (ML) INTRAVENOUS
Status: DISPENSED
Start: 2019-01-01 | End: 2019-01-01

## 2019-01-01 RX ORDER — NITROFURANTOIN 25; 75 MG/1; MG/1
100 CAPSULE ORAL 2 TIMES DAILY
Qty: 14 CAPSULE | Refills: 0 | Status: SHIPPED | OUTPATIENT
Start: 2019-01-01 | End: 2019-01-01

## 2019-01-01 RX ORDER — PANTOPRAZOLE SODIUM 40 MG/1
40 TABLET, DELAYED RELEASE ORAL DAILY
Status: DISCONTINUED | OUTPATIENT
Start: 2019-01-01 | End: 2019-01-01

## 2019-01-01 RX ORDER — ALBUTEROL SULFATE 2.5 MG/.5ML
2.5 SOLUTION RESPIRATORY (INHALATION)
Status: DISCONTINUED | OUTPATIENT
Start: 2019-01-01 | End: 2019-01-01 | Stop reason: HOSPADM

## 2019-01-01 RX ORDER — AMOXICILLIN AND CLAVULANATE POTASSIUM 400; 57 MG/5ML; MG/5ML
400 POWDER, FOR SUSPENSION ORAL EVERY 12 HOURS
Status: DISCONTINUED | OUTPATIENT
Start: 2019-01-01 | End: 2019-01-01

## 2019-01-01 RX ORDER — AMIODARONE HYDROCHLORIDE 400 MG/1
400 TABLET ORAL 2 TIMES DAILY
Qty: 60 TABLET | Refills: 11 | Status: SHIPPED | OUTPATIENT
Start: 2019-01-01 | End: 2019-01-01

## 2019-01-01 RX ORDER — AMOXICILLIN 250 MG
1 CAPSULE ORAL 2 TIMES DAILY
Status: CANCELLED | COMMUNITY
Start: 2019-01-01

## 2019-01-01 RX ORDER — DILTIAZEM HYDROCHLORIDE 180 MG/1
180 CAPSULE, COATED, EXTENDED RELEASE ORAL DAILY
Status: DISCONTINUED | OUTPATIENT
Start: 2019-01-01 | End: 2019-01-01

## 2019-01-01 RX ORDER — PREDNISONE 20 MG/1
20 TABLET ORAL DAILY
Status: DISCONTINUED | OUTPATIENT
Start: 2019-01-01 | End: 2019-01-01

## 2019-01-01 RX ORDER — AMIODARONE HYDROCHLORIDE 200 MG/1
400 TABLET ORAL 2 TIMES DAILY
Status: DISCONTINUED | OUTPATIENT
Start: 2019-01-01 | End: 2019-01-01 | Stop reason: HOSPADM

## 2019-01-01 RX ORDER — HYDROCODONE BITARTRATE AND ACETAMINOPHEN 7.5; 325 MG/1; MG/1
1 TABLET ORAL ONCE
Status: COMPLETED | OUTPATIENT
Start: 2019-01-01 | End: 2019-01-01

## 2019-01-01 RX ORDER — ALBUTEROL SULFATE 2.5 MG/.5ML
10 SOLUTION RESPIRATORY (INHALATION) ONCE
Status: COMPLETED | OUTPATIENT
Start: 2019-01-01 | End: 2019-01-01

## 2019-01-01 RX ORDER — RAMELTEON 8 MG/1
8 TABLET ORAL NIGHTLY PRN
Status: DISCONTINUED | OUTPATIENT
Start: 2019-01-01 | End: 2019-01-01 | Stop reason: HOSPADM

## 2019-01-01 RX ORDER — MIDAZOLAM HYDROCHLORIDE 1 MG/ML
INJECTION, SOLUTION INTRAMUSCULAR; INTRAVENOUS
Status: DISCONTINUED | OUTPATIENT
Start: 2019-01-01 | End: 2019-01-01 | Stop reason: HOSPADM

## 2019-01-01 RX ORDER — LORAZEPAM 2 MG/ML
INJECTION INTRAMUSCULAR
Status: DISPENSED
Start: 2019-01-01 | End: 2019-01-01

## 2019-01-01 RX ORDER — METOPROLOL TARTRATE 1 MG/ML
INJECTION, SOLUTION INTRAVENOUS
Status: COMPLETED
Start: 2019-01-01 | End: 2019-01-01

## 2019-01-01 RX ORDER — NOREPINEPHRINE BITARTRATE/D5W 16MG/250ML
0.02 PLASTIC BAG, INJECTION (ML) INTRAVENOUS CONTINUOUS
Status: DISCONTINUED | OUTPATIENT
Start: 2019-01-01 | End: 2019-01-01 | Stop reason: HOSPADM

## 2019-01-01 RX ORDER — HYDROCODONE BITARTRATE AND ACETAMINOPHEN 5; 325 MG/1; MG/1
1 TABLET ORAL EVERY 6 HOURS PRN
Qty: 60 TABLET | Refills: 0 | Status: SHIPPED | OUTPATIENT
Start: 2019-01-01 | End: 2019-01-01 | Stop reason: SDUPTHER

## 2019-01-01 RX ORDER — AMIODARONE HYDROCHLORIDE 200 MG/1
400 TABLET ORAL 2 TIMES DAILY
Status: DISCONTINUED | OUTPATIENT
Start: 2019-01-01 | End: 2019-01-01

## 2019-01-01 RX ORDER — POLYETHYLENE GLYCOL 3350 17 G/17G
17 POWDER, FOR SOLUTION ORAL 3 TIMES DAILY
Status: DISCONTINUED | OUTPATIENT
Start: 2019-01-01 | End: 2019-01-01 | Stop reason: HOSPADM

## 2019-01-01 RX ORDER — TRIAMCINOLONE ACETONIDE 1 MG/G
CREAM TOPICAL 2 TIMES DAILY
Status: DISCONTINUED | OUTPATIENT
Start: 2019-01-01 | End: 2019-01-01 | Stop reason: HOSPADM

## 2019-01-01 RX ORDER — FUROSEMIDE 10 MG/ML
40 INJECTION INTRAMUSCULAR; INTRAVENOUS DAILY
Status: COMPLETED | OUTPATIENT
Start: 2019-01-01 | End: 2019-01-01

## 2019-01-01 RX ORDER — PREDNISONE 20 MG/1
20 TABLET ORAL DAILY
Qty: 3 TABLET | Refills: 0 | Status: SHIPPED | OUTPATIENT
Start: 2019-01-01 | End: 2019-01-01

## 2019-01-01 RX ORDER — SODIUM CHLORIDE 0.9 % (FLUSH) 0.9 %
10 SYRINGE (ML) INJECTION
Status: DISCONTINUED | OUTPATIENT
Start: 2019-01-01 | End: 2019-01-01

## 2019-01-01 RX ORDER — HYDROCODONE BITARTRATE AND ACETAMINOPHEN 5; 325 MG/1; MG/1
1 TABLET ORAL EVERY 6 HOURS PRN
Qty: 60 TABLET | Refills: 0 | Status: SHIPPED | OUTPATIENT
Start: 2019-01-01

## 2019-01-01 RX ORDER — CEFAZOLIN SODIUM 1 G/50ML
SOLUTION INTRAVENOUS
Status: COMPLETED | OUTPATIENT
Start: 2019-01-01 | End: 2019-01-01

## 2019-01-01 RX ORDER — DILTIAZEM HCL/D5W 125 MG/125
5 PLASTIC BAG, INJECTION (ML) INTRAVENOUS CONTINUOUS
Status: DISCONTINUED | OUTPATIENT
Start: 2019-01-01 | End: 2019-01-01

## 2019-01-01 RX ORDER — METOPROLOL TARTRATE 1 MG/ML
5 INJECTION, SOLUTION INTRAVENOUS EVERY 5 MIN PRN
Status: DISCONTINUED | OUTPATIENT
Start: 2019-01-01 | End: 2019-01-01 | Stop reason: HOSPADM

## 2019-01-01 RX ORDER — POLYETHYLENE GLYCOL 3350 17 G/17G
17 POWDER, FOR SOLUTION ORAL 2 TIMES DAILY PRN
Status: DISCONTINUED | OUTPATIENT
Start: 2019-01-01 | End: 2019-01-01

## 2019-01-01 RX ORDER — FUROSEMIDE 10 MG/ML
40 INJECTION INTRAMUSCULAR; INTRAVENOUS 2 TIMES DAILY
Status: DISCONTINUED | OUTPATIENT
Start: 2019-01-01 | End: 2019-01-01 | Stop reason: HOSPADM

## 2019-01-01 RX ORDER — FUROSEMIDE 10 MG/ML
40 INJECTION INTRAMUSCULAR; INTRAVENOUS 2 TIMES DAILY
Status: DISCONTINUED | OUTPATIENT
Start: 2019-01-01 | End: 2019-01-01

## 2019-01-01 RX ORDER — DILTIAZEM HYDROCHLORIDE 60 MG/1
60 TABLET, FILM COATED ORAL EVERY 6 HOURS
Status: COMPLETED | OUTPATIENT
Start: 2019-01-01 | End: 2019-01-01

## 2019-01-01 RX ORDER — DRONABINOL 2.5 MG/1
2.5 CAPSULE ORAL 2 TIMES DAILY
Status: DISCONTINUED | OUTPATIENT
Start: 2019-01-01 | End: 2019-01-01

## 2019-01-01 RX ORDER — OXYCODONE AND ACETAMINOPHEN 5; 325 MG/1; MG/1
1 TABLET ORAL EVERY 8 HOURS PRN
Qty: 24 TABLET | Refills: 0 | Status: SHIPPED | OUTPATIENT
Start: 2019-01-01 | End: 2019-01-01 | Stop reason: SDUPTHER

## 2019-01-01 RX ORDER — DRONABINOL 2.5 MG/1
2.5 CAPSULE ORAL 2 TIMES DAILY
Qty: 60 CAPSULE | Refills: 0 | Status: SHIPPED | OUTPATIENT
Start: 2019-01-01 | End: 2019-01-01 | Stop reason: HOSPADM

## 2019-01-01 RX ORDER — HYDROCODONE BITARTRATE AND ACETAMINOPHEN 5; 325 MG/1; MG/1
1 TABLET ORAL EVERY 6 HOURS PRN
Status: DISCONTINUED | OUTPATIENT
Start: 2019-01-01 | End: 2019-01-01 | Stop reason: HOSPADM

## 2019-01-01 RX ORDER — GLYCERIN 1 G/1
1 SUPPOSITORY RECTAL ONCE
Status: COMPLETED | OUTPATIENT
Start: 2019-01-01 | End: 2019-01-01

## 2019-01-01 RX ORDER — TRIAMCINOLONE ACETONIDE 0.25 MG/G
OINTMENT TOPICAL 2 TIMES DAILY
Status: DISCONTINUED | OUTPATIENT
Start: 2019-01-01 | End: 2019-01-01 | Stop reason: HOSPADM

## 2019-01-01 RX ORDER — METOPROLOL SUCCINATE 100 MG/1
100 TABLET, EXTENDED RELEASE ORAL DAILY
Qty: 60 TABLET | Refills: 11 | Status: SHIPPED | OUTPATIENT
Start: 2019-01-01 | End: 2019-01-01 | Stop reason: HOSPADM

## 2019-01-01 RX ORDER — GLUCAGON 1 MG
1 KIT INJECTION
Status: DISCONTINUED | OUTPATIENT
Start: 2019-01-01 | End: 2019-01-01 | Stop reason: HOSPADM

## 2019-01-01 RX ORDER — IPRATROPIUM BROMIDE AND ALBUTEROL SULFATE 2.5; .5 MG/3ML; MG/3ML
3 SOLUTION RESPIRATORY (INHALATION) EVERY 6 HOURS PRN
Qty: 180 ML | Refills: 0 | Status: SHIPPED | OUTPATIENT
Start: 2019-01-01 | End: 2020-07-24

## 2019-01-01 RX ORDER — ONDANSETRON 2 MG/ML
4 INJECTION INTRAMUSCULAR; INTRAVENOUS ONCE
Status: DISCONTINUED | OUTPATIENT
Start: 2019-01-01 | End: 2019-01-01

## 2019-01-01 RX ORDER — METOPROLOL TARTRATE 25 MG/1
25 TABLET, FILM COATED ORAL 2 TIMES DAILY
Status: DISCONTINUED | OUTPATIENT
Start: 2019-01-01 | End: 2019-01-01

## 2019-01-01 RX ORDER — LIDOCAINE HYDROCHLORIDE 20 MG/ML
INJECTION, SOLUTION INFILTRATION; PERINEURAL
Status: DISCONTINUED | OUTPATIENT
Start: 2019-01-01 | End: 2019-01-01 | Stop reason: HOSPADM

## 2019-01-01 RX ORDER — DIPHENHYDRAMINE HCL 50 MG
50 CAPSULE ORAL ONCE
Status: CANCELLED | OUTPATIENT
Start: 2019-01-01 | End: 2019-01-01

## 2019-01-01 RX ORDER — ONDANSETRON 8 MG/1
8 TABLET, ORALLY DISINTEGRATING ORAL EVERY 8 HOURS PRN
Status: DISCONTINUED | OUTPATIENT
Start: 2019-01-01 | End: 2019-01-01 | Stop reason: HOSPADM

## 2019-01-01 RX ORDER — AMOXICILLIN AND CLAVULANATE POTASSIUM 500; 125 MG/1; MG/1
1 TABLET, FILM COATED ORAL 3 TIMES DAILY
Status: COMPLETED | OUTPATIENT
Start: 2019-01-01 | End: 2019-01-01

## 2019-01-01 RX ORDER — FAMOTIDINE 20 MG/1
20 TABLET, FILM COATED ORAL 2 TIMES DAILY
Status: DISCONTINUED | OUTPATIENT
Start: 2019-01-01 | End: 2019-01-01

## 2019-01-01 RX ORDER — HEPARIN SODIUM,PORCINE/D5W 25000/250
12 INTRAVENOUS SOLUTION INTRAVENOUS CONTINUOUS
Status: DISCONTINUED | OUTPATIENT
Start: 2019-01-01 | End: 2019-01-01

## 2019-01-01 RX ORDER — MIDAZOLAM HYDROCHLORIDE 1 MG/ML
1 INJECTION INTRAMUSCULAR; INTRAVENOUS
Status: CANCELLED | OUTPATIENT
Start: 2019-01-01

## 2019-01-01 RX ORDER — PANTOPRAZOLE SODIUM 40 MG/1
40 TABLET, DELAYED RELEASE ORAL DAILY
Qty: 30 TABLET | Refills: 11 | Status: SHIPPED | OUTPATIENT
Start: 2019-01-01 | End: 2019-01-01 | Stop reason: HOSPADM

## 2019-01-01 RX ORDER — METOPROLOL SUCCINATE 100 MG/1
100 TABLET, EXTENDED RELEASE ORAL DAILY
Qty: 30 TABLET | Refills: 0 | Status: SHIPPED | OUTPATIENT
Start: 2019-01-01 | End: 2019-09-05

## 2019-01-01 RX ORDER — CEFTRIAXONE 1 G/1
1 INJECTION, POWDER, FOR SOLUTION INTRAMUSCULAR; INTRAVENOUS
Status: DISCONTINUED | OUTPATIENT
Start: 2019-01-01 | End: 2019-01-01

## 2019-01-01 RX ORDER — IBUPROFEN 400 MG/1
400 TABLET ORAL EVERY 6 HOURS PRN
Status: DISCONTINUED | OUTPATIENT
Start: 2019-01-01 | End: 2019-01-01 | Stop reason: HOSPADM

## 2019-01-01 RX ORDER — ALBUTEROL SULFATE 2.5 MG/.5ML
10 SOLUTION RESPIRATORY (INHALATION)
Status: COMPLETED | OUTPATIENT
Start: 2019-01-01 | End: 2019-01-01

## 2019-01-01 RX ORDER — FUROSEMIDE 40 MG/1
40 TABLET ORAL 2 TIMES DAILY
Status: DISCONTINUED | OUTPATIENT
Start: 2019-01-01 | End: 2019-01-01

## 2019-01-01 RX ORDER — AMOXICILLIN AND CLAVULANATE POTASSIUM 875; 125 MG/1; MG/1
1 TABLET, FILM COATED ORAL 2 TIMES DAILY
Qty: 14 TABLET | Refills: 0 | Status: ON HOLD | OUTPATIENT
Start: 2019-01-01 | End: 2019-01-01 | Stop reason: HOSPADM

## 2019-01-01 RX ORDER — MIDAZOLAM HYDROCHLORIDE 1 MG/ML
INJECTION INTRAMUSCULAR; INTRAVENOUS CODE/TRAUMA/SEDATION MEDICATION
Status: COMPLETED | OUTPATIENT
Start: 2019-01-01 | End: 2019-01-01

## 2019-01-01 RX ORDER — ONDANSETRON 8 MG/1
8 TABLET, ORALLY DISINTEGRATING ORAL EVERY 12 HOURS PRN
Qty: 60 TABLET | Refills: 0 | Status: SHIPPED | OUTPATIENT
Start: 2019-01-01

## 2019-01-01 RX ORDER — METOPROLOL TARTRATE 25 MG/1
50 TABLET, FILM COATED ORAL 2 TIMES DAILY
Status: DISPENSED | OUTPATIENT
Start: 2019-01-01 | End: 2019-01-01

## 2019-01-01 RX ORDER — FAMOTIDINE 20 MG/1
20 TABLET, FILM COATED ORAL DAILY
Qty: 30 TABLET | Refills: 0 | Status: SHIPPED | OUTPATIENT
Start: 2019-01-01 | End: 2019-09-04

## 2019-01-01 RX ORDER — DILTIAZEM HYDROCHLORIDE 5 MG/ML
20 INJECTION INTRAVENOUS ONCE
Status: COMPLETED | OUTPATIENT
Start: 2019-01-01 | End: 2019-01-01

## 2019-01-01 RX ORDER — CIPROFLOXACIN 500 MG/1
500 TABLET ORAL 2 TIMES DAILY
Qty: 10 TABLET | Refills: 0 | Status: SHIPPED | OUTPATIENT
Start: 2019-01-01 | End: 2019-01-01

## 2019-01-01 RX ORDER — METOPROLOL TARTRATE 50 MG/1
50 TABLET ORAL 2 TIMES DAILY
Status: DISCONTINUED | OUTPATIENT
Start: 2019-01-01 | End: 2019-01-01

## 2019-01-01 RX ORDER — TRIAMCINOLONE ACETONIDE 0.25 MG/G
CREAM TOPICAL 2 TIMES DAILY
Status: DISCONTINUED | OUTPATIENT
Start: 2019-01-01 | End: 2019-01-01

## 2019-01-01 RX ORDER — METOPROLOL TARTRATE 1 MG/ML
5 INJECTION, SOLUTION INTRAVENOUS ONCE
Status: COMPLETED | OUTPATIENT
Start: 2019-01-01 | End: 2019-01-01

## 2019-01-01 RX ORDER — TRIAMCINOLONE ACETONIDE 0.25 MG/G
OINTMENT TOPICAL 2 TIMES DAILY PRN
Qty: 15 G | Refills: 1 | Status: SHIPPED | OUTPATIENT
Start: 2019-01-01

## 2019-01-01 RX ORDER — AZITHROMYCIN 1 G/1
1 POWDER, FOR SUSPENSION ORAL ONCE
Qty: 1 PACKET | Refills: 0 | Status: SHIPPED | OUTPATIENT
Start: 2019-01-01 | End: 2019-01-01

## 2019-01-01 RX ORDER — FUROSEMIDE 40 MG/1
40 TABLET ORAL DAILY
Status: DISCONTINUED | OUTPATIENT
Start: 2019-01-01 | End: 2019-01-01

## 2019-01-01 RX ORDER — CIPROFLOXACIN 2 MG/ML
400 INJECTION, SOLUTION INTRAVENOUS
Status: CANCELLED | OUTPATIENT
Start: 2019-01-01

## 2019-01-01 RX ORDER — GUAIFENESIN 100 MG/5ML
200 SOLUTION ORAL ONCE
Status: COMPLETED | OUTPATIENT
Start: 2019-01-01 | End: 2019-01-01

## 2019-01-01 RX ORDER — TRIAMCINOLONE ACETONIDE 0.25 MG/G
OINTMENT TOPICAL 2 TIMES DAILY PRN
Status: DISCONTINUED | OUTPATIENT
Start: 2019-01-01 | End: 2019-01-01

## 2019-01-01 RX ORDER — AMIODARONE HYDROCHLORIDE 400 MG/1
400 TABLET ORAL 2 TIMES DAILY
Qty: 60 TABLET | Refills: 0 | Status: SHIPPED | OUTPATIENT
Start: 2019-01-01 | End: 2019-09-04

## 2019-01-01 RX ORDER — TRIAMCINOLONE ACETONIDE 1 MG/G
CREAM TOPICAL 2 TIMES DAILY PRN
Status: DISCONTINUED | OUTPATIENT
Start: 2019-01-01 | End: 2019-01-01

## 2019-01-01 RX ORDER — TRIAMCINOLONE ACETONIDE 0.25 MG/G
OINTMENT TOPICAL 2 TIMES DAILY
Status: DISCONTINUED | OUTPATIENT
Start: 2019-01-01 | End: 2019-01-01

## 2019-01-01 RX ORDER — ONDANSETRON 2 MG/ML
8 INJECTION INTRAMUSCULAR; INTRAVENOUS
Status: DISCONTINUED | OUTPATIENT
Start: 2019-01-01 | End: 2019-01-01

## 2019-01-01 RX ORDER — NOREPINEPHRINE BITARTRATE/D5W 16MG/250ML
0.02 PLASTIC BAG, INJECTION (ML) INTRAVENOUS CONTINUOUS
Status: DISCONTINUED | OUTPATIENT
Start: 2019-01-01 | End: 2019-01-01

## 2019-01-01 RX ORDER — PREDNISONE 10 MG/1
10 TABLET ORAL 3 TIMES DAILY
Status: DISCONTINUED | OUTPATIENT
Start: 2019-01-01 | End: 2019-01-01

## 2019-01-01 RX ORDER — SODIUM CHLORIDE 9 MG/ML
500 INJECTION, SOLUTION INTRAVENOUS
Status: COMPLETED | OUTPATIENT
Start: 2019-01-01 | End: 2019-01-01

## 2019-01-01 RX ORDER — HEPARIN 100 UNIT/ML
SYRINGE INTRAVENOUS CODE/TRAUMA/SEDATION MEDICATION
Status: COMPLETED | OUTPATIENT
Start: 2019-01-01 | End: 2019-01-01

## 2019-01-01 RX ORDER — PREDNISONE 20 MG/1
40 TABLET ORAL DAILY
Status: COMPLETED | OUTPATIENT
Start: 2019-01-01 | End: 2019-01-01

## 2019-01-01 RX ORDER — OXYCODONE AND ACETAMINOPHEN 5; 325 MG/1; MG/1
1 TABLET ORAL EVERY 8 HOURS PRN
Qty: 24 TABLET | Refills: 0 | Status: SHIPPED | OUTPATIENT
Start: 2019-01-01 | End: 2019-01-01 | Stop reason: ALTCHOICE

## 2019-01-01 RX ORDER — IPRATROPIUM BROMIDE AND ALBUTEROL SULFATE 2.5; .5 MG/3ML; MG/3ML
3 SOLUTION RESPIRATORY (INHALATION) EVERY 6 HOURS PRN
Status: DISCONTINUED | OUTPATIENT
Start: 2019-01-01 | End: 2019-01-01 | Stop reason: HOSPADM

## 2019-01-01 RX ORDER — LORAZEPAM 2 MG/ML
INJECTION INTRAMUSCULAR
Status: COMPLETED
Start: 2019-01-01 | End: 2019-01-01

## 2019-01-01 RX ORDER — IBUPROFEN 200 MG
16 TABLET ORAL
Status: DISCONTINUED | OUTPATIENT
Start: 2019-01-01 | End: 2019-01-01 | Stop reason: HOSPADM

## 2019-01-01 RX ORDER — METOPROLOL SUCCINATE 50 MG/1
100 TABLET, EXTENDED RELEASE ORAL DAILY
Status: DISCONTINUED | OUTPATIENT
Start: 2019-01-01 | End: 2019-01-01 | Stop reason: HOSPADM

## 2019-01-01 RX ORDER — CEFAZOLIN SODIUM 1 G/3ML
1 INJECTION, POWDER, FOR SOLUTION INTRAMUSCULAR; INTRAVENOUS
Status: DISCONTINUED | OUTPATIENT
Start: 2019-01-01 | End: 2019-01-01 | Stop reason: HOSPADM

## 2019-01-01 RX ORDER — FAMOTIDINE 20 MG/1
20 TABLET, FILM COATED ORAL DAILY
Qty: 30 TABLET | Refills: 11 | Status: SHIPPED | OUTPATIENT
Start: 2019-01-01 | End: 2019-01-01

## 2019-01-01 RX ADMIN — DILTIAZEM HYDROCHLORIDE 5 MG/HR: 5 INJECTION INTRAVENOUS at 07:07

## 2019-01-01 RX ADMIN — AMOXICILLIN AND CLAVULANATE POTASSIUM 500 MG: 500; 125 TABLET, FILM COATED ORAL at 08:07

## 2019-01-01 RX ADMIN — DIPHENHYDRAMINE HYDROCHLORIDE 25 MG: 25 CAPSULE ORAL at 01:07

## 2019-01-01 RX ADMIN — FUROSEMIDE 40 MG: 10 INJECTION, SOLUTION INTRAMUSCULAR; INTRAVENOUS at 08:07

## 2019-01-01 RX ADMIN — FAMOTIDINE 20 MG: 20 TABLET, FILM COATED ORAL at 08:08

## 2019-01-01 RX ADMIN — FENTANYL CITRATE 50 MCG: 50 INJECTION, SOLUTION INTRAMUSCULAR; INTRAVENOUS at 03:06

## 2019-01-01 RX ADMIN — IPRATROPIUM BROMIDE AND ALBUTEROL SULFATE 3 ML: .5; 3 SOLUTION RESPIRATORY (INHALATION) at 08:07

## 2019-01-01 RX ADMIN — LORAZEPAM 0.5 MG: 2 INJECTION INTRAMUSCULAR; INTRAVENOUS at 09:08

## 2019-01-01 RX ADMIN — GEMCITABINE HYDROCHLORIDE 1840 MG: 1 INJECTION, POWDER, LYOPHILIZED, FOR SOLUTION INTRAVENOUS at 01:06

## 2019-01-01 RX ADMIN — PREDNISONE 50 MG: 20 TABLET ORAL at 01:07

## 2019-01-01 RX ADMIN — ENOXAPARIN SODIUM 80 MG: 100 INJECTION SUBCUTANEOUS at 02:07

## 2019-01-01 RX ADMIN — METOPROLOL TARTRATE 5 MG: 1 INJECTION, SOLUTION INTRAVENOUS at 08:07

## 2019-01-01 RX ADMIN — FUROSEMIDE 40 MG: 10 INJECTION, SOLUTION INTRAVENOUS at 11:08

## 2019-01-01 RX ADMIN — DILTIAZEM HYDROCHLORIDE 60 MG: 60 TABLET, FILM COATED ORAL at 05:07

## 2019-01-01 RX ADMIN — TRIAMCINOLONE ACETONIDE: 0.25 OINTMENT TOPICAL at 08:08

## 2019-01-01 RX ADMIN — SENNOSIDES,DOCUSATE SODIUM 1 TABLET: 8.6; 5 TABLET, FILM COATED ORAL at 09:08

## 2019-01-01 RX ADMIN — CALCIUM GLUCONATE 1 G: 98 INJECTION, SOLUTION INTRAVENOUS at 11:08

## 2019-01-01 RX ADMIN — DEXAMETHASONE SODIUM PHOSPHATE 10 MG: 4 INJECTION, SOLUTION INTRA-ARTICULAR; INTRALESIONAL; INTRAMUSCULAR; INTRAVENOUS; SOFT TISSUE at 10:01

## 2019-01-01 RX ADMIN — TRIAMCINOLONE ACETONIDE: 0.25 OINTMENT TOPICAL at 10:08

## 2019-01-01 RX ADMIN — VANCOMYCIN HYDROCHLORIDE 2000 MG: 100 INJECTION, POWDER, LYOPHILIZED, FOR SOLUTION INTRAVENOUS at 03:08

## 2019-01-01 RX ADMIN — PANTOPRAZOLE SODIUM 40 MG: 40 TABLET, DELAYED RELEASE ORAL at 09:07

## 2019-01-01 RX ADMIN — HYPROMELLOSE 2910 1 DROP: 5 SOLUTION OPHTHALMIC at 10:08

## 2019-01-01 RX ADMIN — DIPHENHYDRAMINE HYDROCHLORIDE 25 MG: 25 CAPSULE ORAL at 09:07

## 2019-01-01 RX ADMIN — METOPROLOL SUCCINATE 100 MG: 50 TABLET, EXTENDED RELEASE ORAL at 12:08

## 2019-01-01 RX ADMIN — FAMOTIDINE 20 MG: 20 TABLET, FILM COATED ORAL at 10:08

## 2019-01-01 RX ADMIN — Medication 10 ML: at 11:05

## 2019-01-01 RX ADMIN — TRIAMCINOLONE ACETONIDE: 1 CREAM TOPICAL at 09:08

## 2019-01-01 RX ADMIN — CEFTRIAXONE SODIUM 1 G: 1 INJECTION, POWDER, FOR SOLUTION INTRAMUSCULAR; INTRAVENOUS at 08:08

## 2019-01-01 RX ADMIN — TRIAMCINOLONE ACETONIDE: 0.25 OINTMENT TOPICAL at 09:08

## 2019-01-01 RX ADMIN — METOPROLOL SUCCINATE 200 MG: 50 TABLET, EXTENDED RELEASE ORAL at 10:07

## 2019-01-01 RX ADMIN — FUROSEMIDE 40 MG: 10 INJECTION, SOLUTION INTRAVENOUS at 06:08

## 2019-01-01 RX ADMIN — IPRATROPIUM BROMIDE AND ALBUTEROL SULFATE 3 ML: .5; 3 SOLUTION RESPIRATORY (INHALATION) at 07:07

## 2019-01-01 RX ADMIN — Medication 10 ML: at 02:06

## 2019-01-01 RX ADMIN — AMOXICILLIN AND CLAVULANATE POTASSIUM 500 MG: 500; 125 TABLET, FILM COATED ORAL at 09:07

## 2019-01-01 RX ADMIN — METOPROLOL SUCCINATE 100 MG: 50 TABLET, EXTENDED RELEASE ORAL at 09:07

## 2019-01-01 RX ADMIN — IOHEXOL 100 ML: 350 INJECTION, SOLUTION INTRAVENOUS at 02:03

## 2019-01-01 RX ADMIN — FAMOTIDINE 20 MG: 20 TABLET, FILM COATED ORAL at 08:07

## 2019-01-01 RX ADMIN — ENOXAPARIN SODIUM 80 MG: 100 INJECTION SUBCUTANEOUS at 11:07

## 2019-01-01 RX ADMIN — SODIUM CHLORIDE 500 ML: 0.9 INJECTION, SOLUTION INTRAVENOUS at 12:08

## 2019-01-01 RX ADMIN — FILGRASTIM 480 MCG: 480 INJECTION, SOLUTION INTRAVENOUS; SUBCUTANEOUS at 12:05

## 2019-01-01 RX ADMIN — IPRATROPIUM BROMIDE AND ALBUTEROL SULFATE 3 ML: .5; 3 SOLUTION RESPIRATORY (INHALATION) at 12:07

## 2019-01-01 RX ADMIN — ONDANSETRON 8 MG: 2 INJECTION, SOLUTION INTRAMUSCULAR; INTRAVENOUS at 12:04

## 2019-01-01 RX ADMIN — TRIAMCINOLONE ACETONIDE: 1 CREAM TOPICAL at 10:08

## 2019-01-01 RX ADMIN — IPRATROPIUM BROMIDE AND ALBUTEROL SULFATE 3 ML: .5; 3 SOLUTION RESPIRATORY (INHALATION) at 04:07

## 2019-01-01 RX ADMIN — INSULIN HUMAN 10 UNITS: 100 INJECTION, SOLUTION PARENTERAL at 06:08

## 2019-01-01 RX ADMIN — HEPARIN 500 UNITS: 100 SYRINGE at 02:06

## 2019-01-01 RX ADMIN — AMIODARONE HYDROCHLORIDE 150 MG: 1.5 INJECTION, SOLUTION INTRAVENOUS at 05:07

## 2019-01-01 RX ADMIN — MEROPENEM 1 G: 1 INJECTION, POWDER, FOR SOLUTION INTRAVENOUS at 06:08

## 2019-01-01 RX ADMIN — PALONOSETRON 0.25 MG: 0.25 INJECTION, SOLUTION INTRAVENOUS at 11:01

## 2019-01-01 RX ADMIN — AMIODARONE HYDROCHLORIDE 400 MG: 200 TABLET ORAL at 08:08

## 2019-01-01 RX ADMIN — ONDANSETRON 8 MG: 2 INJECTION, SOLUTION INTRAMUSCULAR; INTRAVENOUS at 11:06

## 2019-01-01 RX ADMIN — FUROSEMIDE 80 MG: 10 INJECTION, SOLUTION INTRAMUSCULAR; INTRAVENOUS at 10:07

## 2019-01-01 RX ADMIN — FAMOTIDINE 20 MG: 20 TABLET, FILM COATED ORAL at 09:08

## 2019-01-01 RX ADMIN — METOPROLOL TARTRATE 2.5 MG: 5 INJECTION INTRAVENOUS at 07:07

## 2019-01-01 RX ADMIN — DILTIAZEM HYDROCHLORIDE 60 MG: 60 TABLET, FILM COATED ORAL at 06:07

## 2019-01-01 RX ADMIN — SODIUM CHLORIDE 500 ML: 0.9 INJECTION, SOLUTION INTRAVENOUS at 11:08

## 2019-01-01 RX ADMIN — DRONABINOL 2.5 MG: 2.5 CAPSULE ORAL at 08:08

## 2019-01-01 RX ADMIN — IPRATROPIUM BROMIDE AND ALBUTEROL SULFATE 3 ML: .5; 3 SOLUTION RESPIRATORY (INHALATION) at 08:08

## 2019-01-01 RX ADMIN — METOPROLOL TARTRATE 25 MG: 25 TABLET ORAL at 08:07

## 2019-01-01 RX ADMIN — SODIUM CHLORIDE: 0.9 INJECTION, SOLUTION INTRAVENOUS at 12:04

## 2019-01-01 RX ADMIN — PANTOPRAZOLE SODIUM 40 MG: 40 TABLET, DELAYED RELEASE ORAL at 08:07

## 2019-01-01 RX ADMIN — MEROPENEM 1 G: 1 INJECTION, POWDER, FOR SOLUTION INTRAVENOUS at 05:08

## 2019-01-01 RX ADMIN — AMOXICILLIN AND CLAVULANATE POTASSIUM 500 MG: 500; 125 TABLET, FILM COATED ORAL at 03:07

## 2019-01-01 RX ADMIN — FUROSEMIDE 40 MG: 10 INJECTION, SOLUTION INTRAMUSCULAR; INTRAVENOUS at 01:07

## 2019-01-01 RX ADMIN — PEGFILGRASTIM 6 MG: KIT SUBCUTANEOUS at 11:03

## 2019-01-01 RX ADMIN — TBO-FILGRASTIM 480 MCG: 480 INJECTION, SOLUTION SUBCUTANEOUS at 01:04

## 2019-01-01 RX ADMIN — HEPARIN SODIUM AND DEXTROSE 12 UNITS/KG/HR: 10000; 5 INJECTION INTRAVENOUS at 06:07

## 2019-01-01 RX ADMIN — GUAIFENESIN AND DEXTROMETHORPHAN HYDROBROMIDE 1 TABLET: 600; 30 TABLET, EXTENDED RELEASE ORAL at 09:07

## 2019-01-01 RX ADMIN — DIPHENHYDRAMINE HYDROCHLORIDE 25 MG: 25 CAPSULE ORAL at 10:07

## 2019-01-01 RX ADMIN — PREDNISONE 60 MG: 20 TABLET ORAL at 08:08

## 2019-01-01 RX ADMIN — METOPROLOL TARTRATE 50 MG: 25 TABLET ORAL at 08:07

## 2019-01-01 RX ADMIN — TRIAMCINOLONE ACETONIDE: 1 CREAM TOPICAL at 08:08

## 2019-01-01 RX ADMIN — ONDANSETRON 8 MG: 2 INJECTION, SOLUTION INTRAMUSCULAR; INTRAVENOUS at 01:03

## 2019-01-01 RX ADMIN — DILTIAZEM HYDROCHLORIDE 60 MG: 60 TABLET, FILM COATED ORAL at 11:07

## 2019-01-01 RX ADMIN — POLYETHYLENE GLYCOL 3350 17 G: 17 POWDER, FOR SOLUTION ORAL at 09:07

## 2019-01-01 RX ADMIN — TBO-FILGRASTIM 480 MCG: 480 INJECTION, SOLUTION SUBCUTANEOUS at 01:05

## 2019-01-01 RX ADMIN — DIPHENHYDRAMINE HYDROCHLORIDE 25 MG: 25 CAPSULE ORAL at 07:07

## 2019-01-01 RX ADMIN — FILGRASTIM 480 MCG: 480 INJECTION, SOLUTION INTRAVENOUS; SUBCUTANEOUS at 11:05

## 2019-01-01 RX ADMIN — ALBUTEROL SULFATE 10 MG: 2.5 SOLUTION RESPIRATORY (INHALATION) at 06:08

## 2019-01-01 RX ADMIN — IPRATROPIUM BROMIDE AND ALBUTEROL SULFATE 3 ML: .5; 3 SOLUTION RESPIRATORY (INHALATION) at 03:07

## 2019-01-01 RX ADMIN — LEVOFLOXACIN 750 MG: 500 TABLET, FILM COATED ORAL at 09:08

## 2019-01-01 RX ADMIN — RAMELTEON 8 MG: 8 TABLET, FILM COATED ORAL at 11:07

## 2019-01-01 RX ADMIN — SODIUM CHLORIDE 950 MG: 9 INJECTION, SOLUTION INTRAVENOUS at 01:01

## 2019-01-01 RX ADMIN — ONDANSETRON 8 MG: 2 INJECTION, SOLUTION INTRAMUSCULAR; INTRAVENOUS at 10:05

## 2019-01-01 RX ADMIN — SODIUM CHLORIDE: 0.9 INJECTION, SOLUTION INTRAVENOUS at 01:03

## 2019-01-01 RX ADMIN — IPRATROPIUM BROMIDE AND ALBUTEROL SULFATE 3 ML: .5; 3 SOLUTION RESPIRATORY (INHALATION) at 04:08

## 2019-01-01 RX ADMIN — ONDANSETRON 8 MG: 2 INJECTION, SOLUTION INTRAMUSCULAR; INTRAVENOUS at 10:03

## 2019-01-01 RX ADMIN — FILGRASTIM 480 MCG: 480 INJECTION, SOLUTION INTRAVENOUS; SUBCUTANEOUS at 11:06

## 2019-01-01 RX ADMIN — HEPARIN 500 UNITS: 100 SYRINGE at 05:07

## 2019-01-01 RX ADMIN — IPRATROPIUM BROMIDE AND ALBUTEROL SULFATE 3 ML: .5; 3 SOLUTION RESPIRATORY (INHALATION) at 09:07

## 2019-01-01 RX ADMIN — GLYCERIN 1 SUPPOSITORY: 2 SUPPOSITORY RECTAL at 10:07

## 2019-01-01 RX ADMIN — ONDANSETRON 8 MG: 2 INJECTION, SOLUTION INTRAMUSCULAR; INTRAVENOUS at 11:04

## 2019-01-01 RX ADMIN — METOPROLOL SUCCINATE 100 MG: 50 TABLET, EXTENDED RELEASE ORAL at 08:07

## 2019-01-01 RX ADMIN — ONDANSETRON 8 MG: 8 TABLET, ORALLY DISINTEGRATING ORAL at 09:07

## 2019-01-01 RX ADMIN — IPRATROPIUM BROMIDE AND ALBUTEROL SULFATE 3 ML: .5; 3 SOLUTION RESPIRATORY (INHALATION) at 07:08

## 2019-01-01 RX ADMIN — PIPERACILLIN AND TAZOBACTAM 4.5 G: 4; .5 INJECTION, POWDER, LYOPHILIZED, FOR SOLUTION INTRAVENOUS; PARENTERAL at 04:08

## 2019-01-01 RX ADMIN — POLYETHYLENE GLYCOL 3350 17 G: 17 POWDER, FOR SOLUTION ORAL at 09:08

## 2019-01-01 RX ADMIN — FAMOTIDINE 20 MG: 20 TABLET, FILM COATED ORAL at 02:07

## 2019-01-01 RX ADMIN — PREDNISONE 20 MG: 20 TABLET ORAL at 09:08

## 2019-01-01 RX ADMIN — TRIAMCINOLONE ACETONIDE: 0.25 CREAM TOPICAL at 09:07

## 2019-01-01 RX ADMIN — FUROSEMIDE 40 MG: 10 INJECTION, SOLUTION INTRAVENOUS at 08:07

## 2019-01-01 RX ADMIN — ALTEPLASE 2 MG: 2.2 INJECTION, POWDER, LYOPHILIZED, FOR SOLUTION INTRAVENOUS at 06:07

## 2019-01-01 RX ADMIN — PREDNISONE 60 MG: 20 TABLET ORAL at 09:08

## 2019-01-01 RX ADMIN — DRONABINOL 2.5 MG: 2.5 CAPSULE ORAL at 09:07

## 2019-01-01 RX ADMIN — TRIAMCINOLONE ACETONIDE: 0.25 CREAM TOPICAL at 02:07

## 2019-01-01 RX ADMIN — IPRATROPIUM BROMIDE AND ALBUTEROL SULFATE 3 ML: .5; 3 SOLUTION RESPIRATORY (INHALATION) at 06:07

## 2019-01-01 RX ADMIN — GEMCITABINE HYDROCHLORIDE 1840 MG: 1 INJECTION, POWDER, LYOPHILIZED, FOR SOLUTION INTRAVENOUS at 12:04

## 2019-01-01 RX ADMIN — IPRATROPIUM BROMIDE AND ALBUTEROL SULFATE 3 ML: .5; 3 SOLUTION RESPIRATORY (INHALATION) at 01:07

## 2019-01-01 RX ADMIN — TRIAMCINOLONE ACETONIDE: 0.25 CREAM TOPICAL at 08:07

## 2019-01-01 RX ADMIN — NOREPINEPHRINE BITARTRATE 1.5 MCG/KG/MIN: 1 INJECTION, SOLUTION, CONCENTRATE INTRAVENOUS at 05:08

## 2019-01-01 RX ADMIN — FENTANYL CITRATE 12.5 MCG: 50 INJECTION, SOLUTION INTRAMUSCULAR; INTRAVENOUS at 02:08

## 2019-01-01 RX ADMIN — MAGNESIUM SULFATE IN WATER 2 G: 40 INJECTION, SOLUTION INTRAVENOUS at 09:07

## 2019-01-01 RX ADMIN — SODIUM CHLORIDE: 9 INJECTION, SOLUTION INTRAVENOUS at 11:06

## 2019-01-01 RX ADMIN — FENTANYL CITRATE 25 MCG: 50 INJECTION, SOLUTION INTRAMUSCULAR; INTRAVENOUS at 10:06

## 2019-01-01 RX ADMIN — AMIODARONE HYDROCHLORIDE 400 MG: 200 TABLET ORAL at 10:08

## 2019-01-01 RX ADMIN — IPRATROPIUM BROMIDE AND ALBUTEROL SULFATE 3 ML: .5; 3 SOLUTION RESPIRATORY (INHALATION) at 06:08

## 2019-01-01 RX ADMIN — DEXAMETHASONE SODIUM PHOSPHATE: 4 INJECTION, SOLUTION INTRA-ARTICULAR; INTRALESIONAL; INTRAMUSCULAR; INTRAVENOUS; SOFT TISSUE at 12:01

## 2019-01-01 RX ADMIN — DILTIAZEM HYDROCHLORIDE 20 MG: 5 INJECTION INTRAVENOUS at 07:07

## 2019-01-01 RX ADMIN — HYPROMELLOSE 2910 1 DROP: 5 SOLUTION OPHTHALMIC at 09:08

## 2019-01-01 RX ADMIN — PIPERACILLIN AND TAZOBACTAM 4.5 G: 4; .5 INJECTION, POWDER, LYOPHILIZED, FOR SOLUTION INTRAVENOUS; PARENTERAL at 11:08

## 2019-01-01 RX ADMIN — FILGRASTIM 480 MCG: 480 INJECTION, SOLUTION INTRAVENOUS; SUBCUTANEOUS at 11:04

## 2019-01-01 RX ADMIN — GEMCITABINE HYDROCHLORIDE 1840 MG: 1 INJECTION, POWDER, LYOPHILIZED, FOR SOLUTION INTRAVENOUS at 01:02

## 2019-01-01 RX ADMIN — MIDAZOLAM HYDROCHLORIDE 1 MG: 1 INJECTION, SOLUTION INTRAMUSCULAR; INTRAVENOUS at 03:06

## 2019-01-01 RX ADMIN — TRIAMCINOLONE ACETONIDE: 1 CREAM TOPICAL at 12:08

## 2019-01-01 RX ADMIN — SODIUM CHLORIDE 500 ML: 0.9 INJECTION, SOLUTION INTRAVENOUS at 04:07

## 2019-01-01 RX ADMIN — GEMCITABINE HYDROCHLORIDE 1840 MG: 1 INJECTION, POWDER, LYOPHILIZED, FOR SOLUTION INTRAVENOUS at 02:02

## 2019-01-01 RX ADMIN — DRONABINOL 2.5 MG: 2.5 CAPSULE ORAL at 09:08

## 2019-01-01 RX ADMIN — METOPROLOL TARTRATE 5 MG: 5 INJECTION INTRAVENOUS at 08:07

## 2019-01-01 RX ADMIN — METOPROLOL SUCCINATE 200 MG: 50 TABLET, EXTENDED RELEASE ORAL at 08:07

## 2019-01-01 RX ADMIN — AMOXICILLIN AND CLAVULANATE POTASSIUM 500 MG: 500; 125 TABLET, FILM COATED ORAL at 11:07

## 2019-01-01 RX ADMIN — PREDNISONE 30 MG: 5 TABLET ORAL at 08:07

## 2019-01-01 RX ADMIN — HEPARIN 500 UNITS: 100 SYRINGE at 12:06

## 2019-01-01 RX ADMIN — PIPERACILLIN AND TAZOBACTAM 4.5 G: 4; .5 INJECTION, POWDER, LYOPHILIZED, FOR SOLUTION INTRAVENOUS; PARENTERAL at 02:08

## 2019-01-01 RX ADMIN — DILTIAZEM HYDROCHLORIDE 5 MG/HR: 5 INJECTION INTRAVENOUS at 12:07

## 2019-01-01 RX ADMIN — SODIUM CHLORIDE: 9 INJECTION, SOLUTION INTRAVENOUS at 12:01

## 2019-01-01 RX ADMIN — HEPARIN SODIUM (PORCINE) LOCK FLUSH IV SOLN 100 UNIT/ML 5 ML: 100 SOLUTION at 03:06

## 2019-01-01 RX ADMIN — IOHEXOL 75 ML: 350 INJECTION, SOLUTION INTRAVENOUS at 01:02

## 2019-01-01 RX ADMIN — CALCIUM GLUCONATE 1 G: 98 INJECTION, SOLUTION INTRAVENOUS at 12:08

## 2019-01-01 RX ADMIN — CEFTRIAXONE SODIUM 1 G: 1 INJECTION, POWDER, FOR SOLUTION INTRAMUSCULAR; INTRAVENOUS at 10:08

## 2019-01-01 RX ADMIN — HEPARIN SODIUM AND DEXTROSE 12 UNITS/KG/HR: 10000; 5 INJECTION INTRAVENOUS at 08:07

## 2019-01-01 RX ADMIN — POLYETHYLENE GLYCOL 3350 17 G: 17 POWDER, FOR SOLUTION ORAL at 01:07

## 2019-01-01 RX ADMIN — PIPERACILLIN AND TAZOBACTAM 4.5 G: 4; .5 INJECTION, POWDER, LYOPHILIZED, FOR SOLUTION INTRAVENOUS; PARENTERAL at 12:08

## 2019-01-01 RX ADMIN — SODIUM CHLORIDE: 9 INJECTION, SOLUTION INTRAVENOUS at 10:06

## 2019-01-01 RX ADMIN — GEMCITABINE HYDROCHLORIDE 1840 MG: 1 INJECTION, POWDER, LYOPHILIZED, FOR SOLUTION INTRAVENOUS at 10:05

## 2019-01-01 RX ADMIN — HYDROCODONE BITARTRATE AND ACETAMINOPHEN 1 TABLET: 5; 325 TABLET ORAL at 03:07

## 2019-01-01 RX ADMIN — GUAIFENESIN 200 MG: 200 SOLUTION ORAL at 11:07

## 2019-01-01 RX ADMIN — FILGRASTIM-SNDZ 480 MCG: 480 INJECTION, SOLUTION INTRAVENOUS; SUBCUTANEOUS at 10:06

## 2019-01-01 RX ADMIN — MIDAZOLAM HYDROCHLORIDE 1 MG: 1 INJECTION, SOLUTION INTRAMUSCULAR; INTRAVENOUS at 02:06

## 2019-01-01 RX ADMIN — AMOXICILLIN AND CLAVULANATE POTASSIUM 500 MG: 500; 125 TABLET, FILM COATED ORAL at 02:07

## 2019-01-01 RX ADMIN — FILGRASTIM 480 MCG: 480 INJECTION, SOLUTION INTRAVENOUS; SUBCUTANEOUS at 12:06

## 2019-01-01 RX ADMIN — ENOXAPARIN SODIUM 80 MG: 100 INJECTION SUBCUTANEOUS at 06:07

## 2019-01-01 RX ADMIN — AMIODARONE HYDROCHLORIDE 400 MG: 200 TABLET ORAL at 09:08

## 2019-01-01 RX ADMIN — NOREPINEPHRINE BITARTRATE 0.02 MCG/KG/MIN: 1 INJECTION, SOLUTION, CONCENTRATE INTRAVENOUS at 06:08

## 2019-01-01 RX ADMIN — TRIAMCINOLONE ACETONIDE: 0.25 OINTMENT TOPICAL at 08:07

## 2019-01-01 RX ADMIN — METOPROLOL TARTRATE 50 MG: 25 TABLET ORAL at 09:07

## 2019-01-01 RX ADMIN — BEVACIZUMAB 1120 MG: 400 INJECTION, SOLUTION INTRAVENOUS at 11:01

## 2019-01-01 RX ADMIN — DIPHENHYDRAMINE HYDROCHLORIDE 25 MG: 25 CAPSULE ORAL at 05:07

## 2019-01-01 RX ADMIN — GEMCITABINE HYDROCHLORIDE 1840 MG: 1 INJECTION, POWDER, LYOPHILIZED, FOR SOLUTION INTRAVENOUS at 11:06

## 2019-01-01 RX ADMIN — FUROSEMIDE 40 MG: 10 INJECTION, SOLUTION INTRAMUSCULAR; INTRAVENOUS at 05:07

## 2019-01-01 RX ADMIN — ONDANSETRON 8 MG: 2 INJECTION, SOLUTION INTRAMUSCULAR; INTRAVENOUS at 02:02

## 2019-01-01 RX ADMIN — POLYETHYLENE GLYCOL 3350 17 G: 17 POWDER, FOR SOLUTION ORAL at 02:08

## 2019-01-01 RX ADMIN — DEXTROSE MONOHYDRATE 25 G: 500 INJECTION PARENTERAL at 12:08

## 2019-01-01 RX ADMIN — ALBUTEROL SULFATE 10 MG: 2.5 SOLUTION RESPIRATORY (INHALATION) at 12:08

## 2019-01-01 RX ADMIN — POLYETHYLENE GLYCOL 3350 17 G: 17 POWDER, FOR SOLUTION ORAL at 08:08

## 2019-01-01 RX ADMIN — TRIAMCINOLONE ACETONIDE: 0.25 OINTMENT TOPICAL at 09:07

## 2019-01-01 RX ADMIN — LORAZEPAM 2 MG: 2 INJECTION INTRAMUSCULAR; INTRAVENOUS at 08:08

## 2019-01-01 RX ADMIN — ONDANSETRON 8 MG: 2 INJECTION, SOLUTION INTRAMUSCULAR; INTRAVENOUS at 01:06

## 2019-01-01 RX ADMIN — SODIUM CHLORIDE 500 ML: 0.9 INJECTION, SOLUTION INTRAVENOUS at 03:07

## 2019-01-01 RX ADMIN — AMIODARONE HYDROCHLORIDE 0.5 MG/MIN: 1.8 INJECTION, SOLUTION INTRAVENOUS at 09:07

## 2019-01-01 RX ADMIN — SODIUM CHLORIDE, SODIUM LACTATE, POTASSIUM CHLORIDE, AND CALCIUM CHLORIDE 1000 ML: .6; .31; .03; .02 INJECTION, SOLUTION INTRAVENOUS at 03:08

## 2019-01-01 RX ADMIN — SODIUM CHLORIDE 950 MG: 9 INJECTION, SOLUTION INTRAVENOUS at 12:01

## 2019-01-01 RX ADMIN — POLYETHYLENE GLYCOL 3350 17 G: 17 POWDER, FOR SOLUTION ORAL at 04:08

## 2019-01-01 RX ADMIN — BEVACIZUMAB 1120 MG: 400 INJECTION, SOLUTION INTRAVENOUS at 12:01

## 2019-01-01 RX ADMIN — DILTIAZEM HYDROCHLORIDE 60 MG: 60 TABLET, FILM COATED ORAL at 12:07

## 2019-01-01 RX ADMIN — AMIODARONE HYDROCHLORIDE 400 MG: 200 TABLET ORAL at 11:08

## 2019-01-01 RX ADMIN — GEMCITABINE HYDROCHLORIDE 1840 MG: 200 INJECTION, POWDER, LYOPHILIZED, FOR SOLUTION INTRAVENOUS at 02:03

## 2019-01-01 RX ADMIN — HYPROMELLOSE 2910 1 DROP: 5 SOLUTION OPHTHALMIC at 02:08

## 2019-01-01 RX ADMIN — FUROSEMIDE 40 MG: 10 INJECTION, SOLUTION INTRAMUSCULAR; INTRAVENOUS at 09:07

## 2019-01-01 RX ADMIN — METOPROLOL SUCCINATE 100 MG: 50 TABLET, EXTENDED RELEASE ORAL at 09:08

## 2019-01-01 RX ADMIN — TBO-FILGRASTIM 480 MCG: 480 INJECTION, SOLUTION SUBCUTANEOUS at 10:05

## 2019-01-01 RX ADMIN — TBO-FILGRASTIM 480 MCG: 480 INJECTION, SOLUTION SUBCUTANEOUS at 11:03

## 2019-01-01 RX ADMIN — DILTIAZEM HYDROCHLORIDE 180 MG: 180 CAPSULE, COATED, EXTENDED RELEASE ORAL at 08:07

## 2019-01-01 RX ADMIN — ENOXAPARIN SODIUM 80 MG: 100 INJECTION SUBCUTANEOUS at 05:07

## 2019-01-01 RX ADMIN — IOHEXOL 75 ML: 350 INJECTION, SOLUTION INTRAVENOUS at 05:07

## 2019-01-01 RX ADMIN — PREDNISONE 40 MG: 20 TABLET ORAL at 08:07

## 2019-01-01 RX ADMIN — PREDNISONE 60 MG: 20 TABLET ORAL at 10:08

## 2019-01-01 RX ADMIN — DILTIAZEM HYDROCHLORIDE 180 MG: 180 CAPSULE, COATED, EXTENDED RELEASE ORAL at 09:07

## 2019-01-01 RX ADMIN — FUROSEMIDE 40 MG: 40 TABLET ORAL at 09:07

## 2019-01-01 RX ADMIN — SODIUM CHLORIDE: 9 INJECTION, SOLUTION INTRAVENOUS at 10:01

## 2019-01-01 RX ADMIN — SODIUM CHLORIDE: 0.9 INJECTION, SOLUTION INTRAVENOUS at 02:02

## 2019-01-01 RX ADMIN — RAMELTEON 8 MG: 8 TABLET, FILM COATED ORAL at 12:08

## 2019-01-01 RX ADMIN — SODIUM CHLORIDE: 0.9 INJECTION, SOLUTION INTRAVENOUS at 10:05

## 2019-01-01 RX ADMIN — LEVOFLOXACIN 750 MG: 500 TABLET, FILM COATED ORAL at 06:08

## 2019-01-01 RX ADMIN — SODIUM CHLORIDE: 9 INJECTION, SOLUTION INTRAVENOUS at 11:04

## 2019-01-01 RX ADMIN — DEXTROSE MONOHYDRATE 25 G: 500 INJECTION PARENTERAL at 06:08

## 2019-01-01 RX ADMIN — AMIODARONE HYDROCHLORIDE 1 MG/MIN: 1.8 INJECTION, SOLUTION INTRAVENOUS at 05:07

## 2019-01-01 RX ADMIN — FENTANYL CITRATE 50 MCG: 50 INJECTION, SOLUTION INTRAMUSCULAR; INTRAVENOUS at 02:06

## 2019-01-01 RX ADMIN — MAGNESIUM SULFATE IN WATER 2 G: 40 INJECTION, SOLUTION INTRAVENOUS at 01:07

## 2019-01-01 RX ADMIN — DEXTROSE MONOHYDRATE 25 G: 500 INJECTION PARENTERAL at 05:08

## 2019-01-01 RX ADMIN — INSULIN HUMAN 10 UNITS: 100 INJECTION, SOLUTION PARENTERAL at 12:08

## 2019-01-01 RX ADMIN — IPRATROPIUM BROMIDE AND ALBUTEROL SULFATE 3 ML: .5; 3 SOLUTION RESPIRATORY (INHALATION) at 01:08

## 2019-01-01 RX ADMIN — FENTANYL CITRATE 50 MCG: 50 INJECTION, SOLUTION INTRAMUSCULAR; INTRAVENOUS at 10:06

## 2019-01-01 RX ADMIN — DEXTROSE MONOHYDRATE 25 G: 500 INJECTION PARENTERAL at 11:08

## 2019-01-01 RX ADMIN — IBUPROFEN 400 MG: 200 TABLET, FILM COATED ORAL at 07:07

## 2019-01-01 RX ADMIN — PANTOPRAZOLE SODIUM 40 MG: 40 TABLET, DELAYED RELEASE ORAL at 11:07

## 2019-01-01 RX ADMIN — ONDANSETRON 8 MG: 2 INJECTION, SOLUTION INTRAMUSCULAR; INTRAVENOUS at 12:05

## 2019-01-01 RX ADMIN — TRIAMCINOLONE ACETONIDE: 1 CREAM TOPICAL at 09:07

## 2019-01-01 RX ADMIN — IPRATROPIUM BROMIDE AND ALBUTEROL SULFATE 3 ML: .5; 3 SOLUTION RESPIRATORY (INHALATION) at 11:07

## 2019-01-01 RX ADMIN — SODIUM CHLORIDE: 9 INJECTION, SOLUTION INTRAVENOUS at 01:02

## 2019-01-01 RX ADMIN — SODIUM CHLORIDE 1000 ML: 0.9 INJECTION, SOLUTION INTRAVENOUS at 04:07

## 2019-01-01 RX ADMIN — METOPROLOL TARTRATE: 5 INJECTION INTRAVENOUS at 07:07

## 2019-01-01 RX ADMIN — Medication 10 ML: at 05:07

## 2019-01-01 RX ADMIN — FUROSEMIDE 40 MG: 40 TABLET ORAL at 08:07

## 2019-01-01 RX ADMIN — PREDNISONE 30 MG: 5 TABLET ORAL at 02:07

## 2019-01-01 RX ADMIN — AMOXICILLIN AND CLAVULANATE POTASSIUM 500 MG: 500; 125 TABLET, FILM COATED ORAL at 10:07

## 2019-01-01 RX ADMIN — IBUPROFEN 400 MG: 200 TABLET, FILM COATED ORAL at 02:07

## 2019-01-01 RX ADMIN — GUAIFENESIN 200 MG: 200 SOLUTION ORAL at 08:07

## 2019-01-01 RX ADMIN — IOHEXOL 15 ML: 350 INJECTION, SOLUTION INTRAVENOUS at 05:07

## 2019-01-01 RX ADMIN — HYDROCODONE BITARTRATE AND ACETAMINOPHEN 1 TABLET: 5; 325 TABLET ORAL at 11:07

## 2019-01-01 RX ADMIN — ONDANSETRON 8 MG: 2 INJECTION, SOLUTION INTRAMUSCULAR; INTRAVENOUS at 12:02

## 2019-01-01 RX ADMIN — ALBUTEROL SULFATE 10 MG: 2.5 SOLUTION RESPIRATORY (INHALATION) at 04:08

## 2019-01-01 RX ADMIN — SODIUM CHLORIDE 500 ML: 0.9 INJECTION, SOLUTION INTRAVENOUS at 12:06

## 2019-01-01 RX ADMIN — DRONABINOL 2.5 MG: 2.5 CAPSULE ORAL at 10:08

## 2019-01-01 RX ADMIN — TRIAMCINOLONE ACETONIDE: 0.25 OINTMENT TOPICAL at 11:07

## 2019-01-01 RX ADMIN — FUROSEMIDE 40 MG: 40 TABLET ORAL at 06:07

## 2019-01-01 RX ADMIN — METOPROLOL SUCCINATE 100 MG: 50 TABLET, EXTENDED RELEASE ORAL at 10:08

## 2019-01-01 RX ADMIN — AMOXICILLIN AND CLAVULANATE POTASSIUM 500 MG: 500; 125 TABLET, FILM COATED ORAL at 04:07

## 2019-01-01 RX ADMIN — IPRATROPIUM BROMIDE AND ALBUTEROL SULFATE 3 ML: .5; 3 SOLUTION RESPIRATORY (INHALATION) at 11:08

## 2019-01-01 RX ADMIN — TBO-FILGRASTIM 480 MCG: 480 INJECTION, SOLUTION SUBCUTANEOUS at 02:02

## 2019-01-01 RX ADMIN — DILTIAZEM HYDROCHLORIDE 180 MG: 180 CAPSULE, COATED, EXTENDED RELEASE ORAL at 05:07

## 2019-01-01 RX ADMIN — IPRATROPIUM BROMIDE AND ALBUTEROL SULFATE 3 ML: .5; 3 SOLUTION RESPIRATORY (INHALATION) at 03:08

## 2019-01-01 RX ADMIN — METOPROLOL TARTRATE 2.5 MG: 5 INJECTION, SOLUTION INTRAVENOUS at 07:07

## 2019-01-01 RX ADMIN — HYDROCODONE BITARTRATE AND ACETAMINOPHEN 1 TABLET: 7.5; 325 TABLET ORAL at 01:06

## 2019-01-01 RX ADMIN — CALCIUM GLUCONATE 1 G: 98 INJECTION, SOLUTION INTRAVENOUS at 05:08

## 2019-01-01 RX ADMIN — METOPROLOL TARTRATE 25 MG: 25 TABLET ORAL at 09:07

## 2019-01-01 RX ADMIN — IOHEXOL 30 ML: 350 INJECTION, SOLUTION INTRAVENOUS at 02:05

## 2019-01-01 RX ADMIN — SODIUM CHLORIDE 500 ML: 0.9 INJECTION, SOLUTION INTRAVENOUS at 08:07

## 2019-01-01 RX ADMIN — GEMCITABINE HYDROCHLORIDE 1840 MG: 1 INJECTION, POWDER, LYOPHILIZED, FOR SOLUTION INTRAVENOUS at 11:03

## 2019-01-01 RX ADMIN — METOPROLOL SUCCINATE 200 MG: 50 TABLET, EXTENDED RELEASE ORAL at 11:07

## 2019-01-01 RX ADMIN — PIPERACILLIN AND TAZOBACTAM 4.5 G: 4; .5 INJECTION, POWDER, LYOPHILIZED, FOR SOLUTION INTRAVENOUS; PARENTERAL at 08:08

## 2019-01-01 RX ADMIN — PREDNISONE 60 MG: 20 TABLET ORAL at 08:07

## 2019-01-01 RX ADMIN — IOHEXOL 75 ML: 350 INJECTION, SOLUTION INTRAVENOUS at 03:05

## 2019-01-01 RX ADMIN — FILGRASTIM 480 MCG: 480 INJECTION, SOLUTION INTRAVENOUS; SUBCUTANEOUS at 08:03

## 2019-01-01 RX ADMIN — METOPROLOL TARTRATE 25 MG: 25 TABLET ORAL at 11:07

## 2019-01-01 RX ADMIN — SODIUM CHLORIDE: 0.9 INJECTION, SOLUTION INTRAVENOUS at 01:06

## 2019-01-01 RX ADMIN — METOPROLOL SUCCINATE 200 MG: 50 TABLET, EXTENDED RELEASE ORAL at 08:08

## 2019-01-01 RX ADMIN — POLYETHYLENE GLYCOL 3350 17 G: 17 POWDER, FOR SOLUTION ORAL at 06:08

## 2019-01-01 RX ADMIN — GEMCITABINE HYDROCHLORIDE 1840 MG: 1 INJECTION, POWDER, LYOPHILIZED, FOR SOLUTION INTRAVENOUS at 02:05

## 2019-01-01 RX ADMIN — Medication 10 ML: at 12:06

## 2019-01-01 RX ADMIN — PREDNISONE 20 MG: 20 TABLET ORAL at 10:08

## 2019-01-01 RX ADMIN — CEFAZOLIN SODIUM 1 G: 1 SOLUTION INTRAVENOUS at 02:06

## 2019-01-01 RX ADMIN — HYPROMELLOSE 2910 1 DROP: 5 SOLUTION OPHTHALMIC at 04:08

## 2019-01-01 RX ADMIN — FILGRASTIM 480 MCG: 480 INJECTION, SOLUTION INTRAVENOUS; SUBCUTANEOUS at 09:03

## 2019-01-01 RX ADMIN — FENTANYL CITRATE 12.5 MCG: 50 INJECTION, SOLUTION INTRAMUSCULAR; INTRAVENOUS at 10:08

## 2019-01-01 RX ADMIN — SODIUM BICARBONATE: 84 INJECTION, SOLUTION INTRAVENOUS at 09:08

## 2019-01-07 NOTE — TELEPHONE ENCOUNTER
----- Message from Gosia [place] sent at 1/7/2019 11:56 AM CST -----  Contact: [facility] lab  Lab needs to verify orders placed by Dr Foster. Pt insisted on giving a urine specimen, but lab does not have anything in the system ordering those test.     Contact:: [phone]

## 2019-01-09 NOTE — PLAN OF CARE
Problem: Adult Inpatient Plan of Care  Goal: Plan of Care Review  Outcome: Ongoing (interventions implemented as appropriate)  Pt tolerated avastin and alimta infusion without issue, pt to rtc 1/30/18, no distress noted upon d/c to home with spouse

## 2019-01-09 NOTE — PROGRESS NOTES
Subjective:       Patient ID: Cale Harding is a 78 y.o. male.    Chief Complaint: Mesothelioma of left lung  ONCOLOGIC HISTORY: Mr. 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  He started having left chest wall discomfort in November 2017, which prompted a chest x-ray.  He underwent a thoracentesis with 1.2 liters removed and apparently cytology was negative; although, I do not have that path in the system and then he noted increasing shortness of breath and repeat pleural effusion and fluid was removed.  He then underwent IR biopsy of the left pleural thickening, which was positive for epithelioid mesothelioma confirmed at [facility].  He underwent PET scan on 02/09/2018, which revealed three left pleural based masses with an SUV max of 7.6 and a small pleural effusion.  He has had night sweats and about 20-pound weight loss in the last three months, low-grade fevers also, occasional shortness of breath and he has chest wall pain, which is not frequent.  Plan originally was to proceed with VATS, left thoracotomy and radical pleurectomy, decortication and HIPEC.  However, after the patient complained of worsening pain, an MRI of the chest was done on 03/01/2018 and that revealed loculated complex pleural fluid collection with marked pleural thickening and internal septation.  The largest and more superior anterior of the two pleural masses measured 7 x 4.3 cm.  The smaller and more inferior one measured 3.9 x 2.2.  The mass abuts the pleural surface and the larger of the two masses abuts the pericardium and the chest wall, involvement or invasion of the structures is not excluded.  The lower mass abuts and possibly invades the diaphragm.     Since initial visit with me he underwent Diagnostic laparoscopy with biopsy of diaphragm. Left VATS thoracoscopy on 3/19/18. Final path  is pending. Due to amount of involvemt surgery was not done so is on Carboplatin and ALimta                 He completed 6 cycles of chemo with Carboplatin, Alimta and Avastin. He is now on Alimta and Avastin maintenance           HPI He comes in today for Alimta and Avastin maintenance. He feels well and denies ay new issues    Review of Systems   Constitutional: Negative for appetite change, fatigue and unexpected weight change.   HENT: Negative for mouth sores.    Eyes: Negative for visual disturbance.   Respiratory: Negative for cough and shortness of breath.    Cardiovascular: Negative for chest pain.   Gastrointestinal: Negative for abdominal pain and diarrhea.   Genitourinary: Negative for frequency.   Musculoskeletal: Negative for back pain.   Skin: Negative for rash.   Neurological: Negative for headaches.   Hematological: Negative for adenopathy.   Psychiatric/Behavioral: The patient is not nervous/anxious.    All other systems reviewed and are negative.      Objective:      Physical Exam   Constitutional: He is oriented to person, place, and time. He appears well-developed and well-nourished.   HENT:   Mouth/Throat: No oropharyngeal exudate.   Cardiovascular: Normal rate and normal heart sounds.   Pulmonary/Chest: Effort normal and breath sounds normal. He has no wheezes.   Abdominal: Soft. Bowel sounds are normal. There is no tenderness.   Musculoskeletal: He exhibits no edema or tenderness.   Lymphadenopathy:     He has no cervical adenopathy.   Neurological: He is alert and oriented to person, place, and time. Coordination normal.   Skin: Skin is warm and dry. No rash noted.   Psychiatric: He has a normal mood and affect. Judgment and thought content normal.   Vitals reviewed.      LABS:  WBC   Date Value Ref Range Status   01/07/2019 5.94 3.90 - 12.70 K/uL Final     Hemoglobin   Date Value Ref Range Status   01/07/2019 10.8 (L) 14.0 - 18.0 g/dL Final     Hematocrit   Date Value Ref Range Status    01/07/2019 35.0 (L) 40.0 - 54.0 % Final     Platelets   Date Value Ref Range Status   01/07/2019 241 150 - 350 K/uL Final     Gran # (ANC)   Date Value Ref Range Status   01/07/2019 4.2 1.8 - 7.7 K/uL Final     Comment:     The ANC is based on a white cell differential from an   automated cell counter. It has not been microscopically   reviewed for the presence of abnormal cells. Clinical   correlation is required.         Chemistry        Component Value Date/Time     01/07/2019 0955    K 4.6 01/07/2019 0955     01/07/2019 0955    CO2 27 01/07/2019 0955    BUN 16 01/07/2019 0955    CREATININE 0.98 01/07/2019 0955     01/07/2019 0955        Component Value Date/Time    CALCIUM 8.9 01/07/2019 0955    ALKPHOS 69 01/07/2019 0955    AST 27 01/07/2019 0955    ALT 17 01/07/2019 0955    BILITOT 0.5 01/07/2019 0955    ESTGFRAFRICA >60.0 01/07/2019 0955    EGFRNONAA >60.0 01/07/2019 0955         UA: negative for protein  Assessment:       1. Mesothelioma of left lung    2. Chemotherapy follow-up examination        Plan:        1,2. He is doing well and will proceed with Alimta an Avastin and will return in 3 weeks for next cycle    Above care plan was discussed with patient and accompanying wife and all questions were addressed to their satisfaction

## 2019-01-09 NOTE — PLAN OF CARE
Problem: Adult Inpatient Plan of Care  Goal: Plan of Care Review  Outcome: Ongoing (interventions implemented as appropriate)  Pt here for avastin, alimta infusion D1C14, labs, hx, meds, allergies reviewed, pt with no complaints at this time, reclined in chair, continue to monitor

## 2019-01-30 NOTE — Clinical Note
Schedule CBC, CMP, UA and see me in 3 weeks and for ALimta and Avastin. Schedule PET scan on 2/18/19 mid morning

## 2019-01-30 NOTE — PLAN OF CARE
Problem: Anemia (Chemotherapy Effects)  Goal: Anemia Symptom Improvement    Intervention: Monitor and Manage Anemia  Denies fatigue. Reports SOB with activity.

## 2019-01-30 NOTE — PLAN OF CARE
Problem: Adult Inpatient Plan of Care  Goal: Plan of Care Review  Outcome: Ongoing (interventions implemented as appropriate)  Pt tolerated infusions. Reports taking dex at home as prescribed. Will be due for b12 next cycle. Taking Folic daily. PIV flushed, saline locked and removed. AVS Given. No questions at this time.

## 2019-01-30 NOTE — PROGRESS NOTES
Subjective:       Patient ID: Cale Harding is a 78 y.o. male.    Chief Complaint: Mesothelioma of left lung  ONCOLOGIC HISTORY: Mr. 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  He started having left chest wall discomfort in November 2017, which prompted a chest x-ray.  He underwent a thoracentesis with 1.2 liters removed and apparently cytology was negative; although, I do not have that path in the system and then he noted increasing shortness of breath and repeat pleural effusion and fluid was removed.  He then underwent IR biopsy of the left pleural thickening, which was positive for epithelioid mesothelioma confirmed at [facility].  He underwent PET scan on 02/09/2018, which revealed three left pleural based masses with an SUV max of 7.6 and a small pleural effusion.  He has had night sweats and about 20-pound weight loss in the last three months, low-grade fevers also, occasional shortness of breath and he has chest wall pain, which is not frequent.  Plan originally was to proceed with VATS, left thoracotomy and radical pleurectomy, decortication and HIPEC.  However, after the patient complained of worsening pain, an MRI of the chest was done on 03/01/2018 and that revealed loculated complex pleural fluid collection with marked pleural thickening and internal septation.  The largest and more superior anterior of the two pleural masses measured 7 x 4.3 cm.  The smaller and more inferior one measured 3.9 x 2.2.  The mass abuts the pleural surface and the larger of the two masses abuts the pericardium and the chest wall, involvement or invasion of the structures is not excluded.  The lower mass abuts and possibly invades the diaphragm.     Since initial visit with me he underwent Diagnostic laparoscopy with biopsy of diaphragm. Left VATS thoracoscopy on 3/19/18. Final path  is pending. Due to amount of involvemt surgery was not done so is on Carboplatin and ALimta                 He completed 6 cycles of chemo with Carboplatin, Alimta and Avastin. He is now on Alimta and Avastin maintenance             HPI He comes in today for Alimta and Avastin maintenance. He continues to feel well. He had some issues last week which resolved.  He denies any nausea, vomiting, diarrhea, constipation, abdominal pain, weight loss or loss of appetite, chest pain, shortness of breath, leg swelling, fatigue, pain, headache, dizziness, or mood changes. His ECOG PS is zero. He is accompanied by his wife        Review of Systems   Constitutional: Negative for appetite change, fatigue and unexpected weight change.   HENT: Negative for mouth sores.    Eyes: Negative for visual disturbance.   Respiratory: Negative for cough and shortness of breath.    Cardiovascular: Negative for chest pain.   Gastrointestinal: Negative for abdominal pain and diarrhea.   Genitourinary: Negative for frequency.   Musculoskeletal: Negative for back pain.   Skin: Negative for rash.   Neurological: Negative for headaches.   Hematological: Negative for adenopathy.   Psychiatric/Behavioral: The patient is not nervous/anxious.    All other systems reviewed and are negative.      Objective:      Physical Exam   Constitutional: He is oriented to person, place, and time. He appears well-developed and well-nourished.   HENT:   Mouth/Throat: No oropharyngeal exudate.   Cardiovascular: Normal rate and normal heart sounds.   Pulmonary/Chest: Effort normal and breath sounds normal. He has no wheezes.   Abdominal: Soft. Bowel sounds are normal. There is no tenderness.   Musculoskeletal: He exhibits no edema or tenderness.   Lymphadenopathy:     He has no cervical adenopathy.   Neurological: He is alert and oriented to person, place, and time. Coordination normal.   Skin: Skin is warm and dry. No rash noted.   Psychiatric: He has a normal mood  and affect. Judgment and thought content normal.   Vitals reviewed.      LABS:  WBC   Date Value Ref Range Status   01/28/2019 4.74 3.90 - 12.70 K/uL Final     Hemoglobin   Date Value Ref Range Status   01/28/2019 10.7 (L) 14.0 - 18.0 g/dL Final     Hematocrit   Date Value Ref Range Status   01/28/2019 35.0 (L) 40.0 - 54.0 % Final     Platelets   Date Value Ref Range Status   01/28/2019 263 150 - 350 K/uL Final     Gran # (ANC)   Date Value Ref Range Status   01/28/2019 3.3 1.8 - 7.7 K/uL Final     Comment:     The ANC is based on a white cell differential from an   automated cell counter. It has not been microscopically   reviewed for the presence of abnormal cells. Clinical   correlation is required.            Chemistry        Component Value Date/Time     01/28/2019 0947    K 4.1 01/28/2019 0947     01/28/2019 0947    CO2 29 01/28/2019 0947    BUN 15 01/28/2019 0947    CREATININE 0.86 01/28/2019 0947     (H) 01/28/2019 0947        Component Value Date/Time    CALCIUM 8.9 01/28/2019 0947    ALKPHOS 65 01/28/2019 0947    AST 24 01/28/2019 0947    ALT 15 01/28/2019 0947    BILITOT 0.4 01/28/2019 0947    ESTGFRAFRICA >60.0 01/28/2019 0947    EGFRNONAA >60.0 01/28/2019 0947          Assessment:       1. Mesothelioma    2. Mesothelioma of pleura         Plan:         1,2. He will proceed with Alimta and Avastin maintenance and will return in 3 weeks for next cycle with PET scan prior    Above care plan was discussed with patient and accompanying wife and all questions were addressed to their satisfaction     Advance Care Planning     Living Will  During this visit, I engaged the patient in the advance care planning process.  The patient and I reviewed the role for advance directives and their purpose in directing future healthcare if the patient's unable to speak for him/herself.  At this point in time, the patient does have full decision-making capacity.  We discussed different extreme health  states that he could experience, and reviewed what kind of medical care he would want in those situations.    I spent a total of 25 minutes engaging the patient in this advance care planning discussion.  He would like to go home and think and will get the papers back to us.

## 2019-02-15 NOTE — PLAN OF CARE
START ON PATHWAY REGIMEN - Mesothelioma    [id number]        Gemcitabine (Gemzar(R))     **Always confirm dose/schedule in your pharmacy ordering system**        Patient Characteristics:  [facility]  AJCC M Category: M1  AJCC 8 Stage Grouping: IV  Current evidence of distant metastases<= Yes  AJCC T Category: T4  AJCC N Category: N2  Line of therapy: [facility] and Beyond  Intent of Therapy:  Non-Curative / Palliative Intent, Discussed with Patient

## 2019-02-20 NOTE — PLAN OF CARE
Problem: Adult Inpatient Plan of Care  Goal: Plan of Care Review  Outcome: Ongoing (interventions implemented as appropriate)  Pt received Gemzar; tolerated well. VSS and NAD. Pt instructed to call MD with any concerns. Pt discharged home independently.

## 2019-02-20 NOTE — PROGRESS NOTES
Subjective:       Patient ID: Cale Harding is a 78 y.o. male.    Chief Complaint: Mesothelioma  ONCOLOGIC HISTORY: Mr. 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  He started having left chest wall discomfort in November 2017, which prompted a chest x-ray.  He underwent a thoracentesis with 1.2 liters removed and apparently cytology was negative; although, I do not have that path in the system and then he noted increasing shortness of breath and repeat pleural effusion and fluid was removed.  He then underwent IR biopsy of the left pleural thickening, which was positive for epithelioid mesothelioma confirmed at [facility].  He underwent PET scan on 02/09/2018, which revealed three left pleural based masses with an SUV max of 7.6 and a small pleural effusion.  He has had night sweats and about 20-pound weight loss in the last three months, low-grade fevers also, occasional shortness of breath and he has chest wall pain, which is not frequent.  Plan originally was to proceed with VATS, left thoracotomy and radical pleurectomy, decortication and HIPEC.  However, after the patient complained of worsening pain, an MRI of the chest was done on 03/01/2018 and that revealed loculated complex pleural fluid collection with marked pleural thickening and internal septation.  The largest and more superior anterior of the two pleural masses measured 7 x 4.3 cm.  The smaller and more inferior one measured 3.9 x 2.2.  The mass abuts the pleural surface and the larger of the two masses abuts the pericardium and the chest wall, involvement or invasion of the structures is not excluded.  The lower mass abuts and possibly invades the diaphragm.     Since initial visit with me he underwent Diagnostic laparoscopy with biopsy of diaphragm. Left VATS thoracoscopy on 3/19/18. Final path is pending.  "Due to amount of involvemt surgery was not done so is on Carboplatin and ALimta                 He completed 6 cycles of chemo with Carboplatin, Alimta and Avastin. He is now on Alimta and Avastin maintenance                HPI  He received Alimta and Avastin maintenance until 1/30/19  CTA chest reveals "No pulmonary embolism to the segmental level. Left hemithorax findings including left lower lobe collapse, mild chronic pleural effusion, subcentimeter soft tissue nodules, and diffuse pleural thickening and nodularity of the left side of the chest, worse when compared to January 2018.  These findings are consistent with history of mesothelioma.  Stable right hemithorax findings including trace pleural effusion, calcified pleural plaques, and subcentimeter pulmonary nodules. Atherosclerosis of the visualized aorta and coronary arteries"    PET scan on 2/15/19 reveals "Today's findings are concerning for progression of disease with increasing uptake within a left upper lobe pulmonary nodule, development of uptake within a pre-vascular lymph node and increasing uptake within the right inferior pleura suggesting recurrence"  He notes right sided chest wall pain. Denies any other issues. His ECOG PS is zero. He is accompanied by his wife.      Review of Systems   Constitutional: Negative for appetite change, fatigue and unexpected weight change.   HENT: Negative for mouth sores.    Eyes: Negative for visual disturbance.   Respiratory: Negative for cough and shortness of breath.         Right sided chest wall pain    Cardiovascular: Negative for chest pain.   Gastrointestinal: Negative for abdominal pain and diarrhea.   Genitourinary: Negative for frequency.   Musculoskeletal: Negative for back pain.   Skin: Negative for rash.   Neurological: Negative for headaches.   Hematological: Negative for adenopathy.   Psychiatric/Behavioral: The patient is not nervous/anxious.    All other systems reviewed and are negative.    "   [facility] all information reviewed and updated as relevant to today's visit  Objective:      Physical Exam   Constitutional: He is oriented to person, place, and time. He appears well-developed and well-nourished.   HENT:   Mouth/Throat: No oropharyngeal exudate.   Cardiovascular: Normal rate and normal heart sounds.   Pulmonary/Chest: Effort normal and breath sounds normal. He has no wheezes.   Abdominal: Soft. Bowel sounds are normal. There is no tenderness.   Musculoskeletal: He exhibits no edema or tenderness.   Lymphadenopathy:     He has no cervical adenopathy.   Neurological: He is alert and oriented to person, place, and time. Coordination normal.   Skin: Skin is warm and dry. No rash noted.   Psychiatric: He has a normal mood and affect. Judgment and thought content normal.   Vitals reviewed.        LABS:  WBC   Date Value Ref Range Status   02/19/2019 5.64 3.90 - 12.70 K/uL Final     Hemoglobin   Date Value Ref Range Status   02/19/2019 10.8 (L) 14.0 - 18.0 g/dL Final     Hematocrit   Date Value Ref Range Status   02/19/2019 36.0 (L) 40.0 - 54.0 % Final     Platelets   Date Value Ref Range Status   02/19/2019 301 150 - 350 K/uL Final     Gran # (ANC)   Date Value Ref Range Status   02/19/2019 3.5 1.8 - 7.7 K/uL Final     Comment:     The ANC is based on a white cell differential from an   automated cell counter. It has not been microscopically   reviewed for the presence of abnormal cells. Clinical   correlation is required.         Chemistry        Component Value Date/Time     02/19/2019 1019    K 4.0 02/19/2019 1019     02/19/2019 1019    CO2 26 02/19/2019 1019    BUN 17 02/19/2019 1019    CREATININE 0.84 02/19/2019 1019     02/19/2019 1019        Component Value Date/Time    CALCIUM 9.2 02/19/2019 1019    ALKPHOS 70 02/19/2019 1019    AST 24 02/19/2019 1019    ALT 13 02/19/2019 1019    BILITOT 0.4 02/19/2019 1019    ESTGFRAFRICA >60.0 02/19/2019 1019    EGFRNONAA >60.0 02/19/2019 1019           Assessment:       1. Mesothelioma of left lung    2. Pleural effusion        Plan:        1,2. Reviewed recent CTA chest and PET scan, findings worrisome for progression. He will start Gemzar today.  Patient was consented for chemotherapy today 2/20/2019 .   An extensive discussion was had which included a thorough discussion of the risk and benefits of treatment and alternatives.  Risks, including but not limited to, possible hair loss, bone marrow damage (anemia, thrombocytopenia, immune suppression, neutropenia), damage to body organs (brain, heart, liver, kidney, lungs, nervous system, skin, and others), allergic reactions, sterility, nausea/vomiting, constipation/diarrhea, sores in the mouth, secondary cancers, local damage at possible injection sites, and rarely death were all discussed.  The patient agrees with the plan, and all questions have been answered to their satisfaction.  Consent was signed the patient, provider, and a third party witness.      He will return in 1 week for day 8.      Advance Care Planning  Code Status  In light of the patients advanced and terminal illness, we reviewed what the patients preferences for care would be at the very end of life.  The patient wishes to have a natural, peaceful death.  Along those lines, the patient does not wish to have CPR or other invasive treatments performed when his heart and breathing stops.  A [place] form was completed to reflect other EOL preferences of the patient.  I spent a total of 25 minutes engaging the patient in this advance care planning discussion.

## 2019-02-20 NOTE — PATIENT INSTRUCTIONS
Gemcitabine injection  What is this medicine?  GEMCITABINE (ramirez SIT a been) is a chemotherapy drug. This medicine is used to treat many types of cancer like breast cancer, lung cancer, pancreatic cancer, and ovarian cancer.  How should I use this medicine?  This drug is given as an infusion into a vein. It is administered in a hospital or clinic by a specially trained health care professional.  Talk to your pediatrician regarding the use of this medicine in children. Special care may be needed.  What side effects may I notice from receiving this medicine?  Side effects that you should report to your doctor or health care professional as soon as possible:  · allergic reactions like skin rash, itching or hives, swelling of the face, lips, or tongue  · low blood counts - this medicine may decrease the number of white blood cells, red blood cells and platelets. You may be at increased risk for infections and bleeding.  · signs of infection - fever or chills, cough, sore throat, pain or difficulty passing urine  · signs of decreased platelets or bleeding - bruising, pinpoint red spots on the skin, black, tarry stools, blood in the urine  · signs of decreased red blood cells - unusually weak or tired, fainting spells, lightheadedness  · breathing problems  · chest pain  · mouth sores  · nausea and vomiting  · pain, swelling, redness at site where injected  · pain, tingling, numbness in the hands or feet  · stomach pain  · swelling of ankles, feet, hands  · unusual bleeding  Side effects that usually do not require medical attention (report to your doctor or health care professional if they continue or are bothersome):  · constipation  · diarrhea  · hair loss  · loss of appetite  · stomach upset  What may interact with this medicine?  · medicines to increase blood counts like filgrastim, pegfilgrastim, sargramostim  · some other chemotherapy drugs like cisplatin  · vaccines  Talk to your doctor or health care  professional before taking any of these medicines:  · acetaminophen  · aspirin  · ibuprofen  · ketoprofen  · naproxen  What if I miss a dose?  It is important not to miss your dose. Call your doctor or health care professional if you are unable to keep an appointment.  Where should I keep my medicine?  This drug is given in a hospital or clinic and will not be stored at home.  What should I tell my health care provider before I take this medicine?  They need to know if you have any of these conditions:  · blood disorders  · infection  · kidney disease  · liver disease  · recent or ongoing radiation therapy  · an unusual or allergic reaction to gemcitabine, other chemotherapy, other medicines, foods, dyes, or preservatives  · pregnant or trying to get pregnant  · breast-feeding  What should I watch for while using this medicine?  Visit your doctor for checks on your progress. This drug may make you feel generally unwell. This is not uncommon, as chemotherapy can affect healthy cells as well as cancer cells. Report any side effects. Continue your course of treatment even though you feel ill unless your doctor tells you to stop.  In some cases, you may be given additional medicines to help with side effects. Follow all directions for their use.  Call your doctor or health care professional for advice if you get a fever, chills or sore throat, or other symptoms of a cold or flu. Do not treat yourself. This drug decreases your body's ability to fight infections. Try to avoid being around people who are sick.  This medicine may increase your risk to bruise or bleed. Call your doctor or health care professional if you notice any unusual bleeding.  Be careful brushing and flossing your teeth or using a toothpick because you may get an infection or bleed more easily. If you have any dental work done, tell your dentist you are receiving this medicine.  Avoid taking products that contain aspirin, acetaminophen, ibuprofen,  naproxen, or ketoprofen unless instructed by your doctor. These medicines may hide a fever.  Women should inform their doctor if they wish to become pregnant or think they might be pregnant. There is a potential for serious side effects to an unborn child. Talk to your health care professional or pharmacist for more information. Do not breast-feed an infant while taking this medicine.  NOTE:This sheet is a summary. It may not cover all possible information. If you have questions about this medicine, talk to your doctor, pharmacist, or health care provider. Copyright© 2017 [employer]

## 2019-02-27 NOTE — PLAN OF CARE
Problem: Adult Inpatient Plan of Care  Goal: Plan of Care Review  Outcome: Ongoing (interventions implemented as appropriate)  Pt tolerated gemzar infusion without issue, pt to rtc 2/28/19 for neupogen injection, pt aware, no distress noted upon d/c to home with spouse

## 2019-02-27 NOTE — Clinical Note
Please schedule the patient for neupogen injections on 2/28/19 and 3/01/19 at [facility] no earlier than 10AM.  The patient will need a follow up appointment with Dr Foster on 3/13/19 with CBC and CMP prior to the appt.  He will need to be set up for neupogen injections on 3/14 and 3/15 at [facility] as well no earlier than 10AM.Thanks.

## 2019-02-27 NOTE — PROGRESS NOTES
Subjective:       Patient ID: Cale Harding is a 78 y.o. male.    Chief Complaint: Mesothelioma of left lung  ONCOLOGIC HISTORY: Mr. 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  He started having left chest wall discomfort in November 2017, which prompted a chest x-ray.  He underwent a thoracentesis with 1.2 liters removed and apparently cytology was negative; although, I do not have that path in the system and then he noted increasing shortness of breath and repeat pleural effusion and fluid was removed.  He then underwent IR biopsy of the left pleural thickening, which was positive for epithelioid mesothelioma confirmed at [facility].  He underwent PET scan on 02/09/2018, which revealed three left pleural based masses with an SUV max of 7.6 and a small pleural effusion.  He has had night sweats and about 20-pound weight loss in the last three months, low-grade fevers also, occasional shortness of breath and he has chest wall pain, which is not frequent.  Plan originally was to proceed with VATS, left thoracotomy and radical pleurectomy, decortication and HIPEC.  However, after the patient complained of worsening pain, an MRI of the chest was done on 03/01/2018 and that revealed loculated complex pleural fluid collection with marked pleural thickening and internal septation.  The largest and more superior anterior of the two pleural masses measured 7 x 4.3 cm.  The smaller and more inferior one measured 3.9 x 2.2.  The mass abuts the pleural surface and the larger of the two masses abuts the pericardium and the chest wall, involvement or invasion of the structures is not excluded.  The lower mass abuts and possibly invades the diaphragm.     Since initial visit with me he underwent Diagnostic laparoscopy with biopsy of diaphragm. Left VATS thoracoscopy on 3/19/18. Final path  "is pending. Due to amount of involvemt surgery was not done so is on Carboplatin and ALimta     He completed 6 cycles of chemo with Carboplatin, Alimta and Avastin. He is now on Alimta and Avastin maintenance    He received Alimta and Avastin maintenance until 1/30/19  CTA chest reveals "No pulmonary embolism to the segmental level. Left hemithorax findings including left lower lobe collapse, mild chronic pleural effusion, subcentimeter soft tissue nodules, and diffuse pleural thickening and nodularity of the left side of the chest, worse when compared to January 2018.  These findings are consistent with history of mesothelioma.  Stable right hemithorax findings including trace pleural effusion, calcified pleural plaques, and subcentimeter pulmonary nodules. Atherosclerosis of the visualized aorta and coronary arteries"    PET scan on 2/15/19 reveals "Today's findings are concerning for progression of disease with increasing uptake within a left upper lobe pulmonary nodule, development of uptake within a pre-vascular lymph node and increasing uptake within the right inferior pleura suggesting recurrence"  Pt was then started on Gemzar    HPI  Mr. Harding returns for follow up and second cycle of Gemzar.  Currently the patient has left sided rib pain which he states has been present since his diagnosis.  The pain is intermittent, shock like in nature, lasting seconds.  It was present for 3-4 days but has been absent in the last 2-3 days.  The patient also complains of sinus drainage for a week associated with low grade fevers and a productive cough.  He has had chronic SOB and denies any change in his breathing.  He denies abdominal pain, N/V, constipation, dairrhea.  He is able to complete all of his ADL's.      Review of Systems   Constitutional: Negative for appetite change, fatigue and unexpected weight change.   HENT: Positive for postnasal drip and rhinorrhea. Negative for mouth sores.    Eyes: Negative for " visual disturbance.   Respiratory: Positive for cough (with mild sputum). Negative for shortness of breath.    Cardiovascular: Negative for chest pain.   Gastrointestinal: Negative for abdominal pain and diarrhea.   Genitourinary: Negative for frequency.   Musculoskeletal: Negative for back pain.        Left sided rib pain   Skin: Negative for rash.   Neurological: Negative for headaches.   Hematological: Negative for adenopathy.   Psychiatric/Behavioral: The patient is not nervous/anxious.    All other systems reviewed and are negative.      PMFSH: all information reviewed and updated as relevant to today's visit  Objective:         Vitals:    19 1109   BP: (!) 151/73   Pulse: 87   Resp: 20   Temp: 98 °F (36.7 °C)     ECO  Physical Exam   Constitutional: He is oriented to person, place, and time. He appears well-developed and well-nourished.   HENT:   Mouth/Throat: No oropharyngeal exudate.   Cardiovascular: Normal rate and normal heart sounds.   Pulmonary/Chest: Effort normal and breath sounds normal. He has no wheezes.   Abdominal: Soft. Bowel sounds are normal. There is no tenderness.   Musculoskeletal: He exhibits no edema or tenderness.   Lymphadenopathy:     He has no cervical adenopathy.   Neurological: He is alert and oriented to person, place, and time. Coordination normal.   Skin: Skin is warm and dry. No rash noted.   Psychiatric: He has a normal mood and affect. Judgment and thought content normal.   Vitals reviewed.        LABS:  WBC   Date Value Ref Range Status   2019 2.51 (L) 3.90 - 12.70 K/uL Final     Hemoglobin   Date Value Ref Range Status   2019 10.0 (L) 14.0 - 18.0 g/dL Final     Hematocrit   Date Value Ref Range Status   2019 33.0 (L) 40.0 - 54.0 % Final     Platelets   Date Value Ref Range Status   2019 226 150 - 350 K/uL Final     Gran # (ANC)   Date Value Ref Range Status   2019 1.2 (L) 1.8 - 7.7 K/uL Final     Comment:     The ANC is based on a white  cell differential from an   automated cell counter. It has not been microscopically   reviewed for the presence of abnormal cells. Clinical   correlation is required.         Chemistry        Component Value Date/Time     02/26/2019 1123    K 4.3 02/26/2019 1123     02/26/2019 1123    CO2 27 02/26/2019 1123    BUN 18 02/26/2019 1123    CREATININE 0.89 02/26/2019 1123     (H) 02/26/2019 1123        Component Value Date/Time    CALCIUM 9.1 02/26/2019 1123    ALKPHOS 70 02/26/2019 1123    AST 37 02/26/2019 1123    ALT 21 02/26/2019 1123    BILITOT 0.5 02/26/2019 1123    ESTGFRAFRICA >60.0 02/26/2019 1123    EGFRNONAA >60.0 02/26/2019 1123          Assessment:       1. Mesothelioma of left lung    2. Mesothelioma    3. Pleural effusion    4. Sinusitis, unspecified chronicity, unspecified location        Plan:        1,2,3. The patient returns for C1D8 of Gemcitabine  -The patient's ANC is 1.2k  -Will give neupogen for two days on 2/28 and 3/01  -No need to dose reduce Gemcitabine  -Will have the patient follow up in 2 weeks for cycle 3  -Will prescribe neupogen for 2 days on days 2 and 3 of cycle 3 and provide the patient with neulasta on body injector for C3D8.  -Will repeat scans after 8 cycles    4 Will prescribe the patient a Z-pack. No concern for pneumonia    -Discussed with Dr Kristin Keene MD PGY-VI  Hematology and Oncology Fellow  Pager:[phone]            Distress Screening Results: Psychosocial Distress screening score of Distress Score: 0 noted and reviewed. No intervention indicated.     STAFF NOTE:  I have personally taken the history and examined this patient and agree with Dr. Keene's Note as stated above.

## 2019-02-27 NOTE — TELEPHONE ENCOUNTER
----- Message from Deion Keene MD sent at 2/27/2019 11:55 AM CST -----  Please schedule the patient for neupogen injections on 2/28/19 and 3/01/19 at [facility] no earlier than 10AM.  The patient will need a follow up appointment with Dr Foster on 3/13/19 with CBC and CMP prior to the appt.  He will need to be set up for neupogen injections on 3/14 and 3/15 at [facility] as well no earlier than 10AM.    Thanks.

## 2019-02-27 NOTE — TELEPHONE ENCOUNTER
spoke with pt on today in regards to Neupogen injections schedule on 02/28 and 03/1, pt is aware and has confirm, per Nanci at [facility] pt will be schedule for 2:00 on 02/28 and 11:00 on 03/1, will give other appointments on 03/14 and 03/15 once pt arrives.

## 2019-02-27 NOTE — PLAN OF CARE
Problem: Adult Inpatient Plan of Care  Goal: Plan of Care Review  Outcome: Ongoing (interventions implemented as appropriate)  Pt here for Gemzar infusion D8C1, labs, hx, meds, allergies reviewed. Pt with no complaints at this time, reclined inc hair, continue to monitor

## 2019-03-13 NOTE — PROGRESS NOTES
Subjective:       Patient ID: Cale Harding is a 78 y.o. male.    Chief Complaint: mesothelioma; left chest pain 3/10; Shortness of Breath; VALENCIA; and Sinusitis  ONCOLOGIC HISTORY: Mr. 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  He started having left chest wall discomfort in November 2017, which prompted a chest x-ray.  He underwent a thoracentesis with 1.2 liters removed and apparently cytology was negative; although, I do not have that path in the system and then he noted increasing shortness of breath and repeat pleural effusion and fluid was removed.  He then underwent IR biopsy of the left pleural thickening, which was positive for epithelioid mesothelioma confirmed at [facility].  He underwent PET scan on 02/09/2018, which revealed three left pleural based masses with an SUV max of 7.6 and a small pleural effusion.  He has had night sweats and about 20-pound weight loss in the last three months, low-grade fevers also, occasional shortness of breath and he has chest wall pain, which is not frequent.  Plan originally was to proceed with VATS, left thoracotomy and radical pleurectomy, decortication and HIPEC.  However, after the patient complained of worsening pain, an MRI of the chest was done on 03/01/2018 and that revealed loculated complex pleural fluid collection with marked pleural thickening and internal septation.  The largest and more superior anterior of the two pleural masses measured 7 x 4.3 cm.  The smaller and more inferior one measured 3.9 x 2.2.  The mass abuts the pleural surface and the larger of the two masses abuts the pericardium and the chest wall, involvement or invasion of the structures is not excluded.  The lower mass abuts and possibly invades the diaphragm.     Since initial visit with me he underwent Diagnostic laparoscopy with biopsy of  "diaphragm. Left VATS thoracoscopy on 3/19/18. Final path is pending. Due to amount of involvemt surgery was not done so is on Carboplatin and ALimta     He completed 6 cycles of chemo with Carboplatin, Alimta and Avastin. He is now on Alimta and Avastin maintenance     He received Alimta and Avastin maintenance until 1/30/19  CTA chest reveals "No pulmonary embolism to the segmental level. Left hemithorax findings including left lower lobe collapse, mild chronic pleural effusion, subcentimeter soft tissue nodules, and diffuse pleural thickening and nodularity of the left side of the chest, worse when compared to January 2018.  These findings are consistent with history of mesothelioma.  Stable right hemithorax findings including trace pleural effusion, calcified pleural plaques, and subcentimeter pulmonary nodules. Atherosclerosis of the visualized aorta and coronary arteries"     PET scan on 2/15/19 reveals "Today's findings are concerning for progression of disease with increasing uptake within a left upper lobe pulmonary nodule, development of uptake within a pre-vascular lymph node and increasing uptake within the right inferior pleura suggesting recurrence"  Pt was then started on Gemzar       HPI Mr. Harding returns for follow up and second cycle of Gemzar  He noted chest wall pain which was severe the week after chemo and now getting better. His sinus complaints are persistent      Review of Systems   Constitutional: Negative for appetite change, fatigue and unexpected weight change.   HENT: Positive for sinus pressure. Negative for mouth sores.    Eyes: Negative for visual disturbance.   Respiratory: Positive for cough and shortness of breath.    Cardiovascular: Negative for chest pain.   Gastrointestinal: Negative for abdominal pain and diarrhea.   Genitourinary: Negative for frequency.   Musculoskeletal: Negative for back pain.   Skin: Negative for rash.   Neurological: Negative for headaches. "   Hematological: Negative for adenopathy.   Psychiatric/Behavioral: The patient is not nervous/anxious.    All other systems reviewed and are negative.      Objective:      Physical Exam   Constitutional: He is oriented to person, place, and time. He appears well-developed and well-nourished.   HENT:   Mouth/Throat: No oropharyngeal exudate.   Cardiovascular: Normal rate and normal heart sounds.   Pulmonary/Chest: Effort normal. He has decreased breath sounds in the left middle field and the left lower field. He has no wheezes.   Abdominal: Soft. Bowel sounds are normal. There is no tenderness.   Musculoskeletal: He exhibits no edema or tenderness.   Lymphadenopathy:     He has no cervical adenopathy.   Neurological: He is alert and oriented to person, place, and time. Coordination normal.   Skin: Skin is warm and dry. No rash noted.   Psychiatric: He has a normal mood and affect. Judgment and thought content normal.   Vitals reviewed.        LABS:  WBC   Date Value Ref Range Status   03/12/2019 6.11 3.90 - 12.70 K/uL Final     Hemoglobin   Date Value Ref Range Status   03/12/2019 11.0 (L) 14.0 - 18.0 g/dL Final     Hematocrit   Date Value Ref Range Status   03/12/2019 36.2 (L) 40.0 - 54.0 % Final     Platelets   Date Value Ref Range Status   03/12/2019 282 150 - 350 K/uL Final     Gran # (ANC)   Date Value Ref Range Status   03/12/2019 3.8 1.8 - 7.7 K/uL Final     Comment:     The ANC is based on a white cell differential from an   automated cell counter. It has not been microscopically   reviewed for the presence of abnormal cells. Clinical   correlation is required.         Chemistry        Component Value Date/Time     03/12/2019 1043    K 4.4 03/12/2019 1043     03/12/2019 1043    CO2 28 03/12/2019 1043    BUN 11 03/12/2019 1043    CREATININE 0.74 03/12/2019 1043     03/12/2019 1043        Component Value Date/Time    CALCIUM 9.2 03/12/2019 1043    ALKPHOS 88 03/12/2019 1043    AST 30  03/12/2019 1043    ALT 17 03/12/2019 1043    BILITOT 0.5 03/12/2019 1043    ESTGFRAFRICA >60.0 03/12/2019 1043    EGFRNONAA >60.0 03/12/2019 1043          Assessment:       1. Mesothelioma    2. Chemotherapy follow-up examination    3. Acute recurrent maxillary sinusitis    4. Pleural effusion        Plan:        1,2. He will proceed with Gemzar, cycle 2, day 1 and will return in 1 week for day 8. Cancel neupogen this week  3. Escribed Levaquin  4. Chest PA/LAteral and lateral decub today    Above care plan was discussed with patient and accompanying wife and all questions were addressed to their satisfaction

## 2019-03-13 NOTE — Clinical Note
Schedule Chest xray and bilateral decub today after chemo. Cancel Neupogen X 2 days this weekSchedule CBC,cMp and see me in 1 week and for Gemzar and Neupogen X 2 days

## 2019-03-13 NOTE — PLAN OF CARE
Problem: Adult Inpatient Plan of Care  Goal: Plan of Care Review  Outcome: Ongoing (interventions implemented as appropriate)  Patient tolerated gemzar well today. PIV removed, catheter tip intact. Scheduled for chest xray after infusion d/t having pain on left side of abdomen/chest. Patient has hx of pleural effusion. Verbalized understanding to call MD for any questions/concerns. Discharged to next appt, ambulated independently with wife by side.

## 2019-03-15 NOTE — TELEPHONE ENCOUNTER
----- Message from Lexis Negrete RN sent at 3/14/2019  8:20 AM CDT -----  Please let me know if you feel he is suitable for a thoracentesis...  ~lexis    ----- Message -----  From: Sintia Foster MD  Sent: 3/13/2019   3:35 PM  To: Lexis Negrete RN    Can you see if there is enough pleural fluid for IR can do thoracentesis

## 2019-03-20 NOTE — DISCHARGE INSTRUCTIONS
For scheduling: Call Niurka at [phone]    For questions or concerns call: PADILLA MON-FRI 8 AM- 5PM [phone]. Radiology resident on call [phone].    For immediate concerns that are not emergent, you may call our radiology clinic at: [phone]

## 2019-03-20 NOTE — PROCEDURES
"Radiology Post-Procedure Note    Pre Op Diagnosis: pleural effusion  Post Op Diagnosis: Same    Procedure: thoracentesis    Procedure performed by: Lizzie Mcpherson and Bianka    Written Informed Consent Obtained: Yes  Specimen Removed: YES 30 cc  Estimated Blood Loss: Minimal    Findings:   5 Italian one stick catheter was placed into a left sided pleural effusion under US guidance.  Only 30 cc of blood tinged, serous fluid was able to be removed as the drain repeatedly clogged from calcified plaques in the liquid. Patient may require larger drain placement or pleurodesis.    Patient tolerated procedure well.    Karan Mcpherson MD ([facility])  Radiology PGY-5  [phone]      "

## 2019-03-20 NOTE — H&P
Radiology History & Physical      SUBJECTIVE:     Chief Complaint: Pleural effusion    History of Present Illness:  Cale Harding is a 78 y.o. male who presents for Thoracentesis.    Past Medical History:   Diagnosis Date    Diverticulitis     Hypertension     Lung cancer     Prostate cancer 2002    Urinary tract infection      Past Surgical History:   Procedure Laterality Date    BIOPSY-DIAPHRAGM N/A 3/19/2018    Performed by Galdino Fang MD at [facility] OR [address] [place] [address]    EBSGYV-GCXZOP-OC N/A 2/1/2018    Performed by [facility] Diagnostic Provider at [facility] OR [address] [place] [address]    COLONOSCOPY  10/20/2012    COLONOSCOPY  11/19/2014    dr machado ( repeat in 3 years per the pt )    ELBOW SURGERY Left 2013    dislocation    LAPAROSCOPY-DIAGNOSTIC N/A 3/19/2018    Performed by Galdino Fang MD at [facility] OR [address] [place] [address]    PROCTECTOMY  2002    SHOULDER ARTHROSCOPY W/ ROTATOR CUFF REPAIR Right 2008    THORACOSCOPY-VIDEO ASSISTED (VATS) W/PLEURAL BIOPSY Left 3/19/2018    Performed by Galdino Fang MD at [facility] OR [address] [place] [address]       Home Meds:   Prior to Admission medications    Medication Sig Start Date End Date Taking? Authorizing Provider   calcium carbonate (TUMS) 200 mg calcium (500 mg) chewable tablet Take 1 tablet by mouth as needed for Heartburn.    Historical Provider, MD   dexamethasone (DECADRON) 6 MG tablet Take 6mg (1 tablet) by mouth twice daily with meals. Take the day before and the day after chemo. DO not take on the day of chemo 9/4/18   Sintia Foster MD   dextromethorphan-guaifenesin  mg (MUCINEX DM)  mg per 12 hr tablet Take 1 tablet by mouth every 12 (twelve) hours.    Historical Provider, MD   diphenhydrAMINE (BENADRYL) 25 mg capsule Take 25 mg by mouth once daily.    Historical Provider, MD   enalapril (VASOTEC) 5 MG tablet Take 1 tablet (5 mg total) by mouth 2 (two) times daily. 5/2/18   Jj Escobedo MD   folic acid (FOLVITE) 1 MG tablet Take 1 tablet (1 mg total) by mouth once  daily. Start today 10/29/18 10/29/19  Ashish Trevizo MD   hydrocodone-acetaminophen 5-325mg (NORCO) 5-325 mg per tablet Take 1 tablet by mouth every 6 (six) hours as needed for Pain. 3/8/18   Sintia Foster MD   LACTOBACILLUS ACIDOPHILUS (ACIDOPHILUS ORAL) Take 1 tablet by mouth once daily.    Historical Provider, MD   levoFLOXacin (LEVAQUIN) 500 MG tablet Take 1 tablet (500 mg total) by mouth once daily. for 10 days 3/13/19 3/23/19  Sintia Foster MD   ondansetron (ZOFRAN) 8 MG tablet Take 1 tablet (8 mg total) by mouth 4 (four) times daily as needed for Nausea. 3/27/18 3/27/19  Sintia Foster MD   phenylephrine HCl/acetaminophn (SINUS PAIN-PRESSURE, PE, ORAL) Take by mouth.    Historical Provider, MD   polyethylene glycol (MIRALAX) 17 gram/dose powder Take 17 g by mouth once daily.    Historical Provider, MD   pseudoeph/DM/guaifen/acetamin (VICKS DAYQUIL LIQUICAPS ORAL) Take by mouth.    Historical Provider, MD   vitamin D 1000 units Tab Take 2,000 Units by mouth once daily.     Historical Provider, MD     Anticoagulants/Antiplatelets: no anticoagulation    Allergies:   Review of patient's allergies indicates:   Allergen Reactions    Bactrim [sulfamethoxazole-trimethoprim] Other (See Comments)     Joint pains     Sedation History:  no adverse reactions    Review of Systems:   Hematological: no known coagulopathies  Respiratory: no shortness of breath  Cardiovascular: no chest pain  Gastrointestinal: no abdominal pain  Genito-Urinary: no dysuria  Musculoskeletal: negative  Neurological: no TIA or stroke symptoms         OBJECTIVE:     Vital Signs (Most Recent)       Physical Exam:  ASA: 2  Mallampati: 2    General: no acute distress  Mental Status: alert and oriented to person, place and time  HEENT: normocephalic, atraumatic  Chest: unlabored breathing  Heart: regular heart rate  Abdomen: nondistended  Extremity: moves all extremities    Laboratory  Lab Results   Component Value Date    INR 1.0 02/01/2018      "  Lab Results   Component Value Date    WBC 6.11 03/12/2019    HGB 11.0 (L) 03/12/2019    HCT 36.2 (L) 03/12/2019     (H) 03/12/2019     03/12/2019      Lab Results   Component Value Date     03/12/2019     03/12/2019    K 4.4 03/12/2019     03/12/2019    CO2 28 03/12/2019    BUN 11 03/12/2019    CREATININE 0.74 03/12/2019    CALCIUM 9.2 03/12/2019    MG 1.9 07/17/2018    ALT 17 03/12/2019    AST 30 03/12/2019    ALBUMIN 4.0 03/12/2019    BILITOT 0.5 03/12/2019       ASSESSMENT/PLAN:     Sedation Plan: local  Patient will undergo thoracentesis.    Karan Mcpherson MD ([facility])  Radiology PGY-5  [phone]      "

## 2019-03-21 NOTE — TELEPHONE ENCOUNTER
Dr. Foster,    Should I ask thoracic to look at his case for possible pleurx?  Did he not have enough fluids to drain to begin with---or was the calcified plaques the issue prohibiting the fluid output?    Since he is still symptomatic, should we repeat CTA chest?  I can't imagine 30ccs fluid is the culprit of his increased VALENCIA/SOB.   ~lexis

## 2019-03-21 NOTE — TELEPHONE ENCOUNTER
Can thoracic look at his xr from 3/13? Or, would you like to obtain new updated imaging?  ~lexis

## 2019-03-21 NOTE — TELEPHONE ENCOUNTER
Can you please review IR jocelyne procedure note from Wednesday.  Please review chest xr from the 13th.  Would be benefit from pleurX?  ~lexis

## 2019-03-21 NOTE — TELEPHONE ENCOUNTER
Can you get a xray chest PA/lateral and decubitus and then we can check with thoracic based on that

## 2019-03-22 NOTE — TELEPHONE ENCOUNTER
----- Message from Marina Ribera sent at 3/22/2019 10:38 AM CDT -----  Contact: Pt [phone]  Pt called about the ct scan scheduled for today, it will take him 45 mins+ to make it to the imaging center. Please contact him with advisory at [phone]. Thank you

## 2019-03-22 NOTE — TELEPHONE ENCOUNTER
tried reaching out to pt on today on mobile number no answer, not able to leave a message, home number left a message with someone who answers the phones,sposue number not a good working number, will send pt a message through the portal.

## 2019-03-25 NOTE — TELEPHONE ENCOUNTER
----- Message from Sintia Jiménez MD sent at 3/25/2019  8:04 AM CDT -----  Agreed   ----- Message -----  From: Lily Negrete RN  Sent: 3/25/2019   7:50 AM  To: Sintia Jiménez MD        ----- Message -----  From: Anju Fuller PA-C  Sent: 3/25/2019   7:35 AM  To: Lily Negrete RN    Reviewed with Dr. Fang. He agreed. Does not think he would benefit from pleurX and is not a candidate for surgical drainage. We would likely do a lot more damage trying to operate to clear the small amount of fluid. It was very difficult trying to get into his chest even before he got treatment so there is a high probability we could tear his lung trying to gain access to his chest. He also has a small effusion on the right. I am not sure that IR would go after that but may be worth asking. Sorry we do not have anything better to offer.     CB     ----- Message -----  From: Lily Negrete RN  Sent: 3/21/2019   5:17 PM  To: Sintia Jiménez MD, Anju Fuller PA-C    Thanks, anju---  I completely get what you are saying---  I will discuss with dr jiménez whether a CTA chest needs to be repeated maybe...  ~lily    ----- Message -----  From: Anju Fuller PA-C  Sent: 3/21/2019   5:01 PM  To: JAJA Engle,     I really do not see much fluid on the CT and PET from February. When I compare those to the recent CXRs to the CT they seem to have a similar volume of fluid. I do not think he would see the benefit from a pleurX if he does not get recurrent effusions. I can see IR had difficulty with the thoracentesis but we also had difficulty getting into his chest to do the original procedure, so I think draining surgically would not be an option either (risks > benefits). I am not sure how much difficulty breathing he is having but it does not seem this volume of fluid is the sole culprit.     CB

## 2019-03-27 NOTE — PROGRESS NOTES
Subjective:       Patient ID: Cale Harding is a 78 y.o. male.    Chief Complaint: Mesothelioma  ONCOLOGIC HISTORY: Mr. 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  He started having left chest wall discomfort in November 2017, which prompted a chest x-ray.  He underwent a thoracentesis with 1.2 liters removed and apparently cytology was negative; although, I do not have that path in the system and then he noted increasing shortness of breath and repeat pleural effusion and fluid was removed.  He then underwent IR biopsy of the left pleural thickening, which was positive for epithelioid mesothelioma confirmed at [facility].  He underwent PET scan on 02/09/2018, which revealed three left pleural based masses with an SUV max of 7.6 and a small pleural effusion.  He has had night sweats and about 20-pound weight loss in the last three months, low-grade fevers also, occasional shortness of breath and he has chest wall pain, which is not frequent.  Plan originally was to proceed with VATS, left thoracotomy and radical pleurectomy, decortication and HIPEC.  However, after the patient complained of worsening pain, an MRI of the chest was done on 03/01/2018 and that revealed loculated complex pleural fluid collection with marked pleural thickening and internal septation.  The largest and more superior anterior of the two pleural masses measured 7 x 4.3 cm.  The smaller and more inferior one measured 3.9 x 2.2.  The mass abuts the pleural surface and the larger of the two masses abuts the pericardium and the chest wall, involvement or invasion of the structures is not excluded.  The lower mass abuts and possibly invades the diaphragm.     Since initial visit with me he underwent Diagnostic laparoscopy with biopsy of diaphragm. Left VATS thoracoscopy on 3/19/18. Final path is pending.  "Due to amount of involvemt surgery was not done so is on Carboplatin and ALimta     He completed 6 cycles of chemo with Carboplatin, Alimta and Avastin. He is now on Alimta and Avastin maintenance     He received Alimta and Avastin maintenance until 1/30/19  CTA chest reveals "No pulmonary embolism to the segmental level. Left hemithorax findings including left lower lobe collapse, mild chronic pleural effusion, subcentimeter soft tissue nodules, and diffuse pleural thickening and nodularity of the left side of the chest, worse when compared to January 2018.  These findings are consistent with history of mesothelioma.  Stable right hemithorax findings including trace pleural effusion, calcified pleural plaques, and subcentimeter pulmonary nodules. Atherosclerosis of the visualized aorta and coronary arteries"     PET scan on 2/15/19 reveals "Today's findings are concerning for progression of disease with increasing uptake within a left upper lobe pulmonary nodule, development of uptake within a pre-vascular lymph node and increasing uptake within the right inferior pleura suggesting recurrence"  Pt was then started on Gemzar        HPI Mr. Harding returns for follow up and cycle 2, day 8 of Gemzar. He notes that his breathing is better this week   He had acute shortness of breath last week and underwent a CTA chest on 3/22/19 which revealed "No evidence of pulmonary thromboembolism or pulmonary infarct. There is an increased amount of right pleural fluid without adjacent parenchymal disease. Stable bilateral pleural thickening and scattered stable subcentimeter pulmonary nodules.  No evidence of new disease"      Review of Systems   Constitutional: Negative for appetite change, fatigue and unexpected weight change.   HENT: Negative for mouth sores.    Eyes: Negative for visual disturbance.   Respiratory: Negative for cough and shortness of breath.    Cardiovascular: Negative for chest pain.   Gastrointestinal: " Negative for abdominal pain and diarrhea.   Genitourinary: Negative for frequency.   Musculoskeletal: Negative for back pain.   Skin: Negative for rash.   Neurological: Negative for headaches.   Hematological: Negative for adenopathy.   Psychiatric/Behavioral: The patient is not nervous/anxious.    All other systems reviewed and are negative.      Objective:      Physical Exam   Constitutional: He is oriented to person, place, and time. He appears well-developed and well-nourished.   HENT:   Mouth/Throat: No oropharyngeal exudate.   Cardiovascular: Normal rate and normal heart sounds.   Pulmonary/Chest: Effort normal and breath sounds normal. He has no wheezes.   Abdominal: Soft. Bowel sounds are normal. There is no tenderness.   Musculoskeletal: He exhibits no edema or tenderness.   Lymphadenopathy:     He has no cervical adenopathy.   Neurological: He is alert and oriented to person, place, and time. Coordination normal.   Skin: Skin is warm and dry. No rash noted.   Psychiatric: He has a normal mood and affect. Judgment and thought content normal.   Vitals reviewed.        LABS:  WBC   Date Value Ref Range Status   03/26/2019 5.76 3.90 - 12.70 K/uL Final     Hemoglobin   Date Value Ref Range Status   03/26/2019 10.9 (L) 14.0 - 18.0 g/dL Final     Hematocrit   Date Value Ref Range Status   03/26/2019 35.8 (L) 40.0 - 54.0 % Final     Platelets   Date Value Ref Range Status   03/26/2019 186 150 - 350 K/uL Final     Gran # (ANC)   Date Value Ref Range Status   03/26/2019 3.0 1.8 - 7.7 K/uL Final     Comment:     The ANC is based on a white cell differential from an   automated cell counter. It has not been microscopically   reviewed for the presence of abnormal cells. Clinical   correlation is required.         Chemistry        Component Value Date/Time     (L) 03/26/2019 1045    K 4.0 03/26/2019 1045    CL 99 03/26/2019 1045    CO2 28 03/26/2019 1045    BUN 10 03/26/2019 1045    CREATININE 0.77 03/26/2019 1045     GLU 98 03/26/2019 1045        Component Value Date/Time    CALCIUM 8.6 (L) 03/26/2019 1045    ALKPHOS 83 03/26/2019 1045    AST 20 03/26/2019 1045    ALT 10 03/26/2019 1045    BILITOT 0.4 03/26/2019 1045    ESTGFRAFRICA >60.0 03/26/2019 1045    EGFRNONAA >60.0 03/26/2019 1045          Assessment:       1. Mesothelioma of left lung    2. Chemotherapy follow-up examination    3. Pleural effusion        Plan:        1,2,3. He will proceed with cycle 2, day 8 of Gemzar and will return in 2 weeks for cycle 3. Neupogen X 2 days    Above care plan was discussed with patient and accompanying wife and all questions were addressed to their satisfaction

## 2019-03-28 NOTE — TELEPHONE ENCOUNTER
----- Message from Sintia Foster MD sent at 3/27/2019 10:41 AM CDT -----  Also schedule Neupogen X 2 days after chemo next time. Schedule injections in [place]

## 2019-03-29 NOTE — NURSING
Pt here b/c neulasta on body malfunctioned. neulasta inspected and medication not administered. Spoke with MD and neupogen given x2 days. Injection given. No new complaints.

## 2019-03-30 NOTE — NURSING
Patient in clinic for Neupogen injection. Medication given SQ into right arm with no complications. Patient has no c/o pain or discomfort. Reports no changes since last appointment. Patient educated on side effects of medication. AVS provided. Discharged home.

## 2019-04-10 NOTE — Clinical Note
Schedule CBC,CMP and Gemzar on 4/17/19Schedule Granix in [place] on 4/18 and here on 4/20Schedule PET scan on 4/25 and schedule CBC,CMP and see me for Gemzar Also schedule Granix X 2 days in [place]

## 2019-04-10 NOTE — PLAN OF CARE
Problem: Adult Inpatient Plan of Care  Goal: Plan of Care Review  Outcome: Ongoing (interventions implemented as appropriate)  Infusion completed, pt tolerated well; pt instructed to remain well hydrated; discussed when to contact MD, when to report to ER; printed AVS given to and reviewed with pt and spouse, both verbalized understanding of all discussed and when to report next

## 2019-04-10 NOTE — PROGRESS NOTES
Subjective:       Patient ID: Cale Harding is a 78 y.o. male.    Chief Complaint: Mesothelioma of left lung  ONCOLOGIC HISTORY: Mr. 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  He started having left chest wall discomfort in November 2017, which prompted a chest x-ray.  He underwent a thoracentesis with 1.2 liters removed and apparently cytology was negative; although, I do not have that path in the system and then he noted increasing shortness of breath and repeat pleural effusion and fluid was removed.  He then underwent IR biopsy of the left pleural thickening, which was positive for epithelioid mesothelioma confirmed at [facility].  He underwent PET scan on 02/09/2018, which revealed three left pleural based masses with an SUV max of 7.6 and a small pleural effusion.  He has had night sweats and about 20-pound weight loss in the last three months, low-grade fevers also, occasional shortness of breath and he has chest wall pain, which is not frequent.  Plan originally was to proceed with VATS, left thoracotomy and radical pleurectomy, decortication and HIPEC.  However, after the patient complained of worsening pain, an MRI of the chest was done on 03/01/2018 and that revealed loculated complex pleural fluid collection with marked pleural thickening and internal septation.  The largest and more superior anterior of the two pleural masses measured 7 x 4.3 cm.  The smaller and more inferior one measured 3.9 x 2.2.  The mass abuts the pleural surface and the larger of the two masses abuts the pericardium and the chest wall, involvement or invasion of the structures is not excluded.  The lower mass abuts and possibly invades the diaphragm.     Since initial visit with me he underwent Diagnostic laparoscopy with biopsy of diaphragm. Left VATS thoracoscopy on 3/19/18. Final path  "is pending. Due to amount of involvemt surgery was not done so is on Carboplatin and ALimta     He completed 6 cycles of chemo with Carboplatin, Alimta and Avastin. He is now on Alimta and Avastin maintenance     He received Alimta and Avastin maintenance until 1/30/19  CTA chest reveals "No pulmonary embolism to the segmental level. Left hemithorax findings including left lower lobe collapse, mild chronic pleural effusion, subcentimeter soft tissue nodules, and diffuse pleural thickening and nodularity of the left side of the chest, worse when compared to January 2018.  These findings are consistent with history of mesothelioma.  Stable right hemithorax findings including trace pleural effusion, calcified pleural plaques, and subcentimeter pulmonary nodules. Atherosclerosis of the visualized aorta and coronary arteries"     PET scan on 2/15/19 reveals "Today's findings are concerning for progression of disease with increasing uptake within a left upper lobe pulmonary nodule, development of uptake within a pre-vascular lymph node and increasing uptake within the right inferior pleura suggesting recurrence"  Pt was then started on Gemzar          HPI Mr. Harding returns for follow up and cycle 3, day 1 of Gemzar. He note that he feels better today.  His chest discomfort seems better. He does note shortness of breath on minimal exertion          Review of Systems   Constitutional: Positive for appetite change. Negative for unexpected weight change.   Eyes: Negative for visual disturbance.   Respiratory: Positive for shortness of breath. Negative for cough.    Cardiovascular: Positive for chest pain.   Gastrointestinal: Positive for abdominal pain and diarrhea.   Genitourinary: Negative for frequency.   Musculoskeletal: Positive for back pain.   Skin: Negative for rash.   Neurological: Positive for headaches.   Hematological: Negative for adenopathy.   Psychiatric/Behavioral: The patient is not nervous/anxious.      "   Objective:      Physical Exam      LABS:  WBC   Date Value Ref Range Status   04/09/2019 6.98 3.90 - 12.70 K/uL Final     Hemoglobin   Date Value Ref Range Status   04/09/2019 10.4 (L) 14.0 - 18.0 g/dL Final     Hematocrit   Date Value Ref Range Status   04/09/2019 34.7 (L) 40.0 - 54.0 % Final     Platelets   Date Value Ref Range Status   04/09/2019 205 150 - 350 K/uL Final     Gran # (ANC)   Date Value Ref Range Status   04/09/2019 5.0 1.8 - 7.7 K/uL Final     Comment:     The ANC is based on a white cell differential from an   automated cell counter. It has not been microscopically   reviewed for the presence of abnormal cells. Clinical   correlation is required.         Chemistry        Component Value Date/Time     (L) 04/09/2019 1048    K 4.1 04/09/2019 1048     04/09/2019 1048    CO2 27 04/09/2019 1048    BUN 15 04/09/2019 1048    CREATININE 0.86 04/09/2019 1048     (H) 04/09/2019 1048        Component Value Date/Time    CALCIUM 8.8 04/09/2019 1048    ALKPHOS 79 04/09/2019 1048    AST 21 04/09/2019 1048    ALT 12 04/09/2019 1048    BILITOT 0.5 04/09/2019 1048    ESTGFRAFRICA >60.0 04/09/2019 1048    EGFRNONAA >60.0 04/09/2019 1048          Assessment:       1. Mesothelioma of left lung    2. Chemotherapy follow-up examination    3. Chemotherapy induced neutropenia        Plan:        1,2,3. He will proceed with cycle 3, day 1 of Gemzar and will return in 1 week for day 8.  Granix X 2 days   WIll plan on PET scan prior to cycle 4.    Above care plan was discussed with patient and accompanying wife and all questions were addressed to their satisfaction

## 2019-04-10 NOTE — NURSING
5882  Pt here for Gemzar infusion, accompanied by spouse, no new complaints or concerns at present, reports tolerating treatment; discussed treatment plan for today, all questions answered and pt agrees to proceed

## 2019-04-11 NOTE — NURSING
Pt here for granix inj. Ambulatory with steady gait noted. AAO x 3. OSHEA x 4. Skin p/w/d. Resp's even and unlabored. No distress.

## 2019-04-17 NOTE — PLAN OF CARE
Problem: Adult Inpatient Plan of Care  Goal: Plan of Care Review  Outcome: Ongoing (interventions implemented as appropriate)  Pt tolerated Gemzar with no complications. VSS. Pt instructed to call MD with any problems. NAD. Pt discharged home independently.

## 2019-04-30 NOTE — TELEPHONE ENCOUNTER
Advised patient to stay away from K rich foods.  He understands he needs to repeat labs tomorrow.

## 2019-04-30 NOTE — TELEPHONE ENCOUNTER
----- Message from Sintia Foster MD sent at 4/30/2019  2:05 PM CDT -----  Repeat tomorrow   ----- Message -----  From: Lexis Negrete RN  Sent: 4/30/2019   1:33 PM  To: Sintia Foster MD    Has had 3 avocados in the past 2 days.  Half banana yesterday.  A couple tomatoes  No beans/OJ---    No symptoms r/t elevated K    Repeat CMP tomorrow or send kayexalate?    ~lexis

## 2019-05-02 PROBLEM — J90 PLEURAL EFFUSION: Status: ACTIVE | Noted: 2019-01-01

## 2019-05-02 NOTE — PROGRESS NOTES
Subjective:       Patient ID: Cale Harding is a 78 y.o. male.    Chief Complaint: Mesothelioma of left lung  ONCOLOGIC HISTORY: Mr. 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  He started having left chest wall discomfort in November 2017, which prompted a chest x-ray.  He underwent a thoracentesis with 1.2 liters removed and apparently cytology was negative; although, I do not have that path in the system and then he noted increasing shortness of breath and repeat pleural effusion and fluid was removed.  He then underwent IR biopsy of the left pleural thickening, which was positive for epithelioid mesothelioma confirmed at [facility].  He underwent PET scan on 02/09/2018, which revealed three left pleural based masses with an SUV max of 7.6 and a small pleural effusion.  He has had night sweats and about 20-pound weight loss in the last three months, low-grade fevers also, occasional shortness of breath and he has chest wall pain, which is not frequent.  Plan originally was to proceed with VATS, left thoracotomy and radical pleurectomy, decortication and HIPEC.  However, after the patient complained of worsening pain, an MRI of the chest was done on 03/01/2018 and that revealed loculated complex pleural fluid collection with marked pleural thickening and internal septation.  The largest and more superior anterior of the two pleural masses measured 7 x 4.3 cm.  The smaller and more inferior one measured 3.9 x 2.2.  The mass abuts the pleural surface and the larger of the two masses abuts the pericardium and the chest wall, involvement or invasion of the structures is not excluded.  The lower mass abuts and possibly invades the diaphragm.     Since initial visit with me he underwent Diagnostic laparoscopy with biopsy of diaphragm. Left VATS thoracoscopy on 3/19/18. Final path  "is pending. Due to amount of involvemt surgery was not done so is on Carboplatin and ALimta     He completed 6 cycles of chemo with Carboplatin, Alimta and Avastin. He is now on Alimta and Avastin maintenance     He received Alimta and Avastin maintenance until 1/30/19  CTA chest reveals "No pulmonary embolism to the segmental level. Left hemithorax findings including left lower lobe collapse, mild chronic pleural effusion, subcentimeter soft tissue nodules, and diffuse pleural thickening and nodularity of the left side of the chest, worse when compared to January 2018.  These findings are consistent with history of mesothelioma.  Stable right hemithorax findings including trace pleural effusion, calcified pleural plaques, and subcentimeter pulmonary nodules. Atherosclerosis of the visualized aorta and coronary arteries"     PET scan on 2/15/19 reveals "Today's findings are concerning for progression of disease with increasing uptake within a left upper lobe pulmonary nodule, development of uptake within a pre-vascular lymph node and increasing uptake within the right inferior pleura suggesting recurrence"  Pt was then started on Gemzar             HPI Mr. Harding returns for follow up and cycle 4, day 1 of Gemzar. PET scan reveals "In this patient with history of mesothelioma, there is increased uptake within the enlarging 1.4 cm left upper lobe pulmonary nodule, mediastinal lymph nodes, and left pleura, more avid and extensive when compared to most recent prior 02/15/2019, concerning for mild progression of disease"      Review of Systems   Constitutional: Negative for appetite change and unexpected weight change.   Eyes: Negative for visual disturbance.   Respiratory: Positive for cough and shortness of breath.    Cardiovascular: Positive for chest pain.   Gastrointestinal: Positive for abdominal pain and diarrhea.   Genitourinary: Negative for frequency.   Musculoskeletal: Negative for back pain.   Skin: " Negative for rash.   Neurological: Positive for headaches.   Hematological: Negative for adenopathy.   Psychiatric/Behavioral: The patient is not nervous/anxious.        Objective:      Physical Exam   Constitutional: He is oriented to person, place, and time. He appears well-developed and well-nourished. No distress.   HENT:   Right Ear: External ear normal.   Left Ear: External ear normal.   Nose: Nose normal.   Mouth/Throat: Oropharynx is clear and moist. No oropharyngeal exudate.   Teeth gums and lips  No sinus tenderness  Palate, tongue, posterior pharynx are normal   Eyes: Conjunctivae are normal.   Lids are normal   Neck: Trachea normal and normal range of motion. No thyromegaly present.   Cardiovascular: Normal rate, regular rhythm, normal heart sounds and intact distal pulses.   No murmur heard.  No edema, no tenderness in the extremities.    Pulmonary/Chest: Effort normal. He has decreased breath sounds in the right lower field. He has no wheezes.   Abdominal: Soft. Bowel sounds are normal. He exhibits no distension and no mass. There is no hepatosplenomegaly. There is no tenderness.   Musculoskeletal: Normal range of motion.   Gait is normal  No clubbing cyanosis   Lymphadenopathy:        Head (right side): No submental and no submandibular adenopathy present.        Head (left side): No submental and no submandibular adenopathy present.     He has no cervical adenopathy.        Right: No supraclavicular adenopathy present.        Left: No supraclavicular adenopathy present.   Neurological: He is alert and oriented to person, place, and time. He has normal strength and normal reflexes. No sensory deficit.   Skin: Skin is warm, dry and intact. No bruising, no lesion and no rash noted. No cyanosis. Nails show no clubbing.   Psychiatric: He has a normal mood and affect. His speech is normal and behavior is normal. Judgment normal. Cognition and memory are normal.   Vitals reviewed.        LABS:  WBC   Date  Value Ref Range Status   04/30/2019 5.36 3.90 - 12.70 K/uL Final     Hemoglobin   Date Value Ref Range Status   04/30/2019 10.7 (L) 14.0 - 18.0 g/dL Final     Hematocrit   Date Value Ref Range Status   04/30/2019 35.4 (L) 40.0 - 54.0 % Final     Platelets   Date Value Ref Range Status   04/30/2019 297 150 - 350 K/uL Final     Gran # (ANC)   Date Value Ref Range Status   04/30/2019 3.5 1.8 - 7.7 K/uL Final     Comment:     The ANC is based on a white cell differential from an   automated cell counter. It has not been microscopically   reviewed for the presence of abnormal cells. Clinical   correlation is required.         Chemistry        Component Value Date/Time     05/01/2019 1052    K 4.6 05/01/2019 1052     05/01/2019 1052    CO2 28 05/01/2019 1052    BUN 12 05/01/2019 1052    CREATININE 0.69 05/01/2019 1052    GLU 93 05/01/2019 1052        Component Value Date/Time    CALCIUM 8.9 05/01/2019 1052    ALKPHOS 82 05/01/2019 1052    AST 21 05/01/2019 1052    ALT 11 05/01/2019 1052    BILITOT 0.5 05/01/2019 1052    ESTGFRAFRICA >60.0 05/01/2019 1052    EGFRNONAA >60.0 05/01/2019 1052            Assessment:       1. Mesothelioma of left lung    2. Chemotherapy induced neutropenia    3. Pleural effusion        Plan:         1,2. He will proceed with cycle 4, day 1 of Gemzar and will return in 1 week for day 8. neupogen X 2 days  3. The thoracentesis on left side did not yield any effusion, will attempt on right side    Above care plan was discussed with patient and accompanying wife and all questions were addressed to their satisfaction

## 2019-05-02 NOTE — PLAN OF CARE
Problem: Adult Inpatient Plan of Care  Goal: Plan of Care Review  Outcome: Ongoing (interventions implemented as appropriate)  Pt tolerated gemzar well.  No s/s of reaction. Vitals stable, NAD.

## 2019-05-02 NOTE — Clinical Note
Schedule IR thoracentesis for right side, he wants Dr. Alfredo Nagel to do it, He will go to [place] if necessary. Send the fluid for WBC, LDH, protein, cytology Schedule CBC,CMP, and Gemzar in 1 weeksSchedule CBC,cMP and see me in 3 weeks from now and for Gemzar

## 2019-05-03 NOTE — TELEPHONE ENCOUNTER
Roseanna,   Please let me know if this is an option for him----getting treated on Friday---injection on Saturday at [facility] and Monday at [facility].  Labs day before in [facility].    ~lexis

## 2019-05-04 NOTE — NURSING
Patient in clinic for Neupogen injection. Medication given SQ into abdomen with no complications. Patient has no c/o pain or discomfort. Reports no changes since last appointment. Patient educated on side effects of medication. AVS provided. Discharged home.

## 2019-05-07 NOTE — TELEPHONE ENCOUNTER
PER PT WOULD LIKE TO MOVE ARNOLD TO NEXT WEEK TO MAKE SURE AUTHOR ATION  IS CLEAR, PT STATES HE DOESN'T WANT A INTERN STUDENT DOING PROCEDURE .

## 2019-05-07 NOTE — TELEPHONE ENCOUNTER
Put a bland handicapped for with this patienits name on it for me to fill out this afternoon so he can pick it up

## 2019-05-07 NOTE — TELEPHONE ENCOUNTER
----- Message from Eliane Green sent at 5/7/2019  3:11 PM CDT -----  Patient requesting handicap tag paper to take to [facility]. Please call when ready for pickup

## 2019-05-07 NOTE — TELEPHONE ENCOUNTER
Form has been placed in Dr. Escobedo's bin for completion. Will contact pt once form is completed.

## 2019-05-07 NOTE — TELEPHONE ENCOUNTER
spoke with pt on today in regards to thoracentesis appointment, pt is okay with seeing another provider her at [facility] or [facility]

## 2019-05-10 NOTE — PLAN OF CARE
Problem: Adult Inpatient Plan of Care  Goal: Plan of Care Review  Outcome: Ongoing (interventions implemented as appropriate)  Pt admitted for D1C5 Gemzar. Side effects and self-care tips discussed and labs reviewed. Pt  received infusion, tolerated well. Plan of care reviewed and Pt instructed to contact MD with any further concerns or questions, he verbalized understanding. Pt discharged @ 11am P

## 2019-05-11 NOTE — NURSING
Patient in clinic for Neupogen injection. Medication given SQ into abdomen with no complications. Patient c/o increased SOB with minimal exertion - VS stable; O2 sats 97% on RA. Patient instructed to go to the ER for increased respiratory symptoms. Has a history of needing pleural drainages. Patient refusing to seek treatment at ER. States he will go if symptoms continue to worsen; will contact MD office Monday for sooner thoracentesis appt. Patient educated on side effects of medication. AVS provided. Discharged home.

## 2019-05-13 NOTE — NURSING
Patient tolerated Granix injection well, he wasn't sure when next chemo would be scheduled, so I informed him to let the infusion center know so we could schedule appropriately

## 2019-05-14 NOTE — SUBJECTIVE & OBJECTIVE
Oncology Treatment Plan:   OP GEMCITABINE (1000 MG/M2) Q3W    Medications:  Continuous Infusions:  Scheduled Meds:  PRN Meds:     Review of patient's allergies indicates:   Allergen Reactions    Bactrim [sulfamethoxazole-trimethoprim] Other (See Comments)     Joint pains        Past Medical History:   Diagnosis Date    Diverticulitis     Hypertension     Lung cancer     Prostate cancer 2002    Urinary tract infection      Past Surgical History:   Procedure Laterality Date    BIOPSY-DIAPHRAGM N/A 3/19/2018    Performed by Galdino Fang MD at [facility] OR [address] [place] [address]    READCT-ZHEUQT-CH N/A 2018    Performed by [facility] Diagnostic Provider at [facility] OR [address] [place] [address]    COLONOSCOPY  10/20/2012    COLONOSCOPY  2014    dr machado ( repeat in 3 years per the pt )    ELBOW SURGERY Left     dislocation    LAPAROSCOPY-DIAGNOSTIC N/A 3/19/2018    Performed by Galdino Fang MD at [facility] OR [address] [place] [address]    PROCTECTOMY      SHOULDER ARTHROSCOPY W/ ROTATOR CUFF REPAIR Right     THORACOSCOPY-VIDEO ASSISTED (VATS) W/PLEURAL BIOPSY Left 3/19/2018    Performed by Galdino Fang MD at [facility] OR [address] [place] [address]     Family History     Problem Relation (Age of Onset)    Cancer Mother    Heart attack Sister, Sister    Heart disease Father, Brother, Sister, Brother, Sister, Brother    No Known Problems Son, Son, Son    Prostate cancer Brother, Brother, Brother        Tobacco Use    Smoking status: Former Smoker     Packs/day: 2.00     Years: 15.00     Pack years: 30.00     Types: Cigarettes     Last attempt to quit: 1970     Years since quittin.3    Smokeless tobacco: Former User    Tobacco comment: pt quit 40 years ago   Substance and Sexual Activity    Alcohol use: No     Alcohol/week: 16.8 oz     Types: 28 Cans of beer per week     Comment: None since Nov 15 th 2017    Drug use: No    Sexual activity: Not Currently       Review of Systems   Constitutional: Negative for chills, fatigue, fever and unexpected  weight change.   HENT: Negative for congestion, postnasal drip and rhinorrhea.    Respiratory: Positive for shortness of breath. Negative for cough and chest tightness.    Cardiovascular: Negative for chest pain, palpitations and leg swelling.   Gastrointestinal: Negative for abdominal distention, abdominal pain, constipation, diarrhea, nausea and vomiting.   Endocrine: Negative for polydipsia, polyphagia and polyuria.   Genitourinary: Negative for decreased urine volume, difficulty urinating, dysuria, flank pain, frequency and hematuria.   Musculoskeletal: Negative for arthralgias and back pain.   Skin: Negative for color change, pallor and wound.   Neurological: Negative for dizziness, tremors, facial asymmetry, speech difficulty, weakness, light-headedness, numbness and headaches.   Hematological: Negative for adenopathy. Does not bruise/bleed easily.   Psychiatric/Behavioral: Negative for agitation, behavioral problems, confusion and decreased concentration.     Objective:     Vital Signs (Most Recent):  Temp: 97.9 °F (36.6 °C) (05/14/19 1425)  Pulse: 78 (05/14/19 1425)  Resp: 18 (05/14/19 1425)  BP: (!) 151/75 (05/14/19 1425)  SpO2: 96 % (05/14/19 1425) Vital Signs (24h Range):  Temp:  [97.8 °F (36.6 °C)-97.9 °F (36.6 °C)] 97.9 °F (36.6 °C)  Pulse:  [] 78  Resp:  [18-24] 18  SpO2:  [96 %-98 %] 96 %  BP: (121-151)/(58-84) 151/75     Weight: 67.6 kg (149 lb)  Body mass index is 23.34 kg/m².  Body surface area is 1.79 meters squared.    No intake or output data in the 24 hours ending 05/14/19 1704    Physical Exam   Constitutional: He is oriented to person, place, and time. He appears well-developed and well-nourished.   HENT:   Head: Normocephalic and atraumatic.   Eyes: Pupils are equal, round, and reactive to light. EOM are normal.   Neck: Normal range of motion. Neck supple. No JVD present.   Cardiovascular: Normal rate, regular rhythm, normal heart sounds and intact distal pulses. Exam reveals no gallop  and no friction rub.   No murmur heard.  Pulmonary/Chest: Effort normal. No stridor. No respiratory distress. He has no wheezes. He has rales.   Abdominal: Soft. Bowel sounds are normal. He exhibits no distension. There is no tenderness. There is no guarding.   Musculoskeletal: Normal range of motion.   Neurological: He is alert and oriented to person, place, and time. No cranial nerve deficit or sensory deficit. Coordination normal.   Skin: Skin is dry.   Psychiatric: He has a normal mood and affect. His behavior is normal. Judgment and thought content normal.       Significant Labs:   All pertinent labs from the last 24 hours have been reviewed.    Diagnostic Results:  I have reviewed and interpreted all pertinent imaging results/findings within the past 24 hours.

## 2019-05-14 NOTE — SUBJECTIVE & OBJECTIVE
Oncology Treatment Plan:   OP GEMCITABINE (1000 MG/M2) Q3W    Medications:  Continuous Infusions:  Scheduled Meds:  PRN Meds:     Review of patient's allergies indicates:   Allergen Reactions    Bactrim [sulfamethoxazole-trimethoprim] Other (See Comments)     Joint pains        Past Medical History:   Diagnosis Date    Diverticulitis     Hypertension     Lung cancer     Prostate cancer 2002    Urinary tract infection      Past Surgical History:   Procedure Laterality Date    BIOPSY-DIAPHRAGM N/A 3/19/2018    Performed by Galdino Fang MD at [facility] OR [address] [place] [address]    [id number] N/A 2018    Performed by [facility] Diagnostic Provider at [facility] OR [address] [place] [address]    COLONOSCOPY  10/20/2012    COLONOSCOPY  2014    dr machado ( repeat in 3 years per the pt )    ELBOW SURGERY Left     dislocation    LAPAROSCOPY-DIAGNOSTIC N/A 3/19/2018    Performed by Galdino Fang MD at [facility] OR [address] [place] [address]    PROCTECTOMY      SHOULDER ARTHROSCOPY W/ ROTATOR CUFF REPAIR Right     THORACOSCOPY-VIDEO ASSISTED (VATS) W/PLEURAL BIOPSY Left 3/19/2018    Performed by Galdino Fang MD at [facility] OR [address] [place] [address]     Family History     Problem Relation (Age of Onset)    Cancer Mother    Heart attack Sister, Sister    Heart disease Father, Brother, Sister, Brother, Sister, Brother    No Known Problems Son, Son, Son    Prostate cancer Brother, Brother, Brother        Tobacco Use    Smoking status: Former Smoker     Packs/day: 2.00     Years: 15.00     Pack years: 30.00     Types: Cigarettes     Last attempt to quit: 1970     Years since quittin.3    Smokeless tobacco: Former User    Tobacco comment: pt quit 40 years ago   Substance and Sexual Activity    Alcohol use: No     Alcohol/week: 16.8 oz     Types: 28 Cans of beer per week     Comment: None since Nov 15 th 2017    Drug use: No    Sexual activity: Not Currently       Review of Systems   Constitutional: Negative for chills, fatigue, fever and unexpected  weight change.   HENT: Negative for congestion, postnasal drip and rhinorrhea.    Respiratory: Positive for shortness of breath. Negative for cough and chest tightness.    Cardiovascular: Negative for chest pain, palpitations and leg swelling.   Gastrointestinal: Negative for abdominal distention, abdominal pain, constipation, diarrhea, nausea and vomiting.   Endocrine: Negative for polydipsia, polyphagia and polyuria.   Genitourinary: Negative for decreased urine volume, difficulty urinating, dysuria, flank pain, frequency and hematuria.   Musculoskeletal: Negative for arthralgias and back pain.   Skin: Negative for color change, pallor and wound.   Neurological: Negative for dizziness, tremors, facial asymmetry, speech difficulty, weakness, light-headedness, numbness and headaches.   Hematological: Negative for adenopathy. Does not bruise/bleed easily.   Psychiatric/Behavioral: Negative for agitation, behavioral problems, confusion and decreased concentration.     Objective:     Vital Signs (Most Recent):  Temp: 97.9 °F (36.6 °C) (05/14/19 1425)  Pulse: 78 (05/14/19 1425)  Resp: 18 (05/14/19 1425)  BP: (!) 151/75 (05/14/19 1425)  SpO2: 96 % (05/14/19 1425) Vital Signs (24h Range):  Temp:  [97.8 °F (36.6 °C)-97.9 °F (36.6 °C)] 97.9 °F (36.6 °C)  Pulse:  [] 78  Resp:  [18-24] 18  SpO2:  [96 %-98 %] 96 %  BP: (121-151)/(58-84) 151/75     Weight: 67.6 kg (149 lb)  Body mass index is 23.34 kg/m².  Body surface area is 1.79 meters squared.    No intake or output data in the 24 hours ending 05/14/19 1431    Physical Exam   Constitutional: He is oriented to person, place, and time. He appears well-developed and well-nourished.   HENT:   Head: Normocephalic and atraumatic.   Eyes: Pupils are equal, round, and reactive to light. EOM are normal.   Neck: Normal range of motion. Neck supple. No JVD present.   Cardiovascular: Normal rate, regular rhythm, normal heart sounds and intact distal pulses. Exam reveals no gallop  and no friction rub.   No murmur heard.  Pulmonary/Chest: Effort normal. No stridor. No respiratory distress. He has no wheezes. He has rales.   Abdominal: Soft. Bowel sounds are normal. He exhibits no distension. There is no tenderness. There is no guarding.   Musculoskeletal: Normal range of motion.   Neurological: He is alert and oriented to person, place, and time. No cranial nerve deficit or sensory deficit. Coordination normal.   Skin: Skin is dry.   Psychiatric: He has a normal mood and affect. His behavior is normal. Judgment and thought content normal.       Significant Labs:   All pertinent labs from the last 24 hours have been reviewed.    Diagnostic Results:  I have reviewed and interpreted all pertinent imaging results/findings within the past 24 hours.

## 2019-05-14 NOTE — CONSULTS
HPI: Mr. Harding is a 77yo man with PMH of Lung epithelioid mesothelioma cancer  (patient of Dr. Foster,dx 11/2017 s/p 6 cycles of carboplatin, alimta, & avastatin, now on Gemizar last day on 5/02/2019 on Cycle 4 with neupogen & granix), prostate ca (s/p prostectomy  & radiation), recurrent pleural effusions with outpatient thoracentesis by IR who presents with progressive onset of SOB. He is not on any oxygen at home. He reports VALENCIA with walking around in his house. Improves when he lies down flat. He was scheduled for outpatient thoracentesis for Friday for a known R pleural effusion but he is demanding it be done in the ER today as he has become more dyspneic. Has not had any changes in his chronic dry cough. Reports low grade fever at home up to 99F but no recent sick contacts. He last had chemotherapy on 5/02 with Neupogen afterwards.     In the ED: He is resting comfortably on RA without requiring oxygen. CXR shows an appreciable increase in the volume of pleural fluid on this side but is consistent with prior PET scan. WBC at 33 but this is consistent with his previous Neupogen injections. Afebrile.     ONCOLOGIC HISTORY: Mr. 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  He started having left chest wall discomfort in November 2017, which prompted a chest x-ray.  He underwent a thoracentesis with 1.2 liters removed and apparently cytology was negative; although, I do not have that path in the system and then he noted increasing shortness of breath and repeat pleural effusion and fluid was removed.  He then underwent IR biopsy of the left pleural thickening, which was positive for epithelioid mesothelioma confirmed at [facility].  He underwent PET scan on 02/09/2018, which revealed three left pleural based masses with an SUV max of 7.6 and a small pleural effusion.  He has had  "night sweats and about 20-pound weight loss in the last three months, low-grade fevers also, occasional shortness of breath and he has chest wall pain, which is not frequent.  Plan originally was to proceed with VATS, left thoracotomy and radical pleurectomy, decortication and HIPEC.  However, after the patient complained of worsening pain, an MRI of the chest was done on 03/01/2018 and that revealed loculated complex pleural fluid collection with marked pleural thickening and internal septation.  The largest and more superior anterior of the two pleural masses measured 7 x 4.3 cm.  The smaller and more inferior one measured 3.9 x 2.2.  The mass abuts the pleural surface and the larger of the two masses abuts the pericardium and the chest wall, involvement or invasion of the structures is not excluded.  The lower mass abuts and possibly invades the diaphragm.     Since initial visit with me he underwent Diagnostic laparoscopy with biopsy of diaphragm. Left VATS thoracoscopy on 3/19/18. Final path is pending. Due to amount of involvement surgery was not done so is on Carboplatin and Alimta     He completed 6 cycles of chemo with Carboplatin, Alimta and Avastin. He is now on Alimta and Avastin maintenance     He received Alimta and Avastin maintenance until 1/30/19  CTA chest reveals "No pulmonary embolism to the segmental level. Left hemithorax findings including left lower lobe collapse, mild chronic pleural effusion, subcentimeter soft tissue nodules, and diffuse pleural thickening and nodularity of the left side of the chest, worse when compared to January 2018.  These findings are consistent with history of mesothelioma.  Stable right hemithorax findings including trace pleural effusion, calcified pleural plaques, and subcentimeter pulmonary nodules. Atherosclerosis of the visualized aorta and coronary arteries"     PET scan on 2/15/19 reveals "Today's findings are concerning for progression of disease with " "increasing uptake within a left upper lobe pulmonary nodule, development of uptake within a pre-vascular lymph node and increasing uptake within the right inferior pleura suggesting recurrence"  Pt was then started on Gemzar  Oncology Treatment Plan:   OP GEMCITABINE (1000 MG/M2) Q3W    Medications:  Continuous Infusions:  Scheduled Meds:  PRN Meds:     Review of patient's allergies indicates:   Allergen Reactions    Bactrim [sulfamethoxazole-trimethoprim] Other (See Comments)     Joint pains        Past Medical History:   Diagnosis Date    Diverticulitis     Hypertension     Lung cancer     Prostate cancer     Urinary tract infection      Past Surgical History:   Procedure Laterality Date    BIOPSY-DIAPHRAGM N/A 3/19/2018    Performed by Galdino Fang MD at [facility] OR [address] [place] [address]    [id number] N/A 2018    Performed by [facility] Diagnostic Provider at [facility] OR [address] [place] [address]    COLONOSCOPY  10/20/2012    COLONOSCOPY  2014    dr machado ( repeat in 3 years per the pt )    ELBOW SURGERY Left     dislocation    LAPAROSCOPY-DIAGNOSTIC N/A 3/19/2018    Performed by Galdino Fang MD at [facility] OR [address] [place] [address]    PROCTECTOMY  2002    SHOULDER ARTHROSCOPY W/ ROTATOR CUFF REPAIR Right 2008    THORACOSCOPY-VIDEO ASSISTED (VATS) W/PLEURAL BIOPSY Left 3/19/2018    Performed by Galdino Fang MD at [facility] OR [address] [place] [address]     Family History     Problem Relation (Age of Onset)    Cancer Mother    Heart attack Sister, Sister    Heart disease Father, Brother, Sister, Brother, Sister, Brother    No Known Problems Son, Son, Son    Prostate cancer Brother, Brother, Brother        Tobacco Use    Smoking status: Former Smoker     Packs/day: 2.00     Years: 15.00     Pack years: 30.00     Types: Cigarettes     Last attempt to quit: 1970     Years since quittin.3    Smokeless tobacco: Former User    Tobacco comment: pt quit 40 years ago   Substance and Sexual Activity    Alcohol use: No     Alcohol/week: " 16.8 oz     Types: 28 Cans of beer per week     Comment: None since Nov 15 th 2017    Drug use: No    Sexual activity: Not Currently       Review of Systems   Constitutional: Negative for chills, fatigue, fever and unexpected weight change.   HENT: Negative for congestion, postnasal drip and rhinorrhea.    Respiratory: Positive for shortness of breath. Negative for cough and chest tightness.    Cardiovascular: Negative for chest pain, palpitations and leg swelling.   Gastrointestinal: Negative for abdominal distention, abdominal pain, constipation, diarrhea, nausea and vomiting.   Endocrine: Negative for polydipsia, polyphagia and polyuria.   Genitourinary: Negative for decreased urine volume, difficulty urinating, dysuria, flank pain, frequency and hematuria.   Musculoskeletal: Negative for arthralgias and back pain.   Skin: Negative for color change, pallor and wound.   Neurological: Negative for dizziness, tremors, facial asymmetry, speech difficulty, weakness, light-headedness, numbness and headaches.   Hematological: Negative for adenopathy. Does not bruise/bleed easily.   Psychiatric/Behavioral: Negative for agitation, behavioral problems, confusion and decreased concentration.     Objective:     Vital Signs (Most Recent):  Temp: 97.9 °F (36.6 °C) (05/14/19 1425)  Pulse: 78 (05/14/19 1425)  Resp: 18 (05/14/19 1425)  BP: (!) 151/75 (05/14/19 1425)  SpO2: 96 % (05/14/19 1425) Vital Signs (24h Range):  Temp:  [97.8 °F (36.6 °C)-97.9 °F (36.6 °C)] 97.9 °F (36.6 °C)  Pulse:  [] 78  Resp:  [18-24] 18  SpO2:  [96 %-98 %] 96 %  BP: (121-151)/(58-84) 151/75     Weight: 67.6 kg (149 lb)  Body mass index is 23.34 kg/m².  Body surface area is 1.79 meters squared.    No intake or output data in the 24 hours ending 05/14/19 1704    Physical Exam   Constitutional: He is oriented to person, place, and time. He appears well-developed and well-nourished.   HENT:   Head: Normocephalic and atraumatic.   Eyes: Pupils are  equal, round, and reactive to light. EOM are normal.   Neck: Normal range of motion. Neck supple. No JVD present.   Cardiovascular: Normal rate, regular rhythm, normal heart sounds and intact distal pulses. Exam reveals no gallop and no friction rub.   No murmur heard.  Pulmonary/Chest: Effort normal. No stridor. No respiratory distress. He has no wheezes. He has rales.   Abdominal: Soft. Bowel sounds are normal. He exhibits no distension. There is no tenderness. There is no guarding.   Musculoskeletal: Normal range of motion.   Neurological: He is alert and oriented to person, place, and time. No cranial nerve deficit or sensory deficit. Coordination normal.   Skin: Skin is dry.   Psychiatric: He has a normal mood and affect. His behavior is normal. Judgment and thought content normal.       Significant Labs:   All pertinent labs from the last 24 hours have been reviewed.    Diagnostic Results:  I have reviewed and interpreted all pertinent imaging results/findings within the past 24 hours.    Assessment     Recurrent Pleural Effusion  Mr. Harding is a 79yo man with PMH of mesothelioma & history of recurrent pleural effusions managed on an outpatient basis. No clear indication of infection (leukocytosis from neupogen with granulocytic predominance). Recurrence of effusion likely to be from cancer. He was scheduled for outpatient IR thoracentesis for Friday but we were able to reschedule him one with Pulmonology tomorrow at 1:30pm in Clinic. This was appropriate at this time as he was hemodynamically stable on room air and comfortable in no respiratory distress. CXR reviewed. We have discussed with him that if he were to stay in observation unit, we were unsure when IR would be able to do the procedure as a non emergent inpatient basis. He understood and decided to go home to follow up with Pulmonology for a scheduled thoracentesis tomorrow. He was told that he can come back to the ED if his symptoms worsen or if  he becomes severely short of breath.     Plan:   Ok to discharge from Oncology perspective. Thoracentesis to be done in Pulmonology clinic tomorrow. Discussed with Dr. Fontenot in the ED and my attending physician Dr. North.     Olga Hsu MD  Internal Medicine, PGY-I  Medical Oncology Consults  [facility]

## 2019-05-14 NOTE — ED TRIAGE NOTES
Pt reports that he is here to have his right lung drained.  Pt states that he is scheduled for the procedure on Monday, but he is becoming more SOB.  Pt states that he has a hx of mesothelioma and has had the same problem to the left lung 1 month ago.  Pt states that he is not on oxygen at home.  Pt states that he has occasional CP with the SOB, denies CP now.

## 2019-05-14 NOTE — PROVIDER PROGRESS NOTES - EMERGENCY DEPT.
Encounter Date: 5/14/2019    ED Physician Progress Notes       SCRIBE NOTE: I, Kiana Pineda, am scribing for, and in the presence of,  Dr. Fontenot.  Physician Statement: I, Dr. Fontenot, personally performed the services described in this documentation as scribed by Kiana Pineda in my presence, and it is both accurate and complete.     Physician Note:   Received patient at sign out from Dr. Jones.     78 y.o. M with pmhx lung cancer presents with chief complaints of worsening SOB. He planned to have thoracentesis Friday but due to worsening sx he presented to ED. Exam is non toxic. Patient is on RA with no hypoxia or tachypnea. Labs revealed with leukocytosis c/w nupagen. Chest x-ray reveals worsening of the effusion. Planned to admit to heme/onc. Informed by heme/onc resident that they were able to arrange outpatient thoracentesis tomorrow and patient will follow up as an outpatient. Patient given strict return precautions and appears stable for discharge per his request.    I, Dr. Chase Fontenot, personally performed the services described in this documentation. All medical record entries made by the scribe were at my direction and in my presence.  I have reviewed the chart and agree that the record reflects my personal performance and is accurate and complete. Chase Fontenot MD.  4:41 PM 05/14/2019

## 2019-05-14 NOTE — ED NOTES
LOC: The patient is awake, alert and aware of environment with an appropriate affect, the patient is oriented x 3 and speaking appropriately.  APPEARANCE: Patient resting comfortably and in no acute distress, patient is clean and well groomed, patient's clothing is properly fastened.  SKIN: The skin is warm and dry, color consistent with ethnicity, patient has normal skin turgor and moist mucus membranes, skin intact, no breakdown or bruising noted.  MUSCULOSKELETAL: Patient moving all extremities spontaneously, no obvious swelling or deformities noted.  RESPIRATORY: Airway is open and patent, respirations are spontaneous, patient has a normal effort and rate, no accessory muscle use noted.  CARDIAC: Patient has tachycardia, no periphreal edema noted, capillary refill < 3 seconds.  ABDOMEN: Soft and non tender to palpation, no distention noted.  NEUROLOGIC:  facial expression is symmetrical, patient moving all extremities spontaneously, normal sensation in all extremities when touched with a finger.  Follows all commands appropriately.

## 2019-05-14 NOTE — ED NOTES
LOC: The patient is awake, alert and aware of environment with an appropriate affect, the patient is oriented x 3 and speaking appropriately.  APPEARANCE: Patient resting comfortably and in no acute distress, patient is clean and well groomed, patient's clothing is properly fastened.  SKIN: The skin is warm and dry, color consistent with ethnicity, patient has normal skin turgor and moist mucus membranes, skin intact, no breakdown or bruising noted.  MUSCULOSKELETAL: Patient moving all extremities spontaneously, no obvious swelling or deformities noted.  RESPIRATORY: Airway is open and patent, respirations are spontaneous, patient has a normal effort and rate, no accessory muscle use noted, bilateral breath sounds,no resp distress noted/reported. .  CARDIAC: Patient has a normal rate and regular rhythm, no periphreal edema noted, capillary refill < 3 seconds.  ABDOMEN: Soft and non tender to palpation, no distention noted, normoactive bowel sounds present in all four quadrants.  NEUROLOGIC:  facial expression is symmetrical, patient moving all extremities spontaneously, normal sensation in all extremities when touched with a finger.  Follows all commands appropriately.

## 2019-05-14 NOTE — ED PROVIDER NOTES
Darya Connolly is a 46 year old female presenting with ongoing management of  A rash on her hands, feet and now on her right   eye ball.  .Denies known Latex allergy or symptoms of Latex sensitivity.  Medications reviewed and updated.  Allergies reviewed.  Social History     Tobacco Use   Smoking Status Never Smoker   Smokeless Tobacco Never Used   Weight taken with shoes on  Health Maintenance Due   Topic Date Due   • Depression Screening  01/10/1986   • Pneumococcal Vaccine 19-64 Highest Risk (1 of 3 - PCV13) 01/10/1993   • Influenza Vaccine (1) 09/01/2018             .     Encounter Date: 5/14/2019    SCRIBE #1 NOTE: I, Apolonia Youngblood, am scribing for, and in the presence of,  Dr. Jones. I have scribed the following portions of the note - the EKG reading. Other sections scribed: HPI, ROS, PE.       History     Chief Complaint   Patient presents with    Pleural Effusion     'can't wait til friday to get drained', hx mesthelioma     Time patient was seen by the provider: 10:05 AM      The patient is a 78 y.o. male with co-morbidities including: Diverticulitis, HTN, and UTI, and Lung cancer who presents to the ED with a complaint of pleural effusion and associated shortness of breath that began worsening onset 2 days ago. Patient stated that he has an appointment to have his right lung drained this Friday, but he felt that he could not wait any longer due to his worsening symptoms. Reports that his SOB worsens when he exerts himself; however, it improves when he lays flat or sits down. Endorses a dry cough, post nasal drip, occasionally (but not currently) fever, headache, and diarrhea. He also mentioned that due to his chemotherapy, he has not been wanting to eat since he cannot taste his food. Patient's last chemotherapy treatment was last Wednesday (5/8). Claims that he had a mild fever 2 days ago, as well as, nausea. His nausea resolved after having taken OTC nausea medication. Patient is accompanied to the ED by his wife.       The history is provided by the patient and medical records.     Review of patient's allergies indicates:   Allergen Reactions    Bactrim [sulfamethoxazole-trimethoprim] Other (See Comments)     Joint pains     Past Medical History:   Diagnosis Date    Diverticulitis     Hypertension     Lung cancer     Prostate cancer 2002    Urinary tract infection      Past Surgical History:   Procedure Laterality Date    BIOPSY-DIAPHRAGM N/A 3/19/2018    Performed by [facility] at [facility] OR [employer] [facility]    BOZOKW-ENIEFF-ZO N/A 2/1/2018    [facility]  [facility]    COLONOSCOPY  10/20/2012    COLONOSCOPY  2014    dr machado ( repeat in 3 years per the pt )    ELBOW SURGERY Left 2013    dislocation    LAPAROSCOPY-DIAGNOSTIC N/A 3/19/2018    [facility]    PROCTECTOMY  2002    SHOULDER ARTHROSCOPY W/ ROTATOR CUFF REPAIR Right 2008    THORACOSCOPY-VIDEO ASSISTED (VATS) W/PLEURAL BIOPSY Left 3/19/2018    [facility]     Family History   Problem Relation Age of Onset    Heart disease Father     Heart disease Brother     Prostate cancer Brother     Cancer Mother         [facility]    Heart disease Sister     Heart attack Sister     No Known Problems Son     Heart disease Brother     Prostate cancer Brother     Heart disease Sister     Heart attack Sister     No Known Problems Son     No Known Problems Son     Heart disease Brother     Prostate cancer Brother     Kidney disease Neg Hx     Asthma Neg Hx     Emphysema Neg Hx      Social History     Tobacco Use    Smoking status: Former Smoker     Packs/day: 2.00     Years: 15.00     Pack years: 30.00     Types: Cigarettes     Last attempt to quit: 1970     Years since quittin.3    Smokeless tobacco: Former User    Tobacco comment: pt quit 40 years ago   Substance Use Topics    Alcohol use: No     Alcohol/week: 16.8 oz     Types: 28 Cans of beer per week     Comment: None since Nov 15 th 2017    Drug use: No     Review of Systems   Constitutional: Negative for chills and fever.   HENT: Positive for postnasal drip.    Eyes: Negative for visual disturbance.        Neg vision changes   Respiratory: Positive for cough and shortness of breath.    Cardiovascular: Negative for chest pain and leg swelling.   Gastrointestinal: Positive for diarrhea and nausea. Negative for abdominal pain and vomiting.   Genitourinary:        Neg changes in urination    Musculoskeletal: Negative for arthralgias and joint swelling.   Skin: Negative for rash.   Allergic/Immunologic: Positive for immunocompromised state.   Neurological: Negative for light-headedness and headaches.   Hematological: Does not bruise/bleed easily.       Physical Exam     Initial Vitals [05/14/19 0949]   BP Pulse Resp Temp SpO2   (!) 151/84 100 20 97.8 °F (36.6 °C) 97 %      MAP       --         Physical Exam    Nursing note and vitals reviewed.  Constitutional: He appears well-developed and well-nourished. He is not diaphoretic. No distress.   HENT:   Head: Normocephalic and atraumatic.   Nose: Nose normal.   Eyes: Conjunctivae and EOM are normal. Pupils are equal, round, and reactive to light.   Neck: Normal range of motion. Neck supple.   Cardiovascular: Normal rate and regular rhythm.   No murmur heard.  Pulmonary/Chest: No respiratory distress. He has no wheezes. He has no rhonchi. He has no rales. He exhibits no tenderness.   Decreased breath sounds on left lower lobe and right lower lobe. Left greater than right.   Abdominal: Soft. Bowel sounds are normal. He exhibits no distension. There is no tenderness.   Musculoskeletal: Normal range of motion. He exhibits no edema.   Neurological: He is alert and oriented to person, place, and time. He has normal strength.   Skin: Skin is warm and dry. No rash noted.   Psychiatric: He has a normal mood and affect. His behavior is normal. Thought content normal.         ED Course   Procedures  Labs Reviewed   CBC W/ AUTO DIFFERENTIAL - Abnormal; Notable for the following components:       Result Value    WBC 33.15 (*)     RBC 3.59 (*)     Hemoglobin 10.8 (*)     Hematocrit 34.2 (*)     Mean Corpuscular Hemoglobin Conc 31.6 (*)     RDW 18.6 (*)     Gran% 96.0 (*)     Lymph% 4.0 (*)     Mono% 0.0 (*)     All other components within normal limits   COMPREHENSIVE METABOLIC PANEL - Abnormal; Notable for the following components:    Albumin 3.1 (*)     Alkaline  Phosphatase 152 (*)     All other components within normal limits   PROTIME-INR   TYPE & SCREEN     EKG Readings: (Independently Interpreted)   Initial Reading: No STEMI. Rhythm: Normal Sinus Rhythm. Heart Rate: 89.   Sinus rhythm with sinus arrhythmia, rate 89, , QRS 74,  with some occasional PVCs. NSTEMI.         Imaging Results          X-Ray Chest AP Portable (Final result)  Result time 05/14/19 11:23:35    Final result by Sreedhar Stacy MD (05/14/19 11:23:35)                 Impression:      Detrimental interval change in the appearance of the chest since 03/20/2019, felt to be primarily related to an appreciable increase in the volume of pleural fluid on this side.      Electronically signed by: Sreedhar Stacy MD  Date:    05/14/2019  Time:    11:23             Narrative:    EXAMINATION:  XR CHEST AP PORTABLE    COMPARISON:  Comparison is made to the most recent prior chest radiograph, dated 03/20/2019, with reference also made to a thoracic CT examination of 03/22/2019.    FINDINGS:  Heart size and the appearance of the cardiomediastinal silhouette are unchanged since 03/20/2019, as is the appearance of the left hemothorax, with a stable volume of pleural fluid on the left side.  However, there has been a detrimental change since that time, as opacity is now observed in the inferior hemothorax on the right side not previously present.  This is primarily related to increased pleural fluid on the right, though the development of some basilar airspace consolidation/volume loss may coexist.  The mid and upper lung zones on the right remain stable and essentially clear.  No pneumothorax.                                 Medical Decision Making:   History:   Old Medical Records: I decided to obtain old medical records.  Old Records Summarized: records from clinic visits and records from previous admission(s).       <> Summary of Records: Old records summarized in the HPI.   Initial Assessment:   78-year-old  male with past medical history of mesothelioma complicated by pleural effusion, presenting for recurrence of pleural effusion.  Patient has appointment on Friday for thoracentesis, but was unable to have it rescheduled for today.  Last thoracentesis March 2019  Patient endorses shortness of breath worsened by exertion, but not orthopneic, requesting drainage of with right pleural effusion as he knows that left is loculated. On exam O2 sat stable and no tachypnea, however decreased BS b/l L>R.   Differential Diagnosis:   Pleural effusion, PNA, PTX, PE  Although pt has malignancy I do not suspect PE given stable VS, no F/C or worsening cough concerning for PNA  Independently Interpreted Test(s):   I have ordered and independently interpreted X-rays - see summary below.       <> Summary of X-Ray Reading(s): B/l pleural effusion, L>R, right new compared to prior  I have ordered and independently interpreted EKG Reading(s) - see prior notes  Clinical Tests:   Lab Tests: Ordered and Reviewed  Radiological Study: Ordered and Reviewed  Medical Tests: Ordered and Reviewed  ED Management:  CXR showing L>R pleural effusion, however R effusion is new compared to existing left effusion. D/w H/O team for admission for thoracentesis.   Other:   I have discussed this case with another health care provider.            Scribe Attestation:   Scribe #1: I performed the above scribed service and the documentation accurately describes the services I performed. I attest to the accuracy of the note.               Clinical Impression:       ICD-10-CM ICD-9-CM   1. Recurrent right pleural effusion J90 511.9   2. Shortness of breath R06.02 786.05                                Jackeline Jones MD  05/20/19 1446

## 2019-05-14 NOTE — HPI
"Mr. Harding is a 79yo man with PMH of lung epithelioid mesothelioma (dx in 11/2017, s/p VATs, 6 cycles of carbop cycle 4, day 1 of Gemzar. PET scan reveals "In this patient with history of mesothelioma, there is increased uptake within the enlarging 1.4 cm left upper lobe pulmonary nodule, mediastinal lymph nodes, and left pleura, more avid and extensive when compared to most recent prior 02/15/2019, concerning for mild progression of disease"      Mesothelioma of left lung  ONCOLOGIC HISTORY: Mr. 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  He started having left chest wall discomfort in November 2017, which prompted a chest x-ray.  He underwent a thoracentesis with 1.2 liters removed and apparently cytology was negative; although, I do not have that path in the system and then he noted increasing shortness of breath and repeat pleural effusion and fluid was removed.  He then underwent IR biopsy of the left pleural thickening, which was positive for epithelioid mesothelioma confirmed at [facility].      He underwent PET scan on 02/09/2018, which revealed three left pleural based masses with an SUV max of 7.6 and a small pleural effusion.  He has had night sweats and about 20-pound weight loss in the last three months, low-grade fevers also, occasional shortness of breath and he has chest wall pain, which is not frequent.  Plan originally was to proceed with VATS, left thoracotomy and radical pleurectomy, decortication and HIPEC.  However, after the patient complained of worsening pain, an MRI of the chest was done on 03/01/2018 and that revealed loculated complex pleural fluid collection with marked pleural thickening and internal septation.  The largest and more superior anterior of the two pleural masses measured 7 x 4.3 cm.  The smaller and more inferior one measured 3.9 x 2.2. " " The mass abuts the pleural surface and the larger of the two masses abuts the pericardium and the chest wall, involvement or invasion of the structures is not excluded.  The lower mass abuts and possibly invades the diaphragm.     Since initial visit with me he underwent Diagnostic laparoscopy with biopsy of diaphragm. Left VATS thoracoscopy on 3/19/18. Final path is pending. Due to amount of involvemt surgery was not done so is on Carboplatin and ALimta     He completed 6 cycles of chemo with Carboplatin, Alimta and Avastin. He is now on Alimta and Avastin maintenance     He received Alimta and Avastin maintenance until 1/30/19  CTA chest reveals "No pulmonary embolism to the segmental level. Left hemithorax findings including left lower lobe collapse, mild chronic pleural effusion, subcentimeter soft tissue nodules, and diffuse pleural thickening and nodularity of the left side of the chest, worse when compared to January 2018.  These findings are consistent with history of mesothelioma.  Stable right hemithorax findings including trace pleural effusion, calcified pleural plaques, and subcentimeter pulmonary nodules. Atherosclerosis of the visualized aorta and coronary arteries"     PET scan on 2/15/19 reveals "Today's findings are concerning for progression of disease with increasing uptake within a left upper lobe pulmonary nodule, development of uptake within a pre-vascular lymph node and increasing uptake within the right inferior pleura suggesting recurrence"  Pt was then started on Gemzar    "

## 2019-05-15 NOTE — PROGRESS NOTES
Subjective:       Patient ID: Cale Harding is a 78 y.o. male.    Chief Complaint: No chief complaint on file.    78 year old male with mesothelioma who presents for evaluation of R pleural effusion. Patient has had multiple L thoracenteses. R sided effusion. Patient was seen in the ER with SOB. He was sent home with Pulmonary follow up.   Denies chest pain and SOB.   1.5 K removed during his last thoracentesis.       Review of Systems   Constitutional: Negative for activity change and appetite change.   HENT: Negative for postnasal drip and congestion.    Respiratory: Positive for cough, shortness of breath and dyspnea on extertion. Negative for sputum production and wheezing.    Cardiovascular: Negative for chest pain and leg swelling.   Endocrine: Negative for cold intolerance.    Musculoskeletal: Negative for arthralgias and back pain.   Gastrointestinal: Negative for abdominal pain and abdominal distention.   Neurological: Negative for dizziness and headaches.   Psychiatric/Behavioral: Negative for confusion.       Past Medical History:   Diagnosis Date    Diverticulitis     Hypertension     Lung cancer     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    Prostate cancer 2002    Urinary tract infection      Past Surgical History:   Procedure Laterality Date    BIOPSY-DIAPHRAGM N/A 3/19/2018    [facility]    QGGYHR-OQEHIT-SR N/A 2/1/2018    Performed by [facility]    COLONOSCOPY  10/20/2012    COLONOSCOPY  11/19/2014    dr machado ( repeat in 3 years per the pt )    ELBOW SURGERY Left 2013    dislocation    LAPAROSCOPY-DIAGNOSTIC N/A 3/19/2018    Performed by Galdino Fang MD at [facility] OR 2ND [facility]    PROCTECTOMY  2002    SHOULDER ARTHROSCOPY W/ ROTATOR CUFF REPAIR Right 2008    THORACOSCOPY-VIDEO ASSISTED (VATS) W/PLEURAL BIOPSY Left 3/19/2018    [facility]     Family History   Problem  Relation Age of Onset    Heart disease Father     Heart disease Brother     Prostate cancer Brother     Cancer Mother         [facility]    Heart disease Sister     Heart attack Sister     No Known Problems Son     Heart disease Brother     Prostate cancer Brother     Heart disease Sister     Heart attack Sister     No Known Problems Son     No Known Problems Son     Heart disease Brother     Prostate cancer Brother     Kidney disease Neg Hx     Asthma Neg Hx     Emphysema Neg Hx      Social History     Socioeconomic History    Marital status:      Spouse name: Not on file    Number of children: Not on file    Years of education: Not on file    Highest education level: Not on file   Occupational History    Not on file   Social Needs    Financial resource strain: Not on file    Food insecurity:     Worry: Not on file     Inability: Not on file    Transportation needs:     Medical: Not on file     Non-medical: Not on file   Tobacco Use    Smoking status: Former Smoker     Packs/day: 2.00     Years: 15.00     Pack years: 30.00     Types: Cigarettes     Last attempt to quit: 1970     Years since quittin.4    Smokeless tobacco: Former User    Tobacco comment: pt quit 40 years ago   Substance and Sexual Activity    Alcohol use: No     Alcohol/week: 16.8 oz     Types: 28 Cans of beer per week     Comment: None since Nov 15 th 2017    Drug use: No    Sexual activity: Not Currently   Lifestyle    Physical activity:     Days per week: Not on file     Minutes per session: Not on file    Stress: Not on file   Relationships    Social connections:     Talks on phone: Not on file     Gets together: Not on file     Attends Yazidism service: Not on file     Active member of club or organization: Not on file     Attends meetings of clubs or organizations: Not on file     Relationship status: Not on file   Other Topics Concern    Not on file   Social History Narrative     Not on file       Objective:      Physical Exam   Constitutional: He is oriented to person, place, and time. He appears well-developed and well-nourished. No distress.   HENT:   Head: Normocephalic.   Nose: Nose normal.   Neck: Normal range of motion. Neck supple.   Cardiovascular: Normal rate and regular rhythm.   Pulmonary/Chest:   Decreased breath sounds at the bases.    Abdominal: Soft. Bowel sounds are normal.   Musculoskeletal: Normal range of motion. He exhibits no edema.   Neurological: He is alert and oriented to person, place, and time.   Skin: Skin is warm and dry. He is not diaphoretic.   Psychiatric: He has a normal mood and affect. His behavior is normal.   Nursing note and vitals reviewed.    Personal Diagnostic Review  Chest x-ray: bilateral effusions R > L.  No flowsheet data found.      Assessment:       1. Pleural effusion        Outpatient Encounter Medications as of 5/15/2019   Medication Sig Dispense Refill    calcium carbonate (TUMS) 200 mg calcium (500 mg) chewable tablet Take 1 tablet by mouth as needed for Heartburn.      dexamethasone (DECADRON) 6 MG tablet Take 6mg (1 tablet) by mouth twice daily with meals. Take the day before and the day after chemo. DO not take on the day of chemo 30 tablet 3    dextromethorphan-guaifenesin  mg (MUCINEX DM)  mg per 12 hr tablet Take 1 tablet by mouth every 12 (twelve) hours.      diphenhydrAMINE (BENADRYL) 25 mg capsule Take 25 mg by mouth once daily.      enalapril (VASOTEC) 5 MG tablet Take 1 tablet (5 mg total) by mouth 2 (two) times daily. 180 tablet 3    folic acid (FOLVITE) 1 MG tablet Take 1 tablet (1 mg total) by mouth once daily. Start today 60 tablet 3    hydrocodone-acetaminophen 5-325mg (NORCO) 5-325 mg per tablet Take 1 tablet by mouth every 6 (six) hours as needed for Pain. 60 tablet 0    LACTOBACILLUS ACIDOPHILUS (ACIDOPHILUS ORAL) Take 1 tablet by mouth once daily.      phenylephrine HCl/acetaminophn (SINUS  PAIN-PRESSURE, PE, ORAL) Take by mouth.      polyethylene glycol (MIRALAX) 17 gram/dose powder Take 17 g by mouth once daily.      vitamin D 1000 units Tab Take 2,000 Units by mouth once daily.        No facility-administered encounter medications on file as of 5/15/2019.      Orders Placed This Encounter   Procedures    X-Ray Chest PA And Lateral     Standing Status:   Future     Number of Occurrences:   1     Standing Expiration Date:   5/15/2020     Order Specific Question:   May the Radiologist modify the order per protocol to meet the clinical needs of the patient?     Answer:   Yes       Plan:       Pleural effusion  Thoracentesis performed today. If patient needs repeat thoracentesis, we can consider PleurX catheter placement.     See procedure note.   Follow up PRN.     Jessica Woodward MD

## 2019-05-15 NOTE — PROGRESS NOTES
[facility]  Thoracentesis  Procedure Note    SUMMARY     Date of Procedure:      Procedure:    Indications: Pleural effusion    Pre-Operative Diagnosis: Mesothelioma    Post-Operative Diagnosis: same    Anesthesia: * No surgery found *    Technical Procedures Used:     Description of the Findings of the Procedure:     Consent: Informed consent was obtained. Risks of the procedure were discussed including: infection, bleeding, pain, pneumothorax.    Under sterile conditions the patient was positioned. Chlorhexadine solution and sterile drapes were utilized.  1% plain lidocaine was used to anesthetize between the rib space after localized under ultrasound. Thoracentesis performed on the right. Fluid was obtained after catheter inserted without  difficulty and suction applied with minimal blood loss.  A dressing was applied to the wound and wound care instructions were provided.     1500 ml of clear pleural fluid was obtained. A sample was sent to Pathology for cytogenetics, flow, and cell counts, as well as for infection analysis.    Plan:    A follow up chest x-ray was ordered.           Condition: stable    Disposition: home    Attestation: I performed the procedure.

## 2019-05-15 NOTE — ASSESSMENT & PLAN NOTE
Thoracentesis performed today. If patient needs repeat thoracentesis, we can consider PleurX catheter placement.

## 2019-05-15 NOTE — TELEPHONE ENCOUNTER
tried reaching out to pt on today to discuss appointment on 05/17/18, but no answer,a detail message was left on v/m to contact office to discuss.

## 2019-05-23 NOTE — Clinical Note
Azeem Blevinsul,Can you please look at his CT scans and let me know what you think about a pleurex on him. Thank you

## 2019-05-23 NOTE — PROGRESS NOTES
Subjective:       Patient ID: Cale Harding is a 78 y.o. male.    Chief Complaint: Mesothelioma of left lung  Mr. 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  He started having left chest wall discomfort in November 2017, which prompted a chest x-ray.  He underwent a thoracentesis with 1.2 liters removed and apparently cytology was negative; although, I do not have that path in the system and then he noted increasing shortness of breath and repeat pleural effusion and fluid was removed.  He then underwent IR biopsy of the left pleural thickening, which was positive for epithelioid mesothelioma confirmed at [facility].  He underwent PET scan on 02/09/2018, which revealed three left pleural based masses with an SUV max of 7.6 and a small pleural effusion.  He has had night sweats and about 20-pound weight loss in the last three months, low-grade fevers also, occasional shortness of breath and he has chest wall pain, which is not frequent.  Plan originally was to proceed with VATS, left thoracotomy and radical pleurectomy, decortication and HIPEC.  However, after the patient complained of worsening pain, an MRI of the chest was done on 03/01/2018 and that revealed loculated complex pleural fluid collection with marked pleural thickening and internal septation.  The largest and more superior anterior of the two pleural masses measured 7 x 4.3 cm.  The smaller and more inferior one measured 3.9 x 2.2.  The mass abuts the pleural surface and the larger of the two masses abuts the pericardium and the chest wall, involvement or invasion of the structures is not excluded.  The lower mass abuts and possibly invades the diaphragm.     Since initial visit with me he underwent Diagnostic laparoscopy with biopsy of diaphragm. Left VATS thoracoscopy on 3/19/18. Final path is pending. Due to  "amount of involvemt surgery was not done so is on Carboplatin and ALimta     He completed 6 cycles of chemo with Carboplatin, Alimta and Avastin. He is now on Alimta and Avastin maintenance     He received Alimta and Avastin maintenance until 1/30/19  CTA chest reveals "No pulmonary embolism to the segmental level. Left hemithorax findings including left lower lobe collapse, mild chronic pleural effusion, subcentimeter soft tissue nodules, and diffuse pleural thickening and nodularity of the left side of the chest, worse when compared to January 2018.  These findings are consistent with history of mesothelioma.  Stable right hemithorax findings including trace pleural effusion, calcified pleural plaques, and subcentimeter pulmonary nodules. Atherosclerosis of the visualized aorta and coronary arteries"     PET scan on 2/15/19 reveals "Today's findings are concerning for progression of disease with increasing uptake within a left upper lobe pulmonary nodule, development of uptake within a pre-vascular lymph node and increasing uptake within the right inferior pleura suggesting recurrence"  Pt was then started on Gemzar              HPI Mr. Harding returns for follow up and cycle 5, day 8 of Gemzar. He underwent therapeutic thoracentesis on 5/15/19      Review of Systems   Constitutional: Positive for appetite change and unexpected weight change.   Eyes: Negative for visual disturbance.   Respiratory: Positive for cough and shortness of breath.    Cardiovascular: Positive for chest pain.   Gastrointestinal: Negative for abdominal pain and diarrhea.   Genitourinary: Negative for frequency.   Musculoskeletal: Negative for back pain.   Skin: Negative for rash.   Neurological: Positive for headaches.   Hematological: Negative for adenopathy.   Psychiatric/Behavioral: The patient is not nervous/anxious.        Objective:      Physical Exam   Constitutional: He is oriented to person, place, and time. He appears " well-developed and well-nourished.   HENT:   Mouth/Throat: No oropharyngeal exudate.   Cardiovascular: Normal rate and normal heart sounds.   Pulmonary/Chest: Effort normal. He has decreased breath sounds in the right middle field, the right lower field, the left middle field and the left lower field. He has no wheezes.   Abdominal: Soft. Bowel sounds are normal. There is no tenderness.   Musculoskeletal: He exhibits no edema or tenderness.   Lymphadenopathy:     He has no cervical adenopathy.   Neurological: He is alert and oriented to person, place, and time. Coordination normal.   Skin: Skin is warm and dry. No rash noted.   Psychiatric: He has a normal mood and affect. Judgment and thought content normal.   Vitals reviewed.        LABS:  WBC   Date Value Ref Range Status   05/22/2019 6.60 3.90 - 12.70 K/uL Final     Hemoglobin   Date Value Ref Range Status   05/22/2019 10.2 (L) 14.0 - 18.0 g/dL Final     Hematocrit   Date Value Ref Range Status   05/22/2019 33.6 (L) 40.0 - 54.0 % Final     Platelets   Date Value Ref Range Status   05/22/2019 253 150 - 350 K/uL Final     Gran # (ANC)   Date Value Ref Range Status   05/22/2019 4.8 1.8 - 7.7 K/uL Final     Comment:     The ANC is based on a white cell differential from an   automated cell counter. It has not been microscopically   reviewed for the presence of abnormal cells. Clinical   correlation is required.         Chemistry        Component Value Date/Time     05/22/2019 1236    K 4.2 05/22/2019 1236    CL 99 05/22/2019 1236    CO2 28 05/22/2019 1236    BUN 11 05/22/2019 1236    CREATININE 0.62 05/22/2019 1236     (H) 05/22/2019 1236        Component Value Date/Time    CALCIUM 8.7 05/22/2019 1236    ALKPHOS 81 05/22/2019 1236    AST 21 05/22/2019 1236    ALT 14 05/22/2019 1236    BILITOT 0.5 05/22/2019 1236    ESTGFRAFRICA >60.0 05/22/2019 1236    EGFRNONAA >60.0 05/22/2019 1236          Assessment:       1. Mesothelioma of left lung    2. Pleural  effusion        Plan:        1,2. He appears top be clinically progressing. Hold chemo and obtain CT scans today. If evidence of progression then will change chemo to Navelbine. If stable findings then continue with Gemzar.   Also plan on pleurex if he has re accumulation of pleural fluid.    Above care plan was discussed with patient and accompanying wife and all questions were addressed to their satisfaction      ADDENDUM: CT scans overall stable. Sent message to Dr. Woodward to review as well. Will proceed with gemzar.

## 2019-05-23 NOTE — TELEPHONE ENCOUNTER
spoke with pt on today in regards to chemo being schedule on tomorrow @10:00, pt is aware and has confirm.

## 2019-05-23 NOTE — Clinical Note
Please see if chemo can treat him tomorrow or Saturday and then also schedule Neupogen X 2 daysSchedule to see me in 2 weeks and for Gemzar
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
## 2019-05-24 NOTE — PLAN OF CARE
Problem: Adult Inpatient Plan of Care  Goal: Plan of Care Review  Outcome: Ongoing (interventions implemented as appropriate)  Pt tolerated Gemzar without complications. VSS. No s/s of reaction. Instructed to contact MD with any questions. PIV removed and AVS given to patient.

## 2019-05-28 NOTE — TELEPHONE ENCOUNTER
Is dr witt's appt for a thora or to discuss pleurX placement? I know cardio/thoracic typically places them for us--but the clinic notes are ambiguous.   ~lexis

## 2019-05-31 PROBLEM — J90 PLEURAL EFFUSION: Status: RESOLVED | Noted: 2017-12-22 | Resolved: 2019-01-01

## 2019-05-31 NOTE — TELEPHONE ENCOUNTER
Spoke with patient, gave instruction for pleurx, NPO after MN the night before procedure, may take meds with small sips of water, no blood thinners, arrive at [facility] 2nd floor at 0830 Monday 6/3/19.

## 2019-05-31 NOTE — PROGRESS NOTES
"Subjective:       Patient ID: Cale Harding is a 78 y.o. male.    Chief Complaint: Follow-up    78 year old male with mesothelioma who presents for evaluation of R pleural effusion. Patient has had multiple L thoracenteses. R sided effusion. Patient was seen in the ER with SOB. He was sent home with Pulmonary follow up.   Denies chest pain and SOB.   1.5 K removed during his last thoracentesis.      Interval hx: Patient presents for discussions for PleurX catheter. He continues to accumulate fluid in the R pleural space.     Review of Systems   Constitutional: Negative for activity change and appetite change.   Respiratory: Positive for cough, sputum production, shortness of breath and dyspnea on extertion.        Objective:       Vitals:    05/31/19 0914   BP: 110/80   Pulse: 94   SpO2: 95%   Weight: 67 kg (147 lb 11.3 oz)   Height: 5' 7" (1.702 m)       Physical Exam   Constitutional: He is oriented to person, place, and time. He appears well-developed and well-nourished. No distress.   HENT:   Head: Normocephalic.   Nose: Nose normal.   Neck: Normal range of motion. Neck supple.   Cardiovascular: Normal rate and regular rhythm.   Pulmonary/Chest:   Decreased breath sounds bilaterally R > L   Abdominal: Soft. Bowel sounds are normal.   Neurological: He is alert and oriented to person, place, and time.   Skin: Skin is warm and dry. He is not diaphoretic.   Psychiatric: He has a normal mood and affect. His behavior is normal.   Nursing note and vitals reviewed.    Personal Diagnostic Review  none pertinent  No flowsheet data found.      Assessment:       No diagnosis found.    Outpatient Encounter Medications as of 5/31/2019   Medication Sig Dispense Refill    calcium carbonate (TUMS) 200 mg calcium (500 mg) chewable tablet Take 1 tablet by mouth as needed for Heartburn.      dexamethasone (DECADRON) 6 MG tablet Take 6mg (1 tablet) by mouth twice daily with meals. Take the day before and the day after " chemo. DO not take on the day of chemo 30 tablet 3    dextromethorphan-guaifenesin  mg (MUCINEX DM)  mg per 12 hr tablet Take 1 tablet by mouth every 12 (twelve) hours.      diphenhydrAMINE (BENADRYL) 25 mg capsule Take 25 mg by mouth once daily.      enalapril (VASOTEC) 5 MG tablet Take 1 tablet (5 mg total) by mouth 2 (two) times daily. 180 tablet 3    folic acid (FOLVITE) 1 MG tablet Take 1 tablet (1 mg total) by mouth once daily. Start today 60 tablet 3    hydrocodone-acetaminophen 5-325mg (NORCO) 5-325 mg per tablet Take 1 tablet by mouth every 6 (six) hours as needed for Pain. 60 tablet 0    LACTOBACILLUS ACIDOPHILUS (ACIDOPHILUS ORAL) Take 1 tablet by mouth once daily.      phenylephrine HCl/acetaminophn (SINUS PAIN-PRESSURE, PE, ORAL) Take by mouth.      polyethylene glycol (MIRALAX) 17 gram/dose powder Take 17 g by mouth once daily.      vitamin D 1000 units Tab Take 2,000 Units by mouth once daily.        No facility-administered encounter medications on file as of 5/31/2019.      No orders of the defined types were placed in this encounter.      Plan:        Pleural effusion  R pleural effusion. Will schedule PleurX for Monday Lia 3, 2019.     Jessica Woodward MD

## 2019-06-03 NOTE — PROCEDURES
[facility]   Procedure Note- 6/3/2019                  Procedure- Right indwelling pleural catheter (Pleurex) placement.   Indication-Recurrent right malignant pleural effusion   Location- Pulmonary Lab     Staff- Priya     Informed consent-- Patient consented at bedside. He was explained procedure and indications in detail. I discussed potential complications including infection, PTX, bleeding, worsening respiratory failure and additional numerous potential risks as outlined in the written informed consent. He verbalized understanding and in agreement to proceed. Written consent signed and on chart.     Anesthesia-   1. Lidocaine 1% 15ml- local   2. Fentanyl- 100mcg       Procedure in Detail-- Prior to procedure a time out was performed. Patient was prepped and draped in full sterile technique and placed in the supine position.  15 cc 1% lidocaine inserted locally for anesthesia of pleural surface and tunneled track. Under ultrasound guidance the right pleural effusion and appropriate landmarks identified. Guidewire placed into pleural space. Using #11 blade scalpel two 1cm incision were made approximately 4 cm apart. The catheter track was bluntly dissected with appropriate positioning of the cuff verified. Following dilation over guidewire the catheter was positioned in the pleural space with return of yellow tinged pleural fluid. The incision sites were sutured and catheter secured. Overlying sterile dressing applied.     600cc pleural fluid removed. No specimens were obtained        Attempts- 1       Complications- No immediate complications, tolerated procedure well      CXR--  Appropriate catheter placement. No PTX. No complications.     Disposition- To recovery. Home after discharge criteria met.          Kaleb Powers M.D.   [facility]

## 2019-06-03 NOTE — PROGRESS NOTES
Pagetal Woodward for clarification on if and when he will be coming to talk to patient and patient's spouse, for missing orders (CXR Order, Discharge Order, and Discharge Peripheral IV Order) and to clarify when patient can resume NSAIDS after insertion of Pleurex catheter. Will continue to monitor patient.

## 2019-06-03 NOTE — DISCHARGE INSTRUCTIONS
Chest Tubes  Your lungs are each surrounded by two layers of membrane (pleura). The space between the layers is called the pleural space. Normally the pleural space has a tiny amount of fluid in it. But extra fluid, blood, pus, or air in the pleural space makes it hard for the lung to expand and makes breathing difficult. A chest tube is a soft, flexible tube put into the pleural space that surrounds the lung. The tube does not go into the lung itself. The tube drains blood, air, or extra fluid. The tube is inserted through a small cut (incision) in the skin.    Reasons for a chest tube  You may need a chest tube:  · After chest surgery or injury to the chest  · To treat a lung infection or abscess  · To remove extra fluid from around the lung from other causes. This might be from cancer or congestive heart failure.  · To treat collapsed lung  · To treat bleeding into the chest (hemothorax)  Chest tube placement  A chest tube is often placed during chest surgery while youre in the operating room and still asleep (sedated). If you have a lung infection or other problem, you may have a chest tube placed while youre awake in the emergency department or your hospital room. The procedure takes less than 30 minutes. Heres how it is done:  · Medical staff takes your blood pressure, pulse, and temperature.  · You lie on your side or sit in a semi-upright position. You will be asked to put one arm up over your head.  · The healthcare provider injects pain medicine (anesthetic) to numb the area where the chest tube is placed. You may be given medicine to make you relax (sedation). Sedation is given by a mask or an IV (intravenous) line in your hand or arm.  · The provider makes a small incision is made in your side, chest, or back. He or she puts a soft, flexible tube into the incision site. The tube is guided between your ribs until it reaches the pleural space. The provider may use ultrasound imaging to help place the  tube correctly.  · The provider may sew (suture) the tube to your skin to keep it in place. It will also be covered with an airtight bandage. The tubing will be taped to your body. This is to keep it from being pulled out by accident.  · The tube is then connected to a drainage device.  ¨ A chest drainage unit pulls the extra fluid, blood, pus, or air from your chest. The device should be lower than your chest level and may be put on the floor. Some chest tubes contain water and may make a bubbling sound while they are working. Other chest tubes will not make any sound at all.   ¨ A Heimlich valve (or flutter valve) is a small one-way valve. It is used if you have a collapsed lung (pneumothorax). The lung collapsed because of the collection of air in the pleural space. The valve is attached directly to the end of your chest tube. The valve opens to let air escape from the chest tube. It then closes to prevent air from going back in the tube. You may go home from the hospital with your chest tube attached to a Heimlich valve. Care for it as you are told to.  · You will have an X-ray after the procedure to help confirm that the tube is in the right place.  While the tube is in place  · The tube stays in place for as long as your healthcare provider thinks it is needed. You may be in the hospital until after the tube is removed. Sometimes you may be sent home with the chest tube still in place. If you are sent home with the chest tube in place, you will need home healthcare or a caregiver until it is removed.  · You will be given pain medicine by mouth or by IV. You may have a patient-controlled analgesia (PCA) pump attached to the IV line. This lets you give yourself pain medicine, but it is programmed so you cannot overdose. You are usually sent home when you can take oral pain medicine and no longer need IV pain medicine.  · You may need extra oxygen. This is given through a mouth mask or a flexible tube under your  nose. You may also be connected to a small device called a pulse oximeter. It measures the amount of oxygen in your blood. It is placed on your finger, toe, or ear.  · After the tube is placed, you can help with drainage by:  ¨ Breathing deeply  ¨ Coughing  ¨ Sitting upright  ¨ Moving and walking around if told to do so by your healthcare provider  · You can reduce discomfort by holding a pillow tightly to your chest when you cough.  · Your breathing and heart rate will be monitored. The tubing will be checked regularly. If blood is draining from your chest, the tubing will be checked for clots. If a clot appears, the tubing may be squeezed to move the clot out of the tube. If fluid is draining from your chest, it may be tested for signs of infection or other problems. You may need antibiotics to prevent or treat infection.  · Tell a nurse right away if you have trouble breathing or chest, shoulder, or neck pain.     Risks and possible complications of chest tubes  A chest tube can have some risks. But the benefits of having the tube usually outweigh the risks. Risks of a chest tube include:  · Air leak  · Infection  · Bleeding  · Reaction to anesthesia used during placement  · Lung damage     Caution!  Do not pull on the tube or tip over the drainage container. This can cause serious breathing problems. If you pull on the tube or tip over the container, tell a nurse right away. You may be asked to exhale fully or take deep breaths while the tubing is checked.   Removing the chest tube  When the air, blood, pus, or extra fluid is gone from the pleural space, your healthcare provider will remove the tube. This may be done in your hospital bed. You may get more pain medicine before the tube is removed. As the tube is removed, you may be asked to inhale or exhale deeply and then hold your breath. After the tube is removed, the healthcare provider may close the incision with sutures. Or the incision may be left to close  by itself. The provider will put a bandage over the incision. You may have an X-ray after the tube is removed. This is to make sure your lung is still inflated.  Follow-up care  After the tube is removed:  · Follow up with the doctor within 48 hours. You may have another X-ray. This is to check for fluid or air in your lung. The incision will be checked to make sure it is healing. The bandage may be replaced with a smaller adhesive bandage. You may change the adhesive bandage as often as needed.  · Care for the insertion site(s) as directed. Keep the bandage in place for 48 hours. Keep it dry.  · Until a scab has formed on the incision site, you may shower but not take a bath. When a scab has formed you no longer need an adhesive bandage. After the incision has healed you may have a small scar.  When to call the doctor  While the tube is in place and after it has been removed, call the doctor (or alert your nurse) right away if you have any of the following:  · Fever of 100.4ºF (38ºF) or higher, or as directed by your healthcare provider  · Trouble breathing  · Sharp chest pain that may spread to your shoulder or back  · Bluish color of the skin  · Weakness, dizziness, or fainting  · A feeling of anxiety or restlessness  · Fast pulse      Date Last Reviewed: 10/1/2016  © 7245-6662 [employer]. [address] [place] [address]. All rights reserved. This information is not intended as a substitute for professional medical care. Always follow your healthcare professional's instructions.      Recovery After Procedural Sedation (Adult)  You have been given medicine by vein to make you sleep during your surgery. This may have included both a pain medicine and sleeping medicine. Most of the effects have worn off. But you may still have some drowsiness for the next 6 to 8 hours.  Home care  Follow these guidelines when you get home:  · For the next 8 hours, you should be watched by a responsible  adult. This person should make sure your condition is not getting worse.  · Don't drink any alcohol for the next 24 hours.  · Don't drive, operate dangerous machinery, or make important business or personal decisions during the next 24 hours.  Note: Your healthcare provider may tell you not to take any medicine by mouth for pain or sleep in the next 4 hours. These medicines may react with the medicines you were given in the hospital. This could cause a much stronger response than usual.  Follow-up care  Follow up with your healthcare provider if you are not alert and back to your usual level of activity within 12 hours.  When to seek medical advice  Call your healthcare provider right away if any of these occur:  · Drowsiness gets worse  · Weakness or dizziness gets worse  · Repeated vomiting  · You can't be awakened   Date Last Reviewed: 10/18/2016  © 1696-3225 [employer]. [address] [place] [address]. All rights reserved. This information is not intended as a substitute for professional medical care. Always follow your healthcare professional's instructions.

## 2019-06-03 NOTE — TELEPHONE ENCOUNTER
----- Message from Jenifer Negrete sent at 6/3/2019  3:16 PM CDT -----  Contact:         [facility]   /  [facility]     Reason for call:        Communication Preference:   Phone  [phone]  Ext [phone]     Additional Information:   Order can not be process  due to being not contracted with pt insurance company

## 2019-06-03 NOTE — SEDATION DOCUMENTATION
RN transported patient to bronch suite for pleurx cath placement, spouse at bedside.  H and P updated, patient placed on cardiac monitor, anesthesia Plan:  Conscious sedation, ASA verified-yes, Airway exam performed-yes, Personal or Family history of anesthesia complications-No  Consent signed and witnessed, Nargis Bonner RN

## 2019-06-03 NOTE — PROGRESS NOTES
Dr. RUTHIE Woodward at the bedside to talk to patient and patient's spouse. Notified Dr. RUTHIE Woodward of patient's complaint of pain. MD stated that he would place order for pain medication.

## 2019-06-03 NOTE — H&P
Pulmonary & Critical Care Medicine   Consultation Note    HPI:   Mr. Harding is a 77 yo M with a PMH of mesothelioma who presents today for outpatient placement of pleurX catheter for recurrent R-sided pleural effusion. Patient was recently seen in the ED for pleuritic chest pain and SOB - thoracentesis was performed by pulmonology.    Today, patient has no complaints. He is not on any blood thinners or anticoagulation. His wife is with him to drive him home post-procedure.    Past Medical History:   Diagnosis Date    Diverticulitis     Hypertension     Lung cancer     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    Prostate cancer 2002    Urinary tract infection      Past Surgical History:   Procedure Laterality Date    BIOPSY-DIAPHRAGM N/A 3/19/2018    Performed by Galdino Fang MD at [facility] OR [address] [place] [address]    MGKMIQ-MSFFHS-CV N/A 2/1/2018    Performed by [facility] Diagnostic Provider at [facility] OR [address] [place] [address]    COLONOSCOPY  10/20/2012    COLONOSCOPY  11/19/2014    dr machado ( repeat in 3 years per the pt )    ELBOW SURGERY Left 2013    dislocation    LAPAROSCOPY-DIAGNOSTIC N/A 3/19/2018    Performed by Galdino Fang MD at [facility] OR [address] [place] [address]    PROCTECTOMY  2002    SHOULDER ARTHROSCOPY W/ ROTATOR CUFF REPAIR Right 2008    THORACOSCOPY-VIDEO ASSISTED (VATS) W/PLEURAL BIOPSY Left 3/19/2018    Performed by Galdino Fang MD at [facility] OR [address] [place] [address]     Social History:   Social History     Socioeconomic History    Marital status:      Spouse name: Not on file    Number of children: Not on file    Years of education: Not on file    Highest education level: Not on file   Occupational History    Not on file   Social Needs    Financial resource strain: Not on file    Food insecurity:     Worry: Not on file     Inability: Not on file    Transportation needs:     Medical: Not on file     Non-medical: Not on file   Tobacco Use    Smoking status: Former Smoker     Packs/day: 2.00     Years: 15.00     Pack years: 30.00      Types: Cigarettes     Last attempt to quit: 1970     Years since quittin.4    Smokeless tobacco: Former User    Tobacco comment: pt quit 40 years ago   Substance and Sexual Activity    Alcohol use: No     Alcohol/week: 16.8 oz     Types: 28 Cans of beer per week     Comment: None since Nov 15 th 2017    Drug use: No    Sexual activity: Not Currently   Lifestyle    Physical activity:     Days per week: Not on file     Minutes per session: Not on file    Stress: Not on file   Relationships    Social connections:     Talks on phone: Not on file     Gets together: Not on file     Attends [employer] service: Not on file     Active member of club or organization: Not on file     Attends meetings of clubs or organizations: Not on file     Relationship status: Not on file   Other Topics Concern    Not on file   Social History Narrative    Not on file     Family History   Problem Relation Age of Onset    Heart disease Father     Heart disease Brother     Prostate cancer Brother     Cancer Mother         [facility]    Heart disease Sister     Heart attack Sister     No Known Problems Son     Heart disease Brother     Prostate cancer Brother     Heart disease Sister     Heart attack Sister     No Known Problems Son     No Known Problems Son     Heart disease Brother     Prostate cancer Brother     Kidney disease Neg Hx     Asthma Neg Hx     Emphysema Neg Hx      Drug Allergies:   Review of patient's allergies indicates:   Allergen Reactions    Bactrim [sulfamethoxazole-trimethoprim] Other (See Comments)     Joint pains       PRN Medications:   fentaNYL    Review of Systems:   A comprehensive 12-point review of systems was performed, and is negative except for those items mentioned above in the HPI section of this note.     Vital Signs:    Vitals:    19 1050   BP: (!) 159/79   Pulse: 90   Resp: (!) 23   Temp:      Fluid Balance:     Intake/Output Summary (Last 24 hours) at  6/3/2019 1101  Last data filed at 6/3/2019 1047  Gross per 24 hour   Intake --   Output 800 ml   Net -800 ml     Physical Exam:   General: Lying down comfortably, NAD  HEENT: NC/AT, PERRL, EOMI, no scleral icterus  Neck: Supple without JVD, trachea midline  Cardiac: S1S2 auscultated, RRR, no murmurs/rubs/gallops  Resp: Normal inspection. Symmetric chest rise. Auscultation clear bilaterally with no increased work of breathing. Good inspiratory effort. No wheezes/rhonchi/crackles.  Abd: Soft, NT/ND, +BS, no HSM  Ext: No edema, no cyanosis  Skin: Warm and dry, no rashes, no lesions  Neuro: AAOx3, CN II-XII grossly intact, no focal deficits  Psych: Appropriate mood and affect, cooperative & interactive    Impression & Recommendations    This is a case of recurrent R-sided pleural effusion in the setting of known mesothelioma.    -Plan for R-sided Pleurx catheter placement today.  -Patient will be sent home with 4 drainage bottles. Home health will be visiting the home to help with drainage.  -Will likely need drainage 1-2x/week.    Ester Mendoza MD  [facility] Fellow, PGY 5

## 2019-06-03 NOTE — PROGRESS NOTES
Paged Dr. Ester Mendoza to notify her that patient CXR is up and ready to be reviewed and patient is complaining of pain and is requesting pain medication. Reji Valladares stated that she would place orders for pain medication and call back with results of the CXR.

## 2019-06-03 NOTE — TELEPHONE ENCOUNTER
A voicemail was left at 3:31pm for Alycia. Informed her that I was returning her call from Dr. Woodward office from [facility] in the Pulmonary Department on patient Mr. Cale Harding date of birth 1940. A call back number and also my name was left for Alycia to return my call back at the office.

## 2019-06-03 NOTE — PROGRESS NOTES
Paged Dr. Ester Mendoza for clarification on if and when he will be coming to talk to patient and patient's spouse, for missing orders (CXR Order, Discharge Order, and Discharge Peripheral IV Order) and to clarify when patient can resume NSAIDS after insertion of Pleurex catheter. Will continue to monitor patient.

## 2019-06-03 NOTE — SEDATION DOCUMENTATION
No specimens obtained during pleurx placement:  800ml bloody pleural fluid drained R mid axillary, pleurx drain placed and dressed with tegaderm and 4 X4.  Verbal report given to DOSC RN at bedside to include documentation charted in procedural sedation documentation.  Moderate concious sedation was performed and cardiorespiratory functions were monitored the entire procedure by Nargis Bonner RN.  Sedation began at 1027  and concluded at 1100.  The patient tolerated the procedure well.  Nargis Bonner RN

## 2019-06-03 NOTE — PLAN OF CARE
Patient arrived from Pulmonary Lab.  Patient stable.  Report received at this time from KILEY Bonner RN.  Assumed care of patient at this time.

## 2019-06-03 NOTE — PLAN OF CARE
Patient and patient's spouse received discharge instructions and prescriptions.  Patient and patient's spouse verbalized understanding of all instructions given and all questions were addressed prior to patient's discharge.  Patient's vital signs are stable and within patient's baseline.  Patient tolerated clear liquids PO.  Patient states pain is 5/10 and tolerable.  Patient denies nausea and vomiting at this time.  Patient meets all criteria for discharge at this time.  All required consents present in patient's chart upon patient's discharge.

## 2019-06-03 NOTE — PROGRESS NOTES
Received call from RUTHIE Mendoza stating that CXR looks good and she placed an order for Norco 7.5mg/325mg. Asked GEOFF Bowling RN to administer pain medication to patient.

## 2019-06-06 PROBLEM — J91.0 MALIGNANT PLEURAL EFFUSION: Status: ACTIVE | Noted: 2018-02-01

## 2019-06-06 NOTE — Clinical Note
Dr. Woodward's office was supposed to set up home health but they have not heard anything. They don't have enough kits

## 2019-06-06 NOTE — PLAN OF CARE
Problem: Adult Inpatient Plan of Care  Goal: Plan of Care Review  Outcome: Ongoing (interventions implemented as appropriate)  Pt tolerated Gemzar infusion well, with no complications or s/s of adverse reaction. VS stable throughout infusion. Right antecubital PIV positive for blood return, SL and removed prior to discharge. Catheter tip intact. Pt discharged with no distress noted, ambulating independently with walker, accompanied by wife. RTC tomorrow 6/7/19 for Neupogen, pt and wife verbalized understanding. AVS printed and given to pt.

## 2019-06-06 NOTE — TELEPHONE ENCOUNTER
----- Message from Sintia Foster MD sent at 6/6/2019 10:24 AM CDT -----  Schedule CBC,CMP and Gemzar in 1 week, Neupogen X 2 days    Wants same schedule as this week    Schedule CBC,CMP, and see me in 3 weeks and see me in 3 weeks and for Gemzar., Neupogen X 2 days

## 2019-06-06 NOTE — TELEPHONE ENCOUNTER
----- Message from Sintia Foster MD sent at 6/6/2019 10:29 AM CDT -----  Need IR PORT on 6/20 only per patient request

## 2019-06-06 NOTE — PROGRESS NOTES
Subjective:       Patient ID: Cale Harding is a 78 y.o. male.    Chief Complaint: Mesothelioma of left lung  Mr. 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  He started having left chest wall discomfort in November 2017, which prompted a chest x-ray.  He underwent a thoracentesis with 1.2 liters removed and apparently cytology was negative; although, I do not have that path in the system and then he noted increasing shortness of breath and repeat pleural effusion and fluid was removed.  He then underwent IR biopsy of the left pleural thickening, which was positive for epithelioid mesothelioma confirmed at [facility].  He underwent PET scan on 02/09/2018, which revealed three left pleural based masses with an SUV max of 7.6 and a small pleural effusion.  He has had night sweats and about 20-pound weight loss in the last three months, low-grade fevers also, occasional shortness of breath and he has chest wall pain, which is not frequent.  Plan originally was to proceed with VATS, left thoracotomy and radical pleurectomy, decortication and HIPEC.  However, after the patient complained of worsening pain, an MRI of the chest was done on 03/01/2018 and that revealed loculated complex pleural fluid collection with marked pleural thickening and internal septation.  The largest and more superior anterior of the two pleural masses measured 7 x 4.3 cm.  The smaller and more inferior one measured 3.9 x 2.2.  The mass abuts the pleural surface and the larger of the two masses abuts the pericardium and the chest wall, involvement or invasion of the structures is not excluded.  The lower mass abuts and possibly invades the diaphragm.     Since initial visit with me he underwent Diagnostic laparoscopy with biopsy of diaphragm. Left VATS thoracoscopy on 3/19/18. Final path is pending. Due to  "amount of involvemt surgery was not done so is on Carboplatin and ALimta     He completed 6 cycles of chemo with Carboplatin, Alimta and Avastin. He is now on Alimta and Avastin maintenance     He received Alimta and Avastin maintenance until 1/30/19  CTA chest reveals "No pulmonary embolism to the segmental level. Left hemithorax findings including left lower lobe collapse, mild chronic pleural effusion, subcentimeter soft tissue nodules, and diffuse pleural thickening and nodularity of the left side of the chest, worse when compared to January 2018.  These findings are consistent with history of mesothelioma.  Stable right hemithorax findings including trace pleural effusion, calcified pleural plaques, and subcentimeter pulmonary nodules. Atherosclerosis of the visualized aorta and coronary arteries"     PET scan on 2/15/19 reveals "Today's findings are concerning for progression of disease with increasing uptake within a left upper lobe pulmonary nodule, development of uptake within a pre-vascular lymph node and increasing uptake within the right inferior pleura suggesting recurrence"  Pt was then started on Gemzar                HPI Mr. Harding returns for follow up and cycle 6, day 1 of Gemzar. He had pleurex placed on  6/3/19  He notes that his breathing and palpitations is better, he has pain post procedure.      Review of Systems   Constitutional: Positive for appetite change and unexpected weight change.   Eyes: Negative for visual disturbance.   Respiratory: Positive for cough and shortness of breath.    Cardiovascular: Positive for chest pain.   Gastrointestinal: Positive for diarrhea. Negative for abdominal pain.   Genitourinary: Negative for frequency.   Musculoskeletal: Positive for back pain.   Skin: Positive for rash.   Neurological: Positive for headaches.   Hematological: Negative for adenopathy.   Psychiatric/Behavioral: The patient is not nervous/anxious.        Objective:      Physical Exam "   Constitutional: He is oriented to person, place, and time. He appears well-developed and well-nourished.   HENT:   Mouth/Throat: No oropharyngeal exudate.   Cardiovascular: Normal rate and normal heart sounds.   Pulmonary/Chest: Effort normal and breath sounds normal. He has no wheezes.   Abdominal: Soft. Bowel sounds are normal. There is no tenderness.   Musculoskeletal: He exhibits no edema or tenderness.   Lymphadenopathy:     He has no cervical adenopathy.   Neurological: He is alert and oriented to person, place, and time. Coordination normal.   Skin: Skin is warm and dry. No rash noted.   Psychiatric: He has a normal mood and affect. Judgment and thought content normal.   Vitals reviewed.        LABS:  WBC   Date Value Ref Range Status   06/05/2019 8.48 3.90 - 12.70 K/uL Final     Hemoglobin   Date Value Ref Range Status   06/05/2019 10.3 (L) 14.0 - 18.0 g/dL Final     Hematocrit   Date Value Ref Range Status   06/05/2019 34.1 (L) 40.0 - 54.0 % Final     Platelets   Date Value Ref Range Status   06/05/2019 233 150 - 350 K/uL Final     Gran # (ANC)   Date Value Ref Range Status   06/05/2019 6.6 1.8 - 7.7 K/uL Final     Comment:     The ANC is based on a white cell differential from an   automated cell counter. It has not been microscopically   reviewed for the presence of abnormal cells. Clinical   correlation is required.         Chemistry        Component Value Date/Time     (L) 06/05/2019 1031    K 4.5 06/05/2019 1031    CL 99 06/05/2019 1031    CO2 27 06/05/2019 1031    BUN 18 06/05/2019 1031    CREATININE 0.69 06/05/2019 1031     (H) 06/05/2019 1031        Component Value Date/Time    CALCIUM 8.8 06/05/2019 1031    ALKPHOS 75 06/05/2019 1031    AST 20 06/05/2019 1031    ALT 12 06/05/2019 1031    BILITOT 0.4 06/05/2019 1031    ESTGFRAFRICA >60.0 06/05/2019 1031    EGFRNONAA >60.0 06/05/2019 1031          Assessment:       1. Mesothelioma of left lung    2. Malignant pleural effusion         Plan:        1,2. He will proceed with cycle 6, day 1 of gemzar and neupogen X 2 days    He will return in 1 week for day 8    I will see him in 3 weeks for next cycle 2.    Will also schedule IR PORT for chemo    Above care plan was discussed with patient and accompanying wife and all questions were addressed to their satisfaction

## 2019-06-06 NOTE — Clinical Note
Schedule CBC,CMP and Gemzar in 1 week, Neupogen X 2 daysWants same schedule as this weekSchedule CBC,CMP, and see me in 3 weeks and see me in 3 weeks and for Gemzar., Neupogen X 2 days

## 2019-06-07 NOTE — DISCHARGE SUMMARY
[facility]  Pulmonology  Discharge Summary      Patient Name: Cale Harding  MRN: [id number]  Admission Date: 6/3/2019  Hospital Length of Stay: 0 days  Discharge Date and Time: 6/3/2019  2:20 PM  Attending Physician: No att. providers found   Discharging Provider: Ester Mendoza MD  Primary Care Provider: Jj Escobedo MD    HPI: Per HPI    Indwelling Lines/Drains at Time of Discharge:   Lines/Drains/Airways     Drain                 Chest Tube 06/03/19 1051 Midaxillary;Pleural 16.5 Fr. 4 days              Hospital Course: Patient came in for pleurx drainage and instructions. This has been completed.    Significant Labs:  All pertinent labs within the past 24 hours have been reviewed.    Significant Imaging:  I have reviewed and interpreted all pertinent imaging results/findings within the past 24 hours.    Pending Diagnostic Studies:     None        Final Active Diagnoses:    Diagnosis Date Noted POA    Pleural effusion [J90] 05/02/2019 Yes      Problems Resolved During this Admission:       Discharged Condition: stable    Disposition: Home or Self Care    Follow Up:    Patient Instructions:      Diet Adult Regular     Notify your health care provider if you experience any of the following:  difficulty breathing or increased cough     Change dressing (specify)   Order Comments: Dressing change:Change PleurX dressing with each drainage.     Activity as tolerated     Medications:  Reconciled Home Medications:      Medication List      CONTINUE taking these medications    ACIDOPHILUS ORAL  Take 1 tablet by mouth once daily.     calcium carbonate 200 mg calcium (500 mg) chewable tablet  Commonly known as:  TUMS  Take 1 tablet by mouth as needed for Heartburn.     dexamethasone 6 MG tablet  Commonly known as:  DECADRON  Take 6mg (1 tablet) by mouth twice daily with meals. Take the day before and the day after chemo. DO not take on the day of chemo     dextromethorphan-guaifenesin  mg   mg per 12 hr tablet  Commonly known as:  MUCINEX DM  Take 1 tablet by mouth every 12 (twelve) hours.     diphenhydrAMINE 25 mg capsule  Commonly known as:  BENADRYL  Take 25 mg by mouth once daily.     enalapril 5 MG tablet  Commonly known as:  VASOTEC  Take 1 tablet (5 mg total) by mouth 2 (two) times daily.     folic acid 1 MG tablet  Commonly known as:  FOLVITE  Take 1 tablet (1 mg total) by mouth once daily. Start today     HYDROcodone-acetaminophen 5-325 mg per tablet  Commonly known as:  NORCO  Take 1 tablet by mouth every 6 (six) hours as needed for Pain.     MIRALAX 17 gram/dose powder  Generic drug:  polyethylene glycol  Take 17 g by mouth once daily.     SINUS PAIN-PRESSURE (PE) ORAL  Take by mouth.     vitamin D 1000 units Tab  Commonly known as:  VITAMIN D3  Take 2,000 Units by mouth once daily.          Ester Mendoza MD  [employer] Fellow, PGY 5  [facility]  Pager: [phone]

## 2019-06-07 NOTE — PROGRESS NOTES
Patient came into clinic today for drainage from pleurx catheter. We drained 500 cc and stopped due to pleuritic chest pain. Working on setting up home health at this time. Dr. Woodward was present for the drainage.    Ester Mendoza MD  [employer] [facility]

## 2019-06-08 NOTE — NURSING
Patient in clinic for Zarixio injection. Medication given SQ into abdomen with no complications. Patient has no c/o pain or discomfort. Reports no changes since last appointment. Patient educated on side effects of medication. AVS declined. Discharged home.

## 2019-06-11 NOTE — TELEPHONE ENCOUNTER
----- Message from Gin Varela sent at 6/11/2019 12:12 PM CDT -----  Contact: patient  Type:  Sooner Apoointment Request    Caller is requesting a sooner appointment.  Caller declined first available appointment listed below.  Caller will not accept being placed on the waitlist and is requesting a message be sent to doctor.  Name of Caller: Cale  When is the first available appointment? 07-12-19  Symptoms: Swelling in penis  Would the patient rather a call back or a response via [facility]?  Call back  Best Call Back Number: [phone] or [phone]  Additional Information:  Says the doctor did prostate surgery on him a couple of years ago and his oncologist referred him back to Dr. Philippe for the current penile swelling

## 2019-06-12 PROBLEM — N50.89 SWELLING OF THE TESTICLES: Status: ACTIVE | Noted: 2019-01-01

## 2019-06-12 NOTE — PROGRESS NOTES
Subjective:       Patient ID: Cale Harding is a 78 y.o. male.    Chief Complaint: Groin Swelling (penis and scrotum swelling)    Patient is new to me. He is a 77 yo WM who is here today for penile, scrotum, and bilateral lower extremities swelling that occurred yesterday. Patient reports this morning when he woke up the swelling had greatly improved. Patient is currently doing chemo therapy for mesothelioma of left lung and wasn't sure if the swelling was related to his chemo treatments. He denies pain and any known injury to genitals.    Other   This is a new problem. The current episode started yesterday. The problem has been resolved. Associated symptoms include a change in bowel habit, chills, fatigue (currently taking chemo) and urinary symptoms. Pertinent negatives include no abdominal pain, fever, headaches, nausea, swollen glands, vomiting or weakness. Nothing aggravates the symptoms. He has tried nothing for the symptoms.     Review of Systems   Constitutional: Positive for appetite change (decreased), chills and fatigue (currently taking chemo). Negative for fever.   Cardiovascular: Positive for leg swelling.   Gastrointestinal: Positive for change in bowel habit and constipation. Negative for abdominal pain, diarrhea, nausea and vomiting.   Genitourinary: Positive for scrotal swelling. Negative for difficulty urinating, discharge, dysuria, frequency, hematuria, penile pain, penile swelling, testicular pain and urgency.        Weak urine stream.  Urinary hesitancy.  Nocturia x0-2.   Neurological: Negative for dizziness, weakness and headaches.   Psychiatric/Behavioral: Negative.        Objective:      Physical Exam   Constitutional: He is oriented to person, place, and time. He appears well-developed and well-nourished. No distress.   HENT:   Head: Normocephalic and atraumatic.   Eyes: Pupils are equal, round, and reactive to light. EOM are normal.   Neck: Normal range of motion.   Cardiovascular:  Normal rate.   Pulmonary/Chest: Effort normal. No respiratory distress.   Abdominal: Soft. There is no tenderness.   Genitourinary: Right testis shows swelling (mild). Right testis shows no tenderness. Left testis shows swelling (mild). Left testis shows no tenderness. Uncircumcised. No phimosis, paraphimosis, hypospadias, penile erythema or penile tenderness. No discharge found.   Musculoskeletal: Normal range of motion. He exhibits edema.   Neurological: He is alert and oriented to person, place, and time. Coordination normal.   Skin: Skin is warm and dry.   Psychiatric: He has a normal mood and affect. His behavior is normal. Judgment and thought content normal.   Nursing note and vitals reviewed.      Assessment:       1. Swelling of the testicles        Plan:       Cale was seen today for groin swelling.    Diagnoses and all orders for this visit:    Swelling of the testicles  -     POCT URINE DIPSTICK WITHOUT MICROSCOPE  -     Urine culture    Other order  1. Elevate testicles when lying down with a rolled towel.     Patient to follow-up with Dr. Philippe for urology annual exam. Patient will call to schedule appt around chemo treatments.     Nicola Montalvo NP

## 2019-06-12 NOTE — TELEPHONE ENCOUNTER
----- Message from Regina Block sent at 6/12/2019  1:57 PM CDT -----  Contact: [facility] [phone]   Marcia with [facility]ano would like to be called back regarding Rx  Percocet,  Pt has medication under different Dr 7 days ago,    can be reached at [phone]

## 2019-06-12 NOTE — TELEPHONE ENCOUNTER
"----- Message from Marina Riebra sent at 6/12/2019  4:17 PM CDT -----  Contact: [facility]   Diagnosis for the Norco   Please call the pharmacy at:  [phone]    "Thank you for all that you do for our patients'"    "

## 2019-06-12 NOTE — TELEPHONE ENCOUNTER
Spoke with pharmD---provided them with diagnosis code of neoplasm related pain r/t his lung cancer.  He thanked nurse.

## 2019-06-12 NOTE — PATIENT INSTRUCTIONS
1. Urine dipstick  2. Urine cx  3. Elevate testicles when lying down with a rolled towel.   4. Patient to follow-up with Dr. Philippe for urology annual exam. Patient will call to schedule appt around chemo treatments.

## 2019-06-13 NOTE — TELEPHONE ENCOUNTER
----- Message from Gosia [place] sent at 6/13/2019  9:55 AM CDT -----  Contact: Patient  Pt needs to speak with a nurse ASAP, says he's on his way to the hospital now and he just received a call stating that the doctor didn't give enough information for him to receive his treatments. Please contact to advise.        Contact:: [phone]

## 2019-06-13 NOTE — TELEPHONE ENCOUNTER
Spoke with patient.  Explained to him nurse would contact him as soon as authorization came through for his gemzar---as it required re-authorization today.   Explained the pre-service process to him as best as nurse could, as he was very upset about treatment being canceled today.  He voiced understanding.

## 2019-06-14 NOTE — PROGRESS NOTES
Subjective:       Patient ID: Cale Harding is a 78 y.o. male.    Chief Complaint: Groin Swelling (patient saw jeannettea earlier this week for same problem)    77 yo WM with BPH and history of abnormal cytology on [place] Bladder cancer screening. Pt  Having intermittent peno-scrotal edema two days ago and today. Resolved by time he gets to the office. No Pain.  Recent abnormal cytology on Bladder cancer screening (2/26/19).      Other   This is a chronic (Abnormal urine cytoloy ) problem. The current episode started more than 1 year ago. The problem occurs intermittently. The problem has been waxing and waning. Pertinent negatives include no abdominal pain, anorexia, arthralgias, change in bowel habit, chest pain, chills, congestion, coughing, diaphoresis, fatigue, fever, headaches, joint swelling, myalgias, nausea, neck pain, numbness, rash, sore throat, swollen glands, urinary symptoms, vertigo, visual change, vomiting or weakness. Nothing aggravates the symptoms. He has tried nothing (Followed with FISH test, Cystoscopy and cytology) for the symptoms.     Review of Systems   Constitutional: Negative for activity change, appetite change, chills, diaphoresis, fatigue, fever and unexpected weight change.   HENT: Negative for congestion, hearing loss, sinus pressure, sore throat and trouble swallowing.    Eyes: Negative for photophobia, pain, discharge and visual disturbance.   Respiratory: Negative for apnea, cough and shortness of breath.    Cardiovascular: Negative for chest pain, palpitations and leg swelling.   Gastrointestinal: Negative for abdominal distention, abdominal pain, anal bleeding, anorexia, blood in stool, change in bowel habit, constipation, diarrhea, nausea, rectal pain and vomiting.   Endocrine: Negative for cold intolerance, heat intolerance, polydipsia, polyphagia and polyuria.   Genitourinary: Negative for decreased urine volume, difficulty urinating, discharge, dysuria, enuresis, flank  pain, frequency, genital sores, hematuria, penile pain, penile swelling, scrotal swelling, testicular pain and urgency.   Musculoskeletal: Negative for arthralgias, back pain, joint swelling, myalgias and neck pain.   Skin: Negative for color change, pallor, rash and wound.   Allergic/Immunologic: Negative for environmental allergies, food allergies and immunocompromised state.   Neurological: Negative for dizziness, vertigo, seizures, weakness, numbness and headaches.   Hematological: Negative for adenopathy. Does not bruise/bleed easily.   Psychiatric/Behavioral: Negative.        Objective:      Physical Exam   Nursing note and vitals reviewed.  Constitutional: He is oriented to person, place, and time. He appears well-developed and well-nourished.   HENT:   Head: Normocephalic.   Nose: Nose normal.   Mouth/Throat: Oropharynx is clear and moist.   Eyes: Conjunctivae and EOM are normal. Pupils are equal, round, and reactive to light.   Neck: Normal range of motion. Neck supple.   Cardiovascular: Normal rate, regular rhythm, normal heart sounds and intact distal pulses.    Pulmonary/Chest: Effort normal and breath sounds normal.   Abdominal: Soft. Bowel sounds are normal.   Genitourinary: Penis normal.   Musculoskeletal: Normal range of motion.   Neurological: He is alert and oriented to person, place, and time. He has normal reflexes.   Skin: Skin is warm and dry.     Psychiatric: He has a normal mood and affect. His behavior is normal. Judgment and thought content normal.       Assessment:       1. Abnormal bladder cytology    2. Urinary frequency    3. Nocturia    4. History of prostate cancer    5. Mesothelioma of left lung        Plan:       Patient Instructions   Check Cytology and FISH,  Check with Dr Foster and /or Dr. Escobedo to see if diuretic may help  Cystoscopy 2 weeks to evaluate for obstruction and abnormal cytology  Flomax daily  Use scrotal elevation when scrotum swells

## 2019-06-14 NOTE — PATIENT INSTRUCTIONS
Check Cytology and FISH,  Check with Dr Foster and /or Dr. Escobedo to see if diuretic may help  Cystoscopy 2 weeks to evaluate for obstruction and abnormal cytology  Flomax daily  Use scrotal elevation when scrotum swells

## 2019-06-17 NOTE — TELEPHONE ENCOUNTER
----- Message from Nicola Montalvo NP sent at 6/17/2019 10:06 AM CDT -----  Please inform patient he has a UTI. Script for Macrobid sent to [facility]. If med not covered use [facility]. This is the only oral antibiotic I can prescribed that's sensitive to bacteria.

## 2019-06-18 NOTE — TELEPHONE ENCOUNTER
Please let me know update on his chemo, as patient states he received a letter stating approval.  ~lexis

## 2019-06-18 NOTE — TELEPHONE ENCOUNTER
Just to clarify, the cystoscopy needs to be prior to resuming chemo...    Also, he is being treated for UTI right now.    ~lexis

## 2019-06-18 NOTE — TELEPHONE ENCOUNTER
He needs to do the cystoscopy as soon as he can so he can proceed with chemo as soon as auth is done

## 2019-06-18 NOTE — TELEPHONE ENCOUNTER
I thought his chemo is still not approved so I said to proceed with cystoscopy but if chemo is authorized then he can start chemo

## 2019-06-19 NOTE — TELEPHONE ENCOUNTER
"----- Message from Marina Ribera sent at 6/19/2019 10:46 AM CDT -----  Pt requesting to be contacted, undisclosed concern, he can be reached at [phone]  "Thank you for all that you do for our patients'"  "

## 2019-06-19 NOTE — TELEPHONE ENCOUNTER
Please let me know if this is approved...  Patient states he received a letter on the 13th with approval.  ~lexis

## 2019-06-19 NOTE — TELEPHONE ENCOUNTER
Spoke with patient.   Informed him his chemo is approved-  Scheduled him for this Friday at 130pm.  He thanked nurse.

## 2019-06-20 PROBLEM — Z45.2 ENCOUNTER FOR CARE RELATED TO VASCULAR ACCESS PORT: Status: ACTIVE | Noted: 2019-01-01

## 2019-06-20 NOTE — DISCHARGE SUMMARY
Radiology Discharge Summary      Hospital Course: No complications    Admit Date: 6/20/2019  Discharge Date: 06/20/2019     Instructions Given to Patient: Yes  Diet: Resume prior diet  Activity: activity as tolerated    Description of Condition on Discharge: Stable  Vital Signs (Most Recent): Temp: 98.4 °F (36.9 °C) (06/20/19 1221)  Pulse: 100 (06/20/19 1515)  Resp: 18 (06/20/19 1515)  BP: 136/69 (06/20/19 1515)  SpO2: 100 % (06/20/19 1515)    Discharge Disposition: Home    Discharge Diagnosis: status post port placement      Follow-up: with referring provider     Evelina Rodríguez PGY2

## 2019-06-20 NOTE — PLAN OF CARE
Problem: Adult Inpatient Plan of Care  Goal: Plan of Care Review  Outcome: Outcome(s) achieved Date Met: 06/20/19  Pt tolerated recovery well and is stable for discharge. Pt and spouse was given verbal and written discharge instructions and v/u. Pt VSS, denies pain, dsg CDI and w/o hematoma. Pt discharge home to self care.

## 2019-06-20 NOTE — NURSING
Pt to [facility] post procedure A/A/O x4. Pt talking to staff and in NAD. Pt VSS, denies pain, procedure site w/o bleeding or hematoma and dsg CDI. Will continue to monitor.

## 2019-06-20 NOTE — H&P
Radiology History & Physical      SUBJECTIVE:     Chief Complaint: mesothelioma of left lung    History of Present Illness:  Cale Harding is a 78 y.o. male who presents for chest port for chemotherapy.  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    Prostate cancer 2002    Urinary tract infection      Past Surgical History:   Procedure Laterality Date    BIOPSY-DIAPHRAGM N/A 3/19/2018    Performed by Galdino Fang MD at [facility] OR [address] [place] [address]    WDYHFQ-RJEPCS-US N/A 2/1/2018    Performed by [facility] Diagnostic Provider at [facility] OR [address] [place] [address]    BRONCHOSCOPY N/A 6/3/2019    Performed by [facility] Diagnostic Provider at [facility] OR [address] [place] [address]    COLONOSCOPY  10/20/2012    COLONOSCOPY  11/19/2014    dr machado ( repeat in 3 years per the pt )    ELBOW SURGERY Left 2013    dislocation    LAPAROSCOPY-DIAGNOSTIC N/A 3/19/2018    Performed by Galdino Fang MD at [facility] OR [address] [place] [address]    PROCTECTOMY  2002    SHOULDER ARTHROSCOPY W/ ROTATOR CUFF REPAIR Right 2008    THORACOSCOPY-VIDEO ASSISTED (VATS) W/PLEURAL BIOPSY Left 3/19/2018    Performed by Galdino Fang MD at [facility] OR [address] [place] [address]       Home Meds:   Prior to Admission medications    Medication Sig Start Date End Date Taking? Authorizing Provider   enalapril (VASOTEC) 5 MG tablet Take 1 tablet (5 mg total) by mouth 2 (two) times daily. 5/7/19  Yes Jj Escobedo MD   HYDROcodone-acetaminophen (NORCO) 5-325 mg per tablet Take 1 tablet by mouth every 6 (six) hours as needed for Pain. 6/12/19  Yes Sintia Foster MD   ibuprofen (ADVIL,MOTRIN) 100 MG tablet Take 100 mg by mouth every 6 (six) hours as needed for Temperature greater than.   Yes Historical Provider, MD   LACTOBACILLUS ACIDOPHILUS (ACIDOPHILUS ORAL) Take 1 tablet by mouth once daily.   Yes Historical Provider, MD   nitrofurantoin, macrocrystal-monohydrate, (MACROBID) 100 MG  capsule Take 1 capsule (100 mg total) by mouth 2 (two) times daily. for 7 days 6/17/19 6/24/19 Yes Nicola Montalvo NP   oxyCODONE-acetaminophen (PERCOCET) 5-325 mg per tablet Take 1 tablet by mouth every 8 (eight) hours as needed for Pain. 6/12/19  Yes Sintia Foster MD   tamsulosin (FLOMAX) 0.4 mg Cap Take 1 capsule (0.4 mg total) by mouth once daily. 6/14/19 6/13/20 Yes Galdino Philippe MD   calcium carbonate (TUMS) 200 mg calcium (500 mg) chewable tablet Take 1 tablet by mouth as needed for Heartburn.    Historical Provider, MD   dextromethorphan-guaifenesin  mg (MUCINEX DM)  mg per 12 hr tablet Take 1 tablet by mouth every 12 (twelve) hours.    Historical Provider, MD   phenylephrine HCl/acetaminophn (SINUS PAIN-PRESSURE, PE, ORAL) Take by mouth.    Historical Provider, MD   polyethylene glycol (MIRALAX) 17 gram/dose powder Take 17 g by mouth once daily.    Historical Provider, MD   vitamin D 1000 units Tab Take 2,000 Units by mouth once daily.     Historical Provider, MD   dexamethasone (DECADRON) 6 MG tablet Take 6mg (1 tablet) by mouth twice daily with meals. Take the day before and the day after chemo. DO not take on the day of chemo 9/4/18 6/20/19  Sintia Foster MD     Anticoagulants/Antiplatelets: no anticoagulation    Allergies:   Review of patient's allergies indicates:   Allergen Reactions    Bactrim [sulfamethoxazole-trimethoprim] Other (See Comments)     Joint pains     Sedation History:  no adverse reactions    Review of Systems:   Hematological: no known coagulopathies  Respiratory: no shortness of breath  Cardiovascular: no chest pain  Gastrointestinal: no abdominal pain  Genito-Urinary: no dysuria  Musculoskeletal: negative  Neurological: no TIA or stroke symptoms         OBJECTIVE:     Vital Signs (Most Recent)  Temp: 98.4 °F (36.9 °C) (06/20/19 1221)  Pulse: 95 (06/20/19 1221)  Resp: 18 (06/20/19 1221)  BP: 136/78 (06/20/19 1221)  SpO2: 98 % (06/20/19 1221)    Physical  Exam:  ASA: III  Mallampati: III    General: no acute distress  Mental Status: alert and oriented to person, place and time  HEENT: normocephalic, atraumatic  Chest: unlabored breathing  Heart: regular heart rate  Abdomen: nondistended  Extremity: moves all extremities    Laboratory  Lab Results   Component Value Date    INR 1.0 06/20/2019       Lab Results   Component Value Date    WBC 6.58 06/12/2019    HGB 9.6 (L) 06/12/2019    HCT 31.4 (L) 06/12/2019    MCV 95 06/12/2019     06/12/2019      Lab Results   Component Value Date     (H) 06/12/2019     (L) 06/12/2019    K 4.9 06/12/2019     06/12/2019    CO2 28 06/12/2019    BUN 16 06/12/2019    CREATININE 0.74 06/12/2019    CALCIUM 8.8 06/12/2019    MG 1.9 07/17/2018    ALT 21 06/12/2019    AST 35 06/12/2019    ALBUMIN 3.1 (L) 06/12/2019    BILITOT 0.4 06/12/2019       ASSESSMENT/PLAN:     Sedation Plan: moderate   Patient will undergo chest port placement.    Evelina Rodríguez, PGY2

## 2019-06-20 NOTE — PROCEDURES
Radiology Post-Procedure Note    Pre Op Diagnosis: left lung mesothelioma    Post Op Diagnosis: Same    Procedure: PORT placement    Procedure performed by: Evelina Turner MD    Written Informed Consent Obtained: Yes    Specimen Removed: No    Estimated Blood Loss: Minimal    Findings:   Using realtime U/S guidance a 5 Fr port catheter was placed into the right internal jugular vein with tip of the catheter in the SVC.    Port is ready for use.     Evelina Rodríguez, PGY2

## 2019-06-21 NOTE — PLAN OF CARE
Problem: Adult Inpatient Plan of Care  Goal: Plan of Care Review  Did well with treatment today. carri'ts made for sat and Monday.

## 2019-06-27 PROBLEM — G89.3 NEOPLASM RELATED PAIN: Status: ACTIVE | Noted: 2019-01-01

## 2019-06-27 NOTE — PROGRESS NOTES
Subjective:       Patient ID: Cale Harding is a 78 y.o. male.    Chief Complaint: Mesothelioma of left lung  Mr. 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  He started having left chest wall discomfort in November 2017, which prompted a chest x-ray.  He underwent a thoracentesis with 1.2 liters removed and apparently cytology was negative; although, I do not have that path in the system and then he noted increasing shortness of breath and repeat pleural effusion and fluid was removed.  He then underwent IR biopsy of the left pleural thickening, which was positive for epithelioid mesothelioma confirmed at [facility].  He underwent PET scan on 02/09/2018, which revealed three left pleural based masses with an SUV max of 7.6 and a small pleural effusion.  He has had night sweats and about 20-pound weight loss in the last three months, low-grade fevers also, occasional shortness of breath and he has chest wall pain, which is not frequent.  Plan originally was to proceed with VATS, left thoracotomy and radical pleurectomy, decortication and HIPEC.  However, after the patient complained of worsening pain, an MRI of the chest was done on 03/01/2018 and that revealed loculated complex pleural fluid collection with marked pleural thickening and internal septation.  The largest and more superior anterior of the two pleural masses measured 7 x 4.3 cm.  The smaller and more inferior one measured 3.9 x 2.2.  The mass abuts the pleural surface and the larger of the two masses abuts the pericardium and the chest wall, involvement or invasion of the structures is not excluded.  The lower mass abuts and possibly invades the diaphragm.     Since initial visit with me he underwent Diagnostic laparoscopy with biopsy of diaphragm. Left VATS thoracoscopy on 3/19/18. Final path is pending. Due to  "amount of involvemt surgery was not done so is on Carboplatin and ALimta     He completed 6 cycles of chemo with Carboplatin, Alimta and Avastin. He is now on Alimta and Avastin maintenance     He received Alimta and Avastin maintenance until 1/30/19  CTA chest reveals "No pulmonary embolism to the segmental level. Left hemithorax findings including left lower lobe collapse, mild chronic pleural effusion, subcentimeter soft tissue nodules, and diffuse pleural thickening and nodularity of the left side of the chest, worse when compared to January 2018.  These findings are consistent with history of mesothelioma.  Stable right hemithorax findings including trace pleural effusion, calcified pleural plaques, and subcentimeter pulmonary nodules. Atherosclerosis of the visualized aorta and coronary arteries"     PET scan on 2/15/19 reveals "Today's findings are concerning for progression of disease with increasing uptake within a left upper lobe pulmonary nodule, development of uptake within a pre-vascular lymph node and increasing uptake within the right inferior pleura suggesting recurrence"  Pt was then started on Gemzar                  HPI Mr. Harding returns for follow up and cycle 7, day 8 of Gemzar. He had pleurex placed on  6/3/19. He is scheduled for a cystoscopy with Dr. Philippe on 7/10/19   He notes several issues as specified in ROS below.        Review of Systems   Constitutional: Positive for appetite change and unexpected weight change. Negative for fatigue.   HENT: Negative for mouth sores.    Eyes: Negative for visual disturbance.   Respiratory: Positive for cough and shortness of breath.    Cardiovascular: Positive for chest pain.   Gastrointestinal: Positive for abdominal pain. Negative for diarrhea.   Genitourinary: Positive for frequency.   Musculoskeletal: Positive for back pain.   Skin: Negative for rash.   Neurological: Positive for headaches.   Hematological: Negative for adenopathy. "   Psychiatric/Behavioral: The patient is not nervous/anxious.    All other systems reviewed and are negative.      PMFSH: all information reviewed and updated as relevant to today's visit  Objective:      Physical Exam   Constitutional: He is oriented to person, place, and time. He appears well-developed and well-nourished.   HENT:   Mouth/Throat: No oropharyngeal exudate.   Cardiovascular: Normal rate and normal heart sounds.   Pulmonary/Chest: Effort normal. He has decreased breath sounds in the left middle field and the left lower field. He has no wheezes.   Abdominal: Soft. Bowel sounds are normal. There is no tenderness.   Musculoskeletal: He exhibits no edema or tenderness.   Lymphadenopathy:     He has no cervical adenopathy.   Neurological: He is alert and oriented to person, place, and time. Coordination normal.   Skin: Skin is warm and dry. No rash noted.   Psychiatric: He has a normal mood and affect. Judgment and thought content normal.   Vitals reviewed.      LABS:  WBC   Date Value Ref Range Status   06/26/2019 17.08 (H) 3.90 - 12.70 K/uL Final     Hemoglobin   Date Value Ref Range Status   06/26/2019 9.0 (L) 14.0 - 18.0 g/dL Final     Hematocrit   Date Value Ref Range Status   06/26/2019 30.6 (L) 40.0 - 54.0 % Final     Platelets   Date Value Ref Range Status   06/26/2019 405 (H) 150 - 350 K/uL Final     Gran # (ANC)   Date Value Ref Range Status   06/26/2019 15.0 (H) 1.8 - 7.7 K/uL Final     Comment:     The ANC is based on a white cell differential from an   automated cell counter. It has not been microscopically   reviewed for the presence of abnormal cells. Clinical   correlation is required.         Chemistry        Component Value Date/Time     (L) 06/26/2019 1030    K 4.4 06/26/2019 1030    CL 98 06/26/2019 1030    CO2 28 06/26/2019 1030    BUN 16 06/26/2019 1030    CREATININE 0.60 06/26/2019 1030     (H) 06/26/2019 1030        Component Value Date/Time    CALCIUM 8.5 (L)  06/26/2019 1030    ALKPHOS 108 06/26/2019 1030    AST 28 06/26/2019 1030    ALT 19 06/26/2019 1030    BILITOT 0.3 06/26/2019 1030    ESTGFRAFRICA >60.0 06/26/2019 1030    EGFRNONAA >60.0 06/26/2019 1030          Assessment:       1. Mesothelioma    2. Malignant pleural effusion    3. Neoplasm related pain    4. Leg swelling    5. Edema, unspecified type         Plan:        1,2. He will proceed with cycle 7, day 8 of palliative chemo with Gemzar and will return in 2 weeks with restaging Ct scans for next cycle of chemo  3. Stable on Norco  4,5. Will obtain lower extremity ultrasound.    Above care plan was discussed with patient and accompanying wife and all questions were addressed to their satisfaction

## 2019-06-27 NOTE — PLAN OF CARE
Problem: Adult Inpatient Plan of Care  Goal: Plan of Care Review  Outcome: Ongoing (interventions implemented as appropriate)  Pt tolerated Gemzar infusion without issue, pt to rtc tomorrow for neupogen injection, no distress noted upon d/c to home

## 2019-06-27 NOTE — PLAN OF CARE
Problem: Adult Inpatient Plan of Care  Goal: Plan of Care Review  Outcome: Ongoing (interventions implemented as appropriate)  Pt here for gemzar infusion D1C7, labs, hx, meds, allergies reviewed, pt with no complaints at this time, reclined in chair, continue to monitor

## 2019-07-03 NOTE — TELEPHONE ENCOUNTER
Would you like me to schedule an echo, maybe? Last one was in 2018.  Or, I can tell him to go to his PCP, wear compression hose, or keep his legs elevated?    ~lexis

## 2019-07-10 PROBLEM — N50.89 SCROTAL EDEMA: Status: ACTIVE | Noted: 2019-01-01

## 2019-07-10 PROBLEM — N32.0 ACQUIRED CONTRACTURE OF BLADDER NECK: Status: ACTIVE | Noted: 2019-01-01

## 2019-07-10 PROBLEM — R39.198 SLOW URINARY STREAM: Status: ACTIVE | Noted: 2019-01-01

## 2019-07-10 NOTE — PLAN OF CARE
DISCONTINUE ON PATHWAY REGIMEN - Mesothelioma    [id number]        Gemcitabine (Gemzar(R))     **Always confirm dose/schedule in your pharmacy ordering system**    REASON: Disease Progression  PRIOR TREATMENT: [id number]: Gemcitabine 1,000 mg/m2 D1,8 q21 Days x 4 Cycles  TREATMENT RESPONSE: Progressive Disease (PD)    Mesothelioma - No Medical Intervention - Off Treatment.    Patient Characteristics:  [facility]  AJCC M Category: M1  AJCC 8 Stage Grouping: IV  Current evidence of distant metastases<= Yes  AJCC T Category: T4  AJCC N Category: N2  Line of therapy: [facility]

## 2019-07-10 NOTE — PROCEDURES
"Cale Harding is a 78 y.o. male patient.    Weight: 73 kg (161 lb) (07/10/19 1314)  Height: 5' 7" (170.2 cm) (07/10/19 1314)       Cystoscopy  Date/Time: 7/10/2019 1:39 PM  Performed by: Galdino Philippe MD  Authorized by: Galdino Philippe MD     Consent Done?:  Yes (Written)  Time out: Immediately prior to procedure a "time out" was called to verify the correct patient, procedure, equipment, support staff and site/side marked as required.    Indications comment:  Slow urine stream  Position:  Supine  Anesthesia:  Intraurethral instillation  Patient sedated?: No    Preparation: Patient was prepped and draped in usual sterile fashion      Scope type:  Flexible cystoscope  Stent inserted: No    Stent removed: No    External exam normal: No ([place]-scrotal edema)    Circumcised: No         Urethral Meatus Normal: Yes    Meatal stenosis: No    Hypospadius: No    Condyloma: No    Digital exam performed: No    Urethra normal: Yes    Prostate normal: Absent.    Bladder neck normal: Bladder neck abnormal (bladder neck contracture)    Patient tolerance:  Patient tolerated the procedure well with no immediate complications     Unable to pass scope through the BNC, Will need Cystoscopy and urethral dilation in one week.        Galdino Philippe  7/10/2019    "

## 2019-07-11 NOTE — PATIENT INSTRUCTIONS
Vinorelbine injection  What is this medicine?  VINORELBINE (vi NOR el been) is a chemotherapy drug. It targets fast dividing cells, like cancer cells, and causes these cells to die. This medicine is used to treat cancer, like lung cancer.  How should I use this medicine?  This drug is given as an infusion into a vein. It is administered in a hospital or clinic by a specially trained health care professional. If you have pain, swelling, burning or any unusual feeling around the site of your injection, tell your health care professional right away.  Talk to your pediatrician regarding the use of this medicine in children. Special care may be needed.  What side effects may I notice from receiving this medicine?  Side effects that you should report to your doctor or health care professional as soon as possible:  · allergic reactions like skin rash, itching or hives, swelling of the face, lips, or tongue  · low blood counts - This drug may decrease the number of white blood cells, red blood cells and platelets. You may be at increased risk for infections and bleeding.  · signs of infection - fever or chills, cough, sore throat, pain or difficulty passing urine  · signs of decreased platelets or bleeding - bruising, pinpoint red spots on the skin, black, tarry stools, nosebleeds  · signs of decreased red blood cells - unusually weak or tired, fainting spells, lightheadedness  · breathing problems  · chest pain  · constipation  · cough  · mouth sores  · nausea and vomiting  · pain, swelling, redness or irritation at the injection site  · pain, tingling, numbness in the hands or feet  · stomach pain  · trouble passing urine or change in the amount of urine  Side effects that usually do not require medical attention (report to your doctor or health care professional if they continue or are bothersome):  · diarrhea  · hair loss  · jaw pain  · loss of appetite  What may interact with this medicine?  Do not take this medicine  with any of the following medications:  · itraconazole  · voriconazole  This medicine may also interact with the following medications:  · cyclosporine  · erythromycin  · fluconazole  · ketoconazole  · medicines for HIV like delavirdine, efavirenz, nevirapine  · medicines for seizures like ethotoin, fosphenotoin, phenytoin  · medicines to increase blood counts like filgrastim, pegfilgrastim, sargramostim  · other chemotherapy drugs like cisplatin, mitomycin, paclitaxel  · vaccines  Talk to your doctor or health care professional before taking any of these medicines:  · acetaminophen  · aspirin  · ibuprofen  · ketoprofen  · naproxen  What if I miss a dose?  It is important not to miss your dose. Call your doctor or health care professional if you are unable to keep an appointment.  Where should I keep my medicine?  This drug is given in a hospital or clinic and will not be stored at home.  What should I tell my health care provider before I take this medicine?  They need to know if you have any of these conditions:  · blood disorders  · infection (especially chickenpox and herpes)  · liver disease  · lung disease  · nervous system disease  · previous or current radiation therapy  · an unusual or allergic reaction to vinorelbine, other chemotherapy agents, other medicines, foods, dyes, or preservatives  · pregnant or trying to get pregnant  · breast-feeding  What should I watch for while using this medicine?  Your condition will be monitored carefully while you are receiving this medicine. You will need important blood work done while you are taking this medicine.  This drug may make you feel generally unwell. This is not uncommon, as chemotherapy can affect healthy cells as well as cancer cells. Report any side effects. Continue your course of treatment even though you feel ill unless your doctor tells you to stop.  In some cases, you may be given additional medicines to help with side effects. Follow all directions  for their use.  Call your doctor or health care professional for advice if you get a fever, chills or sore throat, or other symptoms of a cold or flu. Do not treat yourself. This drug decreases your body's ability to fight infections. Try to avoid being around people who are sick.  This medicine may increase your risk to bruise or bleed. Call your doctor or health care professional if you notice any unusual bleeding.  Be careful brushing and flossing your teeth or using a toothpick because you may get an infection or bleed more easily. If you have any dental work done, tell your dentist you are receiving this medicine.  Avoid taking products that contain aspirin, acetaminophen, ibuprofen, naproxen, or ketoprofen unless instructed by your doctor. These medicines may hide a fever.  Do not become pregnant while taking this medicine. Women should inform their doctor if they wish to become pregnant or think they might be pregnant. There is a potential for serious side effects to an unborn child. Talk to your health care professional or pharmacist for more information. Do not breast-feed an infant while taking this medicine.  Men must use a latex condom during sexual contact with a woman while taking this medicine and for 3 months after you stop taking this medicine. A latex condom is needed even if you have had a vasectomy. Contact your doctor right away if your partner becomes pregnant. Do not donate sperm while taking this medicine and for 3 months after you stop taking this medicine. Men should inform their doctors if they wish to father a child. This medicine may lower sperm counts.  NOTE:This sheet is a summary. It may not cover all possible information. If you have questions about this medicine, talk to your doctor, pharmacist, or health care provider. Copyright© 2017 [employer]

## 2019-07-11 NOTE — Clinical Note
Check with auth, needs to start ASAP.Schedule Navelbine as soon as auth is done, Also schedule CBC,CMP, weekly and navelbine weekly for a total of 3 weeks including the first week. Schedule Xarxio for 2 days. See me prior to week 2

## 2019-07-11 NOTE — PROGRESS NOTES
Subjective:       Patient ID: Cale Harding is a 78 y.o. male.    Chief Complaint: Mesothelioma of left lung  Mr. 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  He started having left chest wall discomfort in November 2017, which prompted a chest x-ray.  He underwent a thoracentesis with 1.2 liters removed and apparently cytology was negative; although, I do not have that path in the system and then he noted increasing shortness of breath and repeat pleural effusion and fluid was removed.  He then underwent IR biopsy of the left pleural thickening, which was positive for epithelioid mesothelioma confirmed at [facility].  He underwent PET scan on 02/09/2018, which revealed three left pleural based masses with an SUV max of 7.6 and a small pleural effusion.  He has had night sweats and about 20-pound weight loss in the last three months, low-grade fevers also, occasional shortness of breath and he has chest wall pain, which is not frequent.  Plan originally was to proceed with VATS, left thoracotomy and radical pleurectomy, decortication and HIPEC.  However, after the patient complained of worsening pain, an MRI of the chest was done on 03/01/2018 and that revealed loculated complex pleural fluid collection with marked pleural thickening and internal septation.  The largest and more superior anterior of the two pleural masses measured 7 x 4.3 cm.  The smaller and more inferior one measured 3.9 x 2.2.  The mass abuts the pleural surface and the larger of the two masses abuts the pericardium and the chest wall, involvement or invasion of the structures is not excluded.  The lower mass abuts and possibly invades the diaphragm.     Since initial visit with me he underwent Diagnostic laparoscopy with biopsy of diaphragm. Left VATS thoracoscopy on 3/19/18. Final path is pending. Due to  "amount of involvemt surgery was not done so is on Carboplatin and ALimta     He completed 6 cycles of chemo with Carboplatin, Alimta and Avastin. He is now on Alimta and Avastin maintenance     He received Alimta and Avastin maintenance until 1/30/19  CTA chest reveals "No pulmonary embolism to the segmental level. Left hemithorax findings including left lower lobe collapse, mild chronic pleural effusion, subcentimeter soft tissue nodules, and diffuse pleural thickening and nodularity of the left side of the chest, worse when compared to January 2018.  These findings are consistent with history of mesothelioma.  Stable right hemithorax findings including trace pleural effusion, calcified pleural plaques, and subcentimeter pulmonary nodules. Atherosclerosis of the visualized aorta and coronary arteries"     PET scan on 2/15/19 reveals "Today's findings are concerning for progression of disease with increasing uptake within a left upper lobe pulmonary nodule, development of uptake within a pre-vascular lymph node and increasing uptake within the right inferior pleura suggesting recurrence"  Pt was then started on Gemzar                  HPI Mr. Harding returns for follow up, he completed 7 cycles of Gemzar, CT scans from 7/9/19 reveal "Increased size of the nodular pleural thickening on the left consistent with the known mesothelioma.  There also appears to be enlarged mediastinal node just posterior to the left pulmonary artery.  Increase in the amount of pericardial fluid and enhancement of the pericardium concerning for increased pericardial involvement as well.  There is also increased size of an epigastric node.  Findings concerning for disease progression. Stable right middle lobe nodule. Stable right pleural effusion with pleural drain. Increase in the subcutaneous edema.  There is also some free fluid in the pelvis. Thickening of the sigmoid colon wall with multiple diverticula.  Further evaluation as " "clinically indicated"  He take Norco once or twice/day. He notes that the pleurex drained about 500 ml Sunday and nothing since rthen. He notes swelling underneath the skin on the right chest next to the pleurex,  .    Review of Systems   Constitutional: Positive for appetite change. Negative for unexpected weight change.   Eyes: Negative for visual disturbance.   Respiratory: Positive for shortness of breath. Negative for cough.    Cardiovascular: Positive for chest pain.   Gastrointestinal: Negative for diarrhea.   Genitourinary: Negative for frequency.   Musculoskeletal: Positive for back pain.   Skin: Negative for rash.   Neurological: Positive for headaches.   Hematological: Negative for adenopathy.   Psychiatric/Behavioral: The patient is not nervous/anxious.        PMFSH: all information reviewed and updated as relevant to today's visit  Objective:      Physical Exam   Constitutional: He is oriented to person, place, and time. He appears well-developed and well-nourished.   HENT:   Mouth/Throat: No oropharyngeal exudate.   Cardiovascular: Normal rate and normal heart sounds.   Pulmonary/Chest: Effort normal. He has decreased breath sounds in the left upper field, the left middle field and the left lower field. He has no wheezes.   Subcutaneous swelling on right side   Abdominal: Soft. Bowel sounds are normal. There is no tenderness.   Musculoskeletal: He exhibits no edema or tenderness.   Lymphadenopathy:     He has no cervical adenopathy.   Neurological: He is alert and oriented to person, place, and time. Coordination normal.   Skin: Skin is warm and dry. No rash noted.   Psychiatric: He has a normal mood and affect. Judgment and thought content normal.   Vitals reviewed.        LABS:  WBC   Date Value Ref Range Status   07/10/2019 9.08 3.90 - 12.70 K/uL Final     Hemoglobin   Date Value Ref Range Status   07/10/2019 9.3 (L) 14.0 - 18.0 g/dL Final     Hematocrit   Date Value Ref Range Status   07/10/2019 " 32.0 (L) 40.0 - 54.0 % Final     Platelets   Date Value Ref Range Status   07/10/2019 305 150 - 350 K/uL Final     Gran # (ANC)   Date Value Ref Range Status   07/10/2019 7.0 1.8 - 7.7 K/uL Final     Comment:     The ANC is based on a white cell differential from an   automated cell counter. It has not been microscopically   reviewed for the presence of abnormal cells. Clinical   correlation is required.         Chemistry        Component Value Date/Time     (L) 07/10/2019 0940    K 4.6 07/10/2019 0940    CL 99 07/10/2019 0940    CO2 26 07/10/2019 0940    BUN 16 07/10/2019 0940    CREATININE 0.63 07/10/2019 0940     (H) 07/10/2019 0940        Component Value Date/Time    CALCIUM 8.4 (L) 07/10/2019 0940    ALKPHOS 93 07/10/2019 0940    AST 18 07/10/2019 0940    ALT 13 07/10/2019 0940    BILITOT 0.5 07/10/2019 0940    ESTGFRAFRICA >60.0 07/10/2019 0940    EGFRNONAA >60.0 07/10/2019 0940          Assessment:       1. Mesothelioma of left lung    2. Neoplasm related pain    3. Malignant pleural effusion        Plan:        1,2,. Reviewed CT scans, they reveal progression, switch to Navelbine and start as soon as auth completed. He will receive it weekly for 3 weeks on and 1 week off. Granix X 2 days after each infusion.  Patient was consented for chemotherapy today 7/11/2019 .   An extensive discussion was had which included a thorough discussion of the risk and benefits of treatment and alternatives.  Risks, including but not limited to, possible hair loss, bone marrow damage (anemia, thrombocytopenia, immune suppression, neutropenia), damage to body organs (brain, heart, liver, kidney, lungs, nervous system, skin, and others), allergic reactions, sterility, nausea/vomiting, constipation/diarrhea, sores in the mouth, secondary cancers, local damage at possible injection sites, and rarely death were all discussed.  The patient agrees with the plan, and all questions have been answered to their satisfaction.   Consent was signed the patient, provider, and a third party witness.    Also discussed his poor prognosis with him and his wife.   Refilled Norco     3. He is seeing Dr. Woodward today     Above care plan was discussed with patient and accompanying wife and all questions were addressed to their satisfaction

## 2019-07-15 PROBLEM — I31.39 PERICARDIAL EFFUSION: Status: ACTIVE | Noted: 2019-01-01

## 2019-07-15 PROBLEM — I48.91 ATRIAL FIBRILLATION WITH RVR: Status: ACTIVE | Noted: 2019-01-01

## 2019-07-15 PROBLEM — R07.9 CHEST PAIN: Status: ACTIVE | Noted: 2019-01-01

## 2019-07-15 PROBLEM — I10 ESSENTIAL HYPERTENSION: Status: ACTIVE | Noted: 2019-01-01

## 2019-07-15 NOTE — TELEPHONE ENCOUNTER
----- Message from Marina Ribera sent at 7/15/2019 12:02 PM CDT -----  Wife Irma bonner to speak with the RN about the pt and his current condition as soon as possible . She can be reached at [phone]

## 2019-07-15 NOTE — TELEPHONE ENCOUNTER
Spoke with patient informed him thatt I have received his message, patient states that he is in the hospital and does not know how long they will keep him. Patient states that he is going to keep his scheduled appointment until further notice.

## 2019-07-15 NOTE — TELEPHONE ENCOUNTER
----- Message from Rachna Ann sent at 7/15/2019  1:40 PM CDT -----  Contact: Cale tel:   [phone] cell/in hospital room ICU room 3/[facility]   Needs Advice    Reason for call:    Pt.says he is at [facility] in [place], ICU Room 3,due to his heart is racing.    Will have to put off the procedure you are planning him to have this week .     Pls call his wife at her cell:   [phone] .         Communication Preference:  Phone     Additional Information:   pls call .

## 2019-07-16 NOTE — TELEPHONE ENCOUNTER
Spoke to patient, informed him that I have recieved his message. Patient states that he is concerned about a procedure that him and Dr. Woodward discussed. Patient states that he will like to get the procedure done but does not know if he will be discharged in time. I advised patient that he has no upcoming appointment with Dr. Woodward. Patient says he will call back when he gets discharged with more information.

## 2019-07-16 NOTE — TELEPHONE ENCOUNTER
----- Message from Linda Velasquez sent at 7/16/2019  9:57 AM CDT -----  Contact: self [phone]  .Needs Advice    Reason for call:        Communication Preference:phone    Additional Information:pt states he have an update on the procure him and doctor was discussing pt states that he might discharged today please call back to discuss

## 2019-07-16 NOTE — TELEPHONE ENCOUNTER
----- Message from Linda Velasquez sent at 7/16/2019  2:48 PM CDT -----  Contact: self [phone]  ..Needs Advice    Reason for call:        Communication Preference:phone     Additional Information:pt states he needs to speak with nurse again please call back to discuss

## 2019-07-16 NOTE — TELEPHONE ENCOUNTER
Patient stated that he spoke with someone from our office, patient stated that he thought he was getting out of the hospital but looks like he will be there until the end of the week and that they may not be able to perform procedure on Friday. I have advised the patient that his message will be forwarded to Dr. Woodward to review and advise. Patient verbalized that he understand.

## 2019-07-17 PROBLEM — I48.0 PAROXYSMAL ATRIAL FIBRILLATION: Status: ACTIVE | Noted: 2019-01-01

## 2019-07-17 NOTE — Clinical Note
Pericardiocentesis catheter inserted and pericardial tap performed under US guidance in the pericardial space.

## 2019-07-18 PROBLEM — I50.33 ACUTE ON CHRONIC DIASTOLIC HEART FAILURE: Status: ACTIVE | Noted: 2019-01-01

## 2019-07-18 NOTE — CARE UPDATE
Patient well known to me with malignant mesothelioma with recurrent pleural effusions.   Patient with R PleurX catheter placed Lia 3, 2019.   Patient with concern for some purulent drainage near site during the last 2-3 weeks. He was started on Augmentin last week with plan to remove PleurX on Friday July 19th.   Admitted for A. Fib with RVR this week and transferred last night from [facility] with pericardial effusion.     Drained 450 cc of serous fluid from PleurX. Catheter site does NOT look infected.   -Complete 7 days of Augmentin.   -Consider leaving PleurX in place for pleural effusion drainage.    Full Pulmonary consult as needed.     Please call with questions.     Jessica Woodward MD

## 2019-07-18 NOTE — ASSESSMENT & PLAN NOTE
- Patient of Dr. Foster on palliative chemo with Gemzar   - Controlled pain on norco   - OP follow up with oncologist

## 2019-07-18 NOTE — SUBJECTIVE & OBJECTIVE
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    Prostate cancer 2002    Urinary tract infection        Past Surgical History:   Procedure Laterality Date    BIOPSY-DIAPHRAGM N/A 3/19/2018    Performed by Galdino Fang MD at [facility] OR [address] [place] [address]    IGRSXG-IHOWSD-ZU N/A 2/1/2018    Performed by [facility] at [facility] OR [address] [place] [address]    BRONCHOSCOPY N/A 6/3/2019    Performed by [facility] at [facility] OR [address] [place] [address]    COLONOSCOPY  10/20/2012    COLONOSCOPY  11/19/2014    dr machado ( repeat in 3 years per the pt )    CYSTOSCOPY, WITH RETROGRADE PYELOGRAM Bilateral 7/15/2019    Performed by [facility] Philippe MD at [facility] OR    CYSTOSCOPY, WITH URETHRAL DILATION Bilateral 7/15/2019    Performed by Gal[facility]ino Philippe MD at [facility] OR    ELBOW SURGERY Left 2013    dislocation    IYMLUUDBB-SQDZ-A-CATH N/A 6/20/2019    Performed by [facility] at [facility] OR [address] [place] [address]    LAPAROSCOPY-DIAGNOSTIC N/A 3/19/2018    [facility]    PROCTECTOMY  2002    RELEASE, CONTRACTURE, BLADDER NECK N/A 7/15/2019    Performed by Gal[facility]ino Philippe MD at [facility] OR    SHOULDER ARTHROSCOPY W/ ROTATOR CUFF REPAIR Right 2008    THORACOSCOPY-VIDEO ASSISTED (VATS) W/PLEURAL BIOPSY Left 3/19/2018    Performed by Galdino Fang MD at [facility] OR [address] [place] [address]    URETHROGRAM, RETROGRADE N/A 7/15/2019    Performed by Galdino Philippe MD at [facility] OR       Review of patient's allergies indicates:   Allergen Reactions    Bactrim [sulfamethoxazole-trimethoprim] Other (See Comments)     Joint pains       Current Facility-Administered Medications on File Prior to Encounter   Medication    [COMPLETED] furosemide injection 40 mg    [COMPLETED] metoprolol injection 5 mg    [DISCONTINUED] acetaminophen tablet 650 mg    [DISCONTINUED] dextrose 10% (D10W) Bolus     [DISCONTINUED] dextrose 10% (D10W) Bolus    [DISCONTINUED] diltiaZEM 125 mg in D5W 125 mL infusion    [DISCONTINUED] furosemide injection 20 mg    [DISCONTINUED] furosemide injection 40 mg    [DISCONTINUED] glucagon (human recombinant) injection 1 mg    [DISCONTINUED] glucose chewable tablet 16 g    [DISCONTINUED] glucose chewable tablet 24 g    [DISCONTINUED] ibuprofen tablet 400 mg    [DISCONTINUED] metoprolol tartrate (LOPRESSOR) tablet 25 mg    [DISCONTINUED] ondansetron injection 4 mg    [DISCONTINUED] pantoprazole EC tablet 40 mg    [DISCONTINUED] pantoprazole EC tablet 40 mg    [DISCONTINUED] sodium chloride 0.9% flush 10 mL     Current Outpatient Medications on File Prior to Encounter   Medication Sig    amoxicillin-clavulanate 875-125mg (AUGMENTIN) 875-125 mg per tablet Take 1 tablet by mouth 2 (two) times daily.    calcium carbonate (TUMS) 200 mg calcium (500 mg) chewable tablet Take 1 tablet by mouth as needed for Heartburn.    dextromethorphan-guaifenesin  mg (MUCINEX DM)  mg per 12 hr tablet Take 1 tablet by mouth every 12 (twelve) hours.    enalapril (VASOTEC) 5 MG tablet Take 1 tablet (5 mg total) by mouth 2 (two) times daily.    HYDROcodone-acetaminophen (NORCO) 5-325 mg per tablet Take 1 tablet by mouth every 6 (six) hours as needed for Pain.    ibuprofen (ADVIL,MOTRIN) 100 MG tablet Take 100 mg by mouth every 6 (six) hours as needed for Temperature greater than.    LACTOBACILLUS ACIDOPHILUS (ACIDOPHILUS ORAL) Take 1 tablet by mouth once daily.    phenylephrine HCl/acetaminophn (SINUS PAIN-PRESSURE, PE, ORAL) Take by mouth.    polyethylene glycol (MIRALAX) 17 gram/dose powder Take 17 g by mouth once daily.     Family History     Problem Relation (Age of Onset)    Cancer Mother    Heart attack Sister, Sister    Heart disease Father, Brother, Sister, Brother, Sister, Brother    No Known Problems Son, Son, Son    Prostate cancer Brother, Brother, Brother        Tobacco  Use    Smoking status: Former Smoker     Packs/day: 2.00     Years: 15.00     Pack years: 30.00     Types: Cigarettes     Last attempt to quit: 1970     Years since quittin.5    Smokeless tobacco: Former User    Tobacco comment: pt quit 40 years ago   Substance and Sexual Activity    Alcohol use: No     Alcohol/week: 16.8 oz     Types: 28 Cans of beer per week     Comment: None since Nov 15 th 2017    Drug use: No    Sexual activity: Not Currently     Review of Systems   Constitution: Negative for chills, decreased appetite, diaphoresis, fever and night sweats.   HENT: Negative.    Eyes: Negative.    Cardiovascular: Positive for dyspnea on exertion and leg swelling. Negative for chest pain, irregular heartbeat, near-syncope, orthopnea, palpitations and syncope.   Respiratory: Positive for shortness of breath. Negative for cough, hemoptysis and sputum production.    Skin: Negative for rash.   Gastrointestinal: Negative for abdominal pain, change in bowel habit, hematemesis, hematochezia, melena, nausea and vomiting.     Objective:     Vital Signs (Most Recent):  Temp: 97.5 °F (36.4 °C) (19)  Pulse: 78 (19)  Resp: (!) 22 (19)  BP: 121/73 (19)  SpO2: 96 % (19) Vital Signs (24h Range):  Temp:  [97.5 °F (36.4 °C)-98.3 °F (36.8 °C)] 97.5 °F (36.4 °C)  Pulse:  [] 78  Resp:  [15-31] 22  SpO2:  [92 %-97 %] 96 %  BP: ()/(51-82) 121/73     Weight: 77.6 kg (171 lb 1.2 oz)  Body mass index is 26.79 kg/m².    SpO2: 96 %  O2 Device (Oxygen Therapy): room air      Intake/Output Summary (Last 24 hours) at 2019 0750  Last data filed at 2019 0100  Gross per 24 hour   Intake 410 ml   Output 800 ml   Net -390 ml       Lines/Drains/Airways     Central Venous Catheter Line                 Port A Cath Single Lumen 19 right internal jugular 28 days          Drain                 Chest Tube Right Pleural -- days         Urethral Catheter 19  0920 Non-latex 16 Fr. less than 1 day                Physical Exam   Constitutional: He appears well-developed and well-nourished.   HENT:   Head: Normocephalic.   Neck: Normal range of motion. Neck supple.   Cardiovascular: Normal rate.       Significant Labs: All pertinent lab results from the last 24 hours have been reviewed.    Significant Imaging:

## 2019-07-18 NOTE — PLAN OF CARE
MDRs completed with Dr. Trevizo and the team. Patient of Dr. Foster with a history of prostate cancer (s/p prostatectomy), mesothelioma (on chemotherapy), and a recurrent pleural effusion (with a pleur-x catheter in place). Per patient the Dr. Padilla drained his pleur-x catheter early this morning and 450 cc was removed. Patient being elevated by General Cardiology and Interventional Cardiology for management of his pericardial effusion. Pulmonary consulted for management of the pleur-x catheter. Patient receiving PO Augmentin 500 mg PO TID. Patient currently NPO for possible procedure today. PT/OT consulted today; patient states he gets very SOB when ambulating and uses a walker at home at baseline. MD discussed the plan of care with the patient and the patient's spouse. Discharge needs to be determined. CM to continue to follow with the team. White board updated.    Future Appointments   Date Time Provider Department Center   7/31/2019  1:30 PM Galdino Philippe MD [facility]   12/20/2019  3:00 PM Galdino Philippe MD [facility]     Polina De La Garza, RN, BSN, CM  [facility]  Nurse - Med Onc/Gyn Onc

## 2019-07-18 NOTE — PLAN OF CARE
Problem: Adult Inpatient Plan of Care  Goal: Plan of Care Review  Outcome: Ongoing (interventions implemented as appropriate)  Patient admitted from [place] ICU at shift change, vitals stable upon admit. However, shortly after patient's arrival patient began to complain of his heart fluttering. Cardiac monitoring placed and afib noted. STAT EKG performed and patient had quickly converted out of afib and into sinus tachycardia. Patient then went into afib with RVR and sustained, Dr. Brunson notified and ordered 5mg IV lopressor. After 2.5mg IV lopressor was pushed BP dropped to 83/58. Dr. Brunson notified and ordered to hold the other 2.5mg and she would consult with cardiology. Cardiology recommended giving 25mg PO lopressor once SBP >100 and to continue to monitor for now. About 45 minutes later, SBP was 101 so PO lopressor was given. Cardiology was consulted and came to bedside to perform an echo. No emergent intervention was deemed necessary at this time. Pulmonology was consulted and Dr. Woodward came to bedside and drained 450cc of serous fluid from R pleurx. Patient began to complain of pain shortly after and prn norco given. At 0000 BP 90/51, patient still in Afib. At 0100 patient had converted back to NSR, BP 96/65. Patient states he feels much better. Cano catheter in place - output 350cc so far. +3 swelling noted to flank area, scrotum, and BLE. Vitals stable, will continue to monitor.

## 2019-07-18 NOTE — HPI
Mr. Cale Harding is a 78-year-old male with a past medical history of prostate cancer (s/p prostatectomy), mesothelioma (on chemotherapy), with recurrent pleural effusion  ( with a right PleurX) and hypertension who is a transfer for evaluation of pericardial effusion.       Patient was scheduled for elective cystography with Urology (Dr. Philippe) for dilation of bladder neck contracture.  On arrival to elective surgical suite, patient found to be tachycardic and heart rate was irregular.  EKG confirmed atrial fibrillation with RVR.  Surgery was cancelled and he was admitted to the ICU for a diltiazem drip. He converted to sinus but would go in and out of afib, was started on lopressor 25 BID and echo done showed moderate size of pericardial effusion with no tamponade physiology however increased in effusion from 7/5, cardiology in the OSH was planning for pericardiocentesis but requested transfer for possible window vs pericardiocentesis and was sent to [facility]    On arrival patient was hemodynamically stable  , NSR, converted in and out of afib. Cardiology was consulted for input on afib management in the setting of pericardial effusion and need for pericardiocentesis/ closer ccu monitoring.     Of note patient has a PleurX on the right that is possibly infected and he was supposed to get it removed this Friday by his pulmonologist Dr. Woodward

## 2019-07-18 NOTE — MEDICAL/APP STUDENT
[facility]  Cardiology  Consult Note    Patient Name: Cale Harding  MRN: [id number]  Admission Date: 7/17/2019  Hospital Length of Stay: 1 days  Code Status: Full Code   Attending Provider: Ashish Trevizo MD   Consulting Provider: Servando Dueñas  Primary Care Physician: Jj Escobedo MD  Principal Problem:Pericardial effusion without cardiac tamponade    Patient information was obtained from patient, spouse/SO and ER records.     Consults  Subjective:     Chief Complaint:  SOB     HPI: Pt is a 79 yo man PMH of prostate cancer s/p prostatectomy, mesothelioma with recurrent effusions (on chemo, R pleurx drain), GERD. Pt originally presented to [facility] for elective dilation of bladder neck, but was found to be in previously undiagnosed Afib with RVR (HR 150s). His procedure was delayed and he was sent to the ICU, where he was placed on diltiazem to achieve rate control. Echo at OSH showed moderate pericardial effusion worse than effusion noted on 7/5, and additionally showed EF65%, CVP 15, PAP 44. Pt completed urological procedure at OSH and was transferred here on 7/17 for management of Afib and Pericardial effusion.    On arrival VS were stable, and since arrival pt has been in and out of Afib. BP generally in 120s/70s, but falls to 80s/50s when HR >120. IL mainly 80 with intermittent elevations to the 170s. Pt currently on lopressor 25 BID for rate control.     Interval: pt reports intermittent SOB and VALENCIA, palpitations, and swelling. Denies CP, n/v/f/c, LOC, orthopnea. Reports discomfort associated with his chest drain.    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    Prostate cancer 2002    Urinary tract infection        Past Surgical History:   Procedure Laterality Date    BIOPSY-DIAPHRAGM N/A 3/19/2018    Performed by Galdino CAO  [facility]    LLIGAR-VOIKGG-XL N/A 2/1/2018    Performed by [facility] Diagnostic Provider at [facility]    BRONCHOSCOPY N/A 6/3/2019    Performed by [facility] Diagnostic Provider at [facility] OR [address] [place] [address]    COLONOSCOPY  10/20/2012    COLONOSCOPY  11/19/2014    dr machado ( repeat in 3 years per the pt )    CYSTOSCOPY, WITH RETROGRADE PYELOGRAM Bilateral 7/15/2019    Performed by Galdino Philippe [facility] at [facility] OR    CYSTOSCOPY, WITH URETHRAL DILATION Bilateral 7/15/2019    Performed by Galdino Philippe MD at [facility] OR    ELBOW SURGERY Left 2013    dislocation    RJWVZAEFK-YHTK-F-CATH N/A 6/20/2019    Performed by [facility] Diagnostic Provider at [facility] OR [address] [place] [address]    LAPAROSCOPY-DIAGNOSTIC N/A 3/19/2018    [facility] Gald[facility] Fang [facility]    PROCTECTOMY  2002    RELEASE, CONTRACTURE, BLADDER NECK N/A 7/15/2019    Performed by Galdino Philippe MD at [facility] OR    SHOULDER ARTHROSCOPY W/ ROTATOR CUFF REPAIR Right 2008    THORACOSCOPY-VIDEO ASSISTED (VATS) W/PLEURAL BIOPSY Left 3/19/2018    [facility]    URETHROGRAM, RETROGRADE N/A 7/15/2019    Performed by Galdino Philipep MD at [facility] OR       Review of patient's allergies indicates:   Allergen Reactions    Bactrim [sulfamethoxazole-trimethoprim] Other (See Comments)     Joint pains       Current Facility-Administered Medications on File Prior to Encounter   Medication    [COMPLETED] furosemide injection 40 mg    [COMPLETED] metoprolol injection 5 mg    [DISCONTINUED] acetaminophen tablet 650 mg    [DISCONTINUED] dextrose 10% (D10W) Bolus    [DISCONTINUED] dextrose 10% (D10W) Bolus    [DISCONTINUED] diltiaZEM 125 mg in D5W 125 mL infusion    [DISCONTINUED] furosemide injection 20 mg    [DISCONTINUED] furosemide injection 40 mg    [DISCONTINUED] glucagon (human recombinant) injection 1 mg    [DISCONTINUED] glucose chewable tablet 16 g    [DISCONTINUED] glucose chewable tablet 24 g     [DISCONTINUED] ibuprofen tablet 400 mg    [DISCONTINUED] metoprolol tartrate (LOPRESSOR) tablet 25 mg    [DISCONTINUED] ondansetron injection 4 mg    [DISCONTINUED] pantoprazole EC tablet 40 mg    [DISCONTINUED] pantoprazole EC tablet 40 mg    [DISCONTINUED] sodium chloride 0.9% flush 10 mL     Current Outpatient Medications on File Prior to Encounter   Medication Sig    amoxicillin-clavulanate 875-125mg (AUGMENTIN) 875-125 mg per tablet Take 1 tablet by mouth 2 (two) times daily.    calcium carbonate (TUMS) 200 mg calcium (500 mg) chewable tablet Take 1 tablet by mouth as needed for Heartburn.    dextromethorphan-guaifenesin  mg (MUCINEX DM)  mg per 12 hr tablet Take 1 tablet by mouth every 12 (twelve) hours.    enalapril (VASOTEC) 5 MG tablet Take 1 tablet (5 mg total) by mouth 2 (two) times daily.    HYDROcodone-acetaminophen (NORCO) 5-325 mg per tablet Take 1 tablet by mouth every 6 (six) hours as needed for Pain.    ibuprofen (ADVIL,MOTRIN) 100 MG tablet Take 100 mg by mouth every 6 (six) hours as needed for Temperature greater than.    LACTOBACILLUS ACIDOPHILUS (ACIDOPHILUS ORAL) Take 1 tablet by mouth once daily.    phenylephrine HCl/acetaminophn (SINUS PAIN-PRESSURE, PE, ORAL) Take by mouth.    polyethylene glycol (MIRALAX) 17 gram/dose powder Take 17 g by mouth once daily.     Family History     Problem Relation (Age of Onset)    Cancer Mother    Heart attack Sister, Sister    Heart disease Father, Brother, Sister, Brother, Sister, Brother    No Known Problems Son, Son, Son    Prostate cancer Brother, Brother, Brother        Tobacco Use    Smoking status: Former Smoker     Packs/day: 2.00     Years: 15.00     Pack years: 30.00     Types: Cigarettes     Last attempt to quit: 1970     Years since quittin.5    Smokeless tobacco: Former User    Tobacco comment: pt quit 40 years ago   Substance and Sexual Activity    Alcohol use: No     Alcohol/week: 16.8 oz     Types: 28  Cans of beer per week     Comment: None since Nov 15 th 2017    Drug use: No    Sexual activity: Not Currently     Review of Systems   Constitution: Negative for chills, diaphoresis and fever.   HENT: Negative.    Cardiovascular: Positive for dyspnea on exertion, irregular heartbeat and leg swelling. Negative for chest pain, near-syncope, orthopnea, palpitations, paroxysmal nocturnal dyspnea and syncope.   Respiratory: Positive for shortness of breath. Negative for cough.    Gastrointestinal: Negative for abdominal pain, nausea and vomiting.   Neurological: Negative for dizziness, focal weakness and light-headedness.     Objective:     Vital Signs (Most Recent):  Temp: 97.7 °F (36.5 °C) (07/18/19 0939)  Pulse: 67 (07/18/19 0939)  Resp: (!) 22 (07/18/19 0939)  BP: 108/64 (07/18/19 0939)  SpO2: 96 % (07/18/19 0939) Vital Signs (24h Range):  Temp:  [97.5 °F (36.4 °C)-98.3 °F (36.8 °C)] 97.7 °F (36.5 °C)  Pulse:  [] 67  Resp:  [15-30] 22  SpO2:  [92 %-97 %] 96 %  BP: ()/(51-82) 108/64     Weight: 77.6 kg (171 lb 1.2 oz)  Body mass index is 26.79 kg/m².    SpO2: 96 %  O2 Device (Oxygen Therapy): room air      Intake/Output Summary (Last 24 hours) at 7/18/2019 1153  Last data filed at 7/18/2019 1045  Gross per 24 hour   Intake 470 ml   Output 1800 ml   Net -1330 ml       Lines/Drains/Airways     Central Venous Catheter Line                 Port A Cath Single Lumen 06/20/19 right internal jugular 28 days          Drain                 Chest Tube Right Pleural -- days         Urethral Catheter 07/17/19 0920 Non-latex 16 Fr. 1 day                Physical Exam   Constitutional: He is oriented to person, place, and time. No distress.   HENT:   Head: Normocephalic and atraumatic.   Neck: JVD ( 2 in below mandibular angle, rises with inspiration) present.   Cardiovascular: Normal rate, S1 normal and S2 normal. An irregularly irregular rhythm present. Exam reveals no gallop.   No murmur heard.  Pulmonary/Chest:  Accessory muscle usage present. No tachypnea. No respiratory distress. He has decreased breath sounds in the right lower field and the left lower field. He has rales.   Decreased breath sounds in the bases b/l   Abdominal: Soft. There is no tenderness.   Musculoskeletal: He exhibits edema.   Neurological: He is alert and oriented to person, place, and time.   Skin: He is not diaphoretic.       Significant Labs:   Recent Lab Results       07/18/19  0746   07/18/19  0358   07/17/19 2023      Albumin   2.1 2.2    Alkaline Phosphatase   88 95    ALT   7 8    Anion Gap   7 8    Aniso   Slight Slight    Baso #   0.05 0.02    Basophil%   0.6 0.2    BILIRUBIN TOTAL   0.3  Comment:   0.3  Comment       BNP     374  Comment:  Values of less than 100 pg/ml are consistent with non-CHF populations.    BSA 1.92        BUN, Bld   27 25    Calcium   8.3 8.0    Chloride   98 100    CO2   25 24    Creatinine   0.8 0.9    Differential Method   Automated Automated    eGFR if    >60.0 >60.0    eGFR if non    >60.0  Comment:  Calculation used to obtain the estimated glomerular filtration  rate (eGFR) is the CKD-EPI equation.    >60.0  Comment:  Calculation used to obtain the estimated glomerular filtration  rate (eGFR) is the CKD-EPI equation.       Eos #   0.0 0.1    Eosinophil%   0.5 0.6    Fragmented Cells     Occasional    FS 47        Glucose   115 148    Gran # (ANC)   5.1 6.1    Gran%   62.4 65.8    Hematocrit   30.9 31.9    Hemoglobin   9.0 9.3    Hypo   Occasional Occasional    Immature Grans (Abs)   0.15     0.11    Immature Granulocytes   1.8 1.2    Coumadin Monitoring INR   1.1  Comment:  Coumadin Therapy:  2.0 - 3.0 for INR for all indicators except mechanical heart valves  and antiphospholipid syndromes which should use 2.5 - 3.5.        Lymph #   0.8 0.7    Lymph%   9.2 7.1    Magnesium   2.5 1.8    MCH   26.6 27.4    MCHC   29.1 29.2    MCV   91 94    Mean e' 0.09        Mono #   2.1 2.3     Mono%   25.5 25.1    MPV   9.7 9.4    nRBC   0 0    Ovalocytes   Occasional Occasional    Phosphorus   3.5 3.0    Platelet Estimate     Increased    Platelets   451 430    Poik   Slight Slight    Poly   Occasional Occasional    Potassium   4.2 4.2    PROTEIN TOTAL   5.6 5.8    Protime   11.4      PW 1.14        RBC   3.38 3.39    RDW   20.3 20.1    RVDD 2.31        Sinus 3.32        Sodium   130 132    WBC   8.12 9.30          Significant Imaging: Echocardiogram:   Transthoracic echo (TTE) complete (Cupid Only):   Results for orders placed or performed during the hospital encounter of 07/15/19   Transthoracic echo (TTE) 2D with Color Flow   Result Value Ref Range    Narrative    · Normal left ventricular systolic function. The estimated ejection   fraction is 60%  · Grade I (mild) left ventricular diastolic dysfunction consistent with   impaired relaxation.  · Normal right ventricular systolic function.  · Mild tricuspid regurgitation.  · Intermediate central venous pressure (8 mm Hg).  · Moderate circumferential pericardial effusion without tamponade.  · There is a left pleural effusion.  · Compared to echo images from 7/15/2019, pericardial effusion appears   largely unchanged in size, and pt is now in sinus rhythm.     EKG Showed Afib RVR rate 162 with low voltage QRS and T wave abnormality  Assessment and Plan:     Active Diagnoses:    Diagnosis Date Noted POA    PRINCIPAL PROBLEM:  Pericardial effusion without cardiac tamponade [I31.3] 07/15/2019 Yes    Acute on chronic diastolic heart failure [I50.33] 07/18/2019 Unknown    Paroxysmal atrial fibrillation [I48.0] 07/17/2019 Yes    Essential hypertension [I10] 07/15/2019 Yes    Atrial fibrillation with RVR [I48.91] 07/15/2019 Yes    Acquired contracture of bladder neck [N32.0] 07/10/2019 Yes    Pleural effusion [J90] 05/02/2019 Yes    Mesothelioma of left lung [C45.7] 02/15/2018 Yes    Malignant pleural effusion [J91.0] 02/01/2018 Yes     Gastroesophageal reflux disease [K21.9] 10/11/2017 Yes    History of prostate cancer [Z85.46] 05/25/2016 Not Applicable      Problems Resolved During this Admission:     Pericardial Effusion  - Pt currently stable and compensating for effusion, reports SOB and VALENCIA. Showing kussmauls sign on exam. With no history of prior effusions, plan for pericardiocentesis this afternoon. If effusion recurs plan for pericardial window  - Hold Lasix last measured CVP 8.   - Make NPO   - will follow post pericardiocentesis    Afib RVR  - Rate control with HR 70-80 since midnight  - intermittent palpitations  - continue metoprolol tartrate 25 BID        VTE Risk Mitigation (From admission, onward)        Ordered     Place sequential compression device  Until discontinued      07/17/19 1930     IP VTE HIGH RISK PATIENT  Once      07/17/19 1930          Thank you for your consult. I will follow-up with patient. Please contact us if you have any additional questions.    Servando Dueñas  Cardiology   [facility]

## 2019-07-18 NOTE — BRIEF OP NOTE
"            Interventional Cardiology   Post Procedure Note      Referring Physician: Ashish Trevizo MD  Procedure: Pericardiocentesis  Indication: Moderate pericardial effusion    SUBJECTIVE:   Patient tolerated procedure well with no complications. Please see full report for more details. Removed 570 cc of serosanguinous pericardial fluid.     Intervention:     Patient tolerated the procedure well, no complications    Post Procedure Exam:   /64 (BP Location: Left arm, Patient Position: Lying)   Pulse 67   Temp 97.7 °F (36.5 °C) (Oral)   Resp (!) 22   Ht 5' 7" (1.702 m)   Wt 77.6 kg (171 lb 1.2 oz)   SpO2 96%   BMI 26.79 kg/m²     No unusual pain at subxiphoid area. Regular rate and rhythm, no m/r/g. CTAB. Distal pulses intact.     Assessment / Plan:     - Maximize medical management.  - Further care by the primary team.    Attending addendum to follow     Sherry Cintron MD  Cardiology - PGY4  Pager [phone]            "

## 2019-07-18 NOTE — ASSESSMENT & PLAN NOTE
- Patient transferred for evaluation by cardiology / CTS for pericardial window   - Consulted cardiology who will see patient, question is to help with afib with rvr management and whether pericardiocentesis or window is beneficial for patient, additionally concern that patient needs to be in the CCU given that he was transferred for possible pericardial window   - Consult for CTS placed will need to be called in the morning

## 2019-07-18 NOTE — ASSESSMENT & PLAN NOTE
S/p prostectomy   Has a haider that was placed this morning for urinary retenion  Sees urology as OP   Urology rec to keep haider in place

## 2019-07-18 NOTE — H&P
[facility]  Hematology/Oncology  H&P    Patient Name: Cale Harding  MRN: [id number]  Admission Date: 7/17/2019  Code Status: Full Code   Attending Provider: Ashish Trevizo MD  Primary Care Physician: Jj Escobedo MD  Principal Problem:Pericardial effusion    Subjective:     HPI: Mr. Cale Harding is a 78-year-old male with a past medical history of prostate cancer (s/p prostatectomy), mesothelioma (on chemotherapy), with recurrent pleural effusion  ( with a right PleurX) and hypertension who is a transfer for evaluation of pericardial effusion.       Patient was scheduled for elective cystography with Urology (Dr. Philippe) for dilation of bladder neck contracture.  On arrival to elective surgical suite, patient found to be tachycardic and heart rate was irregular.  EKG confirmed atrial fibrillation with RVR.  Surgery was cancelled and he was admitted to the ICU for a diltiazem drip. He converted to sinus but would go in and out of afib, was started on lopressor 25 BID and echo done showed moderate size of pericardial effusion with no tamponade physiology however increased in effusion from 7/5, cardiology in the OSH was planning for pericardiocentesis but requested transfer for possible window vs pericardiocentesis and was [facility]    On arrival patient was hemodynamically stable  , NSR, converted in and out of afib. Cardiology was consulted for input on afib management in the setting of pericardial effusion and need for pericardiocentesis/ closer ccu monitoring.     Of note patient has a PleurX on the right that is possibly infected and he was supposed to get it removed this Friday by his pulmonologist Dr. Woodward      Oncology Treatment Plan:   OP NSCLC VINORELBINE WEEKLY    Medications:  Continuous Infusions:  Scheduled Meds:   amoxicillin-clavulanate 500-125mg  1 tablet Oral TID    magnesium sulfate IVPB  2 g Intravenous Once    metoprolol tartrate  25 mg Oral BID    [START  ON 7/18/2019] pantoprazole  40 mg Oral Daily     PRN Meds:albuterol-ipratropium, Dextrose 10% Bolus, Dextrose 10% Bolus, glucagon (human recombinant), glucose, glucose, HYDROcodone-acetaminophen, ondansetron, sodium chloride 0.9%     Review of patient's allergies indicates:   Allergen Reactions    Bactrim [sulfamethoxazole-trimethoprim] Other (See Comments)     Joint pains        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    Prostate cancer 2002    Urinary tract infection      Past Surgical History:   Procedure Laterality Date    BIOPSY-DIAPHRAGM N/A 3/19/2018    Performed by Galdino Fang MD at [facility] OR [address] [place] [address]    EORWIG-ETMQJV-XG N/A 2/1/2018    Performed by [facility] Provider at [facility] OR [address] [place] [address]    BRONCHOSCOPY N/A 6/3/2019    Performed by [facility] Diagnostic Provider at [facility] OR [address] [place] [address]    COLONOSCOPY  10/20/2012    COLONOSCOPY  11/19/2014    dr machado ( repeat in 3 years per the pt )    CYSTOSCOPY, WITH RETROGRADE PYELOGRAM Bilateral 7/15/2019    Performed by [facility] at [facility] OR    CYSTOSCOPY, WITH URETHRAL DILATION Bilateral 7/15/2019    Performed by [facility] at [facility] OR    ELBOW SURGERY Left 2013    dislocation    [id number]-CATH N/A 6/20/2019    Performed by [facility] Provider at [facility] OR [facility]    LAPAROSCOPY-DIAGNOSTIC N/A 3/19/2018    [facility]    PROCTECTOMY  2002    RELEASE, CONTRACTURE, BLADDER NECK N/A 7/15/2019    Performed by [facility] at [facility] OR    SHOULDER ARTHROSCOPY W/ ROTATOR CUFF REPAIR Right 2008    THORACOSCOPY-VIDEO ASSISTED (VATS) W/PLEURAL BIOPSY Left 3/19/2018    [facility]    URETHROGRAM, RETROGRADE N/A 7/15/2019    Performed by [facility] Philippe [facility] at [facility] OR     Family History     Problem Relation  (Age of Onset)    Cancer Mother    Heart attack Sister, Sister    Heart disease Father, Brother, Sister, Brother, Sister, Brother    No Known Problems Son, Son, Son    Prostate cancer Brother, Brother, Brother        Tobacco Use    Smoking status: Former Smoker     Packs/day: 2.00     Years: 15.00     Pack years: 30.00     Types: Cigarettes     Last attempt to quit: 1970     Years since quittin.5    Smokeless tobacco: Former User    Tobacco comment: pt quit 40 years ago   Substance and Sexual Activity    Alcohol use: No     Alcohol/week: 16.8 oz     Types: 28 Cans of beer per week     Comment: None since Nov 15 th 2017    Drug use: No    Sexual activity: Not Currently       Review of Systems   Constitutional: Positive for appetite change, chills and fatigue. Negative for activity change and fever.   HENT: Negative.  Negative for congestion.    Eyes: Negative for visual disturbance.   Respiratory: Positive for shortness of breath. Negative for cough and chest tightness.    Cardiovascular: Positive for leg swelling. Negative for chest pain and palpitations.   Gastrointestinal: Negative.  Negative for abdominal pain, constipation and diarrhea.   Genitourinary: Positive for difficulty urinating and scrotal swelling. Negative for frequency.   Musculoskeletal: Negative for back pain.   Skin: Negative for color change, pallor, rash and wound.   Neurological: Positive for weakness. Negative for dizziness.   Hematological: Negative for adenopathy. Does not bruise/bleed easily.   Psychiatric/Behavioral: Negative for agitation and behavioral problems.     Objective:     Vital Signs (Most Recent):  Pulse: (!) 140 (19)  Resp: (!) 22 (19)  BP: 101/62 (19)  SpO2: (!) 93 % (19) Vital Signs (24h Range):  Temp:  [97.6 °F (36.4 °C)-98.3 °F (36.8 °C)] 97.8 °F (36.6 °C)  Pulse:  [] 140  Resp:  [15-31] 22  SpO2:  [92 %-97 %] 93 %  BP: ()/(58-82) 101/62        There is no  height or weight on file to calculate BMI.  There is no height or weight on file to calculate BSA.      Intake/Output Summary (Last 24 hours) at 7/17/2019 2138  Last data filed at 7/17/2019 1856  Gross per 24 hour   Intake --   Output 200 ml   Net -200 ml       Physical Exam   Constitutional: He is oriented to person, place, and time.   Cachetic    HENT:   Head: Normocephalic and atraumatic.   Eyes: Pupils are equal, round, and reactive to light. EOM are normal. Right eye exhibits no discharge. Left eye exhibits no discharge.   Neck: Normal range of motion. Neck supple.   Cardiovascular: Normal rate and regular rhythm. Exam reveals no gallop and no friction rub.   No murmur heard.  Pulmonary/Chest: Effort normal.   Crackle in his right lower lobe  Decreased breath sound in his left bases    Abdominal: Soft. Bowel sounds are normal. He exhibits no distension. There is no tenderness.   Genitourinary:   Genitourinary Comments: Scrotal swelling    Musculoskeletal: Normal range of motion. He exhibits edema.   Neurological: He is alert and oriented to person, place, and time.   Skin: Skin is warm and dry.   Psychiatric: He has a normal mood and affect. His behavior is normal. Judgment and thought content normal.       Significant Labs:   CBC:   Recent Labs   Lab 07/16/19  0608 07/17/19  0610 07/17/19 2023   WBC 7.82 7.03 9.30   HGB 8.9* 9.0* 9.3*   HCT 29.3* 30.4* 31.9*   * 506* 430*   , CMP:   Recent Labs   Lab 07/16/19  0608 07/17/19  0610 07/17/19 2023   * 136 132*   K 4.7 4.5 4.2    99 100   CO2 25 25 24   * 108 148*   BUN 20 24* 25*   CREATININE 0.88 0.84 0.9   CALCIUM 8.0* 7.9* 8.0*   PROT 5.9* 6.0 5.8*   ALBUMIN 2.6* 2.7* 2.2*   BILITOT 0.6 0.5 0.3   ALKPHOS 81 80 95   AST 17 21 15   ALT 11 11 8*   ANIONGAP 10 12 8   EGFRNONAA >60.0 >60.0 >60.0    and All pertinent labs from the last 24 hours have been reviewed.    Diagnostic Results:  I have reviewed all pertinent imaging  results/findings within the past 24 hours.  I have reviewed and interpreted all pertinent imaging results/findings within the past 24 hours.    Assessment/Plan:     * Pericardial effusion  - Patient transferred for evaluation by cardiology / CTS for pericardial window or pericardiocentesis   - Consulted cardiology who will see patient, question is to help with afib with rvr management and whether pericardiocentesis or window is beneficial for patient, additionally concern that patient needs to be in the CCU given that he was transferred for possible pericardial window   - Consult for CTS placed will need to be called in the morning     Paroxysmal atrial fibrillation  - Patient states that he has always had palpitations that come and go but they were never caught while he was at a doctor or in the hospital   - Atrial fibrillation with RVR likely precipitated by volume overload and enlarging pericardial effusion. He was started on diltiazem drip then switched to lopressor in the OSH  -  Cardiology consulted for pericardiocentesis vs pericardial window   - On arrival patient was in NSR and converted to RVR and 2.5 mg of Iv lopressor was pushed, his BP started to decrease to ~86/44. Will watch his BP and if it comes up will give him lopressor PO. As he has not gotten his night time dose   -  If becomes hemodynamically unstable will consider diltiazem drip but given his BP will be cautious  - Cardiology consulted appreciate their recs  - will get repeat echo for tomorrow to monitor effusion, if he becomes unstable tonight will need stat echo     Malignant pleural effusion  - Patient of  , discussed with his overnight suspect that the drain could be infected and  recommended Augmentin for 5 more day. He also drained 450 cc of fluid. Consults placed for pulm to manage the drain please call in the am. They might decide to remove he drain as this was planned for the end of the week   - Last drainage was on Sunday ,  and is supposed to be drained once a week   - Patient on RA and not SOB   -  repeat Chest xray with effusion as seen in previous       Acquired contracture of bladder neck  Urology consulted in the OSH   S/p Bladder neck contracture release and urethral dilation on 7/15/19  Was having problems voiding this am and haider was placed again   Will consult urology for final discharge recs on whether he needs to remain with haider until seen OP , call in the am        Acute on chronic diastolic heart failure  - Patient with symptoms of Sob and swelling   - Patient not on lasix at home   - BNP elevated at 374  -  bilateral pitting edema and scrotal swelling        PLAN   - IV diuresis with Lasix 40mg IV for now  and monitor response.  Goal diuresis 2-3L/day Need to be cautious and assess daily due to patient labile BP, was given a dose before arrival at 6pm of lasix 40 mg IV   - Strict I/O   - Daily weights  - Keep mag >2 and phos> 3  - Cardiac diet with decreased salt intake            Essential hypertension  - hold all antihypertensives he is on enalapril at home     Mesothelioma of left lung  - Patient of Dr. Foster on palliative chemo with Gemzar   - Controlled pain on norco   - OP follow up with oncologist     Gastroesophageal reflux disease  - PPI daily     History of prostate cancer  S/p prostectomy   Has a haider that was placed this morning for urinary retenion  Sees urology as OP     Patient seen and examined. Discussed with Fellow ( Dr. Styles)  - staff attestation to follow.      Vani Brunson MD PGY3  Hematology/Oncology  [facility]      I have reviewed the notes, assessments, and/or procedures performed by the housestaff, as above.  I have personally interviewed and examined the patient at the beside, and rounded with the housestaff. I concur with her/his assessment and plan and the documentation of Cale Harding.  I, Dr. Ashish Trevizo, personally spent more than 60 mins during this  encounter, greater than 50% was spent in direct counseling and/or coordination of care.     Ashish Trevizo M.D., M.S., F.A.C.P.  Hematology/Oncology Attending  [facility]

## 2019-07-18 NOTE — SUBJECTIVE & OBJECTIVE
Oncology Treatment Plan:   OP NSCLC VINORELBINE WEEKLY    Medications:  Continuous Infusions:  Scheduled Meds:   amoxicillin-clavulanate 500-125mg  1 tablet Oral TID    magnesium sulfate IVPB  2 g Intravenous Once    metoprolol tartrate  25 mg Oral BID    [START ON 7/18/2019] pantoprazole  40 mg Oral Daily     PRN Meds:albuterol-ipratropium, Dextrose 10% Bolus, Dextrose 10% Bolus, glucagon (human recombinant), glucose, glucose, HYDROcodone-acetaminophen, ondansetron, sodium chloride 0.9%     Review of patient's allergies indicates:   Allergen Reactions    Bactrim [sulfamethoxazole-trimethoprim] Other (See Comments)     Joint pains        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    Prostate cancer 2002    Urinary tract infection      Past Surgical History:   Procedure Laterality Date    BIOPSY-DIAPHRAGM N/A 3/19/2018    Performed by [facility] Fang MD at [facility] OR [address] [place] [address]    [id number]-[id number]-LC N/A 2/1/2018    Performed by [facility] Diagnostic Provider at [facility] OR [address] [place] [address]    BRONCHOSCOPY N/A 6/3/2019    Performed by [facility] Diagnostic Provider at [facility] OR [address] [place] [address]    COLONOSCOPY  10/20/2012    COLONOSCOPY  11/19/2014    dr machado ( repeat in 3 years per the pt )    CYSTOSCOPY, WITH RETROGRADE PYELOGRAM Bilateral 7/15/2019    [facility] OR    CYSTOSCOPY, WITH URETHRAL DILATION Bilateral 7/15/2019    [facility]formed by [facility] at [facility] OR    ELBOW SURGERY Left 2013    dislocation    [id number]-CATH N/A 6/20/2019    Performed by [facility] Diagnostic Provider at [facility] OR [address] [place] [address]    LAPAROSCOPY-DIAGNOSTIC N/A 3/19/2018    [facility]    PROCTECTOMY  2002    RELEASE, CONTRACTURE, BLADDER NECK N/A 7/15/2019    [facility]formed by [facility] at [facility] OR    SHOULDER ARTHROSCOPY W/ ROTATOR CUFF REPAIR  Right     THORACOSCOPY-VIDEO ASSISTED (VATS) W/PLEURAL BIOPSY Left 3/19/2018    [facility] at [facility] OR [facility]ND [facility]    URETHROGRAM, RETROGRADE N/A 7/15/2019    [facility] by [facility] at [facility] OR     Family History     Problem Relation (Age of Onset)    Cancer Mother    Heart attack Sister, Sister    Heart disease Father, Brother, Sister, Brother, Sister, Brother    No Known Problems Son, Son, Son    Prostate cancer Brother, Brother, Brother        Tobacco Use    Smoking status: Former Smoker     Packs/day: 2.00     Years: 15.00     Pack years: 30.00     Types: Cigarettes     Last attempt to quit: 1970     Years since quittin.5    Smokeless tobacco: Former User    Tobacco comment: pt quit 40 years ago   Substance and Sexual Activity    Alcohol use: No     Alcohol/week: 16.8 oz     Types: 28 Cans of beer per week     Comment: None since Nov 15 th 2017    Drug use: No    Sexual activity: Not Currently       Review of Systems   Constitutional: Positive for appetite change, chills and fatigue. Negative for activity change and fever.   HENT: Negative.  Negative for congestion.    Eyes: Negative for visual disturbance.   Respiratory: Positive for shortness of breath. Negative for cough and chest tightness.    Cardiovascular: Positive for leg swelling. Negative for chest pain and palpitations.   Gastrointestinal: Negative.  Negative for abdominal pain, constipation and diarrhea.   Genitourinary: Positive for difficulty urinating and scrotal swelling. Negative for frequency.   Musculoskeletal: Negative for back pain.   Skin: Negative for color change, pallor, rash and wound.   Neurological: Positive for weakness. Negative for dizziness.   Hematological: Negative for adenopathy. Does not bruise/bleed easily.   Psychiatric/Behavioral: Negative for agitation and behavioral problems.     Objective:     Vital Signs (Most Recent):  Pulse: (!) 140 (19)  Resp: (!) 22 (19  1850)  BP: 101/62 (07/17/19 2115)  SpO2: (!) 93 % (07/17/19 1850) Vital Signs (24h Range):  Temp:  [97.6 °F (36.4 °C)-98.3 °F (36.8 °C)] 97.8 °F (36.6 °C)  Pulse:  [] 140  Resp:  [15-31] 22  SpO2:  [92 %-97 %] 93 %  BP: ()/(58-82) 101/62        There is no height or weight on file to calculate BMI.  There is no height or weight on file to calculate BSA.      Intake/Output Summary (Last 24 hours) at 7/17/2019 2138  Last data filed at 7/17/2019 1856  Gross per 24 hour   Intake --   Output 200 ml   Net -200 ml       Physical Exam   Constitutional: He is oriented to person, place, and time.   Cachetic    HENT:   Head: Normocephalic and atraumatic.   Eyes: Pupils are equal, round, and reactive to light. EOM are normal. Right eye exhibits no discharge. Left eye exhibits no discharge.   Neck: Normal range of motion. Neck supple.   Cardiovascular: Normal rate and regular rhythm. Exam reveals no gallop and no friction rub.   No murmur heard.  Pulmonary/Chest: Effort normal.   Crackle in his right lower lobe  Decreased breath sound in his left bases    Abdominal: Soft. Bowel sounds are normal. He exhibits no distension. There is no tenderness.   Genitourinary:   Genitourinary Comments: Scrotal swelling    Musculoskeletal: Normal range of motion. He exhibits edema.   Neurological: He is alert and oriented to person, place, and time.   Skin: Skin is warm and dry.   Psychiatric: He has a normal mood and affect. His behavior is normal. Judgment and thought content normal.       Significant Labs:   CBC:   Recent Labs   Lab 07/16/19  0608 07/17/19  0610 07/17/19 2023   WBC 7.82 7.03 9.30   HGB 8.9* 9.0* 9.3*   HCT 29.3* 30.4* 31.9*   * 506* 430*   , CMP:   Recent Labs   Lab 07/16/19  0608 07/17/19  0610 07/17/19 2023   * 136 132*   K 4.7 4.5 4.2    99 100   CO2 25 25 24   * 108 148*   BUN 20 24* 25*   CREATININE 0.88 0.84 0.9   CALCIUM 8.0* 7.9* 8.0*   PROT 5.9* 6.0 5.8*   ALBUMIN 2.6* 2.7*  2.2*   BILITOT 0.6 0.5 0.3   ALKPHOS 81 80 95   AST 17 21 15   ALT 11 11 8*   ANIONGAP 10 12 8   EGFRNONAA >60.0 >60.0 >60.0    and All pertinent labs from the last 24 hours have been reviewed.    Diagnostic Results:  I have reviewed all pertinent imaging results/findings within the past 24 hours.  I have reviewed and interpreted all pertinent imaging results/findings within the past 24 hours.

## 2019-07-18 NOTE — CARE UPDATE
Dr. Woodward from pulmonary critical care saw patient and drained about 450cc from pleurx, recommended to treat with Augmentin for 5 more days to total a week. For now PleurX to remain in place as it is draining

## 2019-07-18 NOTE — ASSESSMENT & PLAN NOTE
- Patient states that he has always had palpitations that come and go but they were never caught while he was at a doctor or in the hospital   - Atrial fibrillation with RVR likely precipitated by volume overload and enlarging pericardial effusion. He was started on diltiazem drip then switched to lopressor in the OSH  -  Cardiology consulted for pericardiocentesis vs pericardial window   - On arrival patient was in NSR and converted to RVR and 2.5 mg of Iv lopressor was pushed, his BP started to decrease to ~86/44. Will watch his BP and if it comes up will give him lopressor PO. As he has not gotten his night time dose   -  If becomes hemodynamically unstable will consider diltiazem drip but given his BP will be cautious  - Cardiology consulted appreciate their recs  - Repeat echo showed EF of 55% w/ moderate pericardial effusion BEFORE pericardiocentesis

## 2019-07-18 NOTE — CONSULTS
[facility]  General Surgery  Consult Note    Inpatient consult to Cardiothoracic Surgery  Consult performed by: Patsy Retana MD  Consult ordered by: Vani Brunson MD        Subjective:     Chief Complaint/Reason for Admission: Pericardial effusion    History of Present Illness:   Cale Harding is a 78 y.o. male with h/o prostate cancer and mesothelioma (on chemotherapy) with pleural effusion managed by Pleurx catheter who was transferred from OSH after discovery of moderate sized pericardial effusion. He is known to the thoracic surgery service following his VATS procedure in 2018 in which significant tumor burden was noted. Presented to OSH for elective cystography for dilation of bladder neck contracture. He was noted to be in afib with RVR and echo showed moderate sized pericardial effusion without tamponade physiology. Repeat echo showed EF 55% with moderate pericardial effusion and no RA or RV collapse. The effusion appears to be increased since 7/5 but has been stable since 7/15. CT scan on 7/9 showed pericardial effusion with pericardial thickening suggesting pericardial involvement.     Current Facility-Administered Medications on File Prior to Encounter   Medication    [COMPLETED] furosemide injection 40 mg    [COMPLETED] metoprolol injection 5 mg    [DISCONTINUED] acetaminophen tablet 650 mg    [DISCONTINUED] dextrose 10% (D10W) Bolus    [DISCONTINUED] dextrose 10% (D10W) Bolus    [DISCONTINUED] diltiaZEM 125 mg in D5W 125 mL infusion    [DISCONTINUED] furosemide injection 20 mg    [DISCONTINUED] furosemide injection 40 mg    [DISCONTINUED] glucagon (human recombinant) injection 1 mg    [DISCONTINUED] glucose chewable tablet 16 g    [DISCONTINUED] glucose chewable tablet 24 g    [DISCONTINUED] ibuprofen tablet 400 mg    [DISCONTINUED] metoprolol tartrate (LOPRESSOR) tablet 25 mg    [DISCONTINUED] ondansetron injection 4 mg    [DISCONTINUED] pantoprazole  EC tablet 40 mg    [DISCONTINUED] pantoprazole EC tablet 40 mg    [DISCONTINUED] sodium chloride 0.9% flush 10 mL     Current Outpatient Medications on File Prior to Encounter   Medication Sig    amoxicillin-clavulanate 875-125mg (AUGMENTIN) 875-125 mg per tablet Take 1 tablet by mouth 2 (two) times daily.    calcium carbonate (TUMS) 200 mg calcium (500 mg) chewable tablet Take 1 tablet by mouth as needed for Heartburn.    dextromethorphan-guaifenesin  mg (MUCINEX DM)  mg per 12 hr tablet Take 1 tablet by mouth every 12 (twelve) hours.    enalapril (VASOTEC) 5 MG tablet Take 1 tablet (5 mg total) by mouth 2 (two) times daily.    HYDROcodone-acetaminophen (NORCO) 5-325 mg per tablet Take 1 tablet by mouth every 6 (six) hours as needed for Pain.    ibuprofen (ADVIL,MOTRIN) 100 MG tablet Take 100 mg by mouth every 6 (six) hours as needed for Temperature greater than.    LACTOBACILLUS ACIDOPHILUS (ACIDOPHILUS ORAL) Take 1 tablet by mouth once daily.    phenylephrine HCl/acetaminophn (SINUS PAIN-PRESSURE, PE, ORAL) Take by mouth.    polyethylene glycol (MIRALAX) 17 gram/dose powder Take 17 g by mouth once daily.       Review of patient's allergies indicates:   Allergen Reactions    Bactrim [sulfamethoxazole-trimethoprim] Other (See Comments)     Joint pains       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    Prostate cancer 2002    Urinary tract infection      Past Surgical History:   Procedure Laterality Date    BIOPSY-DIAPHRAGM N/A 3/19/2018    [facility]    [id number] N/A 2/1/2018    Performed by [facility]    BRONCHOSCOPY N/A 6/3/2019    [facility]    COLONOSCOPY  10/20/2012    COLONOSCOPY  11/19/2014    dr machado ( repeat in  3 years per the pt )    CYSTOSCOPY, WITH RETROGRADE PYELOGRAM Bilateral 7/15/2019    [facility]    CYSTOSCOPY, WITH URETHRAL DILATION Bilateral 7/15/2019    [facility] OR    ELBOW SURGERY Left     dislocation    [id number]-CATH N/A 2019    Performed by [facility] OR 2ND [facility]    LAPAROSCOPY-DIAGNOSTIC N/A 3/19/2018    Performed by [facility] OR 2ND [facility]    PROCTECTOMY  2002    RELEASE, CONTRACTURE, BLADDER NECK N/A 7/15/2019    [facility] OR    SHOULDER ARTHROSCOPY W/ ROTATOR CUFF REPAIR Right     THORACOSCOPY-VIDEO ASSISTED (VATS) W/PLEURAL BIOPSY Left 3/19/2018    Performed by [facility] OR 2ND [facility]    URETHROGRAM, RETROGRADE N/A 7/15/2019    Performed by [facility] at [facility] OR     Family History     Problem Relation (Age of Onset)    Cancer Mother    Heart attack Sister, Sister    Heart disease Father, Brother, Sister, Brother, Sister, Brother    No Known Problems Son, Son, Son    Prostate cancer Brother, Brother, Brother        Tobacco Use    Smoking status: Former Smoker     Packs/day: 2.00     Years: 15.00     Pack years: 30.00     Types: Cigarettes     Last attempt to quit: 1970     Years since quittin.5    Smokeless tobacco: Former User    Tobacco comment: pt quit 40 years ago   Substance and Sexual Activity    Alcohol use: No     Alcohol/week: 16.8 oz     Types: 28 Cans of beer per week     Comment: None since Nov 15 th 2017    Drug use: No    Sexual activity: Not Currently     Review of Systems   Constitutional: Negative for chills and fever.   Respiratory: Positive for shortness of breath. Negative for chest tightness.    Cardiovascular: Positive for palpitations. Negative for chest pain.   Gastrointestinal: Negative for abdominal distention, abdominal pain, diarrhea and nausea.   Neurological: Negative for  dizziness and headaches.   Psychiatric/Behavioral: Negative for agitation and confusion.     Objective:     Vital Signs (Most Recent):  Temp: 97.7 °F (36.5 °C) (07/18/19 0939)  Pulse: 67 (07/18/19 0939)  Resp: (!) 22 (07/18/19 0939)  BP: 108/64 (07/18/19 0939)  SpO2: 96 % (07/18/19 0939) Vital Signs (24h Range):  Temp:  [97.5 °F (36.4 °C)-98.3 °F (36.8 °C)] 97.7 °F (36.5 °C)  Pulse:  [] 67  Resp:  [15-30] 22  SpO2:  [92 %-97 %] 96 %  BP: ()/(51-82) 108/64     Weight: 77.6 kg (171 lb 1.2 oz)  Body mass index is 26.79 kg/m².      Intake/Output Summary (Last 24 hours) at 7/18/2019 1052  Last data filed at 7/18/2019 1045  Gross per 24 hour   Intake 470 ml   Output 1800 ml   Net -1330 ml       Physical Exam   Constitutional: He is oriented to person, place, and time. He appears well-developed and well-nourished. No distress.   Cardiovascular: Normal rate.   Pulmonary/Chest: Effort normal. No respiratory distress.   Abdominal: Soft. He exhibits no distension.   Musculoskeletal: Normal range of motion. He exhibits edema.   Neurological: He is alert and oriented to person, place, and time.   Skin: Skin is warm and dry.   Psychiatric: He has a normal mood and affect. His behavior is normal.       Significant Labs:  BMP:   Recent Labs   Lab 07/18/19  0358   *   *   K 4.2   CL 98   CO2 25   BUN 27*   CREATININE 0.8   CALCIUM 8.3*   MG 2.5     CBC:   Recent Labs   Lab 07/18/19 0358   WBC 8.12   RBC 3.38*   HGB 9.0*   HCT 30.9*   *   MCV 91   MCH 26.6*   MCHC 29.1*       Significant Diagnostics:  I have reviewed all pertinent imaging results/findings within the past 24 hours.    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   Due to likely pericardial involvement of disease process, a pericardial window would likely be difficult and present higher risks. Discussed with Cardiology who is planning pericardiocentesis with Interventional Radiology.     Patsy Retana MD  General Surgery  [facility]

## 2019-07-18 NOTE — ASSESSMENT & PLAN NOTE
- Patient states that he has always had palpitations that come and go but they were never caught while he was at a doctor or in the hospital   - Atrial fibrillation with RVR likely precipitated by volume overload and enlarging pericardial effusion. He was started on diltiazem drip then switched to lopressor in the [facility]  -  Cardiology consulted for pericardiocentesis vs pericardial window   - On arrival patient was in NSR and converted to RVR and 2.5 mg of Iv lopressor was pushed, his BP started to decrease to ~86/44. Will watch his BP and if it comes up will give him lopressor PO. As he has not gotten his night time dose   -  If becomes hemodynamically unstable will consider diltiazem drip but given his BP will be cautious  - Cardiology consulted appreciate their recs  - will get repeat echo for tomorrow to monitor effusion, if he becomes unstable tonight will need stat echo

## 2019-07-18 NOTE — CARE UPDATE
Rapid Response Nurse Follow-up Note     Followed up with patient for proactive rounding.   No acute issues at this time. Pt's rhythm converted from Afib to NSR. Pt resting comfortably. Reviewed plan of care with primary RN, Ana.   Please call Rapid Response RN, Daphnie Castro RN with any questions or concerns at [phone].

## 2019-07-18 NOTE — SUBJECTIVE & OBJECTIVE
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    Prostate cancer 2002    Urinary tract infection        Past Surgical History:   Procedure Laterality Date    BIOPSY-DIAPHRAGM N/A 3/19/2018    Performed by [facility] at [facility] OR 2ND [facility]    [id number] N/A 2/1/2018    Performed by [facility] Diagnostic Provider at [facility] OR [address] [place] [address]    BRONCHOSCOPY N/A 6/3/2019    Performed by [facility] Provider at [facility] OR [address] [place] [address]    COLONOSCOPY  10/20/2012    COLONOSCOPY  11/19/2014    dr machado ( repeat in 3 years per the pt )    CYSTOSCOPY, WITH RETROGRADE PYELOGRAM Bilateral 7/15/2019    [facility] OR    CYSTOSCOPY, WITH URETHRAL DILATION Bilateral 7/15/2019    [facility] OR    ELBOW SURGERY Left 2013    dislocation    [id number]-CATH N/A 6/20/2019    Performed by [facility] Provider at [facility] OR [address] [place] [address]    LAPAROSCOPY-DIAGNOSTIC N/A 3/19/2018    [facility]    PROCTECTOMY  2002    RELEASE, CONTRACTURE, BLADDER NECK N/A 7/15/2019    Per[facility] by [facility] OR    SHOULDER ARTHROSCOPY W/ ROTATOR CUFF REPAIR Right 2008    THORACOSCOPY-VIDEO ASSISTED (VATS) W/PLEURAL BIOPSY Left 3/19/2018    Performed by [facility] at [facility] OR [address] [place] [address]    URETHROGRAM, RETROGRADE N/A 7/15/2019    Per[facility] by [facility] OR       Review of patient's allergies indicates:   Allergen Reactions    Bactrim [sulfamethoxazole-trimethoprim] Other (See Comments)     Joint pains       PTA Medications   Medication Sig    amoxicillin-clavulanate 875-125mg (AUGMENTIN) 875-125 mg per tablet Take 1 tablet by mouth 2 (two) times daily.    calcium carbonate (TUMS) 200 mg calcium (500 mg) chewable tablet Take 1 tablet by mouth as needed for Heartburn.     dextromethorphan-guaifenesin  mg (MUCINEX DM)  mg per 12 hr tablet Take 1 tablet by mouth every 12 (twelve) hours.    enalapril (VASOTEC) 5 MG tablet Take 1 tablet (5 mg total) by mouth 2 (two) times daily.    HYDROcodone-acetaminophen (NORCO) 5-325 mg per tablet Take 1 tablet by mouth every 6 (six) hours as needed for Pain.    ibuprofen (ADVIL,MOTRIN) 100 MG tablet Take 100 mg by mouth every 6 (six) hours as needed for Temperature greater than.    LACTOBACILLUS ACIDOPHILUS (ACIDOPHILUS ORAL) Take 1 tablet by mouth once daily.    phenylephrine HCl/acetaminophn (SINUS PAIN-PRESSURE, PE, ORAL) Take by mouth.    polyethylene glycol (MIRALAX) 17 gram/dose powder Take 17 g by mouth once daily.     Family History     Problem Relation (Age of Onset)    Cancer Mother    Heart attack Sister, Sister    Heart disease Father, Brother, Sister, Brother, Sister, Brother    No Known Problems Son, Son, Son    Prostate cancer Brother, Brother, Brother        Tobacco Use    Smoking status: Former Smoker     Packs/day: 2.00     Years: 15.00     Pack years: 30.00     Types: Cigarettes     Last attempt to quit: 1970     Years since quittin.5    Smokeless tobacco: Former User    Tobacco comment: pt quit 40 years ago   Substance and Sexual Activity    Alcohol use: No     Alcohol/week: 16.8 oz     Types: 28 Cans of beer per week     Comment: None since Nov 15 th 2017    Drug use: No    Sexual activity: Not Currently     Review of Systems   Constitution: Negative.   HENT: Negative.    Eyes: Negative.    Cardiovascular: Positive for dyspnea on exertion. Negative for chest pain.   Endocrine: Negative.    Hematologic/Lymphatic: Negative.    Musculoskeletal: Negative.    Gastrointestinal: Negative.    Genitourinary: Positive for hesitancy.   Neurological: Negative.    Psychiatric/Behavioral: Negative.      Objective:     Vital Signs (Most Recent):  Temp: 97.7 °F (36.5 °C) (19 0939)  Pulse: 67 (19  0939)  Resp: (!) 22 (07/18/19 0939)  BP: 108/64 (07/18/19 0939)  SpO2: 96 % (07/18/19 0939) Vital Signs (24h Range):  Temp:  [97.5 °F (36.4 °C)-98.3 °F (36.8 °C)] 97.7 °F (36.5 °C)  Pulse:  [] 67  Resp:  [15-30] 22  SpO2:  [92 %-97 %] 96 %  BP: ()/(51-82) 108/64     Weight: 77.6 kg (171 lb 1.2 oz)  Body mass index is 26.79 kg/m².    SpO2: 96 %  O2 Device (Oxygen Therapy): room air      Intake/Output Summary (Last 24 hours) at 7/18/2019 1110  Last data filed at 7/18/2019 1045  Gross per 24 hour   Intake 470 ml   Output 1800 ml   Net -1330 ml       Lines/Drains/Airways     Central Venous Catheter Line                 Port A Cath Single Lumen 06/20/19 right internal jugular 28 days          Drain                 Chest Tube Right Pleural -- days         Urethral Catheter 07/17/19 0920 Non-latex 16 Fr. 1 day                Physical Exam   Constitutional: He is oriented to person, place, and time. He appears well-developed.   HENT:   Head: Normocephalic and atraumatic.   Mouth/Throat: Oropharynx is clear and moist.   Eyes: Pupils are equal, round, and reactive to light. EOM are normal.   Neck: Neck supple. No JVD present.   Cardiovascular: Intact distal pulses.   Irregularly irregular rhythm    Pulmonary/Chest: Effort normal and breath sounds normal.   Diminished breath sounds at the right base   Abdominal: Soft. Bowel sounds are normal.   Musculoskeletal: Normal range of motion. He exhibits no edema.   Neurological: He is alert and oriented to person, place, and time.   Skin: Skin is warm and dry.   Psychiatric: He has a normal mood and affect.       Significant Labs:   CMP   Recent Labs   Lab 07/17/19  0610 07/17/19 2023 07/18/19  0358    132* 130*   K 4.5 4.2 4.2   CL 99 100 98   CO2 25 24 25    148* 115*   BUN 24* 25* 27*   CREATININE 0.84 0.9 0.8   CALCIUM 7.9* 8.0* 8.3*   PROT 6.0 5.8* 5.6*   ALBUMIN 2.7* 2.2* 2.1*   BILITOT 0.5 0.3 0.3   ALKPHOS 80 95 88   AST 21 15 17   ALT 11 8* 7*    ANIONGAP 12 8 7*   ESTGFRAFRICA >60.0 >60.0 >60.0   EGFRNONAA >60.0 >60.0 >60.0   , CBC   Recent Labs   Lab 07/17/19  0610 07/17/19 2023 07/18/19  0358   WBC 7.03 9.30 8.12   HGB 9.0* 9.3* 9.0*   HCT 30.4* 31.9* 30.9*   * 430* 451*   , INR   Recent Labs   Lab 07/18/19  0358   INR 1.1    and Troponin No results for input(s): TROPONINI in the last 48 hours.    Significant Imaging: Echocardiogram:   2D echo with color flow doppler: No results found for this or any previous visit. and Transthoracic echo (TTE) complete (Cupid Only):   Results for orders placed or performed during the hospital encounter of 07/15/19   Transthoracic echo (TTE) 2D with Color Flow   Result Value Ref Range    BSA 1.88 m2    Right Atrial Pressure (from IVC) 8 mmHg    Narrative    · Normal left ventricular systolic function. The estimated ejection   fraction is 60%  · Grade I (mild) left ventricular diastolic dysfunction consistent with   impaired relaxation.  · Normal right ventricular systolic function.  · Mild tricuspid regurgitation.  · Intermediate central venous pressure (8 mm Hg).  · Moderate circumferential pericardial effusion without tamponade.  · There is a left pleural effusion.  · Compared to echo images from 7/15/2019, pericardial effusion appears   largely unchanged in size, and pt is now in sinus rhythm.

## 2019-07-18 NOTE — ASSESSMENT & PLAN NOTE
S/p prostectomy   Has a haider that was placed this morning for urinary retenion  Sees urology as OP

## 2019-07-18 NOTE — ASSESSMENT & PLAN NOTE
- Patient with symptoms of Sob and swelling   - Patient not on lasix at home   - BNP elevated at 374  -  bilateral pitting edema and scrotal swelling        PLAN   - D/c lasix  - Strict I/O   - Daily weights  - Keep mag >2 and phos> 3  - Cardiac diet with decreased salt intake

## 2019-07-18 NOTE — SUBJECTIVE & OBJECTIVE
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    Prostate cancer 2002    Urinary tract infection        Past Surgical History:   Procedure Laterality Date    BIOPSY-DIAPHRAGM N/A 3/19/2018    Performed by Galdino Fang MD at [facility] OR [address] [place] [address]    [id number] N/A 2/1/2018    Performed by [facility] Diagnostic Provider at [facility] OR [address] [place] [address]    BRONCHOSCOPY N/A 6/3/2019    Performed by [facility] Diagnostic Provider at [facility] OR [address] [place] [address]    COLONOSCOPY  10/20/2012    COLONOSCOPY  11/19/2014    dr machado ( repeat in 3 years per the pt )    CYSTOSCOPY, WITH RETROGRADE PYELOGRAM Bilateral 7/15/2019    Performed by Galdino Philippe MD at [facility] OR    CYSTOSCOPY, WITH URETHRAL DILATION Bilateral 7/15/2019    Performed by Galdino Philippe MD at [facility] OR    ELBOW SURGERY Left 2013    dislocation    [id number] N/A 6/20/2019    Performed by [facility] Diagnostic Provider at [facility] OR [address] [place] [address]    LAPAROSCOPY-DIAGNOSTIC N/A 3/19/2018    [facility]    PROCTECTOMY  2002    RELEASE, CONTRACTURE, BLADDER NECK N/A 7/15/2019    Performed by Galdino Philippe MD at [facility] OR    SHOULDER ARTHROSCOPY W/ ROTATOR CUFF REPAIR Right 2008    THORACOSCOPY-VIDEO ASSISTED (VATS) W/PLEURAL BIOPSY Left 3/19/2018    Performed by Galdino Fang MD at [facility] OR [address] [place] [address]    URETHROGRAM, RETROGRADE N/A 7/15/2019    Performed by Galdino Philippe MD at [facility] OR       Review of patient's allergies indicates:   Allergen Reactions    Bactrim [sulfamethoxazole-trimethoprim] Other (See Comments)     Joint pains       Current Facility-Administered Medications on File Prior to Encounter   Medication    [COMPLETED] furosemide injection 40 mg    [COMPLETED] metoprolol injection 5 mg    [DISCONTINUED] acetaminophen tablet 650 mg    [DISCONTINUED] dextrose 10% (D10W) Bolus     [DISCONTINUED] dextrose 10% (D10W) Bolus    [DISCONTINUED] diltiaZEM 125 mg in D5W 125 mL infusion    [DISCONTINUED] furosemide injection 20 mg    [DISCONTINUED] furosemide injection 40 mg    [DISCONTINUED] glucagon (human recombinant) injection 1 mg    [DISCONTINUED] glucose chewable tablet 16 g    [DISCONTINUED] glucose chewable tablet 24 g    [DISCONTINUED] ibuprofen tablet 400 mg    [DISCONTINUED] metoprolol tartrate (LOPRESSOR) tablet 25 mg    [DISCONTINUED] ondansetron injection 4 mg    [DISCONTINUED] pantoprazole EC tablet 40 mg    [DISCONTINUED] pantoprazole EC tablet 40 mg    [DISCONTINUED] sodium chloride 0.9% flush 10 mL     Current Outpatient Medications on File Prior to Encounter   Medication Sig    amoxicillin-clavulanate 875-125mg (AUGMENTIN) 875-125 mg per tablet Take 1 tablet by mouth 2 (two) times daily.    calcium carbonate (TUMS) 200 mg calcium (500 mg) chewable tablet Take 1 tablet by mouth as needed for Heartburn.    dextromethorphan-guaifenesin  mg (MUCINEX DM)  mg per 12 hr tablet Take 1 tablet by mouth every 12 (twelve) hours.    enalapril (VASOTEC) 5 MG tablet Take 1 tablet (5 mg total) by mouth 2 (two) times daily.    HYDROcodone-acetaminophen (NORCO) 5-325 mg per tablet Take 1 tablet by mouth every 6 (six) hours as needed for Pain.    ibuprofen (ADVIL,MOTRIN) 100 MG tablet Take 100 mg by mouth every 6 (six) hours as needed for Temperature greater than.    LACTOBACILLUS ACIDOPHILUS (ACIDOPHILUS ORAL) Take 1 tablet by mouth once daily.    phenylephrine HCl/acetaminophn (SINUS PAIN-PRESSURE, PE, ORAL) Take by mouth.    polyethylene glycol (MIRALAX) 17 gram/dose powder Take 17 g by mouth once daily.     Family History     Problem Relation (Age of Onset)    Cancer Mother    Heart attack Sister, Sister    Heart disease Father, Brother, Sister, Brother, Sister, Brother    No Known Problems Son, Son, Son    Prostate cancer Brother, Brother, Brother        Tobacco  Use    Smoking status: Former Smoker     Packs/day: 2.00     Years: 15.00     Pack years: 30.00     Types: Cigarettes     Last attempt to quit: 1970     Years since quittin.5    Smokeless tobacco: Former User    Tobacco comment: pt quit 40 years ago   Substance and Sexual Activity    Alcohol use: No     Alcohol/week: 16.8 oz     Types: 28 Cans of beer per week     Comment: None since Nov 15 th 2017    Drug use: No    Sexual activity: Not Currently     Review of Systems   Constitution: Negative for chills and decreased appetite.   Eyes: Negative for double vision.   Cardiovascular: Positive for irregular heartbeat, leg swelling and palpitations. Negative for chest pain, near-syncope and orthopnea.   Respiratory: Negative for shortness of breath.    Gastrointestinal: Negative for constipation, diarrhea, nausea and vomiting.   Neurological: Positive for weakness.   Psychiatric/Behavioral: Negative for altered mental status.     Objective:     Vital Signs (Most Recent):  Temp: 97.6 °F (36.4 °C) (19)  Pulse: 75 (19)  Resp: 20 (19)  BP: 96/65 (19)  SpO2: (!) 94 % (19) Vital Signs (24h Range):  Temp:  [97.6 °F (36.4 °C)-98.3 °F (36.8 °C)] 97.6 °F (36.4 °C)  Pulse:  [] 75  Resp:  [15-31] 20  SpO2:  [92 %-97 %] 94 %  BP: ()/(51-82) 96/65     Weight: 75.8 kg (167 lb 1.6 oz)  Body mass index is 26.17 kg/m².    SpO2: (!) 94 %  O2 Device (Oxygen Therapy): room air      Intake/Output Summary (Last 24 hours) at 2019 0228  Last data filed at 2019 0100  Gross per 24 hour   Intake 290 ml   Output 800 ml   Net -510 ml       Lines/Drains/Airways     Central Venous Catheter Line                 Port A Cath Single Lumen 19 right internal jugular 28 days          Drain                 Chest Tube Right Pleural -- days         Urethral Catheter 19 0920 Non-latex 16 Fr. less than 1 day                Physical Exam   Constitutional: He is  oriented to person, place, and time. He appears well-developed and well-nourished.   HENT:   Head: Normocephalic and atraumatic.   Cardiovascular: Normal heart sounds.   Irregularly irregular rhythm   Pulmonary/Chest: Effort normal and breath sounds normal. He has no wheezes.   Abdominal: Soft. Bowel sounds are normal. He exhibits no distension.   Neurological: He is alert and oriented to person, place, and time.   Skin: Skin is warm and dry. No erythema.   Psychiatric: He has a normal mood and affect. His behavior is normal. Judgment and thought content normal.   Vitals reviewed.    Significant Labs:   CMP   Recent Labs   Lab 07/16/19  0608 07/17/19  0610 07/17/19 2023   * 136 132*   K 4.7 4.5 4.2    99 100   CO2 25 25 24   * 108 148*   BUN 20 24* 25*   CREATININE 0.88 0.84 0.9   CALCIUM 8.0* 7.9* 8.0*   PROT 5.9* 6.0 5.8*   ALBUMIN 2.6* 2.7* 2.2*   BILITOT 0.6 0.5 0.3   ALKPHOS 81 80 95   AST 17 21 15   ALT 11 11 8*   ANIONGAP 10 12 8   ESTGFRAFRICA >60.0 >60.0 >60.0   EGFRNONAA >60.0 >60.0 >60.0    and CBC   Recent Labs   Lab 07/16/19  0608 07/17/19  0610 07/17/19 2023   WBC 7.82 7.03 9.30   HGB 8.9* 9.0* 9.3*   HCT 29.3* 30.4* 31.9*   * 506* 430*       Significant Imaging: Echocardiogram:   Transthoracic echo (TTE) complete (Cupid Only):   Results for orders placed or performed during the hospital encounter of 07/15/19   Transthoracic echo (TTE) 2D with Color Flow   Result Value Ref Range    BSA 1.88 m2    Right Atrial Pressure (from IVC) 8 mmHg    Narrative    · Normal left ventricular systolic function. The estimated ejection   fraction is 60%  · Grade I (mild) left ventricular diastolic dysfunction consistent with   impaired relaxation.  · Normal right ventricular systolic function.  · Mild tricuspid regurgitation.  · Intermediate central venous pressure (8 mm Hg).  · Moderate circumferential pericardial effusion without tamponade.  · There is a left pleural effusion.  · Compared  to echo images from 7/15/2019, pericardial effusion appears   largely unchanged in size, and pt is now in sinus rhythm.       and EKG:     On bedside Echo, moderate pericardial effusion was noted with minimal early diastolic collapse of the RV with RA or RV inversion. Doppler spectra noted 10-20% respiratory variation in mitral and tricuspid valve. Echo performed with assistance of co-fellows.

## 2019-07-18 NOTE — PLAN OF CARE
Problem: Adult Inpatient Plan of Care  Goal: Plan of Care Review  Outcome: Ongoing (interventions implemented as appropriate)  Pt. with nonskid footwear on with bed in lowest position and locked with bed rails up x2, with bed alarm being utilized.  Pt. instructed to call prior to getting OOB.  Pt. with call light within reach and verbalized understanding.  Pt. with a pericardiocentesis done this afternoon.  Pt. continued on telemetry and in NSR all shift.  Pt. with c/o a headache this afternoon with ibuprofen given.  Pt. With hypotension this afternoon with 500 mL bolus given.  Pt. With family at bedside.  Will continue to monitor pt.

## 2019-07-18 NOTE — PROGRESS NOTES
Dr. Enrique notified of current BP, will continue to monitor pt.         07/18/19 1532   Vital Signs   BP (!) 95/53   MAP (mmHg) 66   BP Location Left arm   Patient Position Lying

## 2019-07-18 NOTE — CONSULTS
[facility]  Interventional Cardiology  Consult Note    Patient Name: Cale Harding  MRN: [id number]  Admission Date: 7/17/2019  Hospital Length of Stay: 1 days  Code Status: Full Code   Attending Provider: Ashish Trevizo MD  Consulting Provider: Sherry Cintron MD  Primary Care Physician: Jj Escobedo MD  Principal Problem:Pericardial effusion without cardiac tamponade    Patient information was obtained from patient and ER records.     Inpatient consult to Interventional Cardiology  Consult performed by: Sherry Cintron MD  Consult ordered by: Fior Padron MD        Subjective:     Chief Complaint:  Pericardial effusion     HPI:  Mr. Harding is a 78-year-old male with prostate cancer (s/p prostatectomy), mesothelioma (on chemotherapy), with recurrent pleural effusion  who initially presented to OSH 7/15/19 for elective cystography for dilation of bladder neck contracture. He was noted to be tachycardic with HR in 150s bpm. He was placed on a diltiazem drip , started on lopressor and noted to have a moderate sized pericardial effusion without tamponade physiology. Cardiology at OSH was planning for pericardiocentesis vs. pericardial window but patient requested transfer here.     On arrival patient was in Afib with RVR with HR varying 70-160bpm and BP /50-80. Cardiology has been consulted for further evaluation and management of atrial fibrillation and pericardial effusion.  Interventional Cardiology was consulted to determine if patient requires pericardiocentesis.      Pt reports intermittent episodes of palpitations without chest pain or shortness of breath, which spontaneously resolve. He denies any prior history of CAD, CHF, diabetes.       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 OR [address] [place] [address]    [id number] N/A 2/1/2018    Performed by [facility] Diagnostic Provider at [facility] OR [address] [place] [address]    BRONCHOSCOPY N/A 6/3/2019    Performed by [facility] Diagnostic Provider at [facility] OR [address] [place] [address]    COLONOSCOPY  10/20/2012    COLONOSCOPY  11/19/2014    dr machado ( repeat in 3 years per the pt )    CYSTOSCOPY, WITH RETROGRADE PYELOGRAM Bilateral 7/15/2019    Performed by [facility]ino Philippe MD at [facility] OR    CYSTOSCOPY, WITH URETHRAL DILATION Bilateral 7/15/2019    Performed by Galdino Philippe MD at [facility] OR    ELBOW SURGERY Left 2013    dislocation    [id number] N/A 6/20/2019    Performed by [facility] Diagnostic Provider at [facility] OR [address] [place] [address]    LAPAROSCOPY-DIAGNOSTIC N/A 3/19/2018    [facility]    PROCTECTOMY  2002    RELEASE, CONTRACTURE, BLADDER NECK N/A 7/15/2019    Performed by Galdino Philippe MD at [facility] OR    SHOULDER ARTHROSCOPY W/ ROTATOR CUFF REPAIR Right 2008    THORACOSCOPY-VIDEO ASSISTED (VATS) W/PLEURAL BIOPSY Left 3/19/2018    Performed by Galdino Fang MD at [facility] OR [address] [place] [address]    URETHROGRAM, RETROGRADE N/A 7/15/2019    Performed by Galdino Philippe MD at [facility] OR       Review of patient's allergies indicates:   Allergen Reactions    Bactrim [sulfamethoxazole-trimethoprim] Other (See Comments)     Joint pains       PTA Medications   Medication Sig    amoxicillin-clavulanate 875-125mg (AUGMENTIN) 875-125 mg per tablet Take 1 tablet by mouth 2 (two) times daily.    calcium carbonate (TUMS) 200 mg calcium (500 mg) chewable tablet Take 1 tablet by mouth as needed for Heartburn.    dextromethorphan-guaifenesin  mg (MUCINEX DM)  mg per 12 hr tablet Take 1 tablet by mouth every 12 (twelve) hours.    enalapril (VASOTEC) 5 MG tablet Take 1 tablet (5 mg total) by mouth 2 (two) times daily.     HYDROcodone-acetaminophen (NORCO) 5-325 mg per tablet Take 1 tablet by mouth every 6 (six) hours as needed for Pain.    ibuprofen (ADVIL,MOTRIN) 100 MG tablet Take 100 mg by mouth every 6 (six) hours as needed for Temperature greater than.    LACTOBACILLUS ACIDOPHILUS (ACIDOPHILUS ORAL) Take 1 tablet by mouth once daily.    phenylephrine HCl/acetaminophn (SINUS PAIN-PRESSURE, PE, ORAL) Take by mouth.    polyethylene glycol (MIRALAX) 17 gram/dose powder Take 17 g by mouth once daily.     Family History     Problem Relation (Age of Onset)    Cancer Mother    Heart attack Sister, Sister    Heart disease Father, Brother, Sister, Brother, Sister, Brother    No Known Problems Son, Son, Son    Prostate cancer Brother, Brother, Brother        Tobacco Use    Smoking status: Former Smoker     Packs/day: 2.00     Years: 15.00     Pack years: 30.00     Types: Cigarettes     Last attempt to quit: 1970     Years since quittin.5    Smokeless tobacco: Former User    Tobacco comment: pt quit 40 years ago   Substance and Sexual Activity    Alcohol use: No     Alcohol/week: 16.8 oz     Types: 28 Cans of beer per week     Comment: None since Nov 15 th 2017    Drug use: No    Sexual activity: Not Currently     Review of Systems   Constitution: Negative.   HENT: Negative.    Eyes: Negative.    Cardiovascular: Positive for dyspnea on exertion. Negative for chest pain.   Endocrine: Negative.    Hematologic/Lymphatic: Negative.    Musculoskeletal: Negative.    Gastrointestinal: Negative.    Genitourinary: Positive for hesitancy.   Neurological: Negative.    Psychiatric/Behavioral: Negative.      Objective:     Vital Signs (Most Recent):  Temp: 97.7 °F (36.5 °C) (19)  Pulse: 67 (19)  Resp: (!) 22 (19)  BP: 108/64 (19)  SpO2: 96 % (19) Vital Signs (24h Range):  Temp:  [97.5 °F (36.4 °C)-98.3 °F (36.8 °C)] 97.7 °F (36.5 °C)  Pulse:  [] 67  Resp:  [15-30] 22  SpO2:  [92  %-97 %] 96 %  BP: ()/(51-82) 108/64     Weight: 77.6 kg (171 lb 1.2 oz)  Body mass index is 26.79 kg/m².    SpO2: 96 %  O2 Device (Oxygen Therapy): room air      Intake/Output Summary (Last 24 hours) at 7/18/2019 1110  Last data filed at 7/18/2019 1045  Gross per 24 hour   Intake 470 ml   Output 1800 ml   Net -1330 ml       Lines/Drains/Airways     Central Venous Catheter Line                 Port A Cath Single Lumen 06/20/19 right internal jugular 28 days          Drain                 Chest Tube Right Pleural -- days         Urethral Catheter 07/17/19 0920 Non-latex 16 Fr. 1 day                Physical Exam   Constitutional: He is oriented to person, place, and time. He appears well-developed.   HENT:   Head: Normocephalic and atraumatic.   Mouth/Throat: Oropharynx is clear and moist.   Eyes: Pupils are equal, round, and reactive to light. EOM are normal.   Neck: Neck supple. No JVD present.   Cardiovascular: Intact distal pulses.   Irregularly irregular rhythm    Pulmonary/Chest: Effort normal and breath sounds normal.   Diminished breath sounds at the right base   Abdominal: Soft. Bowel sounds are normal.   Musculoskeletal: Normal range of motion. He exhibits no edema.   Neurological: He is alert and oriented to person, place, and time.   Skin: Skin is warm and dry.   Psychiatric: He has a normal mood and affect.       Significant Labs:   CMP   Recent Labs   Lab 07/17/19  0610 07/17/19 2023 07/18/19  0358    132* 130*   K 4.5 4.2 4.2   CL 99 100 98   CO2 25 24 25    148* 115*   BUN 24* 25* 27*   CREATININE 0.84 0.9 0.8   CALCIUM 7.9* 8.0* 8.3*   PROT 6.0 5.8* 5.6*   ALBUMIN 2.7* 2.2* 2.1*   BILITOT 0.5 0.3 0.3   ALKPHOS 80 95 88   AST 21 15 17   ALT 11 8* 7*   ANIONGAP 12 8 7*   ESTGFRAFRICA >60.0 >60.0 >60.0   EGFRNONAA >60.0 >60.0 >60.0   , CBC   Recent Labs   Lab 07/17/19  0610 07/17/19 2023 07/18/19  0358   WBC 7.03 9.30 8.12   HGB 9.0* 9.3* 9.0*   HCT 30.4* 31.9* 30.9*   * 430*  451*   , INR   Recent Labs   Lab 07/18/19  0358   INR 1.1    and Troponin No results for input(s): TROPONINI in the last 48 hours.    Significant Imaging: Echocardiogram:   2D echo with color flow doppler: No results found for this or any previous visit. and Transthoracic echo (TTE) complete (Cupid Only):   Results for orders placed or performed during the hospital encounter of 07/15/19   Transthoracic echo (TTE) 2D with Color Flow   Result Value Ref Range    BSA 1.88 m2    Right Atrial Pressure (from IVC) 8 mmHg    Narrative    · Normal left ventricular systolic function. The estimated ejection   fraction is 60%  · Grade I (mild) left ventricular diastolic dysfunction consistent with   impaired relaxation.  · Normal right ventricular systolic function.  · Mild tricuspid regurgitation.  · Intermediate central venous pressure (8 mm Hg).  · Moderate circumferential pericardial effusion without tamponade.  · There is a left pleural effusion.  · Compared to echo images from 7/15/2019, pericardial effusion appears   largely unchanged in size, and pt is now in sinus rhythm.        Assessment and Plan:     * Pericardial effusion without cardiac tamponade  Patient found to have moderate pericardial effusion on TTE without evidence of cardiac tamponade. Will perform diagnostic pericardiocentesis to determine etiology.  - NPO, plan for pericardiocentesis this AM  - Ordered labs of pericardial fluid: cell count, cytology, protein, LDH, cultures (aerobic/anaerobic/fungal), ADA, AFB smear and culture  - Type and screen ordered  - All patient's questions were answered.  -The risks, benefits and alternatives of the procedure were explained to the patient.   -The risks of moderate sedation include hypotension, respiratory depression, arrhythmias, bronchospasm, and death.   - Informed consent was obtained and the  patient is agreeable to proceed with the procedure.          VTE Risk Mitigation (From admission, onward)        Ordered      Place sequential compression device  Until discontinued      07/17/19 1930     IP VTE HIGH RISK PATIENT  Once      07/17/19 1930          Thank you for your consult. I will sign off. Please contact us if you have any additional questions.    Sherry Cintron MD  Interventional Cardiology   [facility]

## 2019-07-18 NOTE — ASSESSMENT & PLAN NOTE
Patient found to have moderate pericardial effusion on TTE without evidence of cardiac tamponade. Will perform diagnostic pericardiocentesis to determine etiology.  - NPO, plan for pericardiocentesis this AM  - Ordered labs of pericardial fluid: cell count, cytology, protein, LDH, cultures (aerobic/anaerobic/fungal), ADA, AFB smear and culture  - Type and screen ordered  - All patient's questions were answered.  -The risks, benefits and alternatives of the procedure were explained to the patient.   -The risks of moderate sedation include hypotension, respiratory depression, arrhythmias, bronchospasm, and death.   - Informed consent was obtained and the  patient is agreeable to proceed with the procedure.

## 2019-07-18 NOTE — ASSESSMENT & PLAN NOTE
- Patient of  , discussees his drain is infected ( recommended Augmentin) , he suspects that the drain needs to be removed and he will discuss with pulm fellow . Consults placed please call in the am d with him as I saw him in the ICU he recommended to continue antibiotics as he believ  - Last drainage was on Sunday , and is supposed to be drained once a week   - Patient on RA and not SOB will continue drainage as scheduled   - Will repeat Chest xray   - Patient stated that there is concern that his PleuX needs to come out, on exam no erythema , will get Chest xray and morning team to discuss whether pulmonary needs to be consulted

## 2019-07-18 NOTE — CONSULTS
[facility]  Cardiology  Consult Note    Patient Name: Cale Harding  MRN: [id number]  Admission Date: 7/17/2019  Hospital Length of Stay: 1 days  Code Status: Full Code   Attending Provider: Ashish Trevizo MD   Consulting Provider: Fior Padron MD  Primary Care Physician: Jj Escobedo MD  Principal Problem:Pericardial effusion    Patient information was obtained from patient, past medical records and ER records.     Inpatient consult to Cardiology  Consult performed by: Fior Padron MD  Consult ordered by: Vnai Brunson MD        Subjective:     HPI:   Mr. Harding is a 78-year-old male with prostate cancer (s/p prostatectomy), mesothelioma (on chemotherapy), with recurrent pleural effusion  who initially presented to OSH 7/15/19 for elective cystography for dilation of bladder neck contracture. He was noted to be tachycardic with HR in 150s bpm. He was placed on a diltiazem drip , started on lopressor and noted to have a moderate sized pericardial effusion without tamponade physiology. Cardiology at OSH was planning for pericardiocentesis vs. pericardial window but patient requested transfer here.     On arrival patient was in Afib with RVR with HR varying 70-160bpm and BP /50-80. Cardiology has been consulted for further evaluation and management of atrial fibrillation and pericardial effusion.     Pt reports intermittent episodes of palpitations without chest pain or shortness of breath, which spontaneously resolve. He denies any prior history of CAD, CHF, diabetes.    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    Prostate cancer 2002    Urinary tract infection      Past Surgical History:   Procedure Laterality Date    BIOPSY-DIAPHRAGM N/A 3/19/2018    [facility]    CGYUZK-QXRCIL-YY N/A 2/1/2018     Performed by [facility] OR [facility]ND [facility]    BRONCHOSCOPY N/A 6/3/2019    Performed by [facility]    COLONOSCOPY  10/20/2012    COLONOSCOPY  11/19/2014    dr machado ( repeat in 3 years per the pt )    CYSTOSCOPY, WITH RETROGRADE PYELOGRAM Bilateral 7/15/2019    Performed by [facility] Philippe [facility] at [facility] OR    CYSTOSCOPY, WITH URETHRAL DILATION Bilateral 7/15/2019    Performed by [facility] Philippe [facility] at [facility]    ELBOW SURGERY Left 2013    dislocation    IDLGHODBP-IQXG-L-CATH N/A 6/20/2019    Performed by [facility] OR [facility]    LAPAROSCOPY-DIAGNOSTIC N/A 3/19/2018    Performed by Gal[facility] Fang MD at [facility] OR [facility]    PROCTECTOMY  2002    RELEASE, CONTRACTURE, BLADDER NECK N/A 7/15/2019    Performed by [facility]ino Philippe MD at [facility]    SHOULDER ARTHROSCOPY W/ ROTATOR CUFF REPAIR Right 2008    THORACOSCOPY-VIDEO ASSISTED (VATS) W/PLEURAL BIOPSY Left 3/19/2018    Performed by Gal[facility] Fang MD at [facility] OR [facility]    URETHROGRAM, RETROGRADE N/A 7/15/2019    Performed by Galdino Philippe MD at [facility] OR     Review of patient's allergies indicates:   Allergen Reactions    Bactrim [sulfamethoxazole-trimethoprim] Other (See Comments)     Joint pains     Current Facility-Administered Medications on File Prior to Encounter   Medication    [COMPLETED] furosemide injection 40 mg    [COMPLETED] metoprolol injection 5 mg    [DISCONTINUED] acetaminophen tablet 650 mg    [DISCONTINUED] dextrose 10% (D10W) Bolus    [DISCONTINUED] dextrose 10% (D10W) Bolus    [DISCONTINUED] diltiaZEM 125 mg in D5W 125 mL infusion    [DISCONTINUED] furosemide injection 20 mg    [DISCONTINUED] furosemide injection 40 mg    [DISCONTINUED] glucagon (human recombinant) injection 1 mg    [DISCONTINUED] glucose chewable tablet 16 g    [DISCONTINUED] glucose chewable tablet 24 g    [DISCONTINUED] ibuprofen tablet 400 mg    [DISCONTINUED] metoprolol  tartrate (LOPRESSOR) tablet 25 mg    [DISCONTINUED] ondansetron injection 4 mg    [DISCONTINUED] pantoprazole EC tablet 40 mg    [DISCONTINUED] pantoprazole EC tablet 40 mg    [DISCONTINUED] sodium chloride 0.9% flush 10 mL     Current Outpatient Medications on File Prior to Encounter   Medication Sig    amoxicillin-clavulanate 875-125mg (AUGMENTIN) 875-125 mg per tablet Take 1 tablet by mouth 2 (two) times daily.    calcium carbonate (TUMS) 200 mg calcium (500 mg) chewable tablet Take 1 tablet by mouth as needed for Heartburn.    dextromethorphan-guaifenesin  mg (MUCINEX DM)  mg per 12 hr tablet Take 1 tablet by mouth every 12 (twelve) hours.    enalapril (VASOTEC) 5 MG tablet Take 1 tablet (5 mg total) by mouth 2 (two) times daily.    HYDROcodone-acetaminophen (NORCO) 5-325 mg per tablet Take 1 tablet by mouth every 6 (six) hours as needed for Pain.    ibuprofen (ADVIL,MOTRIN) 100 MG tablet Take 100 mg by mouth every 6 (six) hours as needed for Temperature greater than.    LACTOBACILLUS ACIDOPHILUS (ACIDOPHILUS ORAL) Take 1 tablet by mouth once daily.    phenylephrine HCl/acetaminophn (SINUS PAIN-PRESSURE, PE, ORAL) Take by mouth.    polyethylene glycol (MIRALAX) 17 gram/dose powder Take 17 g by mouth once daily.     Family History     Problem Relation (Age of Onset)    Cancer Mother    Heart attack Sister, Sister    Heart disease Father, Brother, Sister, Brother, Sister, Brother    No Known Problems Son, Son, Son    Prostate cancer Brother, Brother, Brother        Tobacco Use    Smoking status: Former Smoker     Packs/day: 2.00     Years: 15.00     Pack years: 30.00     Types: Cigarettes     Last attempt to quit: 1970     Years since quittin.5    Smokeless tobacco: Former User    Tobacco comment: pt quit 40 years ago   Substance and Sexual Activity    Alcohol use: No     Alcohol/week: 16.8 oz     Types: 28 Cans of beer per week     Comment: None since Nov 15 th 2017    Drug use:  No    Sexual activity: Not Currently     Review of Systems   Constitution: Negative for chills and decreased appetite.   Eyes: Negative for double vision.   Cardiovascular: Positive for irregular heartbeat, leg swelling and palpitations. Negative for chest pain, near-syncope and orthopnea.   Respiratory: Negative for shortness of breath.    Gastrointestinal: Negative for constipation, diarrhea, nausea and vomiting.   Neurological: Positive for weakness.   Psychiatric/Behavioral: Negative for altered mental status.     Objective:     Vital Signs (Most Recent):  Temp: 97.6 °F (36.4 °C) (07/18/19 0007)  Pulse: 75 (07/18/19 0100)  Resp: 20 (07/18/19 0100)  BP: 96/65 (07/18/19 0100)  SpO2: (!) 94 % (07/18/19 0007) Vital Signs (24h Range):  Temp:  [97.6 °F (36.4 °C)-98.3 °F (36.8 °C)] 97.6 °F (36.4 °C)  Pulse:  [] 75  Resp:  [15-31] 20  SpO2:  [92 %-97 %] 94 %  BP: ()/(51-82) 96/65     Weight: 75.8 kg (167 lb 1.6 oz)  Body mass index is 26.17 kg/m².    SpO2: (!) 94 %  O2 Device (Oxygen Therapy): room air    Intake/Output Summary (Last 24 hours) at 7/18/2019 0228  Last data filed at 7/18/2019 0100  Gross per 24 hour   Intake 290 ml   Output 800 ml   Net -510 ml       Lines/Drains/Airways     Central Venous Catheter Line                 Port A Cath Single Lumen 06/20/19 right internal jugular 28 days          Drain                 Chest Tube Right Pleural -- days         Urethral Catheter 07/17/19 0920 Non-latex 16 Fr. less than 1 day              Physical Exam   Constitutional: He is oriented to person, place, and time. He appears well-developed and well-nourished.   HENT:   Head: Normocephalic and atraumatic.   Cardiovascular: Normal heart sounds.   Irregularly irregular rhythm   Pulmonary/Chest: Effort normal and breath sounds normal. He has no wheezes.   Abdominal: Soft. Bowel sounds are normal. He exhibits no distension.   Neurological: He is alert and oriented to person, place, and time.   Skin: Skin is  warm and dry. No erythema.   Psychiatric: He has a normal mood and affect. His behavior is normal. Judgment and thought content normal.   Vitals reviewed.    Significant Labs:   CMP   Recent Labs   Lab 07/16/19  0608 07/17/19  0610 07/17/19  2023   * 136 132*   K 4.7 4.5 4.2    99 100   CO2 25 25 24   * 108 148*   BUN 20 24* 25*   CREATININE 0.88 0.84 0.9   CALCIUM 8.0* 7.9* 8.0*   PROT 5.9* 6.0 5.8*   ALBUMIN 2.6* 2.7* 2.2*   BILITOT 0.6 0.5 0.3   ALKPHOS 81 80 95   AST 17 21 15   ALT 11 11 8*   ANIONGAP 10 12 8   ESTGFRAFRICA >60.0 >60.0 >60.0   EGFRNONAA >60.0 >60.0 >60.0    and CBC   Recent Labs   Lab 07/16/19  0608 07/17/19 0610 07/17/19 2023   WBC 7.82 7.03 9.30   HGB 8.9* 9.0* 9.3*   HCT 29.3* 30.4* 31.9*   * 506* 430*       Significant Imaging:   ECG 7/17/19 19:56  Atrial fibrillation with RVR  bpm    ECG 7/16/19 08:27  NSR with HR 88 bpm    ECG 7/15/19 08:27  NSR with  bpm      Echocardiogram 7/17/19  Transthoracic echo (TTE) 2D with Color Flow   Result Value Ref Range    BSA 1.88 m2    Right Atrial Pressure (from IVC) 8 mmHg    Narrative    · Normal left ventricular systolic function. The estimated ejection   fraction is 60%  · Grade I (mild) left ventricular diastolic dysfunction consistent with   impaired relaxation.  · Normal right ventricular systolic function.  · Mild tricuspid regurgitation.  · Intermediate central venous pressure (8 mm Hg).  · Moderate circumferential pericardial effusion without tamponade.  · There is a left pleural effusion.  · Compared to echo images from 7/15/2019, pericardial effusion appears   largely unchanged in size, and pt is now in sinus rhythm.       and EKG:     On bedside Echo 7/17/19: moderate pericardial effusion was noted with minimal early diastolic collapse of the RV with RA or RV inversion. Doppler spectra noted 10-20% respiratory variation in mitral and tricuspid valve. Echo performed with assistance of  co-fellows.    Assessment and Plan:     Atrial fibrillation with RVR  - first noted on ECG 7/15/19 although pt reports longer history of palpitations  - likely multifactorial due to pericardial effusion and underlying malignancy, precipitated by volume overload  - rate control with lopressor limited due to low-normal BP (SBP /60s) overnight. Recommend upt  - CHADsVASC 3 suggestive of 3.2% annual stroke risk. Would consider anticoagulation when stable and after definitive treatment/intervention of pericardial effusion    Pericardial effusion  - moderate pericardial effusion without markers of cardiac tamponade. On exam, no pulsus paradoxus. On bedside echo findings performed with co-fellow: minimal early diastolic RV collapse without exaggerated respiratory in mitral and tricuspid flow. Appears consistent with prior TTEs although formal read is pending.   - recommend consult Interventional cardiology for pericardiocentesis vs. CTS consult for pericardial window    Congestive heart failure     - per ECHO 7/17/19 EF 60% with Grade 1 diastolic dysfunction - CVP 8, elevated BNP (374), leg edema bilaterally suggestive of volume overload     - consider diuresis with lasix once hemodynamically stable     - strict in & out     - daily weights    - cardiac diet    Patient seen, and plan of care discussed with Dr. Blanco    Thank you for your consult. I will continue to follow along. Low threshold to admit pt to CCU if hemodynamically unstable.     Please contact us if you have any additional questions.    Fior Padron MD  Cardiology   [id number]  [facility]

## 2019-07-18 NOTE — HOSPITAL COURSE
Admitted to hematology oncology on 7/17 for a. Fib with RVR.      The patient was found to have a possibly associated pericardial effusion we were concerned was contributing to a. Fib. CTS consulted for evaluation of pericardial effusion to evaluate need for pericardial window. Patient not strong candidate for pericardial window per CTS, Interventional cardiology performed paracentesis on hospital day 2 and drained 570cc from pericardial space. TTE on 7/21 showed a reaccumulation of pericardial fluid, thoracic surgery consulted for potential pericardial window and deemed patient a poor candidate due to there likely being pericardial disease and rash on anterior chest, Interventional cardiology also felt patient was a poor candidate. Also repeat TTE on 7/26 showed stable pericardial effusion when compared to 7/26 and there was no tamponade physiology. A. Fib with RVR did not resolve following paracentesis.  They recommended starting patient on heparin and diltiazem drip for rate control. Rate controlled and pt stepped down to PO diltiazem and lopressor but a.fib with AVR recurred and was then switched from lopressor to Toprol XL 100mg.  Patient experienced improvement in shortness of breath and atrial fibrillation, but on hospital day 7 the patient developed a new rash first noticed on back and spread to chest and abdomen. Dermatology was consulted and felt this was likely AGEP from diltiazem and Cardiology was in agreement. Diltiazem discontinued and patient's Toprol XL was increased to 200mg daily for management of his atrial fibrillation. On 7/31, the patient missed his Toprol dose to low blood pressure and he went into a.fib with RVR again.  He was started on amiodarone drip with improvement in HR.  Cardiology recommended transitioning to PO amio + Toprol 100mg(lower dose 2/2 lower blood pressure)  for rhythm and rate control respectively.  He had no more episodes of a.fib with RVR following the start of this  regimen.  Cardiology did recommend that patient be started on anticoagulation, however he had a hematuria on lovenox and heparin and patient is poor anticoagulation candidate given his frailty and poor prognosis.  Cardiology recommends that as outpatient, based on prognosis that Dr. Foster decides whether to follow pericardial effusion and continue amiodarone without anticoagulation. He will follow up with cardiology in 2-3 weeks.    Patient also with evidence of bilateral malignant effusion on admission. Patient seen by Pulmonology. Dr. Padilla familiar w/ patient and was able to drain 450 ccs from R sided pleurX cath. Pulmonology continued to follow patient for bilateral malignant effusion and felt that there is nothing left to drain from left or right side. Patient's respiratory status remained stable and he did not experience any more shortness of breath.     For his rash, Dermatology continued to follow patient. The patient completed prednisone 60mg daily x 5 days and topical steroids.  He was transitioned to prednisone 20mg daily for 4 days total.  His rash continued to improve with this regimen.  He has 3 more days of prednisone to complete following discharge and he was advised to use vaseline topically for desquamation.     On 7/31 patient was found to have a urinary tract infection positive for gram negative rods and he was started on Rocephin.  He completed 3 days of therapy, however cultures grew Klebsiella pneumonia resistant to rocephin.  He was transitioned to oral levaquin of which he completed 3 days, and then he was transtioned to zosyn per family request due to levaquin's side effect profile.He is unable to have haider removed due to bladder neck contracture per Urology.  He will follow up with Urology as outpatient to decide whether removal would be feasible in future.    On 8/3 the patient developed delirium characterized by heightened alertness and disorientation we believe 2/2 receiving high dose  steroids, uti, and length of hospital stay. He improved with a lower dose of steroids, adequate antibiotics treatment of his UTI, delirium precautions and discontinuing marinol.    He was deemed ready for discharge with plans to follow up with Dr. Foster with labs on 8/8.  We also deferred further hospice discussion to outpatient Dr. Foster since patient improved in hospital.

## 2019-07-18 NOTE — ASSESSMENT & PLAN NOTE
- Patient transferred for evaluation by cardiology / CTS for pericardial window or pericardiocentesis   - Pericardiocentesis preformed by interventional cards, pt tolerated procedure well, produced 570cc serosanguinous fluid.  - Maximize medical management per cards  - will appreciate recs

## 2019-07-18 NOTE — HPI
Mr. Harding is a 78-year-old male with prostate cancer (s/p prostatectomy), mesothelioma (on chemotherapy), with recurrent pleural effusion  who initially presented to OSH 7/15/19 for elective cystography for dilation of bladder neck contracture. He was noted to be tachycardic with HR in 150s bpm. He was placed on a diltiazem drip , started on lopressor and noted to have a moderate sized pericardial effusion without tamponade physiology.    · Pericardiocentesis (7/18) moderate pericardial effusion; same day he underwent pericardiocentesis and removed 570 cc of serosanguinous pericardial fluid.   · TTE (7/21) showed Small circumferential pericardial effusion. Shaggy visceral pericardium. No evidence of tamponade physiology  · TTE (7/26) Circumferential pericardial effusion; unchanged from 7/21/19. No evidence of tamponade.    Mr. Cale Harding has been actively diurese with sub optimal UOP. Intervetional cardiology re-consulted for recurrent pericardial effusion.

## 2019-07-18 NOTE — CONSULTS
Please see note from Dr. Woodward this AM regarding plan for pleurx drain. If there is any need while patient is in the hospital for symptomatic drainage of his pleural fluid, we can provide team / nursing staff with a drainage kit.     Julio C Reynoso MD  [facility] Care Fellow  Pager: [phone]  Cell: [phone]

## 2019-07-18 NOTE — ASSESSMENT & PLAN NOTE
Urology consulted in the OSH   S/p Bladder neck contracture release and urethral dilation on 7/15/19  Was having problems voiding this am and haider was placed again   Will consult urology for final discharge recs on whether he needs to remain with haider until seen OP , call in the am

## 2019-07-18 NOTE — CARE UPDATE
"RAPID RESPONSE NURSE PROACTIVE ROUNDING NOTE     Time of Visit: 0020    Admit Date: 2019  LOS: 1  Code Status: Full Code   Date of Visit: 2019  : 1940  Age: 78 y.o.  Sex: male  Race: White  Bed: 828/828 A:   MRN: [id number]  Was the patient discharged from an ICU this admission? no   Was the patient discharged from a PACU within last 24 hours?  no  Did the patient receive conscious sedation/general anesthesia in last 24 hours?  no  Was the patient in the ED within the past 24 hours?  yes  Was the patient started on NIPPV within the past 24 hours?  no  Attending Physician: Ashish Trevizo MD  [place] [facility]    ASSESSMENT     Diagnosis: Pericardial effusion    Abnormal Vital Signs: BP 96/65   Pulse 75   Temp 97.6 °F (36.4 °C)   Resp 20   Ht 5' 7" (1.702 m)   Wt 75.8 kg (167 lb 1.6 oz)   SpO2 (!) 94%   BMI 26.17 kg/m²      Clinical Issues: Circulatory    Patient  has a past medical history of Disorder of kidney and ureter, Diverticulitis, Elevated PSA, Hypertension, Lung cancer, Mesothelioma, Prostate cancer, and Urinary tract infection.    Pt transferred from [facility] ICU for possible window or pericardiocentesis. History of prostate cancer and is currently being treated for mesothelioma. New onset Afib RVR discovered @ outside facility. Was previously on a diltiazem gtt until pt converted back to NSR. Pt currently in Afib HR sustianing 120-140's.  Cardiology consulted and bedside echo done with no emergent information found. Pt will most likely need paracentesis on . PRN lopressor given but pt BP not tolerating. Pt also on lopressor 25 mg PO BID. Cardiology states call if HR sustains 160's for 15 minutes. POC reviewed with bedside RN. RRN to follow up.   INTERVENTIONS/ RECOMMENDATIONS         Discussed plan of care with Ana WILKINSON.    PHYSICIAN ESCALATION     Yes/No  no    Orders received and case discussed with NA.    Disposition: Remain in room " 148.+    FOLLOW-UP     Call back the Rapid Response Nurse, Daphnie Castro RN at [phone] for additional questions or concerns.

## 2019-07-18 NOTE — ASSESSMENT & PLAN NOTE
- likely due to pericardial effusion vs. Underlying malignancy  - rate control with lopressor limited to low-normal BP (SBP /60s)  - CHADsVASC 3 suggestive of 3.2% annual stroke risk. Would consider anticoagulation when stable and after definitive treatment/intervention of pericardial effusion

## 2019-07-18 NOTE — CODE DOCUMENTATION
"On admission patient requested to be full code , he understands that his [place] post states DNR. He is alert oriented and with capacity.  He stated: " I  would like to give resuscitation a shot , he expressed that his son and wife know that if he were to be resuscitated and live on " machines:, they would know when to stop. "    Wife and nurse updated and chart order updated to full code         Vani Brunson MD, MPH  Internal Medicine PGY3         "

## 2019-07-18 NOTE — HPI
Mr. Harding is a 78-year-old male with prostate cancer (s/p prostatectomy), mesothelioma (on chemotherapy), with recurrent pleural effusion  who initially presented to OSH for elective cystography for dilation of bladder neck contracture.     Patient was scheduled for elective cystography with Urology (Dr. Philippe) for dilation of bladder neck contracture.  On arrival to elective surgical suite, patient found to be tachycardic and heart rate was irregular.  EKG confirmed atrial fibrillation with RVR.  Surgery was cancelled and he was admitted to the ICU for a diltiazem drip. He converted to sinus but would go in and out of afib, was started on lopressor 25 BID and echo done showed moderate size of pericardial effusion with no tamponade physiology however increased in effusion from 7/5.   Pericardiocentesis was done on 7/18 with removal of 270 cc of fluid that was positive for malignancy. Patients HR has been controled with diltiazem 60 mg Q6H and metoprolol 100 mg. He has been in NSR.

## 2019-07-18 NOTE — ASSESSMENT & PLAN NOTE
- Patient of  , discussed with his overnight suspect that the drain could be infected and  recommended Augmentin for 5 more day. He also drained 450 cc of fluid.   - Pulm consults rec to leave PleurX in place   - Last drainage was morning of 7/18 , and is supposed to be drained once a week   - Patient on RA and not SOB   -  repeat Chest xray with effusion as seen in previous

## 2019-07-19 NOTE — PROGRESS NOTES
07/19/19 0718   Vital Signs   Temp 97.5 °F (36.4 °C)   Pulse (!) 156   SpO2 (!) 92 %   O2 Device (Oxygen Therapy) room air   /86   Noticed patient's HR on telemetry monitor tachycardic and irregular. Dr. Enrique notified; initially 2.5 mg IV lopressor ordered. Changed to 5 mg IV lopressor. Administered by Ana Acuña RN. Heart rate controlled following medication; will continue to monitor.

## 2019-07-19 NOTE — SUBJECTIVE & OBJECTIVE
Interval History: Interventional cards consulted, preformed pericardiocentesis on 7/19 which produced 570cc fluid. Cards to follow. Pulm consulted, plan to leave PleurX in place. Uro rec to continue haider cath at this time for bladder contracture. Will appreciate emeli DEL CASTILLO. Patient experienced 1 episode of Afib w/ RVR this morning. Cards on board and metoprolol 25mg 5 cc pushed during episode. Successfully lowered HR and did not cause hypotension as it had done on previous attempts. Patient HD stable at this time but still in Afib. Patient still has SOB on exertion and a mild cough. He has not had a BM since Wednesday. Haider in place for bladder contracture per urology. He denies HA, f/c, n/v, CP, and Abd pain.    Oncology Treatment Plan:   OP NSCLC VINORELBINE WEEKLY    Medications:  Continuous Infusions:   heparin (porcine)       Scheduled Meds:   amoxicillin-clavulanate 500-125mg  1 tablet Oral TID    metoprolol tartrate  25 mg Oral BID    pantoprazole  40 mg Oral Daily     PRN Meds:albuterol-ipratropium, alteplase, Dextrose 10% Bolus, Dextrose 10% Bolus, glucagon (human recombinant), glucose, glucose, heparin (porcine), heparin, porcine (PF), HYDROcodone-acetaminophen, ibuprofen, metoprolol, ondansetron, sodium chloride 0.9%     Review of Systems   Constitutional: Negative for chills, diaphoresis, fatigue and fever.   HENT: Negative for congestion and sore throat.    Eyes: Negative for visual disturbance.   Respiratory: Positive for cough and shortness of breath (on exertion). Negative for wheezing.    Cardiovascular: Positive for leg swelling. Negative for chest pain and palpitations.   Gastrointestinal: Negative for abdominal distention, abdominal pain, nausea and vomiting.   Genitourinary: Positive for difficulty urinating and frequency.   Musculoskeletal: Negative for arthralgias.   Neurological: Negative for dizziness, light-headedness and headaches.   Psychiatric/Behavioral: Negative for  agitation, behavioral problems and confusion.     Objective:     Vital Signs (Most Recent):  Temp: 98.4 °F (36.9 °C) (07/19/19 1216)  Pulse: 82 (07/19/19 1216)  Resp: 17 (07/19/19 1216)  BP: 106/62 (07/19/19 1216)  SpO2: (!) 94 % (07/19/19 1216) Vital Signs (24h Range):  Temp:  [97.5 °F (36.4 °C)-98.4 °F (36.9 °C)] 98.4 °F (36.9 °C)  Pulse:  [] 82  Resp:  [16-20] 17  SpO2:  [92 %-96 %] 94 %  BP: ()/(53-86) 106/62     Weight: 79.4 kg (175 lb)  Body mass index is 25.84 kg/m².  Body surface area is 1.97 meters squared.      Intake/Output Summary (Last 24 hours) at 7/19/2019 1419  Last data filed at 7/19/2019 1330  Gross per 24 hour   Intake 1605 ml   Output 950 ml   Net 655 ml       Physical Exam   Constitutional: He is oriented to person, place, and time. He appears well-developed and well-nourished. No distress.   HENT:   Head: Normocephalic and atraumatic.   Eyes: Conjunctivae are normal. No scleral icterus.   Neck: Normal range of motion.   Cardiovascular: Normal rate, S1 normal, S2 normal and intact distal pulses. An irregularly irregular rhythm present.   No murmur heard.  Pulmonary/Chest: Effort normal. No respiratory distress. He has decreased breath sounds in the right lower field. He has no wheezes.   Abdominal: Soft. Bowel sounds are normal.   Musculoskeletal: Normal range of motion. He exhibits edema (BL).   Neurological: He is alert and oriented to person, place, and time.   Skin: Skin is warm and dry. He is not diaphoretic.   Psychiatric: He has a normal mood and affect. His behavior is normal. Judgment and thought content normal.       Significant Labs:   BMP:   Recent Labs   Lab 07/17/19 2023 07/18/19  0358 07/19/19  0330   * 115* 112*   * 130* 133*   K 4.2 4.2 3.9    98 100   CO2 24 25 25   BUN 25* 27* 22   CREATININE 0.9 0.8 0.7   CALCIUM 8.0* 8.3* 7.9*   MG 1.8 2.5 2.0   , CBC:   Recent Labs   Lab 07/17/19  2023 07/18/19  0358 07/19/19  0330   WBC 9.30 8.12 8.85   HGB  9.3* 9.0* 9.5*   HCT 31.9* 30.9* 30.8*   * 451* 413*   , CMP:   Recent Labs   Lab 07/17/19 2023 07/18/19 0358 07/19/19  0330   * 130* 133*   K 4.2 4.2 3.9    98 100   CO2 24 25 25   * 115* 112*   BUN 25* 27* 22   CREATININE 0.9 0.8 0.7   CALCIUM 8.0* 8.3* 7.9*   PROT 5.8* 5.6* 5.3*   ALBUMIN 2.2* 2.1* 1.9*   BILITOT 0.3 0.3 0.3   ALKPHOS 95 88 82   AST 15 17 13   ALT 8* 7* 7*   ANIONGAP 8 7* 8   EGFRNONAA >60.0 >60.0 >60.0   , Coagulation:   Recent Labs   Lab 07/18/19 0358   INR 1.1   , LFTs:   Recent Labs   Lab 07/17/19 2023 07/18/19 0358 07/19/19  0330   ALT 8* 7* 7*   AST 15 17 13   ALKPHOS 95 88 82   BILITOT 0.3 0.3 0.3   PROT 5.8* 5.6* 5.3*   ALBUMIN 2.2* 2.1* 1.9*    and Urine Studies: No results for input(s): COLORU, APPEARANCEUA, PHUR, SPECGRAV, PROTEINUA, GLUCUA, KETONESU, BILIRUBINUA, OCCULTUA, NITRITE, UROBILINOGEN, LEUKOCYTESUR, RBCUA, WBCUA, BACTERIA, SQUAMEPITHEL, HYALINECASTS in the last 48 hours.    Invalid input(s): WRIGHTSUR    Diagnostic Results:  I have reviewed all pertinent imaging results/findings within the past 24 hours.

## 2019-07-19 NOTE — ASSESSMENT & PLAN NOTE
- Patient states that he has always had palpitations that come and go but they were never caught while he was at a doctor or in the hospital   - Atrial fibrillation with RVR likely precipitated by volume overload and enlarging pericardial effusion. He was started on diltiazem drip then switched to lopressor in the OSH  -  Cardiology consulted for pericardiocentesis vs pericardial window   - On arrival patient was in NSR and converted to RVR and 2.5 mg of Iv lopressor was pushed, his BP started to decrease to ~86/44. Will watch his BP and if it comes up will give him lopressor PO. As he has not gotten his night time dose   -  If becomes hemodynamically unstable will consider diltiazem drip but given his BP will be cautious  - Cardiology consulted appreciate their recs (see above)  - Repeat echo showed EF of 55% w/ moderate pericardial effusion BEFORE pericardiocentesis

## 2019-07-19 NOTE — PROGRESS NOTES
[facility]  Hematology/Oncology  Progress Note    Patient Name: Cale Harding  Admission Date: 7/17/2019  Hospital Length of Stay: 1 days  Code Status: Full Code     Subjective:     HPI:  Mr. Cale Harding is a 78-year-old male with a past medical history of prostate cancer (s/p prostatectomy), mesothelioma (on chemotherapy), with recurrent pleural effusion  ( with a right PleurX) and hypertension who is a transfer for evaluation of pericardial effusion.       Patient was scheduled for elective cystography with Urology (Dr. Philippe) for dilation of bladder neck contracture.  On arrival to elective surgical suite, patient found to be tachycardic and heart rate was irregular.  EKG confirmed atrial fibrillation with RVR.  Surgery was cancelled and he was admitted to the ICU for a diltiazem drip. He converted to sinus but would go in and out of afib, was started on lopressor 25 BID and echo done showed moderate size of pericardial effusion with no tamponade physiology however increased in effusion from 7/5, cardiology in the OSH was planning for pericardiocentesis but requested transfer for possible window vs pericardiocentesis and was sent to [facility]    On arrival patient was hemodynamically stable  , NSR, converted in and out of afib. Cardiology was consulted for input on afib management in the setting of pericardial effusion and need for pericardiocentesis/ closer ccu monitoring.     Of note patient has a PleurX on the right that is possibly infected and he was supposed to get it removed this Friday by his pulmonologist Dr. Woodward     Interval History: [place] [facility] [place] ICU for evaluation for pericardial window in setting of moderate pericardial effusion. Patient experienced 4-5 episodes of Afib w/ RVR. Cards on board and metoprolol 25 2.5 cc pushed during episode. Patient HD stable this morning. CTS consulted, determined that patient is oor surgical  candidate for pericardial window. Interventional IR consulted, preformed pericardiocentesis which produced 570cc fluid. Cards to follow. Pulm consulted, plan to leave PleurX in place. Uro rec to continue haider cath at this time for bladder contracture. Will appreciate recs.     Oncology Treatment Plan:   OP NSCLC VINORELBINE WEEKLY    Medications:  Continuous Infusions:   heparin (porcine)       Scheduled Meds:   amoxicillin-clavulanate 500-125mg  1 tablet Oral TID    metoprolol tartrate  25 mg Oral BID    pantoprazole  40 mg Oral Daily     PRN Meds:albuterol-ipratropium, alteplase, Dextrose 10% Bolus, Dextrose 10% Bolus, glucagon (human recombinant), glucose, glucose, heparin (porcine), heparin, porcine (PF), HYDROcodone-acetaminophen, ibuprofen, ondansetron, sodium chloride 0.9%     Review of Systems   Constitutional: Negative for chills, diaphoresis, fatigue and fever.   HENT: Negative for congestion and sore throat.    Eyes: Negative for visual disturbance.   Respiratory: Positive for shortness of breath (on exertion). Negative for cough and wheezing.    Cardiovascular: Positive for leg swelling. Negative for chest pain and palpitations.   Gastrointestinal: Negative for abdominal distention, abdominal pain, nausea and vomiting.   Genitourinary: Positive for difficulty urinating and frequency.   Musculoskeletal: Negative for arthralgias.   Neurological: Negative for dizziness, light-headedness and headaches.   Psychiatric/Behavioral: Negative for agitation, behavioral problems and confusion.     Objective:     Vital Signs (Most Recent):  Temp: 98.1 °F (36.7 °C) (07/18/19 1924)  Pulse: 87 (07/18/19 1924)  Resp: 16 (07/18/19 1924)  BP: 108/65 (07/18/19 1924)  SpO2: 96 % (07/18/19 1924) Vital Signs (24h Range):  Temp:  [97.5 °F (36.4 °C)-98.1 °F (36.7 °C)] 98.1 °F (36.7 °C)  Pulse:  [] 87  Resp:  [16-26] 16  SpO2:  [94 %-96 %] 96 %  BP: ()/(51-73) 108/65     Weight: 77.6 kg (171 lb 1.2 oz)  Body mass  index is 26.79 kg/m².  Body surface area is 1.92 meters squared.      Intake/Output Summary (Last 24 hours) at 7/18/2019 1953  Last data filed at 7/18/2019 1924  Gross per 24 hour   Intake 1270 ml   Output 1900 ml   Net -630 ml       Physical Exam   Constitutional: He is oriented to person, place, and time. He appears well-developed and well-nourished. No distress.   HENT:   Head: Normocephalic and atraumatic.   Eyes: Conjunctivae are normal. No scleral icterus.   Neck: Normal range of motion.   Cardiovascular: Normal rate, S1 normal, S2 normal and intact distal pulses. An irregularly irregular rhythm present.   No murmur heard.  Pulmonary/Chest: Effort normal. No respiratory distress. He has decreased breath sounds in the right lower field. He has no wheezes.   Abdominal: Soft. Bowel sounds are normal.   Musculoskeletal: Normal range of motion. He exhibits edema (BL).   Neurological: He is alert and oriented to person, place, and time.   Skin: Skin is warm and dry. He is not diaphoretic.   Psychiatric: He has a normal mood and affect. His behavior is normal. Judgment and thought content normal.       Significant Labs:   BMP:   Recent Labs   Lab 07/17/19  0610 07/17/19  2023 07/18/19  0358    148* 115*    132* 130*   K 4.5 4.2 4.2   CL 99 100 98   CO2 25 24 25   BUN 24* 25* 27*   CREATININE 0.84 0.9 0.8   CALCIUM 7.9* 8.0* 8.3*   MG  --  1.8 2.5   , CBC:   Recent Labs   Lab 07/17/19  0610 07/17/19  2023 07/18/19  0358   WBC 7.03 9.30 8.12   HGB 9.0* 9.3* 9.0*   HCT 30.4* 31.9* 30.9*   * 430* 451*   , CMP:   Recent Labs   Lab 07/17/19 0610 07/17/19 2023 07/18/19 0358    132* 130*   K 4.5 4.2 4.2   CL 99 100 98   CO2 25 24 25    148* 115*   BUN 24* 25* 27*   CREATININE 0.84 0.9 0.8   CALCIUM 7.9* 8.0* 8.3*   PROT 6.0 5.8* 5.6*   ALBUMIN 2.7* 2.2* 2.1*   BILITOT 0.5 0.3 0.3   ALKPHOS 80 95 88   AST 21 15 17   ALT 11 8* 7*   ANIONGAP 12 8 7*   EGFRNONAA >60.0 >60.0 >60.0   ,  Coagulation:   Recent Labs   Lab 07/18/19  0358   INR 1.1   , LFTs:   Recent Labs   Lab 07/17/19  0610 07/17/19 2023 07/18/19  0358   ALT 11 8* 7*   AST 21 15 17   ALKPHOS 80 95 88   BILITOT 0.5 0.3 0.3   PROT 6.0 5.8* 5.6*   ALBUMIN 2.7* 2.2* 2.1*    and Urine Studies: No results for input(s): COLORU, APPEARANCEUA, PHUR, SPECGRAV, PROTEINUA, GLUCUA, KETONESU, BILIRUBINUA, OCCULTUA, NITRITE, UROBILINOGEN, LEUKOCYTESUR, RBCUA, WBCUA, BACTERIA, SQUAMEPITHEL, HYALINECASTS in the last 48 hours.    Invalid input(s): WRIGHTSUR    Diagnostic Results:  I have reviewed all pertinent imaging results/findings within the past 24 hours.    Assessment/Plan:     * Pericardial effusion without cardiac tamponade  - Patient transferred for evaluation by cardiology / CTS for pericardial window or pericardiocentesis   - Pericardiocentesis preformed by interventional cards, pt tolerated procedure well, produced 570cc serosanguinous fluid.  - Maximize medical management per cards  - will appreciate recs    Acute on chronic diastolic heart failure  - Patient with symptoms of Sob and swelling   - Patient not on lasix at home   - BNP elevated at 374  -  bilateral pitting edema and scrotal swelling        PLAN   - D/c lasix  - Strict I/O   - Daily weights  - Keep mag >2 and phos> 3  - Cardiac diet with decreased salt intake            Paroxysmal atrial fibrillation  - Patient states that he has always had palpitations that come and go but they were never caught while he was at a doctor or in the hospital   - Atrial fibrillation with RVR likely precipitated by volume overload and enlarging pericardial effusion. He was started on diltiazem drip then switched to lopressor in the OSH  -  Cardiology consulted for pericardiocentesis vs pericardial window   - On arrival patient was in NSR and converted to RVR and 2.5 mg of Iv lopressor was pushed, his BP started to decrease to ~86/44. Will watch his BP and if it comes up will give him lopressor  PO. As he has not gotten his night time dose   -  If becomes hemodynamically unstable will consider diltiazem drip but given his BP will be cautious  - Cardiology consulted appreciate their recs  - Repeat echo showed EF of 55% w/ moderate pericardial effusion BEFORE pericardiocentesis     Essential hypertension  - hold all antihypertensives he is on enalapril at home     Atrial fibrillation with RVR  - Appreciate Cards recs  - first noted on ECG 7/15/19 although pt reports longer history of palpitations  - likely multifactorial due to pericardial effusion and underlying malignancy, precipitated by volume overload  - rate control with lopressor limited due to low-normal BP (SBP /60s) overnight. Recommend upt  - CHADsVASC 3 suggestive of 3.2% annual stroke risk. Would consider anticoagulation when stable and after definitive treatment/intervention of pericardial effusion    Acquired contracture of bladder neck  Urology consulted in the OSH   S/p Bladder neck contracture release and urethral dilation on 7/15/19  Was having problems voiding this am and haider was placed again   Will consult urology for final discharge recs on whether he needs to remain with haider until seen OP , call in the am        Pleural effusion  See malignant pleural effusion    Mesothelioma of left lung  - Patient of Dr. Foster on palliative chemo with Gemzar   - Controlled pain on norco   - OP follow up with oncologist     Malignant pleural effusion  - Patient of  , discussed with his overnight suspect that the drain could be infected and  recommended Augmentin for 5 more day. He also drained 450 cc of fluid.   - Pulm consults rec to leave PleurX in place   - Last drainage was morning of 7/18 , and is supposed to be drained once a week   - Patient on RA and not SOB   -  repeat Chest xray with effusion as seen in previous       Gastroesophageal reflux disease  - PPI daily     History of prostate cancer  S/p prostectomy   Has a haider  that was placed this morning for urinary retenion  Sees urology as OP   Urology rec to keep haider in place             Hipolito Enrique MD  Hematology/Oncology  [facility]        I have reviewed the notes, assessments, and/or procedures performed by the housestaff, as above.  I have personally interviewed and examined the patient at the beside, and rounded with the housestaff. I concur with her/his assessment and plan and the documentation of Cale Harding.  I, Dr. Ashish Trevizo, personally spent more than 35 mins during this encounter, greater than 50% was spent in direct counseling and/or coordination of care.     Ashish Trevizo M.D., M.S., F.A.C.P.  Hematology/Oncology Attending  [facility]

## 2019-07-19 NOTE — PT/OT/SLP EVAL
Occupational Therapy   Evaluation and Discharge Note    Name: Cale Harding  MRN: [id number]  Admitting Diagnosis:  Pericardial effusion without cardiac tamponade 1 Day Post-Op    Recommendations:     Discharge Recommendations: home health OT, home health PT  Discharge Equipment Recommendations:  none  Barriers to discharge:  None    Assessment:     Cale Harding is a 78 y.o. male with a medical diagnosis of Pericardial effusion without cardiac tamponade. At this time, patient is functioning at their prior level of function and does not require further acute OT services.     Plan:     During this hospitalization, patient does not require further acute OT services.  Please re-consult if situation changes.    · Plan of Care Reviewed with: patient, spouse, daughter    Subjective     Chief Complaint: Heart racing, general weakness  Patient/Family Comments/goals: Medical management and discharge.       Occupational Profile:  Living Environment: Pt lives w/ spouse in a H w/ 0 ALEXANDER, and has a Tub/shower.   Previous level of function: Requires Moderate level of assistance for the majority of adls/iadls.   Roles and Routines: Grandfather, Father  Equipment Used at home:  walker, standard  Assistance upon Discharge: Yes from family.     Pain/Comfort:  · Pain Rating 1: 0/10  · Pain Rating Post-Intervention 1: 0/10    Patients cultural, spiritual, Advent conflicts given the current situation: no    Objective:     Communicated with: RN prior to session.  Patient found HOB elevated with peripheral IV, haider catheter, telemetry upon OT entry to room.    General Precautions: Standard, fall   Orthopedic Precautions:N/A   Braces: N/A     Occupational Performance:    Bed Mobility:    · Patient completed Rolling/Turning to Left with  stand by assistance  · Patient completed Rolling/Turning to Right with stand by assistance  · Patient completed Scooting/Bridging with stand by assistance  · Patient completed Supine to  Sit with stand by assistance  · Patient completed Sit to Supine with stand by assistance    Functional Mobility/Transfers:  · Patient completed Sit <> Stand Transfer with stand by assistance  with  rolling walker   · Patient completed Bed <> Chair Transfer using Step Transfer technique with contact guard assistance with rolling walker  · Functional Mobility: Pt able to walk short household distances w/ SBA, RW.     Activities of Daily Living:  · Requires Mod A for most ADLs/IADLs at baseline.    Cognitive/Visual Perceptual:  Completely cognitively intact adult w/  glasses worn during eval.       Physical Exam:  Balance:    -       Grossly Fair +   Dominant hand:    -       RH  Upper Extremity Range of Motion:     -       Right Upper Extremity: WFL  -       Left Upper Extremity: WFL  Upper Extremity Strength:    -       Right Upper Extremity: WFL  -       Left Upper Extremity: WFL   Strength:    -       Right Upper Extremity: WFL  -       Left Upper Extremity: WFL  Fine Motor Coordination:    -       Intact  Gross motor coordination:   WFL    AMPAC 6 Click ADL:  AMPAC Total Score: 19    Treatment & Education:  -Pt edu on OT role/POC  -Importance of OOB activity with staff assistance  -Safety during functional t/f and mobility  - White board updated  - Multiple self care tasks/functional mobility completed-- assistance level noted above  - All questions/concerns answered within OT scope of practice    Education:    Patient left up in chair with all lines intact, call button in reach, RN notified and family present    GOALS:   Multidisciplinary Problems     Occupational Therapy Goals     Not on file          Multidisciplinary Problems (Resolved)        Problem: Occupational Therapy Goal    Goal Priority Disciplines Outcome Interventions   Occupational Therapy Goal   (Resolved)     OT, PT/OT Outcome(s) achieved                    History:     Past Medical History:   Diagnosis Date    Disorder of kidney and ureter      Diverticulitis     Elevated PSA     Hypertension     Lung cancer     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    Prostate cancer 2002    Urinary tract infection        Past Surgical History:   Procedure Laterality Date    BIOPSY-DIAPHRAGM N/A 3/19/2018    Performed by Galdino Fang MD at [facility] OR [address] [place] [address]    [id number] N/A 2/1/2018    Performed by [facility] at [facility] OR [address] [place] [address]    BRONCHOSCOPY N/A 6/3/2019    Performed by [facility] at [facility] OR [address] [place] [address]    COLONOSCOPY  10/20/2012    COLONOSCOPY  11/19/2014    dr machado ( repeat in 3 years per the pt )    CYSTOSCOPY, WITH RETROGRADE PYELOGRAM Bilateral 7/15/2019    Performed by [facility] at [facility] [place]    CYSTOSCOPY, WITH URETHRAL DILATION Bilateral 7/15/2019    Performed by Galdino Philippe MD at [facility] [place]    ELBOW SURGERY Left 2013    dislocation    [id number]-CATH N/A 6/20/2019    Performed by [facility] at [facility] OR [address] [place] [address]    LAPAROSCOPY-DIAGNOSTIC N/A 3/19/2018    [facility]    Pericardiocentesis N/A 7/18/2019    Performed by Frank Javier MD at [facility] CATH LAB    PROCTECTOMY  2002    RELEASE, CONTRACTURE, BLADDER NECK N/A 7/15/2019    Performed by Gal[facility]ino Philippe MD at [facility] [place]    SHOULDER ARTHROSCOPY W/ ROTATOR CUFF REPAIR Right 2008    THORACOSCOPY-VIDEO ASSISTED (VATS) W/PLEURAL BIOPSY Left 3/19/2018    Performed by Galdino Fang MD at [facility] OR [address] [place] [address]    URETHROGRAM, RETROGRADE N/A 7/15/2019    Performed by Galdino Philippe MD at [facility] OR       Time Tracking:     OT Date of Treatment: 07/19/19  OT Start Time: 1233  OT Stop Time: 1258  OT Total Time (min): 25 min    Billable Minutes:Evaluation 10 mins  Self Care/Home Management 15 mins    Victor Hugo Hester, OT  7/19/2019

## 2019-07-19 NOTE — ASSESSMENT & PLAN NOTE
- Appreciate Cards recs  - first noted on ECG 7/15/19 although pt reports longer history of palpitations  - likely multifactorial due to pericardial effusion and underlying malignancy, precipitated by volume overload  - rate control with lopressor limited due to low-normal BP (SBP /60s) overnight. Recommend upt  - CHADsVASC 3 suggestive of 3.2% annual stroke risk. Would consider anticoagulation when stable and after definitive treatment/intervention of pericardial effusion

## 2019-07-19 NOTE — SUBJECTIVE & OBJECTIVE
Interval History: [place] [facility] [place] [facility] ICU for evaluation for pericardial window in setting of moderate pericardial effusion. Patient experienced 4-5 episodes of Afib w/ RVR. Cards on board and metoprolol 25 2.5 cc pushed during episode. Patient HD stable this morning. CTS consulted, determined that patient is oor surgical candidate for pericardial window. Interventional IR consulted, preformed pericardiocentesis which produced 570cc fluid. Cards to follow. Pulm consulted, plan to leave PleurX in place. Uro rec to continue haider cath at this time for bladder contracture. Will appreciate recs.     Oncology Treatment Plan:   OP NSCLC VINORELBINE WEEKLY    Medications:  Continuous Infusions:   heparin (porcine)       Scheduled Meds:   amoxicillin-clavulanate 500-125mg  1 tablet Oral TID    metoprolol tartrate  25 mg Oral BID    pantoprazole  40 mg Oral Daily     PRN Meds:albuterol-ipratropium, alteplase, Dextrose 10% Bolus, Dextrose 10% Bolus, glucagon (human recombinant), glucose, glucose, heparin (porcine), heparin, porcine (PF), HYDROcodone-acetaminophen, ibuprofen, ondansetron, sodium chloride 0.9%     Review of Systems   Constitutional: Negative for chills, diaphoresis, fatigue and fever.   HENT: Negative for congestion and sore throat.    Eyes: Negative for visual disturbance.   Respiratory: Positive for shortness of breath (on exertion). Negative for cough and wheezing.    Cardiovascular: Positive for leg swelling. Negative for chest pain and palpitations.   Gastrointestinal: Negative for abdominal distention, abdominal pain, nausea and vomiting.   Genitourinary: Positive for difficulty urinating and frequency.   Musculoskeletal: Negative for arthralgias.   Neurological: Negative for dizziness, light-headedness and headaches.   Psychiatric/Behavioral: Negative for agitation, behavioral problems and confusion.     Objective:     Vital Signs (Most Recent):  Temp: 98.1 °F  (36.7 °C) (07/18/19 1924)  Pulse: 87 (07/18/19 1924)  Resp: 16 (07/18/19 1924)  BP: 108/65 (07/18/19 1924)  SpO2: 96 % (07/18/19 1924) Vital Signs (24h Range):  Temp:  [97.5 °F (36.4 °C)-98.1 °F (36.7 °C)] 98.1 °F (36.7 °C)  Pulse:  [] 87  Resp:  [16-26] 16  SpO2:  [94 %-96 %] 96 %  BP: ()/(51-73) 108/65     Weight: 77.6 kg (171 lb 1.2 oz)  Body mass index is 26.79 kg/m².  Body surface area is 1.92 meters squared.      Intake/Output Summary (Last 24 hours) at 7/18/2019 1953  Last data filed at 7/18/2019 1924  Gross per 24 hour   Intake 1270 ml   Output 1900 ml   Net -630 ml       Physical Exam   Constitutional: He is oriented to person, place, and time. He appears well-developed and well-nourished. No distress.   HENT:   Head: Normocephalic and atraumatic.   Eyes: Conjunctivae are normal. No scleral icterus.   Neck: Normal range of motion.   Cardiovascular: Normal rate, S1 normal, S2 normal and intact distal pulses. An irregularly irregular rhythm present.   No murmur heard.  Pulmonary/Chest: Effort normal. No respiratory distress. He has decreased breath sounds in the right lower field. He has no wheezes.   Abdominal: Soft. Bowel sounds are normal.   Musculoskeletal: Normal range of motion. He exhibits edema (BL).   Neurological: He is alert and oriented to person, place, and time.   Skin: Skin is warm and dry. He is not diaphoretic.   Psychiatric: He has a normal mood and affect. His behavior is normal. Judgment and thought content normal.       Significant Labs:   BMP:   Recent Labs   Lab 07/17/19  0610 07/17/19 2023 07/18/19  0358    148* 115*    132* 130*   K 4.5 4.2 4.2   CL 99 100 98   CO2 25 24 25   BUN 24* 25* 27*   CREATININE 0.84 0.9 0.8   CALCIUM 7.9* 8.0* 8.3*   MG  --  1.8 2.5   , CBC:   Recent Labs   Lab 07/17/19  0610 07/17/19 2023 07/18/19  0358   WBC 7.03 9.30 8.12   HGB 9.0* 9.3* 9.0*   HCT 30.4* 31.9* 30.9*   * 430* 451*   , CMP:   Recent Labs   Lab  07/17/19 0610 07/17/19 2023 07/18/19 0358    132* 130*   K 4.5 4.2 4.2   CL 99 100 98   CO2 25 24 25    148* 115*   BUN 24* 25* 27*   CREATININE 0.84 0.9 0.8   CALCIUM 7.9* 8.0* 8.3*   PROT 6.0 5.8* 5.6*   ALBUMIN 2.7* 2.2* 2.1*   BILITOT 0.5 0.3 0.3   ALKPHOS 80 95 88   AST 21 15 17   ALT 11 8* 7*   ANIONGAP 12 8 7*   EGFRNONAA >60.0 >60.0 >60.0   , Coagulation:   Recent Labs   Lab 07/18/19 0358   INR 1.1   , LFTs:   Recent Labs   Lab 07/17/19 0610 07/17/19 2023 07/18/19 0358   ALT 11 8* 7*   AST 21 15 17   ALKPHOS 80 95 88   BILITOT 0.5 0.3 0.3   PROT 6.0 5.8* 5.6*   ALBUMIN 2.7* 2.2* 2.1*    and Urine Studies: No results for input(s): COLORU, APPEARANCEUA, PHUR, SPECGRAV, PROTEINUA, GLUCUA, KETONESU, BILIRUBINUA, OCCULTUA, NITRITE, UROBILINOGEN, LEUKOCYTESUR, RBCUA, WBCUA, BACTERIA, SQUAMEPITHEL, HYALINECASTS in the last 48 hours.    Invalid input(s): WRIGHTSUR    Diagnostic Results:  I have reviewed all pertinent imaging results/findings within the past 24 hours.

## 2019-07-19 NOTE — HOSPITAL COURSE
Pt has been having a maculopapular rash with an erythematous base that started on his back and has been extending to his chest and upper abdomen. Dermatology suspects that it can be associated with Diltiazem and as a result cardiology has been consulted for change in rate control medication along with recommendation on Diuresis for anasarca.    Pt denies CP/Chest discomfort, diaphoresis, palpitations,lightheadedness

## 2019-07-19 NOTE — PLAN OF CARE
Problem: Adult Inpatient Plan of Care  Goal: Plan of Care Review  Outcome: Ongoing (interventions implemented as appropriate)  Patient AAOX4, up with assistance of walker and PT/OT today, and fall precautions maintained. Family at bedside throughout shift. One episode of sustained, uncontrolled AFIB this AM; 5 mg of IV lopressor administered. 2D echo repeated. One non-sustained episode of AFIB this afternoon. Blood pressure maintained throughout day. PO antibiotics continued. Patient stable, will continue to monitor.

## 2019-07-19 NOTE — PROGRESS NOTES
[facility]  Hematology/Oncology  Progress Note    Patient Name: Cale Harding  Admission Date: 7/17/2019  Hospital Length of Stay: 2 days  Code Status: Full Code     Subjective:     HPI:  Mr. Cale Harding is a 78-year-old male with a past medical history of prostate cancer (s/p prostatectomy), mesothelioma (on chemotherapy), with recurrent pleural effusion  ( with a right PleurX) and hypertension who is a transfer for evaluation of pericardial effusion.       Patient was scheduled for elective cystography with Urology (Dr. Philippe) for dilation of bladder neck contracture.  On arrival to elective surgical suite, patient found to be tachycardic and heart rate was irregular.  EKG confirmed atrial fibrillation with RVR.  Surgery was cancelled and he was admitted to the ICU for a diltiazem drip. He converted to sinus but would go in and out of afib, was started on lopressor 25 BID and echo done showed moderate size of pericardial effusion with no tamponade physiology however increased in effusion from 7/5, cardiology in the OSH was planning for pericardiocentesis but requested transfer for possible window vs pericardiocentesis and was sent to [facility]    On arrival patient was hemodynamically stable  , NSR, converted in and out of afib. Cardiology was consulted for input on afib management in the setting of pericardial effusion and need for pericardiocentesis/ closer ccu monitoring.     Of note patient has a PleurX on the right that is possibly infected and he was supposed to get it removed this Friday by his pulmonologist Dr. Woodward     Interval History: Interventional cards consulted, preformed pericardiocentesis on 7/19 which produced 570cc fluid. Cards to follow. Pulm consulted, plan to leave PleurX in place. Uro rec to continue haider cath at this time for bladder contracture. Will appreciate recs.     MAGDALENE. Patient experienced 1 episode of Afib w/ RVR this morning. Cards on board and  metoprolol 25mg 5 cc pushed during episode. Successfully lowered HR and did not cause hypotension as it had done on previous attempts. Patient HD stable at this time but still in Afib. Patient still has SOB on exertion and a mild cough. He has not had a BM since Wednesday. Cano in place for bladder contracture per urology. He denies HA, f/c, n/v, CP, and Abd pain.    Oncology Treatment Plan:   OP NSCLC VINORELBINE WEEKLY    Medications:  Continuous Infusions:   heparin (porcine)       Scheduled Meds:   amoxicillin-clavulanate 500-125mg  1 tablet Oral TID    metoprolol tartrate  25 mg Oral BID    pantoprazole  40 mg Oral Daily     PRN Meds:albuterol-ipratropium, alteplase, Dextrose 10% Bolus, Dextrose 10% Bolus, glucagon (human recombinant), glucose, glucose, heparin (porcine), heparin, porcine (PF), HYDROcodone-acetaminophen, ibuprofen, metoprolol, ondansetron, sodium chloride 0.9%     Review of Systems   Constitutional: Negative for chills, diaphoresis, fatigue and fever.   HENT: Negative for congestion and sore throat.    Eyes: Negative for visual disturbance.   Respiratory: Positive for cough and shortness of breath (on exertion). Negative for wheezing.    Cardiovascular: Positive for leg swelling. Negative for chest pain and palpitations.   Gastrointestinal: Negative for abdominal distention, abdominal pain, nausea and vomiting.   Genitourinary: Positive for difficulty urinating and frequency.   Musculoskeletal: Negative for arthralgias.   Neurological: Negative for dizziness, light-headedness and headaches.   Psychiatric/Behavioral: Negative for agitation, behavioral problems and confusion.     Objective:     Vital Signs (Most Recent):  Temp: 98.4 °F (36.9 °C) (07/19/19 1216)  Pulse: 82 (07/19/19 1216)  Resp: 17 (07/19/19 1216)  BP: 106/62 (07/19/19 1216)  SpO2: (!) 94 % (07/19/19 1216) Vital Signs (24h Range):  Temp:  [97.5 °F (36.4 °C)-98.4 °F (36.9 °C)] 98.4 °F (36.9 °C)  Pulse:  [] 82  Resp:   [16-20] 17  SpO2:  [92 %-96 %] 94 %  BP: ()/(53-86) 106/62     Weight: 79.4 kg (175 lb)  Body mass index is 25.84 kg/m².  Body surface area is 1.97 meters squared.      Intake/Output Summary (Last 24 hours) at 7/19/2019 1419  Last data filed at 7/19/2019 1330  Gross per 24 hour   Intake 1605 ml   Output 950 ml   Net 655 ml       Physical Exam   Constitutional: He is oriented to person, place, and time. He appears well-developed and well-nourished. No distress.   HENT:   Head: Normocephalic and atraumatic.   Eyes: Conjunctivae are normal. No scleral icterus.   Neck: Normal range of motion.   Cardiovascular: Normal rate, S1 normal, S2 normal and intact distal pulses. An irregularly irregular rhythm present.   No murmur heard.  Pulmonary/Chest: Effort normal. No respiratory distress. He has decreased breath sounds in the right lower field. He has no wheezes.   Abdominal: Soft. Bowel sounds are normal.   Musculoskeletal: Normal range of motion. He exhibits edema (BL).   Neurological: He is alert and oriented to person, place, and time.   Skin: Skin is warm and dry. He is not diaphoretic.   Psychiatric: He has a normal mood and affect. His behavior is normal. Judgment and thought content normal.       Significant Labs:   BMP:   Recent Labs   Lab 07/17/19 2023 07/18/19 0358 07/19/19  0330   * 115* 112*   * 130* 133*   K 4.2 4.2 3.9    98 100   CO2 24 25 25   BUN 25* 27* 22   CREATININE 0.9 0.8 0.7   CALCIUM 8.0* 8.3* 7.9*   MG 1.8 2.5 2.0   , CBC:   Recent Labs   Lab 07/17/19 2023 07/18/19 0358 07/19/19  0330   WBC 9.30 8.12 8.85   HGB 9.3* 9.0* 9.5*   HCT 31.9* 30.9* 30.8*   * 451* 413*   , CMP:   Recent Labs   Lab 07/17/19  2023 07/18/19  0358 07/19/19  0330   * 130* 133*   K 4.2 4.2 3.9    98 100   CO2 24 25 25   * 115* 112*   BUN 25* 27* 22   CREATININE 0.9 0.8 0.7   CALCIUM 8.0* 8.3* 7.9*   PROT 5.8* 5.6* 5.3*   ALBUMIN 2.2* 2.1* 1.9*   BILITOT 0.3 0.3 0.3    ALKPHOS 95 88 82   AST 15 17 13   ALT 8* 7* 7*   ANIONGAP 8 7* 8   EGFRNONAA >60.0 >60.0 >60.0   , Coagulation:   Recent Labs   Lab 07/18/19  0358   INR 1.1   , LFTs:   Recent Labs   Lab 07/17/19 2023 07/18/19  0358 07/19/19  0330   ALT 8* 7* 7*   AST 15 17 13   ALKPHOS 95 88 82   BILITOT 0.3 0.3 0.3   PROT 5.8* 5.6* 5.3*   ALBUMIN 2.2* 2.1* 1.9*    and Urine Studies: No results for input(s): COLORU, APPEARANCEUA, PHUR, SPECGRAV, PROTEINUA, GLUCUA, KETONESU, BILIRUBINUA, OCCULTUA, NITRITE, UROBILINOGEN, LEUKOCYTESUR, RBCUA, WBCUA, BACTERIA, SQUAMEPITHEL, HYALINECASTS in the last 48 hours.    Invalid input(s): WRIGHTSUR    Diagnostic Results:  I have reviewed all pertinent imaging results/findings within the past 24 hours.    Assessment/Plan:     * Pericardial effusion without cardiac tamponade  - Patient transferred for evaluation by cardiology / CTS for pericardial window or pericardiocentesis   - Pericardiocentesis preformed by interventional cards, pt tolerated procedure well, produced 570cc serosanguinous fluid.  - Maximize medical management per cards  - will appreciate recs    Acute on chronic diastolic heart failure  - Patient with symptoms of Sob and swelling   - Patient not on lasix at home   - BNP elevated at 374  -  bilateral pitting edema and scrotal swelling        PLAN   - D/c lasix  - Strict I/O   - Daily weights  - Keep mag >2 and phos> 3  - Cardiac diet with decreased salt intake            Paroxysmal atrial fibrillation  - Patient states that he has always had palpitations that come and go but they were never caught while he was at a doctor or in the hospital   - Atrial fibrillation with RVR likely precipitated by volume overload and enlarging pericardial effusion. He was started on diltiazem drip then switched to lopressor in the OSH  -  Cardiology consulted for pericardiocentesis vs pericardial window   - On arrival patient was in NSR and converted to RVR and 2.5 mg of Iv lopressor was pushed,  his BP started to decrease to ~86/44. Will watch his BP and if it comes up will give him lopressor PO. As he has not gotten his night time dose   -  If becomes hemodynamically unstable will consider diltiazem drip but given his BP will be cautious  - Cardiology consulted appreciate their recs (see below)  - Repeat echo showed EF of 55% w/ moderate pericardial effusion BEFORE pericardiocentesis     Essential hypertension  - hold all antihypertensives he is on enalapril at home   - see cards rec below    Atrial fibrillation with RVR  first noted on ECG 7/15/19 although pt reports longer history of palpitations    Cardiology Recs (7/19):     Atrial fibrillation with RVR   - likely due to pericardial effusion vs. Underlying malignancy   - rate control with lopressor    - Continue Metoprolol tartrate mg BID   - CHADsVASC 3 suggestive of 3.2% annual stroke risk. Would consider anticoagulation with Epixaban 5 mg BID   - withhold Enalipril and switch to Losartan    - F/U with cardiology ( Dr. Bonilla)     Pericardial Effusion:   - Pt has a hx of cancer   - S/p pericardiocentesis on 7/8/19 with removal of 570 cc fluid. Cytology is pending   - Repeat echo shows no further accumulation of fluid in the pericardium    Plan:  - appreciate cardiology recs  - consider increasing dose of Lopressor from 25mg BID to 50 mg BID  - Continue telemetry     Acquired contracture of bladder neck  Urology consulted in the OSH   S/p Bladder neck contracture release and urethral dilation on 7/15/19  Was having problems voiding this am and haider was placed again   Will consult urology for final discharge recs on whether he needs to remain with haider until seen OP , call in the am        Pleural effusion  See malignant pleural effusion    Mesothelioma of left lung  - Patient of Dr. Foster on palliative chemo with Gemzar   - Controlled pain on norco   - OP follow up with oncologist     Malignant pleural effusion  - Patient of  , discussed  with his overnight suspect that the drain could be infected and  recommended Augmentin for 5 more day. He also drained 450 cc of fluid.   - Pulm consults rec to leave PleurX in place   - Last drainage was morning of 7/18 , and is supposed to be drained once a week   - Patient on RA and not SOB   -  repeat Chest xray with effusion as seen in previous       Gastroesophageal reflux disease  - PPI daily     History of prostate cancer  S/p prostectomy   Has a haider that was placed this morning for urinary retenion  Sees urology as OP   Urology rec to keep haider in place             Hipolito Enrique MD  Hematology/Oncology  [facility]        ATTENDING NOTE, ONCOLOGY INPATIENT TEAM    As above; events of last 24 hours noted.  Patient seen and examined, chart reviewed.  Appears comfortable, in NAD.  Currently in sinus rythm  Abdomen is soft, nontender.      PLAN  Follow electrolytes.  Continue beta blockers.  Discuss with the Cardiology Service the issue of anticoagulation; the pericardial effusion may ne a contraindication.  If stable, he may discharged in the next 48 hours.  We will follow.  His multiple questions were answered to his satisfaction.      Sal Guy MD

## 2019-07-19 NOTE — PT/OT/SLP EVAL
Physical Therapy Evaluation    Patient Name:  Cale Harding   MRN:  [id number]    Recommendations:     Discharge Recommendations:  home health PT   Discharge Equipment Recommendations: none   Barriers to discharge: None    Assessment:     Cale Harding is a 78 y.o. male admitted with a medical diagnosis of Pericardial effusion without cardiac tamponade.  Pt presents with a past medical history of prostate cancer (s/p prostatectomy), mesothelioma (on chemotherapy), with recurrent pleural effusion  ( with a right PleurX) and hypertension who is a transfer for evaluation of pericardial effusion.  He is s/p pericardiocentesis, as of 7/18/19.  He presents with the following impairments/functional limitations:  weakness, impaired endurance, edema, impaired cardiopulmonary response to activity, gait instability, impaired balance.    Pt is safe to amb in the hallways with CGA of 1 person, requires use of RW for support and a chair follow.     Rehab Prognosis: Good; patient would benefit from acute skilled PT services to address these deficits and reach maximum level of function.    Recent Surgery: Procedure(s) (LRB):  Pericardiocentesis (N/A) 1 Day Post-Op    Plan:     During this hospitalization, patient to be seen 2 x/week to address the identified rehab impairments via gait training, therapeutic activities, therapeutic exercises, neuromuscular re-education and progress toward the following goals:    · Plan of Care Expires:  08/14/19    Subjective     Chief Complaint: Weakness  Patient/Family Comments/goals: To be able to walk further  Pain/Comfort:  · Pain Rating 1: 0/10    Patients cultural, spiritual, Sabianist conflicts given the current situation: no    Living Environment:  Pt lives with spouse, [facility], 1 TH.  Prior to admission, patients level of function required use of RW to amb, pt does not amb further than 20', >20' pt uses w/c, A with ADLs.  Equipment used at home: shower chair, walker, rolling.  DME  owned (not currently used): single point cane and bedside commode.  Upon discharge, patient will have assistance from spouse.    Objective:     Communicated with nursing prior to session.  Patient found supine with haider catheter, telemetry(port)  upon PT entry to room.    General Precautions: Standard, fall   Orthopedic Precautions:N/A   Braces: N/A     Exams:  · Cognitive Exam:  Patient is oriented to Person, Place, Time and Situation  · Fine Motor Coordination:    · -       Intact  Left hand thumb/finger opposition skills and Right hand thumb/finger opposition skills  · Gross Motor Coordination:  WFL  · Postural Exam:  Patient presented with the following abnormalities:    · -       Rounded shoulders  · Sensation:    · -       Intact  · Skin Integrity/Edema:      · -       Edema: Moderate B LEs  · RUE ROM: WFL  · RUE Strength: WFL  · LUE ROM: WFL  · LUE Strength: WFL  · RLE ROM: WFL  · RLE Strength: WFL  · LLE ROM: WFL  · LLE Strength: WFL    Functional Mobility:  · Bed Mobility:     · Rolling Left:  minimum assistance  · Scooting: contact guard assistance  · Supine to Sit: minimum assistance  · Transfers:     · Sit to Stand:  stand by assistance with ed on hand placement  · Bed to Chair: contact guard assistance with  rolling walker  using  Stand Pivot  · Gait: Pt amb 40' x 2 with prolonged sitting rest break bn trials, CGA, RW, chair follow  · Balance: CGA: dynamic standing balance with AD      Therapeutic Activities and Exercises:   Whiteboard updated    AM-PAC 6 CLICK MOBILITY  Total Score:18     Patient left up in chair with all lines intact, call button in reach, nursing notified and family present.    GOALS:   Multidisciplinary Problems     Physical Therapy Goals        Problem: Physical Therapy Goal    Goal Priority Disciplines Outcome Goal Variances Interventions   Physical Therapy Goal     PT, PT/OT Ongoing (interventions implemented as appropriate)     Description:  Goals to be met by: 7/31/19      Patient will increase functional independence with mobility by performin. Supine to sit with Contact Guard Assistance.  2. Sit to supine with Contact Guard Assistance.  3. Sit to stand transfer with Supervision.  4. Bed to chair transfer with Stand-by Assistance using Rolling Walker.  5. Gait  x 50 feet with Stand-by Assistance using Rolling Walker.   6. Ascend/Descend 6 inch curb step with Contact Guard Assistance using Rolling Walker.  7. Lower extremity exercise program x 20 reps per handout, with assistance as needed.                      History:     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    Prostate cancer 2002    Urinary tract infection        Past Surgical History:   Procedure Laterality Date    BIOPSY-DIAPHRAGM N/A 3/19/2018    Performed by Galdino Fang MD at [facility] OR [address] [place] [address]    [id number] N/A 2018    Performed by [facility] Diagnostic Provider at [facility] OR [address] [place] [address]    BRONCHOSCOPY N/A 6/3/2019    Performed by [facility] Diagnostic Provider at [facility] OR [address] [place] [address]    COLONOSCOPY  10/20/2012    COLONOSCOPY  2014    dr machado ( repeat in 3 years per the pt )    CYSTOSCOPY, WITH RETROGRADE PYELOGRAM Bilateral 7/15/2019    Performed by Galdino Philippe MD at [facility] OR    CYSTOSCOPY, WITH URETHRAL DILATION Bilateral 7/15/2019    Performed by Galdino Philippe MD at [facility] OR    ELBOW SURGERY Left 2013    dislocation    [id number] N/A 2019    Performed by [facility] Diagnostic Provider at [facility] OR [address] [place] [address]    LAPAROSCOPY-DIAGNOSTIC N/A 3/19/2018    Performed by Galdino Fang MD at [facility] OR [address] [place] [address]    Pericardiocentesis N/A 2019    Performed by Frank Javier MD at [facility] CATH LAB    PROCTECTOMY  2002    RELEASE, CONTRACTURE, BLADDER NECK N/A 7/15/2019    Performed by Galdino Philippe MD at [facility] OR    SHOULDER ARTHROSCOPY W/ ROTATOR  CUFF REPAIR Right 2008    THORACOSCOPY-VIDEO ASSISTED (VATS) W/PLEURAL BIOPSY Left 3/19/2018    Performed by [facility] Fang [facility] at [facility] OR 2ND FLR    URETHROGRAM, RETROGRADE N/A 7/15/2019    Performed by Gal[facility]ino Philippe MD at [facility] OR       Time Tracking:     PT Received On: 07/19/19  PT Start Time: 1232     PT Stop Time: 1258  PT Total Time (min): 26 min     Billable Minutes: Evaluation 7 and Gait Training 13      Neisha Mora, PT  07/19/2019

## 2019-07-19 NOTE — PLAN OF CARE
Problem: Physical Therapy Goal  Goal: Physical Therapy Goal  Goals to be met by: 19     Patient will increase functional independence with mobility by performin. Supine to sit with Contact Guard Assistance.  2. Sit to supine with Contact Guard Assistance.  3. Sit to stand transfer with Supervision.  4. Bed to chair transfer with Stand-by Assistance using Rolling Walker.  5. Gait  x 50 feet with Stand-by Assistance using Rolling Walker.   6. Ascend/Descend 6 inch curb step with Contact Guard Assistance using Rolling Walker.  7. Lower extremity exercise program x 20 reps per handout, with assistance as needed.    Outcome: Ongoing (interventions implemented as appropriate)  PT goals established.

## 2019-07-19 NOTE — PROGRESS NOTES
[facility]  Cardiology  Progress Note    Patient Name: Cale Harding  MRN: [id number]  Admission Date: 7/17/2019  Hospital Length of Stay: 2 days  Code Status: Full Code   Attending Physician: Ashish Trevizo MD   Primary Care Physician: Jj Escobedo MD  Expected Discharge Date: 7/19/2019  Principal Problem:Pericardial effusion without cardiac tamponade    Subjective:     Hospital Course:   7/19/19  Pt denies any CP/chest discomfort, SOB/VALENCIA, palpitations, lightheadedness or dizziness.  This morning pt had an episode of afib with RVR with a rate of 150. 2 does of 2.5 metoprolol IV push were given which dropped the rate to 70.  Pt is currently asymptomatic.    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    Prostate cancer 2002    Urinary tract infection        Past Surgical History:   Procedure Laterality Date    BIOPSY-DIAPHRAGM N/A 3/19/2018    [facility]    CXLJRI-GGSBHD-YM N/A 2/1/2018    Performed by [facility] Diagnostic Provider at [facility] OR 2ND FLR    BRONCHOSCOPY N/A 6/3/2019    Per[facility] by [facility]    COLONOSCOPY  10/20/2012    COLONOSCOPY  11/19/2014    dr machado ( repeat in 3 years per the pt )    CYSTOSCOPY, WITH RETROGRADE PYELOGRAM Bilateral 7/15/2019    Performed by [facility]ino Philippe [facility] at [facility] OR    CYSTOSCOPY, WITH URETHRAL DILATION Bilateral 7/15/2019    Performed by Galdino Philippe MD at [facility] OR    ELBOW SURGERY Left 2013    dislocation    [id number]-R-CATH N/A 6/20/2019    Performed by [facility] Diagnostic Provider at [facility] OR [facility]    LAPAROSCOPY-DIAGNOSTIC N/A 3/19/2018    [facility]    PROCTECTOMY  2002    RELEASE, CONTRACTURE, BLADDER NECK N/A 7/15/2019    Performed by Galdino Philippe MD at [facility] OR     SHOULDER ARTHROSCOPY W/ ROTATOR CUFF REPAIR Right 2008    THORACOSCOPY-VIDEO ASSISTED (VATS) W/PLEURAL BIOPSY Left 3/19/2018    Performed by [facility] at [facility] OR [facility]    URETHROGRAM, RETROGRADE N/A 7/15/2019    Performed by Galdino Philippe [facility] at [facility] OR       Review of patient's allergies indicates:   Allergen Reactions    Bactrim [sulfamethoxazole-trimethoprim] Other (See Comments)     Joint pains       Current Facility-Administered Medications on File Prior to Encounter   Medication    [COMPLETED] furosemide injection 40 mg    [COMPLETED] metoprolol injection 5 mg    [DISCONTINUED] acetaminophen tablet 650 mg    [DISCONTINUED] dextrose 10% (D10W) Bolus    [DISCONTINUED] dextrose 10% (D10W) Bolus    [DISCONTINUED] diltiaZEM 125 mg in D5W 125 mL infusion    [DISCONTINUED] furosemide injection 20 mg    [DISCONTINUED] furosemide injection 40 mg    [DISCONTINUED] glucagon (human recombinant) injection 1 mg    [DISCONTINUED] glucose chewable tablet 16 g    [DISCONTINUED] glucose chewable tablet 24 g    [DISCONTINUED] ibuprofen tablet 400 mg    [DISCONTINUED] metoprolol tartrate (LOPRESSOR) tablet 25 mg    [DISCONTINUED] ondansetron injection 4 mg    [DISCONTINUED] pantoprazole EC tablet 40 mg    [DISCONTINUED] pantoprazole EC tablet 40 mg    [DISCONTINUED] sodium chloride 0.9% flush 10 mL     Current Outpatient Medications on File Prior to Encounter   Medication Sig    amoxicillin-clavulanate 875-125mg (AUGMENTIN) 875-125 mg per tablet Take 1 tablet by mouth 2 (two) times daily.    calcium carbonate (TUMS) 200 mg calcium (500 mg) chewable tablet Take 1 tablet by mouth as needed for Heartburn.    dextromethorphan-guaifenesin  mg (MUCINEX DM)  mg per 12 hr tablet Take 1 tablet by mouth every 12 (twelve) hours.    enalapril (VASOTEC) 5 MG tablet Take 1 tablet (5 mg total) by mouth 2 (two) times daily.    HYDROcodone-acetaminophen (NORCO) 5-325 mg per tablet Take 1  tablet by mouth every 6 (six) hours as needed for Pain.    ibuprofen (ADVIL,MOTRIN) 100 MG tablet Take 100 mg by mouth every 6 (six) hours as needed for Temperature greater than.    LACTOBACILLUS ACIDOPHILUS (ACIDOPHILUS ORAL) Take 1 tablet by mouth once daily.    phenylephrine HCl/acetaminophn (SINUS PAIN-PRESSURE, PE, ORAL) Take by mouth.    polyethylene glycol (MIRALAX) 17 gram/dose powder Take 17 g by mouth once daily.     Family History     Problem Relation (Age of Onset)    Cancer Mother    Heart attack Sister, Sister    Heart disease Father, Brother, Sister, Brother, Sister, Brother    No Known Problems Son, Son, Son    Prostate cancer Brother, Brother, Brother        Tobacco Use    Smoking status: Former Smoker     Packs/day: 2.00     Years: 15.00     Pack years: 30.00     Types: Cigarettes     Last attempt to quit: 1970     Years since quittin.5    Smokeless tobacco: Former User    Tobacco comment: pt quit 40 years ago   Substance and Sexual Activity    Alcohol use: No     Alcohol/week: 16.8 oz     Types: 28 Cans of beer per week     Comment: None since Nov 15 th 2017    Drug use: No    Sexual activity: Not Currently     Review of Systems   Constitution: Negative for chills, decreased appetite, diaphoresis, fever and night sweats.   HENT: Negative.    Eyes: Negative.    Cardiovascular: Positive for dyspnea on exertion and leg swelling. Negative for chest pain, irregular heartbeat, near-syncope, orthopnea, palpitations and syncope.   Respiratory: Positive for shortness of breath. Negative for cough, hemoptysis and sputum production.    Skin: Negative for rash.   Gastrointestinal: Negative for abdominal pain, change in bowel habit, hematemesis, hematochezia, melena, nausea and vomiting.     Objective:     Vital Signs (Most Recent):  Temp: 97.5 °F (36.4 °C) (19)  Pulse: 78 (19)  Resp: (!) 22 (19)  BP: 121/73 (19)  SpO2: 96 % (19) Vital Signs  (24h Range):  Temp:  [97.5 °F (36.4 °C)-98.3 °F (36.8 °C)] 97.5 °F (36.4 °C)  Pulse:  [] 78  Resp:  [15-31] 22  SpO2:  [92 %-97 %] 96 %  BP: ()/(51-82) 121/73     Weight: 77.6 kg (171 lb 1.2 oz)  Body mass index is 26.79 kg/m².    SpO2: 96 %  O2 Device (Oxygen Therapy): room air      Intake/Output Summary (Last 24 hours) at 7/18/2019 0753  Last data filed at 7/18/2019 0100  Gross per 24 hour   Intake 410 ml   Output 800 ml   Net -390 ml       Lines/Drains/Airways     Central Venous Catheter Line                 Port A Cath Single Lumen 06/20/19 right internal jugular 28 days          Drain                 Chest Tube Right Pleural -- days         Urethral Catheter 07/17/19 0920 Non-latex 16 Fr. less than 1 day                Physical Exam   Constitutional: He appears well-developed and well-nourished.   HENT:   Head: Normocephalic.   Neck: Normal range of motion. Neck supple.   Cardiovascular: Normal rate.       Significant Labs: All pertinent lab results from the last 24 hours have been reviewed.    Significant Imaging:     Assessment and Plan:     Brief HPI:   78 y.o male with a hxof prostate cancer, mesothelioma currently on chemo who presented to the hospital with afib with RVR and pericardial effusion. Pt has been stable with IV lopressor for the RVR and is s/p pericardiocentesis on 7/18/19  .   Atrial fibrillation with RVR  - likely due to pericardial effusion vs. Underlying malignancy  - rate control with lopressor   - Continue Metoprolol tartrate mg BID  - CHADsVASC 3 suggestive of 3.2% annual stroke risk. Would consider anticoagulation with Epixaban 5 mg BID  - withhold Enalipril and switch to Losartan   - F/U with cardiology ( Dr. Bonilla)    Pericardial Effusion:  Pt has a hx of cancer  S/p pericardiocentesis on 7/8/19 with removal of 570 cc fluid. Cytology is pending  Repeat echo shows no further accumulation of fluid in the pericardium            VTE Risk Mitigation (From admission, onward)         Ordered     heparin, porcine (PF) 100 unit/mL injection flush 500 Units  Every 8 hours PRN      07/18/19 1712     heparin infusion 1,000 units/500 ml in 0.9% NaCl (pressure line flush)  Intra-op continuous PRN      07/18/19 1155     Place sequential compression device  Until discontinued      07/17/19 1930     IP VTE HIGH RISK PATIENT  Once      07/17/19 1930          Gallo Sparrow MD  Cardiology  [facility]

## 2019-07-19 NOTE — ASSESSMENT & PLAN NOTE
first noted on ECG 7/15/19 although pt reports longer history of palpitations    Cardiology Recs:     Atrial fibrillation with RVR   - likely due to pericardial effusion vs. Underlying malignancy   - rate control with lopressor    - Continue Metoprolol tartrate mg BID   - CHADsVASC 3 suggestive of 3.2% annual stroke risk. Would consider anticoagulation with Epixaban 5 mg BID   - withhold Enalipril and switch to Losartan    - F/U with cardiology ( Dr. Bonilla)     Pericardial Effusion:   - Pt has a hx of cancer   - S/p pericardiocentesis on 7/8/19 with removal of 570 cc fluid. Cytology is pending   - Repeat echo shows no further accumulation of fluid in the pericardium    Plan:  - appreciate cardiology recs  - consider increasing dose of Lopressor from 25mg BID to 50 mg BID  - Continue telemetry

## 2019-07-19 NOTE — PLAN OF CARE
Problem: Occupational Therapy Goal  Goal: Occupational Therapy Goal  Outcome: Outcome(s) achieved Date Met: 07/19/19  Neginal complete. Pt tolerated session well. D/C from OT.

## 2019-07-19 NOTE — PLAN OF CARE
"Problem: Adult Inpatient Plan of Care  Goal: Plan of Care Review  Outcome: Ongoing (interventions implemented as appropriate)  Patient remained free from falls throughout shift, call bell within reach. Cardiac monitoring continued - NSR all shift. Gauze dressing noted to mid sternum, CDI. Cano catheter in place, only about 350cc concentrated urine collected overnight. Patient concerned about activity level before going home - patient has not been out of bed and PT/OT has not seen patient yet. Patient gets very SOB with minimal exertion, states he can feel his heart "beating really fast." vitals stable, will continue to monitor.       "

## 2019-07-20 NOTE — SUBJECTIVE & OBJECTIVE
Interval History: Interventional cards consulted, preformed pericardiocentesis on 7/19 which produced 570cc fluid. Cards to follow. Pulm consulted, plan to leave PleurX in place. Uro rec to continue haider cath at this time for bladder contracture. Will appreciate emeli DEL CASTILLO. Patient experienced 1 episode of Afib w/ RVR this morning. Cards on board and metoprolol 5mg pushed once during episode. Successfully lowered HR and did not cause hypotension as it had done on previous attempts. Patient HD stable at this time but still in Afib. Patient still has SOB on exertion and a mild cough. Patient had one BM yesterday and today on normal consistency and no visible blood. Haider in place for bladder contracture per urology. He denies HA, f/c, n/v, CP, and Abd pain.    Oncology Treatment Plan:   OP NSCLC VINORELBINE WEEKLY    Medications:  Continuous Infusions:   heparin (porcine)       Scheduled Meds:   amoxicillin-clavulanate 500-125mg  1 tablet Oral TID    metoprolol tartrate  25 mg Oral BID    pantoprazole  40 mg Oral Daily     PRN Meds:albuterol-ipratropium, alteplase, Dextrose 10% Bolus, Dextrose 10% Bolus, glucagon (human recombinant), glucose, glucose, heparin (porcine), heparin, porcine (PF), HYDROcodone-acetaminophen, ibuprofen, metoprolol, ondansetron, sodium chloride 0.9%     Review of Systems   Constitutional: Negative for chills, diaphoresis, fatigue and fever.   HENT: Positive for congestion. Negative for sore throat.    Eyes: Negative for visual disturbance.   Respiratory: Positive for cough and shortness of breath (on exertion). Negative for wheezing.    Cardiovascular: Positive for palpitations. Negative for chest pain and leg swelling.   Gastrointestinal: Negative for abdominal distention, abdominal pain, nausea and vomiting.   Genitourinary: Positive for difficulty urinating and frequency.   Musculoskeletal: Negative for arthralgias.   Neurological: Negative for dizziness, light-headedness and  headaches.   Psychiatric/Behavioral: Negative for agitation, behavioral problems and confusion.     Objective:     Vital Signs (Most Recent):  Temp: 97.7 °F (36.5 °C) (07/20/19 0422)  Pulse: (!) 132 (07/20/19 0728)  Resp: 18 (07/20/19 0422)  BP: 119/69 (07/20/19 0728)  SpO2: 95 % (07/20/19 0728) Vital Signs (24h Range):  Temp:  [97.7 °F (36.5 °C)-99 °F (37.2 °C)] 97.7 °F (36.5 °C)  Pulse:  [] 132  Resp:  [17-20] 18  SpO2:  [94 %-97 %] 95 %  BP: (104-129)/(62-70) 119/69     Weight: 84.7 kg (186 lb 11.2 oz)  Body mass index is 27.57 kg/m².  Body surface area is 2.03 meters squared.      Intake/Output Summary (Last 24 hours) at 7/20/2019 0744  Last data filed at 7/20/2019 0655  Gross per 24 hour   Intake 525 ml   Output 475 ml   Net 50 ml       Physical Exam   Constitutional: He is oriented to person, place, and time. He appears well-developed and well-nourished. No distress.   HENT:   Head: Normocephalic and atraumatic.   Eyes: Conjunctivae are normal. No scleral icterus.   Neck: Normal range of motion.   Cardiovascular: Normal rate, S1 normal, S2 normal and intact distal pulses. An irregularly irregular rhythm present.   No murmur heard.  Pulmonary/Chest: Effort normal. No respiratory distress. He has decreased breath sounds in the right lower field. He has no wheezes.   Abdominal: Soft. Bowel sounds are normal.   Musculoskeletal: Normal range of motion. He exhibits no edema.   Neurological: He is alert and oriented to person, place, and time.   Skin: Skin is warm and dry. He is not diaphoretic.   Psychiatric: He has a normal mood and affect. His behavior is normal. Judgment and thought content normal.       Significant Labs:   BMP:   Recent Labs   Lab 07/19/19  0330 07/20/19  0409   * 108   * 133*   K 3.9 4.0    100   CO2 25 26   BUN 22 19   CREATININE 0.7 0.7   CALCIUM 7.9* 8.3*   MG 2.0 1.9   , CBC:   Recent Labs   Lab 07/19/19  0330 07/20/19  0409   WBC 8.85 8.19   HGB 9.5* 9.4*   HCT  30.8* 31.8*   * 371*   , CMP:   Recent Labs   Lab 07/19/19  0330 07/20/19  0409   * 133*   K 3.9 4.0    100   CO2 25 26   * 108   BUN 22 19   CREATININE 0.7 0.7   CALCIUM 7.9* 8.3*   PROT 5.3* 5.4*   ALBUMIN 1.9* 1.9*   BILITOT 0.3 0.4   ALKPHOS 82 78   AST 13 14   ALT 7* 6*   ANIONGAP 8 7*   EGFRNONAA >60.0 >60.0   , Coagulation:   No results for input(s): PT, INR, APTT in the last 48 hours., LFTs:   Recent Labs   Lab 07/19/19 0330 07/20/19  0409   ALT 7* 6*   AST 13 14   ALKPHOS 82 78   BILITOT 0.3 0.4   PROT 5.3* 5.4*   ALBUMIN 1.9* 1.9*    and Urine Studies: No results for input(s): COLORU, APPEARANCEUA, PHUR, SPECGRAV, PROTEINUA, GLUCUA, KETONESU, BILIRUBINUA, OCCULTUA, NITRITE, UROBILINOGEN, LEUKOCYTESUR, RBCUA, WBCUA, BACTERIA, SQUAMEPITHEL, HYALINECASTS in the last 48 hours.    Invalid input(s): [id number]    Diagnostic Results:  I have reviewed all pertinent imaging results/findings within the past 24 hours.

## 2019-07-20 NOTE — ASSESSMENT & PLAN NOTE
- Patient states that he has always had palpitations that come and go but they were never caught while he was at a doctor or in the hospital   - Atrial fibrillation with RVR likely precipitated by volume overload and enlarging pericardial effusion. He was started on diltiazem drip then switched to lopressor in the OSH  -  Cardiology consulted for pericardiocentesis vs pericardial window   - On arrival patient was in NSR and converted to RVR and 2.5 mg of Iv lopressor was pushed, his BP started to decrease to ~86/44. Will watch his BP and if it comes up will give him lopressor PO. As he has not gotten his night time dose   -  If becomes hemodynamically unstable will consider diltiazem drip but given his BP will be cautious  - Cardiology consulted appreciate their recs  - Repeat echo showed EF of 55% w/ moderate pericardial effusion BEFORE pericardiocentesis    No

## 2019-07-20 NOTE — ASSESSMENT & PLAN NOTE
- Patient of  , discussed with his overnight suspect that the drain could be infected and  recommended Augmentin for 5 more day. He also drained 450 cc of fluid.   - Pulm consults rec to leave PleurX in place   - Last drainage was morning of 7/18 , and is supposed to be drained once a week   - Patient on RA and not SOB   -  repeat Chest xray to assess for effusion recurrence

## 2019-07-20 NOTE — CARE UPDATE
"RAPID RESPONSE NURSE PROACTIVE ROUNDING NOTE     Time of Visit: 935    Admit Date: 2019  LOS: 3  Code Status: Full Code   Date of Visit: 2019  : 1940  Age: 78 y.o.  Sex: male  Race: White  Bed: 8088/80 A:   MRN: [id number]  Was the patient discharged from an ICU this admission? no   Was the patient discharged from a PACU within last 24 hours?  no  Did the patient receive conscious sedation/general anesthesia in last 24 hours?  No  Was the patient in the ED within the past 24 hours?  no  Was the patient started on NIPPV within the past 24 hours?  no  Attending Physician: Sal Guy MD  Primary Service: [facility] MEDICAL ONCOLOGY    ASSESSMENT     Diagnosis: Pericardial effusion without cardiac tamponade    Abnormal Vital Signs: /77 (BP Location: Left arm, Patient Position: Lying)   Pulse (!) 132   Temp 97.7 °F (36.5 °C) (Oral)   Resp 18   Ht 5' 9" (1.753 m)   Wt 84.7 kg (186 lb 11.2 oz)   SpO2 96%   BMI 27.57 kg/m²      Clinical Issues: Circulatory    Patient  has a past medical history of Disorder of kidney and ureter, Diverticulitis, Elevated PSA, Hypertension, Lung cancer, Mesothelioma, Prostate cancer, and Urinary tract infection.    RR team notified and NV preformed for pt HR in afib and hypotention     INTERVENTIONS/ RECOMMENDATIONS     Per RN, pt in afib, SBP dropped after administration of IV metoprolol. 500 ml NS bolus administered. /77 during NV. Pt denies pain or discomfort. Plan to await recommendations from cards and continue to monitor HR    Discussed plan of care with Anita WILKINSON.    PHYSICIAN ESCALATION     Yes/No  no    Orders received and case discussed with NA.    Disposition: Remain in room 8088.    FOLLOW-UP     Call back the Rapid Response Nurse, Keena Selby RN at [phone] for additional questions or concerns.          "

## 2019-07-20 NOTE — PLAN OF CARE
Problem: Adult Inpatient Plan of Care  Goal: Plan of Care Review  Outcome: Ongoing (interventions implemented as appropriate)  No acute events over night. VSS. Afebrile. On best rest. Up with assistance. NSR on telemetry. Right flank pleurex site WNL. denies any pain.

## 2019-07-20 NOTE — PROGRESS NOTES
07/20/19 0812   Vital Signs   Pulse (!) 133   BP (!) 88/61   MAP (mmHg) 69   BP Location Left arm   BP Method Automatic   Patient Position Lying     Notified Dr. Enrique. See new order for 500 cc bolus.

## 2019-07-20 NOTE — CARE UPDATE
"RAPID RESPONSE NURSE PROACTIVE ROUNDING NOTE     Time of Visit: 1010    Admit Date: 2019  LOS: 3  Code Status: Full Code   Date of Visit: 2019  : 1940  Age: 78 y.o.  Sex: male  Race: White  Bed: 8088/8088 A:   MRN: [id number]  Was the patient discharged from an ICU this admission? no   Was the patient discharged from a PACU within last 24 hours?  no  Did the patient receive conscious sedation/general anesthesia in last 24 hours?  no  Was the patient in the ED within the past 24 hours?  no  Was the patient started on NIPPV within the past 24 hours?  no  Attending Physician: Sal Guy MD  Primary Service: [facility]COL[facility]    ASSESSMENT     Diagnosis: Pericardial effusion without cardiac tamponade    Abnormal Vital Signs: /77 (BP Location: Left arm, Patient Position: Lying)   Pulse (!) 134   Temp 97.7 °F (36.5 °C) (Oral)   Resp 18   Ht 5' 9" (1.753 m)   Wt 84.7 kg (186 lb 11.2 oz)   SpO2 96%   BMI 27.57 kg/m²      Clinical Issues: Circulatory    Patient  has a past medical history of Disorder of kidney and ureter, Diverticulitis, Elevated PSA, Hypertension, Lung cancer, Mesothelioma, Prostate cancer, and Urinary tract infection.    HI performed for hypotension.     INTERVENTIONS/ RECOMMENDATIONS     Per charge RN, pt in afib w/ SBP 80's. 500 mL NS bolus was administered. 's. /77 after bolus. Pt denies complaints at this time. Awaiting recommendations from cardiology.     Discussed plan of care with Sujata WILKINSON, [id number].    PHYSICIAN ESCALATION     Yes/No  no    Orders received and case discussed with NA.    Disposition: Remain in room 8088 A.    FOLLOW-UP     Call back the Rapid Response Nurse, Kera Kim RN at [phone] for additional questions or concerns.        "

## 2019-07-20 NOTE — PROGRESS NOTES
Spoke with Dr. Black and Dr. Magana regarding pts afib. HR ranging from 130s-160s. BP stable. They have consulted cards and waiting to hear back from cardiology about recommendations. Will monitor pt.

## 2019-07-20 NOTE — PROGRESS NOTES
[facility]  Hematology/Oncology  Progress Note    Patient Name: Cale Harding  Admission Date: 7/17/2019  Hospital Length of Stay: 3 days  Code Status: Full Code     Subjective:     HPI:  Mr. Cale Harding is a 78-year-old male with a past medical history of prostate cancer (s/p prostatectomy), mesothelioma (on chemotherapy), with recurrent pleural effusion  ( with a right PleurX) and hypertension who is a transfer for evaluation of pericardial effusion.       Patient was scheduled for elective cystography with Urology (Dr. Philippe) for dilation of bladder neck contracture.  On arrival to elective surgical suite, patient found to be tachycardic and heart rate was irregular.  EKG confirmed atrial fibrillation with RVR.  Surgery was cancelled and he was admitted to the ICU for a diltiazem drip. He converted to sinus but would go in and out of afib, was started on lopressor 25 BID and echo done showed moderate size of pericardial effusion with no tamponade physiology however increased in effusion from 7/5, cardiology in the OSH was planning for pericardiocentesis but requested transfer for possible window vs pericardiocentesis and was sent to [facility]    On arrival patient was hemodynamically stable  , NSR, converted in and out of afib. Cardiology was consulted for input on afib management in the setting of pericardial effusion and need for pericardiocentesis/ closer ccu monitoring.     Of note patient has a PleurX on the right that is possibly infected and he was supposed to get it removed this Friday by his pulmonologist Dr. Woodward     Interval History: Interventional cards consulted, preformed pericardiocentesis on 7/19 which produced 570cc fluid. Cards to follow. Pulm consulted, plan to leave PleurX in place. Uro rec to continue haider cath at this time for bladder contracture. Will appreciate recs.     MAGDALENE. Patient experienced 1 episode of Afib w/ RVR this morning. Cards on board and  metoprolol 5mg pushed once during episode. Successfully lowered HR and did not cause hypotension as it had done on previous attempts. Patient HD stable at this time but still in Afib. Patient still has SOB on exertion and a mild cough. Patient had one BM yesterday and today on normal consistency and no visible blood. Cano in place for bladder contracture per urology. He denies HA, f/c, n/v, CP, and Abd pain.    Oncology Treatment Plan:   OP NSCLC VINORELBINE WEEKLY    Medications:  Continuous Infusions:   heparin (porcine)       Scheduled Meds:   amoxicillin-clavulanate 500-125mg  1 tablet Oral TID    metoprolol tartrate  25 mg Oral BID    pantoprazole  40 mg Oral Daily     PRN Meds:albuterol-ipratropium, alteplase, Dextrose 10% Bolus, Dextrose 10% Bolus, glucagon (human recombinant), glucose, glucose, heparin (porcine), heparin, porcine (PF), HYDROcodone-acetaminophen, ibuprofen, metoprolol, ondansetron, sodium chloride 0.9%     Review of Systems   Constitutional: Negative for chills, diaphoresis, fatigue and fever.   HENT: Positive for congestion. Negative for sore throat.    Eyes: Negative for visual disturbance.   Respiratory: Positive for cough and shortness of breath (on exertion). Negative for wheezing.    Cardiovascular: Positive for palpitations. Negative for chest pain and leg swelling.   Gastrointestinal: Negative for abdominal distention, abdominal pain, nausea and vomiting.   Genitourinary: Positive for difficulty urinating and frequency.   Musculoskeletal: Negative for arthralgias.   Neurological: Negative for dizziness, light-headedness and headaches.   Psychiatric/Behavioral: Negative for agitation, behavioral problems and confusion.     Objective:     Vital Signs (Most Recent):  Temp: 97.7 °F (36.5 °C) (07/20/19 0422)  Pulse: (!) 132 (07/20/19 0728)  Resp: 18 (07/20/19 0422)  BP: 119/69 (07/20/19 0728)  SpO2: 95 % (07/20/19 0728) Vital Signs (24h Range):  Temp:  [97.7 °F (36.5 °C)-99 °F (37.2  °C)] 97.7 °F (36.5 °C)  Pulse:  [] 132  Resp:  [17-20] 18  SpO2:  [94 %-97 %] 95 %  BP: (104-129)/(62-70) 119/69     Weight: 84.7 kg (186 lb 11.2 oz)  Body mass index is 27.57 kg/m².  Body surface area is 2.03 meters squared.      Intake/Output Summary (Last 24 hours) at 7/20/2019 0744  Last data filed at 7/20/2019 0655  Gross per 24 hour   Intake 525 ml   Output 475 ml   Net 50 ml       Physical Exam   Constitutional: He is oriented to person, place, and time. He appears well-developed and well-nourished. No distress.   HENT:   Head: Normocephalic and atraumatic.   Eyes: Conjunctivae are normal. No scleral icterus.   Neck: Normal range of motion.   Cardiovascular: Normal rate, S1 normal, S2 normal and intact distal pulses. An irregularly irregular rhythm present.   No murmur heard.  Pulmonary/Chest: Effort normal. No respiratory distress. He has decreased breath sounds in the right lower field. He has no wheezes.   Abdominal: Soft. Bowel sounds are normal.   Musculoskeletal: Normal range of motion. He exhibits no edema.   Neurological: He is alert and oriented to person, place, and time.   Skin: Skin is warm and dry. He is not diaphoretic.   Psychiatric: He has a normal mood and affect. His behavior is normal. Judgment and thought content normal.       Significant Labs:   BMP:   Recent Labs   Lab 07/19/19  0330 07/20/19  0409   * 108   * 133*   K 3.9 4.0    100   CO2 25 26   BUN 22 19   CREATININE 0.7 0.7   CALCIUM 7.9* 8.3*   MG 2.0 1.9   , CBC:   Recent Labs   Lab 07/19/19  0330 07/20/19  0409   WBC 8.85 8.19   HGB 9.5* 9.4*   HCT 30.8* 31.8*   * 371*   , CMP:   Recent Labs   Lab 07/19/19  0330 07/20/19  0409   * 133*   K 3.9 4.0    100   CO2 25 26   * 108   BUN 22 19   CREATININE 0.7 0.7   CALCIUM 7.9* 8.3*   PROT 5.3* 5.4*   ALBUMIN 1.9* 1.9*   BILITOT 0.3 0.4   ALKPHOS 82 78   AST 13 14   ALT 7* 6*   ANIONGAP 8 7*   EGFRNONAA >60.0 >60.0   , Coagulation:   No  results for input(s): PT, INR, APTT in the last 48 hours., LFTs:   Recent Labs   Lab 07/19/19  0330 07/20/19  0409   ALT 7* 6*   AST 13 14   ALKPHOS 82 78   BILITOT 0.3 0.4   PROT 5.3* 5.4*   ALBUMIN 1.9* 1.9*    and Urine Studies: No results for input(s): COLORU, APPEARANCEUA, PHUR, SPECGRAV, PROTEINUA, GLUCUA, KETONESU, BILIRUBINUA, OCCULTUA, NITRITE, UROBILINOGEN, LEUKOCYTESUR, RBCUA, WBCUA, BACTERIA, SQUAMEPITHEL, HYALINECASTS in the last 48 hours.    Invalid input(s): WRIGHTSUR    Diagnostic Results:  I have reviewed all pertinent imaging results/findings within the past 24 hours.    Assessment/Plan:     * Pericardial effusion without cardiac tamponade  - Patient transferred for evaluation by cardiology / CTS for pericardial window or pericardiocentesis   - Pericardiocentesis preformed by interventional cards, pt tolerated procedure well, produced 570cc serosanguinous fluid.  - Maximize medical management per cards  - will appreciate recs    Acute on chronic diastolic heart failure  - Patient with symptoms of Sob and swelling   - Patient not on lasix at home   - BNP elevated at 374  -  bilateral pitting edema and scrotal swelling        PLAN   - D/c lasix  - Strict I/O   - Daily weights  - Keep mag >2 and phos> 3  - Cardiac diet with decreased salt intake            Paroxysmal atrial fibrillation  - Patient states that he has always had palpitations that come and go but they were never caught while he was at a doctor or in the hospital   - Atrial fibrillation with RVR likely precipitated by volume overload and enlarging pericardial effusion. He was started on diltiazem drip then switched to lopressor in the OSH  -  Cardiology consulted for pericardiocentesis vs pericardial window   - On arrival patient was in NSR and converted to RVR and 2.5 mg of Iv lopressor was pushed, his BP started to decrease to ~86/44. Will watch his BP and if it comes up will give him lopressor PO. As he has not gotten his night time  dose   -  If becomes hemodynamically unstable will consider diltiazem drip but given his BP will be cautious  - Cardiology consulted appreciate their recs  - Repeat echo showed EF of 55% w/ moderate pericardial effusion BEFORE pericardiocentesis     Essential hypertension  - hold all antihypertensives he is on enalapril at home   - see cards recs    Atrial fibrillation with RVR  first noted on ECG 7/15/19 although pt reports longer history of palpitations    Cardiology Recs:     Atrial fibrillation with RVR   - likely due to pericardial effusion vs. Underlying malignancy   - rate control with lopressor    - Continue Metoprolol tartrate mg BID   - CHADsVASC 3 suggestive of 3.2% annual stroke risk. Would consider anticoagulation with Epixaban 5 mg BID   - withhold Enalipril and switch to Losartan    - F/U with cardiology ( Dr. Bonilla)     Pericardial Effusion:   - Pt has a hx of cancer   - S/p pericardiocentesis on 7/8/19 with removal of 570 cc fluid. Cytology is pending   - Repeat echo shows no further accumulation of fluid in the pericardium    Plan:  - appreciate cardiology recs  - consider increasing dose of Lopressor from 25mg BID to 50 mg BID  - Continue telemetry     Acquired contracture of bladder neck  Urology consulted in the OSH   S/p Bladder neck contracture release and urethral dilation on 7/15/19  Was having problems voiding this am and haider was placed again   Will consult urology for final discharge recs on whether he needs to remain with haider until seen OP , call in the am        Pleural effusion  See malignant pleural effusion    Mesothelioma of left lung  - Patient of Dr. Foster on palliative chemo with Gemzar   - Controlled pain on norco   - OP follow up with oncologist     Malignant pleural effusion  - Patient of  , discussed with his overnight suspect that the drain could be infected and  recommended Augmentin for 5 more day. He also drained 450 cc of fluid.   - Pulm consults rec to  leave PleurX in place   - Last drainage was morning of 7/18 , and is supposed to be drained once a week   - Patient on RA and not SOB   -  repeat Chest xray to assess for effusion recurrence      Gastroesophageal reflux disease  - PPI daily     History of prostate cancer  S/p prostectomy   Has a haider that was placed this morning for urinary retenion  Sees urology as OP   Urology rec to keep haider in place             Hipolito Enrique MD  Hematology/Oncology  [facility]

## 2019-07-20 NOTE — CARE UPDATE
Contacted at 0814 by JAJA Ernst to evaluate . Cale Harding for episode of afib unrelieved by 1 dose metoprol with decreased BP. Pt otherwise asymptomatic. Will assess as soon as possible. Instructed to call  and activate a rapid response if patient's condition changes prior to arrival.

## 2019-07-21 PROBLEM — R31.0 GROSS HEMATURIA: Status: ACTIVE | Noted: 2019-01-01

## 2019-07-21 NOTE — PROGRESS NOTES
Patient started on Cardizem gtt in beginning of shift for Afib. Patient also states he's been SOB all day. Dr. Brunson notified and ordered to place him on 2L NC. Patient still complaining of SOB at midnight. tachypneic and audible wheezing noted. Patient with productive cough. Respiratory called and breathing treatment given. Patient still having SOB. Dr. Brunson notified and stated to see if patient wanted R pleurx drained. Patient however, declined, states he's starting to feel a little better and wants to wait until the morning. Instructed to call if symptoms worsen. Will continue to monitor.

## 2019-07-21 NOTE — ASSESSMENT & PLAN NOTE
Cale Harding is a 78 y.o. 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 Philippe  - Maintain haider catheter, nursing staff may irrigate PRN for clots  - Recommend scrotal support for swelling  - If still in hospital, may perform voiding trial on 7/26 first thing in the morning, only if urine remains clear  - Patient may continue to follow up with Dr. Philippe for urologic management otherwise

## 2019-07-21 NOTE — PROGRESS NOTES
Notified MD that pt's heart rate elevated to 147 and rhythm is a. Fib. Also stated IV lopressor would be given per PRN order. MD agreed. Will continue to monitor.

## 2019-07-21 NOTE — PLAN OF CARE
Problem: Adult Inpatient Plan of Care  Goal: Plan of Care Review  Outcome: Ongoing (interventions implemented as appropriate)  Patient remained free from falls throughout shift, call bell within reach. Caredizem gtt infusing at 5mg/hr. HR ranging from 's. Heparin gtt infusing at 12units/kg - therapeutic.Patient states he is not as SOB as he was in beginning of shift, but is still having a productive cough that is keeping him awake. On 2L NC, pulse ox 94-96%. Dr. Brunson ordered to hold 2100 dose of PO lopressor. Vitals stable, will continue to monitor.

## 2019-07-21 NOTE — SUBJECTIVE & OBJECTIVE
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    Prostate cancer 2002    Urinary tract infection        Past Surgical History:   Procedure Laterality Date    BIOPSY-DIAPHRAGM N/A 3/19/2018    Performed by Galdino Fang MD at [facility] OR [address] [place] [address]    HCSHLC-DYANJZ-SF N/A 2/1/2018    Performed by [facility] Provider at [facility] OR [address] [place] [address]    BRONCHOSCOPY N/A 6/3/2019    Performed by [facility] Provider at [facility] OR [address] [place] [address]    COLONOSCOPY  10/20/2012    COLONOSCOPY  11/19/2014    dr machado ( repeat in 3 years per the pt )    CYSTOSCOPY, WITH RETROGRADE PYELOGRAM Bilateral 7/15/2019    Performed by [facility] Philippe MD at [facility] OR    CYSTOSCOPY, WITH URETHRAL DILATION Bilateral 7/15/2019    Performed by Galdino Philippe MD at [facility] OR    ELBOW SURGERY Left 2013    dislocation    [id number]-E-CATH N/A 6/20/2019    Performed by [facility] Provider at [facility] OR [address] [place] [address]    LAPAROSCOPY-DIAGNOSTIC N/A 3/19/2018    [facility]    Pericardiocentesis N/A 7/18/2019    Performed by Frank Javier MD at [facility] CATH LAB    PROCTECTOMY  2002    RELEASE, CONTRACTURE, BLADDER NECK N/A 7/15/2019    Performed by Galdino Philippe MD at [facility] OR    SHOULDER ARTHROSCOPY W/ ROTATOR CUFF REPAIR Right 2008    THORACOSCOPY-VIDEO ASSISTED (VATS) W/PLEURAL BIOPSY Left 3/19/2018    Performed by Galdino Fang MD at [facility] OR [address] [place] [address]    URETHROGRAM, RETROGRADE N/A 7/15/2019    Performed by Galdino Philippe MD at [facility] OR       Review of patient's allergies indicates:   Allergen Reactions    Bactrim [sulfamethoxazole-trimethoprim] Other (See Comments)     Joint pains       No current facility-administered medications on file prior to encounter.      Current Outpatient Medications on File Prior to Encounter   Medication Sig     amoxicillin-clavulanate 875-125mg (AUGMENTIN) 875-125 mg per tablet Take 1 tablet by mouth 2 (two) times daily.    calcium carbonate (TUMS) 200 mg calcium (500 mg) chewable tablet Take 1 tablet by mouth as needed for Heartburn.    dextromethorphan-guaifenesin  mg (MUCINEX DM)  mg per 12 hr tablet Take 1 tablet by mouth every 12 (twelve) hours.    enalapril (VASOTEC) 5 MG tablet Take 1 tablet (5 mg total) by mouth 2 (two) times daily.    HYDROcodone-acetaminophen (NORCO) 5-325 mg per tablet Take 1 tablet by mouth every 6 (six) hours as needed for Pain.    ibuprofen (ADVIL,MOTRIN) 100 MG tablet Take 100 mg by mouth every 6 (six) hours as needed for Temperature greater than.    LACTOBACILLUS ACIDOPHILUS (ACIDOPHILUS ORAL) Take 1 tablet by mouth once daily.    phenylephrine HCl/acetaminophn (SINUS PAIN-PRESSURE, PE, ORAL) Take by mouth.    polyethylene glycol (MIRALAX) 17 gram/dose powder Take 17 g by mouth once daily.     Family History     Problem Relation (Age of Onset)    Cancer Mother    Heart attack Sister, Sister    Heart disease Father, Brother, Sister, Brother, Sister, Brother    No Known Problems Son, Son, Son    Prostate cancer Brother, Brother, Brother        Tobacco Use    Smoking status: Former Smoker     Packs/day: 2.00     Years: 15.00     Pack years: 30.00     Types: Cigarettes     Last attempt to quit: 1970     Years since quittin.5    Smokeless tobacco: Former User    Tobacco comment: pt quit 40 years ago   Substance and Sexual Activity    Alcohol use: No     Alcohol/week: 16.8 oz     Types: 28 Cans of beer per week     Comment: None since Nov 15 th 2017    Drug use: No    Sexual activity: Not Currently     Review of Systems   Constitution: Negative for chills and fever.   Cardiovascular: Positive for dyspnea on exertion and palpitations. Negative for chest pain and syncope.   Respiratory: Positive for shortness of breath. Negative for cough and sleep disturbances due to  breathing.    Musculoskeletal: Negative for back pain.   Gastrointestinal: Negative for abdominal pain, nausea and vomiting.   Neurological: Negative for dizziness and focal weakness.   Psychiatric/Behavioral: Negative for altered mental status.     Objective:     Vital Signs (Most Recent):  Temp: 97.4 °F (36.3 °C) (07/20/19 2359)  Pulse: 108 (07/21/19 0008)  Resp: (!) 22 (07/21/19 0008)  BP: 103/70 (07/20/19 2109)  SpO2: (!) 94 % (07/21/19 0008) Vital Signs (24h Range):  Temp:  [97.4 °F (36.3 °C)-97.9 °F (36.6 °C)] 97.4 °F (36.3 °C)  Pulse:  [] 108  Resp:  [18-24] 22  SpO2:  [91 %-97 %] 94 %  BP: ()/(56-77) 103/70     Weight: 84.7 kg (186 lb 11.2 oz)  Body mass index is 27.57 kg/m².    SpO2: (!) 94 %  O2 Device (Oxygen Therapy): room air      Intake/Output Summary (Last 24 hours) at 7/21/2019 0317  Last data filed at 7/21/2019 0226  Gross per 24 hour   Intake 1390.69 ml   Output 675 ml   Net 715.69 ml       Lines/Drains/Airways     Central Venous Catheter Line                 Port A Cath Single Lumen 06/20/19 right internal jugular 31 days          Drain                 Chest Tube Right Pleural -- days         Urethral Catheter 07/17/19 0920 Non-latex 16 Fr. 3 days                Physical Exam   Constitutional: He is oriented to person, place, and time. He appears well-developed.   HENT:   Head: Normocephalic and atraumatic.   Mouth/Throat: Oropharynx is clear and moist.   Eyes: Pupils are equal, round, and reactive to light. EOM are normal.   Neck: Normal range of motion. Neck supple. No JVD present.   Cardiovascular: Intact distal pulses.   Irregularly irregular rhythm    Pulmonary/Chest: Effort normal and breath sounds normal.   Left lung with diffuse rales. Right lung clear anteriorly.    Abdominal: Soft. Bowel sounds are normal.   Musculoskeletal: Normal range of motion. He exhibits no edema.   Neurological: He is alert and oriented to person, place, and time.   Skin: Skin is warm and dry.    Psychiatric: He has a normal mood and affect.       Significant Labs: All pertinent lab results from the last 24 hours have been reviewed.    Significant Imaging: All pertinent images from the last 24h have been reviewed.

## 2019-07-21 NOTE — HPI
Cale Harding is a 78 y.o. male with hx of prostate cancer (s/p radical prostatectomy), mesothelioma (on chemotherapy), with recurrent pleural effusion  ( with a right PleurX) and hypertension.    Underwent laser incision of BNC and bilateral RGP with Dr. Philippe on 7/15, at that time was noted to be in a fib with RVR and was noted to have a pericardiac effusion. [facility] management. He is now on anticoagulation. A haider was able to be placed without complication however patient has since started having hematuria.     Denies previous hematuria. Has been having issues with scrotal swelling for which he has also seen Dr. Philippe.   Had an abnormal bladder cytology however recent workup on 7/15 with cysto, B RGP showed no abnormalities. Ureteral barbotage showed atypia.

## 2019-07-21 NOTE — CODE/ RAPID DOCUMENTATION
Rapid Response Nurse Follow-up Note     Followed up with patient for proactive rounding.   No acute issues at this time. Reviewed plan of care with primary RN,    Please call Rapid Response RN, Rosa Iyer RN with any questions or concerns at [phone].

## 2019-07-21 NOTE — SUBJECTIVE & OBJECTIVE
Interval History: Interventional cards consulted, preformed pericardiocentesis on 7/19 which produced 570cc fluid. Cards to follow. Pulm consulted, plan to leave PleurX in place. Uro rec to continue haider cath at this time for bladder contracture. Will appreciate reckayleigh DEL CASTILLO. After multiple days of lopressor trials (PO and 5mg pushes during episodes), cardiology recommended/started patient on heparin and diltiazem drip for rate control. Patient noted to have hematuria thi morning after, Urology consulted. Haider in place for bladder contracture per urology, hematuria present w/o clots. Patient HD stable at this time. Patient still has SOB on exertion and a mild cough, Pulmonary consulted to drain PleurX. CXR repeated. He denies HA, f/c, n/v, CP, and Abd pain.    Oncology Treatment Plan:   OP NSCLC VINORELBINE WEEKLY    Medications:  Continuous Infusions:   dilTIAZem 5 mg/hr (07/21/19 1259)    heparin (porcine) in D5W 12 Units/kg/hr (07/20/19 2006)    heparin (porcine)       Scheduled Meds:   amoxicillin-clavulanate 500-125mg  1 tablet Oral TID    metoprolol tartrate  50 mg Oral BID    ondansetron  4 mg Intravenous Once    pantoprazole  40 mg Oral Daily     PRN Meds:albuterol-ipratropium, alteplase, Dextrose 10% Bolus, Dextrose 10% Bolus, glucagon (human recombinant), glucose, glucose, heparin (porcine), heparin, porcine (PF), HYDROcodone-acetaminophen, ibuprofen, metoprolol, ondansetron, sodium chloride 0.9%     Review of Systems   Constitutional: Positive for fever. Negative for chills, diaphoresis and fatigue.   HENT: Positive for sinus pain. Negative for congestion and sore throat.    Eyes: Negative for visual disturbance.   Respiratory: Positive for cough and shortness of breath (on exertion). Negative for wheezing.    Cardiovascular: Positive for palpitations. Negative for chest pain and leg swelling.   Gastrointestinal: Positive for nausea. Negative for abdominal distention, abdominal pain and  vomiting.   Genitourinary: Positive for decreased urine volume, difficulty urinating, frequency and scrotal swelling.   Musculoskeletal: Negative for arthralgias.   Neurological: Negative for dizziness, light-headedness and headaches.   Psychiatric/Behavioral: Negative for agitation, behavioral problems and confusion.     Objective:     Vital Signs (Most Recent):  Temp: 98 °F (36.7 °C) (07/21/19 1252)  Pulse: 76 (07/21/19 1252)  Resp: (!) 22 (07/21/19 1252)  BP: 133/66 (07/21/19 1252)  SpO2: 97 % (07/21/19 1252) Vital Signs (24h Range):  Temp:  [96.7 °F (35.9 °C)-98 °F (36.7 °C)] 98 °F (36.7 °C)  Pulse:  [] 76  Resp:  [18-24] 22  SpO2:  [91 %-97 %] 97 %  BP: ()/(56-83) 133/66     Weight: 84.1 kg (185 lb 6.5 oz)  Body mass index is 27.38 kg/m².  Body surface area is 2.02 meters squared.      Intake/Output Summary (Last 24 hours) at 7/21/2019 1502  Last data filed at 7/21/2019 1259  Gross per 24 hour   Intake 2308.04 ml   Output 400 ml   Net 1908.04 ml       Physical Exam   Constitutional: He is oriented to person, place, and time. He appears well-developed and well-nourished. No distress.   HENT:   Head: Normocephalic and atraumatic.   Eyes: Conjunctivae are normal. No scleral icterus.   Neck: Normal range of motion.   Cardiovascular: Normal heart sounds and intact distal pulses. An irregularly irregular rhythm present. Tachycardia present.   No murmur heard.  Pulmonary/Chest: Effort normal. No respiratory distress. He has decreased breath sounds in the right lower field. He has no wheezes.   Abdominal: Soft. Bowel sounds are normal. There is no tenderness.   Musculoskeletal: Normal range of motion. He exhibits no edema.   Neurological: He is alert and oriented to person, place, and time.   Skin: Skin is warm and dry. He is not diaphoretic.   Psychiatric: He has a normal mood and affect. His behavior is normal. Judgment and thought content normal.   Vitals reviewed.      Significant Labs:   BMP:   Recent  Labs   Lab 07/20/19  0409 07/21/19  0420    120*   * 131*   K 4.0 4.2    99   CO2 26 22*   BUN 19 20   CREATININE 0.7 0.7   CALCIUM 8.3* 8.3*   MG 1.9 1.9   , CBC:   Recent Labs   Lab 07/20/19  0409 07/20/19 1828 07/21/19  0420   WBC 8.19 10.83 10.45  10.45   HGB 9.4* 10.5* 9.7*  9.7*   HCT 31.8* 35.3* 32.4*  32.4*   * 404* 380*  380*   , CMP:   Recent Labs   Lab 07/20/19 0409 07/21/19  0420   * 131*   K 4.0 4.2    99   CO2 26 22*    120*   BUN 19 20   CREATININE 0.7 0.7   CALCIUM 8.3* 8.3*   PROT 5.4* 5.7*   ALBUMIN 1.9* 2.0*   BILITOT 0.4 0.4   ALKPHOS 78 84   AST 14 20   ALT 6* 8*   ANIONGAP 7* 10   EGFRNONAA >60.0 >60.0   , Coagulation:   Recent Labs   Lab 07/20/19 1828 07/21/19  0218   INR 1.1  --    APTT 26.6 39.1*   , LFTs:   Recent Labs   Lab 07/20/19 0409 07/21/19  0420   ALT 6* 8*   AST 14 20   ALKPHOS 78 84   BILITOT 0.4 0.4   PROT 5.4* 5.7*   ALBUMIN 1.9* 2.0*    and Urine Studies: No results for input(s): COLORU, APPEARANCEUA, PHUR, SPECGRAV, PROTEINUA, GLUCUA, KETONESU, BILIRUBINUA, OCCULTUA, NITRITE, UROBILINOGEN, LEUKOCYTESUR, RBCUA, WBCUA, BACTERIA, SQUAMEPITHEL, HYALINECASTS in the last 48 hours.    Invalid input(s): WRIGHTSUR    Diagnostic Results:  I have reviewed all pertinent imaging results/findings within the past 24 hours.

## 2019-07-21 NOTE — ASSESSMENT & PLAN NOTE
- Unable to rate control with metoprolol because BP did not tolerate  - Started on diltiazem infusion with 20 mg bolus. HR decreased. Patient back to NSR at 10 pm. He is in/out. Will consider antiarrhythmic (amiodarone) once therapeutic on heparin.   - CHADsVASC 3 suggestive of 3.2% annual stroke risk. - will start heparin at this time. Discussed with primary team and they agree.

## 2019-07-21 NOTE — CONSULTS
[facility]  Urology  Consult Note    Patient Name: Cale Harding  MRN: [id number]  Admission Date: 7/17/2019  Hospital Length of Stay: 4   Code Status: Full Code   Attending Provider: Robbin Horner MD  Consulting Provider: Brenna Franco MD  Primary Care Physician: Jj Escobedo MD  Principal Problem:Pericardial effusion without cardiac tamponade    Inpatient consult to Urology  Consult performed by: Brenna Franco MD  Consult ordered by: Hipolito Enrique MD          Subjective:     HPI:  Cale Harding is a 78 y.o. male with hx of prostate cancer (s/p radical prostatectomy), mesothelioma (on chemotherapy), with recurrent pleural effusion  ( with a right PleurX) and hypertension.    Underwent laser incision of BNC and bilateral RGP with Dr. Philippe on 7/15, at that time was noted to be in a fib with RVR and was noted to have a pericardiac effusion. [place] [facility] He is now on anticoagulation. A haider was able to be placed without complication however patient has since started having hematuria.     Denies previous hematuria. Has been having issues with scrotal swelling for which he has also seen Dr. Philipep.   Had an abnormal bladder cytology however recent workup on 7/15 with cysto, B RGP showed no abnormalities. Ureteral barbotage showed atypia.     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    Prostate cancer 2002    Urinary tract infection        Past Surgical History:   Procedure Laterality Date    BIOPSY-DIAPHRAGM N/A 3/19/2018    Performed by [facility] at [facility] OR 2ND FLR    IUBJAG-NUCQAU-PX N/A 2/1/2018    Performed by [facility] Provider at [facility] OR 2ND FLR    BRONCHOSCOPY N/A 6/3/2019    Performed by [facility] Provider at [facility] OR 2ND FLR    COLONOSCOPY  10/20/2012     COLONOSCOPY  2014    dr machado ( repeat in 3 years per the pt )    CYSTOSCOPY, WITH RETROGRADE PYELOGRAM Bilateral 7/15/2019    Performed by [facility] at [facility] OR    CYSTOSCOPY, WITH URETHRAL DILATION Bilateral 7/15/2019    Performed by [facility] at [facility] OR    ELBOW SURGERY Left     dislocation    [id number]-M-CATH N/A 2019    Performed by [facility] at [facility] OR 2ND FLR    LAPAROSCOPY-DIAGNOSTIC N/A 3/19/2018    Performed by [facility] at [facility] OR 2ND FLR    Pericardiocentesis N/A 2019    Performed by Frank Javier MD at [facility] CATH LAB    PROCTECTOMY  2002    RELEASE, CONTRACTURE, BLADDER NECK N/A 7/15/2019    Performed by [facility]ldino Philippe [facility] at [facility] OR    SHOULDER ARTHROSCOPY W/ ROTATOR CUFF REPAIR Right     THORACOSCOPY-VIDEO ASSISTED (VATS) W/PLEURAL BIOPSY Left 3/19/2018    Performed by [facility] at [facility] OR 2ND FLR    URETHROGRAM, RETROGRADE N/A 7/15/2019    Performed by Galdino Philippe MD at [facility] OR       Review of patient's allergies indicates:   Allergen Reactions    Bactrim [sulfamethoxazole-trimethoprim] Other (See Comments)     Joint pains       Family History     Problem Relation (Age of Onset)    Cancer Mother    Heart attack Sister, Sister    Heart disease Father, Brother, Sister, Brother, Sister, Brother    No Known Problems Son, Son, Son    Prostate cancer Brother, Brother, Brother          Tobacco Use    Smoking status: Former Smoker     Packs/day: 2.00     Years: 15.00     Pack years: 30.00     Types: Cigarettes     Last attempt to quit: 1970     Years since quittin.5    Smokeless tobacco: Former User    Tobacco comment: pt quit 40 years ago   Substance and Sexual Activity    Alcohol use: No     Alcohol/week: 16.8 oz     Types: 28 Cans of beer per week     Comment: None since Nov 15 th 2017    Drug use: No    Sexual activity: Not Currently       Review of Systems   Constitutional:  Negative for chills and fever.   HENT: Negative for trouble swallowing.    Eyes: Negative for pain.   Respiratory: Negative for cough and shortness of breath.    Gastrointestinal: Negative for nausea and vomiting.   Genitourinary: Positive for hematuria and scrotal swelling. Negative for testicular pain.   Neurological: Negative for weakness.   Psychiatric/Behavioral: Negative for behavioral problems.       Objective:     Temp:  [96.7 °F (35.9 °C)-98 °F (36.7 °C)] 98 °F (36.7 °C)  Pulse:  [] 76  Resp:  [18-24] 22  SpO2:  [91 %-97 %] 97 %  BP: ()/(56-83) 133/66     Body mass index is 27.38 kg/m².    Date 07/21/19 0700 - 07/22/19 0659   Shift 1436-0452 0236-0070 5872-5045 24 Hour Total   INTAKE   P.O. 597   597   I.V.(mL/kg) 113.6(1.4)   113.6(1.4)   Shift Total(mL/kg) 710.6(8.4)   710.6(8.4)   OUTPUT   Urine(mL/kg/hr) 50   50   Shift Total(mL/kg) 50(0.6)   50(0.6)   Weight (kg) 84.1 84.1 84.1 84.1          Drains     Drain                 Chest Tube Right Pleural -- days         Urethral Catheter 07/17/19 0920 Non-latex 16 Fr. 4 days                Physical Exam   Constitutional: He is oriented to person, place, and time. No distress.   HENT:   Head: Normocephalic and atraumatic.   Eyes: No scleral icterus.   Neck: No JVD present.   Pulmonary/Chest: Effort normal. No respiratory distress.   Abdominal: Soft. He exhibits no distension. There is no tenderness. There is no rebound and no guarding.   Genitourinary: Uncircumcised.   Genitourinary Comments: Significant swelling of scrotum and penis noted. No erythema. Minimal tenderness. Unable to palpate testicles. Unable to visualize glans.   16 Fr haider in place draining light red urine.    Neurological: He is alert and oriented to person, place, and time.   Skin: He is not diaphoretic.     Psychiatric: He has a normal mood and affect. His behavior is normal. Thought content normal.       Significant Labs:    BMP:  Recent Labs   Lab 07/19/19  7644  07/20/19  0409 07/21/19  0420   * 133* 131*   K 3.9 4.0 4.2    100 99   CO2 25 26 22*   BUN 22 19 20   CREATININE 0.7 0.7 0.7   CALCIUM 7.9* 8.3* 8.3*       CBC:  Recent Labs   Lab 07/20/19  0409 07/20/19  1828 07/21/19  0420   WBC 8.19 10.83 10.45  10.45   HGB 9.4* 10.5* 9.7*  9.7*   HCT 31.8* 35.3* 32.4*  32.4*   * 404* 380*  380*         Significant Imaging:  All pertinent imaging results/findings from the past 24 hours have been reviewed.      Assessment and Plan:     Gross hematuria  Cale Harding is a 78 y.o. 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 Philippe  - Catheter irrigated easily to clear with 200 cc, 1 small clot removed  - Maintain haider catheter, nursing staff may irrigate PRN for clots  - Recommend scrotal support for swelling  - If still in hospital, may perform voiding trial on 7/26 first thing in the morning, only if urine remains clear  - Patient may continue to follow up with Dr. Philippe for urologic management otherwise        VTE Risk Mitigation (From admission, onward)        Ordered     heparin 25,000 units in dextrose 5% 250 ml (100 units/mL) infusion MINIMAL INTENSITY nomogram - [facility]  Continuous      07/20/19 1817     heparin, porcine (PF) 100 unit/mL injection flush 500 Units  Every 8 hours PRN      07/18/19 1712     heparin infusion 1,000 units/500 ml in 0.9% NaCl (pressure line flush)  Intra-op continuous PRN      07/18/19 1155     Place sequential compression device  Until discontinued      07/17/19 1930     IP VTE HIGH RISK PATIENT  Once      07/17/19 1930          Thank you for your consult. I will sign off. Please contact  us if you have any additional questions.    Brenna Franco MD  Urology  [facility]    I have reviewed the notes, assessments, and/or procedures performed by Dr. Franco, I concur with her/his documentation of Cale Harding.  Irrigate haider prn.  Scrotal support.  Can f/u with Dr. Philippe following discharge. Call with any  questions.

## 2019-07-21 NOTE — CONSULTS
[facility]  Cardiology  Consult Note    Patient Name: Cale Harding  MRN: [id number]  Admission Date: 7/17/2019  Hospital Length of Stay: 4 days  Code Status: Full Code   Attending Provider: Elizabeth Magana MD   Consulting Provider: Tyson Shields MD  Primary Care Physician: Jj Escobedo MD  Principal Problem:Pericardial effusion without cardiac tamponade    Patient information was obtained from patient and past medical records.     Inpatient consult to Cardiology  Consult performed by: Tyson Shields MD  Consult ordered by: Live Gaona MD        Subjective:     Chief Complaint:  Atrial fibrillation     HPI:   Mr. Harding is a 78-year-old male with prostate cancer (s/p prostatectomy), mesothelioma (on chemotherapy), with recurrent pleural effusion  who initially presented to OSH for elective cystography for dilation of bladder neck contracture.     Patient was scheduled for elective cystography with Urology (Dr. Philippe) for dilation of bladder neck contracture.  On arrival to elective surgical suite, patient found to be tachycardic and heart rate was irregular.  EKG confirmed atrial fibrillation with RVR.  Surgery was cancelled and he was admitted to the ICU for a diltiazem drip. He converted to sinus but would go in and out of afib, was started on lopressor 25 BID and echo done showed moderate size of pericardial effusion with no tamponade physiology however increased in effusion from 7/5, cardiology in the OSH was planning for pericardiocentesis but requested transfer for possible window vs pericardiocentesis and was sent to [facility]      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    Prostate cancer 2002    Urinary tract infection        Past Surgical History:   Procedure Laterality Date    BIOPSY-DIAPHRAGM N/A 3/19/2018    Performed by Galdino CAO  MD Annalisa at [facility] OR [address] [place] [address]    [id number]-[id number] N/A 2/1/2018    Performed by [facility] Diagnostic Provider at [facility] OR [address] [place] [address]    BRONCHOSCOPY N/A 6/3/2019    Performed by [facility] Diagnostic Provider at [facility] OR [address] [place] [address]    COLONOSCOPY  10/20/2012    COLONOSCOPY  11/19/2014    dr machado ( repeat in 3 years per the pt )    CYSTOSCOPY, WITH RETROGRADE PYELOGRAM Bilateral 7/15/2019    Performed [facility] at [facility] OR    CYSTOSCOPY, WITH URETHRAL DILATION Bilateral 7/15/2019    Performed by [facility]ino Philippe MD at [facility] OR    ELBOW SURGERY Left 2013    dislocation    [id number]-R-CATH N/A 6/20/2019    Performed by [facility] Diagnostic Provider at [facility] OR [address] [place] [address]    LAPAROSCOPY-DIAGNOSTIC N/A 3/19/2018    [facility]    Pericardiocentesis N/A 7/18/2019    Performed by Frank Javier MD at [facility] CATH LAB    PROCTECTOMY  2002    RELEASE, CONTRACTURE, BLADDER NECK N/A 7/15/2019    Performed by [facility] Philippe MD at [facility] OR    SHOULDER ARTHROSCOPY W/ ROTATOR CUFF REPAIR Right 2008    THORACOSCOPY-VIDEO ASSISTED (VATS) W/PLEURAL BIOPSY Left 3/19/2018    Performed by Galdino Fang MD at [facility] OR [address] [place] [address]    URETHROGRAM, RETROGRADE N/A 7/15/2019    Performed by Galdino Philippe MD at [facility] OR       Review of patient's allergies indicates:   Allergen Reactions    Bactrim [sulfamethoxazole-trimethoprim] Other (See Comments)     Joint pains       No current facility-administered medications on file prior to encounter.      Current Outpatient Medications on File Prior to Encounter   Medication Sig    amoxicillin-clavulanate 875-125mg (AUGMENTIN) 875-125 mg per tablet Take 1 tablet by mouth 2 (two) times daily.    calcium carbonate (TUMS) 200 mg calcium (500 mg) chewable tablet Take 1 tablet by mouth as needed for Heartburn.    dextromethorphan-guaifenesin  mg (MUCINEX DM)  mg per 12 hr tablet Take 1 tablet by mouth every 12 (twelve) hours.     enalapril (VASOTEC) 5 MG tablet Take 1 tablet (5 mg total) by mouth 2 (two) times daily.    HYDROcodone-acetaminophen (NORCO) 5-325 mg per tablet Take 1 tablet by mouth every 6 (six) hours as needed for Pain.    ibuprofen (ADVIL,MOTRIN) 100 MG tablet Take 100 mg by mouth every 6 (six) hours as needed for Temperature greater than.    LACTOBACILLUS ACIDOPHILUS (ACIDOPHILUS ORAL) Take 1 tablet by mouth once daily.    phenylephrine HCl/acetaminophn (SINUS PAIN-PRESSURE, PE, ORAL) Take by mouth.    polyethylene glycol (MIRALAX) 17 gram/dose powder Take 17 g by mouth once daily.     Family History     Problem Relation (Age of Onset)    Cancer Mother    Heart attack Sister, Sister    Heart disease Father, Brother, Sister, Brother, Sister, Brother    No Known Problems Son, Son, Son    Prostate cancer Brother, Brother, Brother        Tobacco Use    Smoking status: Former Smoker     Packs/day: 2.00     Years: 15.00     Pack years: 30.00     Types: Cigarettes     Last attempt to quit: 1970     Years since quittin.5    Smokeless tobacco: Former User    Tobacco comment: pt quit 40 years ago   Substance and Sexual Activity    Alcohol use: No     Alcohol/week: 16.8 oz     Types: 28 Cans of beer per week     Comment: None since Nov 15 th 2017    Drug use: No    Sexual activity: Not Currently     Review of Systems   Constitution: Negative for chills and fever.   Cardiovascular: Positive for dyspnea on exertion and palpitations. Negative for chest pain and syncope.   Respiratory: Positive for shortness of breath. Negative for cough and sleep disturbances due to breathing.    Musculoskeletal: Negative for back pain.   Gastrointestinal: Negative for abdominal pain, nausea and vomiting.   Neurological: Negative for dizziness and focal weakness.   Psychiatric/Behavioral: Negative for altered mental status.     Objective:     Vital Signs (Most Recent):  Temp: 97.4 °F (36.3 °C) (19 2359)  Pulse: 108 (19  0008)  Resp: (!) 22 (07/21/19 0008)  BP: 103/70 (07/20/19 2109)  SpO2: (!) 94 % (07/21/19 0008) Vital Signs (24h Range):  Temp:  [97.4 °F (36.3 °C)-97.9 °F (36.6 °C)] 97.4 °F (36.3 °C)  Pulse:  [] 108  Resp:  [18-24] 22  SpO2:  [91 %-97 %] 94 %  BP: ()/(56-77) 103/70     Weight: 84.7 kg (186 lb 11.2 oz)  Body mass index is 27.57 kg/m².    SpO2: (!) 94 %  O2 Device (Oxygen Therapy): room air      Intake/Output Summary (Last 24 hours) at 7/21/2019 0317  Last data filed at 7/21/2019 0226  Gross per 24 hour   Intake 1390.69 ml   Output 675 ml   Net 715.69 ml       Lines/Drains/Airways     Central Venous Catheter Line                 Port A Cath Single Lumen 06/20/19 right internal jugular 31 days          Drain                 Chest Tube Right Pleural -- days         Urethral Catheter 07/17/19 0920 Non-latex 16 Fr. 3 days                Physical Exam   Constitutional: He is oriented to person, place, and time. He appears well-developed.   HENT:   Head: Normocephalic and atraumatic.   Mouth/Throat: Oropharynx is clear and moist.   Eyes: Pupils are equal, round, and reactive to light. EOM are normal.   Neck: Normal range of motion. Neck supple. No JVD present.   Cardiovascular: Intact distal pulses.   Irregularly irregular rhythm    Pulmonary/Chest: Effort normal and breath sounds normal.   Left lung with diffuse rales. Right lung clear anteriorly.    Abdominal: Soft. Bowel sounds are normal.   Musculoskeletal: Normal range of motion. He exhibits no edema.   Neurological: He is alert and oriented to person, place, and time.   Skin: Skin is warm and dry.   Psychiatric: He has a normal mood and affect.       Significant Labs: All pertinent lab results from the last 24 hours have been reviewed.    Significant Imaging: All pertinent images from the last 24h have been reviewed.      Assessment and Plan:     * Pericardial effusion without cardiac tamponade  - Will repeat TTE to assess pericardial effusion. Might be  contributing to atrial fibrillation.     Atrial fibrillation with RVR  - Unable to rate control with metoprolol because BP did not tolerate  - Started on diltiazem infusion with 20 mg bolus. HR decreased. Patient back to NSR at 10 pm. He is in/out. Will consider antiarrhythmic (amiodarone) once therapeutic on heparin.   - CHADsVASC 3 suggestive of 3.2% annual stroke risk. - will start heparin at this time. Discussed with primary team and they agree.         Tyson Shields MD  Cardiology   [facility]

## 2019-07-21 NOTE — ASSESSMENT & PLAN NOTE
Urology consulted in the OSH   S/p Bladder neck contracture release and urethral dilation on 7/15/19  Haider in place   Will consult urology for final discharge recs on whether he needs to remain with haider until seen OP, call in the am

## 2019-07-21 NOTE — ASSESSMENT & PLAN NOTE
first noted on ECG 7/15/19 although pt reports longer history of palpitations    Cardiology Recs:     Atrial fibrillation with RVR   - likely due to pericardial effusion vs. Underlying malignancy   - Continue Metoprolol tartrate mg BID   - CHADsVASC 3 suggestive of 3.2% annual stroke risk. Would consider anticoagulation with Epixaban 5 mg BID   - withhold Enalipril and switch to Losartan    - F/U with cardiology ( Dr. Bonilla)     Pericardial Effusion:   - Pt has a hx of cancer   - S/p pericardiocentesis on 7/8/19 with removal of 570 cc fluid. Cytology is pending   - Repeat echo shows no further accumulation of fluid in the pericardium    Plan:  - appreciate cardiology recs  - Increased dose of Lopressor from 25mg BID to 50 mg BID  - Continue telemetry   - Patient on heparin and diltiazem drip per cardiology, will continue to follow

## 2019-07-21 NOTE — CODE/ RAPID DOCUMENTATION
Rapid Response Nurse Follow-up Note     Followed up with patient for proactive rounding. Pt resting in bed; VSS.  No acute issues at this time. Reviewed plan of care with primary RN, Jerica.   Please call Rapid Response RN, Briana Arzola RN with any questions or concerns at [phone].

## 2019-07-21 NOTE — ASSESSMENT & PLAN NOTE
- Patient of  , discussed with his overnight suspect that the drain could be infected and  recommended Augmentin for 5 more day. He also drained 450 cc of fluid.   - Pulm consults rec to leave PleurX in place   - Last drainage was morning of 7/18 , and is supposed to be drained once a week   - Patient on RA and not SOB   -  repeat Chest xray to assess for effusion recurrence  - Pulm consulted to drain per patient request

## 2019-07-21 NOTE — ASSESSMENT & PLAN NOTE
S/p prostectomy   Haider in place for urinary retention 2/2 bladder contracture  Sees urology as OP   Urology rec to keep haider in place

## 2019-07-21 NOTE — PROGRESS NOTES
[facility]  Hematology/Oncology  Progress Note    Patient Name: Cale Harding  Admission Date: 7/17/2019  Hospital Length of Stay: 4 days  Code Status: Full Code     Subjective:     HPI:  Mr. Cale Harding is a 78-year-old male with a past medical history of prostate cancer (s/p prostatectomy), mesothelioma (on chemotherapy), with recurrent pleural effusion  ( with a right PleurX) and hypertension who is a transfer for evaluation of pericardial effusion.       Patient was scheduled for elective cystography with Urology (Dr. Philippe) for dilation of bladder neck contracture.  On arrival to elective surgical suite, patient found to be tachycardic and heart rate was irregular.  EKG confirmed atrial fibrillation with RVR.  Surgery was cancelled and he was admitted to the ICU for a diltiazem drip. He converted to sinus but would go in and out of afib, was started on lopressor 25 BID and echo done showed moderate size of pericardial effusion with no tamponade physiology however increased in effusion from 7/5, cardiology in the OSH was planning for pericardiocentesis but requested transfer for possible window vs pericardiocentesis and was sent to [facility]    On arrival patient was hemodynamically stable  , NSR, converted in and out of afib. Cardiology was consulted for input on afib management in the setting of pericardial effusion and need for pericardiocentesis/ closer ccu monitoring.     Of note patient has a PleurX on the right that is possibly infected and he was supposed to get it removed this Friday by his pulmonologist Dr. Woodward     Interval History: Interventional cards consulted, preformed pericardiocentesis on 7/19 which produced 570cc fluid. Cards to follow. Pulm consulted, plan to leave PleurX in place. Uro rec to continue haider cath at this time for bladder contracture. Will appreciate recs.     MAGDALENE. After multiple days of lopressor trials (PO and 5mg pushes during episodes),  cardiology recommended/started patient on heparin and diltiazem drip for rate control. Patient noted to have hematuria thi morning after, Urology consulted. Cano in place for bladder contracture per urology, hematuria present w/o clots. Patient HD stable at this time. Patient still has SOB on exertion and a mild cough, Pulmonary consulted to drain PleurX. CXR repeated. He denies HA, f/c, n/v, CP, and Abd pain.    Oncology Treatment Plan:   OP NSCLC VINORELBINE WEEKLY    Medications:  Continuous Infusions:   dilTIAZem 5 mg/hr (07/21/19 1259)    heparin (porcine) in D5W 12 Units/kg/hr (07/20/19 2006)    heparin (porcine)       Scheduled Meds:   amoxicillin-clavulanate 500-125mg  1 tablet Oral TID    metoprolol tartrate  50 mg Oral BID    ondansetron  4 mg Intravenous Once    pantoprazole  40 mg Oral Daily     PRN Meds:albuterol-ipratropium, alteplase, Dextrose 10% Bolus, Dextrose 10% Bolus, glucagon (human recombinant), glucose, glucose, heparin (porcine), heparin, porcine (PF), HYDROcodone-acetaminophen, ibuprofen, metoprolol, ondansetron, sodium chloride 0.9%     Review of Systems   Constitutional: Positive for fever. Negative for chills, diaphoresis and fatigue.   HENT: Positive for sinus pain. Negative for congestion and sore throat.    Eyes: Negative for visual disturbance.   Respiratory: Positive for cough and shortness of breath (on exertion). Negative for wheezing.    Cardiovascular: Positive for palpitations. Negative for chest pain and leg swelling.   Gastrointestinal: Positive for nausea. Negative for abdominal distention, abdominal pain and vomiting.   Genitourinary: Positive for decreased urine volume, difficulty urinating, frequency and scrotal swelling.   Musculoskeletal: Negative for arthralgias.   Neurological: Negative for dizziness, light-headedness and headaches.   Psychiatric/Behavioral: Negative for agitation, behavioral problems and confusion.     Objective:     Vital Signs (Most  Recent):  Temp: 98 °F (36.7 °C) (07/21/19 1252)  Pulse: 76 (07/21/19 1252)  Resp: (!) 22 (07/21/19 1252)  BP: 133/66 (07/21/19 1252)  SpO2: 97 % (07/21/19 1252) Vital Signs (24h Range):  Temp:  [96.7 °F (35.9 °C)-98 °F (36.7 °C)] 98 °F (36.7 °C)  Pulse:  [] 76  Resp:  [18-24] 22  SpO2:  [91 %-97 %] 97 %  BP: ()/(56-83) 133/66     Weight: 84.1 kg (185 lb 6.5 oz)  Body mass index is 27.38 kg/m².  Body surface area is 2.02 meters squared.      Intake/Output Summary (Last 24 hours) at 7/21/2019 1502  Last data filed at 7/21/2019 1259  Gross per 24 hour   Intake 2308.04 ml   Output 400 ml   Net 1908.04 ml       Physical Exam   Constitutional: He is oriented to person, place, and time. He appears well-developed and well-nourished. No distress.   HENT:   Head: Normocephalic and atraumatic.   Eyes: Conjunctivae are normal. No scleral icterus.   Neck: Normal range of motion.   Cardiovascular: Normal heart sounds and intact distal pulses. An irregularly irregular rhythm present. Tachycardia present.   No murmur heard.  Pulmonary/Chest: Effort normal. No respiratory distress. He has decreased breath sounds in the right lower field. He has no wheezes.   Abdominal: Soft. Bowel sounds are normal. There is no tenderness.   Musculoskeletal: Normal range of motion. He exhibits no edema.   Neurological: He is alert and oriented to person, place, and time.   Skin: Skin is warm and dry. He is not diaphoretic.   Psychiatric: He has a normal mood and affect. His behavior is normal. Judgment and thought content normal.   Vitals reviewed.      Significant Labs:   BMP:   Recent Labs   Lab 07/20/19  0409 07/21/19  0420    120*   * 131*   K 4.0 4.2    99   CO2 26 22*   BUN 19 20   CREATININE 0.7 0.7   CALCIUM 8.3* 8.3*   MG 1.9 1.9   , CBC:   Recent Labs   Lab 07/20/19  0409 07/20/19  1828 07/21/19  0420   WBC 8.19 10.83 10.45  10.45   HGB 9.4* 10.5* 9.7*  9.7*   HCT 31.8* 35.3* 32.4*  32.4*   * 404*  380*  380*   , CMP:   Recent Labs   Lab 07/20/19  0409 07/21/19  0420   * 131*   K 4.0 4.2    99   CO2 26 22*    120*   BUN 19 20   CREATININE 0.7 0.7   CALCIUM 8.3* 8.3*   PROT 5.4* 5.7*   ALBUMIN 1.9* 2.0*   BILITOT 0.4 0.4   ALKPHOS 78 84   AST 14 20   ALT 6* 8*   ANIONGAP 7* 10   EGFRNONAA >60.0 >60.0   , Coagulation:   Recent Labs   Lab 07/20/19  1828 07/21/19  0218   INR 1.1  --    APTT 26.6 39.1*   , LFTs:   Recent Labs   Lab 07/20/19  0409 07/21/19  0420   ALT 6* 8*   AST 14 20   ALKPHOS 78 84   BILITOT 0.4 0.4   PROT 5.4* 5.7*   ALBUMIN 1.9* 2.0*    and Urine Studies: No results for input(s): COLORU, APPEARANCEUA, PHUR, SPECGRAV, PROTEINUA, GLUCUA, KETONESU, BILIRUBINUA, OCCULTUA, NITRITE, UROBILINOGEN, LEUKOCYTESUR, RBCUA, WBCUA, BACTERIA, SQUAMEPITHEL, HYALINECASTS in the last 48 hours.    Invalid input(s): WRIGHTSUR    Diagnostic Results:  I have reviewed all pertinent imaging results/findings within the past 24 hours.    Assessment/Plan:     * Pericardial effusion without cardiac tamponade  - Patient transferred for evaluation by cardiology / CTS for pericardial window or pericardiocentesis   - Pericardiocentesis preformed by interventional cards, pt tolerated procedure well, produced 570cc serosanguinous fluid.  - Maximize medical management per cards  - will appreciate recs    Gross hematuria  - Ordered UA and     Urology recs as below:    - Urology suspects that hematuria is 2/2 heparin anticoagulation in setting of recent incision of his bladder neck contracture  - pt also just underwent a negative work up for malignancy w/ Dr. Philippe  - continue haider cath at this time  - nursing staff may irrigate PRN for clots  - scrotal support for swelling  - Urology recs voiding trial on 7/26 IF patient still in hospital AND urine remains clear  - Patient to f/u w/ Dr. Philippe for urologic management after d/c       Acute on chronic diastolic heart failure  - Patient with symptoms of Sob  and swelling   - Patient not on lasix at home   - BNP elevated at 374  -  bilateral pitting edema and scrotal swelling        PLAN   - D/c lasix  - Strict I/O   - Daily weights  - Keep mag >2 and phos> 3  - Cardiac diet with decreased salt intake       Paroxysmal atrial fibrillation  - Patient states that he has always had palpitations that come and go but they were never caught while he was at a doctor or in the hospital   - Atrial fibrillation with RVR likely precipitated by volume overload and enlarging pericardial effusion. He was started on diltiazem drip then switched to lopressor in the OSH  -  Cardiology consulted for pericardiocentesis vs pericardial window   - On arrival patient was in NSR and converted to RVR and 2.5 mg of Iv lopressor was pushed, his BP started to decrease to ~86/44. Will watch his BP and if it comes up will give him lopressor PO. As he has not gotten his night time dose   -  If becomes hemodynamically unstable will consider diltiazem drip but given his BP will be cautious  - Cardiology consulted appreciate their recs  - Repeat echo showed EF of 55% w/ moderate pericardial effusion BEFORE pericardiocentesis     Essential hypertension  - hold all antihypertensives he is on enalapril at home   - see cards recs    Atrial fibrillation with RVR  first noted on ECG 7/15/19 although pt reports longer history of palpitations    Cardiology Recs:     Atrial fibrillation with RVR   - likely due to pericardial effusion vs. Underlying malignancy   - Continue Metoprolol tartrate mg BID   - CHADsVASC 3 suggestive of 3.2% annual stroke risk. Would consider anticoagulation with Epixaban 5 mg BID   - withhold Enalipril and switch to Losartan    - F/U with cardiology ( Dr. Bonilla)     Pericardial Effusion:   - Pt has a hx of cancer   - S/p pericardiocentesis on 7/8/19 with removal of 570 cc fluid. Cytology is pending   - Repeat echo shows no further accumulation of fluid in the pericardium    Plan:  -  appreciate cardiology recs  - Increased dose of Lopressor from 25mg BID to 50 mg BID  - Continue telemetry   - Patient on heparin and diltiazem drip per cardiology, will continue to follow    Acquired contracture of bladder neck  Urology consulted in the OSH   S/p Bladder neck contracture release and urethral dilation on 7/15/19  Haider in place   Will consult urology for final discharge recs on whether he needs to remain with haider until seen OP, call in the am      Pleural effusion  See malignant pleural effusion    Mesothelioma of left lung  - Patient of Dr. Foster on palliative chemo with Gemzar   - Controlled pain on norco   - OP follow up with oncologist     Malignant pleural effusion  - Patient of  , discussed with his overnight suspect that the drain could be infected and  recommended Augmentin for 5 more day. He also drained 450 cc of fluid.   - Pulm consults rec to leave PleurX in place   - Last drainage was morning of 7/18 , and is supposed to be drained once a week   - Patient on RA and not SOB   -  repeat Chest xray to assess for effusion recurrence  - Pulm consulted to drain per patient request      Gastroesophageal reflux disease  - PPI daily     History of prostate cancer  S/p prostectomy   Haider in place for urinary retention 2/2 bladder contracture  Sees urology as OP   Urology rec to keep haider in place             Hipolito Enrique MD  Hematology/Oncology  [facility]

## 2019-07-21 NOTE — PLAN OF CARE
Problem: Adult Inpatient Plan of Care  Goal: Plan of Care Review  Outcome: Ongoing (interventions implemented as appropriate)  Pt involved in plan of care and communicating needs throughout shift.  Family at bedside and involved in care.  Pt converted to A-Fib, discussed care with MD's throughout the day. IV lopressor given and NS boluses given per orders. Heparin and Cardizem to be started. Pt fairly fatigued today with poor appetite, boost started. Urine output low & concentrated, but adequate- Oral fluids being promoted.  Pt remaining free from falls or injury throughout shift; bed in lowest position; call light within reach.  Pt instructed to call for assistance as needed.  Q1H rounding done on pt.

## 2019-07-21 NOTE — ASSESSMENT & PLAN NOTE
- Ordered UA and UC    Urology recs as below:    - Urology suspects that hematuria is 2/2 heparin anticoagulation in setting of recent incision of his bladder neck contracture  - pt also just underwent a negative work up for malignancy w/ Dr. Philippe  - continue haider cath at this time  - nursing staff may irrigate PRN for clots  - scrotal support for swelling  - Urology recs voiding trial on 7/26 IF patient still in hospital AND urine remains clear  - Patient to f/u w/ Dr. Philippe for urologic management after d/c

## 2019-07-22 NOTE — PROGRESS NOTES
07/22/19 0131   Vital Signs   Temp 97.6 °F (36.4 °C)   Pulse 72   Resp (!) 24   SpO2 96 %   Flow (L/min) 3   /79   Nurse called to bedside, patient stated it was hard for him to breathe. bilateral lung sounds very coarse, respiratory treatment given. Dr. Hearn came to bedside to assess. CXR ordered and 40mg IV lasix given.

## 2019-07-22 NOTE — PT/OT/SLP PROGRESS
Physical Therapy Treatment    Patient Name:  Cale Harding   MRN:  [id number]  Admitting Diagnosis: Pericardial effusion without cardiac tamponade  Recent Surgery: Procedure(s) (LRB):  Pericardiocentesis (N/A) 4 Days Post-Op    Recommendations:     Discharge Recommendations:  home health PT   Discharge Equipment Recommendations: none   Barriers to discharge: None    Plan:     During this hospitalization, patient to be seen 2 x/week to address the above listed problems via gait training, therapeutic activities, therapeutic exercises, neuromuscular re-education  · Plan of Care Expires:  08/14/19   Plan of Care Reviewed with: patient, spouse    This Plan of care has been discussed with the patient who was involved in its development and understands and is in agreement with the identified goals and treatment plan    Subjective     Communicated with RN (Pamela) prior to session.     Patient comments: Pt with c/o fatigue  Pain/Comfort:  · Pain Rating 1: 0/10  · Pain Rating Post-Intervention 1: 0/10    Objective:     Patient found with: peripheral IV, telemetry, SCD, haider catheter(waffle overlay, RAUDEL hose to B LE's)    Patient found sup in bed upon PT entry to room, agreeable to treatment.  Wife present in the room.    General Precautions: Standard, fall   Orthopedic Precautions:N/A   Braces: N/A       BED MOBILITY (vc's for hand placement sequencing of task):        Rolling to the R:  NT.       Rolling to the L:  NT.        Sup > sit at the EOB:  SBA to min A for trunk elevation, exiting on the R side.       Sit > sup:  Not performed 2* pt left seated UIC.                       SITTING AT THE EDGE OF THE BED   Assistance Level Required: close sup for safety      TRANSFERS  (vc's for hand placement, sequencing of task and safety)   Patient completed Sit <> Stand Transfer from EOB with CG/SBA for balance and safety with rolling walker xmultiple trial(s)   Patient completed Stand <> Sit Transfer to EOB/BS chair with SBA  for safety with rolling walker      GAIT: within room, with turns to the R and to the L incorporated   Patient ambulated: 36ft and then 42ft with prolonged seated rest break in between trials   Patient required: CGA/SBA for balance and safety   Patient used:  Rolling Walker   Gait Pattern observed: reciprocal gait   Gait Deviation(s): decreased idalia, increased time in double stance, decreased velocity of limb motion, decreased step length, decreased stride length, decreased toe-to-floor clearance, decreased weight-shifting ability and narrow OSMAN    Comments: vc's for upright posture, placement of AD, directional guidance and safety      EDUCATION  Patient provided with daily orientation and goals of this PT session. They were educated to call for assistance and to transfer with hospital staff only.  Also, pt was educated on the effects of prolonged immobility and the importance of performing OOB activity and exercises to promote healing and reduce recovery time    Whiteboard updated with correct mobility information. RN/PCT notified.  Pt safe to transfer OOB/BTB and amb short distance with RN/PCT : Use RW with CGA of 1 person.    Patient left up in chair, with  all lines intact, call button in reach, RN notified and wife present    AM-PAC 6 CLICK MOBILITY  Turning over in bed (including adjusting bedclothes, sheets and blankets)?: 3  Sitting down on and standing up from a chair with arms (e.g., wheelchair, bedside commode, etc.): 3  Moving from lying on back to sitting on the side of the bed?: 3  Moving to and from a bed to a chair (including a wheelchair)?: 3  Need to walk in hospital room?: 3  Climbing 3-5 steps with a railing?: 2  Basic Mobility Total Score: 17     Assessment:     Cale Harding is a 78 y.o. male admitted with a medical diagnosis of Pericardial effusion without cardiac tamponade.  He presents with the following impairments/functional limitations:  weakness, impaired endurance, impaired  self care skills, impaired functional mobilty, gait instability, impaired balance, decreased upper extremity function, decreased lower extremity function, decreased safety awareness, impaired cardiopulmonary response to activity. requiring assistance and verbal cues for bed mob, transfers and gait 2* weakness, decreased endurance and SOB.  Pt will cont to benefit from skilled PT intervention to address deficits and improve functional mobility.       Rehab Prognosis:  Good; patient would benefit from acute skilled PT services to address these deficits and reach maximum level of function.      GOALS:   Multidisciplinary Problems     Physical Therapy Goals        Problem: Physical Therapy Goal    Goal Priority Disciplines Outcome Goal Variances Interventions   Physical Therapy Goal     PT, PT/OT Ongoing (interventions implemented as appropriate)     Description:  Goals to be met by: 19     Patient will increase functional independence with mobility by performin. Supine to sit with Contact Guard Assistance.  2. Sit to supine with Contact Guard Assistance.  3. Sit to stand transfer with Supervision.  4. Bed to chair transfer with Stand-by Assistance using Rolling Walker.  5. Gait  x 50 feet with Stand-by Assistance using Rolling Walker.   6. Ascend/Descend 6 inch curb step with Contact Guard Assistance using Rolling Walker.  7. Lower extremity exercise program x 20 reps per handout, with assistance as needed.                      Time Tracking:     PT Received On: 07/15/19  PT Start Time: 1047     PT Stop Time: 1121  PT Total Time (min): 34 min     Billable Minutes: Gait Training 15 and Therapeutic Activity 19    Treatment Type: Treatment  PT/PTA: PTA     PTA Visit Number: 1       Julissa Land PTA.  Pager [phone]    2019    .

## 2019-07-22 NOTE — ASSESSMENT & PLAN NOTE
- Patient of  , discussed with his overnight suspect that the drain could be infected and  recommended Augmentin for 5 more day. He also drained 450 cc of fluid.   - Pulm consults rec to leave PleurX in place   - Last drainage was morning of 7/18 , and is supposed to be drained once a week   - Patient on RA w/ mild SOB   -  repeat Chest xray to assess for effusion recurrence  - Pulm consulted to drain per patient request

## 2019-07-22 NOTE — PROGRESS NOTES
Dr. Styles notified of pt continuing with red tinged urine as well as decreased output.  Will continue to monitor pt.

## 2019-07-22 NOTE — PROGRESS NOTES
Six Minute Walking Test:  SpO2 95% at rest on RA  SpO2 74% with ambulation on RA  SpO2 97% NC @ 2L post ambulation

## 2019-07-22 NOTE — ASSESSMENT & PLAN NOTE
-s/p R pleurX catheter  -CXR w/o significant effusion; therefore will hold off on drainage  -wound care to drain site; not tender; warm, or erythematous; complete abx therapy for superficial skin infection

## 2019-07-22 NOTE — CONSULTS
Please see consult note from yesterday 7/21/19 by Dr. Marlon Reynoso MD  [facility] Fellow  Pager: [phone]  Cell: [phone]

## 2019-07-22 NOTE — PLAN OF CARE
Problem: Physical Therapy Goal  Goal: Physical Therapy Goal  Goals to be met by: 19     Patient will increase functional independence with mobility by performin. Supine to sit with Contact Guard Assistance.  2. Sit to supine with Contact Guard Assistance.  3. Sit to stand transfer with Supervision.  4. Bed to chair transfer with Stand-by Assistance using Rolling Walker.  5. Gait  x 50 feet with Stand-by Assistance using Rolling Walker.   6. Ascend/Descend 6 inch curb step with Contact Guard Assistance using Rolling Walker.  7. Lower extremity exercise program x 20 reps per handout, with assistance as needed.       Discharge Recommendations: HH PT    Pt safe to transfer OOB/BTB and amb short distance with RN/PCT : Use RW with CGA of 1 person.    Goals remain appropriate.     Julissa Land, PTA.   [phone]   2019

## 2019-07-22 NOTE — PLAN OF CARE
Problem: Adult Inpatient Plan of Care  Goal: Plan of Care Review  Outcome: Ongoing (interventions implemented as appropriate)  Patient remained free from falls throughout shift, call bell within reach. Cardizem gtt and heparin gtt continued. 1L urine output after 40mg lasix given. Cano irrigated twice - urine is now yellow. Cardiac monitoring continued- NSR all night. Vitals stable, will continue to monitor.

## 2019-07-22 NOTE — PLAN OF CARE
Problem: Adult Inpatient Plan of Care  Goal: Plan of Care Review  Outcome: Ongoing (interventions implemented as appropriate)  Pt. with nonskid footwear on with bed in lowest position and locked with bed rails up x2, with bed alarm being utilized.  Pt. instructed to call prior to getting OOB.  Pt. with call light within reach and verbalized understanding.  Pt. received magnesium replacement.  Pt. transitioned off heparin and cardizem gtt today.  Pt. With haider irrigated PRN throughout the day.  Will continue to monitor pt.

## 2019-07-22 NOTE — PROGRESS NOTES
Cano irrigated at this time with 60 mL normal saline due to continued red tinged urine and decreased output.

## 2019-07-22 NOTE — SUBJECTIVE & OBJECTIVE
Interval History: Interventional cards consulted, preformed pericardiocentesis on 7/19 which produced 570cc fluid. Cards to follow. Pulm consulted, plan to leave PleurX in place. Uro rec to continue haider cath at this time for bladder contracture. Will appreciate recs.     Patient states that he had an acute episode of SOB overnight. Patient states that the episode improved w/ breathing treatments and sitting up. Patient on heparin and diltiazem drip for rate control. Patient noted to have improved hematuria this morning. Haider in place for bladder contracture per urology, hematuria present w/o clots. Patient HD stable at this time. Patient still has SOB on exertion and a mild cough, Pulmonary consulted to drain PleurX. He denies HA, f/c, n/v, CP, and Abd pain.    Oncology Treatment Plan:   OP NSCLC VINORELBINE WEEKLY    Medications:  Continuous Infusions:   dilTIAZem 5 mg/hr (07/21/19 1259)    heparin (porcine) in D5W 12 Units/kg/hr (07/21/19 1826)    heparin (porcine)       Scheduled Meds:   amoxicillin-clavulanate 500-125mg  1 tablet Oral TID    metoprolol tartrate  50 mg Oral BID    ondansetron  4 mg Intravenous Once    pantoprazole  40 mg Oral Daily     PRN Meds:albuterol-ipratropium, alteplase, Dextrose 10% Bolus, Dextrose 10% Bolus, glucagon (human recombinant), glucose, glucose, heparin (porcine), heparin, porcine (PF), HYDROcodone-acetaminophen, ibuprofen, metoprolol, ondansetron, sodium chloride 0.9%     Review of Systems   Constitutional: Positive for fatigue. Negative for chills, diaphoresis and fever.   HENT: Positive for sinus pain. Negative for congestion and sore throat.    Eyes: Negative for visual disturbance.   Respiratory: Positive for cough, shortness of breath (on exertion) and wheezing.    Cardiovascular: Negative for chest pain, palpitations and leg swelling.   Gastrointestinal: Negative for abdominal distention, abdominal pain, constipation, nausea and vomiting.   Genitourinary:  Positive for difficulty urinating, frequency, hematuria and scrotal swelling. Negative for decreased urine volume.   Musculoskeletal: Negative for arthralgias.   Neurological: Negative for dizziness, light-headedness and headaches.   Psychiatric/Behavioral: Negative for agitation, behavioral problems and confusion.     Objective:     Vital Signs (Most Recent):  Temp: 97.6 °F (36.4 °C) (07/22/19 0808)  Pulse: 99 (07/22/19 0808)  Resp: (!) 24 (07/22/19 0808)  BP: 112/89 (07/22/19 0808)  SpO2: 99 % (07/22/19 0808) Vital Signs (24h Range):  Temp:  [96.7 °F (35.9 °C)-98.1 °F (36.7 °C)] 97.6 °F (36.4 °C)  Pulse:  [] 99  Resp:  [16-24] 24  SpO2:  [93 %-99 %] 99 %  BP: (112-133)/(66-89) 112/89     Weight: 84.8 kg (187 lb)  Body mass index is 27.62 kg/m².  Body surface area is 2.03 meters squared.      Intake/Output Summary (Last 24 hours) at 7/22/2019 0837  Last data filed at 7/22/2019 0400  Gross per 24 hour   Intake 1323.82 ml   Output 1450 ml   Net -126.18 ml       Physical Exam   Constitutional: He is oriented to person, place, and time. He appears well-developed and well-nourished. No distress.   HENT:   Head: Normocephalic and atraumatic.   Eyes: Conjunctivae are normal. No scleral icterus.   Neck: Normal range of motion.   Cardiovascular: Normal heart sounds and intact distal pulses. An irregularly irregular rhythm present. Tachycardia present.   No murmur heard.  Pulmonary/Chest: Effort normal. No respiratory distress. He has decreased breath sounds in the right lower field. He has no wheezes.   Abdominal: Soft. Bowel sounds are normal. There is no tenderness.   Musculoskeletal: Normal range of motion. He exhibits no edema.   Neurological: He is alert and oriented to person, place, and time.   Skin: Skin is warm and dry. He is not diaphoretic.   Psychiatric: He has a normal mood and affect. His behavior is normal. Judgment and thought content normal.   Vitals reviewed.      Significant Labs:   BMP:   Recent  Labs   Lab 07/21/19  0420 07/22/19  0423   * 125*   * 129*   K 4.2 4.0   CL 99 97   CO2 22* 21*   BUN 20 24*   CREATININE 0.7 0.7   CALCIUM 8.3* 8.4*   MG 1.9 1.8   , CBC:   Recent Labs   Lab 07/21/19  0420 07/21/19 1959 07/22/19 0423   WBC 10.45  10.45 11.89 10.38   HGB 9.7*  9.7* 9.3* 9.2*   HCT 32.4*  32.4* 31.7* 30.7*   *  380* 364* 349   , CMP:   Recent Labs   Lab 07/21/19 0420 07/22/19  0423   * 129*   K 4.2 4.0   CL 99 97   CO2 22* 21*   * 125*   BUN 20 24*   CREATININE 0.7 0.7   CALCIUM 8.3* 8.4*   PROT 5.7* 5.5*   ALBUMIN 2.0* 2.0*   BILITOT 0.4 0.4   ALKPHOS 84 82   AST 20 26   ALT 8* 10   ANIONGAP 10 11   EGFRNONAA >60.0 >60.0   , Coagulation:   Recent Labs   Lab 07/20/19  1828 07/21/19 0218 07/22/19  0423   INR 1.1  --   --    APTT 26.6 39.1* 43.4*   , LFTs:   Recent Labs   Lab 07/21/19 0420 07/22/19 0423   ALT 8* 10   AST 20 26   ALKPHOS 84 82   BILITOT 0.4 0.4   PROT 5.7* 5.5*   ALBUMIN 2.0* 2.0*    and Urine Studies:   Recent Labs   Lab 07/21/19  1658   COLORU Gina   APPEARANCEUA Hazy*   PHUR 6.0   SPECGRAV 1.025   PROTEINUA 2+*   GLUCUA Negative   KETONESU Negative   BILIRUBINUA Negative   OCCULTUA 3+*   NITRITE Negative   LEUKOCYTESUR 2+*   RBCUA >100*   WBCUA 43*   BACTERIA Occasional   SQUAMEPITHEL 1   HYALINECASTS 0       Diagnostic Results:  I have reviewed all pertinent imaging results/findings within the past 24 hours.

## 2019-07-22 NOTE — SUBJECTIVE & OBJECTIVE
Interval History:   Review of Systems   Constitution: Negative for chills, decreased appetite, diaphoresis, fever and night sweats.   HENT: Negative.    Eyes: Negative.    Cardiovascular: Positive for dyspnea on exertion and leg swelling. Negative for chest pain, irregular heartbeat, near-syncope, orthopnea, palpitations and syncope.   Respiratory: Positive for shortness of breath. Negative for cough, hemoptysis and sleep disturbances due to breathing.    Skin: Positive for rash.   Gastrointestinal: Negative for abdominal pain, change in bowel habit, hematemesis, hematochezia, melena, nausea and vomiting.     Objective:     Vital Signs (Most Recent):  Temp: 97.6 °F (36.4 °C) (07/22/19 0808)  Pulse: 99 (07/22/19 0808)  Resp: (!) 24 (07/22/19 0808)  BP: 112/89 (07/22/19 0808)  SpO2: 99 % (07/22/19 0808) Vital Signs (24h Range):  Temp:  [97.6 °F (36.4 °C)-98.1 °F (36.7 °C)] 97.6 °F (36.4 °C)  Pulse:  [66-99] 99  Resp:  [16-24] 24  SpO2:  [93 %-99 %] 99 %  BP: (112-133)/(66-89) 112/89     Weight: 84.8 kg (187 lb)  Body mass index is 27.62 kg/m².     SpO2: 99 %  O2 Device (Oxygen Therapy): nasal cannula w/ humidification      Intake/Output Summary (Last 24 hours) at 7/22/2019 0910  Last data filed at 7/22/2019 0901  Gross per 24 hour   Intake 1326.82 ml   Output 1450 ml   Net -123.18 ml       Lines/Drains/Airways     Central Venous Catheter Line                 Port A Cath Single Lumen 06/20/19 right internal jugular 32 days          Drain                 Chest Tube Right Pleural -- days         Urethral Catheter 07/17/19 0920 Non-latex 16 Fr. 4 days                Physical Exam   Constitutional: He appears well-developed and well-nourished.   HENT:   Head: Normocephalic and atraumatic.   Neck: Normal range of motion. Neck supple.   Cardiovascular: Normal rate and regular rhythm. PMI is not displaced.   Murmur heard.   Low-pitched blowing holosystolic murmur is present with a grade of 2/6 at the upper right sternal  border.  Pulses:       Carotid pulses are 2+ on the right side, and 2+ on the left side.  1+ presacral edema  1+ bilateral pitting edema of lower legs extending to mid shins   Pulmonary/Chest: Effort normal. No accessory muscle usage. No respiratory distress. He has rhonchi in the right middle field, the right lower field, the left middle field and the left lower field. He has rales in the right middle field, the right lower field, the left middle field and the left lower field.   Course breath sounds bilateral lower lung fields.   Abdominal: Soft. Bowel sounds are normal. He exhibits no distension. There is no tenderness. There is no guarding.   Neurological: He is alert.   Skin: Skin is warm.       Significant Labs: All pertinent lab results from the last 24 hours have been reviewed.    Significant Imaging: Echocardiogram:   Transthoracic echo (TTE) complete (Cupid Only):   Results for orders placed or performed during the hospital encounter of 07/17/19   Transthoracic echo (TTE) 2D with Color Flow   Result Value Ref Range    Ascending aorta 3.02 cm    STJ 2.45 cm    AV mean gradient 4 mmHg    Ao peak michael 1.27 m/s    Ao VTI 18.06 cm    IVRT 0.07 msec    IVS 1.08 0.6 - 1.1 cm    LA size 3.87 cm    Left Atrium Major Axis 4.95 cm    Left Atrium Minor Axis 5.10 cm    LVIDD 3.31 (A) 3.5 - 6.0 cm    LVIDS 2.30 2.1 - 4.0 cm    LVOT diameter 2.23 cm    LVOT peak VTI 12.22 cm    PW 0.84 0.6 - 1.1 cm    MV Peak A Michael 0.95 m/s    E wave decelartion time 122.99 msec    MV Peak E Michael 0.91 m/s    RA Major Axis 4.61 cm    RA Width 3.33 cm    RVDD 3.13 cm    Sinus 3.48 cm    TAPSE 1.30 cm    TR Max Michael 2.36 m/s    TDI LATERAL 0.06 m/s    TDI SEPTAL 0.11 m/s    LA WIDTH 3.90 cm    LV Diastolic Volume 44.47 mL    LV Systolic Volume 18.12 mL    RV S' 9.21 cm/s    LVOT peak michael 0.72 m/s    LV LATERAL E/E' RATIO 15.17 m/s    LV SEPTAL E/E' RATIO 8.27 m/s    FS 31 %    LA volume 64.45 cm3    LV mass 89.45 g    Left Ventricle Relative  Wall Thickness 0.51 cm    AV valve area 2.64 cm2    AV Velocity Ratio 0.57     AV index (prosthetic) 0.68     E/A ratio 0.96     Mean e' 0.09 m/s    LVOT area 3.9 cm2    LVOT stroke volume 47.70 cm3    AV peak gradient 6 mmHg    E/E' ratio 10.71 m/s    LV Systolic Volume Index 9.1 mL/m2    LV Diastolic Volume Index 22.25 mL/m2    LA Volume Index 32.3 mL/m2    LV Mass Index 45 g/m2    Triscuspid Valve Regurgitation Peak Gradient 22 mmHg    BSA 2.02 m2    Right Atrial Pressure (from IVC) 8 mmHg    TV rest pulmonary artery pressure 30 mmHg    Narrative    · Normal left ventricular systolic function. The estimated ejection   fraction is 60%  · Concentric left ventricular remodeling.  · Normal LV diastolic function.  · Normal right ventricular systolic function.  · Mild tricuspid regurgitation.  · The estimated PA systolic pressure is 30 mm Hg  · Intermediate central venous pressure (8 mm Hg).  · Small circumferential pericardial effusion. Shaggy visceral pericardium.   No evidence of tamponade physiology

## 2019-07-22 NOTE — CONSULTS
[facility]  Pulmonology  Consult Note    Patient Name: Cale Harding  MRN: [id number]  Admission Date: 7/17/2019  Hospital Length of Stay: 4 days  Code Status: Full Code  Attending Physician: Elizabeth Magana MD  Primary Care Provider: Jj Escobedo MD   Principal Problem: Pericardial effusion without cardiac tamponade    Consults  Subjective:     HPI:  Mr. Harding is a 77yo WM w/ pmhx of  prostate cancer (s/p prostatectomy), mesothelioma (on chemotherapy), with recurrent pleural effusion s/p right PleurX) and hypertension who was transferred to [facility] for evaluation of pericardial effusion. Pulm consulted for evaluation of R PleurX catheter drainage. He states he last had it drained on Thursday. He usually goes about 7 days between drainage. He denies any worsening shortness of breath or chest pain. He states that Dr. Woodward was treating a superficial skin infection around the site. He denies any pain, redness, swelling, or fevers.          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    Prostate cancer 2002    Urinary tract infection        Past Surgical History:   Procedure Laterality Date    BIOPSY-DIAPHRAGM N/A 3/19/2018    [facility] at [facility] OR 2ND FLR    YMLKVO-NGQISR-DX N/A 2/1/2018    Performed by [facility] at [facility] OR 2ND FLR    BRONCHOSCOPY N/A 6/3/2019    Performed by [facility] 2ND FLR    COLONOSCOPY  10/20/2012    COLONOSCOPY  11/19/2014    dr machado ( repeat in 3 years per the pt )    CYSTOSCOPY, WITH RETROGRADE PYELOGRAM Bilateral 7/15/2019    Performed by [facility] Philippe [facility] at [facility] OR    CYSTOSCOPY, WITH URETHRAL DILATION Bilateral 7/15/2019    Performed by [facility] at [facility] OR    ELBOW SURGERY Left 2013    dislocation    [id number]-CATH N/A 6/20/2019     Performed by [facility] at [facility] OR 2ND FLR    LAPAROSCOPY-DIAGNOSTIC N/A 3/19/2018    Performed by [facility] Fang [facility] at [facility] OR 2ND FLR    Pericardiocentesis N/A 2019    Performed by Frank Javier MD at [facility] CATH LAB    PROCTECTOMY  2002    RELEASE, CONTRACTURE, BLADDER NECK N/A 7/15/2019    Per[facility] by [facility] at [facility] OR    SHOULDER ARTHROSCOPY W/ ROTATOR CUFF REPAIR Right     THORACOSCOPY-VIDEO ASSISTED (VATS) W/PLEURAL BIOPSY Left 3/19/2018    Performed by [facility] Fagn [facility] at [facility] OR 2ND FLR    URETHROGRAM, RETROGRADE N/A 7/15/2019    Performed by [facility] Philippe [facility] at [facility] OR       Review of patient's allergies indicates:   Allergen Reactions    Bactrim [sulfamethoxazole-trimethoprim] Other (See Comments)     Joint pains       Family History     Problem Relation (Age of Onset)    Cancer Mother    Heart attack Sister, Sister    Heart disease Father, Brother, Sister, Brother, Sister, Brother    No Known Problems Son, Son, Son    Prostate cancer Brother, Brother, Brother        Tobacco Use    Smoking status: Former Smoker     Packs/day: 2.00     Years: 15.00     Pack years: 30.00     Types: Cigarettes     Last attempt to quit: 1970     Years since quittin.5    Smokeless tobacco: Former User    Tobacco comment: pt quit 40 years ago   Substance and Sexual Activity    Alcohol use: No     Alcohol/week: 16.8 oz     Types: 28 Cans of beer per week     Comment: None since Nov 15 th 2017    Drug use: No    Sexual activity: Not Currently         Review of Systems   All other systems reviewed and are negative.    Objective:     Vital Signs (Most Recent):  Temp: 98.1 °F (36.7 °C) (19)  Pulse: 85(Simultaneous filing. User may not have seen previous data.) (19)  Resp: 20 (19)  BP: 125/79 (19)  SpO2: 95 % (19) Vital Signs (24h Range):  Temp:  [96.7 °F (35.9 °C)-98.1 °F (36.7 °C)] 98.1 °F (36.7  °C)  Pulse:  [] 85  Resp:  [20-24] 20  SpO2:  [94 %-97 %] 95 %  BP: (125-133)/(66-83) 125/79     Weight: 83.9 kg (185 lb)  Body mass index is 27.32 kg/m².      Intake/Output Summary (Last 24 hours) at 7/21/2019 2239  Last data filed at 7/21/2019 2100  Gross per 24 hour   Intake 1479.93 ml   Output 800 ml   Net 679.93 ml       Physical Exam   Constitutional: He is oriented to person, place, and time. He appears well-developed and well-nourished. No distress.   HENT:   Head: Normocephalic and atraumatic.   Eyes: Conjunctivae are normal. No scleral icterus.   Neck: Normal range of motion.   Cardiovascular: Normal heart sounds and intact distal pulses. An irregularly irregular rhythm present. Tachycardia present.   No murmur heard.  Pulmonary/Chest: Effort normal. No respiratory distress. He has decreased breath sounds in the right lower field. He has no wheezes.   Abdominal: Soft. Bowel sounds are normal. There is no tenderness.   Musculoskeletal: Normal range of motion. He exhibits no edema.   Neurological: He is alert and oriented to person, place, and time.   Skin: Skin is warm and dry. He is not diaphoretic.   Psychiatric: He has a normal mood and affect. His behavior is normal. Judgment and thought content normal.   Vitals reviewed.      Vents:       Lines/Drains/Airways     Central Venous Catheter Line                 Port A Cath Single Lumen 06/20/19 right internal jugular 31 days          Drain                 Chest Tube Right Pleural -- days         Urethral Catheter 07/17/19 0920 Non-latex 16 Fr. 4 days                Significant Labs:    CBC/Anemia Profile:  Recent Labs   Lab 07/20/19  1828 07/21/19  0420 07/21/19  1959   WBC 10.83 10.45  10.45 11.89   HGB 10.5* 9.7*  9.7* 9.3*   HCT 35.3* 32.4*  32.4* 31.7*   * 380*  380* 364*   MCV 91 89  89 90   RDW 19.9* 19.9*  19.9* 19.9*        Chemistries:  Recent Labs   Lab 07/20/19  0409 07/21/19  0420   * 131*   K 4.0 4.2    99   CO2  26 22*   BUN 19 20   CREATININE 0.7 0.7   CALCIUM 8.3* 8.3*   ALBUMIN 1.9* 2.0*   PROT 5.4* 5.7*   BILITOT 0.4 0.4   ALKPHOS 78 84   ALT 6* 8*   AST 14 20   MG 1.9 1.9   PHOS 2.6* 2.8       All pertinent labs within the past 24 hours have been reviewed.    Significant Imaging:   I have reviewed and interpreted all pertinent imaging results/findings within the past 24 hours.    Assessment/Plan:     Malignant pleural effusion  -s/p R pleurX catheter  -CXR w/o significant effusion; therefore will hold off on drainage  -wound care to drain site; not tender; warm, or erythematous; complete abx therapy for superficial skin infection          Thank you for your consult. I will follow-up with patient. Please contact us if you have any additional questions.     Emerson Mason MD  Pulmonology  [facility]

## 2019-07-22 NOTE — CARE UPDATE
"RAPID RESPONSE NURSE PROACTIVE ROUNDING NOTE     Time of Visit:     Admit Date: 2019  LOS: 4  Code Status: Full Code   Date of Visit: 2019  : 1940  Age: 78 y.o.  Sex: male  Race: White  Bed: 8088/8088 A:   MRN: [id number]  Was the patient discharged from an ICU this admission? no   Was the patient discharged from a PACU within last 24 hours?  no  Did the patient receive conscious sedation/general anesthesia in last 24 hours?  no  Was the patient in the ED within the past 24 hours?  no  Was the patient started on NIPPV within the past 24 hours?  no  Attending Physician: Elizabeth Magana MD  Primary Service: [facility] MEDICAL ONCOLOGY    ASSESSMENT     Diagnosis: Pericardial effusion without cardiac tamponade    Abnormal Vital Signs: /79   Pulse 85 Comment: Simultaneous filing. User may not have seen previous data.  Temp 98.1 °F (36.7 °C)   Resp 20   Ht 5' 9" (1.753 m)   Wt 83.9 kg (185 lb)   SpO2 95%   BMI 27.32 kg/m²      Clinical Issues: Dysrythmia    Patient  has a past medical history of Disorder of kidney and ureter, Diverticulitis, Elevated PSA, Hypertension, Lung cancer, Mesothelioma, Prostate cancer, and Urinary tract infection.    Follow up proactive rounding for Afib with RVR. Now rate controlled 80's. /79, 95% 2L NC. No concerns from BS RN      INTERVENTIONS/ RECOMMENDATIONS     Continue to monitor  Discussed plan of care with Ana WILKINSON.    PHYSICIAN ESCALATION     Yes/No  no    Orders received and case discussed with NA.    Disposition: Remain in room 8088.    FOLLOW-UP     Call back the Rapid Response Nurse, Lulu Kebede RN at [phone] for additional questions or concerns.          "

## 2019-07-22 NOTE — PLAN OF CARE
Brief Cardiology Progress Note      Repeat TTE reviewed, no significant residual pericardial effusion. Patient remains volume overloaded on exam w JVD to 10cm and recorded 15-20lb weight gain on EMR (unclear accuracy). On tele, patient has since converted to sinus with adequate rate control. Would continue current regimen of therapeutic Lovenox, diltiazem and metoprolol (can consolidate both into long-acting). Would consider initiation of IV Lasix 40mg bid for goal net negative 1-2 L until ~175 lbs/euvolemic before converting to PO Lasix. No further workup needed by EP. Will need follow up with cardiology 1-2 weeks following discharge.    Thank you for this interesting consult. Cardiology consult will sign off. Please call with questions as needed.      Higinio Munson MD

## 2019-07-22 NOTE — PROGRESS NOTES
Urology notified of pt. continuing with red tinged urine.  Will continue to irrigate PRN and will notify if no output is seen post irrigation.

## 2019-07-22 NOTE — SUBJECTIVE & OBJECTIVE
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    Prostate cancer 2002    Urinary tract infection        Past Surgical History:   Procedure Laterality Date    BIOPSY-DIAPHRAGM N/A 3/19/2018    Performed by Galdino Fang MD at [facility] OR [address] [place] [address]    VHBSLF-CHVKPZ-SP N/A 2/1/2018    Performed by [facility] at [facility] OR [address] [place] [address]    BRONCHOSCOPY N/A 6/3/2019    Performed by [facility] at [facility] OR [address] [place] [address]    COLONOSCOPY  10/20/2012    COLONOSCOPY  11/19/2014    dr machado ( repeat in 3 years per the pt )    CYSTOSCOPY, WITH RETROGRADE PYELOGRAM Bilateral 7/15/2019    Performed by Galdino Philippe MD at [facility] OR    CYSTOSCOPY, WITH URETHRAL DILATION Bilateral 7/15/2019    Performed by Galdino Philippe MD at [facility] OR    ELBOW SURGERY Left 2013    dislocation    [id number]-[id number]-B-CATH N/A 6/20/2019    Performed by [facility] at [facility] OR [address] [place] [address]    LAPAROSCOPY-DIAGNOSTIC N/A 3/19/2018    [facility]    Pericardiocentesis N/A 7/18/2019    Performed by Frank Javier MD at [facility] CATH LAB    PROCTECTOMY  2002    RELEASE, CONTRACTURE, BLADDER NECK N/A 7/15/2019    Performed by Galdino Philippe MD at [facility] OR    SHOULDER ARTHROSCOPY W/ ROTATOR CUFF REPAIR Right 2008    THORACOSCOPY-VIDEO ASSISTED (VATS) W/PLEURAL BIOPSY Left 3/19/2018    Performed by Galdino Fang MD at [facility] OR [address] [place] [address]    URETHROGRAM, RETROGRADE N/A 7/15/2019    Performed by Galdino Philippe MD at [facility] OR       Review of patient's allergies indicates:   Allergen Reactions    Bactrim [sulfamethoxazole-trimethoprim] Other (See Comments)     Joint pains       Family History     Problem Relation (Age of Onset)    Cancer Mother    Heart attack Sister, Sister    Heart disease Father, Brother, Sister, Brother, Sister, Brother    No  Known Problems Son, Son, Son    Prostate cancer Brother, Brother, Brother        Tobacco Use    Smoking status: Former Smoker     Packs/day: 2.00     Years: 15.00     Pack years: 30.00     Types: Cigarettes     Last attempt to quit: 1970     Years since quittin.5    Smokeless tobacco: Former User    Tobacco comment: pt quit 40 years ago   Substance and Sexual Activity    Alcohol use: No     Alcohol/week: 16.8 oz     Types: 28 Cans of beer per week     Comment: None since Nov 15 th 2017    Drug use: No    Sexual activity: Not Currently         Review of Systems   All other systems reviewed and are negative.    Objective:     Vital Signs (Most Recent):  Temp: 98.1 °F (36.7 °C) (19)  Pulse: 85(Simultaneous filing. User may not have seen previous data.) (19)  Resp: 20 (19)  BP: 125/79 (19)  SpO2: 95 % (19) Vital Signs (24h Range):  Temp:  [96.7 °F (35.9 °C)-98.1 °F (36.7 °C)] 98.1 °F (36.7 °C)  Pulse:  [] 85  Resp:  [20-24] 20  SpO2:  [94 %-97 %] 95 %  BP: (125-133)/(66-83) 125/79     Weight: 83.9 kg (185 lb)  Body mass index is 27.32 kg/m².      Intake/Output Summary (Last 24 hours) at 2019 2239  Last data filed at 2019 2100  Gross per 24 hour   Intake 1479.93 ml   Output 800 ml   Net 679.93 ml       Physical Exam   Constitutional: He is oriented to person, place, and time. He appears well-developed and well-nourished. No distress.   HENT:   Head: Normocephalic and atraumatic.   Eyes: Conjunctivae are normal. No scleral icterus.   Neck: Normal range of motion.   Cardiovascular: Normal heart sounds and intact distal pulses. An irregularly irregular rhythm present. Tachycardia present.   No murmur heard.  Pulmonary/Chest: Effort normal. No respiratory distress. He has decreased breath sounds in the right lower field. He has no wheezes.   Abdominal: Soft. Bowel sounds are normal. There is no tenderness.   Musculoskeletal: Normal range of  motion. He exhibits no edema.   Neurological: He is alert and oriented to person, place, and time.   Skin: Skin is warm and dry. He is not diaphoretic.   Psychiatric: He has a normal mood and affect. His behavior is normal. Judgment and thought content normal.   Vitals reviewed.      Vents:       Lines/Drains/Airways     Central Venous Catheter Line                 Port A Cath Single Lumen 06/20/19 right internal jugular 31 days          Drain                 Chest Tube Right Pleural -- days         Urethral Catheter 07/17/19 0920 Non-latex 16 Fr. 4 days                Significant Labs:    CBC/Anemia Profile:  Recent Labs   Lab 07/20/19  1828 07/21/19  0420 07/21/19  1959   WBC 10.83 10.45  10.45 11.89   HGB 10.5* 9.7*  9.7* 9.3*   HCT 35.3* 32.4*  32.4* 31.7*   * 380*  380* 364*   MCV 91 89  89 90   RDW 19.9* 19.9*  19.9* 19.9*        Chemistries:  Recent Labs   Lab 07/20/19  0409 07/21/19  0420   * 131*   K 4.0 4.2    99   CO2 26 22*   BUN 19 20   CREATININE 0.7 0.7   CALCIUM 8.3* 8.3*   ALBUMIN 1.9* 2.0*   PROT 5.4* 5.7*   BILITOT 0.4 0.4   ALKPHOS 78 84   ALT 6* 8*   AST 14 20   MG 1.9 1.9   PHOS 2.6* 2.8       All pertinent labs within the past 24 hours have been reviewed.    Significant Imaging:   I have reviewed and interpreted all pertinent imaging results/findings within the past 24 hours.

## 2019-07-22 NOTE — ASSESSMENT & PLAN NOTE
- Patient with symptoms of Sob and swelling   - Patient not on lasix at home   - BNP elevated at 374  -  bilateral pitting edema and scrotal swelling        PLAN   - Lasix given last night  - Strict I/O   - Daily weights  - Keep mag >2 and phos> 3  - Cardiac diet with decreased salt intake

## 2019-07-22 NOTE — HPI
Mr. Harding is a 79yo WM w/ pmhx of  prostate cancer (s/p prostatectomy), mesothelioma (on chemotherapy), with recurrent pleural effusion s/p right PleurX) and hypertension who was transferred to [facility] for evaluation of pericardial effusion. Pulm consulted for evaluation of R PleurX catheter drainage. He states he last had it drained on Thursday. He usually goes about 7 days between drainage. He denies any worsening shortness of breath or chest pain. He states that Dr. Woodward was treating a superficial skin infection around the site. He denies any pain, redness, swelling, or fevers.

## 2019-07-22 NOTE — PROGRESS NOTES
[facility]  Hematology/Oncology  Progress Note    Patient Name: Cale Harding  Admission Date: 7/17/2019  Hospital Length of Stay: 5 days  Code Status: Full Code     Subjective:     HPI:  Mr. Cale Harding is a 78-year-old male with a past medical history of prostate cancer (s/p prostatectomy), mesothelioma (on chemotherapy), with recurrent pleural effusion  ( with a right PleurX) and hypertension who is a transfer for evaluation of pericardial effusion.       Patient was scheduled for elective cystography with Urology (Dr. Philippe) for dilation of bladder neck contracture.  On arrival to elective surgical suite, patient found to be tachycardic and heart rate was irregular.  EKG confirmed atrial fibrillation with RVR.  Surgery was cancelled and he was admitted to the ICU for a diltiazem drip. He converted to sinus but would go in and out of afib, was started on lopressor 25 BID and echo done showed moderate size of pericardial effusion with no tamponade physiology however increased in effusion from 7/5, cardiology in the OSH was planning for pericardiocentesis but requested transfer for possible window vs pericardiocentesis and was sent to [facility]    On arrival patient was hemodynamically stable  , NSR, converted in and out of afib. Cardiology was consulted for input on afib management in the setting of pericardial effusion and need for pericardiocentesis/ closer ccu monitoring.     Of note patient has a PleurX on the right that is possibly infected and he was supposed to get it removed this Friday by his pulmonologist Dr. Woodward     Interval History: Interventional cards consulted, preformed pericardiocentesis on 7/19 which produced 570cc fluid. Cards to follow. Pulm consulted, plan to leave PleurX in place. Uro rec to continue haider cath at this time for bladder contracture. Will appreciate recs.     Patient states that he had an acute episode of SOB overnight. Patient states that the  episode improved w/ breathing treatments and sitting up. Patient on heparin and diltiazem drip for rate control. Patient noted to have improved hematuria this morning. Cano in place for bladder contracture per urology, hematuria present w/o clots. Patient HD stable at this time. Patient still has SOB on exertion and a mild cough, Pulmonary consulted to drain PleurX. He denies HA, f/c, n/v, CP, and Abd pain.    Oncology Treatment Plan:   OP NSCLC VINORELBINE WEEKLY    Medications:  Continuous Infusions:   dilTIAZem 5 mg/hr (07/21/19 1259)    heparin (porcine) in D5W 12 Units/kg/hr (07/21/19 4091)    heparin (porcine)       Scheduled Meds:   amoxicillin-clavulanate 500-125mg  1 tablet Oral TID    metoprolol tartrate  50 mg Oral BID    ondansetron  4 mg Intravenous Once    pantoprazole  40 mg Oral Daily     PRN Meds:albuterol-ipratropium, alteplase, Dextrose 10% Bolus, Dextrose 10% Bolus, glucagon (human recombinant), glucose, glucose, heparin (porcine), heparin, porcine (PF), HYDROcodone-acetaminophen, ibuprofen, metoprolol, ondansetron, sodium chloride 0.9%     Review of Systems   Constitutional: Positive for fatigue. Negative for chills, diaphoresis and fever.   HENT: Positive for sinus pain. Negative for congestion and sore throat.    Eyes: Negative for visual disturbance.   Respiratory: Positive for cough, shortness of breath (on exertion) and wheezing.    Cardiovascular: Negative for chest pain, palpitations and leg swelling.   Gastrointestinal: Negative for abdominal distention, abdominal pain, constipation, nausea and vomiting.   Genitourinary: Positive for difficulty urinating, frequency, hematuria and scrotal swelling. Negative for decreased urine volume.   Musculoskeletal: Negative for arthralgias.   Neurological: Negative for dizziness, light-headedness and headaches.   Psychiatric/Behavioral: Negative for agitation, behavioral problems and confusion.     Objective:     Vital Signs (Most  Recent):  Temp: 97.6 °F (36.4 °C) (07/22/19 0808)  Pulse: 99 (07/22/19 0808)  Resp: (!) 24 (07/22/19 0808)  BP: 112/89 (07/22/19 0808)  SpO2: 99 % (07/22/19 0808) Vital Signs (24h Range):  Temp:  [96.7 °F (35.9 °C)-98.1 °F (36.7 °C)] 97.6 °F (36.4 °C)  Pulse:  [] 99  Resp:  [16-24] 24  SpO2:  [93 %-99 %] 99 %  BP: (112-133)/(66-89) 112/89     Weight: 84.8 kg (187 lb)  Body mass index is 27.62 kg/m².  Body surface area is 2.03 meters squared.      Intake/Output Summary (Last 24 hours) at 7/22/2019 0837  Last data filed at 7/22/2019 0400  Gross per 24 hour   Intake 1323.82 ml   Output 1450 ml   Net -126.18 ml       Physical Exam   Constitutional: He is oriented to person, place, and time. He appears well-developed and well-nourished. No distress.   HENT:   Head: Normocephalic and atraumatic.   Eyes: Conjunctivae are normal. No scleral icterus.   Neck: Normal range of motion.   Cardiovascular: Normal heart sounds and intact distal pulses. An irregularly irregular rhythm present. Tachycardia present.   No murmur heard.  Pulmonary/Chest: Effort normal. No respiratory distress. He has decreased breath sounds in the right lower field. He has no wheezes.   Abdominal: Soft. Bowel sounds are normal. There is no tenderness.   Musculoskeletal: Normal range of motion. He exhibits no edema.   Neurological: He is alert and oriented to person, place, and time.   Skin: Skin is warm and dry. He is not diaphoretic.   Psychiatric: He has a normal mood and affect. His behavior is normal. Judgment and thought content normal.   Vitals reviewed.      Significant Labs:   BMP:   Recent Labs   Lab 07/21/19  0420 07/22/19  0423   * 125*   * 129*   K 4.2 4.0   CL 99 97   CO2 22* 21*   BUN 20 24*   CREATININE 0.7 0.7   CALCIUM 8.3* 8.4*   MG 1.9 1.8   , CBC:   Recent Labs   Lab 07/21/19 0420 07/21/19 1959 07/22/19 0423   WBC 10.45  10.45 11.89 10.38   HGB 9.7*  9.7* 9.3* 9.2*   HCT 32.4*  32.4* 31.7* 30.7*   *   380* 364* 349   , CMP:   Recent Labs   Lab 07/21/19  0420 07/22/19  0423   * 129*   K 4.2 4.0   CL 99 97   CO2 22* 21*   * 125*   BUN 20 24*   CREATININE 0.7 0.7   CALCIUM 8.3* 8.4*   PROT 5.7* 5.5*   ALBUMIN 2.0* 2.0*   BILITOT 0.4 0.4   ALKPHOS 84 82   AST 20 26   ALT 8* 10   ANIONGAP 10 11   EGFRNONAA >60.0 >60.0   , Coagulation:   Recent Labs   Lab 07/20/19  1828 07/21/19  0218 07/22/19  0423   INR 1.1  --   --    APTT 26.6 39.1* 43.4*   , LFTs:   Recent Labs   Lab 07/21/19  0420 07/22/19  0423   ALT 8* 10   AST 20 26   ALKPHOS 84 82   BILITOT 0.4 0.4   PROT 5.7* 5.5*   ALBUMIN 2.0* 2.0*    and Urine Studies:   Recent Labs   Lab 07/21/19  1658   COLORU Gina   APPEARANCEUA Hazy*   PHUR 6.0   SPECGRAV 1.025   PROTEINUA 2+*   GLUCUA Negative   KETONESU Negative   BILIRUBINUA Negative   OCCULTUA 3+*   NITRITE Negative   LEUKOCYTESUR 2+*   RBCUA >100*   WBCUA 43*   BACTERIA Occasional   SQUAMEPITHEL 1   HYALINECASTS 0       Diagnostic Results:  I have reviewed all pertinent imaging results/findings within the past 24 hours.    Assessment/Plan:     * Pericardial effusion without cardiac tamponade  - Patient transferred for evaluation by cardiology / CTS for pericardial window or pericardiocentesis   - Pericardiocentesis preformed by interventional cards, pt tolerated procedure well, produced 570cc serosanguinous fluid.  - Maximize medical management per cards  - will appreciate recs    Gross hematuria  - Ordered UA and UC    Urology recs as below:    - Urology suspects that hematuria is 2/2 heparin anticoagulation in setting of recent incision of his bladder neck contracture  - pt also just underwent a negative work up for malignancy w/ Dr. Philippe  - continue haider cath at this time  - nursing staff may irrigate PRN for clots  - scrotal support for swelling  - Urology recs voiding trial on 7/26 IF patient still in hospital AND urine remains clear  - Patient to f/u w/ Dr. Philippe for urologic management  after d/c       Acute on chronic diastolic heart failure  - Patient with symptoms of Sob and swelling   - Patient not on lasix at home   - BNP elevated at 374  -  bilateral pitting edema and scrotal swelling        PLAN   - Lasix given last night  - Strict I/O   - Daily weights  - Keep mag >2 and phos> 3  - Cardiac diet with decreased salt intake            Paroxysmal atrial fibrillation  - Patient states that he has always had palpitations that come and go but they were never caught while he was at a doctor or in the hospital   - Atrial fibrillation with RVR likely precipitated by volume overload and enlarging pericardial effusion. He was started on diltiazem drip then switched to lopressor in the OSH  -  Cardiology consulted for pericardiocentesis vs pericardial window   - On arrival patient was in NSR and converted to RVR and 2.5 mg of Iv lopressor was pushed, his BP started to decrease to ~86/44. Will watch his BP and if it comes up will give him lopressor PO. As he has not gotten his night time dose   -  If becomes hemodynamically unstable will consider diltiazem drip but given his BP will be cautious  - Cardiology consulted appreciate their recs  - Repeat echo showed EF of 55% w/ moderate pericardial effusion BEFORE pericardiocentesis     Essential hypertension  - hold all antihypertensives he is on enalapril at home   - see cards recs    Atrial fibrillation with RVR  first noted on ECG 7/15/19 although pt reports longer history of palpitations    Cardiology Recs:     Atrial fibrillation with RVR   - likely due to pericardial effusion vs. Underlying malignancy   - Continue Metoprolol tartrate mg BID   - CHADsVASC 3 suggestive of 3.2% annual stroke risk. Would consider anticoagulation with Epixaban 5 mg BID   - withhold Enalipril and switch to Losartan    - F/U with cardiology ( Dr. Bonilla)     Pericardial Effusion:   - Pt has a hx of cancer   - S/p pericardiocentesis on 7/8/19 with removal of 570 cc fluid.  Cytology is pending   - Repeat echo shows no further accumulation of fluid in the pericardium    Plan:  - appreciate cardiology recs  - Increased dose of Lopressor from 25mg BID to 50 mg BID  - Continue telemetry   - Patient on heparin and diltiazem drip per cardiology, will continue to follow    Acquired contracture of bladder neck  Urology consulted in the OSH   S/p Bladder neck contracture release and urethral dilation on 7/15/19  Haider in place   Will consult urology for final discharge recs on whether he needs to remain with haider until seen OP, call in the am        Pleural effusion  See malignant pleural effusion    Mesothelioma of left lung  - Patient of Dr. Foster on palliative chemo with Gemzar   - Controlled pain on norco   - OP follow up with oncologist     Malignant pleural effusion  - Patient of  , discussed with his overnight suspect that the drain could be infected and  recommended Augmentin for 5 more day. He also drained 450 cc of fluid.   - Pulm consults rec to leave PleurX in place   - Last drainage was morning of 7/18 , and is supposed to be drained once a week   - Patient on RA w/ mild SOB   -  repeat Chest xray to assess for effusion recurrence  - Pulm consulted to drain per patient request      Gastroesophageal reflux disease  - PPI daily     History of prostate cancer  S/p prostectomy   Haider in place for urinary retention 2/2 bladder contracture  Sees urology as OP   Urology rec to keep haider in place             Hipolito Enrique MD  Hematology/Oncology  [facility]      ATTENDING NOTE, ONCOLOGY INPATIENT TEAM    As above; events of last 24 hours noted.  Patient seen and examined, chart reviewed.  Appears comfortable, in NAD.  Lungs with few scattered ronchi  Abdomen is soft, nontender.  Labs reviewed.    PLAN  Follow electrolytes and Mg daily.  Switch to oral diltiazem.   Enoxaparin 1 mg /kg Q 12 hours today; we will switch him to apixaban when ready for  discharge.  PT/OT.  Patient advised to increase ambulation. We will d/c TEDs and SCds.  We will follow.    Sal Guy MD

## 2019-07-23 NOTE — ASSESSMENT & PLAN NOTE
- Ordered UA and UC    Urology recs as below:    - Urology suspects that hematuria is 2/2 heparin anticoagulation in setting of recent incision of his bladder neck contracture  - pt also just underwent a negative work up for malignancy w/ Dr. Philippe  - continue haiedr cath at this time  - nursing staff may irrigate PRN for clots  - scrotal support for swelling  - Urology recs voiding trial on 7/26 IF patient still in hospital AND urine remains clear  - Patient to f/u w/ Dr. Philippe for urologic management after d/c

## 2019-07-23 NOTE — ASSESSMENT & PLAN NOTE
- Patient states that he has always had palpitations that come and go but they were never caught while he was at a doctor or in the hospital   - Atrial fibrillation with RVR likely precipitated by volume overload and enlarging pericardial effusion. He was started on diltiazem drip then switched to lopressor in the OSH  -  Cardiology consulted for pericardiocentesis vs pericardial window   - On arrival patient was in NSR and converted to RVR and 2.5 mg of Iv lopressor was pushed, his BP started to decrease to ~86/44. Will watch his BP and if it comes up will give him lopressor PO. As he has not gotten his night time dose   -  If becomes hemodynamically unstable will consider diltiazem drip but given his BP will be cautious  - Cardiology consulted appreciate their recs  - Repeat echo showed EF of 55% w/ moderate pericardial effusion BEFORE pericardiocentesis; EF of 60% after pericardiocentesis

## 2019-07-23 NOTE — ASSESSMENT & PLAN NOTE
first noted on ECG 7/15/19 although pt reports longer history of palpitations    Cardiology Recs:     Atrial fibrillation with RVR   - likely due to pericardial effusion vs. Underlying malignancy   - Continue Metoprolol tartrate mg BID   - CHADsVASC 3 suggestive of 3.2% annual stroke risk. Would consider anticoagulation with Epixaban 5 mg BID   - withhold Enalipril and switch to Losartan    - F/U with cardiology ( Dr. Bonilla)     Pericardial Effusion:   - Pt has a hx of cancer   - S/p pericardiocentesis on 7/8/19 with removal of 570 cc fluid. Cytology is pending   - Repeat echo shows no further accumulation of fluid in the pericardium    Plan:  - appreciate cardiology recs  - Increased dose of Lopressor from 25mg BID to 50 mg BID  - Continue telemetry   - swtiched to lovenox BID, now on hold for hematuria

## 2019-07-23 NOTE — ASSESSMENT & PLAN NOTE
- Patient of  , discussed with his overnight suspect that the drain could be infected and  recommended Augmentin for 5 more day. He also drained 450 cc of fluid.   - Pulm consults rec to leave PleurX in place   - Last drainage was morning of 7/18 , and is supposed to be drained once a week   - Patient on RA w/ mild SOB   -  repeat Chest xray to assess for effusion recurrence  - Pulm consulted to drain per patient request  - lasix 40 mg as needed PRN

## 2019-07-23 NOTE — ASSESSMENT & PLAN NOTE
- Patient transferred for evaluation by cardiology / CTS for pericardial window or pericardiocentesis   - Pericardiocentesis preformed by interventional cards, pt tolerated procedure well, produced 570cc serosanguinous fluid.  - Maximize medical management per cards  -  appreciate recs

## 2019-07-23 NOTE — PROGRESS NOTES
[facility]  Hematology/Oncology  Progress Note    Patient Name: Cale Hrading  Admission Date: 7/17/2019  Hospital Length of Stay: 6 days  Code Status: Full Code     Subjective:     HPI:  Mr. Cale Harding is a 78-year-old male with a past medical history of prostate cancer (s/p prostatectomy), mesothelioma (on chemotherapy), with recurrent pleural effusion  ( with a right PleurX) and hypertension who is a transfer for evaluation of pericardial effusion.       Patient was scheduled for elective cystography with Urology (Dr. Philippe) for dilation of bladder neck contracture.  On arrival to elective surgical suite, patient found to be tachycardic and heart rate was irregular.  EKG confirmed atrial fibrillation with RVR.  Surgery was cancelled and he was admitted to the ICU for a diltiazem drip. He converted to sinus but would go in and out of afib, was started on lopressor 25 BID and echo done showed moderate size of pericardial effusion with no tamponade physiology however increased in effusion from 7/5, cardiology in the OSH was planning for pericardiocentesis but requested transfer for possible window vs pericardiocentesis and was sent to [facility]    On arrival patient was hemodynamically stable  , NSR, converted in and out of afib. Cardiology was consulted for input on afib management in the setting of pericardial effusion and need for pericardiocentesis/ closer ccu monitoring.     Of note patient has a PleurX on the right that is possibly infected and he was supposed to get it removed this Friday by his pulmonologist Dr. Woodward     Interval History: She was switched to diltiazem po and also lovenox BID. The pt has had hematuria and penile/scrotal edema, has been elevating this region. Pt gets decompensated with minimal movement in bed, on 3 L of O2. Eating has been very poor.       Oncology Treatment Plan:   OP NSCLC VINORELBINE WEEKLY    Medications:  Continuous Infusions:   heparin  (porcine)       Scheduled Meds:   diltiaZEM  60 mg Oral Q6H    metoprolol tartrate  50 mg Oral BID    ondansetron  4 mg Intravenous Once    pantoprazole  40 mg Oral Daily     PRN Meds:albuterol-ipratropium, alteplase, Dextrose 10% Bolus, Dextrose 10% Bolus, glucagon (human recombinant), glucose, glucose, heparin (porcine), heparin, porcine (PF), HYDROcodone-acetaminophen, ibuprofen, metoprolol, ondansetron, ramelteon, sodium chloride 0.9%     Review of Systems   Constitutional: Positive for fatigue. Negative for chills, diaphoresis and fever.   HENT: Positive for sinus pain. Negative for congestion and sore throat.    Eyes: Negative for visual disturbance.   Respiratory: Positive for cough, shortness of breath (on exertion) and wheezing.    Cardiovascular: Negative for chest pain, palpitations and leg swelling.   Gastrointestinal: Negative for abdominal distention, abdominal pain, constipation, nausea and vomiting.   Genitourinary: Positive for difficulty urinating, frequency, hematuria and scrotal swelling. Negative for decreased urine volume.   Musculoskeletal: Negative for arthralgias.   Neurological: Negative for dizziness, light-headedness and headaches.   Psychiatric/Behavioral: Negative for agitation, behavioral problems and confusion.     Objective:     Vital Signs (Most Recent):  Temp: 97.5 °F (36.4 °C) (07/23/19 0758)  Pulse: 72 (07/23/19 0758)  Resp: (!) 26 (07/23/19 0758)  BP: 101/63 (07/23/19 0758)  SpO2: 98 % (07/23/19 0758) Vital Signs (24h Range):  Temp:  [97.5 °F (36.4 °C)-98 °F (36.7 °C)] 97.5 °F (36.4 °C)  Pulse:  [48-97] 72  Resp:  [16-26] 26  SpO2:  [93 %-100 %] 98 %  BP: (101-134)/(60-78) 101/63     Weight: 86.2 kg (190 lb)  Body mass index is 28.06 kg/m².  Body surface area is 2.05 meters squared.      Intake/Output Summary (Last 24 hours) at 7/23/2019 0858  Last data filed at 7/23/2019 0847  Gross per 24 hour   Intake 1484.83 ml   Output 815 ml   Net 669.83 ml       Physical Exam    Constitutional: He is oriented to person, place, and time. He appears well-developed and well-nourished. No distress.   HENT:   Head: Normocephalic and atraumatic.   Eyes: Conjunctivae are normal. No scleral icterus.   Neck: Normal range of motion.   Cardiovascular: Normal heart sounds and intact distal pulses. An irregularly irregular rhythm present. Tachycardia present.   No murmur heard.  Pulmonary/Chest: Effort normal. No respiratory distress. He has decreased breath sounds in the right lower field. He has no wheezes.   Abdominal: Soft. Bowel sounds are normal. There is no tenderness.   Musculoskeletal: Normal range of motion. He exhibits no edema.   Neurological: He is alert and oriented to person, place, and time.   Skin: Skin is warm and dry. He is not diaphoretic.   Psychiatric: He has a normal mood and affect. His behavior is normal. Judgment and thought content normal.   Vitals reviewed.  : scrotal and penile edema      Significant Labs:   BMP:   Recent Labs   Lab 07/22/19 0423 07/23/19 0355   * 111*   * 129*   K 4.0 4.2   CL 97 96   CO2 21* 24   BUN 24* 25*   CREATININE 0.7 0.7   CALCIUM 8.4* 8.3*   MG 1.8 2.1   , CBC:   Recent Labs   Lab 07/21/19 1959 07/22/19 0423 07/23/19 0355   WBC 11.89 10.38 13.24*   HGB 9.3* 9.2* 9.0*   HCT 31.7* 30.7* 30.2*   * 349 336   , CMP:   Recent Labs   Lab 07/22/19 0423 07/23/19 0355   * 129*   K 4.0 4.2   CL 97 96   CO2 21* 24   * 111*   BUN 24* 25*   CREATININE 0.7 0.7   CALCIUM 8.4* 8.3*   PROT 5.5* 5.7*   ALBUMIN 2.0* 2.1*   BILITOT 0.4 0.5   ALKPHOS 82 85   AST 26 14   ALT 10 8*   ANIONGAP 11 9   EGFRNONAA >60.0 >60.0   , Coagulation:   Recent Labs   Lab 07/22/19 0423   APTT 43.4*   , Haptoglobin: No results for input(s): HAPTOGLOBIN in the last 48 hours., LDH: No results for input(s): LDHCSF, BFSOURCE in the last 48 hours., LFTs:   Recent Labs   Lab 07/22/19  0423 07/23/19  0355   ALT 10 8*   AST 26 14   ALKPHOS 82 85    BILITOT 0.4 0.5   PROT 5.5* 5.7*   ALBUMIN 2.0* 2.1*   , Reticulocytes: No results for input(s): RETIC in the last 48 hours., Urine Studies:   Recent Labs   Lab 07/21/19  1658   COLORU Gina   APPEARANCEUA Hazy*   PHUR 6.0   SPECGRAV 1.025   PROTEINUA 2+*   GLUCUA Negative   KETONESU Negative   BILIRUBINUA Negative   OCCULTUA 3+*   NITRITE Negative   LEUKOCYTESUR 2+*   RBCUA >100*   WBCUA 43*   BACTERIA Occasional   SQUAMEPITHEL 1   HYALINECASTS 0    and All pertinent labs from the last 24 hours have been reviewed.    Diagnostic Results:  I have reviewed all pertinent imaging results/findings within the past 24 hours.    Assessment/Plan:     * Pericardial effusion without cardiac tamponade  - Patient transferred for evaluation by cardiology / CTS for pericardial window or pericardiocentesis   - Pericardiocentesis preformed by interventional cards, pt tolerated procedure well, produced 570cc serosanguinous fluid.  - Maximize medical management per cards  -  appreciate recs    Gross hematuria  - Ordered UA and UC    Urology recs as below:    - Urology suspects that hematuria is 2/2 heparin anticoagulation in setting of recent incision of his bladder neck contracture  - pt also just underwent a negative work up for malignancy w/ Dr. Philippe  - continue haider cath at this time  - nursing staff may irrigate PRN for clots  - scrotal support for swelling  - Urology recs voiding trial on 7/26 IF patient still in hospital AND urine remains clear  - Patient to f/u w/ Dr. Philippe for urologic management after d/c       Acute on chronic diastolic heart failure  - Patient with symptoms of Sob and swelling   - Patient not on lasix at home   - BNP elevated at 374  -  bilateral pitting edema and scrotal swelling        PLAN   - Lasix given last night  - Strict I/O   - Daily weights  - Keep mag >2 and phos> 3  - Cardiac diet with decreased salt intake            Paroxysmal atrial fibrillation  - Patient states that he has always had  palpitations that come and go but they were never caught while he was at a doctor or in the hospital   - Atrial fibrillation with RVR likely precipitated by volume overload and enlarging pericardial effusion. He was started on diltiazem drip then switched to lopressor in the OSH  -  Cardiology consulted for pericardiocentesis vs pericardial window   - On arrival patient was in NSR and converted to RVR and 2.5 mg of Iv lopressor was pushed, his BP started to decrease to ~86/44. Will watch his BP and if it comes up will give him lopressor PO. As he has not gotten his night time dose   -  If becomes hemodynamically unstable will consider diltiazem drip but given his BP will be cautious  - Cardiology consulted appreciate their recs  - Repeat echo showed EF of 55% w/ moderate pericardial effusion BEFORE pericardiocentesis; EF of 60% after pericardiocentesis    Essential hypertension  - hold all antihypertensives he is on enalapril at home   - see cards recs    Atrial fibrillation with RVR  first noted on ECG 7/15/19 although pt reports longer history of palpitations    Cardiology Recs:     Atrial fibrillation with RVR   - likely due to pericardial effusion vs. Underlying malignancy   - Continue Metoprolol tartrate mg BID   - CHADsVASC 3 suggestive of 3.2% annual stroke risk. Would consider anticoagulation with Epixaban 5 mg BID   - withhold Enalipril and switch to Losartan    - F/U with cardiology ( Dr. Bonilla)     Pericardial Effusion:   - Pt has a hx of cancer   - S/p pericardiocentesis on 7/8/19 with removal of 570 cc fluid. Cytology is pending   - Repeat echo shows no further accumulation of fluid in the pericardium    Plan:  - appreciate cardiology recs  - Increased dose of Lopressor from 25mg BID to 50 mg BID  - Continue telemetry   - swtiched to lovenox BID, now on hold for hematuria    Acquired contracture of bladder neck  Urology consulted in the OSH   S/p Bladder neck contracture release and urethral  dilation on 7/15/19  Haider in place   Will discuss with Urology, may need ligation of bleeding vessel given continued hematuria       Pleural effusion  See malignant pleural effusion    Mesothelioma of left lung  - Patient of Dr. Foster on palliative chemo with Gemzar   - Controlled pain on norco   - OP follow up with oncologist     Malignant pleural effusion  - Patient of  , discussed with his overnight suspect that the drain could be infected and  recommended Augmentin for 5 more day. He also drained 450 cc of fluid.   - Pulm consults rec to leave PleurX in place   - Last drainage was morning of 7/18 , and is supposed to be drained once a week   - Patient on RA w/ mild SOB   -  repeat Chest xray to assess for effusion recurrence  - Pulm consulted to drain per patient request  - lasix 40 mg as needed PRN      Gastroesophageal reflux disease  - PPI daily     History of prostate cancer  S/p prostectomy   Haider in place for urinary retention 2/2 bladder contracture  Sees urology as OP   Urology rec to keep haider in place      To be discussed with Dr. Gage Styles MD  Hematology/Oncology Fellow, PGY V  [facility]        ATTENDING NOTE, ONCOLOGY INPATIENT TEAM    As above; events of last 24 hours noted.  Patient seen and examined, chart reviewed.  Appears uncomfortable, in mild respiratory distress.  Lungs with ronchi and scattered wheezes today.  Abdomen is soft, nontender.  Labs reviewed.  He hs having nathanael hematuria.    PLAN  Follow CBC and electrolytes including Mg  Daily.  Repeat chest x ray tomororw.  D/C enoxaparin given his significant hematuria.  Cardiology recommendations   are noted; we will have to hold diuresis if BP is less than 100 mm Hg.  Prognosis is guarded.    Sal Guy MD

## 2019-07-23 NOTE — PROGRESS NOTES
Attending daily rounds on patient this week with Dr. Guy and the Medical Oncology team. Patient is not yet medically ready for discharge, but when he is he will need home oxygen (his nurse completed the 6 minute walk test yesterday and he desaturated with ambulation. Patient will also benefit from home health nursing and aide services. PT and OT are recommending home health PT and OT. Patient's wife is with him in his room daily. Will continue to follow.

## 2019-07-23 NOTE — ASSESSMENT & PLAN NOTE
Urology consulted in the OSH   S/p Bladder neck contracture release and urethral dilation on 7/15/19  Cano in place   Will discuss with Urology, may need ligation of bleeding vessel given continued hematuria

## 2019-07-23 NOTE — PLAN OF CARE
Problem: Adult Inpatient Plan of Care  Goal: Plan of Care Review  Outcome: Ongoing (interventions implemented as appropriate)  Pt. with nonskid footwear on with bed in lowest position and locked with bed rails up x2, with bed alarm being utilized.  Pt. instructed to call prior to getting OOB.  Pt. with call light within reach and verbalized understanding.  Pt. continues on telemetry.  Pt. with lovenox D/C'd due to hematuria.  Dr. Burr with urology irrigated catheter today around noon.  Pt.continues with hematuria.  Pt. with wife at bedside.  Will continue to monitor pt.

## 2019-07-23 NOTE — PROGRESS NOTES
Admit Assessment    Patient Identification  Cale Harding   :  1940  Admit Date:  2019  Attending Provider:  Sal Guy MD              Referral:   Patient was admitted to Medical Oncology Service with a diagnosis of Mesothelioma (on chemotherapy) and a history of Prostate Cancer. He was admitted this hospital stay due to Pericardial effusion without cardiac tamponade.  Pericardial effusion [I31.3].   Additional oncologic and medical history noted in H&P, in chart.    Oncology Social Worker is involved. Patient was referred to the Social Work Department via admission list/census. Patient presents as a 78 y.o. year old, ,  male.    Persons interviewed: Met with patient and his wife Irma Harding in patient's room this morning to complete assessment. Patient was alert, oriented, cooperative.    Living Situation:    Lives with: spouse - Irma Harding ([phone] cell).  Resides at [address] [place] [address].  Phone: [phone] (home), [phone] (patient's cell).    Patient and wife have 3 sons, 2 of whom reside in [place] and 1 in [place].    Patient previously used a rolling walker with ambulation and needed assistance with ADLs. His wife is his primary caregiver      (RETIRED) Functional Status Prior  Ambulation Prior: 2-->assistive person  Transferrin-->assistive person  Toiletin-->assistive person    Current or Past Agencies and Description of Services/Supplies    Family assists patient with Pleurx catheter care, and they get the drainage cannisters through Dr. Woodward's clinic.    DME  They have a rolling walker, straight cane, 3-in-1 commode, shower/tub stool (DME is patient's wife's from when she had knee surgery in the past).     Home Health  None currently    IV Infusion  None    Nutrition: Oral diet.    Outpatient Pharmacy:     [id number] - REYNA Herrera - [address] On [address] of [facility]  [address] [facility] [place]  [address]  Phone: [phone] Fax: [phone]      Patient Preference of agencies include: none specified; in-network with [employer] insurance.    Patient/Caregiver informed of right to choose providers or agencies.  Patient provides permission to release any necessary information to [facility] and to Non[facility] agencies as needed to facilitate patient care, treatment planning, and patient discharge planning.  Written and verbal resources will be provided as needed/requested.      Coping  Appropriate to situation.  Patient has good family support.          Adjustment to Diagnosis and Treatment  Appropriate; ongoing process.      Emotional/Behavioral/Cognitive Issues  None noted/observed.          History/Current Symptoms of Anxiety/Depression: No      History/Current Substance Use:   Social History     Tobacco Use    Smoking status: Former Smoker     Packs/day: 2.00     Years: 15.00     Pack years: 30.00     Types: Cigarettes     Last attempt to quit: 1970     Years since quittin.5    Smokeless tobacco: Former User    Tobacco comment: pt quit 40 years ago   Substance and Sexual Activity    Alcohol use: No     Alcohol/week: 16.8 oz     Types: 28 Cans of beer per week     Comment: None since Nov 15 th 2017    Drug use: No    Sexual activity: Not Currently       Indications of Abuse/Neglect: No  Abuse Screen (yes response referral indicated)  Feels Unsafe at Home or Work/School: no  Feels Threatened by Someone: no  Does Anyone Try to Keep You From Having Contact with Others or Doing Things Outside Your Home?: no  Physical Signs of Abuse Present: no      Financial:  Payor/Plan Subscr  Sex Relation Sub. Ins. ID Effective Group Num   1. [employer]IC WORKE* TAMPLAIN,GEM 1940 Male  [id number]                                    [address] [address], [facility] [address]   2. [employer]* TAMPLAIN,GEM * 1940 Male  [id number]                                   [address]       Patient is retired, and he has [employer] medical insurance.                 Other identified concerns/needs: Home health services, ? home oxygen.     Plan: Patient to return home with his wife via family car.     Interventions/Referrals: None yet.    Patient/caregiver engaged in treatment planning process.    Oncology Social Worker providing psychosocial and supportive counseling, resources, education, assistance and discharge planning as appropriate.  Patient/caregiver state understanding of  available resources,  following, remains available.    Provided patient and wife with business card containing all contact information for Oncology Social Worker, and encouraged them to make contact as needed.     Will continue to follow.

## 2019-07-23 NOTE — PLAN OF CARE
Problem: Adult Inpatient Plan of Care  Goal: Plan of Care Review  Outcome: Ongoing (interventions implemented as appropriate)  Patient remained free from falls throughout shift, call bell within reach. PO lopressor and cardizem given as ordered. NSR throughout shift. Cano catheter still draining red tinged urine, also with low output. Irrigated 3 times so far, small clots noted in beginning of shift. Patient didn't sleep well overnight, prn ramelteon ordered and given but with no relief. Very SOB on minimal exertion. Vitals stable, will continue to monitor.

## 2019-07-23 NOTE — SUBJECTIVE & OBJECTIVE
Interval History: She was switched to diltiazem po and also lovenox BID. The pt has had hematuria and penile/scrotal edema, has been elevating this region. Pt gets decompensated with minimal movement in bed, on 3 L of O2. Eating has been very poor.       Oncology Treatment Plan:   OP NSCLC VINORELBINE WEEKLY    Medications:  Continuous Infusions:   heparin (porcine)       Scheduled Meds:   diltiaZEM  60 mg Oral Q6H    metoprolol tartrate  50 mg Oral BID    ondansetron  4 mg Intravenous Once    pantoprazole  40 mg Oral Daily     PRN Meds:albuterol-ipratropium, alteplase, Dextrose 10% Bolus, Dextrose 10% Bolus, glucagon (human recombinant), glucose, glucose, heparin (porcine), heparin, porcine (PF), HYDROcodone-acetaminophen, ibuprofen, metoprolol, ondansetron, ramelteon, sodium chloride 0.9%     Review of Systems   Constitutional: Positive for fatigue. Negative for chills, diaphoresis and fever.   HENT: Positive for sinus pain. Negative for congestion and sore throat.    Eyes: Negative for visual disturbance.   Respiratory: Positive for cough, shortness of breath (on exertion) and wheezing.    Cardiovascular: Negative for chest pain, palpitations and leg swelling.   Gastrointestinal: Negative for abdominal distention, abdominal pain, constipation, nausea and vomiting.   Genitourinary: Positive for difficulty urinating, frequency, hematuria and scrotal swelling. Negative for decreased urine volume.   Musculoskeletal: Negative for arthralgias.   Neurological: Negative for dizziness, light-headedness and headaches.   Psychiatric/Behavioral: Negative for agitation, behavioral problems and confusion.     Objective:     Vital Signs (Most Recent):  Temp: 97.5 °F (36.4 °C) (07/23/19 0758)  Pulse: 72 (07/23/19 0758)  Resp: (!) 26 (07/23/19 0758)  BP: 101/63 (07/23/19 0758)  SpO2: 98 % (07/23/19 0758) Vital Signs (24h Range):  Temp:  [97.5 °F (36.4 °C)-98 °F (36.7 °C)] 97.5 °F (36.4 °C)  Pulse:  [48-97] 72  Resp:  [16-26]  26  SpO2:  [93 %-100 %] 98 %  BP: (101-134)/(60-78) 101/63     Weight: 86.2 kg (190 lb)  Body mass index is 28.06 kg/m².  Body surface area is 2.05 meters squared.      Intake/Output Summary (Last 24 hours) at 7/23/2019 0858  Last data filed at 7/23/2019 0847  Gross per 24 hour   Intake 1484.83 ml   Output 815 ml   Net 669.83 ml       Physical Exam   Constitutional: He is oriented to person, place, and time. He appears well-developed and well-nourished. No distress.   HENT:   Head: Normocephalic and atraumatic.   Eyes: Conjunctivae are normal. No scleral icterus.   Neck: Normal range of motion.   Cardiovascular: Normal heart sounds and intact distal pulses. An irregularly irregular rhythm present. Tachycardia present.   No murmur heard.  Pulmonary/Chest: Effort normal. No respiratory distress. He has decreased breath sounds in the right lower field. He has no wheezes.   Abdominal: Soft. Bowel sounds are normal. There is no tenderness.   Musculoskeletal: Normal range of motion. He exhibits no edema.   Neurological: He is alert and oriented to person, place, and time.   Skin: Skin is warm and dry. He is not diaphoretic.   Psychiatric: He has a normal mood and affect. His behavior is normal. Judgment and thought content normal.   Vitals reviewed.      Significant Labs:   BMP:   Recent Labs   Lab 07/22/19 0423 07/23/19  0355   * 111*   * 129*   K 4.0 4.2   CL 97 96   CO2 21* 24   BUN 24* 25*   CREATININE 0.7 0.7   CALCIUM 8.4* 8.3*   MG 1.8 2.1   , CBC:   Recent Labs   Lab 07/21/19 1959 07/22/19 0423 07/23/19  0355   WBC 11.89 10.38 13.24*   HGB 9.3* 9.2* 9.0*   HCT 31.7* 30.7* 30.2*   * 349 336   , CMP:   Recent Labs   Lab 07/22/19 0423 07/23/19  0355   * 129*   K 4.0 4.2   CL 97 96   CO2 21* 24   * 111*   BUN 24* 25*   CREATININE 0.7 0.7   CALCIUM 8.4* 8.3*   PROT 5.5* 5.7*   ALBUMIN 2.0* 2.1*   BILITOT 0.4 0.5   ALKPHOS 82 85   AST 26 14   ALT 10 8*   ANIONGAP 11 9   EGFRNONAA  >60.0 >60.0   , Coagulation:   Recent Labs   Lab 07/22/19  0423   APTT 43.4*   , Haptoglobin: No results for input(s): HAPTOGLOBIN in the last 48 hours., LDH: No results for input(s): LDHCSF, BFSOURCE in the last 48 hours., LFTs:   Recent Labs   Lab 07/22/19  0423 07/23/19  0355   ALT 10 8*   AST 26 14   ALKPHOS 82 85   BILITOT 0.4 0.5   PROT 5.5* 5.7*   ALBUMIN 2.0* 2.1*   , Reticulocytes: No results for input(s): RETIC in the last 48 hours., Urine Studies:   Recent Labs   Lab 07/21/19  1658   COLORU Gina   APPEARANCEUA Hazy*   PHUR 6.0   SPECGRAV 1.025   PROTEINUA 2+*   GLUCUA Negative   KETONESU Negative   BILIRUBINUA Negative   OCCULTUA 3+*   NITRITE Negative   LEUKOCYTESUR 2+*   RBCUA >100*   WBCUA 43*   BACTERIA Occasional   SQUAMEPITHEL 1   HYALINECASTS 0    and All pertinent labs from the last 24 hours have been reviewed.    Diagnostic Results:  I have reviewed all pertinent imaging results/findings within the past 24 hours.

## 2019-07-23 NOTE — PROGRESS NOTES
Dr. Styles notified of haider irrigated with 60 mL x2 with no NS return.  Awaiting page put in from urology.

## 2019-07-24 NOTE — PLAN OF CARE
MDRs completed with Dr. Almonte and the team. VSS. Patient is afebrile. O2 sats 95-99% on 3L per nasal cannula. Patient is not medically ready to discharge at this time. See MD progress note for details. MD discussed the possibility of rehab need after discharge. MD discussed the plan of care with the patient and the patient's family. CM to continue to follow with the team.    Polina De La Garza, RN, BSN, CM  [facility]  Nurse - Med Onc/Gyn Onc

## 2019-07-24 NOTE — SUBJECTIVE & OBJECTIVE
"Interval History: Pt. Switched to PO diltiazem on 7/22 and Lovenox BID. Pt. Still experiencing SOB w/ exertion this morning and that he had two "breathing treatments" yesterday that did not improve his symptoms. He is currently at rest, sitting up in bed on 3L O2, in no respiratory distress. He also and notes increased arm swelling, continued hematuria, and unchanged scrotal swelling since admission. Lovenox d/c 2/2 hematuria. Patient notes that he ate a hamburger last night but has had a poor appetite since admission. He denies HA, f/c, n/v, CP, and Abd pain.    Oncology Treatment Plan:   OP NSCLC VINORELBINE WEEKLY    Medications:  Continuous Infusions:    Scheduled Meds:   diltiaZEM  180 mg Oral Daily    furosemide  40 mg Oral Daily    metoprolol succinate  100 mg Oral Daily    metoprolol tartrate  50 mg Oral BID    ondansetron  4 mg Intravenous Once    pantoprazole  40 mg Oral Daily     PRN Meds:albuterol-ipratropium, alteplase, Dextrose 10% Bolus, Dextrose 10% Bolus, glucagon (human recombinant), glucose, glucose, heparin, porcine (PF), HYDROcodone-acetaminophen, ibuprofen, metoprolol, ondansetron, ramelteon, sodium chloride 0.9%     Review of Systems   Constitutional: Positive for fatigue. Negative for chills, diaphoresis and fever.   HENT: Positive for sinus pain and sore throat. Negative for congestion.    Eyes: Negative for visual disturbance.   Respiratory: Positive for cough, shortness of breath (on exertion) and wheezing.    Cardiovascular: Positive for leg swelling. Negative for chest pain and palpitations.   Gastrointestinal: Negative for abdominal distention, abdominal pain, constipation, nausea and vomiting.   Genitourinary: Positive for decreased urine volume, difficulty urinating, hematuria and scrotal swelling. Negative for frequency.   Musculoskeletal: Negative for arthralgias.   Neurological: Negative for dizziness, light-headedness and headaches.   Psychiatric/Behavioral: Negative for " agitation, behavioral problems and confusion.     Objective:     Vital Signs (Most Recent):  Temp: 97.9 °F (36.6 °C) (07/24/19 0353)  Pulse: 75 (07/24/19 0353)  Resp: 20 (07/24/19 0353)  BP: (!) 148/79 (07/24/19 0353)  SpO2: 99 % (07/24/19 0353) Vital Signs (24h Range):  Temp:  [97.5 °F (36.4 °C)-98.1 °F (36.7 °C)] 97.9 °F (36.6 °C)  Pulse:  [] 75  Resp:  [18-26] 20  SpO2:  [95 %-99 %] 99 %  BP: (101-148)/(63-80) 148/79     Weight: 79.9 kg (176 lb 0.6 oz)(with tele box on)  Body mass index is 26 kg/m².  Body surface area is 1.97 meters squared.      Intake/Output Summary (Last 24 hours) at 7/24/2019 0731  Last data filed at 7/24/2019 0354  Gross per 24 hour   Intake 2060 ml   Output 775 ml   Net 1285 ml       Physical Exam   Constitutional: He is oriented to person, place, and time. He appears well-developed and well-nourished. No distress.   HENT:   Head: Normocephalic and atraumatic.   Eyes: Conjunctivae are normal. No scleral icterus.   Neck: Normal range of motion.   Cardiovascular: Normal heart sounds and intact distal pulses. An irregularly irregular rhythm present. Tachycardia present.   No murmur heard.  Pulmonary/Chest: Accessory muscle usage present. Tachypnea noted. No respiratory distress. He has decreased breath sounds in the right lower field. He has no wheezes.   Abdominal: Soft. Bowel sounds are normal. There is no tenderness.   Musculoskeletal: Normal range of motion. He exhibits edema (BL 2+ pitting edema).   Neurological: He is alert and oriented to person, place, and time.   Skin: Skin is warm and dry. He is not diaphoretic.   Psychiatric: He has a normal mood and affect. His behavior is normal. Judgment and thought content normal.   Vitals reviewed.      Significant Labs:   BMP:   Recent Labs   Lab 07/23/19 0355 07/24/19  0438   * 102   * 127*   K 4.2 4.2   CL 96 95   CO2 24 23   BUN 25* 26*   CREATININE 0.7 0.8   CALCIUM 8.3* 8.1*   MG 2.1 1.9   , CBC:   Recent Labs   Lab  07/23/19 0355 07/24/19 0438   WBC 13.24* 14.07*   HGB 9.0* 8.7*   HCT 30.2* 29.0*    309   , CMP:   Recent Labs   Lab 07/23/19 0355 07/24/19 0438   * 127*   K 4.2 4.2   CL 96 95   CO2 24 23   * 102   BUN 25* 26*   CREATININE 0.7 0.8   CALCIUM 8.3* 8.1*   PROT 5.7* 5.5*   ALBUMIN 2.1* 2.1*   BILITOT 0.5 0.4   ALKPHOS 85 83   AST 14 12   ALT 8* 7*   ANIONGAP 9 9   EGFRNONAA >60.0 >60.0   , Coagulation:   No results for input(s): PT, INR, APTT in the last 48 hours., LFTs:   Recent Labs   Lab 07/23/19 0355 07/24/19 0438   ALT 8* 7*   AST 14 12   ALKPHOS 85 83   BILITOT 0.5 0.4   PROT 5.7* 5.5*   ALBUMIN 2.1* 2.1*    and Urine Studies:   No results for input(s): COLORU, APPEARANCEUA, PHUR, SPECGRAV, PROTEINUA, GLUCUA, KETONESU, BILIRUBINUA, OCCULTUA, NITRITE, UROBILINOGEN, LEUKOCYTESUR, RBCUA, WBCUA, BACTERIA, SQUAMEPITHEL, HYALINECASTS in the last 48 hours.    Invalid input(s): WRIGHTSUR    Diagnostic Results:  I have reviewed all pertinent imaging results/findings within the past 24 hours.

## 2019-07-24 NOTE — TELEPHONE ENCOUNTER
----- Message from Gosia [place] sent at 7/24/2019  2:00 PM CDT -----  Contact: Patient  Pt returning a missed call, was hoping to speak with Dr Foster. Please contact to assist.      Contact:: [phone]

## 2019-07-24 NOTE — PLAN OF CARE
Problem: Adult Inpatient Plan of Care  Goal: Plan of Care Review  Outcome: Ongoing (interventions implemented as appropriate)  Patient remained free from falls throughout shift, call bell within reach. Patient still with red tinged urine. Irrigated with NS multiple times throughout shift, small clots noted each time. Only about 375cc output all night. Expiratory wheezing heard, respiratory treatment given but patient states it doesn't help. Guaifenesin cough syrup ordered and given. Vitals stable, will continue to monitor.

## 2019-07-24 NOTE — PROGRESS NOTES
Cano catheter irrigated twice with 60cc NS, multiple small clots found. Will continue to monitor.

## 2019-07-24 NOTE — PLAN OF CARE
Problem: Adult Inpatient Plan of Care  Goal: Plan of Care Review  Outcome: Ongoing (interventions implemented as appropriate)  Patient had no c/o pain or nausea. Catheter irrigated throughout shift; blood clots present. O2 3L continued. Voiding via haider catheter. Wife at bedside. Frequent rounds made to assess pain and safety. Patient remained free from falls. Bed in lowest position. Side rails up X 2. Call light within reach. Will continue to monitor.

## 2019-07-24 NOTE — PROGRESS NOTES
"[facility]  Hematology/Oncology  Progress Note    Patient Name: Cale Harding  Admission Date: 7/17/2019  Hospital Length of Stay: 7 days  Code Status: Full Code     Subjective:     HPI:  Mr. Cale Harding is a 78-year-old male with a past medical history of prostate cancer (s/p prostatectomy), mesothelioma (on chemotherapy), with recurrent pleural effusion  ( with a right PleurX) and hypertension who is a transfer for evaluation of pericardial effusion.       Patient was scheduled for elective cystography with Urology (Dr. Philippe) for dilation of bladder neck contracture.  On arrival to elective surgical suite, patient found to be tachycardic and heart rate was irregular.  EKG confirmed atrial fibrillation with RVR.  Surgery was cancelled and he was admitted to the ICU for a diltiazem drip. He converted to sinus but would go in and out of afib, was started on lopressor 25 BID and echo done showed moderate size of pericardial effusion with no tamponade physiology however increased in effusion from 7/5, cardiology in the OSH was planning for pericardiocentesis but requested transfer for possible window vs pericardiocentesis and was sent to [facility]    On arrival patient was hemodynamically stable  , NSR, converted in and out of afib. Cardiology was consulted for input on afib management in the setting of pericardial effusion and need for pericardiocentesis/ closer ccu monitoring.     Of note patient has a PleurX on the right that is possibly infected and he was supposed to get it removed this Friday by his pulmonologist Dr. Woodward     Interval History: Pt. Switched to PO diltiazem on 7/22 and Lovenox BID. Pt. Still experiencing SOB w/ exertion this morning and that he had two "breathing treatments" yesterday that did not improve his symptoms. He is currently at rest, sitting up in bed on 3L O2, in no respiratory distress. He also and notes increased arm swelling, continued hematuria, and " unchanged scrotal swelling since admission. Lovenox d/c 2/2 hematuria. Patient notes that he ate a hamburger last night but has had a poor appetite since admission. He denies HA, f/c, n/v, CP, and Abd pain.    Oncology Treatment Plan:   OP NSCLC VINORELBINE WEEKLY    Medications:  Continuous Infusions:    Scheduled Meds:   diltiaZEM  180 mg Oral Daily    furosemide  40 mg Oral Daily    metoprolol succinate  100 mg Oral Daily    metoprolol tartrate  50 mg Oral BID    ondansetron  4 mg Intravenous Once    pantoprazole  40 mg Oral Daily     PRN Meds:albuterol-ipratropium, alteplase, Dextrose 10% Bolus, Dextrose 10% Bolus, glucagon (human recombinant), glucose, glucose, heparin, porcine (PF), HYDROcodone-acetaminophen, ibuprofen, metoprolol, ondansetron, ramelteon, sodium chloride 0.9%     Review of Systems   Constitutional: Positive for fatigue. Negative for chills, diaphoresis and fever.   HENT: Positive for sinus pain and sore throat. Negative for congestion.    Eyes: Negative for visual disturbance.   Respiratory: Positive for cough, shortness of breath (on exertion) and wheezing.    Cardiovascular: Positive for leg swelling. Negative for chest pain and palpitations.   Gastrointestinal: Negative for abdominal distention, abdominal pain, constipation, nausea and vomiting.   Genitourinary: Positive for decreased urine volume, difficulty urinating, hematuria and scrotal swelling. Negative for frequency.   Musculoskeletal: Negative for arthralgias.   Neurological: Negative for dizziness, light-headedness and headaches.   Psychiatric/Behavioral: Negative for agitation, behavioral problems and confusion.     Objective:     Vital Signs (Most Recent):  Temp: 97.9 °F (36.6 °C) (07/24/19 0353)  Pulse: 75 (07/24/19 0353)  Resp: 20 (07/24/19 0353)  BP: (!) 148/79 (07/24/19 0353)  SpO2: 99 % (07/24/19 0353) Vital Signs (24h Range):  Temp:  [97.5 °F (36.4 °C)-98.1 °F (36.7 °C)] 97.9 °F (36.6 °C)  Pulse:  [] 75  Resp:   [18-26] 20  SpO2:  [95 %-99 %] 99 %  BP: (101-148)/(63-80) 148/79     Weight: 79.9 kg (176 lb 0.6 oz)(with tele box on)  Body mass index is 26 kg/m².  Body surface area is 1.97 meters squared.      Intake/Output Summary (Last 24 hours) at 7/24/2019 0731  Last data filed at 7/24/2019 0354  Gross per 24 hour   Intake 2060 ml   Output 775 ml   Net 1285 ml       Physical Exam   Constitutional: He is oriented to person, place, and time. He appears well-developed and well-nourished. No distress.   HENT:   Head: Normocephalic and atraumatic.   Eyes: Conjunctivae are normal. No scleral icterus.   Neck: Normal range of motion.   Cardiovascular: Normal heart sounds and intact distal pulses. An irregularly irregular rhythm present. Tachycardia present.   No murmur heard.  Pulmonary/Chest: Accessory muscle usage present. Tachypnea noted. No respiratory distress. He has decreased breath sounds in the right lower field. He has no wheezes.   Abdominal: Soft. Bowel sounds are normal. There is no tenderness.   Musculoskeletal: Normal range of motion. He exhibits edema (BL 2+ pitting edema).   Neurological: He is alert and oriented to person, place, and time.   Skin: Skin is warm and dry. He is not diaphoretic.   Psychiatric: He has a normal mood and affect. His behavior is normal. Judgment and thought content normal.   Vitals reviewed.      Significant Labs:   BMP:   Recent Labs   Lab 07/23/19 0355 07/24/19 0438   * 102   * 127*   K 4.2 4.2   CL 96 95   CO2 24 23   BUN 25* 26*   CREATININE 0.7 0.8   CALCIUM 8.3* 8.1*   MG 2.1 1.9   , CBC:   Recent Labs   Lab 07/23/19 0355 07/24/19 0438   WBC 13.24* 14.07*   HGB 9.0* 8.7*   HCT 30.2* 29.0*    309   , CMP:   Recent Labs   Lab 07/23/19 0355 07/24/19 0438   * 127*   K 4.2 4.2   CL 96 95   CO2 24 23   * 102   BUN 25* 26*   CREATININE 0.7 0.8   CALCIUM 8.3* 8.1*   PROT 5.7* 5.5*   ALBUMIN 2.1* 2.1*   BILITOT 0.5 0.4   ALKPHOS 85 83   AST 14 12   ALT  8* 7*   ANIONGAP 9 9   EGFRNONAA >60.0 >60.0   , Coagulation:   No results for input(s): PT, INR, APTT in the last 48 hours., LFTs:   Recent Labs   Lab 07/23/19  0355 07/24/19  0438   ALT 8* 7*   AST 14 12   ALKPHOS 85 83   BILITOT 0.5 0.4   PROT 5.7* 5.5*   ALBUMIN 2.1* 2.1*    and Urine Studies:   No results for input(s): COLORU, APPEARANCEUA, PHUR, SPECGRAV, PROTEINUA, GLUCUA, KETONESU, BILIRUBINUA, OCCULTUA, NITRITE, UROBILINOGEN, LEUKOCYTESUR, RBCUA, WBCUA, BACTERIA, SQUAMEPITHEL, HYALINECASTS in the last 48 hours.    Invalid input(s): WRIGHTSUR    Diagnostic Results:  I have reviewed all pertinent imaging results/findings within the past 24 hours.    Assessment/Plan:     * Pericardial effusion without cardiac tamponade  - Patient transferred for evaluation by cardiology / CTS for pericardial window or pericardiocentesis   - Pericardiocentesis preformed by interventional cards, pt tolerated procedure well, produced 570cc serosanguinous fluid.  - Maximize medical management per cards  -  appreciate recs    Gross hematuria  - Ordered UA and UC    Urology recs as below:    - Urology suspects that hematuria is 2/2 heparin anticoagulation in setting of recent incision of his bladder neck contracture  - pt also just underwent a negative work up for malignancy w/ Dr. Philippe  - continue haider cath at this time  - nursing staff may irrigate PRN for clots  - scrotal support for swelling  - Urology recs voiding trial on 7/26 IF patient still in hospital AND urine remains clear  - Patient to f/u w/ Dr. Philippe for urologic management after d/c       Acute on chronic diastolic heart failure  - Patient with symptoms of Sob and swelling   - Patient not on lasix at home   - BNP elevated at 374  -  bilateral pitting edema and scrotal swelling        PLAN   - Lasix given last night  - Strict I/O   - Daily weights  - Keep mag >2 and phos> 3  - Cardiac diet with decreased salt intake            Paroxysmal atrial fibrillation  -  Patient states that he has always had palpitations that come and go but they were never caught while he was at a doctor or in the hospital   - Atrial fibrillation with RVR likely precipitated by volume overload and enlarging pericardial effusion. He was started on diltiazem drip then switched to lopressor in the OSH  -  Cardiology consulted for pericardiocentesis vs pericardial window   - On arrival patient was in NSR and converted to RVR and 2.5 mg of Iv lopressor was pushed, his BP started to decrease to ~86/44. Will watch his BP and if it comes up will give him lopressor PO. As he has not gotten his night time dose   -  If becomes hemodynamically unstable will consider diltiazem drip but given his BP will be cautious  - Cardiology consulted appreciate their recs  - Repeat echo showed EF of 55% w/ moderate pericardial effusion BEFORE pericardiocentesis; EF of 60% after pericardiocentesis    Essential hypertension  - hold all antihypertensives he is on enalapril at home   - see cards recs    Atrial fibrillation with RVR  first noted on ECG 7/15/19 although pt reports longer history of palpitations    Cardiology Recs:     Atrial fibrillation with RVR   - likely due to pericardial effusion vs. Underlying malignancy   - Continue Metoprolol tartrate mg BID   - CHADsVASC 3 suggestive of 3.2% annual stroke risk. Would consider anticoagulation with Epixaban 5 mg BID   - withhold Enalipril and switch to Losartan    - F/U with cardiology ( Dr. Bonilla)     Pericardial Effusion:   - Pt has a hx of cancer   - S/p pericardiocentesis on 7/8/19 with removal of 570 cc fluid. Cytology is pending   - Repeat echo shows no further accumulation of fluid in the pericardium    Plan:  - appreciate cardiology recs  - Increased dose of Lopressor from 25mg BID to 50 mg BID  - Continue telemetry   - swtiched to lovenox BID, now on hold for hematuria    Acquired contracture of bladder neck  Urology consulted in the OSH   S/p Bladder neck  contracture release and urethral dilation on 7/15/19  Haider in place   Will discuss with Urology, may need ligation of bleeding vessel given continued hematuria       Pleural effusion  See malignant pleural effusion    Mesothelioma of left lung  - Patient of Dr. Foster on palliative chemo with Gemzar   - Controlled pain on norco   - OP follow up with oncologist     Malignant pleural effusion  - Patient of  , discussed with his overnight suspect that the drain could be infected and  recommended Augmentin for 5 more day. He also drained 450 cc of fluid.   - Pulm consults rec to leave PleurX in place   - Last drainage was morning of 7/18 , and is supposed to be drained once a week   - Patient on RA w/ mild SOB   -  repeat Chest xray to assess for effusion recurrence  - Pulm consulted to drain per patient request  - lasix 40 mg as needed PRN      Gastroesophageal reflux disease  - PPI daily     History of prostate cancer  S/p prostectomy   Haider in place for urinary retention 2/2 bladder contracture  Sees urology as OP   Urology rec to keep haider in place             Hipolito Enrique MD  Hematology/Oncology  [facility]      ATTENDING NOTE, ONCOLOGY INPATIENT TEAM    As above; events of last 24 hours noted.  Patient seen and examined, chart reviewed.  Appears comfortable, in NAD.  Lungs with scattered ronchi and wheezes.  Abdomen is soft, nontender.  He has 3 (+) scrotal and penile edema.  Labs reviewed.    PLAN  Lower extremity Doppler studies.  Follow electrolytes daily.  Repeat chest x ray today.  Will ask PT/Ot to evaluate him for rehab.  Given his hematuria he is not a good candidate for anticoagulation.  We will ask Urology Service to reassess the need for a Folley catheter long term once his hematuria resolves.  We will follow.      Sal Guy MD

## 2019-07-24 NOTE — TELEPHONE ENCOUNTER
----- Message from Shirley Villatoro sent at 7/24/2019 12:27 PM CDT -----  Contact: Pt   Pt Son Timothy called to speak with Dr Foster have some concern about the Doctor that is not the actual doctor for Pt and Pt son is not happy at all with the things that are being said   Callback#[phone]  Thank You  EDU Villatoro

## 2019-07-24 NOTE — TELEPHONE ENCOUNTER
Called Timothy (son) per his request.,Discussed prognosis extensively with him. We discussed quality of life issues. Patient obviously wants to continue chemo. Reviewed hospital notes, he still has issues with hematuria, his atrial fibrillation is better but he is very weak. PT/OT has seen him and they feel home PT/OT will help. However he is still very weak and may have stay longer in the hospital. We discussed that this is third line chemo and gives about ~ 10% benefit but he needs to strong enough to tolerate chemo or it can shorten his survival. Will plan on seeing him after DC.

## 2019-07-25 PROBLEM — R06.02 SOB (SHORTNESS OF BREATH) ON EXERTION: Status: ACTIVE | Noted: 2019-01-01

## 2019-07-25 NOTE — ASSESSMENT & PLAN NOTE
- Patient notes improvement in SOB w/ exertion  - Marked scattered ronchi w/ some wheezing on exam  - duo nebs PRN   - consider abx for PNA, WBC 15 today

## 2019-07-25 NOTE — ASSESSMENT & PLAN NOTE
Urology consulted in the OSH   S/p Bladder neck contracture release and urethral dilation on 7/15/19  Cano in place  Cleared for d/c from urology perspective

## 2019-07-25 NOTE — ASSESSMENT & PLAN NOTE
first noted on ECG 7/15/19 although pt reports longer history of palpitations    Cardiology Recs:     Atrial fibrillation with RVR   - likely due to pericardial effusion vs. Underlying malignancy   - Continue Metoprolol tartrate mg BID   - CHADsVASC 3 suggestive of 3.2% annual stroke risk. Would consider anticoagulation with Epixaban 5 mg BID   - withhold Enalipril and switch to Losartan    - F/U with cardiology ( Dr. Bonilla)     Pericardial Effusion:   - Pt has a hx of cancer   - S/p pericardiocentesis on 7/8/19 with removal of 570 cc fluid. Cytology is pending   - Repeat echo shows no further accumulation of fluid in the pericardium    Plan:  - appreciate cardiology recs  - Increased dose of Lopressor from 25mg BID to 50 mg BID  - Continue telemetry   - Lovenox BID for DVT Prophylaxis  -TEDs and SCDs in place

## 2019-07-25 NOTE — SUBJECTIVE & OBJECTIVE
"Interval History: Pt. Switched to PO diltiazem on 7/22 and Lovenox BID. Pt. Still experiencing SOB w/ exertion this morning and that he had two "breathing treatments" yesterday that did not improve his symptoms. He is currently at rest, sitting up in bed on 3L O2, in no respiratory distress. He also and notes increased arm swelling, continued hematuria, and unchanged scrotal swelling since admission. Lovenox d/c 2/2 hematuria. Patient notes that he ate a hamburger last night but has had a poor appetite since admission. He denies HA, f/c, n/v, CP, and Abd pain.    Oncology Treatment Plan:   OP NSCLC VINORELBINE WEEKLY    Medications:  Continuous Infusions:    Scheduled Meds:   diltiaZEM  180 mg Oral Daily    furosemide  40 mg Oral Daily    metoprolol succinate  100 mg Oral Daily    ondansetron  4 mg Intravenous Once    pantoprazole  40 mg Oral Daily     PRN Meds:albuterol-ipratropium, alteplase, Dextrose 10% Bolus, Dextrose 10% Bolus, glucagon (human recombinant), glucose, glucose, heparin, porcine (PF), HYDROcodone-acetaminophen, ibuprofen, metoprolol, ondansetron, ramelteon, sodium chloride 0.9%     Review of Systems   Constitutional: Positive for fatigue. Negative for chills, diaphoresis and fever.   HENT: Positive for congestion and sore throat. Negative for sinus pain.    Eyes: Negative for visual disturbance.   Respiratory: Positive for cough (productive), shortness of breath (on exertion) and wheezing.    Cardiovascular: Positive for leg swelling. Negative for chest pain and palpitations.   Gastrointestinal: Negative for abdominal distention, abdominal pain, constipation, nausea and vomiting.   Genitourinary: Positive for decreased urine volume, difficulty urinating and scrotal swelling. Negative for frequency and hematuria.   Musculoskeletal: Negative for arthralgias.   Neurological: Negative for dizziness, light-headedness and headaches.   Psychiatric/Behavioral: Negative for agitation, behavioral " problems and confusion.     Objective:     Vital Signs (Most Recent):  Temp: 97.7 °F (36.5 °C) (07/25/19 0741)  Pulse: 75 (07/25/19 0741)  Resp: 18 (07/25/19 0741)  BP: 122/62 (07/25/19 0741)  SpO2: 96 % (07/25/19 0741) Vital Signs (24h Range):  Temp:  [97.4 °F (36.3 °C)-98.1 °F (36.7 °C)] 97.7 °F (36.5 °C)  Pulse:  [] 75  Resp:  [16-19] 18  SpO2:  [96 %-99 %] 96 %  BP: (101-122)/(62-75) 122/62     Weight: 80.6 kg (177 lb 11.1 oz)  Body mass index is 26.24 kg/m².  Body surface area is 1.98 meters squared.      Intake/Output Summary (Last 24 hours) at 7/25/2019 0818  Last data filed at 7/25/2019 0317  Gross per 24 hour   Intake 240 ml   Output 700 ml   Net -460 ml       Physical Exam   Constitutional: He is oriented to person, place, and time. He appears well-developed and well-nourished. No distress.   HENT:   Head: Normocephalic and atraumatic.   Eyes: Conjunctivae are normal. No scleral icterus.   Neck: Normal range of motion.   Cardiovascular: Normal rate, normal heart sounds and intact distal pulses. An irregularly irregular rhythm present.   No murmur heard.  Pulmonary/Chest: Accessory muscle usage present. No tachypnea. No respiratory distress. He has decreased breath sounds in the right lower field. He has wheezes. He has rhonchi (diffuse).   Abdominal: Soft. Bowel sounds are normal. There is no tenderness.   Musculoskeletal: Normal range of motion. He exhibits edema (BL 2+ pitting edema in upper and lower extremities).   Neurological: He is alert and oriented to person, place, and time.   Skin: Skin is warm and dry. He is not diaphoretic.   Psychiatric: He has a normal mood and affect. His behavior is normal. Judgment and thought content normal.   Vitals reviewed.      Significant Labs:   BMP:   Recent Labs   Lab 07/24/19  0438 07/25/19  0347    98   * 125*   K 4.2 4.4   CL 95 93*   CO2 23 24   BUN 26* 24*   CREATININE 0.8 0.8   CALCIUM 8.1* 8.3*   MG 1.9 1.9   , CBC:   Recent Labs   Lab  07/24/19 0438 07/25/19 0347   WBC 14.07* 14.97*   HGB 8.7* 8.9*   HCT 29.0* 29.8*    302   , CMP:   Recent Labs   Lab 07/24/19 0438 07/25/19 0347   * 125*   K 4.2 4.4   CL 95 93*   CO2 23 24    98   BUN 26* 24*   CREATININE 0.8 0.8   CALCIUM 8.1* 8.3*   PROT 5.5* 5.7*   ALBUMIN 2.1* 2.2*   BILITOT 0.4 0.5   ALKPHOS 83 89   AST 12 17   ALT 7* 7*   ANIONGAP 9 8   EGFRNONAA >60.0 >60.0   , Coagulation:   No results for input(s): PT, INR, APTT in the last 48 hours., LFTs:   Recent Labs   Lab 07/24/19 0438 07/25/19 0347   ALT 7* 7*   AST 12 17   ALKPHOS 83 89   BILITOT 0.4 0.5   PROT 5.5* 5.7*   ALBUMIN 2.1* 2.2*    and Urine Studies:   No results for input(s): COLORU, APPEARANCEUA, PHUR, SPECGRAV, PROTEINUA, GLUCUA, KETONESU, BILIRUBINUA, OCCULTUA, NITRITE, UROBILINOGEN, LEUKOCYTESUR, RBCUA, WBCUA, BACTERIA, SQUAMEPITHEL, HYALINECASTS in the last 48 hours.    Invalid input(s): WRIGHTSUR    Diagnostic Results:  I have reviewed all pertinent imaging results/findings within the past 24 hours.

## 2019-07-25 NOTE — PROGRESS NOTES
"[facility]  Hematology/Oncology  Progress Note    Patient Name: Cale Harding  Admission Date: 7/17/2019  Hospital Length of Stay: 8 days  Code Status: Full Code     Subjective:     HPI:  Mr. Cale Harding is a 78-year-old male with a past medical history of prostate cancer (s/p prostatectomy), mesothelioma (on chemotherapy), with recurrent pleural effusion  ( with a right PleurX) and hypertension who is a transfer for evaluation of pericardial effusion.       Patient was scheduled for elective cystography with Urology (Dr. Philippe) for dilation of bladder neck contracture.  On arrival to elective surgical suite, patient found to be tachycardic and heart rate was irregular.  EKG confirmed atrial fibrillation with RVR.  Surgery was cancelled and he was admitted to the ICU for a diltiazem drip. He converted to sinus but would go in and out of afib, was started on lopressor 25 BID and echo done showed moderate size of pericardial effusion with no tamponade physiology however increased in effusion from 7/5, cardiology in the OSH was planning for pericardiocentesis but requested transfer for possible window vs pericardiocentesis and was sent to [facility]    On arrival patient was hemodynamically stable  , NSR, converted in and out of afib. Cardiology was consulted for input on afib management in the setting of pericardial effusion and need for pericardiocentesis/ closer ccu monitoring.     Of note patient has a PleurX on the right that is possibly infected and he was supposed to get it removed this Friday by his pulmonologist Dr. Woodward     Interval History: Pt. Switched to PO diltiazem on 7/22 and Lovenox BID. Pt. Still experiencing SOB w/ exertion this morning and that he had two "breathing treatments" yesterday that did not improve his symptoms. He is currently at rest, sitting up in bed on 3L O2, in no respiratory distress. He also and notes increased arm swelling, continued hematuria, and " unchanged scrotal swelling since admission. Lovenox d/c 2/2 hematuria. Patient notes that he ate a hamburger last night but has had a poor appetite since admission. He denies HA, f/c, n/v, CP, and Abd pain.    Oncology Treatment Plan:   OP NSCLC VINORELBINE WEEKLY    Medications:  Continuous Infusions:    Scheduled Meds:   diltiaZEM  180 mg Oral Daily    furosemide  40 mg Oral Daily    metoprolol succinate  100 mg Oral Daily    ondansetron  4 mg Intravenous Once    pantoprazole  40 mg Oral Daily     PRN Meds:albuterol-ipratropium, alteplase, Dextrose 10% Bolus, Dextrose 10% Bolus, glucagon (human recombinant), glucose, glucose, heparin, porcine (PF), HYDROcodone-acetaminophen, ibuprofen, metoprolol, ondansetron, ramelteon, sodium chloride 0.9%     Review of Systems   Constitutional: Positive for fatigue. Negative for chills, diaphoresis and fever.   HENT: Positive for congestion and sore throat. Negative for sinus pain.    Eyes: Negative for visual disturbance.   Respiratory: Positive for cough (productive), shortness of breath (on exertion) and wheezing.    Cardiovascular: Positive for leg swelling. Negative for chest pain and palpitations.   Gastrointestinal: Negative for abdominal distention, abdominal pain, constipation, nausea and vomiting.   Genitourinary: Positive for decreased urine volume, difficulty urinating and scrotal swelling. Negative for frequency and hematuria.   Musculoskeletal: Negative for arthralgias.   Neurological: Negative for dizziness, light-headedness and headaches.   Psychiatric/Behavioral: Negative for agitation, behavioral problems and confusion.     Objective:     Vital Signs (Most Recent):  Temp: 97.7 °F (36.5 °C) (07/25/19 0741)  Pulse: 75 (07/25/19 0741)  Resp: 18 (07/25/19 0741)  BP: 122/62 (07/25/19 0741)  SpO2: 96 % (07/25/19 0741) Vital Signs (24h Range):  Temp:  [97.4 °F (36.3 °C)-98.1 °F (36.7 °C)] 97.7 °F (36.5 °C)  Pulse:  [] 75  Resp:  [16-19] 18  SpO2:  [96 %-99  %] 96 %  BP: (101-122)/(62-75) 122/62     Weight: 80.6 kg (177 lb 11.1 oz)  Body mass index is 26.24 kg/m².  Body surface area is 1.98 meters squared.      Intake/Output Summary (Last 24 hours) at 7/25/2019 0818  Last data filed at 7/25/2019 0317  Gross per 24 hour   Intake 240 ml   Output 700 ml   Net -460 ml       Physical Exam   Constitutional: He is oriented to person, place, and time. He appears well-developed and well-nourished. No distress.   HENT:   Head: Normocephalic and atraumatic.   Eyes: Conjunctivae are normal. No scleral icterus.   Neck: Normal range of motion.   Cardiovascular: Normal rate, normal heart sounds and intact distal pulses. An irregularly irregular rhythm present.   No murmur heard.  Pulmonary/Chest: Accessory muscle usage present. No tachypnea. No respiratory distress. He has decreased breath sounds in the right lower field. He has wheezes. He has rhonchi (diffuse).   Abdominal: Soft. Bowel sounds are normal. There is no tenderness.   Musculoskeletal: Normal range of motion. He exhibits edema (BL 2+ pitting edema in upper and lower extremities).   Neurological: He is alert and oriented to person, place, and time.   Skin: Skin is warm and dry. He is not diaphoretic.   Psychiatric: He has a normal mood and affect. His behavior is normal. Judgment and thought content normal.   Vitals reviewed.      Significant Labs:   BMP:   Recent Labs   Lab 07/24/19 0438 07/25/19 0347    98   * 125*   K 4.2 4.4   CL 95 93*   CO2 23 24   BUN 26* 24*   CREATININE 0.8 0.8   CALCIUM 8.1* 8.3*   MG 1.9 1.9   , CBC:   Recent Labs   Lab 07/24/19 0438 07/25/19 0347   WBC 14.07* 14.97*   HGB 8.7* 8.9*   HCT 29.0* 29.8*    302   , CMP:   Recent Labs   Lab 07/24/19 0438 07/25/19 0347   * 125*   K 4.2 4.4   CL 95 93*   CO2 23 24    98   BUN 26* 24*   CREATININE 0.8 0.8   CALCIUM 8.1* 8.3*   PROT 5.5* 5.7*   ALBUMIN 2.1* 2.2*   BILITOT 0.4 0.5   ALKPHOS 83 89   AST 12 17   ALT 7*  7*   ANIONGAP 9 8   EGFRNONAA >60.0 >60.0   , Coagulation:   No results for input(s): PT, INR, APTT in the last 48 hours., LFTs:   Recent Labs   Lab 07/24/19  0438 07/25/19  0347   ALT 7* 7*   AST 12 17   ALKPHOS 83 89   BILITOT 0.4 0.5   PROT 5.5* 5.7*   ALBUMIN 2.1* 2.2*    and Urine Studies:   No results for input(s): COLORU, APPEARANCEUA, PHUR, SPECGRAV, PROTEINUA, GLUCUA, KETONESU, BILIRUBINUA, OCCULTUA, NITRITE, UROBILINOGEN, LEUKOCYTESUR, RBCUA, WBCUA, BACTERIA, SQUAMEPITHEL, HYALINECASTS in the last 48 hours.    Invalid input(s): WRIGHTSUR    Diagnostic Results:  I have reviewed all pertinent imaging results/findings within the past 24 hours.    Assessment/Plan:     * Pericardial effusion without cardiac tamponade  - Patient transferred for evaluation by cardiology / CTS for pericardial window or pericardiocentesis   - Pericardiocentesis preformed by interventional cards, pt tolerated procedure well, produced 570cc serosanguinous fluid.  - Maximize medical management per cards  -  appreciate recs    SOB (shortness of breath) on exertion  - Patient notes improvement in SOB w/ exertion  - Marked scattered ronchi w/ some wheezing on exam  - duo nebs PRN   - consider abx for PNA, WBC 15 today    Gross hematuria  - Ordered UA and UC    Urology recs as below:    - Urology suspects that hematuria is 2/2 heparin anticoagulation in setting of recent incision of his bladder neck contracture  - pt also just underwent a negative work up for malignancy w/ Dr. Philippe  - continue haider cath at this time  - nursing staff may irrigate PRN for clots  - scrotal support for swelling  - Urology recs voiding trial on 7/26 if patient still in hospital and urine remains clear  - Patient to f/u w/ Dr. Philippe for urologic management after d/c    - Urine clear today (7/25), continue to monitor       Acute on chronic diastolic heart failure  - Patient with symptoms of Sob and swelling   - Patient not on lasix at home   - BNP elevated  at 374  -  bilateral pitting edema and scrotal swelling        PLAN   - Lasix daily  - Strict I/O   - Daily weights  - Keep mag >2 and phos> 3  - Cardiac diet with decreased salt intake   - consider advancing diet           Paroxysmal atrial fibrillation  - Patient states that he has always had palpitations that come and go but they were never caught while he was at a doctor or in the hospital   - Atrial fibrillation with RVR likely precipitated by volume overload and enlarging pericardial effusion. He was started on diltiazem drip then switched to lopressor in the OSH  -  Cardiology consulted for pericardiocentesis vs pericardial window   - On arrival patient was in NSR and converted to RVR and 2.5 mg of Iv lopressor was pushed, his BP started to decrease to ~86/44. Will watch his BP and if it comes up will give him lopressor PO. As he has not gotten his night time dose   -  If becomes hemodynamically unstable will consider diltiazem drip but given his BP will be cautious  - Cardiology consulted appreciate their recs  - Repeat echo showed EF of 55% w/ moderate pericardial effusion BEFORE pericardiocentesis; EF of 60% after pericardiocentesis    Essential hypertension  - hold all antihypertensives he is on enalapril at home   - see cards recs    Atrial fibrillation with RVR  first noted on ECG 7/15/19 although pt reports longer history of palpitations    Cardiology Recs:     Atrial fibrillation with RVR   - likely due to pericardial effusion vs. Underlying malignancy   - Continue Metoprolol tartrate mg BID   - CHADsVASC 3 suggestive of 3.2% annual stroke risk. Would consider anticoagulation with Epixaban 5 mg BID   - withhold Enalipril and switch to Losartan    - F/U with cardiology ( Dr. Bonilla)     Pericardial Effusion:   - Pt has a hx of cancer   - S/p pericardiocentesis on 7/8/19 with removal of 570 cc fluid. Cytology is pending   - Repeat echo shows no further accumulation of fluid in the  pericardium    Plan:  - appreciate cardiology recs  - Increased dose of Lopressor from 25mg BID to 50 mg BID  - Continue telemetry   - Lovenox BID for DVT Prophylaxis  -TEDs and SCDs in place    Acquired contracture of bladder neck  Urology consulted in the OSH   S/p Bladder neck contracture release and urethral dilation on 7/15/19  Haider in place  Cleared for d/c from urology perspective    Pleural effusion  See malignant pleural effusion    Mesothelioma of left lung  - Patient of Dr. Foster on palliative chemo with Gemzar   - Controlled pain on norco   - OP follow up with oncologist     Malignant pleural effusion  - Patient of  , discussed with his overnight suspect that the drain could be infected and  recommended Augmentin for 5 more day. He also drained 450 cc of fluid.   - Pulm consults rec to leave PleurX in place   - Last drainage was morning of 7/18 , and is supposed to be drained once a week   - Patient on RA w/ mild SOB   -  repeat Chest xray to assess for effusion recurrence  - Pulm consulted to drain per patient request  - lasix 40 mg as needed PRN      Gastroesophageal reflux disease  - PPI daily     History of prostate cancer  S/p prostectomy   Haider in place for urinary retention 2/2 bladder contracture  Sees urology as OP   Urology rec to keep haider in place             Hipolito Enrique MD  Hematology/Oncology  [facility]      ATTENDING NOTE, ONCOLOGY INPATIENT TEAM    As above; events of last 24 hours noted.  Patient seen and examined, chart reviewed.  Appears comfortable, in NAD, sitting on the chair.  He complains of weakness.  Lungs with scattered ronchi.  No wheezing.  Abdomen is soft, nontender.   He does have a macular erythematous rash on his back and confluent rash on the abdominal wall, c/w drug eruption.  He denies any pruritus.  Still has 2 (+) lower extremity edema and penile edema.  Labs reviewed.  I/Os noted    PLAN  Repeat electrolytes in am.  Fluid  restriction to 1,500 cc.  Continue diuresis for another day, and follow his renal function closely.  Continue diltiazem and beta blockers.  Continue TEDs and SCDs for DVTprophylaxis.  Of note, his hematuria has completely resolved.  Tentative discharge in am if his Na improves and he has no cardiac issues.  We will follow.   Sal Guy MD

## 2019-07-25 NOTE — NURSING
Attempted 6 min walk / 02 test after team left room this AM. 02 dropped to 90% sitting in bed. Pt wanted to wait until he took a nap to perform walking test. Attempted walking test again. Pt wants to wait until he receives breathing treatment.

## 2019-07-25 NOTE — ASSESSMENT & PLAN NOTE
- Ordered UA and UC    Urology recs as below:    - Urology suspects that hematuria is 2/2 heparin anticoagulation in setting of recent incision of his bladder neck contracture  - pt also just underwent a negative work up for malignancy w/ Dr. Philippe  - continue haider cath at this time  - nursing staff may irrigate PRN for clots  - scrotal support for swelling  - Urology recs voiding trial on 7/26 if patient still in hospital and urine remains clear  - Patient to f/u w/ Dr. Philippe for urologic management after d/c    - Urine clear today (7/25), continue to monitor

## 2019-07-25 NOTE — PLAN OF CARE
Problem: Adult Inpatient Plan of Care  Goal: Plan of Care Review  Outcome: Ongoing (interventions implemented as appropriate)  Patient remained free from falls throughout shift, call bell within reach. Irrigated haider catheter twice overnight, yellow urine but still with clots. Still SOB on minimal exertion, but able to move around with stand by assistance and walker. U/s BLE done - negative for clots. Vitals stable, will continue to monitor.

## 2019-07-25 NOTE — PLAN OF CARE
Problem: Occupational Therapy Goal  Goal: Occupational Therapy Goal  Outcome: Outcome(s) achieved Date Met: 07/25/19  Eval complete. Pt tolerated session well. D/C from OT.

## 2019-07-25 NOTE — PROGRESS NOTES
Attending daily rounds on patient with Dr. Guy and the Medical Oncology team. Patient to be discharged home tomorrow. He was re-assessed by therapy and felt safe to go with home health PT and OT. Patient needs to be re-tested for home oxygen as the previous testing results are greater than 48 hours within discharge. Spoke with the Resident and requested orders for oxygen (if patient needs) and home health services today, as patient has [employer] insurance and the referrals need to be sent to [facility] for authorization and arrangement. Remained in patient's room this morning and spoke with patient, his wife, and daughter-in-law Ana re: discharge plans and needs, oxygen and home health, and [facility]. They stated understanding and agreement. Will continue to follow.

## 2019-07-25 NOTE — ASSESSMENT & PLAN NOTE
- Patient with symptoms of Sob and swelling   - Patient not on lasix at home   - BNP elevated at 374  -  bilateral pitting edema and scrotal swelling        PLAN   - Lasix daily  - Strict I/O   - Daily weights  - Keep mag >2 and phos> 3  - Cardiac diet with decreased salt intake   - consider advancing diet

## 2019-07-25 NOTE — PLAN OF CARE
Problem: Adult Inpatient Plan of Care  Goal: Plan of Care Review  Outcome: Ongoing (interventions implemented as appropriate)  Plan of care reviewed with pt and wife. No c/o pain or nausea. Irrigated catheter; now producing clear yellow urine with no clots. Ambulating with walker. Frequent rounds made to assess pain and safety. Patient remained free from falls. Bed in lowest position. Side rails up X 2. Call light within reach. Will continue to monitor.

## 2019-07-25 NOTE — PLAN OF CARE
CM messaged Dr. Foster and Dr. Foster's clinic regarding hospital follow-up appointment, repeat labs needed for Monday, and need for repeat echo Monday.    CM is awaiting a response.    Polina De La Garza, RN, BSN, CM [facility]  Nurse - Med Onc/Gyn Onc

## 2019-07-25 NOTE — PT/OT/SLP RE-EVAL
"Occupational Therapy   Re-evaluation    Name: Cale Harding  MRN: [id number]  Admitting Diagnosis:  Pericardial effusion without cardiac tamponade 7 Days Post-Op    Recommendations:     Discharge Recommendations: home health OT, home health PT  Discharge Equipment Recommendations:  none  Barriers to discharge:  None    Assessment:     Cale Harding is a 78 y.o. male with a medical diagnosis of Pericardial effusion without cardiac tamponade.  He presents with a slight improvement in function and mobility from last evaluation. .  Performance deficits affecting function are weakness, impaired endurance, impaired self care skills, impaired functional mobilty, impaired cardiopulmonary response to activity, gait instability.      Rehab Prognosis:  Discharge from skilled OT.; patient would not benefit from acute skilled OT services to address these deficits and reach maximum level of function.       Plan:     Patient to be seen   to address the above listed problems via self-care/home management, therapeutic activities, therapeutic exercises, neuromuscular re-education  · Plan of Care Expires: 07/25/19  · Plan of Care Reviewed with: patient    Subjective     Chief Complaint: "I just didn't sleep very well the past couple of days."   Patient/Family stated goals: "I just want to go home."   Communicated with: RN prior to session.  Pain/Comfort:  · Pain Rating 1: 0/10  · Pain Rating Post-Intervention 1: 0/10    Objective:     Communicated with: RN prior to session.  Patient found up in chair with: telemetry, haider catheter upon OT entry to room.    General Precautions: Standard, fall   Orthopedic Precautions:N/A   Braces: N/A     Occupational Performance:    Bed Mobility:    · NT    Functional Mobility/Transfers:  · Patient completed Sit <> Stand Transfer with supervision  with  rolling walker   · Patient completed Toilet Transfer Step Transfer technique with stand by assistance with  rolling walker  Functional " Mobility:  Pt was able to walk 10' w/ RW, and SBA.       Activities of Daily Living:  · Upper Body Dressing: modified independence seated in room chair.   · Lower Body Dressing: minimum assistance don socks at baseline.  · Toileting: modified independence seated on toilet w/ increased time for completion and haider cath.     Cognitive/Visual Perceptual:  Completely cognitively intact adult w/ no glasses worn or present during eval.       Physical Exam:  Balance:    -       Good seated balance and fair standing balance.   Upper Extremity Range of Motion:     -       Right Upper Extremity: WFL  -       Left Upper Extremity: WFL  Upper Extremity Strength:    -       Right Upper Extremity: WFL  -       Left Upper Extremity: WFL   Strength:    -       Right Upper Extremity: WFL  -       Left Upper Extremity: WFL  Fine Motor Coordination:    -       Intact  Gross motor coordination:   WFL    AMPAC 6 Click:  AMPAC Total Score: 22    Treatment & Education:  -Pt edu on OT role/POC  -Importance of OOB activity with staff assistance  -Safety during functional t/f and mobility  - White board updated  - Multiple self care tasks/functional mobility completed-- assistance level noted above  - All questions/concerns answered within OT scope of practice    Discussed discharge planning, and falls risk management.  Pt     Patient left up in chair with all lines intact, call button in reach, RN notified and spouse present    GOALS:   Multidisciplinary Problems     Occupational Therapy Goals        Problem: Occupational Therapy Goal              Multidisciplinary Problems (Resolved)        Problem: Occupational Therapy Goal    Goal Priority Disciplines Outcome Interventions   Occupational Therapy Goal   (Resolved)     OT, PT/OT Outcome(s) achieved                    History:     Past Medical History:   Diagnosis Date    Disorder of kidney and ureter     Diverticulitis     Elevated PSA     Hypertension     Lung cancer      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    Prostate cancer 2002    Urinary tract infection        Past Surgical History:   Procedure Laterality Date    BIOPSY-DIAPHRAGM N/A 3/19/2018    Performed by Galdino Fang MD at [facility] OR [address] [place] [address]    [id number] N/A 2/1/2018    Performed by [facility] at [facility] OR [address] [place] [address]    BRONCHOSCOPY N/A 6/3/2019    Performed by [facility] at [facility] OR [address] [place] [address]    COLONOSCOPY  10/20/2012    COLONOSCOPY  11/19/2014    dr machado ( repeat in 3 years per the pt )    CYSTOSCOPY, WITH RETROGRADE PYELOGRAM Bilateral 7/15/2019    Performed by [facility] at [facility] OR    CYSTOSCOPY, WITH URETHRAL DILATION Bilateral 7/15/2019    Performed by [facility]dino Philippe MD at [facility] OR    ELBOW SURGERY Left 2013    dislocation    [id number]-CATH N/A 6/20/2019    Performed by [facility] at [facility] OR [address] [place] [address]    LAPAROSCOPY-DIAGNOSTIC N/A 3/19/2018    Performed by Galdino Fang MD at [facility] OR [address] [place] [address]    Pericardiocentesis N/A 7/18/2019    Performed by Frank Javier MD at [facility] CATH LAB    PROCTECTOMY  2002    RELEASE, CONTRACTURE, BLADDER NECK N/A 7/15/2019    Performed by [facility] Philippe MD at [facility] OR    SHOULDER ARTHROSCOPY W/ ROTATOR CUFF REPAIR Right 2008    THORACOSCOPY-VIDEO ASSISTED (VATS) W/PLEURAL BIOPSY Left 3/19/2018    Performed by Galdino Fang MD at [facility] OR [address] [place] [address]    URETHROGRAM, RETROGRADE N/A 7/15/2019    Performed by Galdino Philippe MD at [facility] OR       Time Tracking:     OT Date of Treatment: 07/25/19  OT Start Time: 0928  OT Stop Time: 1003  OT Total Time (min): 35 min    Billable Minutes:Re-eval 8 mins  Therapeutic Activity 27 mins    Victor Hugo Hester, OT  7/25/2019

## 2019-07-25 NOTE — PROGRESS NOTES
Urology Progress Note    Patient seen this afternoon. Catheter was draining clear yellow urine without evidence of clots.  Still with penoscrotal edema.    Discussed possible voiding trial with the patient, and he would feel more comfortable maintaining catheter at this time with plan to follow up with Dr. Philippe after discharge.    Recommend continued ambulation and scrotal support for edema.    No urologic intervention required while inpatient.  Okay for discharge from urology perspective with outpatient follow up with Dr. Philippe.    Rickey Awan MD  Urology, PGY-3  [facility]

## 2019-07-26 PROBLEM — R63.0 DECREASE IN APPETITE: Status: ACTIVE | Noted: 2019-01-01

## 2019-07-26 PROBLEM — R31.0 GROSS HEMATURIA: Status: RESOLVED | Noted: 2019-01-01 | Resolved: 2019-01-01

## 2019-07-26 NOTE — CONSULTS
"Dermatology Inpatient Consult Note:  7/26/2019  11:36 AM    Reason for consult:  Worsening maculopapular rash in upper body    HPI: 78 y.o. yo male with history of prostate cancer (s/p prostatectomy), mesothelioma (on palliative chemotherapy with gemcitabine), recurrent pleural effusion, and HTN, admitted for evaluation and management of pericardial effusion.   Pt reports that rash began on back about 4 days ago. Has since spread to chest and abdomen. Occasionally itchy, not painful, and not bothersome overall. Began as "red bumps" - has evolved to include flaccid pustules over past day or two. Denies oral and genital lesions. Denies fever, chills, nausea, abdominal pain.   Pt recently (7/22/19) finished a course of Augmentin for a superficial infection of drainage catheter - pt not sure how long he had been taking it at home prior to admission. Denies other recent antibiotics.  Was started on diltiazem this admission: first as infusion on 7/20/19-7/22/19, then as 180mg PO daily on 7/23/19-7/25/19, then as 60mg PO q6h on 7/25/19 to now.      Scheduled Meds:   albuterol-ipratropium  3 mL Nebulization Q4H WAKE    dextromethorphan-guaifenesin  mg  1 tablet Oral BID    diltiaZEM  60 mg Oral Q6H    furosemide (LASIX) IV  40 mg Intravenous Daily    metoprolol succinate  100 mg Oral Daily    ondansetron  4 mg Intravenous Once    pantoprazole  40 mg Oral Daily    triamcinolone acetonide 0.025%   Topical (Top) BID     Continuous Infusions:  PRN Meds:.albuterol-ipratropium, alteplase, Dextrose 10% Bolus, Dextrose 10% Bolus, glucagon (human recombinant), glucose, glucose, heparin, porcine (PF), HYDROcodone-acetaminophen, ibuprofen, metoprolol, ondansetron, ramelteon, sodium chloride 0.9%    Review of patient's allergies indicates:   Allergen Reactions    Bactrim [sulfamethoxazole-trimethoprim] Other (See Comments)     Joint pains       Past Medical History:   Diagnosis Date    Disorder of kidney and ureter     " Diverticulitis     Elevated PSA     Hypertension     Lung cancer     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    Prostate cancer 2002    Urinary tract infection        Past Surgical History:   Procedure Laterality Date    BIOPSY-DIAPHRAGM N/A 3/19/2018    Performed by [facility]ino Fang MD at [facility] OR [address] [place] [address]    [id number] N/A 2/1/2018    Performed by [facility] at [facility] OR [address] [place] [address]    BRONCHOSCOPY N/A 6/3/2019    Performed by [facility] at [facility] OR [address] [place] [address]    COLONOSCOPY  10/20/2012    COLONOSCOPY  11/19/2014    dr machado ( repeat in 3 years per the pt )    CYSTOSCOPY, WITH RETROGRADE PYELOGRAM Bilateral 7/15/2019    [facility] at [facility] OR    CYSTOSCOPY, WITH URETHRAL DILATION Bilateral 7/15/2019    Performed by [facility] Philippe [facility] at [facility] OR    ELBOW SURGERY Left 2013    dislocation    [id number]-CATH N/A 6/20/2019    Performed by [facility] at [facility] OR [address] [place] [address]    LAPAROSCOPY-DIAGNOSTIC N/A 3/19/2018    [facility]    Pericardiocentesis N/A 7/18/2019    Performed by Frank Javier MD at [facility] CATH LAB    PROCTECTOMY  2002    RELEASE, CONTRACTURE, BLADDER NECK N/A 7/15/2019    Performed by [facility] Philippe MD at [facility] OR    SHOULDER ARTHROSCOPY W/ ROTATOR CUFF REPAIR Right 2008    THORACOSCOPY-VIDEO ASSISTED (VATS) W/PLEURAL BIOPSY Left 3/19/2018    Performed by Galdino Fang MD at [facility] OR [address] [place] [address]    URETHROGRAM, RETROGRADE N/A 7/15/2019    Performed by Galdino Philippe MD at [facility] OR       Social History     Socioeconomic History    Marital status:      Spouse name: Not on file    Number of children: Not on file    Years of education: Not on file    Highest education level: Not on file   Occupational History    Not on file   Social Needs    Financial resource strain: Not on file    Food insecurity:     Worry: Not on file     Inability: Not on file    Transportation  needs:     Medical: Not on file     Non-medical: Not on file   Tobacco Use    Smoking status: Former Smoker     Packs/day: 2.00     Years: 15.00     Pack years: 30.00     Types: Cigarettes     Last attempt to quit: 1970     Years since quittin.5    Smokeless tobacco: Former User    Tobacco comment: pt quit 40 years ago   Substance and Sexual Activity    Alcohol use: No     Alcohol/week: 16.8 oz     Types: 28 Cans of beer per week     Comment: None since Nov 15 th 2017    Drug use: No    Sexual activity: Not Currently   Lifestyle    Physical activity:     Days per week: Not on file     Minutes per session: Not on file    Stress: Not on file   Relationships    Social connections:     Talks on phone: Not on file     Gets together: Not on file     Attends Evangelical service: Not on file     Active member of club or organization: Not on file     Attends meetings of clubs or organizations: Not on file     Relationship status: Not on file   Other Topics Concern    Not on file   Social History Narrative    Not on file       Family History   Problem Relation Age of Onset    Heart disease Father     Heart disease Brother     Prostate cancer Brother     Cancer Mother         [facility]    Heart disease Sister     Heart attack Sister     No Known Problems Son     Heart disease Brother     Prostate cancer Brother     Heart disease Sister     Heart attack Sister     No Known Problems Son     No Known Problems Son     Heart disease Brother     Prostate cancer Brother     Kidney disease Neg Hx     Asthma Neg Hx     Emphysema Neg Hx        Review of Systems:  Constitutional:  no fevers, chills, fatigue, or malaise.  HEENT: no headaches, dysphagia, or mouth sores.  Ocular: no eye irritation or blurry vision.  : no genital sores, dysuria, or genital itching.  Neuro: no numbness or tingling.  Skin: no pruritus, burning, pain, blisters or history of keloidal scarring    Physical  Exam:  Vitals:    07/26/19 0831   BP:    Pulse: 76   Resp: (!) 24   Temp:      General: NAD, WDWN.  Psych: AAOx3.  HEENT: no mouth sores or nasal lesions.  Lymph: no lymphadenopathy.  Ocular: no ocular lesions or conjunctivitis.  Skin: Erythematous and edematous papules widespread over chest, abdomen, and back  Numerous small, non-follicular pustules scattered throughout; rash more confluent in intertriginous areas. No mucous membrane involvement.              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  -AGEP can cause leukocytosis and hypocalcemia as seen in this pt. Can also have transient renal or hepatic dysfunction (absent currently)  -Recommend stopping diltiazem and using alternate medication  -Recommend triamcinolone cream 0.1% BID PRN to symptomatic areas of rash on trunk and extremities; will not help rash resolve sooner - will only help with cutaneous symptoms. Systemic steroids not needed at this time.  *Please send 1lb jar of triamcinolone to keep at bedside, small tube sent earlier today not sufficient*. Triamcinolone 0.025% cream can be used on face and groin if pt develops involvement in these areas.  -Expect rash to resolve with desquamation 1-2 weeks after discontinuation of inciting drug  -Punch biopsy on R upper back as below to confirm; expect results to take up to two weeks. Keep biopsy site covered and dry for 48h. Then, can apply vaseline daily to facilitate healing.    Punch biopsy procedure note:  After informed verbal consent was obtained, using alcohol for cleansing  and 1% Lidocaine with epinephrine for anesthetic, with sterile technique a 3 mm punch biopsy was used to obtain a biopsy specimen of the lesion on R upper back. Hemostasis was obtained by pressure and wound was closed with gelfoam. Vaseline and pressure dressing applied, and wound care instructions provided. Be alert for any signs of cutaneous infection. The specimen is labeled and sent to pathology for evaluation. The procedure was well tolerated without complications.    This case was staffed with Dr. Yvette Moore.    Radha Stroud MD  PGY-3, [facility]

## 2019-07-26 NOTE — PROGRESS NOTES
Attended rounds on patient this morning with Dr. Coffey and the Medical Oncology team. Patient's discharge is being held until tomorrow for medical reasons. Again requested home oxygen testing and orders, and home health orders. Orders were entered later this afternoon. Spoke with Gisselle with [facility] and she is submitting for authorization from [facility]. She said that the oxygen order had to be re-entered by an MD who is PECOS certified. Spoke with Dr. Styles the Fellow and he re-entered the order. The home health orders include skilled nursing, aide, PT, and OT services. Completed a [facility] medical necessity form and faxed it along with the home health orders and necessary chart information to [facility] at [phone]. Received call at 5:57 PM from Jonathan with [facility]; she has approved and arranged the home health services with [facility] to start on Sunday; Jonathan is faxing the authorization letter to [place]; case # [id number], auth # [id number]. Asked Jonathan if she could check on the status of the oxygen authorization; she said that it has been approved by nurse Sabiha Donahue for [facility] to provide; case # [id number], auth # [id number]. Called patient's wife via phone to his room and informed her. Gave report to my coworker Margie Hallman LCSW, who is the Oncology Social Worker on call this weekend.

## 2019-07-26 NOTE — SUBJECTIVE & OBJECTIVE
Interval History: NAEON. Patient still experiencing SOB w/ exertion. Diffuse wheezing still noted on auscultation, but improved over past 2 days. Cough productive of yellowish phlegm. Patient on scheduled duo nebs and PO lasix at this time. Persistent anasarca noted. Diffuse rash noted on patient's chest, abdomen, and back, began a few days ago after starting diltiazem therapy for rate control and has progressively worsened. Patient states that it is itchy. Derm consulted for possible drug induced rash. Cardiology consulted for lasix recs and alternative rate control meds. Hematuria resolved at this time, penis and scrotum still edematous. Patient notes that he still has a poor appetite and would like to try MARINOL therapy for appetite stimulation barring any contraindications to rate control medication. He denies HA, f/c, n/v, CP, and Abd pain.    Oncology Treatment Plan:   OP NSCLC VINORELBINE WEEKLY    Medications:  Continuous Infusions:    Scheduled Meds:   albuterol-ipratropium  3 mL Nebulization Q4H WAKE    dextromethorphan-guaifenesin  mg  1 tablet Oral BID    diltiaZEM  60 mg Oral Q6H    furosemide (LASIX) IV  40 mg Intravenous Daily    metoprolol succinate  100 mg Oral Daily    ondansetron  4 mg Intravenous Once    pantoprazole  40 mg Oral Daily    triamcinolone acetonide 0.025%   Topical (Top) BID     PRN Meds:albuterol-ipratropium, alteplase, Dextrose 10% Bolus, Dextrose 10% Bolus, glucagon (human recombinant), glucose, glucose, heparin, porcine (PF), HYDROcodone-acetaminophen, ibuprofen, metoprolol, ondansetron, ramelteon, sodium chloride 0.9%     Review of Systems   Constitutional: Positive for fatigue. Negative for chills, diaphoresis and fever.   HENT: Positive for congestion and sore throat. Negative for sinus pain.    Eyes: Negative for visual disturbance.   Respiratory: Positive for cough (productive), shortness of breath (on exertion) and wheezing.    Cardiovascular: Positive for  leg swelling. Negative for chest pain and palpitations.   Gastrointestinal: Negative for abdominal distention, abdominal pain, constipation, nausea and vomiting.   Genitourinary: Positive for difficulty urinating and scrotal swelling. Negative for decreased urine volume, frequency and hematuria.   Musculoskeletal: Negative for arthralgias.   Skin: Positive for rash.   Neurological: Negative for dizziness, light-headedness and headaches.   Psychiatric/Behavioral: Negative for agitation, behavioral problems and confusion.     Objective:     Vital Signs (Most Recent):  Temp: 97.8 °F (36.6 °C) (07/26/19 1232)  Pulse: 72 (07/26/19 1232)  Resp: 20 (07/26/19 1232)  BP: (!) 105/56 (07/26/19 1232)  SpO2: 100 % (07/26/19 1232) Vital Signs (24h Range):  Temp:  [97.6 °F (36.4 °C)-97.8 °F (36.6 °C)] 97.8 °F (36.6 °C)  Pulse:  [69-83] 72  Resp:  [16-24] 20  SpO2:  [93 %-100 %] 100 %  BP: (105-122)/(56-66) 105/56     Weight: 82.6 kg (182 lb 1.6 oz)  Body mass index is 26.89 kg/m².  Body surface area is 2.01 meters squared.      Intake/Output Summary (Last 24 hours) at 7/26/2019 1340  Last data filed at 7/26/2019 1245  Gross per 24 hour   Intake 780 ml   Output 1725 ml   Net -945 ml       Physical Exam   Constitutional: He is oriented to person, place, and time. He appears well-developed and well-nourished. No distress.   HENT:   Head: Normocephalic and atraumatic.   Eyes: Conjunctivae are normal. No scleral icterus.   Neck: Normal range of motion.   Cardiovascular: Normal rate, normal heart sounds and intact distal pulses. An irregularly irregular rhythm present.   No murmur heard.  Pulmonary/Chest: Accessory muscle usage present. No tachypnea. No respiratory distress. He has decreased breath sounds in the right lower field. He has wheezes. He has no rhonchi (diffuse).   Abdominal: Soft. Bowel sounds are normal. There is no tenderness.   Genitourinary:   Genitourinary Comments: Edematous scrotum and penis, haider in place    Musculoskeletal: Normal range of motion. He exhibits edema (BL 2+ pitting edema in upper and lower extremities).   Neurological: He is alert and oriented to person, place, and time.   Skin: Skin is warm and dry. Rash noted. He is not diaphoretic.   Diffuse erythematous and edematous papules spread over chest, abdomen and back   Psychiatric: He has a normal mood and affect. His behavior is normal. Judgment and thought content normal.   Vitals reviewed.      Significant Labs:   BMP:   Recent Labs   Lab 07/25/19 0347 07/26/19  0400   GLU 98 159*   * 124*   K 4.4 4.5   CL 93* 93*   CO2 24 23   BUN 24* 27*   CREATININE 0.8 0.8   CALCIUM 8.3* 8.1*   MG 1.9 1.9   , CBC:   Recent Labs   Lab 07/25/19 0347 07/26/19  0400   WBC 14.97* 21.36*   HGB 8.9* 9.2*   HCT 29.8* 30.7*    292   , CMP:   Recent Labs   Lab 07/25/19 0347 07/26/19  0400   * 124*   K 4.4 4.5   CL 93* 93*   CO2 24 23   GLU 98 159*   BUN 24* 27*   CREATININE 0.8 0.8   CALCIUM 8.3* 8.1*   PROT 5.7* 5.7*   ALBUMIN 2.2* 2.2*   BILITOT 0.5 0.4   ALKPHOS 89 99   AST 17 12   ALT 7* 6*   ANIONGAP 8 8   EGFRNONAA >60.0 >60.0   , Coagulation:   No results for input(s): PT, INR, APTT in the last 48 hours., LFTs:   Recent Labs   Lab 07/25/19 0347 07/26/19  0400   ALT 7* 6*   AST 17 12   ALKPHOS 89 99   BILITOT 0.5 0.4   PROT 5.7* 5.7*   ALBUMIN 2.2* 2.2*    and Urine Studies:   No results for input(s): COLORU, APPEARANCEUA, PHUR, SPECGRAV, PROTEINUA, GLUCUA, KETONESU, BILIRUBINUA, OCCULTUA, NITRITE, UROBILINOGEN, LEUKOCYTESUR, RBCUA, WBCUA, BACTERIA, SQUAMEPITHEL, HYALINECASTS in the last 48 hours.    Invalid input(s): WRIGHTSUR    Diagnostic Results:  I have reviewed all pertinent imaging results/findings within the past 24 hours.

## 2019-07-26 NOTE — PROCEDURES
Cale Harding is a 78 y.o.  male patient, who presents for a 6 minute walk test ordered by Sal Guy MD.  The diagnosis is Qualify for Oxygen; Pleural Effusion.  The patient's BMI is 26.9 kg/m2. Predicted distance (lower limit of normal) is 301.71 meters.    Test Results:    The test was not completed.  The patient stopped 1 time for a total of 328 seconds.  The total time walked was 32 seconds.  During walking, the patient reported:  Dyspnea.  The patient used a walker for assistance during testing.     07/26/2019---------Distance: 6.1 meters (20 feet)     O2 Sat % Supplemental Oxygen Heart Rate Blood Pressure Kisha Scale   Pre-exercise  (Resting) 96 % Room Air 80 bpm 112/61 mmHg 0   During Exercise 79 % Room Air 86 bpm 106/51 mmHg 3   Post-exercise   93 % Room Air  80 bpm       Recovery Time:  128 seconds    Oxygen Qualification:     O2 Sat % Supplemental Oxygen Heart Rate Blood Pressure Kisha Scale   Pre-exercise  (Resting) 97 % 3 L/M  76 bpm  121/60 mmHg 0    During Exercise   96 %  3 L/M  85 bpm  113/59 mmHg 2    Post-exercise   96 %  3 L/M  80 bpm        Performing nurse/tech:  Merle MARKHAM      PREVIOUS STUDY:   The patient has not had a previous study.      CLINICAL INTERPRETATION:  Six minute walk distance is 6.1 meters (20 feet) with moderate dyspnea.  During exercise, there was significant desaturation while breathing room air.  Both blood pressure and heart rate remained stable with walking.  The patient did not report non-pulmonary symptoms during exercise.  Severe exercise impairment is likely due to desaturation and subjective symptoms.  The patient did not complete the study, walking 32 seconds of the 360 second test.  The patient may benefit from using supplemental oxygen during exertion.  No previous study performed.  Based upon age and body mass index, exercise capacity is less than predicted.   Oxygen saturation did improve while breathing supplemental oxygen.

## 2019-07-26 NOTE — ASSESSMENT & PLAN NOTE
- Patient notes continued SOB w/ exertion  - Marked anasarca noted  - Marked scattered wheezing on auscultation  - duo nebs Q4h and daily lasix- Repeat CXR on 7/24 shows persistent bilateral pleural effusion as well as areas of pulmonary infiltrate L>R. Impression is stable without a large degree of interval change.   - consider abx for PNA, WBC 21 today

## 2019-07-26 NOTE — ASSESSMENT & PLAN NOTE
Plan:  - Patient noted to have poor appetite since admission, only eats small amounts of certain things  - notes that he feels full after eating small quantities of food  - Patient interested in MARINOL for appetite stimulation  - Cardiology consulted to ensure no drug interaction w/ rate control meds prior to beginning this treatment

## 2019-07-26 NOTE — ASSESSMENT & PLAN NOTE
- Patient transferred for evaluation by cardiology / CTS for pericardial window or pericardiocentesis   - Pericardiocentesis preformed by interventional cards, pt tolerated procedure well, produced 570cc serosanguinous fluid.  - Maximize medical management per cards  - f/u ECHO on 7/29 to monitor for reaccumulation of pericardial fluid  -  appreciate cardiology recs

## 2019-07-26 NOTE — PROGRESS NOTES
Attempt # 2  Walking / 02 test. Pt stood at bedside 02=90%. Walked from bed, through room door appx 10 steps into hallway 02 dropped to 86% Pt asked / needed to sit down d/t SOB and weakness.

## 2019-07-26 NOTE — ASSESSMENT & PLAN NOTE
SOB    Patient with symptoms of SOB and diffuse swelling on admission who is not on l;asix at home with an admission BNP of 374.    Plan:  - Marked anasarca noted  - 40mg PO Lasix daily  - Strict I/O   - Daily weights  - Keep mag >2 and phos> 3  - Cardiac diet with decreased salt intake   - fluid restriction to 1500cc/day  - Trend chemistries daily  - Cardiology consulted for lasix recs in setting of acute on chronic diastolic HF, recurrent malignant pleural effusions, Afib, and recently drained pericardial effusion

## 2019-07-26 NOTE — ASSESSMENT & PLAN NOTE
first noted on ECG 7/15/19 although pt reports longer history of palpitations    Cardiology Recs:     Atrial fibrillation with RVR   - likely due to pericardial effusion vs. Underlying malignancy   - Continue Metoprolol tartrate mg BID   - CHADsVASC 3 suggestive of 3.2% annual stroke risk. Would consider anticoagulation with Epixaban 5 mg BID   - withhold Enalipril and switch to Losartan    - F/U with cardiology ( Dr. Bonilla)     Pericardial Effusion:   - Pt has a hx of cancer   - S/p pericardiocentesis on 7/8/19 with removal of 570 cc fluid. Cytology is pending   - Repeat echo shows no further accumulation of fluid in the pericardium    Plan:  - appreciate cardiology recs  - Continue Toprolol XL 100mg Q24h  - Continue telemetry   -TEDs and SCDs in place  - Patient switched to PO diltiazem 60mg on 7/22, has been rate controlled since that time  - Patient developed diffuse rash over chest, abdomen, and back  - Consulted derm to r/o drug induced reaction to diltiazem  - Cardiology consulted to rec alternative rate control medications in setting of drug induced reaction

## 2019-07-26 NOTE — PLAN OF CARE
Problem: Adult Inpatient Plan of Care  Goal: Plan of Care Review  Outcome: Ongoing (interventions implemented as appropriate)  POC reviewed with patient and spouse; understanding verbalized. Pt remains up with assist, walker at bedside. Free from falls and injuries this shift. Tele in place, with normal sinus rhythm. Regular diet. Good appetite.  3L NC with O2 sats 95-97%. Rash still present on torso, red with white pinpoint pustules. Pt has Cano catheter, draining dark yellow, cloudy urine to catheter bag, below the bladder. No BM's this shift. Pt instructed to call when getting OOB. Pt. with nonskid footwear on, bed in lowest position, and locked with bed rails up x 2. Pt. has call light and personal items within reach. Wife @ bedside. VSS and afebrile this shift. All questions and concerns addressed at this time. Will continue to monitor.

## 2019-07-26 NOTE — PROGRESS NOTES
Pt stood up at bedside for 02 / 6 min walk test. 02 sat =90 % while standing. Walked from bed to door of room 02 sat dropped to 87%. Pt then requested to sit down d/t weakness and SOB.

## 2019-07-26 NOTE — ASSESSMENT & PLAN NOTE
Patient of  , R sided PleuX cath in place, 450cc drained upon admission    Plan:  - Last drainage was morning of 7/18, and is supposed to be drained once a week   - Patient on RA w/ mild SOB   - Repeat CXR on 7/24 shows persistent bilateral pleural effusion as well as areas of pulmonary infiltrate L>R. Impression is stable without a large degree of interval change.   - Pulm consulted to drain per patient request  - Patient on duo nebs Q4h while awake  - lasix 40 mg as needed PRN  - continue to monitor

## 2019-07-26 NOTE — CONSULTS
[facility]  Cardiology  Consult Note    Patient Name: Cale Harding  MRN: [id number]  Admission Date: 7/17/2019  Hospital Length of Stay: 9 days  Code Status: Full Code   Attending Provider: Sreedhar Coffey MD   Consulting Provider: Gallo Sparrow MD  Primary Care Physician: Jj Escobedo MD  Principal Problem:Pericardial effusion without cardiac tamponade    Patient information was obtained from patient and ER records.     Inpatient consult to Cardiology  Consult performed by: Gallo Sparrow MD  Consult ordered by: Hipolito Enrique MD        Subjective:     Chief Complaint:    HPI:     Mr. Harding is a 78-year-old male with prostate cancer (s/p prostatectomy), mesothelioma (on chemotherapy), with recurrent pleural effusion  who initially presented to OSH for elective cystography for dilation of bladder neck contracture.     Patient was scheduled for elective cystography with Urology (Dr. Philippe) for dilation of bladder neck contracture.  On arrival to elective surgical suite, patient found to be tachycardic and heart rate was irregular.  EKG confirmed atrial fibrillation with RVR.  Surgery was cancelled and he was admitted to the ICU for a diltiazem drip. He converted to sinus but would go in and out of afib, was started on lopressor 25 BID and echo done showed moderate size of pericardial effusion with no tamponade physiology however increased in effusion from 7/5.   Pericardiocentesis was done on 7/18 with removal of 570 cc of serosanguinous colored fluid that was positive for malignancy. Patients HR has been controled with diltiazem 60 mg Q6H and metoprolol 100 mg. He has been in NSR.       Interval History:   Review of Systems   Constitution: Negative for chills, decreased appetite, diaphoresis, fever and night sweats.   HENT: Negative.    Eyes: Negative.    Cardiovascular: Positive for dyspnea on exertion and leg swelling. Negative for chest pain, irregular heartbeat,  near-syncope, orthopnea, palpitations and syncope.   Respiratory: Positive for shortness of breath. Negative for cough, hemoptysis and sleep disturbances due to breathing.    Skin: Positive for rash.   Gastrointestinal: Negative for abdominal pain, change in bowel habit, hematemesis, hematochezia, melena, nausea and vomiting.     Objective:     Vital Signs (Most Recent):  Temp: 97.6 °F (36.4 °C) (07/22/19 0808)  Pulse: 99 (07/22/19 0808)  Resp: (!) 24 (07/22/19 0808)  BP: 112/89 (07/22/19 0808)  SpO2: 99 % (07/22/19 0808) Vital Signs (24h Range):  Temp:  [97.6 °F (36.4 °C)-98.1 °F (36.7 °C)] 97.6 °F (36.4 °C)  Pulse:  [66-99] 99  Resp:  [16-24] 24  SpO2:  [93 %-99 %] 99 %  BP: (112-133)/(66-89) 112/89     Weight: 84.8 kg (187 lb)  Body mass index is 27.62 kg/m².     SpO2: 99 %  O2 Device (Oxygen Therapy): nasal cannula w/ humidification      Intake/Output Summary (Last 24 hours) at 7/22/2019 0910  Last data filed at 7/22/2019 0901  Gross per 24 hour   Intake 1326.82 ml   Output 1450 ml   Net -123.18 ml       Lines/Drains/Airways     Central Venous Catheter Line                 Port A Cath Single Lumen 06/20/19 right internal jugular 32 days          Drain                 Chest Tube Right Pleural -- days         Urethral Catheter 07/17/19 0920 Non-latex 16 Fr. 4 days                Physical Exam   Constitutional: He appears well-developed and well-nourished.   HENT:   Head: Normocephalic and atraumatic.   Neck: Normal range of motion. Neck supple.   Cardiovascular: Normal rate and regular rhythm. PMI is not displaced.   Murmur heard.   Low-pitched blowing holosystolic murmur is present with a grade of 2/6 at the upper right sternal border.  Pulses:       Carotid pulses are 2+ on the right side, and 2+ on the left side.  1+ presacral edema  1+ bilateral pitting edema of lower legs extending to mid shins   Pulmonary/Chest: Effort normal. No accessory muscle usage. No respiratory distress. He has rhonchi in the right  middle field, the right lower field, the left middle field and the left lower field. He has rales in the right middle field, the right lower field, the left middle field and the left lower field.   Course breath sounds bilateral lower lung fields.   Abdominal: Soft. Bowel sounds are normal. He exhibits no distension. There is no tenderness. There is no guarding.   Neurological: He is alert.   Skin: Skin is warm.       Significant Labs: All pertinent lab results from the last 24 hours have been reviewed.    Significant Imaging: Echocardiogram:   Transthoracic echo (TTE) complete (Cupid Only):   Results for orders placed or performed during the hospital encounter of 07/17/19   Transthoracic echo (TTE) 2D with Color Flow   Result Value Ref Range    Ascending aorta 3.02 cm    STJ 2.45 cm    AV mean gradient 4 mmHg    Ao peak michael 1.27 m/s    Ao VTI 18.06 cm    IVRT 0.07 msec    IVS 1.08 0.6 - 1.1 cm    LA size 3.87 cm    Left Atrium Major Axis 4.95 cm    Left Atrium Minor Axis 5.10 cm    LVIDD 3.31 (A) 3.5 - 6.0 cm    LVIDS 2.30 2.1 - 4.0 cm    LVOT diameter 2.23 cm    LVOT peak VTI 12.22 cm    PW 0.84 0.6 - 1.1 cm    MV Peak A Michael 0.95 m/s    E wave decelartion time 122.99 msec    MV Peak E Michael 0.91 m/s    RA Major Axis 4.61 cm    RA Width 3.33 cm    RVDD 3.13 cm    Sinus 3.48 cm    TAPSE 1.30 cm    TR Max Michael 2.36 m/s    TDI LATERAL 0.06 m/s    TDI SEPTAL 0.11 m/s    LA WIDTH 3.90 cm    LV Diastolic Volume 44.47 mL    LV Systolic Volume 18.12 mL    RV S' 9.21 cm/s    LVOT peak michael 0.72 m/s    LV LATERAL E/E' RATIO 15.17 m/s    LV SEPTAL E/E' RATIO 8.27 m/s    FS 31 %    LA volume 64.45 cm3    LV mass 89.45 g    Left Ventricle Relative Wall Thickness 0.51 cm    AV valve area 2.64 cm2    AV Velocity Ratio 0.57     AV index (prosthetic) 0.68     E/A ratio 0.96     Mean e' 0.09 m/s    LVOT area 3.9 cm2    LVOT stroke volume 47.70 cm3    AV peak gradient 6 mmHg    E/E' ratio 10.71 m/s    LV Systolic Volume Index 9.1 mL/m2     LV Diastolic Volume Index 22.25 mL/m2    LA Volume Index 32.3 mL/m2    LV Mass Index 45 g/m2    Triscuspid Valve Regurgitation Peak Gradient 22 mmHg    BSA 2.02 m2    Right Atrial Pressure (from IVC) 8 mmHg    TV rest pulmonary artery pressure 30 mmHg    Narrative    · Normal left ventricular systolic function. The estimated ejection   fraction is 60%  · Concentric left ventricular remodeling.  · Normal LV diastolic function.  · Normal right ventricular systolic function.  · Mild tricuspid regurgitation.  · The estimated PA systolic pressure is 30 mm Hg  · Intermediate central venous pressure (8 mm Hg).  · Small circumferential pericardial effusion. Shaggy visceral pericardium.   No evidence of tamponade physiology        Assessment and Plan:     Mr. Harding is a 79 y/o M history of prostate cancer, mesotheslioma, HTN, paroxysmal atrial fibrillation (on home Eliquis) who presented with Afib w RVR, subsequently found to have exudative pericardial effusion s/p 570cc pericardiocentesis. Cardiology now reconsulted in setting of anasarca and drug rush deemed 2/2 diltiazem per dermatology.      Hypervolemia: likely both intravascular (elevated JVD on exam) in addition to extravascular (anasarca, hypoalbuminemia 2/2 malnutrition). Ddx: decreased cardiac output 2/2 CHF  (though good systolic function on TTE) vs tamponade (though no evidence of reaccumulation of fluid on 7/21 TTE)   - Repeat TTE (ordered) to rule wall motion abnormalities, recurrence of pericardial effusion or evidence of tamponade  - Continue current Lasix IV 40mg daily (possibly underdosing but would err on side of maintaining preload until tamponade disproven)  - Maintain nutrition per primary team    Paroxysmal atrial fibrillation with RVR, CHADsVASC 3: Concern per dermatology that dilt contributing to skin reaction  - Rate has been controlled with Metoprolol succinate 100 mg along with Diltiazem 60 mg Q6H. However due to the poss development of AGEP  2/2 diltiazem (per Derm), we recommend holding diltiazem and uptitrating Metoprolol succinate up to a dose of 200 mg to maintain goal HR 70-80  - Continue Lovenox given the CHADsVasc of 3      Pericardial effusion, exudative 2/2 malignancy  - Repeat TTE as above    HTN  - well controlled on current regimen        VTE Risk Mitigation (From admission, onward)        Ordered     heparin, porcine (PF) 100 unit/mL injection flush 500 Units  Every 8 hours PRN      07/18/19 1712     IP VTE HIGH RISK PATIENT  Once      07/17/19 1930          Thank you for your consult. We will continue to follow this patient.        Gallo Sparrow MD  Cardiology   [facility]

## 2019-07-26 NOTE — PROGRESS NOTES
[facility]  Hematology/Oncology  Progress Note    Patient Name: Cale Harding  Admission Date: 7/17/2019  Hospital Length of Stay: 9 days  Code Status: Full Code     Subjective:     HPI:  Mr. Cale Harding is a 78-year-old male with a past medical history of prostate cancer (s/p prostatectomy), mesothelioma (on chemotherapy), with recurrent pleural effusion  ( with a right PleurX) and hypertension who is a transfer for evaluation of pericardial effusion.       Patient was scheduled for elective cystography with Urology (Dr. Philippe) for dilation of bladder neck contracture.  On arrival to elective surgical suite, patient found to be tachycardic and heart rate was irregular.  EKG confirmed atrial fibrillation with RVR.  Surgery was cancelled and he was admitted to the ICU for a diltiazem drip. He converted to sinus but would go in and out of afib, was started on lopressor 25 BID and echo done showed moderate size of pericardial effusion with no tamponade physiology however increased in effusion from 7/5, cardiology in the OSH was planning for pericardiocentesis but requested transfer for possible window vs pericardiocentesis and was sent to [facility]    On arrival patient was hemodynamically stable  , NSR, converted in and out of afib. Cardiology was consulted for input on afib management in the setting of pericardial effusion and need for pericardiocentesis/ closer ccu monitoring.     Of note patient has a PleurX on the right that is possibly infected and he was supposed to get it removed this Friday by his pulmonologist Dr. Woodward     Interval History: NAEON. Patient still experiencing SOB w/ exertion. Diffuse wheezing still noted on auscultation, but improved over past 2 days. Cough productive of yellowish phlegm. Patient on scheduled duo nebs and PO lasix at this time. Persistent anasarca noted. Diffuse rash noted on patient's chest, abdomen, and back, began a few days ago after starting  diltiazem therapy for rate control and has progressively worsened. Patient states that it is itchy. Derm consulted for possible drug induced rash. Cardiology consulted for lasix recs and alternative rate control meds. Hematuria resolved at this time, penis and scrotum still edematous. Patient notes that he still has a poor appetite and would like to try MARINOL therapy for appetite stimulation barring any contraindications to rate control medication. He denies HA, f/c, n/v, CP, and Abd pain.    Oncology Treatment Plan:   OP NSCLC VINORELBINE WEEKLY    Medications:  Continuous Infusions:    Scheduled Meds:   albuterol-ipratropium  3 mL Nebulization Q4H WAKE    dextromethorphan-guaifenesin  mg  1 tablet Oral BID    diltiaZEM  60 mg Oral Q6H    furosemide (LASIX) IV  40 mg Intravenous Daily    metoprolol succinate  100 mg Oral Daily    ondansetron  4 mg Intravenous Once    pantoprazole  40 mg Oral Daily    triamcinolone acetonide 0.025%   Topical (Top) BID     PRN Meds:albuterol-ipratropium, alteplase, Dextrose 10% Bolus, Dextrose 10% Bolus, glucagon (human recombinant), glucose, glucose, heparin, porcine (PF), HYDROcodone-acetaminophen, ibuprofen, metoprolol, ondansetron, ramelteon, sodium chloride 0.9%     Review of Systems   Constitutional: Positive for fatigue. Negative for chills, diaphoresis and fever.   HENT: Positive for congestion and sore throat. Negative for sinus pain.    Eyes: Negative for visual disturbance.   Respiratory: Positive for cough (productive), shortness of breath (on exertion) and wheezing.    Cardiovascular: Positive for leg swelling. Negative for chest pain and palpitations.   Gastrointestinal: Negative for abdominal distention, abdominal pain, constipation, nausea and vomiting.   Genitourinary: Positive for difficulty urinating and scrotal swelling. Negative for decreased urine volume, frequency and hematuria.   Musculoskeletal: Negative for arthralgias.   Skin: Positive for  rash.   Neurological: Negative for dizziness, light-headedness and headaches.   Psychiatric/Behavioral: Negative for agitation, behavioral problems and confusion.     Objective:     Vital Signs (Most Recent):  Temp: 97.8 °F (36.6 °C) (07/26/19 1232)  Pulse: 72 (07/26/19 1232)  Resp: 20 (07/26/19 1232)  BP: (!) 105/56 (07/26/19 1232)  SpO2: 100 % (07/26/19 1232) Vital Signs (24h Range):  Temp:  [97.6 °F (36.4 °C)-97.8 °F (36.6 °C)] 97.8 °F (36.6 °C)  Pulse:  [69-83] 72  Resp:  [16-24] 20  SpO2:  [93 %-100 %] 100 %  BP: (105-122)/(56-66) 105/56     Weight: 82.6 kg (182 lb 1.6 oz)  Body mass index is 26.89 kg/m².  Body surface area is 2.01 meters squared.      Intake/Output Summary (Last 24 hours) at 7/26/2019 1340  Last data filed at 7/26/2019 1245  Gross per 24 hour   Intake 780 ml   Output 1725 ml   Net -945 ml       Physical Exam   Constitutional: He is oriented to person, place, and time. He appears well-developed and well-nourished. No distress.   HENT:   Head: Normocephalic and atraumatic.   Eyes: Conjunctivae are normal. No scleral icterus.   Neck: Normal range of motion.   Cardiovascular: Normal rate, normal heart sounds and intact distal pulses. An irregularly irregular rhythm present.   No murmur heard.  Pulmonary/Chest: Accessory muscle usage present. No tachypnea. No respiratory distress. He has decreased breath sounds in the right lower field. He has wheezes. He has no rhonchi (diffuse).   Abdominal: Soft. Bowel sounds are normal. There is no tenderness.   Genitourinary:   Genitourinary Comments: Edematous scrotum and penis, haider in place   Musculoskeletal: Normal range of motion. He exhibits edema (BL 2+ pitting edema in upper and lower extremities).   Neurological: He is alert and oriented to person, place, and time.   Skin: Skin is warm and dry. Rash noted. He is not diaphoretic.   Diffuse erythematous and edematous papules spread over chest, abdomen and back   Psychiatric: He has a normal mood and  affect. His behavior is normal. Judgment and thought content normal.   Vitals reviewed.      Significant Labs:   BMP:   Recent Labs   Lab 07/25/19  0347 07/26/19  0400   GLU 98 159*   * 124*   K 4.4 4.5   CL 93* 93*   CO2 24 23   BUN 24* 27*   CREATININE 0.8 0.8   CALCIUM 8.3* 8.1*   MG 1.9 1.9   , CBC:   Recent Labs   Lab 07/25/19  0347 07/26/19  0400   WBC 14.97* 21.36*   HGB 8.9* 9.2*   HCT 29.8* 30.7*    292   , CMP:   Recent Labs   Lab 07/25/19  0347 07/26/19  0400   * 124*   K 4.4 4.5   CL 93* 93*   CO2 24 23   GLU 98 159*   BUN 24* 27*   CREATININE 0.8 0.8   CALCIUM 8.3* 8.1*   PROT 5.7* 5.7*   ALBUMIN 2.2* 2.2*   BILITOT 0.5 0.4   ALKPHOS 89 99   AST 17 12   ALT 7* 6*   ANIONGAP 8 8   EGFRNONAA >60.0 >60.0   , Coagulation:   No results for input(s): PT, INR, APTT in the last 48 hours., LFTs:   Recent Labs   Lab 07/25/19  0347 07/26/19  0400   ALT 7* 6*   AST 17 12   ALKPHOS 89 99   BILITOT 0.5 0.4   PROT 5.7* 5.7*   ALBUMIN 2.2* 2.2*    and Urine Studies:   No results for input(s): COLORU, APPEARANCEUA, PHUR, SPECGRAV, PROTEINUA, GLUCUA, KETONESU, BILIRUBINUA, OCCULTUA, NITRITE, UROBILINOGEN, LEUKOCYTESUR, RBCUA, WBCUA, BACTERIA, SQUAMEPITHEL, HYALINECASTS in the last 48 hours.    Invalid input(s): WRIGHTSUR    Diagnostic Results:  I have reviewed all pertinent imaging results/findings within the past 24 hours.    Assessment/Plan:     * Pericardial effusion without cardiac tamponade  - Patient transferred for evaluation by cardiology / CTS for pericardial window or pericardiocentesis   - Pericardiocentesis preformed by interventional cards, pt tolerated procedure well, produced 570cc serosanguinous fluid.  - Maximize medical management per cards  - f/u ECHO on 7/29 to monitor for reaccumulation of pericardial fluid  -  appreciate cardiology recs    Decrease in appetite  Patient reports a decrease in his appetite since beginning chemotherapy.    Plan:  - Patient noted to have poor appetite  since admission, only eats small amounts of certain things  - notes that he feels full after eating small quantities of food  - Patient interested in MARINOL for appetite stimulation  - Cardiology consulted to ensure no drug interaction w/ rate control meds prior to beginning this treatment    SOB (shortness of breath) on exertion  - Patient notes continued SOB w/ exertion  - Marked anasarca noted  - Marked scattered wheezing on auscultation  - duo nebs Q4h and daily lasix- Repeat CXR on 7/24 shows persistent bilateral pleural effusion as well as areas of pulmonary infiltrate L>R. Impression is stable without a large degree of interval change.   - consider abx for PNA, WBC 21 today    Acute on chronic diastolic heart failure  SOB    Patient with symptoms of SOB and diffuse swelling on admission who is not on l;asix at home with an admission BNP of 374.    Plan:  - Marked anasarca noted  - 40mg PO Lasix daily  - Strict I/O   - Daily weights  - Keep mag >2 and phos> 3  - Cardiac diet with decreased salt intake   - fluid restriction to 1500cc/day  - Trend chemistries daily  - Cardiology consulted for lasix recs in setting of acute on chronic diastolic HF, recurrent malignant pleural effusions, Afib, and recently drained pericardial effusion      Paroxysmal atrial fibrillation  Patient states that he has always had palpitations that come and go but they were never caught while he was at a doctor or in the hospital. Atrial fibrillation with RVR likely precipitated by volume overload and enlarging pericardial effusion. He was started on diltiazem drip then switched to lopressor in the OSH. [place] [facility] [place]al Cardiology preformed a pericardiocentesis, produced 570cc. Patient stable post drainage, then had recurring epiusodes of Afib w/ RVR.    Plan:  -  Diltiazem and heparin drip used to rate control, switched to PO diltiazem for rate control and stopped heparin drip due to hematuria (has since  resolved)  - Cards consulted for alternative rate control drugs in setting of potential drug induced rxn to diltiazem    Essential hypertension  - hold all antihypertensives   - he is on enalapril at home   - see cards recs    Atrial fibrillation with RVR  first noted on ECG 7/15/19 although pt reports longer history of palpitations    Cardiology Recs:     Atrial fibrillation with RVR   - likely due to pericardial effusion vs. Underlying malignancy   - Continue Metoprolol tartrate mg BID   - CHADsVASC 3 suggestive of 3.2% annual stroke risk. Would consider anticoagulation with Epixaban 5 mg BID   - withhold Enalipril and switch to Losartan    - F/U with cardiology ( Dr. Bonilla)     Pericardial Effusion:   - Pt has a hx of cancer   - S/p pericardiocentesis on 7/8/19 with removal of 570 cc fluid. Cytology is pending   - Repeat echo shows no further accumulation of fluid in the pericardium    Plan:  - appreciate cardiology recs  - Continue Toprolol XL 100mg Q24h  - Continue telemetry   -TEDs and SCDs in place  - Patient switched to PO diltiazem 60mg on 7/22, has been rate controlled since that time  - Patient developed diffuse rash over chest, abdomen, and back  - Consulted derm to r/o drug induced reaction to diltiazem  - Cardiology consulted to rec alternative rate control medications in setting of drug induced reaction    Acquired contracture of bladder neck  Urology consulted in the OSH   S/p Bladder neck contracture release and urethral dilation on 7/15/19  Cano in place  Cleared for d/c from urology perspective    Pleural effusion  See malignant pleural effusion    Mesothelioma of left lung  - Patient of Dr. Foster on palliative chemo with Gemzar   - Controlled pain on norco   - OP follow up with oncologist     Malignant pleural effusion  Patient of  , R sided PleuX cath in place, 450cc drained upon admission    Plan:  - Last drainage was morning of 7/18, and is supposed to be drained once a week   -  Patient on RA w/ mild SOB   - Repeat CXR on 7/24 shows persistent bilateral pleural effusion as well as areas of pulmonary infiltrate L>R. Impression is stable without a large degree of interval change.   - Pulm consulted to drain per patient request  - Patient on duo nebs Q4h while awake  - lasix 40 mg as needed PRN  - continue to monitor    Gastroesophageal reflux disease  - PPI daily     History of prostate cancer  S/p prostectomy   Haider in place for urinary retention 2/2 bladder contracture  Sees urology as OP   Urology rec to keep haider in place        Hipolito Enrique MD  Hematology/Oncology  [facility]

## 2019-07-26 NOTE — PLAN OF CARE
Problem: Physical Therapy Goal  Goal: Physical Therapy Goal  Goals to be met by: 19     Patient will increase functional independence with mobility by performin. Supine to sit with Contact Guard Assistance.  2. Sit to supine with Contact Guard Assistance.  3. Sit to stand transfer with Supervision.  4. Bed to chair transfer with Stand-by Assistance using Rolling Walker.  5. Gait  x 50 feet with Stand-by Assistance using Rolling Walker.   6. Ascend/Descend 6 inch curb step with Contact Guard Assistance using Rolling Walker.  7. Lower extremity exercise program x 20 reps per handout, with assistance as needed.       Discharge Recommendations: HH PT    Pt safe to transfer OOB/BTB and amb with RN/PCT: Use RW with CGA of 1 person.    Goals remain appropriate.     Julissa Land, PTA.   [phone]   2019

## 2019-07-26 NOTE — ASSESSMENT & PLAN NOTE
Patient states that he has always had palpitations that come and go but they were never caught while he was at a doctor or in the hospital. Atrial fibrillation with RVR likely precipitated by volume overload and enlarging pericardial effusion. He was started on diltiazem drip then switched to lopressor in the OSH. Transferred to [facility] where interventional Cardiology preformed a pericardiocentesis, produced 570cc. Patient stable post drainage, then had recurring epiusodes of Afib w/ RVR.    Plan:  -  Diltiazem and heparin drip used to rate control, switched to PO diltiazem for rate control and stopped heparin drip due to hematuria (has since resolved)  - Cards consulted for alternative rate control drugs in setting of potential drug induced rxn to diltiazem

## 2019-07-26 NOTE — PLAN OF CARE
[facility]    HOME HEALTH ORDERS  FACE TO FACE ENCOUNTER    Patient Name: Cale Harding  YOB: 1940    PCP: Jj Escobedo MD   PCP Address: [address] / [place] [address]  PCP Phone Number: [phone]  PCP Fax: [phone]    Encounter Date: 07/26/2019    Admit to Home Health    Diagnoses:  Active Hospital Problems    Diagnosis  POA    *Pericardial effusion without cardiac tamponade [I31.3]  Yes    Compression of vein [I87.1]  Unknown    SOB (shortness of breath) on exertion [R06.02]  Unknown     Patient w/ SOB on exertion. Pericardial effusion drained. On admission      Gross hematuria [R31.0]  No    Acute on chronic diastolic heart failure [I50.33]  Unknown    Paroxysmal atrial fibrillation [I48.0]  Yes    Essential hypertension [I10]  Yes    Atrial fibrillation with RVR [I48.91]  Yes    Acquired contracture of bladder neck [N32.0]  Yes    Pleural effusion [J90]  Yes    Mesothelioma of left lung [C45.7]  Yes    Malignant pleural effusion [J91.0]  Yes    Gastroesophageal reflux disease [K21.9]  Yes    History of prostate cancer [Z85.46]  Not Applicable      Resolved Hospital Problems   No resolved problems to display.       Future Appointments   Date Time Provider Department Center   7/31/2019 10:30 AM [facility] LAB [facility]   7/31/2019  1:30 PM Galdino Philippe MD [facility] [place]   8/1/2019 11:30 AM Yenni Mercer NP [facility] HEM [facility]   [facility]   8/8/2019  9:30 AM Sintia Foster MD [facility] HEM ONC [place] [facility]   12/20/2019  3:00 PM Galdino Philippe MD [facility] [place]     Follow-up Information     Sintia Foster MD.    Specialties:  Hematology and Oncology, Hematology  Why:  Follow-Up Appointment as scheduled by the clinic  Contact information:  [address]  [facility] [address]  [phone]             Schedule an appointment as soon as possible for a visit with Galdino DAVIS  MD [place].    Specialty:  Urology  Why:  follow up TURBNC, with hematuria   Contact information:  [id number] [place] [address]  [address]  [place]  [phone]                     I have seen and examined this patient face to face today. My clinical findings that support the need for the home health skilled services and home bound status are the following:  Weakness/numbness causing balance and gait disturbance due to Malignancy/Cancer making it taxing to leave home.    Allergies:  Review of patient's allergies indicates:   Allergen Reactions    Bactrim [sulfamethoxazole-trimethoprim] Other (See Comments)     Joint pains       Diet: regular diet and fluid restriction: 1500cc    Activities: activity as tolerated    Nursing:   SN to complete comprehensive assessment including routine vital signs. Instruct on disease process and s/s of complications to report to MD. Review/verify medication list sent home with the patient at time of discharge  and instruct patient/caregiver as needed. Frequency may be adjusted depending on start of care date.    Notify MD if SBP > 160 or < 90; DBP > 90 or < 50; HR > 120 or < 50; Temp > 101; Other:   Hematuria      CONSULTS:    Physical Therapy to evaluate and treat. Evaluate for home safety and equipment needs; Establish/upgrade home exercise program. Perform / instruct on therapeutic exercises, gait training, transfer training, and Range of Motion.  Occupational Therapy to evaluate and treat. Evaluate home environment for safety and equipment needs. Perform/Instruct on transfers, ADL training, ROM, and therapeutic exercises.  Aide to provide assistance with personal care, ADLs, and vital signs.    MISCELLANEOUS CARE:  Cano Care: Instruct patient/caregiver to empty Cano bag.  Change Cano every month.  Home Oxygen:  Oxygen at 2 L/min nasal canula to be used:  Continuously.    WOUND CARE ORDERS  n/a      Medications: Review discharge medications with patient and family and provide  education.      Current Discharge Medication List      START taking these medications    Details   albuterol-ipratropium (DUO-NEB) 2.5 mg-0.5 mg/3 mL nebulizer solution Inhale 3 ml's (1 vial) by nebulization every 6 (six) hours as needed for Wheezing or Shortness of Breath. Rescue  Qty: 180 mL, Refills: 0      diltiaZEM (CARDIZEM) 60 MG tablet Take 1 tablet (60 mg total) by mouth every 6 (six) hours.  Qty: 120 tablet, Refills: 11    Associated Diagnoses: Atrial fibrillation with RVR      furosemide (LASIX) 40 MG tablet Take 1 tablet (40 mg total) by mouth 2 (two) times daily.  Qty: 60 tablet, Refills: 11      metoprolol succinate (TOPROL-XL) 100 MG 24 hr tablet Take 1 tablet (100 mg total) by mouth once daily.  Qty: 60 tablet, Refills: 11    Associated Diagnoses: Atrial fibrillation with RVR      ondansetron (ZOFRAN-ODT) 8 MG TbDL Dissolve 1 tablet (8 mg total) by mouth every 12 (twelve) hours as needed.  Qty: 90 tablet, Refills: 1      pantoprazole (PROTONIX) 40 MG tablet Take 1 tablet (40 mg total) by mouth once daily.  Qty: 30 tablet, Refills: 11         CONTINUE these medications which have NOT CHANGED    Details   dextromethorphan-guaifenesin  mg (MUCINEX DM)  mg per 12 hr tablet Take 1 tablet by mouth every 12 (twelve) hours.      HYDROcodone-acetaminophen (NORCO) 5-325 mg per tablet Take 1 tablet by mouth every 6 (six) hours as needed for Pain.  Qty: 60 tablet, Refills: 0    Associated Diagnoses: Neoplasm related pain      polyethylene glycol (MIRALAX) 17 gram/dose powder Take 17 g by mouth once daily.         STOP taking these medications       amoxicillin-clavulanate 875-125mg (AUGMENTIN) 875-125 mg per tablet Comments:   Reason for Stopping:         calcium carbonate (TUMS) 200 mg calcium (500 mg) chewable tablet Comments:   Reason for Stopping:         enalapril (VASOTEC) 5 MG tablet Comments:   Reason for Stopping:         ibuprofen (ADVIL,MOTRIN) 100 MG tablet Comments:   Reason for  Stopping:         LACTOBACILLUS ACIDOPHILUS (ACIDOPHILUS ORAL) Comments:   Reason for Stopping:         phenylephrine HCl/acetaminophn (SINUS PAIN-PRESSURE, PE, ORAL) Comments:   Reason for Stopping:               I certify that this patient is confined to his home and needs intermittent skilled nursing care, physical therapy and occupational therapy. Patient also needs haider care, home O2, and an Aide to help with personal care, ADLS and vital signs as stated above    Hipolito Enrique MD  Hematology/Oncology

## 2019-07-26 NOTE — PT/OT/SLP PROGRESS
"Physical Therapy Treatment    Patient Name:  Cale Harding   MRN:  [id number]  Admitting Diagnosis: Pericardial effusion without cardiac tamponade  Recent Surgery: Procedure(s) (LRB):  Pericardiocentesis (N/A) 8 Days Post-Op    Recommendations:     Discharge Recommendations:  home health PT   Discharge Equipment Recommendations: none   Barriers to discharge: None    Plan:     During this hospitalization, patient to be seen 2 x/week to address the above listed problems via gait training, therapeutic activities, therapeutic exercises, neuromuscular re-education  · Plan of Care Expires:  08/14/19   Plan of Care Reviewed with: patient, spouse    This Plan of care has been discussed with the patient who was involved in its development and understands and is in agreement with the identified goals and treatment plan    Subjective     Communicated with RN (Neisha) prior to session.     Patient comments: "I have a tube on the R side"  Pain/Comfort:  · Pain Rating 1: (No rating provided)  · Location - Side 1: Right  · Location - Orientation 1: generalized  · Location 1: abdomen  · Pain Addressed 1: Reposition, Distraction  · Pain Rating Post-Intervention 1: (No rating provided)    Objective:     Patient found with: chest tube, haider catheter, telemetry, oxygen(3LPM via NC, waffle overlay)    Patient found sup in bed upon PT entry to room, agreeable to treatment.  Wife present in the room.    General Precautions: Standard, fall   Orthopedic Precautions:N/A   Braces: N/A       BED MOBILITY (vc's for hand placement sequencing of task):        Rolling to the R:  NT.       Rolling to the L:  Min A.        Sup > sit at the EOB:  Mod/min A for trunk elevation, increased time.       Sit > sup:  Not performed 2* pt left seated UIC.       Scooting hips to EOB with min/CGA                SITTING AT THE EDGE OF THE BED    Assistance Level Required: close sup for safety with b UE support   Postural deviations noted: flexed trunk, " rounded shoulders, flexed cervical spine, PPT   Encouraged: upright posture        TRANSFERS  (vc's for hand placement, sequencing of task and safety)   Patient completed Sit <> Stand Transfer from w/c with CGA then SBA for balance and sfety with rolling walker xmultiple trial(s)   Patient completed Stand <> Sit Transfer to / with SBA for safety with rolling walker    Patient completed Bed <> w/c Transfer using Step Transfer technique to the  with CGA/SBA with no assistive device       GAIT: in hallway, tech follows with w/c and O2 tank.  supplemental O2 at 3LPM via NC   Patient ambulated: 25ft, 45ft and then 64ft.  Seated rest breaks in between trials   Patient required: SBA for safety   Patient used:  Rolling Walker   Gait Pattern observed: reciprocal gait   Gait Deviation(s): increased idalia, decreased step length, decreased stride length, decreased toe-to-floor clearance, decreased weight-shifting ability and FFP, increased reliance on AD through B UE's, narrow OSMAN    Comments: vc's and tc's for upright posture, placement of AD, increased B step length, appropriate breathing, to relax shoulders and for safety      THERAPEUTIC ACTIVITIES/EXERCISES  Pt was instructed to perform AP's periodically throughout the day to help reduce edema    EDUCATION  Patient provided with daily orientation and goals of this PT session. They were educated to call for assistance and to transfer with hospital staff only.  Also, pt was educated on the effects of prolonged immobility and the importance of performing OOB activity and exercises to promote healing and reduce recovery time    Whiteboard updated with correct mobility information. RN/PCT notified.  Pt safe to transfer OOB/BTB and amb with RN/PCT: Use RW with CGA of 1 person.    Patient left up in w/c, with  all lines intact, call button in reach, RN notified and wife present    AM-PAC 6 CLICK MOBILITY  Turning over in bed (including adjusting bedclothes, sheets and  blankets)?: 3  Sitting down on and standing up from a chair with arms (e.g., wheelchair, bedside commode, etc.): 3  Moving from lying on back to sitting on the side of the bed?: 2  Moving to and from a bed to a chair (including a wheelchair)?: 3  Need to walk in hospital room?: 3  Climbing 3-5 steps with a railing?: 2  Basic Mobility Total Score: 16     Assessment:     Cale Harding is a 78 y.o. male admitted with a medical diagnosis of Pericardial effusion without cardiac tamponade.  He presents with the following impairments/functional limitations:  weakness, impaired endurance, impaired self care skills, impaired functional mobilty, gait instability, impaired balance, decreased upper extremity function, decreased lower extremity function, pain, decreased ROM, edema, impaired cardiopulmonary response to activity. requiring significant assistance for bed mob and verbal cues for transfers and gait to prevent falls due to limited endurance, increased idalia and weakness.  Pt will cont to benefit from skilled PT intervention to address deficits and improve functional mobility.     Rehab Prognosis:  Good; patient would benefit from acute skilled PT services to address these deficits and reach maximum level of function.      GOALS:   Multidisciplinary Problems     Physical Therapy Goals        Problem: Physical Therapy Goal    Goal Priority Disciplines Outcome Goal Variances Interventions   Physical Therapy Goal     PT, PT/OT Ongoing (interventions implemented as appropriate)     Description:  Goals to be met by: 19     Patient will increase functional independence with mobility by performin. Supine to sit with Contact Guard Assistance.  2. Sit to supine with Contact Guard Assistance.  3. Sit to stand transfer with Supervision.  4. Bed to chair transfer with Stand-by Assistance using Rolling Walker.  5. Gait  x 50 feet with Stand-by Assistance using Rolling Walker.   6. Ascend/Descend 6 inch curb  step with Contact Guard Assistance using Rolling Walker.  7. Lower extremity exercise program x 20 reps per handout, with assistance as needed.                      Time Tracking:     PT Received On: 07/26/19  PT Start Time: 1339     PT Stop Time: 1423  PT Total Time (min): 44 min     Billable Minutes: Gait Training 25 and Therapeutic Activity 19    Treatment Type: Treatment  PT/PTA: PTA     PTA Visit Number: 2       Julissa Land PTA.  Pager [phone]    7/26/2019    .

## 2019-07-27 NOTE — ASSESSMENT & PLAN NOTE
SOB    Patient with symptoms of SOB and diffuse swelling on admission who is not on lasix at home with an admission BNP of 374.    Plan:  - Marked anasarca noted  - 40mg IV Lasix daily  - Strict I/O   - Daily weights  - Keep mag >2 and phos> 3  - Cardiac diet with decreased salt intake   - fluid restriction to 1500cc/day  - Trend chemistries daily  - consider increasing lasix from QD to BID pending results of TTE on 7/26

## 2019-07-27 NOTE — ASSESSMENT & PLAN NOTE
Patient of SAM De La Cruz sided PleuX cath in place, 450cc drained upon admission    Plan:  - Patient on 3L w/ mild SOB   - Repeat CXR on 7/24 shows persistent bilateral pleural effusion as well as areas of pulmonary infiltrate L>R. Impression is stable without a large degree of interval change.   - Pulm consulted to drain per patient request  - Patient on duo nebs Q4h while awake  - lasix IV 40 mg daily  - continue to monitor

## 2019-07-27 NOTE — ASSESSMENT & PLAN NOTE
First noted on ECG 7/15/19 following pericardiocentesis   - Patientt reports longer history of palpitations  - likely due to pericardial effusion vs. Underlying malignancy  - CHADsVASC 3 suggestive of 3.2% annual stroke risk. Would consider anticoagulation with Epixaban 5 mg BID  - withhold Enalipril and switch to Losartan if HTN meds deemed necessary at discharge per cards recs  - Was originally rate controlled w/ lopressor 50 mg, recurred, then increased to toprolol XL 100mg, recurred, cardiology consulted and started diltiazem drip w/ heparin -> rate controlled  - Transitioned from IV to PO diltiazem 60mg Q6h and remained rate controlled  - Heparin d/c due to hematuria  - Diffuse rash across chest, abdomen, and back developed a few days after beginning diltiazem therapy  - Determine to be drug induced rash, derm and cardiology concur  - Per cardiology res, diltiazem discontinued and patients dose of toprolol XL increased from 100 to 200mg Q24h    Plan:  - Appreciate cardiology recs  - Monitor for rate control and rash resolution on new regimen  - Continue telemetry   -TEDs and SCDs in place for DVT prophylaxis

## 2019-07-27 NOTE — PLAN OF CARE
Problem: Adult Inpatient Plan of Care  Goal: Plan of Care Review  Outcome: Ongoing (interventions implemented as appropriate)  POC reviewed with patient and family, questions answered and concerns addressed. VS stable, weaker today than yesterday and unable to safely ambulate with increases oxygen demands and weakness. Tolerating diet without difficulty, FR maintained. Rash remains very red with papules in place, present on abdomen, chest and entire back. Generalized anasarca present and skin is weeping in places, patient continues to have leakage around haider catheter and urine output marginal and he received extra dose of Lasix. Telemetry-A.Fib earlier in day, now NSR with rate b/t . O2 saturation good on 2LPM N/C. In no acute distress; will continue to monitor.

## 2019-07-27 NOTE — PROGRESS NOTES
[facility]  Hematology/Oncology  Progress Note    Patient Name: Cale Harding  Admission Date: 7/17/2019  Hospital Length of Stay: 10 days  Code Status: Full Code     Subjective:     HPI:  Mr. Cale Harding is a 78-year-old male with a past medical history of prostate cancer (s/p prostatectomy), mesothelioma (on chemotherapy), with recurrent pleural effusion  ( with a right PleurX) and hypertension who is a transfer for evaluation of pericardial effusion.       Patient was scheduled for elective cystography with Urology (Dr. Philippe) for dilation of bladder neck contracture.  On arrival to elective surgical suite, patient found to be tachycardic and heart rate was irregular.  EKG confirmed atrial fibrillation with RVR.  Surgery was cancelled and he was admitted to the ICU for a diltiazem drip. He converted to sinus but would go in and out of afib, was started on lopressor 25 BID and echo done showed moderate size of pericardial effusion with no tamponade physiology however increased in effusion from 7/5, cardiology in the OSH was planning for pericardiocentesis but requested transfer for possible window vs pericardiocentesis and was sent to [facility]    On arrival patient was hemodynamically stable  , NSR, converted in and out of afib. Cardiology was consulted for input on afib management in the setting of pericardial effusion and need for pericardiocentesis/ closer ccu monitoring.     Of note patient has a PleurX on the right that is possibly infected and he was supposed to get it removed this Friday by his pulmonologist Dr. Woodward     Interval History: Patient notes that SOB has worsened overnight and that he is more fatigued today, stating that he has been unable to get out of the bed as he had previously been able to do. Persistent anasarca noted. Diffuse rash noted on patient's chest, abdomen, and back, began a few days ago after starting diltiazem therapy for rate control and has  progressively worsened. Patient notes that the rash has started to spread to his face and is worse today. Hematuria resolved at this time, penis and scrotum still edematous. Patient notes that he still has a poor appetite and would like to try MARINOL therapy for appetite stimulation barring any contraindications to rate control medication. He denies HA, f/c, n/v, CP, and Abd pain.    We spoke with cardiology prior to the interview who noted that that the patient was not currently experiencing cardiac tamponade and recommended increasing IV lasix to Q8h. They also noted that the patient's most recent TTE on 7/26 showed reaccumulation of pericardial effusion. Rec to consult CTS for pericardial window evaluation.    Oncology Treatment Plan:   OP NSCLC VINORELBINE WEEKLY    Medications:  Continuous Infusions:  Scheduled Meds:   albuterol-ipratropium  3 mL Nebulization Q4H WAKE    furosemide (LASIX) IV  40 mg Intravenous Daily    metoprolol succinate  200 mg Oral Daily    ondansetron  4 mg Intravenous Once    pantoprazole  40 mg Oral Daily    triamcinolone acetonide 0.025%   Topical (Top) BID     PRN Meds:albuterol-ipratropium, alteplase, Dextrose 10% Bolus, Dextrose 10% Bolus, diphenhydrAMINE, glucagon (human recombinant), glucose, glucose, guaifenesin 100 mg/5 ml, heparin, porcine (PF), HYDROcodone-acetaminophen, ibuprofen, metoprolol, ondansetron, ramelteon, sodium chloride 0.9%     Review of Systems  Objective:     Vital Signs (Most Recent):  Temp: 97.7 °F (36.5 °C) (07/27/19 0750)  Pulse: 68 (07/27/19 0750)  Resp: 16 (07/27/19 0750)  BP: (!) 122/58 (07/27/19 0750)  SpO2: 99 % (07/27/19 0750) Vital Signs (24h Range):  Temp:  [97.7 °F (36.5 °C)-98 °F (36.7 °C)] 97.7 °F (36.5 °C)  Pulse:  [68-88] 68  Resp:  [16-24] 16  SpO2:  [93 %-100 %] 99 %  BP: (101-122)/(56-81) 122/58     Weight: 82.5 kg (181 lb 14.1 oz)  Body mass index is 26.86 kg/m².  Body surface area is 2 meters squared.      Intake/Output Summary  (Last 24 hours) at 7/27/2019 0806  Last data filed at 7/27/2019 0500  Gross per 24 hour   Intake 1020 ml   Output 1050 ml   Net -30 ml       Physical Exam   Constitutional: He is oriented to person, place, and time. He appears well-developed. No distress.   HENT:   Head: Normocephalic and atraumatic.   Eyes: Conjunctivae are normal. No scleral icterus.   Neck: Normal range of motion.   Cardiovascular: Normal rate, normal heart sounds and intact distal pulses. An irregularly irregular rhythm present.   Pulmonary/Chest: Accessory muscle usage present. No tachypnea. No respiratory distress. He has decreased breath sounds in the right lower field and the left lower field. He has wheezes (scattered). He has no rhonchi.   Abdominal: Soft. Bowel sounds are normal. There is no tenderness.   Genitourinary:   Genitourinary Comments: Edematous scrotum and penis, haider in place   Musculoskeletal: Normal range of motion. He exhibits edema (BL 2+ pitting edema in upper and lower extremities).   Neurological: He is alert and oriented to person, place, and time.   Skin: Skin is warm and dry. Rash noted. He is not diaphoretic.   Diffuse Anasarca and erythematous and edematous papules spread over chest, abdomen and back   Psychiatric: He has a normal mood and affect. His behavior is normal. Judgment and thought content normal.   Vitals reviewed.      Significant Labs:   BMP:   Recent Labs   Lab 07/26/19  0400 07/27/19  0336   * 144*   * 124*   K 4.5 4.6   CL 93* 93*   CO2 23 23   BUN 27* 28*   CREATININE 0.8 0.8   CALCIUM 8.1* 8.2*   MG 1.9 1.9   , CBC:   Recent Labs   Lab 07/26/19 0400 07/27/19  0336   WBC 21.36* 25.19*   HGB 9.2* 9.1*   HCT 30.7* 29.6*    282   , CMP:   Recent Labs   Lab 07/26/19 0400 07/27/19  0336   * 124*   K 4.5 4.6   CL 93* 93*   CO2 23 23   * 144*   BUN 27* 28*   CREATININE 0.8 0.8   CALCIUM 8.1* 8.2*   PROT 5.7* 5.4*   ALBUMIN 2.2* 2.1*   BILITOT 0.4 0.3   ALKPHOS 99 93    AST 12 10   ALT 6* 6*   ANIONGAP 8 8   EGFRNONAA >60.0 >60.0    and Urine Studies: No results for input(s): COLORU, APPEARANCEUA, PHUR, SPECGRAV, PROTEINUA, GLUCUA, KETONESU, BILIRUBINUA, OCCULTUA, NITRITE, UROBILINOGEN, LEUKOCYTESUR, RBCUA, WBCUA, BACTERIA, SQUAMEPITHEL, HYALINECASTS in the last 48 hours.    Invalid input(s): WRIGHTSUR    Diagnostic Results:  I have reviewed all pertinent imaging results/findings within the past 24 hours.    Assessment/Plan:     * Pericardial effusion without cardiac tamponade  Patient [place] [facility] [place] for evaluation by cardiology/CTS for pericardial window or pericardiocentesis on 7/17/19. Pericardiocentesis was preformed by interventional cards, pt tolerated procedure well, produced 570cc serosanguinous fluid.   - Repeat Echo on 7/21 showed a small circumferential pericardial effusion w/ shaggy visceral pericardium and no evidence of tamponade physiology  - Maximize medical management per cards  - Appreciate cardiology recs    Plan:  - Repeat Echo on 7/26 showed reaccumulation of pericardial effusion  - consult CTS for evaluation for pericardial window    SOB (shortness of breath) on exertion  Patient presented to [facility] w/ Pericardial effusion and a hx of recurrent pleural effusions (newly diagnosed mesothelioma in 2019).   - R sided PleurX catheter in place, patient of Dr. Padilla (Pulm)  - 450 cc drained from PleurX on admission by Dr. Padilla  - CXR during admission have shown BL pleural effusion w/ areas of pulmonary infiltrates L>R    Plan:  - Patient notes continued SOB w/ exertion  - Marked anasarca noted  - Scattered wheezing on auscultation  - Duo nebs Q4h while awake  - Repeat CXR on 7/24 shows persistent bilateral pleural effusion as well as areas of pulmonary infiltrate L>R. Impression is stable without a large degree of interval change.   - Consider abx if infectious etiology suspected, WBC 25 today  - IV 40mg lasix Q8h per cardiology recs    Acute on chronic  diastolic heart failure  SOB    Patient with symptoms of SOB and diffuse swelling on admission who is not on lasix at home with an admission BNP of 374.    Plan:  - Marked anasarca noted  - 40mg IV Lasix Q8h  - Strict I/O   - Daily weights  - Keep mag >2 and phos> 3  - Cardiac diet with decreased salt intake   - fluid restriction to 1500cc/day  - Trend chemistries daily      Paroxysmal atrial fibrillation  See Afib w/ RVR    Atrial fibrillation with RVR  First noted on ECG 7/15/19 following pericardiocentesis   - Patientt reports longer history of palpitations  - likely due to pericardial effusion vs. Underlying malignancy  - CHADsVASC 3 suggestive of 3.2% annual stroke risk. Would consider anticoagulation with Epixaban 5 mg BID  - withhold Enalipril and switch to Losartan if HTN meds deemed necessary at discharge per cards recs  - Was originally rate controlled w/ lopressor 50 mg, recurred, then increased to toprolol XL 100mg, recurred, cardiology consulted and started diltiazem drip w/ heparin -> rate controlled  - Transitioned from IV to PO diltiazem 60mg Q6h and remained rate controlled  - Heparin d/c due to hematuria  - Diffuse rash across chest, abdomen, and back developed a few days after beginning diltiazem therapy  - Determine to be drug induced rash, derm and cardiology concur  - Per cardiology res, diltiazem discontinued and patients dose of toprolol XL increased from 100 to 200mg Q24h    Plan:  - Appreciate cardiology recs  - Monitor for rate control and rash resolution on new regimen  - Continue telemetry   -TEDs and SCDs in place for DVT prophylaxis    Pleural effusion  See malignant pleural effusion    Mesothelioma of left lung  - Patient of Dr. Foster on palliative chemo with Gemzar   - Controlled pain on norco   - OP follow up with oncologist     Malignant pleural effusion  Patient of  , R sided PleuX cath in place, 450cc drained upon admission    Plan:  - Patient on 3L w/ mild SOB   - Repeat  CXR on 7/24 shows persistent bilateral pleural effusion as well as areas of pulmonary infiltrate L>R. Impression is stable without a large degree of interval change.   - Pulm consulted to drain per patient request  - Patient on duo nebs Q4h while awake  - lasix IV 40 mg q8h  - continue to monitor    Acquired contracture of bladder neck  Urology consulted in the OSH. S/p Bladder neck contracture release and urethral dilation on 7/15/19. Patient of Dr. Philippe.    Plan:  - Haider in place  - Marked penile and scrotal edema  - Hematuria resolved, nursing has been flushing cath per Urology recs  - mild leak noted from penis around haider, urology notified, they do not believe the haider should be changed at this time due to anasarca  - Cleared for d/c from urology perspective, to f/u w/ Dr. Philippe    History of prostate cancer  S/p prostectomy   Haider in place for urinary retention 2/2 bladder contracture  Sees urology as OP   Urology rec to keep haider in place    Essential hypertension  - hold all antihypertensives   - he is on enalapril at home, will switch to losartan per cards recs before d/c    Decrease in appetite  Patient w/ reported hx of poor appetite since beginning chemotherapy    Plan:  - Patient noted to have poor appetite since admission, only eats small amounts of certain things  - notes that he feels full after eating small quantities of food  - Patient interested in MARINOL for appetite stimulation  - Cardiology consulted to ensure no drug interaction w/ rate control meds prior to beginning this treatment    Gastroesophageal reflux disease  - PPI daily            Hipolito Enrique MD  Hematology/Oncology  [facility]    Attending Addendum:  The patient was seen, examined, and discussed on rounds with the team.  I agree with the assessment and plan as outlined for Cale Harding.  Have discussed patient with Cardiology.  Will plan to increase Lasix to 40 mg Q 8 in attempts to relieve  some of this anasarca.  Will also need to see if further pleural fluid has developed requiring drainage from PleurX.  Will have surgery discuss the role for a pericardial window.    Chavo Devine DO, FACP  Hematology & Oncology  [facility] [place] [address]  ph. [phone]  Fax. [phone]

## 2019-07-27 NOTE — CONSULTS
[facility]  General Surgery  Consult Note    Consults  Subjective:       History of Present Illness: Cale Harding is a 78 y.o. male with mesothelioma and recent pleural effusion s/p pericardiocentesis. He was noted to have recurrent pericardial effusion without tamponade physiology on TTE. We are consulted for possible pericardial window. Patient feels stable today, no new complaints or concerns, no worsening sob or chest pain.     No current facility-administered medications on file prior to encounter.      Current Outpatient Medications on File Prior to Encounter   Medication Sig    dextromethorphan-guaifenesin  mg (MUCINEX DM)  mg per 12 hr tablet Take 1 tablet by mouth every 12 (twelve) hours.    HYDROcodone-acetaminophen (NORCO) 5-325 mg per tablet Take 1 tablet by mouth every 6 (six) hours as needed for Pain.    polyethylene glycol (MIRALAX) 17 gram/dose powder Take 17 g by mouth once daily.       Review of patient's allergies indicates:   Allergen Reactions    Bactrim [sulfamethoxazole-trimethoprim] Other (See Comments)     Joint pains       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    Prostate cancer 2002    Urinary tract infection      Past Surgical History:   Procedure Laterality Date    BIOPSY-DIAPHRAGM N/A 3/19/2018    Performed by Galdino Fang MD at [facility] OR 2ND FLR    EHTAYJ-LLTHAA-FW N/A 2/1/2018    Performed by [facility] Diagnostic Provider at [facility] OR 2ND FLR    BRONCHOSCOPY N/A 6/3/2019    Performed by [facility] Diagnostic Provider at [facility] OR 2ND FLR    COLONOSCOPY  10/20/2012    COLONOSCOPY  11/19/2014    dr machado ( repeat in 3 years per the pt )    CYSTOSCOPY, WITH RETROGRADE PYELOGRAM Bilateral 7/15/2019    Performed by Galdino Philippe MD at [facility] OR    CYSTOSCOPY, WITH URETHRAL DILATION Bilateral 7/15/2019     Per[facility] at [facility] OR    ELBOW SURGERY Left 2013    dislocation    XLGNYTSIM-XOLB-B-CATH N/A 2019    Performed by [facility] Diagnostic Provider at [facility] OR 2ND FLR    LAPAROSCOPY-DIAGNOSTIC N/A 3/19/2018    Performed by Galdino Fang MD at [facility] OR 2ND FLR    Pericardiocentesis N/A 2019    Performed by Frank Javier MD at [facility] CATH LAB    PROCTECTOMY  2002    RELEASE, CONTRACTURE, BLADDER NECK N/A 7/15/2019    Performed by [facility]ino Philippe [facility] at [facility] OR    SHOULDER ARTHROSCOPY W/ ROTATOR CUFF REPAIR Right     THORACOSCOPY-VIDEO ASSISTED (VATS) W/PLEURAL BIOPSY Left 3/19/2018    Performed by Galdino Fang MD at [facility] OR 2ND FLR    URETHROGRAM, RETROGRADE N/A 7/15/2019    Performed by Galdino Philippe MD at [facility] OR     Family History     Problem Relation (Age of Onset)    Cancer Mother    Heart attack Sister, Sister    Heart disease Father, Brother, Sister, Brother, Sister, Brother    No Known Problems Son, Son, Son    Prostate cancer Brother, Brother, Brother        Tobacco Use    Smoking status: Former Smoker     Packs/day: 2.00     Years: 15.00     Pack years: 30.00     Types: Cigarettes     Last attempt to quit: 1970     Years since quittin.6    Smokeless tobacco: Former User    Tobacco comment: pt quit 40 years ago   Substance and Sexual Activity    Alcohol use: No     Alcohol/week: 16.8 oz     Types: 28 Cans of beer per week     Comment: None since Nov 15 th 2017    Drug use: No    Sexual activity: Not Currently     Review of Systems  Objective:     Vital Signs (Most Recent):  Temp: 98.2 °F (36.8 °C) (19 1603)  Pulse: 80 (19 1603)  Resp: 16 (19 1603)  BP: (!) 102/57 (19 1603)  SpO2: 97 % (19 1603) Vital Signs (24h Range):  Temp:  [97.7 °F (36.5 °C)-98.2 °F (36.8 °C)] 98.2 °F (36.8 °C)  Pulse:  [] 80  Resp:  [16-18] 16  SpO2:  [95 %-99 %] 97 %  BP: (101-122)/(56-81) 102/57     Weight: 82.5 kg (181 lb 14.1  oz)  Body mass index is 26.86 kg/m².      Intake/Output Summary (Last 24 hours) at 7/27/2019 1622  Last data filed at 7/27/2019 1558  Gross per 24 hour   Intake 1000 ml   Output 825 ml   Net 175 ml       Physical Exam   Constitutional: No distress.   HENT:   Head: Normocephalic and atraumatic.   Cardiovascular: Normal rate.   Pulmonary/Chest: Effort normal.   On supplemental oxygen   Abdominal: Soft. He exhibits no distension. There is no tenderness.   Neurological: He is alert.   Psychiatric: He has a normal mood and affect. His behavior is normal.       Significant Labs:  CBC:   Recent Labs   Lab 07/27/19  0336   WBC 25.19*   RBC 3.42*   HGB 9.1*   HCT 29.6*      MCV 87   MCH 26.6*   MCHC 30.7*     CMP:   Recent Labs   Lab 07/27/19 0336   *   CALCIUM 8.2*   ALBUMIN 2.1*   PROT 5.4*   *   K 4.6   CO2 23   CL 93*   BUN 28*   CREATININE 0.8   ALKPHOS 93   ALT 6*   AST 10   BILITOT 0.3       Significant Diagnostics:  I have reviewed all pertinent imaging results/findings within the past 24 hours.    Assessment/Plan:     Active Diagnoses:    Diagnosis Date Noted POA    PRINCIPAL PROBLEM:  Pericardial effusion without cardiac tamponade [I31.3] 07/15/2019 Yes    Decrease in appetite [R63.0] 07/26/2019 Yes    Compression of vein [I87.1]  Clinically Undetermined    SOB (shortness of breath) on exertion [R06.02] 07/25/2019 Unknown    Acute on chronic diastolic heart failure [I50.33] 07/18/2019 Unknown    Paroxysmal atrial fibrillation [I48.0] 07/17/2019 Yes    Essential hypertension [I10] 07/15/2019 Yes    Atrial fibrillation with RVR [I48.91] 07/15/2019 Yes    Acquired contracture of bladder neck [N32.0] 07/10/2019 Yes    Pleural effusion [J90] 05/02/2019 Yes    Mesothelioma of left lung [C45.7] 02/15/2018 Yes    Malignant pleural effusion [J91.0] 02/01/2018 Yes    Gastroesophageal reflux disease [K21.9] 10/11/2017 Yes    History of prostate cancer [Z85.46] 05/25/2016 Not Applicable       Problems Resolved During this Admission:    Diagnosis Date Noted Date Resolved POA    Gross hematuria [R31.0] 07/21/2019 07/26/2019 No     Cale Gabriele Harding is a 78 y.o. male with malignant pericardial effusion recurrence    Discussed with staff, given no tamponade physiology no urgent intervention required  Will plan for possible intervention Monday, vs discussion with IR/Cards for drain placement  No urgent intervention required at this time    Thank you for your consult. I will follow-up with patient. Please contact us if you have any additional questions.    Tacho Tucker MD  General Surgery  [facility]

## 2019-07-27 NOTE — PLAN OF CARE
Problem: Adult Inpatient Plan of Care  Goal: Plan of Care Review  Outcome: Ongoing (interventions implemented as appropriate)  Patient is progressing and involved with plan of care. Frequent rounds made to assess pain and safety. Pt communicating needs throughout shift. Up with 1 assist and walker. Poor appetite, voiding without difficulty (haider).  No C/O pain this shift. Pt did complain about rash, Triamcinolone Acetonide cream applied. Telemetry monitored.  All vitals stable; no acute events this shift. Pt. Remaining free from falls or injury throughout shift; bed in lowest position; side rails up X2; call light within reach; pt instructed to call for assistance as needed - verbalized understanding. Q2h rounding on patient. Will continue to monitor.

## 2019-07-27 NOTE — SUBJECTIVE & OBJECTIVE
Interval History: Overnight, he has lack of sleep from frequent Duo Nebs. He denies change in his SOB, palpitation or chest pain.     Tele: showed episode of atrial fibrillation with rapid ventricular response (120 BPM) that occurred around 8:27 AM.     Repeated TTE (limited) showed accumulation of pericardial fluids more notable in the right sided with no tamponade physiology.     Review of Systems   Constitution: Positive for decreased appetite, malaise/fatigue and night sweats. Negative for weight gain.   HENT: Negative for congestion.    Cardiovascular: Negative for chest pain, dyspnea on exertion and irregular heartbeat.   Respiratory: Positive for shortness of breath. Negative for sleep disturbances due to breathing and sputum production.    Endocrine: Negative for cold intolerance and polyphagia.   Musculoskeletal: Negative for arthritis.   Gastrointestinal: Negative for bloating.   Genitourinary: Negative for bladder incontinence.   Neurological: Negative for difficulty with concentration.     Objective:     Vital Signs (Most Recent):  Temp: 97.7 °F (36.5 °C) (07/27/19 0750)  Pulse: 68 (07/27/19 0750)  Resp: 16 (07/27/19 0750)  BP: (!) 122/58 (07/27/19 0750)  SpO2: 99 % (07/27/19 0750) Vital Signs (24h Range):  Temp:  [97.7 °F (36.5 °C)-98 °F (36.7 °C)] 97.7 °F (36.5 °C)  Pulse:  [68-88] 68  Resp:  [16-20] 16  SpO2:  [93 %-100 %] 99 %  BP: (101-122)/(56-81) 122/58     Weight: 82.5 kg (181 lb 14.1 oz)  Body mass index is 26.86 kg/m².     SpO2: 99 %  O2 Device (Oxygen Therapy): nasal cannula      Intake/Output Summary (Last 24 hours) at 7/27/2019 0921  Last data filed at 7/27/2019 0500  Gross per 24 hour   Intake 1020 ml   Output 1050 ml   Net -30 ml       Lines/Drains/Airways     Central Venous Catheter Line                 Port A Cath Single Lumen 06/20/19 right internal jugular 37 days          Drain                 Chest Tube Right Pleural -- days         Urethral Catheter 07/17/19 0920 Non-latex 16 Fr. 10  days                Physical Exam   Constitutional: He is oriented to person, place, and time. He appears well-nourished. No distress.   HENT:   Head: Normocephalic.   Eyes: Pupils are equal, round, and reactive to light.   Neck: No JVD present. No thyromegaly present.   Cardiovascular: Normal rate. Exam reveals no friction rub.   Murmur heard.  Pulmonary/Chest: He is in respiratory distress. He has no wheezes. He has rales.   Musculoskeletal: He exhibits edema.   Neurological: He is alert and oriented to person, place, and time.   Vitals reviewed.      Significant Labs:   BMP:   Recent Labs   Lab 07/26/19  0400 07/27/19  0336   * 144*   * 124*   K 4.5 4.6   CL 93* 93*   CO2 23 23   BUN 27* 28*   CREATININE 0.8 0.8   CALCIUM 8.1* 8.2*   MG 1.9 1.9   , CMP   Recent Labs   Lab 07/26/19 0400 07/27/19  0336   * 124*   K 4.5 4.6   CL 93* 93*   CO2 23 23   * 144*   BUN 27* 28*   CREATININE 0.8 0.8   CALCIUM 8.1* 8.2*   PROT 5.7* 5.4*   ALBUMIN 2.2* 2.1*   BILITOT 0.4 0.3   ALKPHOS 99 93   AST 12 10   ALT 6* 6*   ANIONGAP 8 8   ESTGFRAFRICA >60.0 >60.0   EGFRNONAA >60.0 >60.0   , CBC   Recent Labs   Lab 07/26/19 0400 07/27/19  0336   WBC 21.36* 25.19*   HGB 9.2* 9.1*   HCT 30.7* 29.6*    282    and All pertinent lab results from the last 24 hours have been reviewed.    Significant Imaging: Echocardiogram:   2D echo with color flow doppler: No results found for this or any previous visit. and Transthoracic echo (TTE) complete (Cupid Only):      · Normal left ventricular systolic function. The estimated ejection   fraction is 60%  · Concentric left ventricular remodeling.  · Normal LV diastolic function.  · Normal right ventricular systolic function.  · Mild tricuspid regurgitation.  · The estimated PA systolic pressure is 30 mm Hg  · Intermediate central venous pressure (8 mm Hg).  · Small circumferential pericardial effusion. Shaggy visceral pericardium.   No evidence of tamponade  physiology

## 2019-07-27 NOTE — ASSESSMENT & PLAN NOTE
Patient presented to [facility] w/ Pericardial effusion and a hx of recurrent pleural effusions (newly diagnosed mesothelioma in 2019).   - R sided PleurX catheter in place, patient of Dr. Padilla (Pul)  - 450 cc drained from PleurX on admission by Dr. Padilla  - CXR during admission have shown BL pleural effusion w/ areas of pulmonary infiltrates L>R    Plan:  - Patient notes continued SOB w/ exertion  - Marked anasarca noted  - Scattered wheezing on auscultation  - Duo nebs Q4h while awake  - Repeat CXR on 7/24 shows persistent bilateral pleural effusion as well as areas of pulmonary infiltrate L>R. Impression is stable without a large degree of interval change.   - Consider abx if infectious etiology suspected, WBC 25 today  - IV 40mg lasix daily, consider increasing to BID pending results of TTE on 7/26

## 2019-07-27 NOTE — ASSESSMENT & PLAN NOTE
- Overnight, he has lack of sleep from frequent Duo Nebs. He denies change in his SOB, palpitation or chest pain.   - Tele: showed episode of atrial fibrillation with rapid ventricular response (120 BPM) that occurred around 8:27 AM.   - Repeated TTE (limited) showed accumulation of pericardial fluids more notable in the right sided with no tamponade physiology.   - We recommend increasing the dose of Lasix to 40 mg IV TID   - We recommend consulting CTS to have discussion for Pericardial Window for his recurrent malignant pericardial effusion

## 2019-07-27 NOTE — PROGRESS NOTES
[facility]  Cardiology  Progress Note    Patient Name: Cale Harding  MRN: [id number]  Admission Date: 7/17/2019  Hospital Length of Stay: 10 days  Code Status: Full Code   Attending Physician: Chavo Devine DO, F*   Primary Care Physician: Jj Escobedo MD  Expected Discharge Date: 7/26/2019  Principal Problem:Pericardial effusion without cardiac tamponade    Subjective:       Interval History: Overnight, he has lack of sleep from frequent Duo Nebs. He denies change in his SOB, palpitation or chest pain.     Tele: showed episode of atrial fibrillation with rapid ventricular response (120 BPM) that occurred around 8:27 AM.     Repeated TTE (limited) showed accumulation of pericardial fluids more notable in the right sided with no tamponade physiology.     Review of Systems   Constitution: Positive for decreased appetite, malaise/fatigue and night sweats. Negative for weight gain.   HENT: Negative for congestion.    Cardiovascular: Negative for chest pain, dyspnea on exertion and irregular heartbeat.   Respiratory: Positive for shortness of breath. Negative for sleep disturbances due to breathing and sputum production.    Endocrine: Negative for cold intolerance and polyphagia.   Musculoskeletal: Negative for arthritis.   Gastrointestinal: Negative for bloating.   Genitourinary: Negative for bladder incontinence.   Neurological: Negative for difficulty with concentration.     Objective:     Vital Signs (Most Recent):  Temp: 97.7 °F (36.5 °C) (07/27/19 0750)  Pulse: 68 (07/27/19 0750)  Resp: 16 (07/27/19 0750)  BP: (!) 122/58 (07/27/19 0750)  SpO2: 99 % (07/27/19 0750) Vital Signs (24h Range):  Temp:  [97.7 °F (36.5 °C)-98 °F (36.7 °C)] 97.7 °F (36.5 °C)  Pulse:  [68-88] 68  Resp:  [16-20] 16  SpO2:  [93 %-100 %] 99 %  BP: (101-122)/(56-81) 122/58     Weight: 82.5 kg (181 lb 14.1 oz)  Body mass index is 26.86 kg/m².     SpO2: 99 %  O2 Device (Oxygen Therapy): nasal  cannula      Intake/Output Summary (Last 24 hours) at 7/27/2019 0921  Last data filed at 7/27/2019 0500  Gross per 24 hour   Intake 1020 ml   Output 1050 ml   Net -30 ml       Lines/Drains/Airways       Central Venous Catheter Line                   Port A Cath Single Lumen 06/20/19 right internal jugular 37 days            Drain                   Chest Tube Right Pleural -- days         Urethral Catheter 07/17/19 0920 Non-latex 16 Fr. 10 days                    Physical Exam   Constitutional: He is oriented to person, place, and time. He appears well-nourished. No distress.   HENT:   Head: Normocephalic.   Eyes: Pupils are equal, round, and reactive to light.   Neck: No JVD present. No thyromegaly present.   Cardiovascular: Normal rate. Exam reveals no friction rub.   Murmur heard.  Pulmonary/Chest: He is in respiratory distress. He has no wheezes. He has rales.   Musculoskeletal: He exhibits edema.   Neurological: He is alert and oriented to person, place, and time.   Vitals reviewed.      Significant Labs:   BMP:   Recent Labs   Lab 07/26/19  0400 07/27/19  0336   * 144*   * 124*   K 4.5 4.6   CL 93* 93*   CO2 23 23   BUN 27* 28*   CREATININE 0.8 0.8   CALCIUM 8.1* 8.2*   MG 1.9 1.9   , CMP   Recent Labs   Lab 07/26/19  0400 07/27/19  0336   * 124*   K 4.5 4.6   CL 93* 93*   CO2 23 23   * 144*   BUN 27* 28*   CREATININE 0.8 0.8   CALCIUM 8.1* 8.2*   PROT 5.7* 5.4*   ALBUMIN 2.2* 2.1*   BILITOT 0.4 0.3   ALKPHOS 99 93   AST 12 10   ALT 6* 6*   ANIONGAP 8 8   ESTGFRAFRICA >60.0 >60.0   EGFRNONAA >60.0 >60.0   , CBC   Recent Labs   Lab 07/26/19  0400 07/27/19  0336   WBC 21.36* 25.19*   HGB 9.2* 9.1*   HCT 30.7* 29.6*    282    and All pertinent lab results from the last 24 hours have been reviewed.    Significant Imaging: Echocardiogram:   2D echo with color flow doppler: No results found for this or any previous visit. and Transthoracic echo (TTE) complete (Cupid Only):      ·  Normal left ventricular systolic function. The estimated ejection   fraction is 60%  · Concentric left ventricular remodeling.  · Normal LV diastolic function.  · Normal right ventricular systolic function.  · Mild tricuspid regurgitation.  · The estimated PA systolic pressure is 30 mm Hg  · Intermediate central venous pressure (8 mm Hg).  · Small circumferential pericardial effusion. Shaggy visceral pericardium.   No evidence of tamponade physiology        Assessment and Plan:     Brief HPI:   Mr. Harding is a 78-year-old male with prostate cancer (s/p prostatectomy), mesothelioma (on chemotherapy), with recurrent pleural effusion  who initially presented to OSH for elective cystography for dilation of bladder neck contracture.  Pericardiocentesis was done on 7/18 with removal of 570 cc  Repeated TTE showed re-accumulation of his pericardial effusion.     * Pericardial effusion without cardiac tamponade  - Will repeat TTE to assess pericardial effusion. Might be contributing to atrial fibrillation.     Atrial fibrillation with RVR  - Unable to rate control with metoprolol because BP did not tolerate  - Started on diltiazem infusion with 20 mg bolus. HR decreased. Patient back to NSR at 10 pm. He is in/out. Will consider antiarrhythmic (amiodarone) once therapeutic on heparin.   - CHADsVASC 3 suggestive of 3.2% annual stroke risk. - will start heparin at this time. Discussed with primary team and they agree.     Malignant pleural effusion  - Overnight, he has lack of sleep from frequent Duo Nebs. He denies change in his SOB, palpitation or chest pain.   - Tele: showed episode of atrial fibrillation with rapid ventricular response (120 BPM) that occurred around 8:27 AM.   - Repeated TTE (limited) showed accumulation of pericardial fluids more notable in the right sided with no tamponade physiology.   - We recommend increasing the dose of Lasix to 40 mg IV TID   - We recommend consulting CTS to have discussion for  Pericardial Window for his recurrent malignant pericardial effusion         VTE Risk Mitigation (From admission, onward)        Ordered     heparin, porcine (PF) 100 unit/mL injection flush 500 Units  Every 8 hours PRN      07/18/19 1712     IP VTE HIGH RISK PATIENT  Once      07/17/19 1930          Alcon Fields MD  Cardiology  [facility]

## 2019-07-27 NOTE — ASSESSMENT & PLAN NOTE
Patient transferred to [facility] from [facility] for evaluation by cardiology/CTS for pericardial window or pericardiocentesis on 7/17/19. Pericardiocentesis was preformed by interventional cards, pt tolerated procedure well, produced 570cc serosanguinous fluid.   - Repeat Echo on 7/21 showed a small circumferential pericardial effusion w/ shaggy visceral pericardium and no evidence of tamponade physiology  - Maximize medical management per cards  - Appreciate cardiology recs    Plan:  - Repeat Echo on 7/26, pending read

## 2019-07-27 NOTE — ASSESSMENT & PLAN NOTE
Patient w/ reported hx of poor appetite since beginning chemotherapy    Plan:  - Patient noted to have poor appetite since admission, only eats small amounts of certain things  - notes that he feels full after eating small quantities of food  - Patient interested in MARINOL for appetite stimulation  - Cardiology consulted to ensure no drug interaction w/ rate control meds prior to beginning this treatment

## 2019-07-27 NOTE — ASSESSMENT & PLAN NOTE
- hold all antihypertensives   - he is on enalapril at home, will switch to losartan per cards recs before d/c

## 2019-07-27 NOTE — SUBJECTIVE & OBJECTIVE
"Interval History: NAEON. Patient still experiencing SOB w/ exertion. Patient notes that he still wheezes, but that his breathing treatments have been helping. He still has a productive cough that is mildly improved over the past day. Patient on scheduled duo nebs and IV lasix at this time. Persistent anasarca noted. Diffuse rash noted on patient's chest, abdomen, and back, began a few days ago after starting diltiazem therapy for rate control and has progressively worsened. Patient states that the rash is still "itchy" but benadryl has helped. Hematuria resolved at this time, penis and scrotum still edematous. Patient notes that he still has a poor appetite and would like to try MARINOL therapy for appetite stimulation barring any contraindications to rate control medication. He denies HA, f/c, n/v, CP, and Abd pain.    Oncology Treatment Plan:   OP NSCLC VINORELBINE WEEKLY    Medications:  Continuous Infusions:  Scheduled Meds:   albuterol-ipratropium  3 mL Nebulization Q4H WAKE    furosemide (LASIX) IV  40 mg Intravenous Daily    metoprolol succinate  200 mg Oral Daily    ondansetron  4 mg Intravenous Once    pantoprazole  40 mg Oral Daily    triamcinolone acetonide 0.025%   Topical (Top) BID     PRN Meds:albuterol-ipratropium, alteplase, Dextrose 10% Bolus, Dextrose 10% Bolus, diphenhydrAMINE, glucagon (human recombinant), glucose, glucose, guaifenesin 100 mg/5 ml, heparin, porcine (PF), HYDROcodone-acetaminophen, ibuprofen, metoprolol, ondansetron, ramelteon, sodium chloride 0.9%     Review of Systems  Objective:     Vital Signs (Most Recent):  Temp: 97.7 °F (36.5 °C) (07/27/19 0750)  Pulse: 68 (07/27/19 0750)  Resp: 16 (07/27/19 0750)  BP: (!) 122/58 (07/27/19 0750)  SpO2: 99 % (07/27/19 0750) Vital Signs (24h Range):  Temp:  [97.7 °F (36.5 °C)-98 °F (36.7 °C)] 97.7 °F (36.5 °C)  Pulse:  [68-88] 68  Resp:  [16-24] 16  SpO2:  [93 %-100 %] 99 %  BP: (101-122)/(56-81) 122/58     Weight: 82.5 kg (181 lb 14.1 " oz)  Body mass index is 26.86 kg/m².  Body surface area is 2 meters squared.      Intake/Output Summary (Last 24 hours) at 7/27/2019 0806  Last data filed at 7/27/2019 0500  Gross per 24 hour   Intake 1020 ml   Output 1050 ml   Net -30 ml       Physical Exam   Constitutional: He is oriented to person, place, and time. He appears well-developed. No distress.   HENT:   Head: Normocephalic and atraumatic.   Eyes: Conjunctivae are normal. No scleral icterus.   Neck: Normal range of motion.   Cardiovascular: Normal rate, normal heart sounds and intact distal pulses. An irregularly irregular rhythm present.   Pulmonary/Chest: Accessory muscle usage present. No tachypnea. No respiratory distress. He has decreased breath sounds in the right lower field and the left lower field. He has wheezes (scattered). He has no rhonchi.   Abdominal: Soft. Bowel sounds are normal. There is no tenderness.   Genitourinary:   Genitourinary Comments: Edematous scrotum and penis, haider in place   Musculoskeletal: Normal range of motion. He exhibits edema (BL 2+ pitting edema in upper and lower extremities).   Neurological: He is alert and oriented to person, place, and time.   Skin: Skin is warm and dry. Rash noted. He is not diaphoretic.   Diffuse Anasarca and erythematous and edematous papules spread over chest, abdomen and back   Psychiatric: He has a normal mood and affect. His behavior is normal. Judgment and thought content normal.   Vitals reviewed.      Significant Labs:   BMP:   Recent Labs   Lab 07/26/19  0400 07/27/19  0336   * 144*   * 124*   K 4.5 4.6   CL 93* 93*   CO2 23 23   BUN 27* 28*   CREATININE 0.8 0.8   CALCIUM 8.1* 8.2*   MG 1.9 1.9   , CBC:   Recent Labs   Lab 07/26/19 0400 07/27/19  0336   WBC 21.36* 25.19*   HGB 9.2* 9.1*   HCT 30.7* 29.6*    282   , CMP:   Recent Labs   Lab 07/26/19 0400 07/27/19  0336   * 124*   K 4.5 4.6   CL 93* 93*   CO2 23 23   * 144*   BUN 27* 28*    CREATININE 0.8 0.8   CALCIUM 8.1* 8.2*   PROT 5.7* 5.4*   ALBUMIN 2.2* 2.1*   BILITOT 0.4 0.3   ALKPHOS 99 93   AST 12 10   ALT 6* 6*   ANIONGAP 8 8   EGFRNONAA >60.0 >60.0    and Urine Studies: No results for input(s): COLORU, APPEARANCEUA, PHUR, SPECGRAV, PROTEINUA, GLUCUA, KETONESU, BILIRUBINUA, OCCULTUA, NITRITE, UROBILINOGEN, LEUKOCYTESUR, RBCUA, WBCUA, BACTERIA, SQUAMEPITHEL, HYALINECASTS in the last 48 hours.    Invalid input(s): [facility]    Diagnostic Results:  I have reviewed all pertinent imaging results/findings within the past 24 hours.

## 2019-07-27 NOTE — ASSESSMENT & PLAN NOTE
Urology consulted in the OSH. S/p Bladder neck contracture release and urethral dilation on 7/15/19. Patient of Dr. Philippe.    Plan:  - Haider in place  - Marked penile and scrotal edema  - Hematuria resolved, nursing has been flushing cath per Urology recs  - mild leak noted from penis around haider, urology notified, they do not believe the haider should be changed at this time due to anasarca  - Cleared for d/c from urology perspective, to f/u w/ Dr. Philippe

## 2019-07-28 PROBLEM — L27.0 RASH, DRUG: Status: ACTIVE | Noted: 2019-01-01

## 2019-07-28 PROBLEM — I31.39 PERICARDIAL EFFUSION WITHOUT CARDIAC TAMPONADE: Status: RESOLVED | Noted: 2019-01-01 | Resolved: 2019-01-01

## 2019-07-28 NOTE — ASSESSMENT & PLAN NOTE
Currently without tamponade physiology necessitating urgent surgical intervention.  Due to pericardial involvement of disease process, a pericardial window would likely be difficult and present higher risks.  This case requires discussion of the risks, benefits, and long term goals of a pericardial drain versus a pericardial window. Have made patient NPO at midnight for potential procedure tomorrow, pending discussion with Dr. Fang. Would also discuss with either Interventional Cardiology or Radiology regarding pericardial drain.

## 2019-07-28 NOTE — ASSESSMENT & PLAN NOTE
Urology consulted in the OSH. S/p Bladder neck contracture release and urethral dilation on 7/15/19. Patient of Dr. Philippe.    Plan:  - Haider in place  - Marked penile and scrotal edema  - Hematuria resolved, nursing has been flushing cath per Urology recs  - urology notified, they do not believe the haider should be changed at this time due to anasarca   - Cleared for d/c from urology perspective, to f/u w/ Dr. Philippe  - Increased leak noted from penis around haider, most likely due to lasix and anasarca

## 2019-07-28 NOTE — ASSESSMENT & PLAN NOTE
Plan:   - Diltiazem therapy stopped and replaced with alternative treatment  - Rash had spread to face on 9/27, improved today 9/28  - topical steroid for pruritis  - PO steroid taper (50->40->30->20) for pruritis and inflammation   - continue to monitor

## 2019-07-28 NOTE — PROGRESS NOTES
[facility]  Cardiology  Progress Note    Patient Name: Cale Harding  MRN: [id number]  Admission Date: 7/17/2019  Hospital Length of Stay: 11 days  Code Status: Full Code   Attending Physician: Chavo Devine DO, F*   Primary Care Physician: Jj Escobedo MD  Expected Discharge Date: 7/29/2019  Principal Problem:Pericardial effusion without cardiac tamponade    Subjective:     Hospital Course:   Pt has been having a maculopapular rash with an erythematous base that started on his back and has been extending to his chest and upper abdomen. Dermatology suspects that it can be associated with Diltiazem and as a result cardiology has been consulted for change in rate control medication along with recommendation on Diuresis for anasarca.    Pt denies CP/Chest discomfort, diaphoresis, palpitations,lightheadedness    Interval History: Patient reports continued dyspnea overnight. Rash has not resolved. Denies chest pain, nausea, vomiting, palpitations, presyncopal/syncopal episodes. Was receiving a breathing treatment this AM.     Review of Systems   Constitution: Positive for malaise/fatigue.   HENT: Negative.    Eyes: Negative.    Cardiovascular: Negative for chest pain.   Respiratory: Positive for cough and shortness of breath.    Endocrine: Negative.    Hematologic/Lymphatic: Negative.    Skin: Negative.    Musculoskeletal: Negative.    Gastrointestinal: Negative.    Genitourinary: Negative.    Neurological: Negative.    Psychiatric/Behavioral: Negative.      Objective:     Vital Signs (Most Recent):  Temp: 97.7 °F (36.5 °C) (07/28/19 1223)  Pulse: 86 (07/28/19 1327)  Resp: 18 (07/28/19 1327)  BP: (!) 111/56 (07/28/19 1223)  SpO2: (!) 93 % (07/28/19 1223) Vital Signs (24h Range):  Temp:  [97.5 °F (36.4 °C)-98.2 °F (36.8 °C)] 97.7 °F (36.5 °C)  Pulse:  [] 86  Resp:  [16-20] 18  SpO2:  [93 %-100 %] 93 %  BP: ()/(56-66) 111/56     Weight: 86.6 kg (190 lb 14.7 oz)  Body mass index  is 28.19 kg/m².     SpO2: (!) 93 %  O2 Device (Oxygen Therapy): nasal cannula      Intake/Output Summary (Last 24 hours) at 7/28/2019 1447  Last data filed at 7/28/2019 0546  Gross per 24 hour   Intake 800 ml   Output 675 ml   Net 125 ml       Lines/Drains/Airways     Central Venous Catheter Line                 Port A Cath Single Lumen 06/20/19 right internal jugular 38 days          Drain                 Chest Tube Right Pleural -- days         Urethral Catheter 07/17/19 0920 Non-latex 16 Fr. 11 days                Physical Exam   Constitutional: He is oriented to person, place, and time. He appears well-developed.   HENT:   Head: Normocephalic and atraumatic.   Eyes: Pupils are equal, round, and reactive to light. Conjunctivae are normal.   Neck: Neck supple.   Cardiovascular: Intact distal pulses.   Irregularly irregular rate and rhythm.    Pulmonary/Chest: Effort normal.   Decreased breath sounds at the bilateral bases with intermittent wheezes   Abdominal: Soft. There is no tenderness.   Musculoskeletal: Normal range of motion. He exhibits edema.   Neurological: He is alert and oriented to person, place, and time.   Skin: Skin is warm and dry. Rash noted.   Diffuse anasarca and erythematous and edematous papules spread over chest, abdomen and back   Psychiatric: He has a normal mood and affect. His behavior is normal.       Significant Labs: All pertinent lab results from the last 24 hours have been reviewed.    Significant Imaging: Echocardiogram:   2D echo with color flow doppler: No results found for this or any previous visit. and Transthoracic echo (TTE) complete (Cupid Only):   Results for orders placed or performed during the hospital encounter of 07/17/19   Transthoracic echo (TTE) 2D with Color Flow   Result Value Ref Range    Ascending aorta 3.02 cm    STJ 2.45 cm    AV mean gradient 4 mmHg    Ao peak gabriela 1.27 m/s    Ao VTI 18.06 cm    IVRT 0.07 msec    IVS 1.08 0.6 - 1.1 cm    LA size 3.87 cm     Left Atrium Major Axis 4.95 cm    Left Atrium Minor Axis 5.10 cm    LVIDD 3.31 (A) 3.5 - 6.0 cm    LVIDS 2.30 2.1 - 4.0 cm    LVOT diameter 2.23 cm    LVOT peak VTI 12.22 cm    PW 0.84 0.6 - 1.1 cm    MV Peak A Michael 0.95 m/s    E wave decelartion time 122.99 msec    MV Peak E Michael 0.91 m/s    RA Major Axis 4.61 cm    RA Width 3.33 cm    RVDD 3.13 cm    Sinus 3.48 cm    TAPSE 1.30 cm    TR Max Michael 2.36 m/s    TDI LATERAL 0.06 m/s    TDI SEPTAL 0.11 m/s    LA WIDTH 3.90 cm    LV Diastolic Volume 44.47 mL    LV Systolic Volume 18.12 mL    RV S' 9.21 cm/s    LVOT peak michael 0.72 m/s    LV LATERAL E/E' RATIO 15.17 m/s    LV SEPTAL E/E' RATIO 8.27 m/s    FS 31 %    LA volume 64.45 cm3    LV mass 89.45 g    Left Ventricle Relative Wall Thickness 0.51 cm    AV valve area 2.64 cm2    AV Velocity Ratio 0.57     AV index (prosthetic) 0.68     E/A ratio 0.96     Mean e' 0.09 m/s    LVOT area 3.9 cm2    LVOT stroke volume 47.70 cm3    AV peak gradient 6 mmHg    E/E' ratio 10.71 m/s    LV Systolic Volume Index 9.1 mL/m2    LV Diastolic Volume Index 22.25 mL/m2    LA Volume Index 32.3 mL/m2    LV Mass Index 45 g/m2    Triscuspid Valve Regurgitation Peak Gradient 22 mmHg    BSA 2.02 m2    Right Atrial Pressure (from IVC) 8 mmHg    TV rest pulmonary artery pressure 30 mmHg    Narrative    · Normal left ventricular systolic function. The estimated ejection   fraction is 60%  · Concentric left ventricular remodeling.  · Normal LV diastolic function.  · Normal right ventricular systolic function.  · Mild tricuspid regurgitation.  · The estimated PA systolic pressure is 30 mm Hg  · Intermediate central venous pressure (8 mm Hg).  · Small circumferential pericardial effusion. Shaggy visceral pericardium.   No evidence of tamponade physiology       and X-Ray: CXR: X-Ray Chest 1 View (CXR):   Results for orders placed or performed during the hospital encounter of 07/17/19   X-Ray Chest 1 View    Narrative    EXAMINATION:  XR CHEST 1  VIEW    CLINICAL HISTORY:  pericardial effusion;    TECHNIQUE:  Single frontal view of the chest was performed.    COMPARISON:  07/24/2019    FINDINGS:  Right chest wall port catheter tip projects over the mid to distal SVC.  The cardiomediastinal silhouette is enlarged, similar to the previous exam allowing for differences in positioning..  There is obscuration of the costophrenic angles, left greater than right, suggesting effusions..  The trachea is midline.  The lungs are symmetrically expanded bilaterally with patchy increased interstitial and parenchymal attenuation bilaterally, suggesting edema.  There is increased parenchymal attenuation projected over the left lower lung zone, underlying consolidation is not excluded..  There is no pneumothorax.  The osseous structures are unchanged..      Impression    As above      Electronically signed by: Julio C Hoyt MD  Date:    07/27/2019  Time:    15:31    and X-Ray Chest PA and Lateral (CXR): No results found for this visit on 07/17/19.    Assessment and Plan:     Brief HPI:   Mr. Harding is a 78-year-old male with prostate cancer (s/p prostatectomy), mesothelioma (on chemotherapy), with recurrent pleural effusion  who initially presented to OSH for elective cystography for dilation of bladder neck contracture.     Patient was scheduled for elective cystography with Urology (Dr. Philippe) for dilation of bladder neck contracture.  On arrival to elective surgical suite, patient found to be tachycardic and heart rate was irregular.  EKG confirmed atrial fibrillation with RVR.  Surgery was cancelled and he was admitted to the ICU for a diltiazem drip. He converted to sinus but would go in and out of afib, was started on lopressor 25 BID and echo done showed moderate size of pericardial effusion with no tamponade physiology however increased in effusion from 7/5.   Pericardiocentesis was done on 7/18 with removal of 270 cc of fluid that was positive for malignancy.  Patients HR has been controlled with IV metoprolol.     Atrial fibrillation with RVR  - Remains in atrial fibrillation with VR < 100. Continue metoprolol PRN if HR > 120  - CHADsVASC 3 suggestive of 3.2% annual stroke risk. - continue heparin gtt    Malignant pleural effusion  - Overnight,  he denies change in his dyspnea. Remains in atrial fibrillation with controlled ventricular rate.   - Repeated TTE (limited) showed accumulation of pericardial fluids more notable in the right sided with no tamponade physiology.   - We recommend increasing the dose of Lasix to 80 mg IV TID   - Follow-up recommendations from CTS regarding pericardial window      VTE Risk Mitigation (From admission, onward)        Ordered     heparin, porcine (PF) 100 unit/mL injection flush 500 Units  Every 8 hours PRN      07/18/19 1712     IP VTE HIGH RISK PATIENT  Once      07/17/19 1930        We will continue to follow along. Please call us with additional questions.     Sherry Cintron MD  Cardiology  [facility]

## 2019-07-28 NOTE — ASSESSMENT & PLAN NOTE
- Remains in atrial fibrillation with VR < 100. Continue metoprolol PRN if HR > 120  - CHADsVASC 3 suggestive of 3.2% annual stroke risk. - continue heparin gtt

## 2019-07-28 NOTE — SUBJECTIVE & OBJECTIVE
Interval History:   In afib on monitor, currently with rate in low 100s. No increased shortness of breath or chest pain.     Medications:  Continuous Infusions:  Scheduled Meds:   albuterol-ipratropium  3 mL Nebulization Q4H WAKE    furosemide (LASIX) IV  40 mg Intravenous Q8H    metoprolol succinate  200 mg Oral Daily    ondansetron  4 mg Intravenous Once    pantoprazole  40 mg Oral Daily    predniSONE  10 mg Oral TID    triamcinolone acetonide 0.025%   Topical (Top) BID     PRN Meds:albuterol-ipratropium, alteplase, Dextrose 10% Bolus, Dextrose 10% Bolus, diphenhydrAMINE, glucagon (human recombinant), glucose, glucose, guaifenesin 100 mg/5 ml, heparin, porcine (PF), HYDROcodone-acetaminophen, ibuprofen, metoprolol, ondansetron, ramelteon, sodium chloride 0.9%     Review of patient's allergies indicates:   Allergen Reactions    Bactrim [sulfamethoxazole-trimethoprim] Other (See Comments)     Joint pains     Objective:     Vital Signs (Most Recent):  Temp: 97.6 °F (36.4 °C) (07/28/19 0740)  Pulse: 88 (07/28/19 0903)  Resp: 18 (07/28/19 0903)  BP: (!) 91/58 (07/28/19 0740)  SpO2: 100 % (07/28/19 0903) Vital Signs (24h Range):  Temp:  [97.5 °F (36.4 °C)-98.2 °F (36.8 °C)] 97.6 °F (36.4 °C)  Pulse:  [] 88  Resp:  [16-20] 18  SpO2:  [94 %-100 %] 100 %  BP: ()/(56-65) 91/58     Intake/Output - Last 3 Shifts       07/26 0700 - 07/27 0659 07/27 0700 - 07/28 0659 07/28 0700 - 07/29 0659    P.O. 1020 1160     Total Intake(mL/kg) 1020 (12.4) 1160 (13.4)     Urine (mL/kg/hr) 1050 (0.5) 1025 (0.5)     Stool 0      Chest Tube       Total Output 1050 1025     Net -30 +135            Stool Occurrence 0 x            SpO2: 100 %  O2 Device (Oxygen Therapy): nasal cannula    Physical Exam   Constitutional: He is oriented to person, place, and time. He appears well-developed and well-nourished.   Cardiovascular:   In afib on monitor   Pulmonary/Chest: Effort normal. No respiratory distress.   On nasal cannula    Neurological: He is alert and oriented to person, place, and time.   Skin: Skin is warm and dry. Rash noted.   Diffuse rash    Psychiatric: He has a normal mood and affect. His behavior is normal.       Significant Labs:  CBC:   Recent Labs   Lab 07/28/19  0430   WBC 35.91*   RBC 3.68*   HGB 9.9*   HCT 31.5*      MCV 86   MCH 26.9*   MCHC 31.4*     CMP:   Recent Labs   Lab 07/28/19  0430   GLU 95   CALCIUM 8.2*   ALBUMIN 2.0*   PROT 5.4*   *   K 4.6   CO2 23   CL 94*   BUN 35*   CREATININE 1.0   ALKPHOS 100   ALT 7*   AST 14   BILITOT 0.7       Significant Diagnostics:  I have reviewed all pertinent imaging results/findings within the past 24 hours.    VTE Risk Mitigation (From admission, onward)        Ordered     heparin, porcine (PF) 100 unit/mL injection flush 500 Units  Every 8 hours PRN      07/18/19 1712     IP VTE HIGH RISK PATIENT  Once      07/17/19 1930

## 2019-07-28 NOTE — SUBJECTIVE & OBJECTIVE
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    Prostate cancer 2002    Urinary tract infection        Past Surgical History:   Procedure Laterality Date    BIOPSY-DIAPHRAGM N/A 3/19/2018    Performed by Galdino Fang MD at [facility] OR [address] [place] [address]    RDAYIY-MPRQYP-IP N/A 2/1/2018    Performed by [facility] Diagnostic Provider at [facility] OR [address] [place] [address]    BRONCHOSCOPY N/A 6/3/2019    Performed by [facility] Diagnostic Provider at [facility] OR [address] [place] [address]    COLONOSCOPY  10/20/2012    COLONOSCOPY  11/19/2014    dr machado ( repeat in 3 years per the pt )    CYSTOSCOPY, WITH RETROGRADE PYELOGRAM Bilateral 7/15/2019    Performed by Galdino Philippe MD at [facility] OR    CYSTOSCOPY, WITH URETHRAL DILATION Bilateral 7/15/2019    Performed by Galdino Philippe MD at [facility] OR    ELBOW SURGERY Left 2013    dislocation    [id number]-U-CATH N/A 6/20/2019    Performed by [facility] Diagnostic Provider at [facility] OR [address] [place] [address]    LAPAROSCOPY-DIAGNOSTIC N/A 3/19/2018    [facility]    Pericardiocentesis N/A 7/18/2019    Performed by Frank Javier MD at [facility] CATH LAB    PROCTECTOMY  2002    RELEASE, CONTRACTURE, BLADDER NECK N/A 7/15/2019    Performed by Galdino Philippe MD at [facility] OR    SHOULDER ARTHROSCOPY W/ ROTATOR CUFF REPAIR Right 2008    THORACOSCOPY-VIDEO ASSISTED (VATS) W/PLEURAL BIOPSY Left 3/19/2018    Performed by Galdino Fang MD at [facility] OR [address] [place] [address]    URETHROGRAM, RETROGRADE N/A 7/15/2019    Performed by Galdino Philippe MD at [facility] OR       Review of patient's allergies indicates:   Allergen Reactions    Bactrim [sulfamethoxazole-trimethoprim] Other (See Comments)     Joint pains       Family History     Problem Relation (Age of Onset)    Cancer Mother    Heart attack Sister, Sister    Heart disease Father, Brother, Sister, Brother, Sister, Brother    No  Known Problems Son, Son, Son    Prostate cancer Brother, Brother, Brother        Tobacco Use    Smoking status: Former Smoker     Packs/day: 2.00     Years: 15.00     Pack years: 30.00     Types: Cigarettes     Last attempt to quit: 1970     Years since quittin.6    Smokeless tobacco: Former User    Tobacco comment: pt quit 40 years ago   Substance and Sexual Activity    Alcohol use: No     Alcohol/week: 16.8 oz     Types: 28 Cans of beer per week     Comment: None since Nov 15 th 2017    Drug use: No    Sexual activity: Not Currently      Review of Systems   Constitutional: Positive for fatigue and unexpected weight change. Negative for chills and fever.   HENT: Negative.    Respiratory: Positive for shortness of breath.    Cardiovascular: Positive for leg swelling.   Genitourinary: Negative.    Skin: Positive for rash.   Allergic/Immunologic: Positive for immunocompromised state.   Neurological: Positive for weakness.     Objective:     Vital Signs (Most Recent):  Temp: 97.9 °F (36.6 °C) (19 1601)  Pulse: (!) 126 (19 1631)  Resp: 18 (19 1631)  BP: (!) 106/59 (19 1601)  SpO2: 99 % (19 1631) Vital Signs (24h Range):  Temp:  [97.5 °F (36.4 °C)-98.2 °F (36.8 °C)] 97.9 °F (36.6 °C)  Pulse:  [] 126  Resp:  [16-20] 18  SpO2:  [93 %-100 %] 99 %  BP: ()/(56-66) 106/59   Weight: 86.6 kg (190 lb 14.7 oz)  Body mass index is 28.19 kg/m².      Intake/Output Summary (Last 24 hours) at 2019 1755  Last data filed at 2019 0546  Gross per 24 hour   Intake 560 ml   Output 625 ml   Net -65 ml       Physical Exam   Constitutional: He is oriented to person, place, and time. He appears well-developed and well-nourished. He has a sickly appearance. No distress. Nasal cannula in place.   HENT:   Head: Atraumatic.   Eyes: Right eye exhibits no discharge. Left eye exhibits no discharge.   Cardiovascular: Normal rate and regular rhythm.   Pulmonary/Chest: No respiratory distress.  He has no wheezes.   Decreased breath sounds  Dullness to percussion L>R   Abdominal: Soft.   Musculoskeletal: He exhibits edema (pitting edema BL LE and R forearm).   Neurological: He is alert and oriented to person, place, and time.   Skin: Skin is warm and dry. Rash noted. There is erythema.   Confluent rash on trunk (abdo into groin/back)  Less confluent on upper chest  Erythematous, scaling in portions    Nursing note and vitals reviewed.      Vents:  Oxygen Concentration (%): 28 (07/28/19 1631)  Lines/Drains/Airways     Central Venous Catheter Line                 Port A Cath Single Lumen 06/20/19 right internal jugular 38 days          Drain                 Chest Tube Right Pleural -- days         Urethral Catheter 07/17/19 0920 Non-latex 16 Fr. 11 days              Significant Labs:    CBC/Anemia Profile:  Recent Labs   Lab 07/27/19  0336 07/28/19  0430   WBC 25.19* 35.91*   HGB 9.1* 9.9*   HCT 29.6* 31.5*    319   MCV 87 86   RDW 19.8* 20.2*        Chemistries:  Recent Labs   Lab 07/27/19  0336 07/28/19  0430   * 126*   K 4.6 4.6   CL 93* 94*   CO2 23 23   BUN 28* 35*   CREATININE 0.8 1.0   CALCIUM 8.2* 8.2*   ALBUMIN 2.1* 2.0*   PROT 5.4* 5.4*   BILITOT 0.3 0.7   ALKPHOS 93 100   ALT 6* 7*   AST 10 14   MG 1.9 1.9   PHOS 3.5 4.0       All pertinent labs within the past 24 hours have been reviewed.    Significant Imaging: I have reviewed all pertinent imaging results/findings within the past 24 hours.

## 2019-07-28 NOTE — ASSESSMENT & PLAN NOTE
Patient transferred to [facility] from OSH for evaluation by cardiology/CTS for pericardial window or pericardiocentesis on 7/17/19. Pericardiocentesis was preformed by interventional cards, pt tolerated procedure well, produced 570cc serosanguinous fluid.   - Maximize medical management per cards   - Appreciate cardiology recs    Plan:  - Repeat Echo on 7/26 showed recurrence of pericardial effusion  - No tamponade at this time  - CTS consulted to evaluate for pericardial window  - CTS to discuss w/ interventional cardiology window vs. Pericardial drain  - Intervention TBD on Monday 7/29

## 2019-07-28 NOTE — ASSESSMENT & PLAN NOTE
- Overnight,  he denies change in his dyspnea. Remains in atrial fibrillation with controlled ventricular rate.   - Repeated TTE (limited) showed accumulation of pericardial fluids more notable in the right sided with no tamponade physiology.   - We recommend increasing the dose of Lasix to 80 mg IV TID   - Follow-up recommendations from CTS regarding pericardial window

## 2019-07-28 NOTE — PROGRESS NOTES
[facility]  Thoracic Surgery  Progress Note    Subjective:     History of Present Illness:  No notes on file    Post-Op Info:  Procedure(s) (LRB):  Pericardiocentesis (N/A)   10 Days Post-Op     Interval History:   In afib on monitor, currently with rate in low 100s. No increased shortness of breath or chest pain.     Medications:  Continuous Infusions:  Scheduled Meds:   albuterol-ipratropium  3 mL Nebulization Q4H WAKE    furosemide (LASIX) IV  40 mg Intravenous Q8H    metoprolol succinate  200 mg Oral Daily    ondansetron  4 mg Intravenous Once    pantoprazole  40 mg Oral Daily    predniSONE  10 mg Oral TID    triamcinolone acetonide 0.025%   Topical (Top) BID     PRN Meds:albuterol-ipratropium, alteplase, Dextrose 10% Bolus, Dextrose 10% Bolus, diphenhydrAMINE, glucagon (human recombinant), glucose, glucose, guaifenesin 100 mg/5 ml, heparin, porcine (PF), HYDROcodone-acetaminophen, ibuprofen, metoprolol, ondansetron, ramelteon, sodium chloride 0.9%     Review of patient's allergies indicates:   Allergen Reactions    Bactrim [sulfamethoxazole-trimethoprim] Other (See Comments)     Joint pains     Objective:     Vital Signs (Most Recent):  Temp: 97.6 °F (36.4 °C) (07/28/19 0740)  Pulse: 88 (07/28/19 0903)  Resp: 18 (07/28/19 0903)  BP: (!) 91/58 (07/28/19 0740)  SpO2: 100 % (07/28/19 0903) Vital Signs (24h Range):  Temp:  [97.5 °F (36.4 °C)-98.2 °F (36.8 °C)] 97.6 °F (36.4 °C)  Pulse:  [] 88  Resp:  [16-20] 18  SpO2:  [94 %-100 %] 100 %  BP: ()/(56-65) 91/58     Intake/Output - Last 3 Shifts       07/26 0700 - 07/27 0659 07/27 0700 - 07/28 0659 07/28 0700 - 07/29 0659    P.O. 1020 1160     Total Intake(mL/kg) 1020 (12.4) 1160 (13.4)     Urine (mL/kg/hr) 1050 (0.5) 1025 (0.5)     Stool 0      Chest Tube       Total Output 1050 1025     Net -30 +135            Stool Occurrence 0 x            SpO2: 100 %  O2 Device (Oxygen Therapy): nasal cannula    Physical Exam    Constitutional: He is oriented to person, place, and time. He appears well-developed and well-nourished.   Cardiovascular:   In afib on monitor   Pulmonary/Chest: Effort normal. No respiratory distress.   On nasal cannula   Neurological: He is alert and oriented to person, place, and time.   Skin: Skin is warm and dry. Rash noted.   Diffuse rash    Psychiatric: He has a normal mood and affect. His behavior is normal.       Significant Labs:  CBC:   Recent Labs   Lab 07/28/19  0430   WBC 35.91*   RBC 3.68*   HGB 9.9*   HCT 31.5*      MCV 86   MCH 26.9*   MCHC 31.4*     CMP:   Recent Labs   Lab 07/28/19  0430   GLU 95   CALCIUM 8.2*   ALBUMIN 2.0*   PROT 5.4*   *   K 4.6   CO2 23   CL 94*   BUN 35*   CREATININE 1.0   ALKPHOS 100   ALT 7*   AST 14   BILITOT 0.7       Significant Diagnostics:  I have reviewed all pertinent imaging results/findings within the past 24 hours.    VTE Risk Mitigation (From admission, onward)        Ordered     heparin, porcine (PF) 100 unit/mL injection flush 500 Units  Every 8 hours PRN      07/18/19 1712     IP VTE HIGH RISK PATIENT  Once      07/17/19 1930        Assessment/Plan:     * Pericardial effusion without cardiac tamponade  Currently without tamponade physiology necessitating urgent surgical intervention.  Due to pericardial involvement of disease process, a pericardial window would likely be difficult and present higher risks.  This case requires discussion of the risks, benefits, and long term goals of a pericardial drain versus a pericardial window. Have made patient NPO at midnight for potential procedure tomorrow, pending discussion with Dr. Fang. Would also discuss with either Interventional Cardiology or Radiology regarding pericardial drain.        Patsy Retana MD  Thoracic Surgery  [facility]

## 2019-07-28 NOTE — PLAN OF CARE
Problem: Adult Inpatient Plan of Care  Goal: Plan of Care Review  Outcome: Ongoing (interventions implemented as appropriate)  POC reviewed with patient and family, questions answered and concerns addressed. Tolerating diet but appetite poor. Anasarca continues and skin is weeping in areas. Right pleurex dressing reinforced. Rash remains very red over entire upper trunk and face-Prednisone started today for symptom relief. C/o constipation-patient checked and no impaction present but does have a moderate amount of soft stool in rectal vault, suppository given and patient was still unable to push it out, miralax given; no results yet. Urine output adequate, penis and scrotum very edematous. Patient resting this afternoon. In no acute distress; will continue to monitor.

## 2019-07-28 NOTE — ASSESSMENT & PLAN NOTE
Patient w/ reported hx of poor appetite since beginning chemotherapy    Plan:  - Patient noted to have poor appetite since admission, only eats small amounts of certain things  - notes that he feels full after eating small quantities of food  - Patient interested in MARINOL for appetite stimulation  - Marinol therapy not initiated at this time as it may worsen hypotension, consult cards prior to confirm

## 2019-07-28 NOTE — ASSESSMENT & PLAN NOTE
Patient presented to [facility] w/ Pericardial effusion and a hx of recurrent pleural effusions (newly diagnosed mesothelioma in 2019).   - R sided PleurX catheter in place, patient of Dr. Padilla (Pulm)  - 450 cc drained from PleurX on admission by Dr. Padilla  - CXR during admission have shown BL pleural effusion w/ areas of pulmonary infiltrates L>R    Plan:  - Patient notes continued SOB w/ exertion  - Marked anasarca noted  - Scattered wheezing on auscultation  - Duo nebs Q4h while awake  - Repeat CXR on 7/24 shows persistent bilateral pleural effusion as well as areas of pulmonary infiltrate L>R. Impression is stable without a large degree of interval change.   - IV 40mg lasix q8h, monitor for tamponade   - pulmonology consulted to evaluate for L sided thoracentesis and R pleurX cath drain

## 2019-07-28 NOTE — SUBJECTIVE & OBJECTIVE
Interval History: Patient reports continued dyspnea overnight. Rash has not resolved. Denies chest pain, nausea, vomiting, palpitations, presyncopal/syncopal episodes. Was receiving a breathing treatment this AM.     Review of Systems   Constitution: Positive for malaise/fatigue.   HENT: Negative.    Eyes: Negative.    Cardiovascular: Negative for chest pain.   Respiratory: Positive for cough and shortness of breath.    Endocrine: Negative.    Hematologic/Lymphatic: Negative.    Skin: Negative.    Musculoskeletal: Negative.    Gastrointestinal: Negative.    Genitourinary: Negative.    Neurological: Negative.    Psychiatric/Behavioral: Negative.      Objective:     Vital Signs (Most Recent):  Temp: 97.7 °F (36.5 °C) (07/28/19 1223)  Pulse: 86 (07/28/19 1327)  Resp: 18 (07/28/19 1327)  BP: (!) 111/56 (07/28/19 1223)  SpO2: (!) 93 % (07/28/19 1223) Vital Signs (24h Range):  Temp:  [97.5 °F (36.4 °C)-98.2 °F (36.8 °C)] 97.7 °F (36.5 °C)  Pulse:  [] 86  Resp:  [16-20] 18  SpO2:  [93 %-100 %] 93 %  BP: ()/(56-66) 111/56     Weight: 86.6 kg (190 lb 14.7 oz)  Body mass index is 28.19 kg/m².     SpO2: (!) 93 %  O2 Device (Oxygen Therapy): nasal cannula      Intake/Output Summary (Last 24 hours) at 7/28/2019 1447  Last data filed at 7/28/2019 0546  Gross per 24 hour   Intake 800 ml   Output 675 ml   Net 125 ml       Lines/Drains/Airways     Central Venous Catheter Line                 Port A Cath Single Lumen 06/20/19 right internal jugular 38 days          Drain                 Chest Tube Right Pleural -- days         Urethral Catheter 07/17/19 0920 Non-latex 16 Fr. 11 days                Physical Exam   Constitutional: He is oriented to person, place, and time. He appears well-developed.   HENT:   Head: Normocephalic and atraumatic.   Eyes: Pupils are equal, round, and reactive to light. Conjunctivae are normal.   Neck: Neck supple.   Cardiovascular: Intact distal pulses.   Irregularly irregular rate and rhythm.     Pulmonary/Chest: Effort normal.   Decreased breath sounds at the bilateral bases with intermittent wheezes   Abdominal: Soft. There is no tenderness.   Musculoskeletal: Normal range of motion. He exhibits edema.   Neurological: He is alert and oriented to person, place, and time.   Skin: Skin is warm and dry. Rash noted.   Diffuse anasarca and erythematous and edematous papules spread over chest, abdomen and back   Psychiatric: He has a normal mood and affect. His behavior is normal.       Significant Labs: All pertinent lab results from the last 24 hours have been reviewed.    Significant Imaging: Echocardiogram:   2D echo with color flow doppler: No results found for this or any previous visit. and Transthoracic echo (TTE) complete (Cupid Only):   Results for orders placed or performed during the hospital encounter of 07/17/19   Transthoracic echo (TTE) 2D with Color Flow   Result Value Ref Range    Ascending aorta 3.02 cm    STJ 2.45 cm    AV mean gradient 4 mmHg    Ao peak michael 1.27 m/s    Ao VTI 18.06 cm    IVRT 0.07 msec    IVS 1.08 0.6 - 1.1 cm    LA size 3.87 cm    Left Atrium Major Axis 4.95 cm    Left Atrium Minor Axis 5.10 cm    LVIDD 3.31 (A) 3.5 - 6.0 cm    LVIDS 2.30 2.1 - 4.0 cm    LVOT diameter 2.23 cm    LVOT peak VTI 12.22 cm    PW 0.84 0.6 - 1.1 cm    MV Peak A Michael 0.95 m/s    E wave decelartion time 122.99 msec    MV Peak E Michael 0.91 m/s    RA Major Axis 4.61 cm    RA Width 3.33 cm    RVDD 3.13 cm    Sinus 3.48 cm    TAPSE 1.30 cm    TR Max Michael 2.36 m/s    TDI LATERAL 0.06 m/s    TDI SEPTAL 0.11 m/s    LA WIDTH 3.90 cm    LV Diastolic Volume 44.47 mL    LV Systolic Volume 18.12 mL    RV S' 9.21 cm/s    LVOT peak michael 0.72 m/s    LV LATERAL E/E' RATIO 15.17 m/s    LV SEPTAL E/E' RATIO 8.27 m/s    FS 31 %    LA volume 64.45 cm3    LV mass 89.45 g    Left Ventricle Relative Wall Thickness 0.51 cm    AV valve area 2.64 cm2    AV Velocity Ratio 0.57     AV index (prosthetic) 0.68     E/A ratio 0.96      Mean e' 0.09 m/s    LVOT area 3.9 cm2    LVOT stroke volume 47.70 cm3    AV peak gradient 6 mmHg    E/E' ratio 10.71 m/s    LV Systolic Volume Index 9.1 mL/m2    LV Diastolic Volume Index 22.25 mL/m2    LA Volume Index 32.3 mL/m2    LV Mass Index 45 g/m2    Triscuspid Valve Regurgitation Peak Gradient 22 mmHg    BSA 2.02 m2    Right Atrial Pressure (from IVC) 8 mmHg    TV rest pulmonary artery pressure 30 mmHg    Narrative    · Normal left ventricular systolic function. The estimated ejection   fraction is 60%  · Concentric left ventricular remodeling.  · Normal LV diastolic function.  · Normal right ventricular systolic function.  · Mild tricuspid regurgitation.  · The estimated PA systolic pressure is 30 mm Hg  · Intermediate central venous pressure (8 mm Hg).  · Small circumferential pericardial effusion. Shaggy visceral pericardium.   No evidence of tamponade physiology       and X-Ray: CXR: X-Ray Chest 1 View (CXR):   Results for orders placed or performed during the hospital encounter of 07/17/19   X-Ray Chest 1 View    Narrative    EXAMINATION:  XR CHEST 1 VIEW    CLINICAL HISTORY:  pericardial effusion;    TECHNIQUE:  Single frontal view of the chest was performed.    COMPARISON:  07/24/2019    FINDINGS:  Right chest wall port catheter tip projects over the mid to distal SVC.  The cardiomediastinal silhouette is enlarged, similar to the previous exam allowing for differences in positioning..  There is obscuration of the costophrenic angles, left greater than right, suggesting effusions..  The trachea is midline.  The lungs are symmetrically expanded bilaterally with patchy increased interstitial and parenchymal attenuation bilaterally, suggesting edema.  There is increased parenchymal attenuation projected over the left lower lung zone, underlying consolidation is not excluded..  There is no pneumothorax.  The osseous structures are unchanged..      Impression    As above      Electronically signed  by: Julio C Hoyt MD  Date:    07/27/2019  Time:    15:31    and X-Ray Chest PA and Lateral (CXR): No results found for this visit on 07/17/19.

## 2019-07-28 NOTE — CONSULTS
"[facility]  Pulmonary Medicine  Consult Note    Patient Name: Cale Harding  MRN: [id number]  Admission Date: 7/17/2019  Hospital Length of Stay: 11 days  Code Status: Full Code  Attending Physician: Chavo Devine DO, F*   Primary Care Provider: Jj Escobedo MD   Principal Problem: Pericardial effusion without cardiac tamponade    Inpatient consult to Pulmonology  Consult performed by: Galdino Ibarra MD  Consult ordered by: Hipolito Enrique MD        Subjective:     HPI:  Mr. Cale Harding is a 78 year old male with prostate cancer s/p prostatectomy/radiation, L epitheloid mesothelioma who completed 6 cycles of chemo with Carboplatin, Alimta, Avastin with Alimta and Avastin maintenance until 1/30/19 who also completed 7 cycles of Gemzar with progression of disease on CT scans from 7/9/19. He is currently hospitalized due to afib with RVR and large pericardial effusion being followed by CTS and IR for consideration of pericardial drain vs pericardial window on 7/29/19.    Outpatient he has been under the care of Dr. Woodward in pulmonology who has been following Mr. Harding's malignancy effusions. He has had multiple L sided thoracenteses and a right sided PleurX catheter is in place. Pulmonary medicine has been previously consulted during this admission for "Pt. of Dr. Padilla, pleurX catheter drainage" and on 7/18 Dr. Woodward noted "patient with R PleurX catheter placed Lia 3, 2019. Patient with concern for some purulent drainage near site during the last 2-3 weeks. He was started on Augmentin last week with plan to remove PleurX on Friday July 19th." and recommended to complete 7 day course of augmentin and maintain PleurX for palliative drainage if need be. Pulmonary medicine was consulted again on 7/28 for "evaluation of L sided thoracentesis and R sided drain".     On examination of the patient at bedside patient found in no acute distress, restful breathing on 1L of supplemental oxygen " via N/C. Patient with confluent erythematous rash being followed by dermatology which has undergone punch biopsy of which he . He has dullness to percussion L>R and complains of dyspnea with minimal exertion.       Hospital/ICU Course:  No notes on file    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    Prostate cancer 2002    Urinary tract infection        Past Surgical History:   Procedure Laterality Date    BIOPSY-DIAPHRAGM N/A 3/19/2018    Performed by Galdino Fang MD at [facility] OR [address] [place] [address]    AQWWGU-LGPEGO-CA N/A 2/1/2018    Performed by [facility] Diagnostic Provider at [facility] OR [facility]    BRONCHOSCOPY N/A 6/3/2019    Performed by [facility] Diagnostic Provider at [facility] OR [address] [place] [address]    COLONOSCOPY  10/20/2012    COLONOSCOPY  11/19/2014    dr machado ( repeat in 3 years per the pt )    CYSTOSCOPY, WITH RETROGRADE PYELOGRAM Bilateral 7/15/2019    Performed by Galdino Philippe MD at [facility] [place]    CYSTOSCOPY, WITH URETHRAL DILATION Bilateral 7/15/2019    Performed by Galdino Philippe MD at [facility] [place]    ELBOW SURGERY Left 2013    dislocation    QRYZBGEUW-RLNO-I-CATH N/A 6/20/2019    Performed by [facility] Diagnostic Provider at [facility] OR [facility]    LAPAROSCOPY-DIAGNOSTIC N/A 3/19/2018    [facility]    Pericardiocentesis N/A 7/18/2019    Performed by Frank Javier MD at [facility] CATH LAB    PROCTECTOMY  2002    RELEASE, CONTRACTURE, BLADDER NECK N/A 7/15/2019    Performed by Galdino Philippe MD at [facility] [place]    SHOULDER ARTHROSCOPY W/ ROTATOR CUFF REPAIR Right 2008    THORACOSCOPY-VIDEO ASSISTED (VATS) W/PLEURAL BIOPSY Left 3/19/2018    Performed [facility]    URETHROGRAM, RETROGRADE N/A 7/15/2019    Performed by Galdino Philippe MD at [facility] [place]       Review of patient's allergies indicates:   Allergen Reactions     Bactrim [sulfamethoxazole-trimethoprim] Other (See Comments)     Joint pains       Family History     Problem Relation (Age of Onset)    Cancer Mother    Heart attack Sister, Sister    Heart disease Father, Brother, Sister, Brother, Sister, Brother    No Known Problems Son, Son, Son    Prostate cancer Brother, Brother, Brother        Tobacco Use    Smoking status: Former Smoker     Packs/day: 2.00     Years: 15.00     Pack years: 30.00     Types: Cigarettes     Last attempt to quit: 1970     Years since quittin.6    Smokeless tobacco: Former User    Tobacco comment: pt quit 40 years ago   Substance and Sexual Activity    Alcohol use: No     Alcohol/week: 16.8 oz     Types: 28 Cans of beer per week     Comment: None since Nov 15 th 2017    Drug use: No    Sexual activity: Not Currently      Review of Systems   Constitutional: Positive for fatigue and unexpected weight change. Negative for chills and fever.   HENT: Negative.    Respiratory: Positive for shortness of breath.    Cardiovascular: Positive for leg swelling.   Genitourinary: Negative.    Skin: Positive for rash.   Allergic/Immunologic: Positive for immunocompromised state.   Neurological: Positive for weakness.     Objective:     Vital Signs (Most Recent):  Temp: 97.9 °F (36.6 °C) (19 1601)  Pulse: (!) 126 (19 1631)  Resp: 18 (19 1631)  BP: (!) 106/59 (19 1601)  SpO2: 99 % (19 1631) Vital Signs (24h Range):  Temp:  [97.5 °F (36.4 °C)-98.2 °F (36.8 °C)] 97.9 °F (36.6 °C)  Pulse:  [] 126  Resp:  [16-20] 18  SpO2:  [93 %-100 %] 99 %  BP: ()/(56-66) 106/59   Weight: 86.6 kg (190 lb 14.7 oz)  Body mass index is 28.19 kg/m².      Intake/Output Summary (Last 24 hours) at 2019 5828  Last data filed at 2019 0546  Gross per 24 hour   Intake 560 ml   Output 625 ml   Net -65 ml       Physical Exam   Constitutional: He is oriented to person, place, and time. He appears well-developed and well-nourished. He  has a sickly appearance. No distress. Nasal cannula in place.   HENT:   Head: Atraumatic.   Eyes: Right eye exhibits no discharge. Left eye exhibits no discharge.   Cardiovascular: Normal rate and regular rhythm.   Pulmonary/Chest: No respiratory distress. He has no wheezes.   Decreased breath sounds  Dullness to percussion L>R   Abdominal: Soft.   Musculoskeletal: He exhibits edema (pitting edema BL LE and R forearm).   Neurological: He is alert and oriented to person, place, and time.   Skin: Skin is warm and dry. Rash noted. There is erythema.   Confluent rash on trunk (abdo into groin/back)  Less confluent on upper chest  Erythematous, scaling in portions    Nursing note and vitals reviewed.      Vents:  Oxygen Concentration (%): 28 (07/28/19 1631)  Lines/Drains/Airways     Central Venous Catheter Line                 Port A Cath Single Lumen 06/20/19 right internal jugular 38 days          Drain                 Chest Tube Right Pleural -- days         Urethral Catheter 07/17/19 0920 Non-latex 16 Fr. 11 days              Significant Labs:    CBC/Anemia Profile:  Recent Labs   Lab 07/27/19  0336 07/28/19  0430   WBC 25.19* 35.91*   HGB 9.1* 9.9*   HCT 29.6* 31.5*    319   MCV 87 86   RDW 19.8* 20.2*        Chemistries:  Recent Labs   Lab 07/27/19  0336 07/28/19  0430   * 126*   K 4.6 4.6   CL 93* 94*   CO2 23 23   BUN 28* 35*   CREATININE 0.8 1.0   CALCIUM 8.2* 8.2*   ALBUMIN 2.1* 2.0*   PROT 5.4* 5.4*   BILITOT 0.3 0.7   ALKPHOS 93 100   ALT 6* 7*   AST 10 14   MG 1.9 1.9   PHOS 3.5 4.0       All pertinent labs within the past 24 hours have been reviewed.    Significant Imaging: I have reviewed all pertinent imaging results/findings within the past 24 hours.      ABG  No results for input(s): PH, PO2, PCO2, HCO3, BE in the last 168 hours.  Assessment/Plan:     Pulmonary  Malignant pleural effusion  Patient with known malignancy pleural effusion, s/p R PleurX, s/p multiple L thoracenteses with   Trace. Patient/family noting increased brown drainage and clear fluid from area around PleurX site. CXR with bilateral pleural effusion L>R and persistent pulmonary infiltration on left and right.     Plan:  Will discuss repeat thoracentesis on L but nonemergent consideration as patient is stable from respiratory status  Continue skin care per dermatology recommendations  Consider US UE to rule out DVT  No gross signs of infection around catheter   Will discuss overall plan with Dr. Woodward on 7/29          Thank you for your consult. I will follow-up with patient. Please contact us if you have any additional questions.     Galdino Ibarra MD  Pulmonary Medicine  [facility]

## 2019-07-28 NOTE — PLAN OF CARE
Problem: Adult Inpatient Plan of Care  Goal: Plan of Care Review  Outcome: Ongoing (interventions implemented as appropriate)  POC reviewed with patient and spouse; understanding verbalized. Pt remains up with assist, walker at bedside. Free from falls and injuries this shift. Tele in place, with normal sinus rhythm initially and a-fib throughout most of shift. Regular diet. Good appetite.  2L NC with O2 sats 94-99%. Rash still present on torso, arms, and face. Rash weeping in several areas. Pt c/o discomfort and pruritus. PRN oral Benadryl given, with mild-moderate results achieved. Scheduled antibiotic cream applied to all areas. Scheduled IV Lasix given. Pt has Cano catheter, draining clear yellow urine to catheter bag, below the bladder. No BM's this shift. Pt instructed to call when getting OOB. Pt. with nonskid footwear on, bed in lowest position, and locked with bed rails up x 2. Pt. has call light and personal items within reach. Wife @ bedside. VSS and afebrile this shift. All questions and concerns addressed at this time. Will continue to monitor.

## 2019-07-28 NOTE — ASSESSMENT & PLAN NOTE
SOB    Patient with symptoms of SOB and diffuse swelling on admission who is not on lasix at home with an admission BNP of 374.    Plan:  - Marked anasarca noted  - 40mg IV Lasix q8h  - Strict I/O   - Daily weights  - Keep mag >2 and phos> 3  - Cardiac diet with decreased salt intake   - fluid restriction to 1500cc/day  - Trend chemistries daily

## 2019-07-28 NOTE — PROGRESS NOTES
"[facility]  Hematology/Oncology  Progress Note    Patient Name: Cale Harding  Admission Date: 7/17/2019  Hospital Length of Stay: 11 days  Code Status: Full Code     Subjective:     HPI:  Mr. Cale Harding is a 78-year-old male with a past medical history of prostate cancer (s/p prostatectomy), mesothelioma (on chemotherapy), with recurrent pleural effusion  ( with a right PleurX) and hypertension who is a transfer for evaluation of pericardial effusion.       Patient was scheduled for elective cystography with Urology (Dr. Philippe) for dilation of bladder neck contracture.  On arrival to elective surgical suite, patient found to be tachycardic and heart rate was irregular.  EKG confirmed atrial fibrillation with RVR.  Surgery was cancelled and he was admitted to the ICU for a diltiazem drip. He converted to sinus but would go in and out of afib, was started on lopressor 25 BID and echo done showed moderate size of pericardial effusion with no tamponade physiology however increased in effusion from 7/5, cardiology in the OSH was planning for pericardiocentesis but requested transfer for possible window vs pericardiocentesis and was sent to [facility]    On arrival patient was hemodynamically stable  , NSR, converted in and out of afib. Cardiology was consulted for input on afib management in the setting of pericardial effusion and need for pericardiocentesis/ closer ccu monitoring.     Of note patient has a PleurX on the right that is possibly infected and he was supposed to get it removed this Friday by his pulmonologist Dr. Woodward     Interval History: Patient is sitting up in the chair during the interview and states that he feels a little better today. His SOB has stayed "about the same" but notes increase pruritis from drug rash. He further notes that he is experiencing more drainage and "leaks" from his penis, scrotum, and pleurX.  Persistent anasarca noted. Diffuse rash noted on " patient's chest, abdomen, and back still present with some improvement of facial rash. Patient notes that he still has a poor appetite and would like to try MARINOL therapy for appetite stimulation barring any contraindications to rate control medication. He notes some constipation over the past few days and denies HA, f/c, n/v, CP, and Abd pain.     CTS consulted for pericardial window evaluation. Patient not experiencing tamponade at this time so intervention not emergent per CTS. CTS team to discuss with interventional cardiology to decide on most appropriate intervention. Planning for intervention on Monday (7/29).     Oncology Treatment Plan:   OP NSCLC VINORELBINE WEEKLY    Medications:  Continuous Infusions:  Scheduled Meds:   albuterol-ipratropium  3 mL Nebulization Q4H WAKE    furosemide (LASIX) IV  40 mg Intravenous Q8H    metoprolol succinate  200 mg Oral Daily    ondansetron  4 mg Intravenous Once    pantoprazole  40 mg Oral Daily    predniSONE  10 mg Oral TID    triamcinolone acetonide 0.025%   Topical (Top) BID     PRN Meds:albuterol-ipratropium, alteplase, Dextrose 10% Bolus, Dextrose 10% Bolus, diphenhydrAMINE, glucagon (human recombinant), glucose, glucose, guaifenesin 100 mg/5 ml, heparin, porcine (PF), HYDROcodone-acetaminophen, ibuprofen, metoprolol, ondansetron, ramelteon, sodium chloride 0.9%     Review of Systems  Objective:     Vital Signs (Most Recent):  Temp: 97.6 °F (36.4 °C) (07/28/19 0740)  Pulse: 88 (07/28/19 0903)  Resp: 18 (07/28/19 0903)  BP: (!) 91/58 (07/28/19 0740)  SpO2: 100 % (07/28/19 0903) Vital Signs (24h Range):  Temp:  [97.5 °F (36.4 °C)-98.2 °F (36.8 °C)] 97.6 °F (36.4 °C)  Pulse:  [] 88  Resp:  [16-20] 18  SpO2:  [94 %-100 %] 100 %  BP: ()/(56-65) 91/58     Weight: 86.6 kg (190 lb 14.7 oz)  Body mass index is 28.19 kg/m².  Body surface area is 2.05 meters squared.      Intake/Output Summary (Last 24 hours) at 7/28/2019 1008  Last data filed at 7/28/2019  0546  Gross per 24 hour   Intake 800 ml   Output 975 ml   Net -175 ml       Physical Exam   Constitutional: He is oriented to person, place, and time. He appears well-developed. No distress.   HENT:   Head: Normocephalic and atraumatic.   Eyes: Conjunctivae are normal. No scleral icterus.   Neck: Normal range of motion. JVD present.   Cardiovascular: Normal rate, normal heart sounds and intact distal pulses. An irregularly irregular rhythm present.   Pulmonary/Chest: Accessory muscle usage present. No tachypnea. No respiratory distress. He has decreased breath sounds in the right lower field and the left lower field. He has wheezes (scattered). He has no rhonchi.   Abdominal: Soft. Bowel sounds are decreased. There is no tenderness.   Genitourinary:   Genitourinary Comments: Edematous scrotum and penis, haider in place w/ leak noted from around the catheter and scrotum   Musculoskeletal: Normal range of motion. He exhibits edema (BL 2+ pitting edema in upper and lower extremities).   Neurological: He is alert and oriented to person, place, and time.   Skin: Skin is warm and dry. Rash noted. He is not diaphoretic.   Diffuse Anasarca and erythematous and edematous papules spread over chest, abdomen and back. Facial rash improving   Psychiatric: He has a normal mood and affect. His behavior is normal. Judgment and thought content normal.   Nursing note and vitals reviewed.      Significant Labs:   BMP:   Recent Labs   Lab 07/27/19  0336 07/28/19  0430   * 95   * 126*   K 4.6 4.6   CL 93* 94*   CO2 23 23   BUN 28* 35*   CREATININE 0.8 1.0   CALCIUM 8.2* 8.2*   MG 1.9 1.9   , CBC:   Recent Labs   Lab 07/27/19 0336 07/28/19  0430   WBC 25.19* 35.91*   HGB 9.1* 9.9*   HCT 29.6* 31.5*    319   , CMP:   Recent Labs   Lab 07/27/19 0336 07/28/19  0430   * 126*   K 4.6 4.6   CL 93* 94*   CO2 23 23   * 95   BUN 28* 35*   CREATININE 0.8 1.0   CALCIUM 8.2* 8.2*   PROT 5.4* 5.4*   ALBUMIN 2.1* 2.0*    BILITOT 0.3 0.7   ALKPHOS 93 100   AST 10 14   ALT 6* 7*   ANIONGAP 8 9   EGFRNONAA >60.0 >60.0    and Urine Studies: No results for input(s): COLORU, APPEARANCEUA, PHUR, SPECGRAV, PROTEINUA, GLUCUA, KETONESU, BILIRUBINUA, OCCULTUA, NITRITE, UROBILINOGEN, LEUKOCYTESUR, RBCUA, WBCUA, BACTERIA, SQUAMEPITHEL, HYALINECASTS in the last 48 hours.    Invalid input(s): WRIGHTSUR    Diagnostic Results:  I have reviewed all pertinent imaging results/findings within the past 24 hours.    Assessment/Plan:     * Pericardial effusion without cardiac tamponade  Patient transferred [place] [facility] [place] for evaluation by cardiology/CTS for pericardial window or pericardiocentesis on 7/17/19. Pericardiocentesis was preformed by interventional cards, pt tolerated procedure well, produced 570cc serosanguinous fluid.   - Maximize medical management per cards   - Appreciate cardiology recs    Plan:  - Repeat Echo on 7/26 showed recurrence of pericardial effusion  - No tamponade at this time  - CTS consulted to evaluate for pericardial window  - CTS to discuss w/ interventional cardiology window vs. Pericardial drain  - Intervention TBD on Monday 7/29    SOB (shortness of breath) on exertion  Patient presented to [facility] w/ Pericardial effusion and a hx of recurrent pleural effusions (newly diagnosed mesothelioma in 2019).   - R sided PleurX catheter in place, patient of Dr. Padilla (Pulm)  - 450 cc drained from PleurX on admission by Dr. Padilla  - CXR during admission have shown BL pleural effusion w/ areas of pulmonary infiltrates L>R    Plan:  - Patient notes continued SOB w/ exertion  - Marked anasarca noted  - Scattered wheezing on auscultation  - Duo nebs Q4h while awake  - Repeat CXR on 7/24 shows persistent bilateral pleural effusion as well as areas of pulmonary infiltrate L>R. Impression is stable without a large degree of interval change.   - IV 40mg lasix q8h, monitor for tamponade   - pulmonology consulted to evaluate for L sided  thoracentesis and R pleurX cath drain    Acute on chronic diastolic heart failure  SOB    Patient with symptoms of SOB and diffuse swelling on admission who is not on lasix at home with an admission BNP of 374.    Plan:  - Marked anasarca noted  - 40mg IV Lasix q8h  - Strict I/O   - Daily weights  - Keep mag >2 and phos> 3  - Cardiac diet with decreased salt intake   - fluid restriction to 1500cc/day  - Trend chemistries daily      Paroxysmal atrial fibrillation  See Afib w/ RVR    Atrial fibrillation with RVR  First noted on ECG 7/15/19 following pericardiocentesis   - Patientt reports longer history of palpitations  - likely due to pericardial effusion vs. Underlying malignancy  - CHADsVASC 3 suggestive of 3.2% annual stroke risk. Would consider anticoagulation with Epixaban 5 mg BID  - withhold Enalipril and switch to Losartan if HTN meds deemed necessary at discharge per cards recs  - Was originally rate controlled w/ lopressor 50 mg, recurred, then increased to toprolol XL 100mg, recurred, cardiology consulted and started diltiazem drip w/ heparin -> rate controlled  - Transitioned from IV to PO diltiazem 60mg Q6h and remained rate controlled  - Heparin d/c due to hematuria  - Diffuse rash across chest, abdomen, and back developed a few days after beginning diltiazem therapy  - Determine to be drug induced rash, derm and cardiology concur  - Per cardiology res, diltiazem discontinued and patients dose of toprolol XL increased from 100 to 200mg Q24h    Plan:  - Appreciate cardiology recs  - Monitor for rate control and rash resolution on new regimen  - Continue telemetry   -TEDs and SCDs in place for DVT prophylaxis    Pleural effusion  See malignant pleural effusion    Mesothelioma of left lung  - Patient of Dr. Foster on palliative chemo with Gemzar   - Controlled pain on norco   - OP follow up with oncologist     Malignant pleural effusion  Patient of  , R sided PleuX cath in place, 450cc drained upon  admission    Plan:  - Patient on 3L w/ mild SOB   - Repeat CXR on 7/24 shows persistent bilateral pleural effusion as well as areas of pulmonary infiltrate L>R. Impression is stable without a large degree of interval change.   - Pulm consulted to drain per patient request  - Patient on duo nebs Q4h while awake  - lasix IV 40 mg daily  - continue to monitor    Rash, drug  Patient developed a suspected AGEP (acute generalized exanthematous pustulosis) rash after initiating Diltiazem. Dermatology was consulted and is in agreement. They preformed a biopsy, path pending.    Plan:   - Diltiazem therapy stopped and replaced with alternative treatment  - Rash had spread to face on 9/27, improved today 9/28  - topical steroid for pruritis  - PO steroid taper (50->40->30->20) for pruritis and inflammation   - continue to monitor    Acquired contracture of bladder neck  Urology consulted in the OSH. S/p Bladder neck contracture release and urethral dilation on 7/15/19. Patient of Dr. Philippe.    Plan:  - Haider in place  - Marked penile and scrotal edema  - Hematuria resolved, nursing has been flushing cath per Urology recs  - urology notified, they do not believe the haider should be changed at this time due to anasarca   - Cleared for d/c from urology perspective, to f/u w/ Dr. Philippe  - Increased leak noted from penis around haider, most likely due to lasix and anasarca    History of prostate cancer  S/p prostectomy   Haider in place for urinary retention 2/2 bladder contracture  Sees urology as OP   Urology rec to keep haider in place    Decrease in appetite  Patient w/ reported hx of poor appetite since beginning chemotherapy    Plan:  - Patient noted to have poor appetite since admission, only eats small amounts of certain things  - notes that he feels full after eating small quantities of food  - Patient interested in MARINOL for appetite stimulation  - Marinol therapy not initiated at this time as it may worsen hypotension,  consult cards to confirm     Gastroesophageal reflux disease  - PPI daily     Essential hypertension  - hold all antihypertensives   - he is on enalapril at home, will switch to losartan per cards recs before d/c        Hipolito Enrique MD  Hematology/Oncology  [facility]    Attending Addendum:  The patient was seen, examined, and discussed on rounds with the team.  I agree with the assessment and plan as outlined for Cale Schusterin.  Patients swelling mildly improved and will continue on with lasix.  Plans for either pericardial window or drain tomorrow.  Will ask pulmonary to evaluate pleurx catheter and also need for thoracentesis on left.  Overall poor prognosis with him in terms of malignancy as he has progressed through multiple regimens.   Discussed code status with him and he wishes to remain full code.    Chavo Devine DO, FACP  Hematology & Oncology  [facility] [place] [address]  ph. [phone]  Fax. [phone]

## 2019-07-28 NOTE — ASSESSMENT & PLAN NOTE
Patient with known malignancy pleural effusion, s/p R PleurX, s/p multiple L thoracenteses with Dr. Woodward. Patient/family noting increased brown drainage and clear fluid from area around PleurX site. CXR with bilateral pleural effusion L>R and persistent pulmonary infiltration on left and right.     Plan:  Will discuss repeat thoracentesis on L  Continue skin care per dermatology recommendations  Will discuss overall plan with Dr. Woodward on 7/29

## 2019-07-28 NOTE — HPI
"Mr. Cale Harding is a 78 year old male with prostate cancer s/p prostatectomy/radiation, L epitheloid mesothelioma who completed 6 cycles of chemo with Carboplatin, Alimta, Avastin and Alimta and Avastin maintenance until 1/30/19 who also completed 7 cycles of Gemzar with progression of disease on CT scans from 7/9/19. He is currently hospitalized due to afib with RVR and large pericardial effusion being followed by CTS and IR for consideration of pericardial drain vs pericardial window on 7/29/19.    Outpatient he has been under the care of Dr. Woodward in pulmonology who has been following Mr. Harding's malignancy effusions. He has had multiple L sided thoracenteses and a right sided PleurX catheter is in place. Pulmonary medicine has been previously consulted during this admission for "Pt. of Dr. Padilla, pleurX catheter drainage" and on 7/18 Dr. Woodward noted "patient with R PleurX catheter placed Lia 3, 2019. Patient with concern for some purulent drainage near site during the last 2-3 weeks. He was started on Augmentin last week with plan to remove PleurX on Friday July 19th." and recommended to complete 7 day course of augmentin and maintain PleurX for palliative drainage if need be. Pulmonary medicine was consulted again on 7/28 for "evaluation of L sided thoracentesis and R sided drain".     On examination of the patient at bedside patient found in no acute distress, restful breathing on 1L of supplemental oxygen via N/C. Patient with confluent erythematous rash being followed by dermatology which has undergone punch biopsy of which he . He has dullness to percussion L>R and complains of dyspnea with minimal exertion.     "

## 2019-07-28 NOTE — SUBJECTIVE & OBJECTIVE
"Interval History: Patient is sitting up in the chair during the interview and states that he feels a little better today. His SOB has stayed "about the same" but notes increase pruritis from drug rash. He further notes that he is experiencing more drainage and "leaks" from his penis, scrotum, and pleurX.  Persistent anasarca noted. Diffuse rash noted on patient's chest, abdomen, and back still present with some improvement of facial rash. Patient notes that he still has a poor appetite and would like to try MARINOL therapy for appetite stimulation barring any contraindications to rate control medication. He notes some constipation over the past few days and denies HA, f/c, n/v, CP, and Abd pain.     CTS consulted for pericardial window evaluation. Patient not experiencing tamponade at this time so intervention not emergent per CTS. CTS team to discuss with interventional cardiology to decide on most appropriate intervention. Planning for intervention on Monday (7/29).     Oncology Treatment Plan:   OP NSCLC VINORELBINE WEEKLY    Medications:  Continuous Infusions:  Scheduled Meds:   albuterol-ipratropium  3 mL Nebulization Q4H WAKE    furosemide (LASIX) IV  40 mg Intravenous Q8H    metoprolol succinate  200 mg Oral Daily    ondansetron  4 mg Intravenous Once    pantoprazole  40 mg Oral Daily    predniSONE  10 mg Oral TID    triamcinolone acetonide 0.025%   Topical (Top) BID     PRN Meds:albuterol-ipratropium, alteplase, Dextrose 10% Bolus, Dextrose 10% Bolus, diphenhydrAMINE, glucagon (human recombinant), glucose, glucose, guaifenesin 100 mg/5 ml, heparin, porcine (PF), HYDROcodone-acetaminophen, ibuprofen, metoprolol, ondansetron, ramelteon, sodium chloride 0.9%     Review of Systems  Objective:     Vital Signs (Most Recent):  Temp: 97.6 °F (36.4 °C) (07/28/19 0740)  Pulse: 88 (07/28/19 0903)  Resp: 18 (07/28/19 0903)  BP: (!) 91/58 (07/28/19 0740)  SpO2: 100 % (07/28/19 0903) Vital Signs (24h Range):  Temp: "  [97.5 °F (36.4 °C)-98.2 °F (36.8 °C)] 97.6 °F (36.4 °C)  Pulse:  [] 88  Resp:  [16-20] 18  SpO2:  [94 %-100 %] 100 %  BP: ()/(56-65) 91/58     Weight: 86.6 kg (190 lb 14.7 oz)  Body mass index is 28.19 kg/m².  Body surface area is 2.05 meters squared.      Intake/Output Summary (Last 24 hours) at 7/28/2019 1008  Last data filed at 7/28/2019 0546  Gross per 24 hour   Intake 800 ml   Output 975 ml   Net -175 ml       Physical Exam   Constitutional: He is oriented to person, place, and time. He appears well-developed. No distress.   HENT:   Head: Normocephalic and atraumatic.   Eyes: Conjunctivae are normal. No scleral icterus.   Neck: Normal range of motion. JVD present.   Cardiovascular: Normal rate, normal heart sounds and intact distal pulses. An irregularly irregular rhythm present.   Pulmonary/Chest: Accessory muscle usage present. No tachypnea. No respiratory distress. He has decreased breath sounds in the right lower field and the left lower field. He has wheezes (scattered). He has no rhonchi.   Abdominal: Soft. Bowel sounds are decreased. There is no tenderness.   Genitourinary:   Genitourinary Comments: Edematous scrotum and penis, haider in place w/ leak noted from around the catheter and scrotum   Musculoskeletal: Normal range of motion. He exhibits edema (BL 2+ pitting edema in upper and lower extremities).   Neurological: He is alert and oriented to person, place, and time.   Skin: Skin is warm and dry. Rash noted. He is not diaphoretic.   Diffuse Anasarca and erythematous and edematous papules spread over chest, abdomen and back. Facial rash improving   Psychiatric: He has a normal mood and affect. His behavior is normal. Judgment and thought content normal.   Nursing note and vitals reviewed.      Significant Labs:   BMP:   Recent Labs   Lab 07/27/19  0336 07/28/19  0430   * 95   * 126*   K 4.6 4.6   CL 93* 94*   CO2 23 23   BUN 28* 35*   CREATININE 0.8 1.0   CALCIUM 8.2* 8.2*    MG 1.9 1.9   , CBC:   Recent Labs   Lab 07/27/19  0336 07/28/19  0430   WBC 25.19* 35.91*   HGB 9.1* 9.9*   HCT 29.6* 31.5*    319   , CMP:   Recent Labs   Lab 07/27/19  0336 07/28/19  0430   * 126*   K 4.6 4.6   CL 93* 94*   CO2 23 23   * 95   BUN 28* 35*   CREATININE 0.8 1.0   CALCIUM 8.2* 8.2*   PROT 5.4* 5.4*   ALBUMIN 2.1* 2.0*   BILITOT 0.3 0.7   ALKPHOS 93 100   AST 10 14   ALT 6* 7*   ANIONGAP 8 9   EGFRNONAA >60.0 >60.0    and Urine Studies: No results for input(s): COLORU, APPEARANCEUA, PHUR, SPECGRAV, PROTEINUA, GLUCUA, KETONESU, BILIRUBINUA, OCCULTUA, NITRITE, UROBILINOGEN, LEUKOCYTESUR, RBCUA, WBCUA, BACTERIA, SQUAMEPITHEL, HYALINECASTS in the last 48 hours.    Invalid input(s): [facility]    Diagnostic Results:  I have reviewed all pertinent imaging results/findings within the past 24 hours.

## 2019-07-29 PROBLEM — D72.823 LEUKEMOID REACTION: Status: ACTIVE | Noted: 2019-01-01

## 2019-07-29 NOTE — ASSESSMENT & PLAN NOTE
S/p prostectomy.  Haider in place for urinary retention 2/2 bladder contracture.  Urology recommends keeping the haider catheter in place.

## 2019-07-29 NOTE — CONSULTS
[facility]  Interventional Cardiology  Consult Note    Patient Name: Cale Harding  MRN: [id number]  Admission Date: 7/17/2019  Hospital Length of Stay: 12 days  Code Status: Full Code   Attending Provider: Sreedhar Coffey MD  Consulting Provider: Alcon Fields MD  Primary Care Physician: Jj Escobedo MD  Principal Problem:Mesothelioma of left lung    Patient information was obtained from patient and past medical records.     Inpatient consult to Interventional Cardiology  Consult performed by: Alcon Fields MD  Consult ordered by: Justin Blackwell MD        Subjective:     Chief Complaint:  pericardial effusion      HPI:  Mr. Harding is a 78-year-old male with prostate cancer (s/p prostatectomy), mesothelioma (on chemotherapy), with recurrent pleural effusion  who initially presented to OSH 7/15/19 for elective cystography for dilation of bladder neck contracture. He was noted to be tachycardic with HR in 150s bpm. He was placed on a diltiazem drip , started on lopressor and noted to have a moderate sized pericardial effusion without tamponade physiology.    · Pericardiocentesis (7/18) moderate pericardial effusion; same day he underwent pericardiocentesis and removed 570 cc of serosanguinous pericardial fluid.   · TTE (7/21) showed Small circumferential pericardial effusion. Shaggy visceral pericardium. No evidence of tamponade physiology  · TTE (7/26) Circumferential pericardial effusion; unchanged from 7/21/19. No evidence of tamponade.    Mr. Cale Harding has been actively diurese with sub optimal UOP. Intervetional cardiology re-consulted for recurrent pericardial effusion.     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    Prostate cancer 2002    Urinary tract infection        Past Surgical History:   Procedure Laterality Date     BIOPSY-DIAPHRAGM N/A 3/19/2018    Performed by Galdino Fang MD at [facility] OR [address] [place] [address]    VSVRTT-LCUFRW-UZ N/A 2/1/2018    Performed by [facility] Diagnostic Provider at [facility] OR [address] [place] [address]    BRONCHOSCOPY N/A 6/3/2019    Performed by [facility] Diagnostic Provider at [facility] OR [address] [place] [address]    COLONOSCOPY  10/20/2012    COLONOSCOPY  11/19/2014    dr machado ( repeat in 3 years per the pt )    CYSTOSCOPY, WITH RETROGRADE PYELOGRAM Bilateral 7/15/2019    [facility]    CYSTOSCOPY, WITH URETHRAL DILATION Bilateral 7/15/2019    [facility]    ELBOW SURGERY Left 2013    dislocation    QBDEAHCLT-RLUR-A-CATH N/A 6/20/2019    Performed by [facility] Diagnostic Provider at [facility] OR [address] [place] [address]    LAPAROSCOPY-DIAGNOSTIC N/A 3/19/2018    Performed by Galdino Fang MD at [facility] OR [address] [place] [address]    Pericardiocentesis N/A 7/18/2019    Performed by Frank Javier MD at [facility] CATH LAB    PROCTECTOMY  2002    RELEASE, CONTRACTURE, BLADDER NECK N/A 7/15/2019    [facility]    SHOULDER ARTHROSCOPY W/ ROTATOR CUFF REPAIR Right 2008    THORACOSCOPY-VIDEO ASSISTED (VATS) W/PLEURAL BIOPSY Left 3/19/2018    Performed by Galdino Fang MD at [facility] OR [address] [place] [address]    URETHROGRAM, RETROGRADE N/A 7/15/2019    [facility]       Review of patient's allergies indicates:   Allergen Reactions    Bactrim [sulfamethoxazole-trimethoprim] Other (See Comments)     Joint pains       PTA Medications   Medication Sig    dextromethorphan-guaifenesin  mg (MUCINEX DM)  mg per 12 hr tablet Take 1 tablet by mouth every 12 (twelve) hours.    HYDROcodone-acetaminophen (NORCO) 5-325 mg per tablet Take 1 tablet by mouth every 6 (six) hours as needed for Pain.    polyethylene glycol (MIRALAX) 17 gram/dose powder Take 17 g by mouth once daily.    [DISCONTINUED] amoxicillin-clavulanate 875-125mg (AUGMENTIN) 875-125 mg per tablet Take 1  tablet by mouth 2 (two) times daily.    [DISCONTINUED] calcium carbonate (TUMS) 200 mg calcium (500 mg) chewable tablet Take 1 tablet by mouth as needed for Heartburn.    [DISCONTINUED] enalapril (VASOTEC) 5 MG tablet Take 1 tablet (5 mg total) by mouth 2 (two) times daily.    [DISCONTINUED] ibuprofen (ADVIL,MOTRIN) 100 MG tablet Take 100 mg by mouth every 6 (six) hours as needed for Temperature greater than.    [DISCONTINUED] LACTOBACILLUS ACIDOPHILUS (ACIDOPHILUS ORAL) Take 1 tablet by mouth once daily.    [DISCONTINUED] phenylephrine HCl/acetaminophn (SINUS PAIN-PRESSURE, PE, ORAL) Take by mouth.     Family History       Problem Relation (Age of Onset)    Cancer Mother    Heart attack Sister, Sister    Heart disease Father, Brother, Sister, Brother, Sister, Brother    No Known Problems Son, Son, Son    Prostate cancer Brother, Brother, Brother          Tobacco Use    Smoking status: Former Smoker     Packs/day: 2.00     Years: 15.00     Pack years: 30.00     Types: Cigarettes     Last attempt to quit: 1970     Years since quittin.6    Smokeless tobacco: Former User    Tobacco comment: pt quit 40 years ago   Substance and Sexual Activity    Alcohol use: No     Alcohol/week: 16.8 oz     Types: 28 Cans of beer per week     Comment: None since Nov 15 th 2017    Drug use: No    Sexual activity: Not Currently     Review of Systems   Constitution: Positive for decreased appetite, malaise/fatigue and night sweats. Negative for weight gain.   HENT: Negative for congestion.    Cardiovascular: Negative for chest pain, dyspnea on exertion and irregular heartbeat.   Respiratory: Positive for shortness of breath. Negative for sleep disturbances due to breathing and sputum production.    Endocrine: Negative for cold intolerance and polyphagia.   Musculoskeletal: Negative for arthritis.   Gastrointestinal: Negative for bloating.   Genitourinary: Negative for bladder incontinence.   Neurological: Negative for  difficulty with concentration.     Objective:     Vital Signs (Most Recent):  Temp: 97.5 °F (36.4 °C) (07/29/19 1115)  Pulse: 104 (07/29/19 1120)  Resp: 18 (07/29/19 1120)  BP: (!) 106/56 (07/29/19 1115)  SpO2: 95 % (07/29/19 1120) Vital Signs (24h Range):  Temp:  [97.5 °F (36.4 °C)-97.9 °F (36.6 °C)] 97.5 °F (36.4 °C)  Pulse:  [] 104  Resp:  [16-22] 18  SpO2:  [90 %-99 %] 95 %  BP: ()/(56-69) 106/56     Weight: 83.1 kg (183 lb 3.2 oz)  Body mass index is 27.05 kg/m².    SpO2: 95 %  O2 Device (Oxygen Therapy): nasal cannula w/ humidification      Intake/Output Summary (Last 24 hours) at 7/29/2019 1235  Last data filed at 7/29/2019 0809  Gross per 24 hour   Intake 1470 ml   Output 925 ml   Net 545 ml       Lines/Drains/Airways       Central Venous Catheter Line                   Port A Cath Single Lumen 06/20/19 right internal jugular 39 days            Drain                   Chest Tube Right Pleural -- days         Urethral Catheter 07/17/19 0920 Non-latex 16 Fr. 12 days                    Physical Exam   Constitutional: He is oriented to person, place, and time. He appears well-nourished. No distress.   HENT:   Head: Normocephalic.   Eyes: Pupils are equal, round, and reactive to light.   Neck: No JVD present. No thyromegaly present.   Cardiovascular: Normal rate. Exam reveals no friction rub.   Murmur heard.  Pulmonary/Chest: He is in respiratory distress. He has no wheezes. He has rales.   Musculoskeletal: He exhibits edema.   Neurological: He is alert and oriented to person, place, and time.   Vitals reviewed.      Significant Labs:   CBC   Recent Labs   Lab 07/28/19  0430 07/29/19  0541   WBC 35.91* 51.34*   HGB 9.9* 10.2*   HCT 31.5* 32.8*    343       STEPHANIE:   Results for orders placed or performed during the hospital encounter of 07/17/19   Transthoracic echo (TTE) 2D with Color Flow   Result Value Ref Range    Ascending aorta 3.02 cm    STJ 2.45 cm    AV mean gradient 4 mmHg    Ao peak gabriela  1.27 m/s    Ao VTI 18.06 cm    IVRT 0.07 msec    IVS 1.08 0.6 - 1.1 cm    LA size 3.87 cm    Left Atrium Major Axis 4.95 cm    Left Atrium Minor Axis 5.10 cm    LVIDD 3.31 (A) 3.5 - 6.0 cm    LVIDS 2.30 2.1 - 4.0 cm    LVOT diameter 2.23 cm    LVOT peak VTI 12.22 cm    PW 0.84 0.6 - 1.1 cm    MV Peak A Michael 0.95 m/s    E wave decelartion time 122.99 msec    MV Peak E Michael 0.91 m/s    RA Major Axis 4.61 cm    RA Width 3.33 cm    RVDD 3.13 cm    Sinus 3.48 cm    TAPSE 1.30 cm    TR Max Michael 2.36 m/s    TDI LATERAL 0.06 m/s    TDI SEPTAL 0.11 m/s    LA WIDTH 3.90 cm    LV Diastolic Volume 44.47 mL    LV Systolic Volume 18.12 mL    RV S' 9.21 cm/s    LVOT peak michael 0.72 m/s    LV LATERAL E/E' RATIO 15.17 m/s    LV SEPTAL E/E' RATIO 8.27 m/s    FS 31 %    LA volume 64.45 cm3    LV mass 89.45 g    Left Ventricle Relative Wall Thickness 0.51 cm    AV valve area 2.64 cm2    AV Velocity Ratio 0.57     AV index (prosthetic) 0.68     E/A ratio 0.96     Mean e' 0.09 m/s    LVOT area 3.9 cm2    LVOT stroke volume 47.70 cm3    AV peak gradient 6 mmHg    E/E' ratio 10.71 m/s    LV Systolic Volume Index 9.1 mL/m2    LV Diastolic Volume Index 22.25 mL/m2    LA Volume Index 32.3 mL/m2    LV Mass Index 45 g/m2    Triscuspid Valve Regurgitation Peak Gradient 22 mmHg    BSA 2.02 m2    Right Atrial Pressure (from IVC) 8 mmHg    TV rest pulmonary artery pressure 30 mmHg    Narrative    · Normal left ventricular systolic function. The estimated ejection   fraction is 60%  · Concentric left ventricular remodeling.  · Normal LV diastolic function.  · Normal right ventricular systolic function.  · Mild tricuspid regurgitation.  · The estimated PA systolic pressure is 30 mm Hg  · Intermediate central venous pressure (8 mm Hg).  · Small circumferential pericardial effusion. Shaggy visceral pericardium.   No evidence of tamponade physiology        Assessment and Plan:     Pericardial effusion without cardiac tamponade  - The etiology of his  pericardial effusion is most likely from underlying mesothelioma (Positive Cytology in the pericardial fluids of 7/18).  - Two sets of TTE (7/21 and 7/22) were reviewed and it showed pericardial effusion in the posterior portion of pericardium   - There is no tamponade physiology in both sets of TTE   - Limitation in findings a safe spot for pericardiocentesis needle  - Pericardial window would be the appropriate procedure if deemed necessary   - Since thoracic surgery hold off on an intervention, consider consulting cardiac surgery  - Discussed with staff         VTE Risk Mitigation (From admission, onward)        Ordered     heparin, porcine (PF) 100 unit/mL injection flush 500 Units  Every 8 hours PRN      07/18/19 1712     IP VTE HIGH RISK PATIENT  Once      07/17/19 1930          Thank you for your consult. Please contact us if you have any additional questions.    Alcon Fields MD  Interventional Cardiology   [facility]

## 2019-07-29 NOTE — PROGRESS NOTES
[facility]  Thoracic Surgery  Progress Note    Subjective:     History of Present Illness:  Cale Harding is a 78 y.o. male with mesothelioma and recent pleural effusion s/p pericardiocentesis. He was noted to have recurrent pericardial effusion without tamponade physiology on TTE. We are consulted for possible pericardial window. Patient feels stable today, no new complaints or concerns, no worsening sob or chest pain.      No current facility-administered medications on file prior to encounter       Post-Op Info:  Procedure(s) (LRB):  Pericardiocentesis (N/A)   11 Days Post-Op     Interval History: NAEON. Paroxysmal afib. Hemodynamically stable. Minimal supplemental O2 requirements.     Medications:  Continuous Infusions:  Scheduled Meds:   albuterol-ipratropium  3 mL Nebulization Q4H WAKE    furosemide (LASIX) IV  40 mg Intravenous Q8H    metoprolol succinate  200 mg Oral Daily    ondansetron  4 mg Intravenous Once    pantoprazole  40 mg Oral Daily    predniSONE  40 mg Oral Daily    And    [START ON 7/30/2019] predniSONE  30 mg Oral Daily    And    [START ON 7/31/2019] predniSONE  20 mg Oral Daily    triamcinolone acetonide 0.025%   Topical (Top) BID     PRN Meds:albuterol-ipratropium, alteplase, Dextrose 10% Bolus, Dextrose 10% Bolus, diphenhydrAMINE, glucagon (human recombinant), glucose, glucose, guaifenesin 100 mg/5 ml, heparin, porcine (PF), HYDROcodone-acetaminophen, ibuprofen, metoprolol, ondansetron, polyethylene glycol, ramelteon, sodium chloride 0.9%     Review of patient's allergies indicates:   Allergen Reactions    Bactrim [sulfamethoxazole-trimethoprim] Other (See Comments)     Joint pains     Objective:     Vital Signs (Most Recent):  Temp: 97.5 °F (36.4 °C) (07/29/19 0435)  Pulse: (!) 115 (07/29/19 0719)  Resp: 18 (07/29/19 0435)  BP: 96/64 (07/29/19 0435)  SpO2: 99 % (07/29/19 0435) Vital Signs (24h Range):  Temp:  [97.5 °F (36.4 °C)-97.9 °F (36.6 °C)] 97.5  °F (36.4 °C)  Pulse:  [] 115  Resp:  [16-20] 18  SpO2:  [93 %-100 %] 99 %  BP: ()/(56-69) 96/64     Intake/Output - Last 3 Shifts       07/27 0700 - 07/28 0659 07/28 0700 - 07/29 0659 07/29 0700 - 07/30 0659    P.O. 1160 1650     Total Intake(mL/kg) 1160 (13.4) 1650 (19.9)     Urine (mL/kg/hr) 1025 (0.5) 925 (0.5)     Stool  0     Total Output 1025 925     Net +135 +725            Stool Occurrence  1 x           SpO2: 99 %  O2 Device (Oxygen Therapy): nasal cannula    Physical Exam   Constitutional: He is oriented to person, place, and time. He appears well-developed and well-nourished.   Cardiovascular: Intact distal pulses. An irregularly irregular rhythm present. Tachycardia present.   In afib on monitor   Pulmonary/Chest: Effort normal. No respiratory distress.   On nasal cannula   Abdominal: Soft. Bowel sounds are normal.   Neurological: He is alert and oriented to person, place, and time.   Skin: Skin is warm and dry. Rash noted.   Diffuse rash    Psychiatric: He has a normal mood and affect. His behavior is normal.       Significant Labs:  BMP:   Recent Labs   Lab 07/29/19  0541   *   *   K 4.7   CL 93*   CO2 21*   BUN 41*   CREATININE 1.1   CALCIUM 8.5*   MG 2.0     CBC:   Recent Labs   Lab 07/29/19  0541   WBC 51.34*   RBC 3.85*   HGB 10.2*   HCT 32.8*      MCV 85   MCH 26.5*   MCHC 31.1*     CMP:   Recent Labs   Lab 07/29/19  0541   *   CALCIUM 8.5*   ALBUMIN 1.9*   PROT 5.3*   *   K 4.7   CO2 21*   CL 93*   BUN 41*   CREATININE 1.1   ALKPHOS 107   ALT 7*   AST 11   BILITOT 0.5       Significant Diagnostics:  CT: I have reviewed all pertinent results/findings within the past 24 hours  CXR: I have reviewed all pertinent results/findings within the past 24 hours    VTE Risk Mitigation (From admission, onward)        Ordered     heparin, porcine (PF) 100 unit/mL injection flush 500 Units  Every 8 hours PRN      07/18/19 1712     IP VTE HIGH RISK PATIENT  Once       07/17/19 1930        Assessment/Plan:     * Pericardial effusion without cardiac tamponade  - Currently without tamponade physiology necessitating urgent surgical intervention. Per ECHO report, pericardial effusion is largely unchanged. While it may be contributing to afib, extension of mesothelioma to the pericardium is likely a bigger contributor to afib.   - Due to pericardial involvement of disease process, a pericardial window would likely be difficult and present higher risks.   - Recommend consulting interventional cardiology for pericardial drain.          CAITLIN Lipscomb  Thoracic Surgery  [facility]

## 2019-07-29 NOTE — NURSING
Pulmonary doctor in to check pleurex catheter, dressing removed and then replaced, no symptoms of infection at site, catheter not draining, moisture coming from skin weeping. Will continue to monitor.

## 2019-07-29 NOTE — ASSESSMENT & PLAN NOTE
First noted on ECG 7/15/19 following pericardiocentesis. Patientt reports longer history of palpitations.  Ddx includes 2/2 pericardial effusion vs. Underlying malignancy. Currently rate controlled with Toprol XL 200mg daily(patient previously not controlled with lower doses of Toprol, lopressor 50mg BID, and had drug rash with diltiazem). CHADsVASC 3 suggestive of 3.2% annual stroke risk. Not currently on heparin due to hematuria.     Plan:  - Appreciate cardiology recommendations  - Continue telemetry and monitor for rate control  -TEDs and SCDs in place for DVT prophylaxis

## 2019-07-29 NOTE — ASSESSMENT & PLAN NOTE
White count with significant increase to 51.34 from 35 yesterday.  Ddx include leukemoid reaction from malignancy and/or drug rash and patient on steroid taper. Also considering infection although much lower on differential as patient has been afebrile and clinically does not appear to be infected.    -Monitor with daily CBC  -Pulm to drain fluid from PleurX to ensure not source of infection

## 2019-07-29 NOTE — ASSESSMENT & PLAN NOTE
- The etiology of his pericardial effusion is most likely from underlying mesothelioma (Positive Cytology in the pericardial fluids of 7/18).  - Two sets of TTE (7/21 and 7/22) were reviewed and it showed pericardial effusion in the posterior portion of pericardium   - There is no tamponade physiology in both sets of TTE   - Limitation in findings a safe spot for pericardiocentesis needle  - Pericardial window would be the appropriate procedure if deemed necessary   - Since thoracic surgery hold off on an intervention, consider consulting cardiac surgery  - Discussed with staff

## 2019-07-29 NOTE — PLAN OF CARE
Problem: Adult Inpatient Plan of Care  Goal: Plan of Care Review  Outcome: Ongoing (interventions implemented as appropriate)  POC reviewed with patient; understanding verbalized. Tele maintained. 1 L NC continued. Pt on a regular diet 1500 fluid restriction. Voiding via haider, multiple BMs this shift. Decreased urine output, haider irrigation. Ointment applied to generalized rash. Pt being turned Q2 hours. Pt. with nonskid footwear on, bed in lowest position, and locked with bed rails up x2.  Pt. instructed to call prior to getting OOB.  Pt. has call light and personal items within reach. VSS and afebrile this shift. All questions and concerns addressed at this time. Spouse at bedside. Will continue to monitor.

## 2019-07-29 NOTE — ASSESSMENT & PLAN NOTE
Patient with symptoms of SOB and diffuse swelling on admission who is not on lasix at home with an admission BNP of 374. Echo 7/17/2019 with Grade I LV diastolic dysfunction.  Patient has been diuresing well with improvements in shortness of breath.  Still with Pleural effusions L>R 2/2 malignancy vs overload.  Patient approaching dry weight with significant increase in BUN/Cr.  Plan:  -Switch to oral lasix 40mg BID  -Strict I/O with Daily Standing weights  -keep Mg >2 and K>4  -Cardiac Diet  -Fluid restriction <1500cc/day

## 2019-07-29 NOTE — SUBJECTIVE & OBJECTIVE
Interval History: No acute oovernight events, seen by thoracic surgery and interventional cardiology; neither suggesting intervention at this time given absence of evidence of tamponade, repeat TTE w only small effusion.    Review of Systems   Constitution: Negative. Negative for chills and fever.   HENT: Negative.    Eyes: Negative.    Cardiovascular: Negative for chest pain, claudication and paroxysmal nocturnal dyspnea.   Respiratory: Negative for cough, shortness of breath and wheezing.    Endocrine: Negative.    Hematologic/Lymphatic: Does not bruise/bleed easily.   Skin: Positive for rash. Negative for nail changes.   Musculoskeletal: Negative.  Negative for back pain.   Gastrointestinal: Negative for abdominal pain, melena, nausea and vomiting.   Neurological: Negative for dizziness and headaches.   Psychiatric/Behavioral: Negative for altered mental status, depression and substance abuse.   Allergic/Immunologic: Negative.      Objective:     Vital Signs (Most Recent):  Temp: 97.5 °F (36.4 °C) (07/29/19 1115)  Pulse: 104 (07/29/19 1120)  Resp: 18 (07/29/19 1120)  BP: (!) 106/56 (07/29/19 1115)  SpO2: 95 % (07/29/19 1120) Vital Signs (24h Range):  Temp:  [97.5 °F (36.4 °C)-97.9 °F (36.6 °C)] 97.5 °F (36.4 °C)  Pulse:  [] 104  Resp:  [16-22] 18  SpO2:  [90 %-99 %] 95 %  BP: ()/(56-69) 106/56     Weight: 83.1 kg (183 lb 3.2 oz)  Body mass index is 27.05 kg/m².     SpO2: 95 %  O2 Device (Oxygen Therapy): nasal cannula w/ humidification      Intake/Output Summary (Last 24 hours) at 7/29/2019 1321  Last data filed at 7/29/2019 0809  Gross per 24 hour   Intake 1470 ml   Output 925 ml   Net 545 ml       Lines/Drains/Airways     Central Venous Catheter Line                 Port A Cath Single Lumen 06/20/19 right internal jugular 39 days          Drain                 Chest Tube Right Pleural -- days         Urethral Catheter 07/17/19 0920 Non-latex 16 Fr. 12 days                Physical Exam    Constitutional: He is oriented to person, place, and time. He appears well-developed and well-nourished.   HENT:   Head: Normocephalic and atraumatic.   Eyes: Pupils are equal, round, and reactive to light. Conjunctivae and EOM are normal.   Neck: JVD present.   Cardiovascular: Normal rate, regular rhythm, normal heart sounds and intact distal pulses. Exam reveals no gallop and no friction rub.   No murmur heard.  Pulmonary/Chest: Effort normal. No stridor. He has no wheezes. He has no rales. He exhibits no tenderness.   Abdominal: Soft. Bowel sounds are normal. He exhibits no distension and no mass. There is no tenderness. There is no guarding.   Musculoskeletal: He exhibits no edema, tenderness or deformity.   Neurological: He is alert and oriented to person, place, and time.   Skin: Skin is warm and dry. No rash noted. No erythema.   Psychiatric: He has a normal mood and affect.       Significant Labs:   CMP   Recent Labs   Lab 07/28/19  0430 07/29/19  0541   * 124*   K 4.6 4.7   CL 94* 93*   CO2 23 21*   GLU 95 133*   BUN 35* 41*   CREATININE 1.0 1.1   CALCIUM 8.2* 8.5*   PROT 5.4* 5.3*   ALBUMIN 2.0* 1.9*   BILITOT 0.7 0.5   ALKPHOS 100 107   AST 14 11   ALT 7* 7*   ANIONGAP 9 10   ESTGFRAFRICA >60.0 >60.0   EGFRNONAA >60.0 >60.0    and CBC   Recent Labs   Lab 07/28/19  0430 07/29/19  0541   WBC 35.91* 51.34*   HGB 9.9* 10.2*   HCT 31.5* 32.8*    343       Significant Imaging: Echocardiogram:   Transthoracic echo (TTE) complete (Cupid Only):   Results for orders placed or performed during the hospital encounter of 07/17/19   Transthoracic echo (TTE) 2D with Color Flow   Result Value Ref Range    Ascending aorta 3.02 cm    STJ 2.45 cm    AV mean gradient 4 mmHg    Ao peak gabriela 1.27 m/s    Ao VTI 18.06 cm    IVRT 0.07 msec    IVS 1.08 0.6 - 1.1 cm    LA size 3.87 cm    Left Atrium Major Axis 4.95 cm    Left Atrium Minor Axis 5.10 cm    LVIDD 3.31 (A) 3.5 - 6.0 cm    LVIDS 2.30 2.1 - 4.0 cm    LVOT  diameter 2.23 cm    LVOT peak VTI 12.22 cm    PW 0.84 0.6 - 1.1 cm    MV Peak A Michael 0.95 m/s    E wave decelartion time 122.99 msec    MV Peak E Michael 0.91 m/s    RA Major Axis 4.61 cm    RA Width 3.33 cm    RVDD 3.13 cm    Sinus 3.48 cm    TAPSE 1.30 cm    TR Max Michael 2.36 m/s    TDI LATERAL 0.06 m/s    TDI SEPTAL 0.11 m/s    LA WIDTH 3.90 cm    LV Diastolic Volume 44.47 mL    LV Systolic Volume 18.12 mL    RV S' 9.21 cm/s    LVOT peak michael 0.72 m/s    LV LATERAL E/E' RATIO 15.17 m/s    LV SEPTAL E/E' RATIO 8.27 m/s    FS 31 %    LA volume 64.45 cm3    LV mass 89.45 g    Left Ventricle Relative Wall Thickness 0.51 cm    AV valve area 2.64 cm2    AV Velocity Ratio 0.57     AV index (prosthetic) 0.68     E/A ratio 0.96     Mean e' 0.09 m/s    LVOT area 3.9 cm2    LVOT stroke volume 47.70 cm3    AV peak gradient 6 mmHg    E/E' ratio 10.71 m/s    LV Systolic Volume Index 9.1 mL/m2    LV Diastolic Volume Index 22.25 mL/m2    LA Volume Index 32.3 mL/m2    LV Mass Index 45 g/m2    Triscuspid Valve Regurgitation Peak Gradient 22 mmHg    BSA 2.02 m2    Right Atrial Pressure (from IVC) 8 mmHg    TV rest pulmonary artery pressure 30 mmHg    Narrative    · Normal left ventricular systolic function. The estimated ejection   fraction is 60%  · Concentric left ventricular remodeling.  · Normal LV diastolic function.  · Normal right ventricular systolic function.  · Mild tricuspid regurgitation.  · The estimated PA systolic pressure is 30 mm Hg  · Intermediate central venous pressure (8 mm Hg).  · Small circumferential pericardial effusion. Shaggy visceral pericardium.   No evidence of tamponade physiology

## 2019-07-29 NOTE — ASSESSMENT & PLAN NOTE
Patient of Dr. Foster on palliative chemo with Gemzar with plans to switch to Vinorelbine due to disease progression.  PET CT 4/2019 with increased uptake within the enlarging 1.4 cm left upper lobe pulmonary nodule, mediastinal lymph nodes, and left pleura when compared to PET 2/2019.  -pain currently controlled   -will need OP follow up with Dr. Foster

## 2019-07-29 NOTE — HPI
Cale Harding is a 78 y.o. male with mesothelioma and recent pleural effusion s/p pericardiocentesis. He was noted to have recurrent pericardial effusion without tamponade physiology on TTE. We are consulted for possible pericardial window. Patient feels stable today, no new complaints or concerns, no worsening sob or chest pain.      No current facility-administered medications on file prior to encounter

## 2019-07-29 NOTE — SUBJECTIVE & OBJECTIVE
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    Prostate cancer 2002    Urinary tract infection        Past Surgical History:   Procedure Laterality Date    BIOPSY-DIAPHRAGM N/A 3/19/2018    Performed by Galdino Fang MD at [facility] OR [address] [place] [address]    GMVRCM-DXVLCZ-HP N/A 2/1/2018    Performed by [facility] at [facility] OR [address] [place] [address]    BRONCHOSCOPY N/A 6/3/2019    Performed by [facility] Provider at [facility] OR [address] [place] [address]    COLONOSCOPY  10/20/2012    COLONOSCOPY  11/19/2014    dr machado ( repeat in 3 years per the pt )    CYSTOSCOPY, WITH RETROGRADE PYELOGRAM Bilateral 7/15/2019    Performed by Galdino Philippe MD at [facility] OR    CYSTOSCOPY, WITH URETHRAL DILATION Bilateral 7/15/2019    Performed by Galdino Philippe MD at [facility] OR    ELBOW SURGERY Left 2013    dislocation    [id number]-CATH N/A 6/20/2019    Performed by [facility] Provider at [facility] OR [address] [place] [address]    LAPAROSCOPY-DIAGNOSTIC N/A 3/19/2018    [facility]    Pericardiocentesis N/A 7/18/2019    Performed by Frank Javier MD at [facility] CATH LAB    PROCTECTOMY  2002    RELEASE, CONTRACTURE, BLADDER NECK N/A 7/15/2019    Performed by Galdino Philippe MD at [facility] OR    SHOULDER ARTHROSCOPY W/ ROTATOR CUFF REPAIR Right 2008    THORACOSCOPY-VIDEO ASSISTED (VATS) W/PLEURAL BIOPSY Left 3/19/2018    Performed by Galdino Fang MD at [facility] OR [address] [place] [address]    URETHROGRAM, RETROGRADE N/A 7/15/2019    Performed by Galdino Philippe MD at [facility] OR       Review of patient's allergies indicates:   Allergen Reactions    Bactrim [sulfamethoxazole-trimethoprim] Other (See Comments)     Joint pains       PTA Medications   Medication Sig    dextromethorphan-guaifenesin  mg (MUCINEX DM)  mg per 12 hr tablet Take 1 tablet by mouth every 12 (twelve) hours.     HYDROcodone-acetaminophen (NORCO) 5-325 mg per tablet Take 1 tablet by mouth every 6 (six) hours as needed for Pain.    polyethylene glycol (MIRALAX) 17 gram/dose powder Take 17 g by mouth once daily.    [DISCONTINUED] amoxicillin-clavulanate 875-125mg (AUGMENTIN) 875-125 mg per tablet Take 1 tablet by mouth 2 (two) times daily.    [DISCONTINUED] calcium carbonate (TUMS) 200 mg calcium (500 mg) chewable tablet Take 1 tablet by mouth as needed for Heartburn.    [DISCONTINUED] enalapril (VASOTEC) 5 MG tablet Take 1 tablet (5 mg total) by mouth 2 (two) times daily.    [DISCONTINUED] ibuprofen (ADVIL,MOTRIN) 100 MG tablet Take 100 mg by mouth every 6 (six) hours as needed for Temperature greater than.    [DISCONTINUED] LACTOBACILLUS ACIDOPHILUS (ACIDOPHILUS ORAL) Take 1 tablet by mouth once daily.    [DISCONTINUED] phenylephrine HCl/acetaminophn (SINUS PAIN-PRESSURE, PE, ORAL) Take by mouth.     Family History     Problem Relation (Age of Onset)    Cancer Mother    Heart attack Sister, Sister    Heart disease Father, Brother, Sister, Brother, Sister, Brother    No Known Problems Son, Son, Son    Prostate cancer Brother, Brother, Brother        Tobacco Use    Smoking status: Former Smoker     Packs/day: 2.00     Years: 15.00     Pack years: 30.00     Types: Cigarettes     Last attempt to quit: 1970     Years since quittin.6    Smokeless tobacco: Former User    Tobacco comment: pt quit 40 years ago   Substance and Sexual Activity    Alcohol use: No     Alcohol/week: 16.8 oz     Types: 28 Cans of beer per week     Comment: None since Nov 15 th 2017    Drug use: No    Sexual activity: Not Currently     Review of Systems   Constitution: Positive for decreased appetite, malaise/fatigue and night sweats. Negative for weight gain.   HENT: Negative for congestion.    Cardiovascular: Negative for chest pain, dyspnea on exertion and irregular heartbeat.   Respiratory: Positive for shortness of breath. Negative  for sleep disturbances due to breathing and sputum production.    Endocrine: Negative for cold intolerance and polyphagia.   Musculoskeletal: Negative for arthritis.   Gastrointestinal: Negative for bloating.   Genitourinary: Negative for bladder incontinence.   Neurological: Negative for difficulty with concentration.     Objective:     Vital Signs (Most Recent):  Temp: 97.5 °F (36.4 °C) (07/29/19 1115)  Pulse: 104 (07/29/19 1120)  Resp: 18 (07/29/19 1120)  BP: (!) 106/56 (07/29/19 1115)  SpO2: 95 % (07/29/19 1120) Vital Signs (24h Range):  Temp:  [97.5 °F (36.4 °C)-97.9 °F (36.6 °C)] 97.5 °F (36.4 °C)  Pulse:  [] 104  Resp:  [16-22] 18  SpO2:  [90 %-99 %] 95 %  BP: ()/(56-69) 106/56     Weight: 83.1 kg (183 lb 3.2 oz)  Body mass index is 27.05 kg/m².    SpO2: 95 %  O2 Device (Oxygen Therapy): nasal cannula w/ humidification      Intake/Output Summary (Last 24 hours) at 7/29/2019 1235  Last data filed at 7/29/2019 0809  Gross per 24 hour   Intake 1470 ml   Output 925 ml   Net 545 ml       Lines/Drains/Airways     Central Venous Catheter Line                 Port A Cath Single Lumen 06/20/19 right internal jugular 39 days          Drain                 Chest Tube Right Pleural -- days         Urethral Catheter 07/17/19 0920 Non-latex 16 Fr. 12 days                Physical Exam   Constitutional: He is oriented to person, place, and time. He appears well-nourished. No distress.   HENT:   Head: Normocephalic.   Eyes: Pupils are equal, round, and reactive to light.   Neck: No JVD present. No thyromegaly present.   Cardiovascular: Normal rate. Exam reveals no friction rub.   Murmur heard.  Pulmonary/Chest: He is in respiratory distress. He has no wheezes. He has rales.   Musculoskeletal: He exhibits edema.   Neurological: He is alert and oriented to person, place, and time.   Vitals reviewed.      Significant Labs:   CBC   Recent Labs   Lab 07/28/19  0430 07/29/19  0541   WBC 35.91* 51.34*   HGB 9.9* 10.2*    HCT 31.5* 32.8*    343       STEPHANIE:   Results for orders placed or performed during the hospital encounter of 07/17/19   Transthoracic echo (TTE) 2D with Color Flow   Result Value Ref Range    Ascending aorta 3.02 cm    STJ 2.45 cm    AV mean gradient 4 mmHg    Ao peak michael 1.27 m/s    Ao VTI 18.06 cm    IVRT 0.07 msec    IVS 1.08 0.6 - 1.1 cm    LA size 3.87 cm    Left Atrium Major Axis 4.95 cm    Left Atrium Minor Axis 5.10 cm    LVIDD 3.31 (A) 3.5 - 6.0 cm    LVIDS 2.30 2.1 - 4.0 cm    LVOT diameter 2.23 cm    LVOT peak VTI 12.22 cm    PW 0.84 0.6 - 1.1 cm    MV Peak A Michael 0.95 m/s    E wave decelartion time 122.99 msec    MV Peak E Michael 0.91 m/s    RA Major Axis 4.61 cm    RA Width 3.33 cm    RVDD 3.13 cm    Sinus 3.48 cm    TAPSE 1.30 cm    TR Max Michael 2.36 m/s    TDI LATERAL 0.06 m/s    TDI SEPTAL 0.11 m/s    LA WIDTH 3.90 cm    LV Diastolic Volume 44.47 mL    LV Systolic Volume 18.12 mL    RV S' 9.21 cm/s    LVOT peak michael 0.72 m/s    LV LATERAL E/E' RATIO 15.17 m/s    LV SEPTAL E/E' RATIO 8.27 m/s    FS 31 %    LA volume 64.45 cm3    LV mass 89.45 g    Left Ventricle Relative Wall Thickness 0.51 cm    AV valve area 2.64 cm2    AV Velocity Ratio 0.57     AV index (prosthetic) 0.68     E/A ratio 0.96     Mean e' 0.09 m/s    LVOT area 3.9 cm2    LVOT stroke volume 47.70 cm3    AV peak gradient 6 mmHg    E/E' ratio 10.71 m/s    LV Systolic Volume Index 9.1 mL/m2    LV Diastolic Volume Index 22.25 mL/m2    LA Volume Index 32.3 mL/m2    LV Mass Index 45 g/m2    Triscuspid Valve Regurgitation Peak Gradient 22 mmHg    BSA 2.02 m2    Right Atrial Pressure (from IVC) 8 mmHg    TV rest pulmonary artery pressure 30 mmHg    Narrative    · Normal left ventricular systolic function. The estimated ejection   fraction is 60%  · Concentric left ventricular remodeling.  · Normal LV diastolic function.  · Normal right ventricular systolic function.  · Mild tricuspid regurgitation.  · The estimated PA systolic pressure is 30  mm Hg  · Intermediate central venous pressure (8 mm Hg).  · Small circumferential pericardial effusion. Shaggy visceral pericardium.   No evidence of tamponade physiology

## 2019-07-29 NOTE — ASSESSMENT & PLAN NOTE
Drug rash thought to be result of diltiazem treatment.  Diltiazem stopped and rash continues to improve in some areas but is progressing down trunk with increased bullae and weeping laterally    Plan:   -continue topical TAC 0.025% BID to sensitive areas including face and groin and TAC 0.1% BID PRN to chest, abdomen, face   - Continue PO steroid taper and monitor rash for improvement  -Follow up skin biopsy

## 2019-07-29 NOTE — PROGRESS NOTES
[facility]  Hematology/Oncology  Progress Note    Patient Name: Cale Harding  Admission Date: 7/17/2019  Hospital Length of Stay: 12 days  Code Status: Full Code     Subjective:     HPI:  Mr. Cale Harding is a 78-year-old male with a past medical history of prostate cancer (s/p prostatectomy), mesothelioma (on chemotherapy), with recurrent pleural effusion  ( with a right PleurX) and hypertension who is a transfer for evaluation of pericardial effusion.       Patient was scheduled for elective cystography with Urology (Dr. Philippe) for dilation of bladder neck contracture.  On arrival to elective surgical suite, patient found to be tachycardic and heart rate was irregular.  EKG confirmed atrial fibrillation with RVR.  Surgery was cancelled and he was admitted to the ICU for a diltiazem drip. He converted to sinus but would go in and out of afib, was started on lopressor 25 BID and echo done showed moderate size of pericardial effusion with no tamponade physiology however increased in effusion from 7/5, cardiology in the OSH was planning for pericardiocentesis but requested transfer for possible window vs pericardiocentesis and was sent to [facility]    On arrival patient was hemodynamically stable  , NSR, converted in and out of afib. Cardiology was consulted for input on afib management in the setting of pericardial effusion and need for pericardiocentesis/ closer ccu monitoring.     Of note patient has a PleurX on the right that is possibly infected and he was supposed to get it removed this Friday by his pulmonologist Dr. Woodward     Hospital Course:   Admitted to hematology oncology on 7/17. CTS consulted for evaluation of pericardial effusion to evaluate need for pericardial window. Patient not strong candidate for pericardial window per CTS, Interventional cardiology preformed paracentesis on 7/18 and drained 570cc from pericardial space. Patient seen by pulmonary on admission.   Valerie familiar w/ patient and was able to drain 450 ccs from R sided pleurX cath. Cath to remain in place per pulmonary team. Patient with new onset Afib w/ RVR, multiple episodes since admission. Cardiology consulted. After multiple days of lopressor trials (PO and Pushes during episodes), cardiology recommended and  started patient on heparin and diltiazem drip for rate control. Rate controlled, pt stepped down to PO lopressor. Hematuria resolved. Patient reports improved SOB w/ exertion. Patient noticed to have a new rash on 7/24, which has progressively worsened. First noticed on his back and now on his chest and abdomen. Derm and Cards consulted for possible drug induced rash. They concur. Will discontinue diltiazem and increase toprolol XL from 100 to 200mg daily. Patient remains fluid restricted to 1500cc/day for hyponatremia. TTE on 7/26 showed a reaccumulation of pericardial fluid, CTS consulted for potential pericardial window, if deemed a poor candidate for the window, interventional cardiology will consider placing a pericardial drain on 7/29.    Interval History: No events overnight.  Patient's rash continued to itch however his symptoms were improved greatly with benadryl. He continues to have improvement of rash on his face however he has been having some weeping of the rash on his trunk. He denies any SOB on 1LNC, abdominal pain, nausea, vomiting, fevers, chills.  He is having poor appetite which is unchanged    Oncology Treatment Plan:   OP NSCLC VINORELBINE WEEKLY    Medications:  Continuous Infusions:  Scheduled Meds:   albuterol-ipratropium  3 mL Nebulization Q4H WAKE    furosemide (LASIX) IV  40 mg Intravenous Q8H    metoprolol succinate  200 mg Oral Daily    ondansetron  4 mg Intravenous Once    pantoprazole  40 mg Oral Daily    predniSONE  40 mg Oral Daily    And    [START ON 7/30/2019] predniSONE  30 mg Oral Daily    And    [START ON 7/31/2019] predniSONE  20 mg Oral Daily     triamcinolone acetonide 0.025%   Topical (Top) BID     PRN Meds:albuterol-ipratropium, alteplase, Dextrose 10% Bolus, Dextrose 10% Bolus, diphenhydrAMINE, glucagon (human recombinant), glucose, glucose, guaifenesin 100 mg/5 ml, heparin, porcine (PF), HYDROcodone-acetaminophen, ibuprofen, metoprolol, ondansetron, polyethylene glycol, ramelteon, sodium chloride 0.9%     Review of Systems   All other systems reviewed and are negative.    Objective:     Vital Signs (Most Recent):  Temp: 97.7 °F (36.5 °C) (07/29/19 0805)  Pulse: (!) 118 (07/29/19 0805)  Resp: (!) 22 (07/29/19 0805)  BP: 105/64 (07/29/19 0805)  SpO2: (!) 93 % (07/29/19 0805) Vital Signs (24h Range):  Temp:  [97.5 °F (36.4 °C)-97.9 °F (36.6 °C)] 97.7 °F (36.5 °C)  Pulse:  [] 118  Resp:  [16-22] 22  SpO2:  [90 %-100 %] 93 %  BP: ()/(56-69) 105/64     Weight: 83.1 kg (183 lb 3.2 oz)  Body mass index is 27.05 kg/m².  Body surface area is 2.01 meters squared.      Intake/Output Summary (Last 24 hours) at 7/29/2019 0824  Last data filed at 7/29/2019 0537  Gross per 24 hour   Intake 1410 ml   Output 925 ml   Net 485 ml       Physical Exam   Constitutional: He is oriented to person, place, and time. He appears well-developed.   HENT:   Head: Normocephalic and atraumatic.   Eyes: Pupils are equal, round, and reactive to light. Conjunctivae are normal. No scleral icterus.   Neck: Normal range of motion. Neck supple. No JVD present.   Cardiovascular: Normal rate, regular rhythm, normal heart sounds and intact distal pulses.   Tachycardic, irregularly irregular rhythm    Pulmonary/Chest: Effort normal and breath sounds normal. No respiratory distress. He has no wheezes. He has no rales.   On 1L NC   Abdominal: Soft. Bowel sounds are normal. He exhibits no distension. There is no tenderness.   Musculoskeletal: He exhibits no edema.   Neurological: He is alert and oriented to person, place, and time.   Skin: Skin is warm and dry. Rash (erythematous papules  coalescing into plaues on the anterior chest, lateral trunk.  On the later trunk are weeping bullae) noted.       Significant Labs:   CBC:   Recent Labs   Lab 07/28/19 0430 07/29/19  0541   WBC 35.91* 51.34*   HGB 9.9* 10.2*   HCT 31.5* 32.8*    343    and CMP:   Recent Labs   Lab 07/28/19 0430 07/29/19  0541   * 124*   K 4.6 4.7   CL 94* 93*   CO2 23 21*   GLU 95 133*   BUN 35* 41*   CREATININE 1.0 1.1   CALCIUM 8.2* 8.5*   PROT 5.4* 5.3*   ALBUMIN 2.0* 1.9*   BILITOT 0.7 0.5   ALKPHOS 100 107   AST 14 11   ALT 7* 7*   ANIONGAP 9 10   EGFRNONAA >60.0 >60.0       Diagnostic Results:  No new imaging    Assessment/Plan:     * Mesothelioma of left lung  Patient of Dr. Foster on palliative chemo with Gemzar with plans to switch to Vinorelbine due to disease progression.  PET CT 4/2019 with increased uptake within the enlarging 1.4 cm left upper lobe pulmonary nodule, mediastinal lymph nodes, and left pleura when compared to PET 2/2019.  -pain currently controlled   -will need OP follow up with Dr. Foster       SOB (shortness of breath) on exertion  Patient presented to [facility] w/ Pericardial effusion and a hx of recurrent pleural effusions (newly diagnosed mesothelioma in 2019). R sided PleurX catheter in place, patient of Dr. Padilla (Pulm) with 450cc drained on admission. CXR during admission have shown BL pleural effusion w/ areas of pulmonary infiltrates L>R.  Continues to improve and satting well on 1L NC. Has been diuresing and with bump in BUN/Cr, he is probably at dry weight.    Plan:  - Continue duo nebs Q4h while awake  -will switch to oral lasix 40 mg BID  -discussed with pulmonology who is working with Dr. Padilla and their current plan is to ultrasound the L sided pleural effusion today.  If there is a significant amount of fluid to drain, they will plan for thoracocentesis tomorrow.  They will also take some fluid via Right PleurX with recent increase in WBC to rule out infectious etiology.       Malignant pleural effusion  Shortness of breath     Pericardial effusion without cardiac tamponade  Patient transferred to [facility] from OSH for evaluation by cardiology/CTS for pericardial window or pericardiocentesis on 7/17/19. Pericardiocentesis was preformed by interventional cards, pt tolerated procedure well, produced 570cc serosanguinous fluid on 7/18. Repeat echo on 7/21 with recurrence of effusion and echo on 7/26 with stabilization of effusion and without tamponade. CTS consulted to evaluate patient for pericardial window but feel he is a poor candidate given likelihood of disease in pericardium.    Plan:  -Consult placed to interventional cardiology to evaluate patient for pericardial drain.  Appreciate their recommendations.   -Continue current management of atrial fibrillation     Rash, drug  Drug rash thought to be result of diltiazem treatment.  Diltiazem stopped and rash continues to improve in some areas but is progressing down trunk with increased bullae and weeping laterally    Plan:   -continue topical TAC 0.025% BID to sensitive areas including face and groin and TAC 0.1% BID PRN to chest, abdomen, face   - Continue PO steroid taper and monitor rash for improvement  -Follow up skin biopsy      Atrial fibrillation with RVR  First noted on ECG 7/15/19 following pericardiocentesis. Patientt reports longer history of palpitations.  Ddx includes 2/2 pericardial effusion vs. Underlying malignancy. Currently rate controlled with Toprol XL 200mg daily(patient previously not controlled with lower doses of Toprol, lopressor 50mg BID, and had drug rash with diltiazem). CHADsVASC 3 suggestive of 3.2% annual stroke risk. Not currently on heparin due to hematuria.     Plan:  - Appreciate cardiology recommendations  - Continue telemetry and monitor for rate control  -TEDs and SCDs in place for DVT prophylaxis    Leukemoid reaction  White count with significant increase to 51.34 from 35 yesterday.  Ddx include  leukemoid reaction from malignancy and/or drug rash and patient on steroid taper. Also considering infection although much lower on differential as patient has been afebrile and clinically does not appear to be infected.    -Monitor with daily CBC  -Pulm to drain fluid from PleurX to ensure not source of infection    Decrease in appetite  Patient w/ reported hx of poor appetite since beginning chemotherapy    Plan:  - Patient interested in MARINOL for appetite stimulation  - Marinol therapy not initiated at this time as it may worsen hypotension    Acute on chronic diastolic heart failure  Patient with symptoms of SOB and diffuse swelling on admission who is not on lasix at home with an admission BNP of 374. Echo 7/17/2019 with Grade I LV diastolic dysfunction.  Patient has been diuresing well with improvements in shortness of breath.  Still with Pleural effusions L>R 2/2 malignancy vs overload.  Patient approaching dry weight with significant increase in BUN/Cr.  Plan:  -Switch to oral lasix 40mg BID  -Strict I/O with Daily Standing weights  -keep Mg >2 and K>4  -Cardiac Diet  -Fluid restriction <1500cc/day    Essential hypertension  Currently holding all hypertensives.  Patient has been normotensive since admission.  Was previously on enalapril at home.   -discuss with cards before discharge whether patient will require ACE/ARB    Acquired contracture of bladder neck  Urology consulted in the OSH. S/p Bladder neck contracture release and urethral dilation on 7/15/19. Patient of Dr. Philippe.    Plan:  - Haider in place  - Marked penile and scrotal edema  - Hematuria resolved, nursing has been flushing cath per Urology recs  - urology notified, they do not believe the haider should be changed at this time due to anasarca   - Cleared for d/c from urology perspective, to f/u w/ Dr. Philippe    Gastroesophageal reflux disease  -Continue PPI daily     History of prostate cancer  S/p prostectomy.  Haider in place for urinary  retention 2/2 bladder contracture.  Urology recommends keeping the haider catheter in place.         Justin Blackwell MD, PGY-1  Hematology/Oncology  [facility]

## 2019-07-29 NOTE — ASSESSMENT & PLAN NOTE
- Currently without tamponade physiology necessitating urgent surgical intervention. Per ECHO report, pericardial effusion is largely unchanged. While it may be contributing to afib, extension of mesothelioma to the pericardium is likely a bigger contributor to afib.   - Due to pericardial involvement of disease process, a pericardial window would likely be difficult and present higher risks.   - Recommend consulting interventional cardiology for pericardial drain.

## 2019-07-29 NOTE — ASSESSMENT & PLAN NOTE
Patient transferred to [facility] from OSH for evaluation by cardiology/CTS for pericardial window or pericardiocentesis on 7/17/19. Pericardiocentesis was preformed by interventional cards, pt tolerated procedure well, produced 570cc serosanguinous fluid on 7/18. Repeat echo on 7/21 with recurrence of effusion and echo on 7/26 with stabilization of effusion and without tamponade. CTS consulted to evaluate patient for pericardial window but feel he is a poor candidate given likelihood of disease in pericardium.    Plan:  -Consult placed to interventional cardiology to evaluate patient for pericardial drain.  Appreciate their recommendations.   -Continue current management of atrial fibrillation

## 2019-07-29 NOTE — SUBJECTIVE & OBJECTIVE
Interval History: No events overnight.  Patient's rash continued to itch however his symptoms were improved greatly with benadryl. He continues to have improvement of rash on his face however he has been having some weeping of the rash on his trunk. He denies any SOB on 1LNC, abdominal pain, nausea, vomiting, fevers, chills.  He is having poor appetite which is unchanged    Oncology Treatment Plan:   OP NSCLC VINORELBINE WEEKLY    Medications:  Continuous Infusions:  Scheduled Meds:   albuterol-ipratropium  3 mL Nebulization Q4H WAKE    furosemide (LASIX) IV  40 mg Intravenous Q8H    metoprolol succinate  200 mg Oral Daily    ondansetron  4 mg Intravenous Once    pantoprazole  40 mg Oral Daily    predniSONE  40 mg Oral Daily    And    [START ON 7/30/2019] predniSONE  30 mg Oral Daily    And    [START ON 7/31/2019] predniSONE  20 mg Oral Daily    triamcinolone acetonide 0.025%   Topical (Top) BID     PRN Meds:albuterol-ipratropium, alteplase, Dextrose 10% Bolus, Dextrose 10% Bolus, diphenhydrAMINE, glucagon (human recombinant), glucose, glucose, guaifenesin 100 mg/5 ml, heparin, porcine (PF), HYDROcodone-acetaminophen, ibuprofen, metoprolol, ondansetron, polyethylene glycol, ramelteon, sodium chloride 0.9%     Review of Systems   All other systems reviewed and are negative.    Objective:     Vital Signs (Most Recent):  Temp: 97.7 °F (36.5 °C) (07/29/19 0805)  Pulse: (!) 118 (07/29/19 0805)  Resp: (!) 22 (07/29/19 0805)  BP: 105/64 (07/29/19 0805)  SpO2: (!) 93 % (07/29/19 0805) Vital Signs (24h Range):  Temp:  [97.5 °F (36.4 °C)-97.9 °F (36.6 °C)] 97.7 °F (36.5 °C)  Pulse:  [] 118  Resp:  [16-22] 22  SpO2:  [90 %-100 %] 93 %  BP: ()/(56-69) 105/64     Weight: 83.1 kg (183 lb 3.2 oz)  Body mass index is 27.05 kg/m².  Body surface area is 2.01 meters squared.      Intake/Output Summary (Last 24 hours) at 7/29/2019 0824  Last data filed at 7/29/2019 0537  Gross per 24 hour   Intake 1410 ml   Output  925 ml   Net 485 ml       Physical Exam   Constitutional: He is oriented to person, place, and time. He appears well-developed.   HENT:   Head: Normocephalic and atraumatic.   Eyes: Pupils are equal, round, and reactive to light. Conjunctivae are normal. No scleral icterus.   Neck: Normal range of motion. Neck supple. No JVD present.   Cardiovascular: Normal rate, regular rhythm, normal heart sounds and intact distal pulses.   Tachycardic, irregularly irregular rhythm    Pulmonary/Chest: Effort normal and breath sounds normal. No respiratory distress. He has no wheezes. He has no rales.   On 1L NC   Abdominal: Soft. Bowel sounds are normal. He exhibits no distension. There is no tenderness.   Musculoskeletal: He exhibits no edema.   Neurological: He is alert and oriented to person, place, and time.   Skin: Skin is warm and dry. Rash (erythematous papules coalescing into plaues on the anterior chest, lateral trunk.  On the later trunk are weeping bullae) noted.       Significant Labs:   CBC:   Recent Labs   Lab 07/28/19  0430 07/29/19  0541   WBC 35.91* 51.34*   HGB 9.9* 10.2*   HCT 31.5* 32.8*    343    and CMP:   Recent Labs   Lab 07/28/19  0430 07/29/19  0541   * 124*   K 4.6 4.7   CL 94* 93*   CO2 23 21*   GLU 95 133*   BUN 35* 41*   CREATININE 1.0 1.1   CALCIUM 8.2* 8.5*   PROT 5.4* 5.3*   ALBUMIN 2.0* 1.9*   BILITOT 0.7 0.5   ALKPHOS 100 107   AST 14 11   ALT 7* 7*   ANIONGAP 9 10   EGFRNONAA >60.0 >60.0       Diagnostic Results:  No new imaging

## 2019-07-29 NOTE — PLAN OF CARE
Problem: Adult Inpatient Plan of Care  Goal: Plan of Care Review  Outcome: Ongoing (interventions implemented as appropriate)  POC reviewed with patient and spouse; understanding verbalized. Pt remains up with assist, walker/wheelchair at bedside. Free from falls and injuries this shift. Tele in place, with normal sinus rhythm initially and a-fib throughout most of shift. Regular diet. Good appetite.  1L NC with O2 sats 97-99%. Rash still present on torso, arms, and face. Rash weeping in several areas. Pt c/o discomfort and pruritus. PRN oral Benadryl given, with mild-moderate results achieved. Scheduled antibiotic ointment applied to all areas. Scheduled IV Lasix given. Pt has Cano catheter, draining clear yellow urine to catheter bag, below the bladder. One small soft BM this shift. Pt instructed to call when getting OOB. Pt. with nonskid footwear on, bed in lowest position, and locked with bed rails up x 2. Pt. has call light and personal items within reach. Wife @ bedside. VSS and afebrile this shift. All questions and concerns addressed at this time. Will continue to monitor.

## 2019-07-29 NOTE — MEDICAL/APP STUDENT
[facility]  Cardiology  Progress Note    Patient Name: Cale Harding  MRN: [id number]  Admission Date: 7/17/2019  Hospital Length of Stay: 12 days  Code Status: Full Code   Attending Physician: Sreedhar Coffey MD   Primary Care Physician: Jj Escobedo MD  Expected Discharge Date: 7/30/2019  Principal Problem:Mesothelioma of left lung    Subjective:     HPI:  Mr. Cale Harding is a 78-year-old male with a past medical history of prostate cancer (s/p prostatectomy), mesothelioma (on chemotherapy), with recurrent pleural effusion  ( with a right PleurX) and hypertension who is a transfer for evaluation of pericardial effusion.      Patient was scheduled for elective cystography with Urology (Dr. Philippe) for dilation of bladder neck contracture.  On arrival to elective surgical suite, patient found to be tachycardic and heart rate was irregular.  EKG confirmed atrial fibrillation with RVR.  Surgery was cancelled and he was admitted to the ICU for a diltiazem drip. He converted to sinus but would go in and out of afib, was started on lopressor 25 BID and echo done showed moderate size of pericardial effusion with no tamponade physiology however increased in effusion from 7/5, cardiology in the OSH was planning for pericardiocentesis but requested transfer for possible window vs pericardiocentesis and was sent to [facility]     On arrival patient was hemodynamically stable  , NSR, converted in and out of afib. Cardiology was consulted for input on afib management in the setting of pericardial effusion and need for pericardiocentesis/ closer ccu monitoring.      Hospital Course:   Admitted to hematology oncology on 7/17. CTS consulted for evaluation of pericardial effusion to evaluate need for pericardial window. Patient not strong candidate for pericardial window per CTS, Interventional cardiology preformed paracentesis on 7/18 and drained 570cc from pericardial space eventually found to be  malignant. Patient with new onset Afib w/ RVR, multiple episodes since admission. After multiple days of lopressor trials (PO and Pushes during episodes), cardiology recommended and  started patient on heparin and diltiazem drip for rate control. Rate controlled, pt stepped down to PO lopressor. Hematuria resolved. Patient reports improved SOB w/ exertion. Patient noticed to have a new rash on 7/24, which has progressively worsened. First noticed on his back and now on his chest and abdomen. Derm and Cards consulted for possible drug induced rash. They concur. Will discontinue diltiazem and increase toprolol XL from 100 to 200mg daily. Patient remains fluid restricted to 1500cc/day for hyponatremia. TTE on 7/26 showed unchanged reaccumulation of pericardial fluid from 7/21, CTS consulted for potential pericardial window.    Interval History: Pt feels mildly improved overall but states that SOB continues. Rash is improving although still extensive and itchy. Denies CP, palpitations, syncopal episodes, dizziness, n/v/f/c. CTS deems poor candidate for window, recommends interventional consult for possible pericardiocentesis.     Review of Systems   Constitution: Negative for chills, decreased appetite, diaphoresis and fever.   Cardiovascular: Positive for dyspnea on exertion and leg swelling. Negative for chest pain, irregular heartbeat, near-syncope, orthopnea, palpitations and syncope.   Respiratory: Positive for shortness of breath and wheezing. Negative for cough.    Gastrointestinal: Negative for nausea and vomiting.   Psychiatric/Behavioral: Negative for altered mental status.     Objective:     Vital Signs (Most Recent):  Temp: 97.5 °F (36.4 °C) (07/29/19 1115)  Pulse: 104 (07/29/19 1120)  Resp: 18 (07/29/19 1120)  BP: (!) 106/56 (07/29/19 1115)  SpO2: 95 % (07/29/19 1120) Vital Signs (24h Range):  Temp:  [97.5 °F (36.4 °C)-97.9 °F (36.6 °C)] 97.5 °F (36.4 °C)  Pulse:  [] 104  Resp:  [16-22] 18  SpO2:  [90  %-99 %] 95 %  BP: ()/(56-69) 106/56     Weight: 83.1 kg (183 lb 3.2 oz)  Body mass index is 27.05 kg/m².    SpO2: 95 %  O2 Device (Oxygen Therapy): nasal cannula w/ humidification      Intake/Output Summary (Last 24 hours) at 7/29/2019 1254  Last data filed at 7/29/2019 0809  Gross per 24 hour   Intake 1470 ml   Output 925 ml   Net 545 ml       Lines/Drains/Airways     Central Venous Catheter Line                 Port A Cath Single Lumen 06/20/19 right internal jugular 39 days          Drain                 Chest Tube Right Pleural -- days         Urethral Catheter 07/17/19 0920 Non-latex 16 Fr. 12 days                Physical Exam   Constitutional: He is oriented to person, place, and time. No distress.   HENT:   Head: Normocephalic and atraumatic.   Neck: JVD present.   Cardiovascular: Normal rate, regular rhythm, normal heart sounds and intact distal pulses. Exam reveals no gallop and no friction rub.   No murmur heard.  Pulmonary/Chest: No respiratory distress. He has decreased breath sounds in the right lower field and the left lower field. He has wheezes.   Abdominal: Soft. There is no tenderness.   Musculoskeletal: He exhibits edema.   Neurological: He is alert and oriented to person, place, and time.   Skin: He is not diaphoretic.   Vitals reviewed.      Significant Labs:   CMP   Recent Labs   Lab 07/28/19  0430 07/29/19  0541   * 124*   K 4.6 4.7   CL 94* 93*   CO2 23 21*   GLU 95 133*   BUN 35* 41*   CREATININE 1.0 1.1   CALCIUM 8.2* 8.5*   PROT 5.4* 5.3*   ALBUMIN 2.0* 1.9*   BILITOT 0.7 0.5   ALKPHOS 100 107   AST 14 11   ALT 7* 7*   ANIONGAP 9 10   ESTGFRAFRICA >60.0 >60.0   EGFRNONAA >60.0 >60.0    and CBC   Recent Labs   Lab 07/28/19  0430 07/29/19  0541   WBC 35.91* 51.34*   HGB 9.9* 10.2*   HCT 31.5* 32.8*    343       Assessment and Plan:     Brief HPI:   Mr. Tamplain is a 78-year-old male with prostate cancer (s/p prostatectomy), mesothelioma (on chemotherapy), with recurrent  pleural effusion  who initially presented to OSH for elective cystography for dilation of bladder neck contracture.      Patient was scheduled for elective cystography with Urology (Dr. Philippe) for dilation of bladder neck contracture.  On arrival to elective surgical suite, patient found to be tachycardic and heart rate was irregular.  EKG confirmed atrial fibrillation with RVR.  Surgery was cancelled and he was admitted to the ICU for a diltiazem drip. He converted to sinus but would go in and out of afib, was started on lopressor 25 BID and echo done showed moderate size of pericardial effusion with no tamponade physiology however increased in effusion from 7/5.   Pericardiocentesis was done on 7/18 with removal of 570 cc of fluid that was positive for malignancy. Patients HR has been controlled with PO metoprolol succinate XR.      Active Diagnoses:    Diagnosis Date Noted POA    PRINCIPAL PROBLEM:  Mesothelioma of left lung [C45.7] 02/15/2018 Yes    Leukemoid reaction [D72.823] 07/29/2019 Unknown    Rash, drug [L27.0] 07/28/2019 Unknown    Decrease in appetite [R63.0] 07/26/2019 Yes    Compression of vein [I87.1]  Clinically Undetermined    SOB (shortness of breath) on exertion [R06.02] 07/25/2019 Unknown    Acute on chronic diastolic heart failure [I50.33] 07/18/2019 Unknown    Pericardial effusion without cardiac tamponade [I31.3] 07/15/2019 Yes    Essential hypertension [I10] 07/15/2019 Yes    Atrial fibrillation with RVR [I48.91] 07/15/2019 Yes    Acquired contracture of bladder neck [N32.0] 07/10/2019 Yes    Malignant pleural effusion [J91.0] 02/01/2018 Yes    Gastroesophageal reflux disease [K21.9] 10/11/2017 Yes    History of prostate cancer [Z85.46] 05/25/2016 Not Applicable      Problems Resolved During this Admission:    Diagnosis Date Noted Date Resolved POA    Gross hematuria [R31.0] 07/21/2019 07/26/2019 No     Pericardial Effusion  - recurrent effusion stable on echo from 7/21 and  7/26, without tamponade physiology  - Poor window candidate  - recommend increased lasix dose to diurese as patient is net positive during admission, will echo following diuresis and reevaulate if intervention is required  - recommend Lasix 80mg IV q8    Afib RVR  - Pt in and out of Afib with RVR, denies palpitations which is improved  - Rate often in 60s but will get above 100 during episodes  - Metoprolol succinate 200 mg XR OD, tolerated well  - metoprolol 5mg IV q5 min PRN if HR >120  - CHADSVASC - 3  - Heparin gtt      VTE Risk Mitigation (From admission, onward)        Ordered     heparin, porcine (PF) 100 unit/mL injection flush 500 Units  Every 8 hours PRN      07/18/19 1712     IP VTE HIGH RISK PATIENT  Once      07/17/19 1930          Servando Dueñas  Cardiology  [facility]

## 2019-07-29 NOTE — ASSESSMENT & PLAN NOTE
Currently holding all hypertensives.  Patient has been normotensive since admission.  Was previously on enalapril at home.   -discuss with cards before discharge whether patient will require ACE/ARB

## 2019-07-29 NOTE — PROGRESS NOTES
[facility]  Cardiology  Progress Note    Patient Name: Cale Harding  MRN: [id number]  Admission Date: 7/17/2019  Hospital Length of Stay: 12 days  Code Status: Full Code   Attending Physician: Sreedhar Coffey MD   Primary Care Physician: Jj Escobedo MD  Expected Discharge Date: 7/30/2019  Principal Problem:Mesothelioma of left lung    Subjective:     Hospital Course:   Pt has been having a maculopapular rash with an erythematous base that started on his back and has been extending to his chest and upper abdomen. Dermatology suspects that it can be associated with Diltiazem and as a result cardiology has been consulted for change in rate control medication along with recommendation on Diuresis for anasarca.    Pt denies CP/Chest discomfort, diaphoresis, palpitations,lightheadedness    Interval History: No acute oovernight events, seen by thoracic surgery and interventional cardiology; neither suggesting intervention at this time given absence of evidence of tamponade, repeat TTE w only small effusion.    Review of Systems   Constitution: Negative. Negative for chills and fever.   HENT: Negative.    Eyes: Negative.    Cardiovascular: Negative for chest pain, claudication and paroxysmal nocturnal dyspnea.   Respiratory: Negative for cough, shortness of breath and wheezing.    Endocrine: Negative.    Hematologic/Lymphatic: Does not bruise/bleed easily.   Skin: Positive for rash. Negative for nail changes.   Musculoskeletal: Negative.  Negative for back pain.   Gastrointestinal: Negative for abdominal pain, melena, nausea and vomiting.   Neurological: Negative for dizziness and headaches.   Psychiatric/Behavioral: Negative for altered mental status, depression and substance abuse.   Allergic/Immunologic: Negative.      Objective:     Vital Signs (Most Recent):  Temp: 97.5 °F (36.4 °C) (07/29/19 1115)  Pulse: 104 (07/29/19 1120)  Resp: 18 (07/29/19 1120)  BP: (!) 106/56 (07/29/19 1115)  SpO2: 95  % (07/29/19 1120) Vital Signs (24h Range):  Temp:  [97.5 °F (36.4 °C)-97.9 °F (36.6 °C)] 97.5 °F (36.4 °C)  Pulse:  [] 104  Resp:  [16-22] 18  SpO2:  [90 %-99 %] 95 %  BP: ()/(56-69) 106/56     Weight: 83.1 kg (183 lb 3.2 oz)  Body mass index is 27.05 kg/m².     SpO2: 95 %  O2 Device (Oxygen Therapy): nasal cannula w/ humidification      Intake/Output Summary (Last 24 hours) at 7/29/2019 1321  Last data filed at 7/29/2019 0809  Gross per 24 hour   Intake 1470 ml   Output 925 ml   Net 545 ml       Lines/Drains/Airways     Central Venous Catheter Line                 Port A Cath Single Lumen 06/20/19 right internal jugular 39 days          Drain                 Chest Tube Right Pleural -- days         Urethral Catheter 07/17/19 0920 Non-latex 16 Fr. 12 days                Physical Exam   Constitutional: He is oriented to person, place, and time. He appears well-developed and well-nourished.   HENT:   Head: Normocephalic and atraumatic.   Eyes: Pupils are equal, round, and reactive to light. Conjunctivae and EOM are normal.   Neck: JVD present.   Cardiovascular: Normal rate, regular rhythm, normal heart sounds and intact distal pulses. Exam reveals no gallop and no friction rub.   No murmur heard.  Pulmonary/Chest: Effort normal. No stridor. He has no wheezes. He has no rales. He exhibits no tenderness.   Abdominal: Soft. Bowel sounds are normal. He exhibits no distension and no mass. There is no tenderness. There is no guarding.   Musculoskeletal: He exhibits no edema, tenderness or deformity.   Neurological: He is alert and oriented to person, place, and time.   Skin: Skin is warm and dry. No rash noted. No erythema.   Psychiatric: He has a normal mood and affect.       Significant Labs:   CMP   Recent Labs   Lab 07/28/19  0430 07/29/19  0541   * 124*   K 4.6 4.7   CL 94* 93*   CO2 23 21*   GLU 95 133*   BUN 35* 41*   CREATININE 1.0 1.1   CALCIUM 8.2* 8.5*   PROT 5.4* 5.3*   ALBUMIN 2.0* 1.9*    BILITOT 0.7 0.5   ALKPHOS 100 107   AST 14 11   ALT 7* 7*   ANIONGAP 9 10   ESTGFRAFRICA >60.0 >60.0   EGFRNONAA >60.0 >60.0    and CBC   Recent Labs   Lab 07/28/19  0430 07/29/19  0541   WBC 35.91* 51.34*   HGB 9.9* 10.2*   HCT 31.5* 32.8*    343       Significant Imaging: Echocardiogram:   Transthoracic echo (TTE) complete (Cupid Only):   Results for orders placed or performed during the hospital encounter of 07/17/19   Transthoracic echo (TTE) 2D with Color Flow   Result Value Ref Range    Ascending aorta 3.02 cm    STJ 2.45 cm    AV mean gradient 4 mmHg    Ao peak michael 1.27 m/s    Ao VTI 18.06 cm    IVRT 0.07 msec    IVS 1.08 0.6 - 1.1 cm    LA size 3.87 cm    Left Atrium Major Axis 4.95 cm    Left Atrium Minor Axis 5.10 cm    LVIDD 3.31 (A) 3.5 - 6.0 cm    LVIDS 2.30 2.1 - 4.0 cm    LVOT diameter 2.23 cm    LVOT peak VTI 12.22 cm    PW 0.84 0.6 - 1.1 cm    MV Peak A Michael 0.95 m/s    E wave decelartion time 122.99 msec    MV Peak E Michael 0.91 m/s    RA Major Axis 4.61 cm    RA Width 3.33 cm    RVDD 3.13 cm    Sinus 3.48 cm    TAPSE 1.30 cm    TR Max Michael 2.36 m/s    TDI LATERAL 0.06 m/s    TDI SEPTAL 0.11 m/s    LA WIDTH 3.90 cm    LV Diastolic Volume 44.47 mL    LV Systolic Volume 18.12 mL    RV S' 9.21 cm/s    LVOT peak michael 0.72 m/s    LV LATERAL E/E' RATIO 15.17 m/s    LV SEPTAL E/E' RATIO 8.27 m/s    FS 31 %    LA volume 64.45 cm3    LV mass 89.45 g    Left Ventricle Relative Wall Thickness 0.51 cm    AV valve area 2.64 cm2    AV Velocity Ratio 0.57     AV index (prosthetic) 0.68     E/A ratio 0.96     Mean e' 0.09 m/s    LVOT area 3.9 cm2    LVOT stroke volume 47.70 cm3    AV peak gradient 6 mmHg    E/E' ratio 10.71 m/s    LV Systolic Volume Index 9.1 mL/m2    LV Diastolic Volume Index 22.25 mL/m2    LA Volume Index 32.3 mL/m2    LV Mass Index 45 g/m2    Triscuspid Valve Regurgitation Peak Gradient 22 mmHg    BSA 2.02 m2    Right Atrial Pressure (from IVC) 8 mmHg    TV rest pulmonary artery pressure 30  mmHg    Narrative    · Normal left ventricular systolic function. The estimated ejection   fraction is 60%  · Concentric left ventricular remodeling.  · Normal LV diastolic function.  · Normal right ventricular systolic function.  · Mild tricuspid regurgitation.  · The estimated PA systolic pressure is 30 mm Hg  · Intermediate central venous pressure (8 mm Hg).  · Small circumferential pericardial effusion. Shaggy visceral pericardium.   No evidence of tamponade physiology        Assessment and Plan:       Pericardial effusion without cardiac tamponade  - Only small residual evidence of pericardial effusion w/o evidence of tamponade  - Not indicated for intervention per interventional cardiology and throacic surgery  - Will continue to monitor intermittently w TTE  - Can safely continue IV diuresis    Atrial fibrillation with RVR  - Continue metoprolol succinate 200mg daily  - If inadequate control, will consider addition of low-dose dig, or other CCB less likely to have derm manifestations  - CHADsVASC 3 suggestive of 3.2% annual stroke risk. - please resume anticoagulation with Lovenox 80mg bid    Malignant pleural effusion  - Overnight,  he denies change in his dyspnea. Remains in atrial fibrillation with controlled ventricular rate.  - Feel patient is still above dry weight volume overloaded w elevated JVD and without significant bump in Cr, HCO3 for which would continue IV diuresis        VTE Risk Mitigation (From admission, onward)        Ordered     heparin, porcine (PF) 100 unit/mL injection flush 500 Units  Every 8 hours PRN      07/18/19 1712     IP VTE HIGH RISK PATIENT  Once      07/17/19 1930          Higinio Munosn MD  Cardiology  [facility]

## 2019-07-29 NOTE — SUBJECTIVE & OBJECTIVE
Interval History: NAEON. Paroxysmal afib. Hemodynamically stable. Minimal supplemental O2 requirements.     Medications:  Continuous Infusions:  Scheduled Meds:   albuterol-ipratropium  3 mL Nebulization Q4H WAKE    furosemide (LASIX) IV  40 mg Intravenous Q8H    metoprolol succinate  200 mg Oral Daily    ondansetron  4 mg Intravenous Once    pantoprazole  40 mg Oral Daily    predniSONE  40 mg Oral Daily    And    [START ON 7/30/2019] predniSONE  30 mg Oral Daily    And    [START ON 7/31/2019] predniSONE  20 mg Oral Daily    triamcinolone acetonide 0.025%   Topical (Top) BID     PRN Meds:albuterol-ipratropium, alteplase, Dextrose 10% Bolus, Dextrose 10% Bolus, diphenhydrAMINE, glucagon (human recombinant), glucose, glucose, guaifenesin 100 mg/5 ml, heparin, porcine (PF), HYDROcodone-acetaminophen, ibuprofen, metoprolol, ondansetron, polyethylene glycol, ramelteon, sodium chloride 0.9%     Review of patient's allergies indicates:   Allergen Reactions    Bactrim [sulfamethoxazole-trimethoprim] Other (See Comments)     Joint pains     Objective:     Vital Signs (Most Recent):  Temp: 97.5 °F (36.4 °C) (07/29/19 0435)  Pulse: (!) 115 (07/29/19 0719)  Resp: 18 (07/29/19 0435)  BP: 96/64 (07/29/19 0435)  SpO2: 99 % (07/29/19 0435) Vital Signs (24h Range):  Temp:  [97.5 °F (36.4 °C)-97.9 °F (36.6 °C)] 97.5 °F (36.4 °C)  Pulse:  [] 115  Resp:  [16-20] 18  SpO2:  [93 %-100 %] 99 %  BP: ()/(56-69) 96/64     Intake/Output - Last 3 Shifts       07/27 0700 - 07/28 0659 07/28 0700 - 07/29 0659 07/29 0700 - 07/30 0659    P.O. 1160 1650     Total Intake(mL/kg) 1160 (13.4) 1650 (19.9)     Urine (mL/kg/hr) 1025 (0.5) 925 (0.5)     Stool  0     Total Output 1025 925     Net +135 +725            Stool Occurrence  1 x           SpO2: 99 %  O2 Device (Oxygen Therapy): nasal cannula    Physical Exam   Constitutional: He is oriented to person, place, and time. He appears well-developed and well-nourished.    Cardiovascular: Intact distal pulses. An irregularly irregular rhythm present. Tachycardia present.   In afib on monitor   Pulmonary/Chest: Effort normal. No respiratory distress.   On nasal cannula   Abdominal: Soft. Bowel sounds are normal.   Neurological: He is alert and oriented to person, place, and time.   Skin: Skin is warm and dry. Rash noted.   Diffuse rash    Psychiatric: He has a normal mood and affect. His behavior is normal.       Significant Labs:  BMP:   Recent Labs   Lab 07/29/19  0541   *   *   K 4.7   CL 93*   CO2 21*   BUN 41*   CREATININE 1.1   CALCIUM 8.5*   MG 2.0     CBC:   Recent Labs   Lab 07/29/19  0541   WBC 51.34*   RBC 3.85*   HGB 10.2*   HCT 32.8*      MCV 85   MCH 26.5*   MCHC 31.1*     CMP:   Recent Labs   Lab 07/29/19  0541   *   CALCIUM 8.5*   ALBUMIN 1.9*   PROT 5.3*   *   K 4.7   CO2 21*   CL 93*   BUN 41*   CREATININE 1.1   ALKPHOS 107   ALT 7*   AST 11   BILITOT 0.5       Significant Diagnostics:  CT: I have reviewed all pertinent results/findings within the past 24 hours  CXR: I have reviewed all pertinent results/findings within the past 24 hours    VTE Risk Mitigation (From admission, onward)        Ordered     heparin, porcine (PF) 100 unit/mL injection flush 500 Units  Every 8 hours PRN      07/18/19 1712     IP VTE HIGH RISK PATIENT  Once      07/17/19 1930

## 2019-07-29 NOTE — ASSESSMENT & PLAN NOTE
Patient w/ reported hx of poor appetite since beginning chemotherapy    Plan:  - Patient interested in MARINOL for appetite stimulation  - Marinol therapy not initiated at this time as it may worsen hypotension

## 2019-07-29 NOTE — ASSESSMENT & PLAN NOTE
Patient presented to [facility] w/ Pericardial effusion and a hx of recurrent pleural effusions (newly diagnosed mesothelioma in 2019). R sided PleurX catheter in place, patient of Dr. Padilla (Pulm) with 450cc drained on admission. CXR during admission have shown BL pleural effusion w/ areas of pulmonary infiltrates L>R.  Continues to improve and satting well on 1L NC. Has been diuresing and with bump in BUN/Cr, he is probably at dry weight.    Plan:  - Continue duo nebs Q4h while awake  -will switch to oral lasix 40 mg BID  -discussed with pulmonology who is working with Dr. Padilla and their current plan is to ultrasound the L sided pleural effusion today.  If there is a significant amount of fluid to drain, they will plan for thoracocentesis tomorrow.  They will also take some fluid via Right PleurX with recent increase in WBC to rule out infectious etiology.

## 2019-07-30 PROBLEM — E87.1 HYPONATREMIA: Status: ACTIVE | Noted: 2019-01-01

## 2019-07-30 PROBLEM — N17.9 AKI (ACUTE KIDNEY INJURY): Status: ACTIVE | Noted: 2019-01-01

## 2019-07-30 NOTE — ASSESSMENT & PLAN NOTE
Patient's baseline Cr is 0.7-0.8 now has been steadily climbing since admission.  Suspect pre-renal in nature due to patient's tenuous fluid status-could be cardiogenic vs volume depletion.  US negative for obstruction.  Intra-renal causes not excluded.  -Will see how patient responds to diuresis today  -UA ordered as patient has not had one since 7/21

## 2019-07-30 NOTE — MEDICAL/APP STUDENT
[facility]  Cardiology  Progress Note    Patient Name: Cale Harding  MRN: [id number]  Admission Date: 7/17/2019  Hospital Length of Stay: 13 days  Code Status: Full Code   Attending Physician: Sreedhar Coffey MD   Primary Care Physician: Jj Escobedo MD  Expected Discharge Date: 7/31/2019  Principal Problem:Mesothelioma of left lung    Subjective:     Interval: No acute events, pt has MO in the 70s since yesterday denies any palpitations, CP, LOC, syncope. SOB improved but still occurring, rash worsening. Received one dose IV lasix 80mg this morning but still on 40mg PO BID. Only diresed 300 mL yesterday.    Review of Systems   Constitution: Negative for chills, decreased appetite, diaphoresis and fever.   Cardiovascular: Positive for dyspnea on exertion ( improved) and leg swelling. Negative for chest pain, irregular heartbeat, palpitations and syncope.   Respiratory: Positive for cough, shortness of breath and wheezing.    Skin: Positive for itching and rash.   Gastrointestinal: Negative for nausea and vomiting.   Psychiatric/Behavioral: Negative for altered mental status.     Objective:     Vital Signs (Most Recent):  Temp: 97.7 °F (36.5 °C) (07/30/19 1121)  Pulse: 88 (07/30/19 1241)  Resp: 18 (07/30/19 1241)  BP: (!) 103/55 (07/30/19 1121)  SpO2: 99 % (07/30/19 1241) Vital Signs (24h Range):  Temp:  [97.4 °F (36.3 °C)-97.9 °F (36.6 °C)] 97.7 °F (36.5 °C)  Pulse:  [71-88] 88  Resp:  [16-20] 18  SpO2:  [96 %-99 %] 99 %  BP: ()/(53-65) 103/55     Weight: 79.2 kg (174 lb 9.7 oz)  Body mass index is 25.78 kg/m².    SpO2: 99 %  O2 Device (Oxygen Therapy): nasal cannula      Intake/Output Summary (Last 24 hours) at 7/30/2019 1252  Last data filed at 7/30/2019 1123  Gross per 24 hour   Intake 97 ml   Output 650 ml   Net -553 ml       Lines/Drains/Airways     Central Venous Catheter Line                 Port A Cath Single Lumen 06/20/19 right internal jugular 40 days          Drain                  Chest Tube Right Pleural -- days         Urethral Catheter 07/17/19 0920 Non-latex 16 Fr. 13 days                Physical Exam   Constitutional: He is oriented to person, place, and time. He appears cachectic. He is cooperative. No distress.   Neck: JVD present.   Cardiovascular: Normal rate, regular rhythm and intact distal pulses.   No murmur heard.  Pulmonary/Chest: Effort normal. He has wheezes.   Abdominal: He exhibits no distension. There is no tenderness.   Musculoskeletal: He exhibits edema.   Neurological: He is alert and oriented to person, place, and time.   Skin: Rash ( diffuse macular erythematous confluent rash with bullae with serous drainage  over chest back abdomen groid, thighs) noted. He is not diaphoretic.   Psychiatric: He has a normal mood and affect. His behavior is normal. Judgment and thought content normal.   Vitals reviewed.      Significant Labs:   CMP   Recent Labs   Lab 07/29/19  0541 07/30/19  0529   * 124*   K 4.7 5.0   CL 93* 93*   CO2 21* 21*   * 108   BUN 41* 50*   CREATININE 1.1 1.4   CALCIUM 8.5* 8.2*   PROT 5.3* 5.5*   ALBUMIN 1.9* 2.1*   BILITOT 0.5 0.5   ALKPHOS 107 120   AST 11 14   ALT 7* 8*   ANIONGAP 10 10   ESTGFRAFRICA >60.0 55.6*   EGFRNONAA >60.0 48.1*    and CBC   Recent Labs   Lab 07/29/19  0541 07/30/19  0529   WBC 51.34* 63.29*   HGB 10.2* 10.3*   HCT 32.8* 33.4*    343     Assessment and Plan:     Brief HPI: Mr. Harding is a 78-year-old male with prostate cancer (s/p prostatectomy), mesothelioma (on chemotherapy), with recurrent pleural effusion  who initially presented to OSH for elective cystography for dilation of bladder neck contracture.      Patient was scheduled for elective cystography with Urology (Dr. Philippe) for dilation of bladder neck contracture.  On arrival to elective surgical suite, patient found to be tachycardic and heart rate was irregular.  EKG confirmed atrial fibrillation with RVR.  Surgery was cancelled and he  was admitted to the ICU for a diltiazem drip. He converted to sinus but would go in and out of afib, was started on lopressor 25 BID and echo done showed moderate size of pericardial effusion with no tamponade physiology however increased in effusion from 7/5.   Pericardiocentesis was done on 7/18 with removal of 570 cc of fluid that was positive for malignancy. Patients HR has been controlled with PO metoprolol succinate XR.      Active Diagnoses:    Diagnosis Date Noted POA    PRINCIPAL PROBLEM:  Mesothelioma of left lung [C45.7] 02/15/2018 Yes    Leukemoid reaction [D72.823] 07/29/2019 Unknown    Rash, drug [L27.0] 07/28/2019 Unknown    Decrease in appetite [R63.0] 07/26/2019 Yes    Compression of vein [I87.1]  Clinically Undetermined    SOB (shortness of breath) on exertion [R06.02] 07/25/2019 Unknown    Acute on chronic diastolic heart failure [I50.33] 07/18/2019 Unknown    Pericardial effusion without cardiac tamponade [I31.3] 07/15/2019 Yes    Essential hypertension [I10] 07/15/2019 Yes    Atrial fibrillation with RVR [I48.91] 07/15/2019 Yes    Acquired contracture of bladder neck [N32.0] 07/10/2019 Yes    Malignant pleural effusion [J91.0] 02/01/2018 Yes    Gastroesophageal reflux disease [K21.9] 10/11/2017 Yes    History of prostate cancer [Z85.46] 05/25/2016 Not Applicable      Problems Resolved During this Admission:    Diagnosis Date Noted Date Resolved POA    Gross hematuria [R31.0] 07/21/2019 07/26/2019 No   Pericardial Effusion  - recurrent effusion stable on echo from 7/21 and 7/26, without tamponade physiology  - Poor window candidate  - recommend increased lasix dose to diurese as patient is net positive during admission, will echo following diuresis and reevaulate if intervention is required  - shows intravascular volume overload on exam with no signs of contraction alkalosis  - recommend Lasix 80mg IV q8     Afib RVR  - Pt in and out of Afib with RVR, denies palpitations which is  improved  - Rate often in 60s but will get above 100 during episodes  - Metoprolol succinate 200 mg XR OD, tolerated well  - metoprolol 5mg IV q5 min PRN if HR >120  - CHADSVASC - 3  - Heparin gtt    VTE Risk Mitigation (From admission, onward)        Ordered     heparin, porcine (PF) 100 unit/mL injection flush 500 Units  Every 8 hours PRN      07/18/19 1712     IP VTE HIGH RISK PATIENT  Once      07/17/19 1930          Servando Dueñas  Cardiology  [facility]

## 2019-07-30 NOTE — ASSESSMENT & PLAN NOTE
Patient of Dr. Foster on palliative chemo with Gemzar with plans to switch to Vinorelbine due to disease progression.  PET CT 4/2019 with increased uptake within the enlarging 1.4 cm left upper lobe pulmonary nodule, mediastinal lymph nodes, and left pleura when compared to PET 2/2019.  -pain currently controlled with current regimen: Norco 5-325 mg q6 PRN  -will need OP follow up with Dr. Foster

## 2019-07-30 NOTE — PLAN OF CARE
Problem: Adult Inpatient Plan of Care  Goal: Plan of Care Review  Pt afebrile. VSS. Has large rash across his back, chest, abdomen, face, buttocks and legs that is blistering and weeping yellow clear fluid. Pt is edematous with +4 edema to scrotum and penis. Penis had milky discharge from meatus. Cano catheter in place. Pt has low urine outout. Taken at 1am this morning to ultrasound of the kidneys. Right chest pleurex catheter dressing held in place by ace wrap. interdry gauze placed over rash sights with skin sloughing. Discouraged pt from applying paper towels to open skin areas in inner thighs. Pt remains on telemetry NSR in the 80s. sats 94-96 on 1 liter of oxygen. Noted crackles to left lung. Pt had 2 loose BM overnight. Implanted port is saline locked and has blood return. Plan of care reviewed with pt. Educated pt on any medications, and procedures that the patient would be receiving. Discussed vital sign and I&O routine. Allowed time for pt to ask any questions, and told the patient to call for any assistance. Bed low & locked, call light and personal belongings within reach.  Progressing towards discharge.

## 2019-07-30 NOTE — PT/OT/SLP PROGRESS
"     Physical Therapy  Pt Not Seen    Patient Name:  Cale Harding   MRN:  [id number]    11:32 am  Patient not seen today secondary to  Other (Comment)(Pt refused to participate in PT session stating "I'm fatigued"  Pt's wife reports "he's tired from having been moved to this room last night, I helped to go to the bathroom a few times and then the doctor had him sit up at the EOB for a while"   PTA encouraged Pt to get OOB and sit UIC with assistance from wife later today.  Pt agreeable). Will follow-up on next scheduled visit.    Julissa Land, PTA  7/30/2019      "

## 2019-07-30 NOTE — ASSESSMENT & PLAN NOTE
Patient presented to [facility] w/ Pericardial effusion and a hx of recurrent pleural effusions (newly diagnosed mesothelioma in 2019). R sided PleurX catheter in place, patient of Dr. Padilla (Pulm) with 450cc drained on admission. CXR during admission have shown BL pleural effusion w/ areas of pulmonary infiltrates L>R.  Continues to improve and satting well on 1L NC. Lung ultrasound by pulmonology/Dr. Padilla reveals no significant amount of fluid to drain. Patient with JVD, weight at 79 kg(73 on admission), and decreased renal perfusion bilaterally on ultrasound still could have fluid overload.    Plan:  -Currently on PO lasix 40mg BID with diminished urine output yesterday.  Pushed 80 mg IV lasix this morning and will monitor response over day. Cardiology would like more aggressive diuresis however we will see how he responds to 80mg IV and re-evaluate.  -consider repeat BNP and/or echo should his fluid status continue to be in question   - Continue duo nebs Q4h while awake

## 2019-07-30 NOTE — PLAN OF CARE
Problem: Adult Inpatient Plan of Care  Goal: Plan of Care Review  Outcome: Ongoing (interventions implemented as appropriate)  POC reviewed with patient; understanding verbalized. Lasix administered. Tele continued. Regular diet, 1500 mL fluid restriction. 1 L NC. Interdry applied to pustular body rash; linens changed multiple times today for comfort. Pt. with nonskid footwear on, bed in lowest position, and locked with bed rails up x2.  Pt. instructed to call prior to getting OOB.  Pt. has call light and personal items within reach. Patient ambulates in room with standby assist. VSS and afebrile this shift. All questions and concerns addressed at this time. Family at bedside. Will continue to monitor.

## 2019-07-30 NOTE — CONSULTS
Consult received from nursing for rash.    Discussed with Dr Styles and they are managing the rash at this time.     Wound care will sign off. Please re-consult if we can be of further assistance.   Modesta Hart RN CN [facility]   [phone]

## 2019-07-30 NOTE — SUBJECTIVE & OBJECTIVE
Interval History: No events overnight.  Rash continued to worsen, moving caudally to the anterior and medial thighs with increased blistering and desquamation. Still having decreased urine output. He was able to get out of bed yesterday and use the restroom with some assistance. Denies fever, chills,nausea, vomiting, chest pain, abdominal pain, increased shortness of breath, diarrhea, constipation.    Oncology Treatment Plan:   OP NSCLC VINORELBINE WEEKLY    Medications:  Continuous Infusions:  Scheduled Meds:   albuterol-ipratropium  3 mL Nebulization Q4H WAKE    furosemide  40 mg Oral BID    metoprolol succinate  200 mg Oral Daily    ondansetron  4 mg Intravenous Once    pantoprazole  40 mg Oral Daily    [START ON 7/31/2019] predniSONE  20 mg Oral Daily     PRN Meds:albuterol-ipratropium, alteplase, Dextrose 10% Bolus, Dextrose 10% Bolus, diphenhydrAMINE, glucagon (human recombinant), glucose, glucose, guaifenesin 100 mg/5 ml, heparin, porcine (PF), HYDROcodone-acetaminophen, ibuprofen, metoprolol, ondansetron, polyethylene glycol, ramelteon, sodium chloride 0.9%, triamcinolone acetonide 0.025%, triamcinolone acetonide 0.1%     Review of Systems   All other systems reviewed and are negative.    Objective:     Vital Signs (Most Recent):  Temp: 97.7 °F (36.5 °C) (07/30/19 1121)  Pulse: 78 (07/30/19 1121)  Resp: 20 (07/30/19 1121)  BP: (!) 103/55 (07/30/19 1121)  SpO2: 96 % (07/30/19 1121) Vital Signs (24h Range):  Temp:  [97.4 °F (36.3 °C)-97.9 °F (36.6 °C)] 97.7 °F (36.5 °C)  Pulse:  [71-79] 78  Resp:  [16-20] 20  SpO2:  [96 %-99 %] 96 %  BP: ()/(53-65) 103/55     Weight: 79.2 kg (174 lb 9.7 oz)  Body mass index is 25.78 kg/m².  Body surface area is 1.96 meters squared.      Intake/Output Summary (Last 24 hours) at 7/30/2019 1143  Last data filed at 7/30/2019 1123  Gross per 24 hour   Intake 337 ml   Output 725 ml   Net -388 ml       Physical Exam   Constitutional: He is oriented to person, place, and  time. He appears well-developed and well-nourished. No distress.   HENT:   Head: Normocephalic and atraumatic.   Eyes: Pupils are equal, round, and reactive to light. Conjunctivae are normal. No scleral icterus.   Neck: Normal range of motion. Neck supple. JVD present. Tracheal deviation: present to mandible when reclined.   Cardiovascular: Normal rate, normal heart sounds and intact distal pulses. Exam reveals no gallop and no friction rub.   No murmur heard.  Irregularly irregular, normal rate   Pulmonary/Chest: Effort normal. No respiratory distress. He has no wheezes. He has no rales.   On 1L NC  Bilateral decreased breath sounds   Abdominal: Soft. Bowel sounds are normal. He exhibits no distension. There is no tenderness.   Musculoskeletal: He exhibits no edema.   Neurological: He is alert and oriented to person, place, and time.   Skin: Skin is warm and dry. Rash (On the lower face, extending to anterior/medial thighs is are erythematous papules which have coalesced into generalized erythema.  There are bullae composed of clear fluid present on the lateral trunk and medial thigs with some desquamating and weeping.  ) noted.   There does not appear to be any superimposed infection or crusting of any kind.   His face which was improved yesterday is more erythematous today    Significant Labs:   CBC:   Recent Labs   Lab 19  0541 19  0529   WBC 51.34* 63.29*   HGB 10.2* 10.3*   HCT 32.8* 33.4*    343    and CMP:   Recent Labs   Lab 19  0541 19  0529   * 124*   K 4.7 5.0   CL 93* 93*   CO2 21* 21*   * 108   BUN 41* 50*   CREATININE 1.1 1.4   CALCIUM 8.5* 8.2*   PROT 5.3* 5.5*   ALBUMIN 1.9* 2.1*   BILITOT 0.5 0.5   ALKPHOS 107 120   AST 11 14   ALT 7* 8*   ANIONGAP 10 10   EGFRNONAA >60.0 48.1*     Phosph: 4.9  M.0    Diagnostic Results:  US retroperitoneal  CLINICAL HISTORY:  SKINNY and decreased urine output;    TECHNIQUE:  Ultrasound of the kidneys and urinary bladder  was performed including color flow and Doppler evaluation of the kidneys.    COMPARISON:  CT chest abdomen pelvis with contrast 07/09/2019.    FINDINGS:  The kidneys are normal in size measuring 11.0 cm on the right and 10.3 cm on left.  Renal resistive indices are elevated measuring 0.77 bilaterally.  There is mildly decreased renal perfusion bilaterally, with no significant cortical thinning or loss of corticomedullary distinction.  No solid masses, nephrolithiasis, or hydronephrosis identified.    The bladder is decompressed around a Cano catheter with mild residual fluid in the bladder lumen.    Moderate volume pelvic free fluid noted.    Splenic resistive index: 0.67      Impression       Nonspecific elevation of the renal arterial resistive indices with mildly decreased renal perfusion which could be seen in the setting of medical renal disease.  No evidence of hydronephrosis.    Moderate volume pelvic free fluid.

## 2019-07-30 NOTE — PROGRESS NOTES
07/30/19 1005   Vital Signs   BP (!) 96/53   MAP (mmHg) 70   BP Location Left arm   BP Method Automatic   Patient Position Lying   Dr. Blackwell notified of BP. Ordered to proceed with lasix administration.

## 2019-07-30 NOTE — PLAN OF CARE
MDRs completed with Dr. Coffey and the team. VSS. Patient remains afebrile. O2 sats 96% on 1L O2 per nasal cannula. Today's labs: WBC 63.29, Hgb 10.3, Plts 343, Na 124, K 5.0, Phos 4.9, Mg 2.0. Patient is not medically ready for discharge. Dermatology, Thoracic Surgery, Cardiology, Urology and Pulmonary following. Patient reports getting up out of bed several times every day. MD encouraged patient to get into the bedside chair today. MD discussed the plan of care with the patient and the patient's family. CM to continue to follow with the team.    Polina De La Garza, RN, BSN, CM  [facility]  Nurse - Med Onc/Gyn Onc

## 2019-07-30 NOTE — ASSESSMENT & PLAN NOTE
Patient transferred to [facility] from OSH for evaluation by cardiology/CTS for pericardial window or pericardiocentesis on 7/17/19. Pericardiocentesis was preformed by interventional cards, pt tolerated procedure well, produced 570cc serosanguinous fluid on 7/18. Repeat echo on 7/21 with recurrence of effusion and echo on 7/26 with stabilization of effusion and without tamponade. CTS consulted to evaluate patient for pericardial window but feel he is a poor candidate given likelihood of disease in pericardium.  Interventional cardiology also feel this patient is poor candidate for a drain as there is no safe place to place pericardiocentesis needle.      Plan:   Will hold off on further management or consults at the moment as effusion is stable and without tamponade physiology

## 2019-07-30 NOTE — ASSESSMENT & PLAN NOTE
Drug rash thought to be result of diltiazem treatment.  Diltiazem stopped and rash continues to improve in some areas but is progressing down trunk and now today to lower extremities with increased blister formation, desquamation and weeping    Plan:   -continue topical TAC 0.025% BID to sensitive areas including face and groin and TAC 0.1% BID PRN to chest, abdomen, face   - Continue PO steroid taper  -Follow up skin biopsy  -Dermatology notified of rash progression and will see patient. Appreciate any recommendations.

## 2019-07-30 NOTE — ASSESSMENT & PLAN NOTE
Suspect SIADH.  Sodium has been stable over last few days and patient is asymptomatic.     Plan:  -Continue fluid restriction

## 2019-07-30 NOTE — PROGRESS NOTES
[facility]  Hematology/Oncology  Progress Note    Patient Name: Cale Harding  Admission Date: 7/17/2019  Hospital Length of Stay: 13 days  Code Status: Full Code     Subjective:     HPI:  Mr. Cale Harding is a 78-year-old male with a past medical history of prostate cancer (s/p prostatectomy), mesothelioma (on chemotherapy), with recurrent pleural effusion  ( with a right PleurX) and hypertension who is a transfer for evaluation of pericardial effusion.    Patient was scheduled for elective cystography with Urology (Dr. Philippe) for dilation of bladder neck contracture.  On arrival to elective surgical suite, patient found to be tachycardic and heart rate was irregular.  EKG confirmed atrial fibrillation with RVR.  Surgery was cancelled and he was admitted to the ICU for a diltiazem drip. He converted to sinus but would go in and out of afib, was started on lopressor 25 BID and echo done showed moderate size of pericardial effusion with no tamponade physiology however increased in effusion from 7/5, cardiology in the OSH was planning for pericardiocentesis but requested transfer for possible window vs pericardiocentesis and was sent to [facility]    On arrival patient was hemodynamically stable  , NSR, converted in and out of afib. Cardiology was consulted for input on afib management in the setting of pericardial effusion and need for pericardiocentesis/ closer ccu monitoring.     Of note patient has a PleurX on the right that is possibly infected and he was supposed to get it removed this Friday by his pulmonologist Dr. Woodward     Hospital Course:   Admitted to hematology oncology on 7/17 for a. Fib with RVR.  The patient was found to have a possibly associated pericardial effusion we were concerned was contributing to a. Fib. CTS consulted for evaluation of pericardial effusion to evaluate need for pericardial window. Patient not strong candidate for pericardial window per CTS,  Interventional cardiology preformed paracentesis on hospital day 2 and drained 570cc from pericardial space. Patient seen by pulmonary on admission. Dr. Padilla familiar w/ patient and was able to drain 450 ccs from R sided pleurX cath.  A. Fib with RVR did not resolve following paracentesis and cardiology was consulted.  They recommended starting patient on heparin and diltiazem drip for rate control. Rate controlled and pt stepped down to PO diltiazem and lopressor but a.fib with AVR recurred and was then switched to Toprol XL 100mg.  Patient experienced improvement in shortness of breath and atrial fibrillation, but on hospital day 7 the patient developed a new rash first noticed on back and spread to chest and abdomen. Dermatology was consulted and felt this was likely drug rash from diltiazem and Cardiology was in agreement. Diltiazem discontinued and patient's Toprol XL was increased to 200mg daily for management of his atrial fibrillation.  He was started on oral prednisone taper and topical steroids for drug rash.  His rash has continued to get worse despite management and progressed caudally with increased blistering and desquamation.  TTE on 7/21 showed a reaccumulation of pericardial fluid, thoracic surgery consulted for potential pericardial window and deemed patient a poor candidate due to there likely being pericardial disease and rash on anterior chest, Interventional cardiology also felt patient was a poor candidate. Also repeat TTE on 7/26 showed stable pericardial effusion when compared to 7/26 and there was no tamponade physiology. Pulmonology continues to follow patient for bilateral malignant effusion and felt that there is nothing left to drain from left or right side.     Interval History: No events overnight.  Rash continued to worsen, moving caudally to the anterior and medial thighs with increased blistering and desquamation. Still having decreased urine output. He was able to get out of bed  yesterday and use the restroom with some assistance. Denies fever, chills,nausea, vomiting, chest pain, abdominal pain, increased shortness of breath, diarrhea, constipation.    Oncology Treatment Plan:   OP NSCLC VINORELBINE WEEKLY    Medications:  Continuous Infusions:  Scheduled Meds:   albuterol-ipratropium  3 mL Nebulization Q4H WAKE    furosemide  40 mg Oral BID    metoprolol succinate  200 mg Oral Daily    ondansetron  4 mg Intravenous Once    pantoprazole  40 mg Oral Daily    [START ON 7/31/2019] predniSONE  20 mg Oral Daily     PRN Meds:albuterol-ipratropium, alteplase, Dextrose 10% Bolus, Dextrose 10% Bolus, diphenhydrAMINE, glucagon (human recombinant), glucose, glucose, guaifenesin 100 mg/5 ml, heparin, porcine (PF), HYDROcodone-acetaminophen, ibuprofen, metoprolol, ondansetron, polyethylene glycol, ramelteon, sodium chloride 0.9%, triamcinolone acetonide 0.025%, triamcinolone acetonide 0.1%     Review of Systems   All other systems reviewed and are negative.    Objective:     Vital Signs (Most Recent):  Temp: 97.7 °F (36.5 °C) (07/30/19 1121)  Pulse: 78 (07/30/19 1121)  Resp: 20 (07/30/19 1121)  BP: (!) 103/55 (07/30/19 1121)  SpO2: 96 % (07/30/19 1121) Vital Signs (24h Range):  Temp:  [97.4 °F (36.3 °C)-97.9 °F (36.6 °C)] 97.7 °F (36.5 °C)  Pulse:  [71-79] 78  Resp:  [16-20] 20  SpO2:  [96 %-99 %] 96 %  BP: ()/(53-65) 103/55     Weight: 79.2 kg (174 lb 9.7 oz)  Body mass index is 25.78 kg/m².  Body surface area is 1.96 meters squared.      Intake/Output Summary (Last 24 hours) at 7/30/2019 1143  Last data filed at 7/30/2019 1123  Gross per 24 hour   Intake 337 ml   Output 725 ml   Net -388 ml       Physical Exam   Constitutional: He is oriented to person, place, and time. He appears well-developed and well-nourished. No distress.   HENT:   Head: Normocephalic and atraumatic.   Eyes: Pupils are equal, round, and reactive to light. Conjunctivae are normal. No scleral icterus.   Neck: Normal  range of motion. Neck supple. JVD present. Tracheal deviation: present to mandible when reclined.   Cardiovascular: Normal rate, normal heart sounds and intact distal pulses. Exam reveals no gallop and no friction rub.   No murmur heard.  Irregularly irregular, normal rate   Pulmonary/Chest: Effort normal. No respiratory distress. He has no wheezes. He has no rales.   On 1L NC  Bilateral decreased breath sounds   Abdominal: Soft. Bowel sounds are normal. He exhibits no distension. There is no tenderness.   Musculoskeletal: He exhibits no edema.   Neurological: He is alert and oriented to person, place, and time.   Skin: Skin is warm and dry. Rash (On the lower face, extending to anterior/medial thighs is are erythematous papules which have coalesced into generalized erythema.  There are bullae composed of clear fluid present on the lateral trunk and medial thigs with some desquamating and weeping.  ) noted.   There does not appear to be any superimposed infection or crusting of any kind.   His face which was improved yesterday is more erythematous today    Significant Labs:   CBC:   Recent Labs   Lab 19  0541 19  0529   WBC 51.34* 63.29*   HGB 10.2* 10.3*   HCT 32.8* 33.4*    343    and CMP:   Recent Labs   Lab 19  0541 19  0529   * 124*   K 4.7 5.0   CL 93* 93*   CO2 21* 21*   * 108   BUN 41* 50*   CREATININE 1.1 1.4   CALCIUM 8.5* 8.2*   PROT 5.3* 5.5*   ALBUMIN 1.9* 2.1*   BILITOT 0.5 0.5   ALKPHOS 107 120   AST 11 14   ALT 7* 8*   ANIONGAP 10 10   EGFRNONAA >60.0 48.1*     Phosph: 4.9  M.0    Diagnostic Results:  US retroperitoneal  CLINICAL HISTORY:  SKINNY and decreased urine output;    TECHNIQUE:  Ultrasound of the kidneys and urinary bladder was performed including color flow and Doppler evaluation of the kidneys.    COMPARISON:  CT chest abdomen pelvis with contrast 2019.    FINDINGS:  The kidneys are normal in size measuring 11.0 cm on the right and 10.3  cm on left.  Renal resistive indices are elevated measuring 0.77 bilaterally.  There is mildly decreased renal perfusion bilaterally, with no significant cortical thinning or loss of corticomedullary distinction.  No solid masses, nephrolithiasis, or hydronephrosis identified.    The bladder is decompressed around a Cano catheter with mild residual fluid in the bladder lumen.    Moderate volume pelvic free fluid noted.    Splenic resistive index: 0.67      Impression       Nonspecific elevation of the renal arterial resistive indices with mildly decreased renal perfusion which could be seen in the setting of medical renal disease.  No evidence of hydronephrosis.    Moderate volume pelvic free fluid.         Assessment/Plan:     * Mesothelioma of left lung  Patient of Dr. Foster on palliative chemo with Gemzar with plans to switch to Vinorelbine due to disease progression.  PET CT 4/2019 with increased uptake within the enlarging 1.4 cm left upper lobe pulmonary nodule, mediastinal lymph nodes, and left pleura when compared to PET 2/2019.  -pain currently controlled with current regimen: Norco 5-325 mg q6 PRN  -will need OP follow up with Dr. Foster       SOB (shortness of breath) on exertion  Patient [place] [facility] [place]/ Pericardial effusion and a hx of recurrent pleural effusions (newly diagnosed mesothelioma in 2019). R sided PleurX catheter in place, patient of Dr. Padilla (Pulm) with 450cc drained on admission. CXR during admission have shown BL pleural effusion w/ areas of pulmonary infiltrates L>R.  Continues to improve and satting well on 1L NC. Lung ultrasound by pulmonology/Dr. Padilla reveals no significant amount of fluid to drain. Patient with JVD, weight at 79 kg(73 on admission), and decreased renal perfusion bilaterally on ultrasound still could have fluid overload.    Plan:  -Currently on PO lasix 40mg BID with diminished urine output yesterday.  Pushed 80 mg IV lasix this morning and will monitor response  over day. Cardiology would like more aggressive diuresis however we will see how he responds to 80mg IV and re-evaluate.  -consider repeat BNP and/or echo should his fluid status continue to be in question   - Continue duo nebs Q4h while awake      Malignant pleural effusion  Shortness of breath     Pericardial effusion without cardiac tamponade  Patient transferred to [facility] from [place]SH for evaluation by cardiology/CTS for pericardial window or pericardiocentesis on 7/17/19. Pericardiocentesis was preformed by interventional cards, pt tolerated procedure well, produced 570cc serosanguinous fluid on 7/18. Repeat echo on 7/21 with recurrence of effusion and echo on 7/26 with stabilization of effusion and without tamponade. CTS consulted to evaluate patient for pericardial window but feel he is a poor candidate given likelihood of disease in pericardium.  Interventional cardiology also feel this patient is poor candidate for a drain as there is no safe place to place pericardiocentesis needle.      Plan:   Will hold off on further management or consults at the moment as effusion is stable and without tamponade physiology    SKINNY (acute kidney injury)  Patient's baseline Cr is 0.7-0.8 now has been steadily climbing since admission.  Suspect pre-renal in nature due to patient's tenuous fluid status-could be cardiogenic vs volume depletion.  US negative for obstruction.  Intra-renal causes not excluded.  -Will see how patient responds to diuresis today  -UA ordered as patient has not had one since 7/21    Rash, drug  Drug rash thought to be result of diltiazem treatment.  Diltiazem stopped and rash continues to improve in some areas but is progressing down trunk and now today to lower extremities with increased blister formation, desquamation and weeping    Plan:   -continue topical TAC 0.025% BID to sensitive areas including face and groin and TAC 0.1% BID PRN to chest, abdomen, face   - Continue PO steroid taper  -Follow up  skin biopsy which should result tomorrow per derm  -Dermatology recommendations:   -drug rash consistent with AGEP and bullous lesions made worse by edema.  Despite removing the inciting agent(diltiazem is still the suspect) the rash can continue to progress.  They recommend switching from PPI to another agent as PPIs can also be implicated.  They also recommend increasing prednisone to 60 mg x 5 days and monitoring for improvement.  They are willing to biopsy rash again should biopsy results not be helpful in diagnosis.     Atrial fibrillation with RVR  First noted on ECG 7/15/19 following pericardiocentesis. Patientt reports longer history of palpitations.  Ddx includes 2/2 pericardial effusion vs. Underlying malignancy. Currently rate controlled with Toprol XL 200mg daily(patient previously not controlled with lower doses of Toprol, lopressor 50mg BID, and had drug rash with diltiazem). CHADsVASC 3 suggestive of 3.2% annual stroke risk. Not currently on heparin or lovenox due to significant hematuria experienced earlier on admission.    Plan:  - Appreciate cardiology recommendations  -Patient not good candidate for anticoagulation  - Continue telemetry and monitor for rate control  -TEDs and SCDs in place for DVT prophylaxis as patient not good candidate for anticoagulation    Hyponatremia  Suspect SIADH.  Sodium has been stable over last few days and patient is asymptomatic.     Plan:  -Continue fluid restriction     Leukemoid reaction  White count still increasing, now at 63.  Ddx include leukemoid reaction from malignancy and/or drug rash and patient on steroid taper. Also considering infection although much lower on differential as patient has been afebrile and clinically does not appear to be infected.    -Monitor with daily CBC  -will have dermatology weigh in on whether this is common with AGEP    Decrease in appetite  Patient w/ reported hx of poor appetite since beginning chemotherapy    Plan:  - Patient  interested in MARINOL for appetite stimulation  - Marinol therapy not initiated at this time as it may worsen hypotension    Acute on chronic diastolic heart failure  Patient with symptoms of SOB and diffuse swelling on admission who is not on lasix at home with an admission BNP of 374. Echo 7/17/2019 with Grade I LV diastolic dysfunction.  Patient has been diuresing well with improvements in shortness of breath.  Still with Pleural effusions L>R 2/2 malignancy vs overload.  Patient approaching dry weight with significant increase in BUN/Cr.  Plan:  -Switch to oral lasix 40mg BID, PRN lasix as needed.  Will see what daily requirements are.  -Strict I/O with Daily Standing weights  -keep Mg >2 and K>4  -Cardiac Diet  -Fluid restriction <1500cc/day    Essential hypertension  Currently holding all hypertensives.  Patient has been normotensive since admission.  Was previously on enalapril at home.   -discuss with cards before discharge whether patient will require ACE/ARB    Acquired contracture of bladder neck  Urology consulted in the OSH. S/p Bladder neck contracture release and urethral dilation on 7/15/19. Patient of Dr. Philippe.    Plan:  - Haider in place  - Marked penile and scrotal edema  - Hematuria resolved, nursing has been flushing cath per Urology recs  - urology notified, they do not believe the haider should be changed at this time due to anasarca   - Cleared for d/c from urology perspective, to f/u w/ Dr. Philippe    Gastroesophageal reflux disease  -switched to Pepcid 20mg BID as PPI can cause drug rash per derm    History of prostate cancer  S/p prostectomy.  Haider in place for urinary retention 2/2 bladder contracture.  Urology recommends keeping the haider catheter in place.        Justin Blackwell MD, PGY-1  Hematology/Oncology  [facility]

## 2019-07-30 NOTE — ASSESSMENT & PLAN NOTE
First noted on ECG 7/15/19 following pericardiocentesis. Patientt reports longer history of palpitations.  Ddx includes 2/2 pericardial effusion vs. Underlying malignancy. Currently rate controlled with Toprol XL 200mg daily(patient previously not controlled with lower doses of Toprol, lopressor 50mg BID, and had drug rash with diltiazem). CHADsVASC 3 suggestive of 3.2% annual stroke risk. Not currently on heparin or lovenox due to significant hematuria experienced earlier on admission.    Plan:  - Appreciate cardiology recommendations  -Patient not good candidate for anticoagulation  - Continue telemetry and monitor for rate control  -TEDs and SCDs in place for DVT prophylaxis as patient not good candidate for anticoagulation

## 2019-07-30 NOTE — PROGRESS NOTES
[facility]  Dermatology  Progress Note    Patient Name: Cale Harding  MRN: [id number]  Admission Date: 7/17/2019  Hospital Length of Stay: 13 days  Attending Physician: Sreedhar Coffey MD  Primary Care Provider: Jj Escobedo MD     Subjective:     Principal Problem:Mesothelioma of left lung    Interval History: Patient is a poor historian and wouldn't provide me with a good history. But, it seems that he has started developing bullae in his groin and lower back starting a couple days ago. He notes that his scrotal area has been edematous for a long period of time.     Medications:  Continuous Infusions:  Scheduled Meds:   albuterol-ipratropium  3 mL Nebulization Q4H WAKE    furosemide  80 mg Intravenous Once    furosemide  40 mg Oral BID    metoprolol succinate  200 mg Oral Daily    ondansetron  4 mg Intravenous Once    pantoprazole  40 mg Oral Daily    [START ON 7/31/2019] predniSONE  20 mg Oral Daily     PRN Meds:albuterol-ipratropium, alteplase, Dextrose 10% Bolus, Dextrose 10% Bolus, diphenhydrAMINE, glucagon (human recombinant), glucose, glucose, guaifenesin 100 mg/5 ml, heparin, porcine (PF), HYDROcodone-acetaminophen, ibuprofen, metoprolol, ondansetron, polyethylene glycol, ramelteon, sodium chloride 0.9%, triamcinolone acetonide 0.025%, triamcinolone acetonide 0.1%    Review of Systems   Constitutional: Negative for fever and chills.   Skin: Positive for rash. Negative for itching.     Objective:     Vital Signs (Most Recent):  Temp: 97.5 °F (36.4 °C) (07/30/19 0736)  Pulse: 73 (07/30/19 0736)  Resp: 16 (07/30/19 0736)  BP: 97/65 (07/30/19 0844)  SpO2: 99 % (07/30/19 0736) Vital Signs (24h Range):  Temp:  [97.4 °F (36.3 °C)-97.9 °F (36.6 °C)] 97.5 °F (36.4 °C)  Pulse:  [] 73  Resp:  [16-20] 16  SpO2:  [94 %-99 %] 99 %  BP: ()/(56-65) 97/65     Weight: 79.2 kg (174 lb 9.7 oz)  Body mass index is 25.78 kg/m².    Physical Exam   Constitutional: He appears  well-developed and well-nourished. No distress.   Neurological: He is alert and oriented to person, place, and time. He is not disoriented.   Psychiatric: He has a normal mood and affect.   Skin:   Areas Examined (abnormalities noted in diagram):   Head / Face Inspection Performed  Neck Inspection Performed  Chest / Axilla Inspection Performed  Abdomen Inspection Performed  Back Inspection Performed  RUE Inspected  LUE Inspection Performed  RLE Inspected  LLE Inspection Performed                                   Significant Labs: All pertinent labs within the past 24 hours have been reviewed.      Assessment/Plan:     Active Diagnoses:    Diagnosis Date Noted POA    PRINCIPAL PROBLEM:  Mesothelioma of left lung [C45.7] 02/15/2018 Yes    Leukemoid reaction [D72.823] 07/29/2019 Unknown    Rash, drug [L27.0] 07/28/2019 Unknown    Decrease in appetite [R63.0] 07/26/2019 Yes    Compression of vein [I87.1]  Clinically Undetermined    SOB (shortness of breath) on exertion [R06.02] 07/25/2019 Unknown    Acute on chronic diastolic heart failure [I50.33] 07/18/2019 Unknown    Pericardial effusion without cardiac tamponade [I31.3] 07/15/2019 Yes    Essential hypertension [I10] 07/15/2019 Yes    Atrial fibrillation with RVR [I48.91] 07/15/2019 Yes    Acquired contracture of bladder neck [N32.0] 07/10/2019 Yes    Malignant pleural effusion [J91.0] 02/01/2018 Yes    Gastroesophageal reflux disease [K21.9] 10/11/2017 Yes    History of prostate cancer [Z85.46] 05/25/2016 Not Applicable      Problems Resolved During this Admission:    Diagnosis Date Noted Date Resolved POA    Gross hematuria [R31.0] 07/21/2019 07/26/2019 No     Drug rash  - Patient's drug rash still seems consistent with AGEP although the bullous lesions on his medial thigh are definitely made worse due to his edema. This rash can continue to progress after removing the inciting agent i.e diltiazem. Also, PPI are known to be associated with AGEP, so  it wouldn't be unreasonable to start the patient on Zantac instead of a PPI. Pustular psoriasis is also in the differential of this rash.   - If no contraindications, may increase prednisone to 60 mg for the next 5 days and then reassess at that time  - Biopsy results to be available tomorrow. If not helpful, will plan to biopsy again.     Staffed with Dr. Givens this afternoon.     Thank you for your consult. I will follow-up with patient. Please contact us if you have any additional questions.    Andrés Cameron MD  Dermatology  [facility]

## 2019-07-30 NOTE — ASSESSMENT & PLAN NOTE
Patient with symptoms of SOB and diffuse swelling on admission who is not on lasix at home with an admission BNP of 374. Echo 7/17/2019 with Grade I LV diastolic dysfunction.  Patient has been diuresing well with improvements in shortness of breath.  Still with Pleural effusions L>R 2/2 malignancy vs overload.  Patient approaching dry weight with significant increase in BUN/Cr.  Plan:  -Switch to oral lasix 40mg BID, PRN lasix as needed.  Will see what daily requirements are.  -Strict I/O with Daily Standing weights  -keep Mg >2 and K>4  -Cardiac Diet  -Fluid restriction <1500cc/day

## 2019-07-30 NOTE — ASSESSMENT & PLAN NOTE
White count still increasing, now at 63.  Ddx include leukemoid reaction from malignancy and/or drug rash and patient on steroid taper. Also considering infection although much lower on differential as patient has been afebrile and clinically does not appear to be infected.    -Monitor with daily CBC  -will have dermatology weigh in on whether this is common with AGEP

## 2019-07-30 NOTE — PLAN OF CARE
No significant pleural fluid noted on left thoracic ultrasound. 400 cc of clear yellow pleural fluid drained via right pleurx catheter and sent for culture and cell count.    Julio C Reynoso MD  [employer] [facility] Fellow  Pager: [phone]  Cell: [phone]

## 2019-07-31 PROBLEM — L27.0 DRUG RASH: Status: ACTIVE | Noted: 2019-01-01

## 2019-07-31 PROBLEM — L27.0 DRUG RASH: Status: RESOLVED | Noted: 2019-01-01 | Resolved: 2019-01-01

## 2019-07-31 NOTE — SUBJECTIVE & OBJECTIVE
Interval History: No acute overnight events. Denies chest pain, shortness of breath, dyspnea on exertion.      Review of Systems   Constitution: Negative. Negative for chills and fever.   HENT: Negative.    Eyes: Negative.    Cardiovascular: Negative for chest pain, claudication and paroxysmal nocturnal dyspnea.   Respiratory: Negative for cough, shortness of breath and wheezing.    Endocrine: Negative.    Hematologic/Lymphatic: Does not bruise/bleed easily.   Skin: Negative for nail changes and rash.   Musculoskeletal: Negative.  Negative for back pain.   Gastrointestinal: Negative for abdominal pain, melena, nausea and vomiting.   Neurological: Negative for dizziness and headaches.   Psychiatric/Behavioral: Negative for altered mental status, depression and substance abuse.   Allergic/Immunologic: Negative.      Objective:     Vital Signs (Most Recent):  Temp: 97.5 °F (36.4 °C) (07/30/19 1626)  Pulse: 76 (07/30/19 1628)  Resp: 18(Simultaneous filing. User may not have seen previous data.) (07/30/19 1626)  BP: 99/61 (07/30/19 1626)  SpO2: 98 %(Simultaneous filing. User may not have seen previous data.) (07/30/19 1626) Vital Signs (24h Range):  Temp:  [97.4 °F (36.3 °C)-97.7 °F (36.5 °C)] 97.5 °F (36.4 °C)  Pulse:  [71-88] 76  Resp:  [16-20] 18  SpO2:  [96 %-99 %] 98 %  BP: ()/(53-65) 99/61     Weight: 79.2 kg (174 lb 9.7 oz)  Body mass index is 25.78 kg/m².     SpO2: 98 %(Simultaneous filing. User may not have seen previous data.)  O2 Device (Oxygen Therapy): nasal cannula      Intake/Output Summary (Last 24 hours) at 7/30/2019 1904  Last data filed at 7/30/2019 1700  Gross per 24 hour   Intake 750 ml   Output 925 ml   Net -175 ml       Lines/Drains/Airways     Central Venous Catheter Line                 Port A Cath Single Lumen 06/20/19 right internal jugular 40 days          Drain                 Chest Tube Right Pleural -- days         Urethral Catheter 07/17/19 0920 Non-latex 16 Fr. 13 days                 Physical Exam   Constitutional: He is oriented to person, place, and time. He appears well-developed and well-nourished.   HENT:   Head: Normocephalic and atraumatic.   Eyes: Pupils are equal, round, and reactive to light. Conjunctivae and EOM are normal.   Neck:   JVD elevated to midneck   Cardiovascular: Normal rate, regular rhythm, normal heart sounds and intact distal pulses. Exam reveals no gallop and no friction rub.   No murmur heard.  Pulmonary/Chest: Effort normal. No stridor. He has no wheezes. He has no rales. He exhibits no tenderness.   Abdominal: Soft. Bowel sounds are normal. He exhibits no distension and no mass. There is no tenderness. There is no guarding.   Musculoskeletal: He exhibits no edema, tenderness or deformity.   Neurological: He is alert and oriented to person, place, and time.   Skin: Skin is warm and dry. No rash noted. No erythema.   Psychiatric: He has a normal mood and affect.       Significant Labs:   CMP   Recent Labs   Lab 07/29/19  0541 07/30/19  0529   * 124*   K 4.7 5.0   CL 93* 93*   CO2 21* 21*   * 108   BUN 41* 50*   CREATININE 1.1 1.4   CALCIUM 8.5* 8.2*   PROT 5.3* 5.5*   ALBUMIN 1.9* 2.1*   BILITOT 0.5 0.5   ALKPHOS 107 120   AST 11 14   ALT 7* 8*   ANIONGAP 10 10   ESTGFRAFRICA >60.0 55.6*   EGFRNONAA >60.0 48.1*    and CBC   Recent Labs   Lab 07/29/19  0541 07/30/19  0529   WBC 51.34* 63.29*   HGB 10.2* 10.3*   HCT 32.8* 33.4*    343       Significant Imaging: Echocardiogram:   Transthoracic echo (TTE) complete (Cupid Only):   Results for orders placed or performed during the hospital encounter of 07/17/19   Transthoracic echo (TTE) 2D with Color Flow   Result Value Ref Range    Ascending aorta 3.02 cm    STJ 2.45 cm    AV mean gradient 4 mmHg    Ao peak gabriela 1.27 m/s    Ao VTI 18.06 cm    IVRT 0.07 msec    IVS 1.08 0.6 - 1.1 cm    LA size 3.87 cm    Left Atrium Major Axis 4.95 cm    Left Atrium Minor Axis 5.10 cm    LVIDD 3.31 (A) 3.5 - 6.0 cm     LVIDS 2.30 2.1 - 4.0 cm    LVOT diameter 2.23 cm    LVOT peak VTI 12.22 cm    PW 0.84 0.6 - 1.1 cm    MV Peak A Michael 0.95 m/s    E wave decelartion time 122.99 msec    MV Peak E Michael 0.91 m/s    RA Major Axis 4.61 cm    RA Width 3.33 cm    RVDD 3.13 cm    Sinus 3.48 cm    TAPSE 1.30 cm    TR Max Michael 2.36 m/s    TDI LATERAL 0.06 m/s    TDI SEPTAL 0.11 m/s    LA WIDTH 3.90 cm    LV Diastolic Volume 44.47 mL    LV Systolic Volume 18.12 mL    RV S' 9.21 cm/s    LVOT peak michael 0.72 m/s    LV LATERAL E/E' RATIO 15.17 m/s    LV SEPTAL E/E' RATIO 8.27 m/s    FS 31 %    LA volume 64.45 cm3    LV mass 89.45 g    Left Ventricle Relative Wall Thickness 0.51 cm    AV valve area 2.64 cm2    AV Velocity Ratio 0.57     AV index (prosthetic) 0.68     E/A ratio 0.96     Mean e' 0.09 m/s    LVOT area 3.9 cm2    LVOT stroke volume 47.70 cm3    AV peak gradient 6 mmHg    E/E' ratio 10.71 m/s    LV Systolic Volume Index 9.1 mL/m2    LV Diastolic Volume Index 22.25 mL/m2    LA Volume Index 32.3 mL/m2    LV Mass Index 45 g/m2    Triscuspid Valve Regurgitation Peak Gradient 22 mmHg    BSA 2.02 m2    Right Atrial Pressure (from IVC) 8 mmHg    TV rest pulmonary artery pressure 30 mmHg    Narrative    · Normal left ventricular systolic function. The estimated ejection   fraction is 60%  · Concentric left ventricular remodeling.  · Normal LV diastolic function.  · Normal right ventricular systolic function.  · Mild tricuspid regurgitation.  · The estimated PA systolic pressure is 30 mm Hg  · Intermediate central venous pressure (8 mm Hg).  · Small circumferential pericardial effusion. Shaggy visceral pericardium.   No evidence of tamponade physiology

## 2019-07-31 NOTE — PROGRESS NOTES
[facility]  Cardiology  Progress Note    Patient Name: Cale Harding  MRN: [id number]  Admission Date: 7/17/2019  Hospital Length of Stay: 13 days  Code Status: Full Code   Attending Physician: Sreedhar Coffey MD   Primary Care Physician: Jj Escobedo MD  Expected Discharge Date: 7/31/2019  Principal Problem:Mesothelioma of left lung    Subjective:     Hospital Course:   Pt has been having a maculopapular rash with an erythematous base that started on his back and has been extending to his chest and upper abdomen. Dermatology suspects that it can be associated with Diltiazem and as a result cardiology has been consulted for change in rate control medication along with recommendation on Diuresis for anasarca.    Pt denies CP/Chest discomfort, diaphoresis, palpitations,lightheadedness    Interval History: No acute overnight events. Denies chest pain, shortness of breath, dyspnea on exertion.      Review of Systems   Constitution: Negative. Negative for chills and fever.   HENT: Negative.    Eyes: Negative.    Cardiovascular: Negative for chest pain, claudication and paroxysmal nocturnal dyspnea.   Respiratory: Negative for cough, shortness of breath and wheezing.    Endocrine: Negative.    Hematologic/Lymphatic: Does not bruise/bleed easily.   Skin: Negative for nail changes and rash.   Musculoskeletal: Negative.  Negative for back pain.   Gastrointestinal: Negative for abdominal pain, melena, nausea and vomiting.   Neurological: Negative for dizziness and headaches.   Psychiatric/Behavioral: Negative for altered mental status, depression and substance abuse.   Allergic/Immunologic: Negative.      Objective:     Vital Signs (Most Recent):  Temp: 97.5 °F (36.4 °C) (07/30/19 1626)  Pulse: 76 (07/30/19 1628)  Resp: 18(Simultaneous filing. User may not have seen previous data.) (07/30/19 1626)  BP: 99/61 (07/30/19 1626)  SpO2: 98 %(Simultaneous filing. User may not have seen previous data.)  (07/30/19 1626) Vital Signs (24h Range):  Temp:  [97.4 °F (36.3 °C)-97.7 °F (36.5 °C)] 97.5 °F (36.4 °C)  Pulse:  [71-88] 76  Resp:  [16-20] 18  SpO2:  [96 %-99 %] 98 %  BP: ()/(53-65) 99/61     Weight: 79.2 kg (174 lb 9.7 oz)  Body mass index is 25.78 kg/m².     SpO2: 98 %(Simultaneous filing. User may not have seen previous data.)  O2 Device (Oxygen Therapy): nasal cannula      Intake/Output Summary (Last 24 hours) at 7/30/2019 1904  Last data filed at 7/30/2019 1700  Gross per 24 hour   Intake 750 ml   Output 925 ml   Net -175 ml       Lines/Drains/Airways     Central Venous Catheter Line                 Port A Cath Single Lumen 06/20/19 right internal jugular 40 days          Drain                 Chest Tube Right Pleural -- days         Urethral Catheter 07/17/19 0920 Non-latex 16 Fr. 13 days                Physical Exam   Constitutional: He is oriented to person, place, and time. He appears well-developed and well-nourished.   HENT:   Head: Normocephalic and atraumatic.   Eyes: Pupils are equal, round, and reactive to light. Conjunctivae and EOM are normal.   Neck:   JVD elevated to midneck   Cardiovascular: Normal rate, regular rhythm, normal heart sounds and intact distal pulses. Exam reveals no gallop and no friction rub.   No murmur heard.  Pulmonary/Chest: Effort normal. No stridor. He has no wheezes. He has no rales. He exhibits no tenderness.   Abdominal: Soft. Bowel sounds are normal. He exhibits no distension and no mass. There is no tenderness. There is no guarding.   Musculoskeletal: He exhibits no edema, tenderness or deformity.   Neurological: He is alert and oriented to person, place, and time.   Skin: Skin is warm and dry. No rash noted. No erythema.   Psychiatric: He has a normal mood and affect.       Significant Labs:   CMP   Recent Labs   Lab 07/29/19  0541 07/30/19  0529   * 124*   K 4.7 5.0   CL 93* 93*   CO2 21* 21*   * 108   BUN 41* 50*   CREATININE 1.1 1.4   CALCIUM  8.5* 8.2*   PROT 5.3* 5.5*   ALBUMIN 1.9* 2.1*   BILITOT 0.5 0.5   ALKPHOS 107 120   AST 11 14   ALT 7* 8*   ANIONGAP 10 10   ESTGFRAFRICA >60.0 55.6*   EGFRNONAA >60.0 48.1*    and CBC   Recent Labs   Lab 07/29/19  0541 07/30/19  0529   WBC 51.34* 63.29*   HGB 10.2* 10.3*   HCT 32.8* 33.4*    343       Significant Imaging: Echocardiogram:   Transthoracic echo (TTE) complete (Cupid Only):   Results for orders placed or performed during the hospital encounter of 07/17/19   Transthoracic echo (TTE) 2D with Color Flow   Result Value Ref Range    Ascending aorta 3.02 cm    STJ 2.45 cm    AV mean gradient 4 mmHg    Ao peak michael 1.27 m/s    Ao VTI 18.06 cm    IVRT 0.07 msec    IVS 1.08 0.6 - 1.1 cm    LA size 3.87 cm    Left Atrium Major Axis 4.95 cm    Left Atrium Minor Axis 5.10 cm    LVIDD 3.31 (A) 3.5 - 6.0 cm    LVIDS 2.30 2.1 - 4.0 cm    LVOT diameter 2.23 cm    LVOT peak VTI 12.22 cm    PW 0.84 0.6 - 1.1 cm    MV Peak A Michael 0.95 m/s    E wave decelartion time 122.99 msec    MV Peak E Michael 0.91 m/s    RA Major Axis 4.61 cm    RA Width 3.33 cm    RVDD 3.13 cm    Sinus 3.48 cm    TAPSE 1.30 cm    TR Max Michael 2.36 m/s    TDI LATERAL 0.06 m/s    TDI SEPTAL 0.11 m/s    LA WIDTH 3.90 cm    LV Diastolic Volume 44.47 mL    LV Systolic Volume 18.12 mL    RV S' 9.21 cm/s    LVOT peak michael 0.72 m/s    LV LATERAL E/E' RATIO 15.17 m/s    LV SEPTAL E/E' RATIO 8.27 m/s    FS 31 %    LA volume 64.45 cm3    LV mass 89.45 g    Left Ventricle Relative Wall Thickness 0.51 cm    AV valve area 2.64 cm2    AV Velocity Ratio 0.57     AV index (prosthetic) 0.68     E/A ratio 0.96     Mean e' 0.09 m/s    LVOT area 3.9 cm2    LVOT stroke volume 47.70 cm3    AV peak gradient 6 mmHg    E/E' ratio 10.71 m/s    LV Systolic Volume Index 9.1 mL/m2    LV Diastolic Volume Index 22.25 mL/m2    LA Volume Index 32.3 mL/m2    LV Mass Index 45 g/m2    Triscuspid Valve Regurgitation Peak Gradient 22 mmHg    BSA 2.02 m2    Right Atrial Pressure (from  IVC) 8 mmHg    TV rest pulmonary artery pressure 30 mmHg    Narrative    · Normal left ventricular systolic function. The estimated ejection   fraction is 60%  · Concentric left ventricular remodeling.  · Normal LV diastolic function.  · Normal right ventricular systolic function.  · Mild tricuspid regurgitation.  · The estimated PA systolic pressure is 30 mm Hg  · Intermediate central venous pressure (8 mm Hg).  · Small circumferential pericardial effusion. Shaggy visceral pericardium.   No evidence of tamponade physiology        Assessment and Plan:     Pericardial effusion without cardiac tamponade  - Would obtain weekly limited TTE, at least x 2 weeks to reassess for reaccumulation of pericardial effusion  - Not indicated for intervention per interventional cardiology and throacic surgery     Atrial fibrillation with RVR  - Continue metoprolol succinate 200mg daily, HR adequately controlled  - CHADsVASC 3 suggestive of 3.2% annual stroke risk - would resume therapeutic anticoagulation     Malignant pleural effusion  - Feel patient is still above dry weight volume overloaded w elevated JVD, feel Cr bump unlikely 2/2 underdiuresis, especially given lack of contraction alkalosis.   - Would consider renal vascular congestion as etiology of Cr increase, defer further diuresis regimen to primary team    Thank you for this interesting consult. Cardiology consult will sign off. Please call with questions as needed.      VTE Risk Mitigation (From admission, onward)        Ordered     heparin, porcine (PF) 100 unit/mL injection flush 500 Units  Every 8 hours PRN      07/18/19 1712     IP VTE HIGH RISK PATIENT  Once      07/17/19 1930          Higinio Munson MD  Cardiology  [facility]

## 2019-07-31 NOTE — PLAN OF CARE
Problem: Physical Therapy Goal  Goal: Physical Therapy Goal  Goals to be met by: 19     Patient will increase functional independence with mobility by performin. Supine to sit with Contact Guard Assistance.  2. Sit to supine with Contact Guard Assistance.  3. Sit to stand transfer with Supervision.  4. Bed to chair transfer with Stand-by Assistance using Rolling Walker.  5. Gait  x 50 feet with Stand-by Assistance using Rolling Walker.   6. Ascend/Descend 6 inch curb step with Contact Guard Assistance using Rolling Walker.  7. Lower extremity exercise program x 20 reps per handout, with assistance as needed.       Discharge Recommendations: HH PT    Pt safe to transfer OOB/BTB and amb with RN/PCT and wife: Use RW with SBA of 1 person.    Goals remain appropriate.     Julissa Land, PTA.   [phone]   2019

## 2019-07-31 NOTE — PLAN OF CARE
Pt afebrile. Cano catheter in place with clear yellow urine.  Skin contiues to blister and weep on back and legs. Interdry placed below testicles and between inner thighs. Pt also packing paper towels in between thighs where skin is sloughing off and blue pads, patient and family acknowledged these were not sterile but continued using. Pt went into afib during afternoon pulse 120's-160's and Bps 90s/50s.  cc bolus adm per ords. Amiodarone bolus and drip adm per ords. Pt is up with assistance. BM x 1. On 1 liter nasal cannula. On telemetry NSR.  Bed low, locked, call light in reach. Pt and family have no further questions at this time, will continue to monitor.

## 2019-07-31 NOTE — PT/OT/SLP PROGRESS
"Physical Therapy Treatment    Patient Name:  Cale Harding   MRN:  [id number]  Admitting Diagnosis: Mesothelioma of left lung  Recent Surgery: Procedure(s) (LRB):  Pericardiocentesis (N/A) 13 Days Post-Op    Recommendations:     Discharge Recommendations:  home health PT   Discharge Equipment Recommendations: none   Barriers to discharge: None    Plan:     During this hospitalization, patient to be seen 2 x/week to address the above listed problems via gait training, therapeutic activities, therapeutic exercises, neuromuscular re-education  · Plan of Care Expires:  08/14/19   Plan of Care Reviewed with: patient, spouse    This Plan of care has been discussed with the patient who was involved in its development and understands and is in agreement with the identified goals and treatment plan    Subjective     Communicated with RN (Alexandria) prior to session.     Patient comments: Pt's wife reports "He has several blisters from a medication and he's peeling now"  Pain/Comfort:  · Pain Rating 1: 0/10  · Pain Rating Post-Intervention 1: 0/10    Objective:     Patient found with: haider catheter, telemetry(RA)    Patient found sup in bed upon PT entry to room, agreeable to treatment.  Wife and brother present in the room.    General Precautions: Standard, fall   Orthopedic Precautions:N/A   Braces: N/A       BED MOBILITY (vc's for hand placement sequencing of task):        Rolling to the R:  NT.       Rolling to the L:  NT.        Sup > sit at the EOB:  Mod/min A for trunk elevation, exiting on the L side.       Sit > sup:  Not performed 2* pt left seated UIC.       Scooting hips to EOB with SBA                SITTING AT THE EDGE OF THE BED    Assistance Level Required: close sup for safety with B UE support   Postural deviations noted: flexed trunk, rounded shoulders, PPT   Encouraged: upright posture        TRANSFERS  (vc's for hand placement, sequencing of task and safety)   Patient completed Sit <> Stand Transfer " from EOB with SBA for safety with rolling walker x1 trial(s)   Patient completed Stand <> Sit Transfer to toilet seat with SBA for safety with rolling walker      GAIT: within room   Patient ambulated: 45ft with turns to the R and L incorporated   Patient required: SBA for safety   Patient used:  Rolling Walker   Gait Pattern observed: reciprocal gait   Gait Deviation(s): decreased idalia and decreased velocity of limb motion decreased B step, FFP and mild instability, but no LOB   Comments: vc's for upright posture, placement of AD, directional guidance and safety      EDUCATION  Patient provided with daily orientation and goals of this PT session. They were educated to call for assistance and to transfer with hospital staff only.  Also, pt was educated on the effects of prolonged immobility and the importance of performing OOB activity and exercises to promote healing and reduce recovery time    Whiteboard updated with correct mobility information. RN/PCT notified.  Pt safe to transfer OOB/BTB and amb with RN/PCT and wife: Use RW with SBA of 1 person.    Patient left seated on toilet seat, with  all lines intact, call button in reach and Wife present    AM-PAC 6 CLICK MOBILITY  Turning over in bed (including adjusting bedclothes, sheets and blankets)?: 3  Sitting down on and standing up from a chair with arms (e.g., wheelchair, bedside commode, etc.): 3  Moving from lying on back to sitting on the side of the bed?: 2  Moving to and from a bed to a chair (including a wheelchair)?: 3  Climbing 3-5 steps with a railing?: 2     Assessment:     Cale Harding is a 78 y.o. male admitted with a medical diagnosis of Mesothelioma of left lung.  He presents with the following impairments/functional limitations:  weakness, impaired endurance, impaired self care skills, impaired functional mobilty, gait instability, impaired balance, decreased lower extremity function, impaired cardiopulmonary response to activity.  requiring  assistance and verbal cues for bed mob for trunk elevation with increased time.    Pt will cont to benefit from skilled PT intervention to address deficits and improve functional mobility.     Rehab Prognosis:  Good; patient would benefit from acute skilled PT services to address these deficits and reach maximum level of function.      GOALS:   Multidisciplinary Problems     Physical Therapy Goals        Problem: Physical Therapy Goal    Goal Priority Disciplines Outcome Goal Variances Interventions   Physical Therapy Goal     PT, PT/OT Ongoing (interventions implemented as appropriate)     Description:  Goals to be met by: 19     Patient will increase functional independence with mobility by performin. Supine to sit with Contact Guard Assistance.  2. Sit to supine with Contact Guard Assistance.  3. Sit to stand transfer with Supervision.  4. Bed to chair transfer with Stand-by Assistance using Rolling Walker.  5. Gait  x 50 feet with Stand-by Assistance using Rolling Walker.   6. Ascend/Descend 6 inch curb step with Contact Guard Assistance using Rolling Walker.  7. Lower extremity exercise program x 20 reps per handout, with assistance as needed.                      Time Tracking:     PT Received On: 19  PT Start Time: 1136     PT Stop Time: 1151  PT Total Time (min): 15 min     Billable Minutes: Gait Training 15    Treatment Type: Treatment  PT/PTA: PTA     PTA Visit Number: 3       Julissa Land PTA.  Pager [phone]    2019    .

## 2019-07-31 NOTE — ASSESSMENT & PLAN NOTE
White count still increasing, now at 63.  Ddx include leukemoid reaction from malignancy and/or drug rash and patient on steroid taper. Also considering infection although much lower on differential as patient has been afebrile and clinically does not appear to be infected.    -Monitor with daily CBC, decreased to 45 K today

## 2019-07-31 NOTE — PLAN OF CARE
Problem: Adult Inpatient Plan of Care  Goal: Plan of Care Review  Outcome: Ongoing (interventions implemented as appropriate)  Pt afebrile. Having hypotension with BP 90s/50s. Cano catheter in place with clear yellow urine. Urine sample obtained and sent for UA. Skin contiues to blister and weep on back and legs. interdry placed below testicles and between inner thighs. Pt's wife wrapped the patient in blue pads, despite my advice that they were not sterile. Pt also packing paper towels in between thighs where skin is sloughing off, which I also told him wasn't sterile. Pt is up with assistance. BM x 1. On 1 liter nasal cannula. On telemetry NSR.

## 2019-07-31 NOTE — ASSESSMENT & PLAN NOTE
Patient's baseline Cr is 0.7-0.8 now has been steadily climbing since admission.  Suspect pre-renal in nature due to patient's tenuous fluid status-could be cardiogenic vs volume depletion.  US negative for obstruction.  Intra-renal causes not excluded.  - Cr of 1.5 today, hold diuresis and assess today

## 2019-07-31 NOTE — ASSESSMENT & PLAN NOTE
Drug rash thought to be result of diltiazem treatment.  Diltiazem stopped and rash continues to improve in some areas but is progressing down trunk and now today to lower extremities with increased blister formation, desquamation and weeping    Plan:   -continue topical TAC 0.025% BID to sensitive areas including face and groin and TAC 0.1% BID  to chest, abdomen, face   - Continue PO steroid taper  -Follow up skin biopsy  - APpreciated derm recs     Problem: NORMAL   Goal: Experiences normal transition  Description  INTERVENTIONS:  - Assess and monitor vital signs and lab values.   - Encourage skin-to-skin with caregiver for thermoregulation  - Assess signs, symptoms and risk factors for hypog

## 2019-07-31 NOTE — ASSESSMENT & PLAN NOTE
Patient with symptoms of SOB and diffuse swelling on admission who is not on lasix at home with an admission BNP of 374. Echo 7/17/2019 with Grade I LV diastolic dysfunction.  Patient has been diuresing well with improvements in shortness of breath.  Still with Pleural effusions L>R 2/2 malignancy vs overload.  Patient approaching dry weight with significant increase in BUN/Cr.  Plan:  - hold lasix, given 9 pounds of fluid loss in past day   -Strict I/O with Daily Standing weights  -keep Mg >2 and K>4  -Cardiac Diet  -Fluid restriction <1500cc/day

## 2019-07-31 NOTE — SUBJECTIVE & OBJECTIVE
Interval History:  Pt has blisters on side of his body and also has some exudate coming from around his inguinal region. He continues to weep from edematous skin. The pt is also having problems with       Oncology Treatment Plan:   OP NSCLC VINORELBINE WEEKLY    Medications:  Continuous Infusions:  Scheduled Meds:   albuterol-ipratropium  3 mL Nebulization Q4H WAKE    famotidine  20 mg Oral Daily    metoprolol succinate  200 mg Oral Daily    ondansetron  4 mg Intravenous Once    predniSONE  60 mg Oral Daily     PRN Meds:albuterol-ipratropium, alteplase, Dextrose 10% Bolus, Dextrose 10% Bolus, diphenhydrAMINE, glucagon (human recombinant), glucose, glucose, guaifenesin 100 mg/5 ml, heparin, porcine (PF), HYDROcodone-acetaminophen, ibuprofen, metoprolol, ondansetron, polyethylene glycol, ramelteon, sodium chloride 0.9%, triamcinolone acetonide 0.025%, triamcinolone acetonide 0.1%     Review of Systems   Constitutional: Positive for fatigue and unexpected weight change. Negative for chills and fever.   HENT: Negative.    Respiratory: Positive for shortness of breath.    Cardiovascular: Positive for leg swelling.   Gastrointestinal: Negative for abdominal pain, constipation, diarrhea, nausea and vomiting.   Genitourinary: Negative.    Musculoskeletal: Negative for back pain.   Skin: Positive for rash.        + sloughing of skin, + blisters on lateral surfaces of body   Allergic/Immunologic: Positive for immunocompromised state.   Neurological: Positive for weakness.     Objective:     Vital Signs (Most Recent):  Temp: 97.4 °F (36.3 °C) (07/31/19 1247)  Pulse: 75 (07/31/19 1248)  Resp: 18 (07/31/19 1248)  BP: (!) 93/56 (07/31/19 1247)  SpO2: 100 % (07/31/19 1248) Vital Signs (24h Range):  Temp:  [97.4 °F (36.3 °C)-98.5 °F (36.9 °C)] 97.4 °F (36.3 °C)  Pulse:  [68-84] 75  Resp:  [16-20] 18  SpO2:  [92 %-100 %] 100 %  BP: (85-99)/(51-61) 93/56     Weight: 75 kg (165 lb 5.5 oz)  Body mass index is 24.42 kg/m².  Body  surface area is 1.91 meters squared.      Intake/Output Summary (Last 24 hours) at 7/31/2019 1407  Last data filed at 7/31/2019 1331  Gross per 24 hour   Intake 780 ml   Output 825 ml   Net -45 ml       Physical Exam   Constitutional: He is oriented to person, place, and time. He appears well-developed and well-nourished. No distress.   HENT:   Head: Normocephalic and atraumatic.   Eyes: Pupils are equal, round, and reactive to light. Conjunctivae are normal. No scleral icterus.   Neck: Normal range of motion. Neck supple. JVD present. Tracheal deviation: present to mandible when reclined.   Cardiovascular: Normal rate, normal heart sounds and intact distal pulses. Exam reveals no gallop and no friction rub.   No murmur heard.  Irregularly irregular, normal rate   Pulmonary/Chest: Effort normal. No respiratory distress. He has no wheezes. He has no rales.   On 1L NC  Bilateral decreased breath sounds   Abdominal: Soft. Bowel sounds are normal. He exhibits no distension. There is no tenderness.   Musculoskeletal: He exhibits no edema.   Neurological: He is alert and oriented to person, place, and time.   Skin: Skin is warm and dry. Rash (On the lower face, extending to anterior/medial thighs is are erythematous papules which have coalesced into generalized erythema.  There are bullae composed of clear fluid present on the lateral trunk and medial thigs with some desquamating and weeping.  ) noted.   Desqumating skin in anterior and posterior trunk of body       Significant Labs:   CBC:   Recent Labs   Lab 07/30/19  0529 07/31/19  0423   WBC 63.29* 45.01*   HGB 10.3* 9.3*   HCT 33.4* 31.0*    259   , CMP:   Recent Labs   Lab 07/30/19  0529 07/31/19  0423   * 123*   K 5.0 4.7   CL 93* 92*   CO2 21* 20*    131*   BUN 50* 57*   CREATININE 1.4 1.5*   CALCIUM 8.2* 8.0*   PROT 5.5* 5.2*   ALBUMIN 2.1* 2.0*   BILITOT 0.5 0.3   ALKPHOS 120 113   AST 14 16   ALT 8* 10   ANIONGAP 10 11   EGFRNONAA 48.1*  44.3*   , Immunology: No results for input(s): SPEP, CHRIS, APOORVA, FREELAMBDALI in the last 48 hours., Reticulocytes: No results for input(s): RETIC in the last 48 hours. and All pertinent labs from the last 24 hours have been reviewed.    Diagnostic Results:  I have reviewed all pertinent imaging results/findings within the past 24 hours.

## 2019-07-31 NOTE — PROGRESS NOTES
[facility]  Dermatology  Progress Note    Patient Name: Cale Harding  MRN: [id number]  Admission Date: 7/17/2019  Hospital Length of Stay: 14 days  Attending Physician: Sreedhar Coffey MD  Primary Care Provider: Jj Escobedo MD     Subjective:     Principal Problem:Mesothelioma of left lung    Interval History: Patient and wife note that the rash on the face, neck and upper chest is starting the dry out and scale. It is the expected response when the rash is resolving. Although, still significant flaccid and tense bullae on the medial thigh and groin.     Medications:  Continuous Infusions:  Scheduled Meds:   albuterol-ipratropium  3 mL Nebulization Q4H WAKE    famotidine  20 mg Oral Daily    metoprolol succinate  200 mg Oral Daily    ondansetron  4 mg Intravenous Once    predniSONE  60 mg Oral Daily     PRN Meds:albuterol-ipratropium, alteplase, Dextrose 10% Bolus, Dextrose 10% Bolus, diphenhydrAMINE, glucagon (human recombinant), glucose, glucose, guaifenesin 100 mg/5 ml, heparin, porcine (PF), HYDROcodone-acetaminophen, ibuprofen, metoprolol, ondansetron, polyethylene glycol, ramelteon, sodium chloride 0.9%, triamcinolone acetonide 0.025%, triamcinolone acetonide 0.1%    Review of Systems   Constitutional: Negative for fever and chills.     Objective:     Vital Signs (Most Recent):  Temp: 97.4 °F (36.3 °C) (07/31/19 1247)  Pulse: 75 (07/31/19 1248)  Resp: 18 (07/31/19 1248)  BP: (!) 93/56 (07/31/19 1247)  SpO2: 100 % (07/31/19 1248) Vital Signs (24h Range):  Temp:  [97.4 °F (36.3 °C)-98.5 °F (36.9 °C)] 97.4 °F (36.3 °C)  Pulse:  [68-84] 75  Resp:  [16-20] 18  SpO2:  [92 %-100 %] 100 %  BP: (85-99)/(51-61) 93/56     Weight: 75 kg (165 lb 5.5 oz)  Body mass index is 24.42 kg/m².    Physical Exam   Constitutional: He appears well-developed and well-nourished. No distress.   Neurological: He is alert and oriented to person, place, and time. He is not disoriented.   Psychiatric: He has a  normal mood and affect.   Skin:   Areas Examined (abnormalities noted in diagram):   Head / Face Inspection Performed  Neck Inspection Performed  Chest / Axilla Inspection Performed  Abdomen Inspection Performed  Back Inspection Performed  RUE Inspected  LUE Inspection Performed  RLE Inspected  LLE Inspection Performed               Significant Labs: All pertinent labs within the past 24 hours have been reviewed.      Assessment/Plan:     Active Diagnoses:    Diagnosis Date Noted POA    PRINCIPAL PROBLEM:  Mesothelioma of left lung [C45.7] 02/15/2018 Yes    Hyponatremia [E87.1] 07/30/2019 No    SKINNY (acute kidney injury) [N17.9] 07/30/2019 No    Leukemoid reaction [D72.823] 07/29/2019 Unknown    Rash, drug [L27.0] 07/28/2019 Unknown    Decrease in appetite [R63.0] 07/26/2019 Yes    Compression of vein [I87.1]  Clinically Undetermined    SOB (shortness of breath) on exertion [R06.02] 07/25/2019 Unknown    Acute on chronic diastolic heart failure [I50.33] 07/18/2019 Unknown    Pericardial effusion without cardiac tamponade [I31.3] 07/15/2019 Yes    Essential hypertension [I10] 07/15/2019 Yes    Atrial fibrillation with RVR [I48.91] 07/15/2019 Yes    Acquired contracture of bladder neck [N32.0] 07/10/2019 Yes    Malignant pleural effusion [J91.0] 02/01/2018 Yes    Gastroesophageal reflux disease [K21.9] 10/11/2017 Yes    History of prostate cancer [Z85.46] 05/25/2016 Not Applicable      Problems Resolved During this Admission:    Diagnosis Date Noted Date Resolved POA    Gross hematuria [R31.0] 07/21/2019 07/26/2019 No       Drug rash  - Patient's drug rash still seems consistent with AGEP although the bullous lesions on his medial thigh are definitely made worse due to his edema. The rash seems to be eventually drying out and resolving on his face, neck and chest but still with significant bulla in the inguinal area.AGEP can continue to progress after removing the inciting agent i.e diltiazem. Also,  PPI are known to be associated with AGEP, so it wouldn't be unreasonable to start the patient on Zantac instead of a PPI. Pustular psoriasis is also in the differential of this rash.   - continue 60 mg of prednisone. Of note, WBC trending down in the past day.   - Biopsy results : Per dermatopathologist Dr. Meade, perivascular lymphocytic infiltrate with neutrophils. Also, subcorneal pustules can be seen which would be consistent with AGEP     Thank you for your consult. I will follow-up with patient. Please contact us if you have any additional questions.      Andrés Cameron MD  Dermatology  [facility]

## 2019-07-31 NOTE — ASSESSMENT & PLAN NOTE
Patient presented to [facility] w/ Pericardial effusion and a hx of recurrent pleural effusions (newly diagnosed mesothelioma in 2019). R sided PleurX catheter in place, patient of Dr. Padilla (Pulm) with 450cc drained on admission. CXR during admission have shown BL pleural effusion w/ areas of pulmonary infiltrates L>R.  Continues to improve and satting well on 1L NC. Lung ultrasound by pulmonology/Dr. Padilla reveals no significant amount of fluid to drain. Patient with JVD, weight at 79 kg(73 on admission), and decreased renal perfusion bilaterally on ultrasound still could have fluid overload.    Plan:  -HOld lasix given significant volume depletion in past two days  -consider repeat BNP and/or echo should his fluid status continue to be in question   - Continue duo nebs Q4h while awake

## 2019-07-31 NOTE — ASSESSMENT & PLAN NOTE
Patient transferred to [facility] from OSH for evaluation by cardiology/CTS for pericardial window or pericardiocentesis on 7/17/19. Pericardiocentesis was preformed by interventional cards, pt tolerated procedure well, produced 570cc serosanguinous fluid on 7/18. Repeat echo on 7/21 with recurrence of effusion and echo on 7/26 with stabilization of effusion and without tamponade. CTS consulted to evaluate patient for pericardial window but feel he is a poor candidate given likelihood of disease in pericardium.  Interventional cardiology also feel this patient is poor candidate for a drain as there is no safe place to place pericardiocentesis needle.      Plan:   Will hold off on further management or consults at the moment as effusion is stable and without tamponade physiology

## 2019-07-31 NOTE — PROGRESS NOTES
[facility]  Hematology/Oncology  Progress Note    Patient Name: Cale Harding  Admission Date: 7/17/2019  Hospital Length of Stay: 14 days  Code Status: Full Code     Subjective:     HPI:  Mr. Cale Harding is a 78-year-old male with a past medical history of prostate cancer (s/p prostatectomy), mesothelioma (on chemotherapy), with recurrent pleural effusion  ( with a right PleurX) and hypertension who is a transfer for evaluation of pericardial effusion.       Patient was scheduled for elective cystography with Urology (Dr. Philippe) for dilation of bladder neck contracture.  On arrival to elective surgical suite, patient found to be tachycardic and heart rate was irregular.  EKG confirmed atrial fibrillation with RVR.  Surgery was cancelled and he was admitted to the ICU for a diltiazem drip. He converted to sinus but would go in and out of afib, was started on lopressor 25 BID and echo done showed moderate size of pericardial effusion with no tamponade physiology however increased in effusion from 7/5, cardiology in the OSH was planning for pericardiocentesis but requested transfer for possible window vs pericardiocentesis and was sent to [facility]    On arrival patient was hemodynamically stable  , NSR, converted in and out of afib. Cardiology was consulted for input on afib management in the setting of pericardial effusion and need for pericardiocentesis/ closer ccu monitoring.     Of note patient has a PleurX on the right that is possibly infected and he was supposed to get it removed this Friday by his pulmonologist Dr. Woodward     Interval History:  Pt has blisters on side of his body and also has some exudate coming from around his inguinal region. He continues to weep from edematous skin. The pt is also having problems with       Oncology Treatment Plan:   OP NSCLC VINORELBINE WEEKLY    Medications:  Continuous Infusions:  Scheduled Meds:   albuterol-ipratropium  3 mL Nebulization  Q4H WAKE    famotidine  20 mg Oral Daily    metoprolol succinate  200 mg Oral Daily    ondansetron  4 mg Intravenous Once    predniSONE  60 mg Oral Daily     PRN Meds:albuterol-ipratropium, alteplase, Dextrose 10% Bolus, Dextrose 10% Bolus, diphenhydrAMINE, glucagon (human recombinant), glucose, glucose, guaifenesin 100 mg/5 ml, heparin, porcine (PF), HYDROcodone-acetaminophen, ibuprofen, metoprolol, ondansetron, polyethylene glycol, ramelteon, sodium chloride 0.9%, triamcinolone acetonide 0.025%, triamcinolone acetonide 0.1%     Review of Systems   Constitutional: Positive for fatigue and unexpected weight change. Negative for chills and fever.   HENT: Negative.    Respiratory: Positive for shortness of breath.    Cardiovascular: Positive for leg swelling.   Gastrointestinal: Negative for abdominal pain, constipation, diarrhea, nausea and vomiting.   Genitourinary: Negative.    Musculoskeletal: Negative for back pain.   Skin: Positive for rash.        + sloughing of skin, + blisters on lateral surfaces of body   Allergic/Immunologic: Positive for immunocompromised state.   Neurological: Positive for weakness.     Objective:     Vital Signs (Most Recent):  Temp: 97.4 °F (36.3 °C) (07/31/19 1247)  Pulse: 75 (07/31/19 1248)  Resp: 18 (07/31/19 1248)  BP: (!) 93/56 (07/31/19 1247)  SpO2: 100 % (07/31/19 1248) Vital Signs (24h Range):  Temp:  [97.4 °F (36.3 °C)-98.5 °F (36.9 °C)] 97.4 °F (36.3 °C)  Pulse:  [68-84] 75  Resp:  [16-20] 18  SpO2:  [92 %-100 %] 100 %  BP: (85-99)/(51-61) 93/56     Weight: 75 kg (165 lb 5.5 oz)  Body mass index is 24.42 kg/m².  Body surface area is 1.91 meters squared.      Intake/Output Summary (Last 24 hours) at 7/31/2019 1407  Last data filed at 7/31/2019 1331  Gross per 24 hour   Intake 780 ml   Output 825 ml   Net -45 ml       Physical Exam   Constitutional: He is oriented to person, place, and time. He appears well-developed and well-nourished. No distress.   HENT:   Head:  Normocephalic and atraumatic.   Eyes: Pupils are equal, round, and reactive to light. Conjunctivae are normal. No scleral icterus.   Neck: Normal range of motion. Neck supple. JVD present. Tracheal deviation: present to mandible when reclined.   Cardiovascular: Normal rate, normal heart sounds and intact distal pulses. Exam reveals no gallop and no friction rub.   No murmur heard.  Irregularly irregular, normal rate   Pulmonary/Chest: Effort normal. No respiratory distress. He has no wheezes. He has no rales.   On 1L NC  Bilateral decreased breath sounds   Abdominal: Soft. Bowel sounds are normal. He exhibits no distension. There is no tenderness.   Musculoskeletal: He exhibits no edema.   Neurological: He is alert and oriented to person, place, and time.   Skin: Skin is warm and dry. Rash (On the lower face, extending to anterior/medial thighs is are erythematous papules which have coalesced into generalized erythema.  There are bullae composed of clear fluid present on the lateral trunk and medial thigs with some desquamating and weeping.  ) noted.   Desqumating skin in anterior and posterior trunk of body       Significant Labs:   CBC:   Recent Labs   Lab 07/30/19  0529 07/31/19  0423   WBC 63.29* 45.01*   HGB 10.3* 9.3*   HCT 33.4* 31.0*    259   , CMP:   Recent Labs   Lab 07/30/19  0529 07/31/19  0423   * 123*   K 5.0 4.7   CL 93* 92*   CO2 21* 20*    131*   BUN 50* 57*   CREATININE 1.4 1.5*   CALCIUM 8.2* 8.0*   PROT 5.5* 5.2*   ALBUMIN 2.1* 2.0*   BILITOT 0.5 0.3   ALKPHOS 120 113   AST 14 16   ALT 8* 10   ANIONGAP 10 11   EGFRNONAA 48.1* 44.3*   , Immunology: No results for input(s): SPEP, CHRIS, APOORVA, FREELAMBDALI in the last 48 hours., Reticulocytes: No results for input(s): RETIC in the last 48 hours. and All pertinent labs from the last 24 hours have been reviewed.    Diagnostic Results:  I have reviewed all pertinent imaging results/findings within the past 24 hours.    Assessment/Plan:      * Mesothelioma of left lung  Patient of Dr. Foster on palliative chemo with Gemzar with plans to switch to Vinorelbine due to disease progression.  PET CT 4/2019 with increased uptake within the enlarging 1.4 cm left upper lobe pulmonary nodule, mediastinal lymph nodes, and left pleura when compared to PET 2/2019.  -pain currently controlled with current regimen: Norco 5-325 mg q6 PRN  -will need OP follow up with Dr. Foster       SKINNY (acute kidney injury)  Patient's baseline Cr is 0.7-0.8 now has been steadily climbing since admission.  Suspect pre-renal in nature due to patient's tenuous fluid status-could be cardiogenic vs volume depletion.  US negative for obstruction.  Intra-renal causes not excluded.  - Cr of 1.5 today, hold diuresis and assess today    Hyponatremia  Suspect SIADH.  Sodium has been stable over last few days and patient is asymptomatic.     Plan:  -Continue fluid restriction     Leukemoid reaction  White count still increasing, now at 63.  Ddx include leukemoid reaction from malignancy and/or drug rash and patient on steroid taper. Also considering infection although much lower on differential as patient has been afebrile and clinically does not appear to be infected.    -Monitor with daily CBC, decreased to 45 K today         Rash, drug  Drug rash thought to be result of diltiazem treatment.  Diltiazem stopped and rash continues to improve in some areas but is progressing down trunk and now today to lower extremities with increased blister formation, desquamation and weeping    Plan:   -continue topical TAC 0.025% BID to sensitive areas including face and groin and TAC 0.1% BID  to chest, abdomen, face   - Continue PO steroid taper  -Follow up skin biopsy  - APpreciated derm recs      Decrease in appetite  Patient w/ reported hx of poor appetite since beginning chemotherapy    Plan:  - Patient interested in MARINOL for appetite stimulation  - Marinol therapy not initiated at this time as it  may worsen hypotension    SOB (shortness of breath) on exertion  Patient presented to [facility] w/ Pericardial effusion and a hx of recurrent pleural effusions (newly diagnosed mesothelioma in 2019). R sided PleurX catheter in place, patient of Dr. Padilla (Pul) with 450cc drained on admission. CXR during admission have shown BL pleural effusion w/ areas of pulmonary infiltrates L>R.  Continues to improve and satting well on 1L NC. Lung ultrasound by pulmonology/Dr. Padilla reveals no significant amount of fluid to drain. Patient with JVD, weight at 79 kg(73 on admission), and decreased renal perfusion bilaterally on ultrasound still could have fluid overload.    Plan:  -HOld lasix given significant volume depletion in past two days  -consider repeat BNP and/or echo should his fluid status continue to be in question   - Continue duo nebs Q4h while awake      Acute on chronic diastolic heart failure  Patient with symptoms of SOB and diffuse swelling on admission who is not on lasix at home with an admission BNP of 374. Echo 7/17/2019 with Grade I LV diastolic dysfunction.  Patient has been diuresing well with improvements in shortness of breath.  Still with Pleural effusions L>R 2/2 malignancy vs overload.  Patient approaching dry weight with significant increase in BUN/Cr.  Plan:  - hold lasix, given 9 pounds of fluid loss in past day   -Strict I/O with Daily Standing weights  -keep Mg >2 and K>4  -Cardiac Diet  -Fluid restriction <1500cc/day    Pericardial effusion without cardiac tamponade  Patient transferred to [facility] from OSH for evaluation by cardiology/CTS for pericardial window or pericardiocentesis on 7/17/19. Pericardiocentesis was preformed by interventional cards, pt tolerated procedure well, produced 570cc serosanguinous fluid on 7/18. Repeat echo on 7/21 with recurrence of effusion and echo on 7/26 with stabilization of effusion and without tamponade. CTS consulted to evaluate patient for pericardial window but feel  he is a poor candidate given likelihood of disease in pericardium.  Interventional cardiology also feel this patient is poor candidate for a drain as there is no safe place to place pericardiocentesis needle.      Plan:   Will hold off on further management or consults at the moment as effusion is stable and without tamponade physiology    Essential hypertension  Currently holding all hypertensives.  Patient has been normotensive since admission.  Was previously on enalapril at home.   -discuss with cards before discharge whether patient will require ACE/ARB    Atrial fibrillation with RVR  First noted on ECG 7/15/19 following pericardiocentesis. Patientt reports longer history of palpitations.  Ddx includes 2/2 pericardial effusion vs. Underlying malignancy. Currently rate controlled with Toprol XL 200mg daily(patient previously not controlled with lower doses of Toprol, lopressor 50mg BID, and had drug rash with diltiazem). CHADsVASC 3 suggestive of 3.2% annual stroke risk. Not currently on heparin or lovenox due to significant hematuria experienced earlier on admission.    Plan:  - Appreciate cardiology recommendations  -Patient not good candidate for anticoagulation  - Continue telemetry and monitor for rate control  -TEDs and SCDs in place for DVT prophylaxis as patient not good candidate for anticoagulation    Acquired contracture of bladder neck  Urology consulted in the OSH. S/p Bladder neck contracture release and urethral dilation on 7/15/19. Patient of Dr. Philippe.    Plan:  - Haider in place  - Marked penile and scrotal edema  - Hematuria resolved, nursing has been flushing cath per Urology recs  - urology notified, they do not believe the haider should be changed at this time due to anasarca   - Cleared for d/c from urology perspective, to f/u w/ Dr. Philippe  - Increased leak noted from penis around haider, most likely due to lasix and anasarca    Malignant pleural effusion  - has R sided pleurX, assocaited  Shortness of breath; 400 cc removed yest    Gastroesophageal reflux disease  -Continue PPI daily     History of prostate cancer  S/p prostectomy.  Haider in place for urinary retention 2/2 bladder contracture.  Urology recommends keeping the haider catheter in place.         Discussed with Dr. Alexx Styles MD  Hematology/Oncology Fellow, PGY V  [facility]

## 2019-07-31 NOTE — PLAN OF CARE
Saw patient and examined telemetry. Patient is currently with no JVD with pitting edema (complicated by his albumin). Determination of fluid status difficult secondary to this. Lab work indicates pre-renal SKINNY. Lungs clear to auscultation. Patient has been in NSR with PAC's till 1430 when he went into Afib with RVR.     Recommendations:  - Bolus IV Amiodarone gtt and start drip. Can transition to PO amiodarone afterwards or go up on toprol to 250 mg daily (BP permitting).   - Please resume therapeutic AC for his CHADS-VASC score of 3.     Cristela Alexander MD, PGY-6  Cardiology fellow

## 2019-08-01 PROBLEM — T83.511A CATHETER-ASSOCIATED URINARY TRACT INFECTION: Status: ACTIVE | Noted: 2019-01-01

## 2019-08-01 PROBLEM — N39.0 CATHETER-ASSOCIATED URINARY TRACT INFECTION: Status: ACTIVE | Noted: 2019-01-01

## 2019-08-01 NOTE — ASSESSMENT & PLAN NOTE
Currently holding all hypertensives.  Patient has been normotensive/llow since admission.  Was previously on enalapril at home.   -discuss with cards before discharge whether patient will require ACE/ARB

## 2019-08-01 NOTE — MEDICAL/APP STUDENT
[facility]  Cardiology  Consult Note    Patient Name: Cale Harding  MRN: [id number]  Admission Date: 7/17/2019  Hospital Length of Stay: 15 days  Code Status: Full Code   Attending Provider: Sreedhar Coffey MD   Primary Care Physician: Jj Escobedo MD  Principal Problem:Mesothelioma of left lung    Patient information was obtained from patient, spouse/SO and ER records.     Consults  Subjective:     Brief HPI:   Mr. Harding is a 78-year-old male with prostate cancer (s/p prostatectomy), mesothelioma (on chemotherapy), with recurrent pleural effusion  who initially presented to OSH for elective cystography for dilation of bladder neck contracture.      Patient was scheduled for elective cystography with Urology (Dr. Philippe) for dilation of bladder neck contracture.  On arrival to elective surgical suite, patient found to be tachycardic and heart rate was irregular.  EKG confirmed atrial fibrillation with RVR.  Surgery was cancelled and he was admitted to the ICU for a diltiazem drip. He converted to sinus but would go in and out of afib, was started on lopressor 25 BID and echo done showed moderate size of pericardial effusion with no tamponade physiology however increased in effusion from 7/5.   Pericardiocentesis was done on 7/18 with removal of 570 cc of fluid that was positive for malignancy. Patients HR has been controlled with PO metoprolol succinate XR.      Interval: Yesterday morning BP fell to 85/51 in the AM and metoprolol was held. Yesterday PM pt relasped into Afib with rates into the 130s. Cardiology consulted, pt given 150 mg bolus of amiodarone and started on .5mg/min drip. Remained in the 100s overnight. Seen this morning reports improving SOB, denies palpitations, dizziness, LOC.   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    Prostate cancer  2002    Urinary tract infection        Past Surgical History:   Procedure Laterality Date    BIOPSY-DIAPHRAGM N/A 3/19/2018    Performed by Galdino Fang MD at [facility] OR [address] [place] [address]    QNXTDO-IGOYYR-YK N/A 2/1/2018    Performed by [facility] Provider at [facility] OR [address] [place] [address]    BRONCHOSCOPY N/A 6/3/2019    Performed by [facility] Provider at [facility] OR [address] [place] [address]    COLONOSCOPY  10/20/2012    COLONOSCOPY  11/19/2014    dr machado ( repeat in 3 years per the pt )    CYSTOSCOPY, WITH RETROGRADE PYELOGRAM Bilateral 7/15/2019    Performed by [facility]ino Philippe [facility] at [facility] OR    CYSTOSCOPY, WITH URETHRAL DILATION Bilateral 7/15/2019    Performed by Gal[facility]ino Philippe MD at [facility] OR    ELBOW SURGERY Left 2013    dislocation    JLASOYPJX-DWYU-I-CATH N/A 6/20/2019    Performed by [facility] Provider at [facility] OR [address] [facility]    LAPAROSCOPY-DIAGNOSTIC N/A 3/19/2018    [facility]    Pericardiocentesis N/A 7/18/2019    Performed by Frank Javier MD at [facility] CATH LAB    PROCTECTOMY  2002    RELEASE, CONTRACTURE, BLADDER NECK N/A 7/15/2019    Performed by Gal[facility]ino Philippe MD at [facility] OR    SHOULDER ARTHROSCOPY W/ ROTATOR CUFF REPAIR Right 2008    THORACOSCOPY-VIDEO ASSISTED (VATS) W/PLEURAL BIOPSY Left 3/19/2018    Performed by Galdino Fang MD at [facility] OR [address] [place] [address]    URETHROGRAM, RETROGRADE N/A 7/15/2019    Performed by Galdino Philippe MD at [facility] OR       Review of patient's allergies indicates:   Allergen Reactions    Bactrim [sulfamethoxazole-trimethoprim] Other (See Comments)     Joint pains       No current facility-administered medications on file prior to encounter.      Current Outpatient Medications on File Prior to Encounter   Medication Sig    dextromethorphan-guaifenesin  mg (MUCINEX DM)  mg per 12 hr tablet Take 1 tablet by mouth every 12 (twelve) hours.    HYDROcodone-acetaminophen (NORCO) 5-325 mg per tablet Take 1 tablet by mouth every  6 (six) hours as needed for Pain.    polyethylene glycol (MIRALAX) 17 gram/dose powder Take 17 g by mouth once daily.     Family History     Problem Relation (Age of Onset)    Cancer Mother    Heart attack Sister, Sister    Heart disease Father, Brother, Sister, Brother, Sister, Brother    No Known Problems Son, Son, Son    Prostate cancer Brother, Brother, Brother        Tobacco Use    Smoking status: Former Smoker     Packs/day: 2.00     Years: 15.00     Pack years: 30.00     Types: Cigarettes     Last attempt to quit: 1970     Years since quittin.6    Smokeless tobacco: Former User    Tobacco comment: pt quit 40 years ago   Substance and Sexual Activity    Alcohol use: No     Alcohol/week: 16.8 oz     Types: 28 Cans of beer per week     Comment: None since Nov 15 th 2017    Drug use: No    Sexual activity: Not Currently     Review of Systems   Constitution: Negative for chills, diaphoresis and fever.   HENT: Negative.    Eyes: Negative.    Cardiovascular: Positive for leg swelling. Negative for chest pain, dyspnea on exertion, irregular heartbeat, near-syncope, orthopnea, palpitations and syncope.   Respiratory: Positive for shortness of breath ( improving). Negative for cough and wheezing.    Endocrine: Negative.    Skin: Positive for itching and rash ( diffuse from face to thighs, improving).   Musculoskeletal: Negative.    Gastrointestinal: Negative.  Negative for nausea and vomiting.   Genitourinary: Negative.  Negative for hematuria.   Neurological: Negative.    Psychiatric/Behavioral: Negative.      Objective:     Vital Signs (Most Recent):  Temp: 96.5 °F (35.8 °C) (19 0751)  Pulse: 89 (19 1128)  Resp: 16 (19 1128)  BP: 105/65 (19 0839)  SpO2: 99 % (19 1128) Vital Signs (24h Range):  Temp:  [96.5 °F (35.8 °C)-97.5 °F (36.4 °C)] 96.5 °F (35.8 °C)  Pulse:  [] 89  Resp:  [16-20] 16  SpO2:  [86 %-100 %] 99 %  BP: ()/(51-80) 105/65     Weight: 71.4 kg (157 lb  6.5 oz)  Body mass index is 23.25 kg/m².    SpO2: 99 %  O2 Device (Oxygen Therapy): room air      Intake/Output Summary (Last 24 hours) at 8/1/2019 1137  Last data filed at 8/1/2019 1120  Gross per 24 hour   Intake 2336.69 ml   Output 250 ml   Net 2086.69 ml       Lines/Drains/Airways     Central Venous Catheter Line                 Port A Cath Single Lumen 06/20/19 right internal jugular 42 days          Drain                 Chest Tube Right Pleural -- days         Urethral Catheter 07/17/19 0920 Non-latex 16 Fr. 15 days                Physical Exam   Constitutional: He is oriented to person, place, and time. No distress.   HENT:   Head: Normocephalic and atraumatic.   Neck: JVD ( to the mandible) present.   Cardiovascular: S1 normal, S2 normal and intact distal pulses. An irregularly irregular rhythm present. Tachycardia present. Exam reveals no gallop.   No murmur heard.  Pulmonary/Chest: Effort normal. No respiratory distress. He has decreased breath sounds in the right lower field and the left lower field. He has wheezes ( mild, right lower lung field). He has no rales.   Abdominal: Soft. There is no tenderness.   Musculoskeletal: He exhibits edema.   Neurological: He is alert and oriented to person, place, and time.   Skin: Rash (erythematous bullous rash with serous drainage) noted. He is not diaphoretic.   Psychiatric: He has a normal mood and affect. His behavior is normal. Judgment and thought content normal.   Vitals reviewed.      Significant Labs:   CMP   Recent Labs   Lab 07/31/19  0423 08/01/19  0522   * 125*   K 4.7 4.4   CL 92* 93*   CO2 20* 20*   * 144*   BUN 57* 60*   CREATININE 1.5* 1.4   CALCIUM 8.0* 8.3*   PROT 5.2* 5.3*   ALBUMIN 2.0* 2.1*   BILITOT 0.3 0.3   ALKPHOS 113 120   AST 16 15   ALT 10 12   ANIONGAP 11 12   ESTGFRAFRICA 51.2* 55.6*   EGFRNONAA 44.3* 48.1*    and CBC   Recent Labs   Lab 07/31/19  0423 08/01/19  0522   WBC 45.01* 40.45*   HGB 9.3* 9.8*   HCT 31.0* 32.9*     272       Assessment and Plan:     Active Diagnoses:    Diagnosis Date Noted POA    PRINCIPAL PROBLEM:  Mesothelioma of left lung [C45.7] 02/15/2018 Yes    Hyponatremia [E87.1] 07/30/2019 No    SKINNY (acute kidney injury) [N17.9] 07/30/2019 No    Leukemoid reaction [D72.823] 07/29/2019 Unknown    Rash, drug [L27.0] 07/28/2019 Unknown    Decrease in appetite [R63.0] 07/26/2019 Yes    Compression of vein [I87.1]  Clinically Undetermined    SOB (shortness of breath) on exertion [R06.02] 07/25/2019 Unknown    Acute on chronic diastolic heart failure [I50.33] 07/18/2019 Unknown    Pericardial effusion without cardiac tamponade [I31.3] 07/15/2019 Yes    Essential hypertension [I10] 07/15/2019 Yes    Atrial fibrillation with RVR [I48.91] 07/15/2019 Yes    Acquired contracture of bladder neck [N32.0] 07/10/2019 Yes    Malignant pleural effusion [J91.0] 02/01/2018 Yes    Gastroesophageal reflux disease [K21.9] 10/11/2017 Yes    History of prostate cancer [Z85.46] 05/25/2016 Not Applicable      Problems Resolved During this Admission:    Diagnosis Date Noted Date Resolved POA    Drug rash [L27.0] 07/31/2019 07/31/2019 Unknown    Gross hematuria [R31.0] 07/21/2019 07/26/2019 No     Afib RVR  - Pt given dose of metoprolol this morning, BP stable, MA decreased to < 100 with regular rhythm.  - continue metoprolol 200mg XR  - Start Amiodarone 400mg PO BID for rhythm control  - will monitor response and reassess    Pericardial effusion  - Last echo on 7/26 showed stable recurrent effusion without tamponade physiology.  - If pt in hospital, consider repeat echo on 8/5 to check progress of effusion  - JVD remains elevated, holding diuresis for elevated Cr    VTE Risk Mitigation (From admission, onward)        Ordered     heparin, porcine (PF) 100 unit/mL injection flush 500 Units  Every 8 hours PRN      07/18/19 1712     IP VTE HIGH RISK PATIENT  Once      07/17/19 1930          Thank you for your consult.  We will follow-up with patient. Please contact us if you have any additional questions.    Servando Dueñas  Cardiology   [facility]

## 2019-08-01 NOTE — ASSESSMENT & PLAN NOTE
Patient's baseline Cr is 0.7-0.8 now has been steadily climbing since admission.  Suspect pre-renal in nature due to patient's tenuous fluid status-could be cardiogenic vs volume depletion.  US negative for obstruction.  Intra-renal causes not excluded.  Creatinine improved from 1.5 to 1.4 today after receiving 250 cc bolus.    - Another 250 cc bolus given today  -daily BMP  -avoid nephrotoxic agents

## 2019-08-01 NOTE — ASSESSMENT & PLAN NOTE
Urine culture with 100,000 cfu gram negative rods. Elevated white count. Asymptomatic otherwise without abdominal pain. Urology recommends leaving catheter in place at this time due to contracture of bladder neck.    Plan:  -Started Rocephin 1g q24h   -follow up speciation and tailor antibiotics

## 2019-08-01 NOTE — ASSESSMENT & PLAN NOTE
Suspect SIADH.  Sodium has been stable over last few days and patient is asymptomatic. Improved to 125 from 123 yesterday    Plan:  -Continue fluid restriction

## 2019-08-01 NOTE — ASSESSMENT & PLAN NOTE
Patient transferred to [facility] from OSH for evaluation by cardiology/CTS for pericardial window or pericardiocentesis on 7/17/19. Pericardiocentesis was preformed by interventional cards, pt tolerated procedure well, produced 570cc serosanguinous fluid on 7/18. Repeat echo on 7/21 with recurrence of effusion and echo on 7/26 with stabilization of effusion and without tamponade. CTS consulted to evaluate patient for pericardial window but feel he is a poor candidate given likelihood of disease in pericardium.  Interventional cardiology also feel this patient is poor candidate for a drain as there is no safe place to place pericardiocentesis needle.      Plan:   -Will hold off on further management or consults at the moment as effusion is stable and without tamponade physiology  -Per cardiology consider repeat echo to monitor progression

## 2019-08-01 NOTE — ASSESSMENT & PLAN NOTE
Urology consulted in the OSH. S/p Bladder neck contracture release and urethral dilation on 7/15/19. Patient of Dr. Philippe.    Plan:  - Haider in place  - Cleared for d/c from urology perspective, to f/u w/ Dr. Philippe  - Increased leak noted from penis around haider, most likely due to lasix and anasarca

## 2019-08-01 NOTE — CARE UPDATE
Rapid Response Respiratory Chart Check     Chart check completed, mews score due to heart rate and low blood pressure, bedside RT, Ulysses, S contacted, no concerns verbalized at this time, instructed to call [phone] for further concerns or assistance.

## 2019-08-01 NOTE — PLAN OF CARE
Problem: Adult Inpatient Plan of Care  Goal: Plan of Care Review  Outcome: Ongoing (interventions implemented as appropriate)  Pt. with nonskid footwear on with bed in lowest position and locked with bed rails up x2, with bed alarm being utilized.  Pt. instructed to call prior to getting OOB.  Pt. with call light within reach and verbalized understanding.  Pt. continues on telemetry.  Pt. with amiodarone gtt D/C'd and transitioned to PO.  Pt. Started on ceftriaxone this AM.  Pt. with 500 mL NS bolus given this AM.   Pt. With a good appetite.  Pt. with wife at bedside.  Will continue to monitor pt.

## 2019-08-01 NOTE — ASSESSMENT & PLAN NOTE
First noted on ECG 7/15/19 following pericardiocentesis. History of paroxysmal atrial fibrillation from past. Ddx includes 2/2 pericardial effusion vs. Underlying malignancy. Earlier during admission patient previously not controlled with lower doses of Toprol, lopressor 50mg BID, and had drug rash with diltiazem). CHADsVASC 3 suggestive of 3.2% annual stroke risk. Not currently on heparin or lovenox due to significant hematuria experienced earlier on admission and generally patient is not a great anticoagulation candidate. Now with recurrent a. Fib with RVR after not receiving toprol dose yesterday.  Started on amiodarone gtt yesterday PM with improvement in rate.    Plan:  - Appreciate cardiology recommendations  -cardiology recommends transitioning to PO amiodarone 400mg BID for rhythm control(have started) and continuing Toprol XL 200mg for rate control        -Continue telemetry and monitor for rate control   -TEDs and SCDs in place for DVT prophylaxis as patient not good candidate for anticoagulation

## 2019-08-01 NOTE — ASSESSMENT & PLAN NOTE
- Remains in atrial fibrillation with . Continue metoprolol PRN if HR > 120  - CHADsVASC 3 suggestive of 3.2% annual stroke risk. - continue heparin gtt

## 2019-08-01 NOTE — ASSESSMENT & PLAN NOTE
Drug rash thought to be result of diltiazem treatment.  Diltiazem stopped and rash continues to improve in some areas but is progressing down trunk and now today to lower extremities with increased blister formation, desquamation and weeping    Plan:   -continue topical TAC 0.025% BID to sensitive areas including face and groin and TAC 0.1% BID  to chest, abdomen, face   - Continue PO steroid taper-day 3 of 60mg  -Biopsy results : Per dermatopathologist Dr. Meade, perivascular lymphocytic infiltrate with neutrophils. 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

## 2019-08-01 NOTE — ASSESSMENT & PLAN NOTE
Patient presented to [facility] w/ Pericardial effusion and a hx of recurrent pleural effusions (newly diagnosed mesothelioma in 2019). R sided PleurX catheter in place, patient of Dr. Padilla (Pul) with 450cc drained on admission. CXR during admission have shown BL pleural effusion w/ areas of pulmonary infiltrates L>R.  Continues to improve and satting well on 1L NC. Lung ultrasound by pulmonology/Dr. Padilla reveals no significant amount of fluid to drain.   Plan:  -Hold lasix given significant volume depletion in past two days   -consider repeat BNP and/or echo should his fluid status continue to be in question   - Continue duo nebs Q4h while awake

## 2019-08-01 NOTE — PLAN OF CARE
Problem: Adult Inpatient Plan of Care  Goal: Plan of Care Review  Outcome: Ongoing (interventions implemented as appropriate)  Pt afebrile. Tachy. On telemetry running afib. On amiodarone drip. Skin rash blistering and weeping serous fluid. Cano catheter intact. Pt has generalized moderate edema. BM x 1 noted. Up with assistance to toilet. Wife at bedside.

## 2019-08-01 NOTE — ASSESSMENT & PLAN NOTE
Patient w/ reported hx of poor appetite since beginning chemotherapy    Plan:  - Started dronabinol 2.5 mg BID  -Continue Ensures with every meal

## 2019-08-01 NOTE — ASSESSMENT & PLAN NOTE
Likely multifactorial ddx includes leukemoid reaction from malignancy and/or AGEP rash.  Also patient with new CAUTI which is likely contributing.  Overall WBC improving, decreased to 40  -Monitor with daily CBC

## 2019-08-01 NOTE — PROGRESS NOTES
[facility]  Hematology/Oncology  Progress Note    Patient Name: Cale Harding  Admission Date: 7/17/2019  Hospital Length of Stay: 15 days  Code Status: Full Code     Subjective:     HPI:  Mr. Cale Harding is a 78-year-old male with a past medical history of prostate cancer (s/p prostatectomy), mesothelioma (on chemotherapy), with recurrent pleural effusion  ( with a right PleurX) and hypertension who is a transfer for evaluation of pericardial effusion.       Patient was scheduled for elective cystography with Urology (Dr. Philippe) for dilation of bladder neck contracture.  On arrival to elective surgical suite, patient found to be tachycardic and heart rate was irregular.  EKG confirmed atrial fibrillation with RVR.  Surgery was cancelled and he was admitted to the ICU for a diltiazem drip. He converted to sinus but would go in and out of afib, was started on lopressor 25 BID and echo done showed moderate size of pericardial effusion with no tamponade physiology however increased in effusion from 7/5, cardiology in the OSH was planning for pericardiocentesis but requested transfer for possible window vs pericardiocentesis and was sent to [facility]    On arrival patient was hemodynamically stable  , NSR, converted in and out of afib. Cardiology was consulted for input on afib management in the setting of pericardial effusion and need for pericardiocentesis/ closer ccu monitoring.     Of note patient has a PleurX on the right that is possibly infected and he was supposed to get it removed this Friday by his pulmonologist Dr. Woodward     Hospital Course:   Admitted to hematology oncology on 7/17 for a. Fib with RVR.  The patient was found to have a possibly associated pericardial effusion we were concerned was contributing to a. Fib. CTS consulted for evaluation of pericardial effusion to evaluate need for pericardial window. Patient not strong candidate for pericardial window per CTS,  Interventional cardiology preformed paracentesis on hospital day 2 and drained 570cc from pericardial space. Patient seen by pulmonary on admission. Dr. Padilla familiar w/ patient and was able to drain 450 ccs from R sided pleurX cath.  A. Fib with RVR did not resolve following paracentesis and cardiology was consulted.  They recommended starting patient on heparin and diltiazem drip for rate control. Rate controlled and pt stepped down to PO diltiazem and lopressor but a.fib with AVR recurred and was then switched to Toprol XL 100mg.  Patient experienced improvement in shortness of breath and atrial fibrillation, but on hospital day 7 the patient developed a new rash first noticed on back and spread to chest and abdomen. Dermatology was consulted and felt this was likely drug rash from diltiazem and Cardiology was in agreement. Diltiazem discontinued and patient's Toprol XL was increased to 200mg daily for management of his atrial fibrillation.  He was started on oral prednisone taper and topical steroids for drug rash.  His rash has continued to get worse despite management and progressed caudally with increased blistering and desquamation.  TTE on 7/21 showed a reaccumulation of pericardial fluid, thoracic surgery consulted for potential pericardial window and deemed patient a poor candidate due to there likely being pericardial disease and rash on anterior chest, Interventional cardiology also felt patient was a poor candidate. Also repeat TTE on 7/26 showed stable pericardial effusion when compared to 7/26 and there was no tamponade physiology. Pulmonology continues to follow patient for bilateral maligna    nt effusion and felt that there is nothing left to drain from left or right side.   Interval History: No events overnight   He continued to have elevated rates from a.fib with RVR but overall improved since addition of amiodarone drip.  Rash is improving on face, trunk, but lower extremities still  desquamating and weeping.  He was able to work well with physical therapy yesterday.  He denies shortness of breath, chest pain, abdominal pain, nausea, vomiting, fever, chills.    Oncology Treatment Plan:   OP NSCLC VINORELBINE WEEKLY    Medications:  Continuous Infusions:  Scheduled Meds:   albuterol-ipratropium  3 mL Nebulization Q4H WAKE    amiodarone  400 mg Oral BID    cefTRIAXone (ROCEPHIN) IVPB  1 g Intravenous Q24H    dronabinol  2.5 mg Oral BID    famotidine  20 mg Oral Daily    metoprolol succinate  200 mg Oral Daily    ondansetron  4 mg Intravenous Once    predniSONE  60 mg Oral Daily    triamcinolone acetonide 0.025%   Topical (Top) BID    triamcinolone acetonide 0.1%   Topical (Top) BID     PRN Meds:albuterol-ipratropium, alteplase, Dextrose 10% Bolus, Dextrose 10% Bolus, diphenhydrAMINE, glucagon (human recombinant), glucose, glucose, guaifenesin 100 mg/5 ml, heparin, porcine (PF), HYDROcodone-acetaminophen, ibuprofen, metoprolol, ondansetron, polyethylene glycol, ramelteon, sodium chloride 0.9%     Review of Systems   All other systems reviewed and are negative.    Objective:     Vital Signs (Most Recent):  Temp: 96.5 °F (35.8 °C) (08/01/19 0751)  Pulse: 89 (08/01/19 1128)  Resp: 16 (08/01/19 1128)  BP: 105/65 (08/01/19 0839)  SpO2: 99 % (08/01/19 1128) Vital Signs (24h Range):  Temp:  [96.5 °F (35.8 °C)-97.5 °F (36.4 °C)] 96.5 °F (35.8 °C)  Pulse:  [] 89  Resp:  [16-20] 16  SpO2:  [86 %-100 %] 99 %  BP: ()/(51-80) 105/65     Weight: 71.4 kg (157 lb 6.5 oz)  Body mass index is 23.25 kg/m².  Body surface area is 1.86 meters squared.      Intake/Output Summary (Last 24 hours) at 8/1/2019 1214  Last data filed at 8/1/2019 1120  Gross per 24 hour   Intake 2336.69 ml   Output 250 ml   Net 2086.69 ml       Physical Exam   Constitutional: He is oriented to person, place, and time. He appears well-developed. No distress.   HENT:   Head: Normocephalic and atraumatic.   Eyes: Pupils are  equal, round, and reactive to light. Conjunctivae are normal. No scleral icterus.   Neck: Normal range of motion. Neck supple. No JVD present.   Cardiovascular: Normal heart sounds and intact distal pulses.   Tachycardic in low 100s, irregularly irregular rhythm   Pulmonary/Chest: Effort normal. No respiratory distress. He has no wheezes. He has no rales.   Bilateral decreased breath sounds   Abdominal: Soft. Bowel sounds are normal. He exhibits no distension. There is no tenderness.   Musculoskeletal: He exhibits no edema (bilateral lower extremity pitting edema).   Neurological: He is alert and oriented to person, place, and time.   Skin: Skin is warm and dry. Rash (On the trunk is confluent erythema with fewer bullae and less weeping compared to prior exam, lower extremities wrapped in ) noted.       Significant Labs:   CBC:   Recent Labs   Lab 19   WBC 45.01* 40.45*   HGB 9.3* 9.8*   HCT 31.0* 32.9*    272    and CMP:   Recent Labs   Lab 193 19   * 125*   K 4.7 4.4   CL 92* 93*   CO2 20* 20*   * 144*   BUN 57* 60*   CREATININE 1.5* 1.4   CALCIUM 8.0* 8.3*   PROT 5.2* 5.3*   ALBUMIN 2.0* 2.1*   BILITOT 0.3 0.3   ALKPHOS 113 120   AST 16 15   ALT 10 12   ANIONGAP 11 12   EGFRNONAA 44.3* 48.1*     M.2  Phosph: 4.0  Corrected calcium: 9.8  Urine culture: 100,000 cfu Gram negative rods    Diagnostic Results:  None    Assessment/Plan:     * Mesothelioma of left lung  Patient of Dr. Foster on palliative chemo with Gemzar with plans to switch to Vinorelbine due to disease progression.  PET CT 2019 with increased uptake within the enlarging 1.4 cm left upper lobe pulmonary nodule, mediastinal lymph nodes, and left pleura when compared to PET 2019.  -pain currently controlled with current regimen: Norco 5-325 mg q6 PRN  -will need OP follow up with Dr. Foster       SOB (shortness of breath) on exertion  Patient presented to [facility] w/ Pericardial  effusion and a hx of recurrent pleural effusions (newly diagnosed mesothelioma in 2019). R sided PleurX catheter in place, patient of Dr. Padilla (Pul) with 450cc drained on admission. CXR during admission have shown BL pleural effusion w/ areas of pulmonary infiltrates L>R.  Continues to improve and satting well on 1L NC. Lung ultrasound by pulmonology/Dr. Padilla reveals no significant amount of fluid to drain.   Plan:  -Hold lasix given significant volume depletion in past two days   -consider repeat BNP and/or echo should his fluid status continue to be in question   - Continue duo nebs Q4h while awake      Malignant pleural effusion  - has R sided pleurX, associated Shortness of breath; 400 cc removed 7/30    Pericardial effusion without cardiac tamponade  Patient transferred to [facility] from OSH for evaluation by cardiology/CTS for pericardial window or pericardiocentesis on 7/17/19. Pericardiocentesis was preformed by interventional cards, pt tolerated procedure well, produced 570cc serosanguinous fluid on 7/18. Repeat echo on 7/21 with recurrence of effusion and echo on 7/26 with stabilization of effusion and without tamponade. CTS consulted to evaluate patient for pericardial window but feel he is a poor candidate given likelihood of disease in pericardium.  Interventional cardiology also feel this patient is poor candidate for a drain as there is no safe place to place pericardiocentesis needle.      Plan:   -Will hold off on further management or consults at the moment as effusion is stable and without tamponade physiology  -Per cardiology consider repeat echo to monitor progression    SKINNY (acute kidney injury)  Patient's baseline Cr is 0.7-0.8 now has been steadily climbing since admission.  Suspect pre-renal in nature due to patient's tenuous fluid status-could be cardiogenic vs volume depletion.  US negative for obstruction.  Intra-renal causes not excluded.  Creatinine improved from 1.5 to 1.4 today after  receiving 250 cc bolus.    Plan:  - Another 250 cc bolus given today  -daily BMP  -avoid nephrotoxic agents    Rash, drug  Drug rash thought to be result of diltiazem treatment.  Diltiazem stopped and rash continues to improve in some areas but is progressing down trunk and now today to lower extremities with increased blister formation, desquamation and weeping    Plan:   -continue topical TAC 0.025% BID to sensitive areas including face and groin and TAC 0.1% BID  to chest, abdomen, face   - Continue PO steroid taper-day 3 of 60mg  -Biopsy results : Per dermatopathologist Dr. Meade, perivascular lymphocytic infiltrate with neutrophils. Also, subcorneal pustules can be seen which would be consistent with AGEP  -Appreciate dermatology recommendations      Atrial fibrillation with RVR  First noted on ECG 7/15/19 following pericardiocentesis. History of paroxysmal atrial fibrillation from past. Ddx includes 2/2 pericardial effusion vs. Underlying malignancy. Earlier during admission patient previously not controlled with lower doses of Toprol, lopressor 50mg BID, and had drug rash with diltiazem). CHADsVASC 3 suggestive of 3.2% annual stroke risk. Not currently on heparin or lovenox due to significant hematuria experienced earlier on admission and generally patient is not a great anticoagulation candidate. Now with recurrent a. Fib with RVR after not receiving toprol dose yesterday.  Started on amiodarone gtt yesterday PM with improvement in rate.    Plan:  - Appreciate cardiology recommendations  -cardiology recommends transitioning to PO amiodarone 400mg BID for rhythm control(have started) and continuing Toprol XL 200mg for rate control        -Continue telemetry and monitor for rate control   -TEDs and SCDs in place for DVT prophylaxis as patient not good candidate for anticoagulation    Catheter-associated urinary tract infection  Urine culture with 100,000 cfu gram negative rods. Elevated white count.  Asymptomatic otherwise without abdominal pain. Urology recommends leaving catheter in place at this time due to contracture of bladder neck.    Plan:  -Started Rocephin 1g q24h   -follow up speciation and tailor antibiotics     Hyponatremia  Suspect SIADH.  Sodium has been stable over last few days and patient is asymptomatic. Improved to 125 from 123 yesterday    Plan:  -Continue fluid restriction     Leukemoid reaction  Likely multifactorial ddx includes leukemoid reaction from malignancy and/or AGEP rash.  Also patient with new CAUTI which is likely contributing.  Overall WBC improving, decreased to 40  -Monitor with daily CBC    Decrease in appetite  Patient w/ reported hx of poor appetite since beginning chemotherapy    Plan:  - Started dronabinol 2.5 mg BID  -Continue Ensures with every meal    Acute on chronic diastolic heart failure  Patient with symptoms of SOB and diffuse swelling on admission who is not on lasix at home with an admission BNP of 374. Echo 7/17/2019 with Grade I LV diastolic dysfunction.  Patient has been diuresing well with improvements in shortness of breath.  Still with Pleural effusions L>R 2/2 malignancy vs overload.  Patient approaching dry weight with significant increase in BUN/Cr.  Plan:  - hold lasix for fluid loss and roverto  -Strict I/O with Daily Standing weights  -keep Mg >2 and K>4  -Cardiac Diet  -Fluid restriction <1500cc/day    Essential hypertension  Currently holding all hypertensives.  Patient has been normotensive/llow since admission.  Was previously on enalapril at home.   -discuss with cards before discharge whether patient will require ACE/ARB    Acquired contracture of bladder neck  Urology consulted in the OSH. S/p Bladder neck contracture release and urethral dilation on 7/15/19. Patient of Dr. Philippe.    Plan:  - Haider in place  - Cleared for d/c from urology perspective, to f/u w/ Dr. Philippe  - Increased leak noted from penis around haider, most likely due to  lasix and anasarca    Gastroesophageal reflux disease  -Continue PPI daily     History of prostate cancer  S/p prostectomy.  Haider in place for urinary retention 2/2 bladder contracture.  Urology recommends keeping the haider catheter in place.      Justin Blackwell MD  Hematology/Oncology  [facility]

## 2019-08-01 NOTE — SUBJECTIVE & OBJECTIVE
Interval History: No events overnight   He continued to have elevated rates from a.fib with RVR but overall improved since addition of amiodarone drip.  Rash is improving on face, trunk, but lower extremities still desquamating and weeping.  He was able to work well with physical therapy yesterday.  He denies shortness of breath, chest pain, abdominal pain, nausea, vomiting, fever, chills.    Oncology Treatment Plan:   OP NSCLC VINORELBINE WEEKLY    Medications:  Continuous Infusions:  Scheduled Meds:   albuterol-ipratropium  3 mL Nebulization Q4H WAKE    amiodarone  400 mg Oral BID    cefTRIAXone (ROCEPHIN) IVPB  1 g Intravenous Q24H    dronabinol  2.5 mg Oral BID    famotidine  20 mg Oral Daily    metoprolol succinate  200 mg Oral Daily    ondansetron  4 mg Intravenous Once    predniSONE  60 mg Oral Daily    triamcinolone acetonide 0.025%   Topical (Top) BID    triamcinolone acetonide 0.1%   Topical (Top) BID     PRN Meds:albuterol-ipratropium, alteplase, Dextrose 10% Bolus, Dextrose 10% Bolus, diphenhydrAMINE, glucagon (human recombinant), glucose, glucose, guaifenesin 100 mg/5 ml, heparin, porcine (PF), HYDROcodone-acetaminophen, ibuprofen, metoprolol, ondansetron, polyethylene glycol, ramelteon, sodium chloride 0.9%     Review of Systems   All other systems reviewed and are negative.    Objective:     Vital Signs (Most Recent):  Temp: 96.5 °F (35.8 °C) (08/01/19 0751)  Pulse: 89 (08/01/19 1128)  Resp: 16 (08/01/19 1128)  BP: 105/65 (08/01/19 0839)  SpO2: 99 % (08/01/19 1128) Vital Signs (24h Range):  Temp:  [96.5 °F (35.8 °C)-97.5 °F (36.4 °C)] 96.5 °F (35.8 °C)  Pulse:  [] 89  Resp:  [16-20] 16  SpO2:  [86 %-100 %] 99 %  BP: ()/(51-80) 105/65     Weight: 71.4 kg (157 lb 6.5 oz)  Body mass index is 23.25 kg/m².  Body surface area is 1.86 meters squared.      Intake/Output Summary (Last 24 hours) at 8/1/2019 1214  Last data filed at 8/1/2019 1120  Gross per 24 hour   Intake 2336.69 ml    Output 250 ml   Net 2086.69 ml       Physical Exam   Constitutional: He is oriented to person, place, and time. He appears well-developed. No distress.   HENT:   Head: Normocephalic and atraumatic.   Eyes: Pupils are equal, round, and reactive to light. Conjunctivae are normal. No scleral icterus.   Neck: Normal range of motion. Neck supple. No JVD present.   Cardiovascular: Normal heart sounds and intact distal pulses.   Tachycardic in low 100s, irregularly irregular rhythm   Pulmonary/Chest: Effort normal. No respiratory distress. He has no wheezes. He has no rales.   Bilateral decreased breath sounds   Abdominal: Soft. Bowel sounds are normal. He exhibits no distension. There is no tenderness.   Musculoskeletal: He exhibits no edema (bilateral lower extremity pitting edema).   Neurological: He is alert and oriented to person, place, and time.   Skin: Skin is warm and dry. Rash (On the trunk is confluent erythema with fewer bullae and less weeping compared to prior exam, lower extremities wrapped in ) noted.       Significant Labs:   CBC:   Recent Labs   Lab 19  0423 19  0522   WBC 45.01* 40.45*   HGB 9.3* 9.8*   HCT 31.0* 32.9*    272    and CMP:   Recent Labs   Lab 19  0423 19  0522   * 125*   K 4.7 4.4   CL 92* 93*   CO2 20* 20*   * 144*   BUN 57* 60*   CREATININE 1.5* 1.4   CALCIUM 8.0* 8.3*   PROT 5.2* 5.3*   ALBUMIN 2.0* 2.1*   BILITOT 0.3 0.3   ALKPHOS 113 120   AST 16 15   ALT 10 12   ANIONGAP 11 12   EGFRNONAA 44.3* 48.1*     M.2  Phosph: 4.0  Corrected calcium: 9.8  Urine culture: 100,000 cfu Gram negative rods    Diagnostic Results:  None

## 2019-08-02 NOTE — PT/OT/SLP PROGRESS
Physical Therapy Treatment    Patient Name:  Cale Harding   MRN:  [id number]    Recommendations:     Discharge Recommendations:  home health PT   Discharge Equipment Recommendations: none   Barriers to discharge: None    Assessment:     Cale Harding is a 78 y.o. male admitted with a medical diagnosis of Mesothelioma of left lung.  He presents with the following impairments/functional limitations:  weakness, impaired endurance, impaired self care skills, gait instability, impaired balance, decreased lower extremity function, impaired cardiopulmonary response to activity .Pt tolerated treatment fairly well and is progressing slowly with mobility. Pt limited due to c/o of fatigue. Pt would continue to benefit from skilled PT to address overall functional mobility and goals. Goals remain appropriate      Rehab Prognosis: Good; patient would benefit from acute skilled PT services to address these deficits and reach maximum level of function.    Recent Surgery: Procedure(s) (LRB):  Pericardiocentesis (N/A) 15 Days Post-Op    Plan:     During this hospitalization, patient to be seen 2 x/week to address the identified rehab impairments via gait training, therapeutic activities, therapeutic exercises, neuromuscular re-education and progress toward the following goals:    · Plan of Care Expires:  08/14/19    Subjective     Patient/Family Comments/goals: I can't walk in the hallway, I get SOB  Pain/Comfort:  · Pain Rating 1: 0/10  · Pain Rating Post-Intervention 1: 0/10      Objective:     Communicated with RN prior to session.  Patient found supine with haider catheter, telemetry upon PT entry to room.     General Precautions: Standard, fall   Orthopedic Precautions:N/A   Braces: N/A     Functional Mobility:  · Bed Mobility:     · Supine to Sit: minimum assistance  · Transfers:     · Sit to Stand:  stand by assistance and contact guard assistance with rolling walker  Gait: pt ambulated with RW x 55 ft with  SBA/CGA for safety  Pt demo decreased idalia and decreased velocity of limb motion decreased B step, FFP and mild instability.vc's for upright posture, placement of AD, directional guidance and safety      AM-PAC 6 CLICK MOBILITY  Turning over in bed (including adjusting bedclothes, sheets and blankets)?: 3  Sitting down on and standing up from a chair with arms (e.g., wheelchair, bedside commode, etc.): 3  Moving from lying on back to sitting on the side of the bed?: 2  Moving to and from a bed to a chair (including a wheelchair)?: 3  Need to walk in hospital room?: 3  Climbing 3-5 steps with a railing?: 2  Basic Mobility Total Score: 16       Therapeutic Activities and Exercises:   educated patient on progress, safety,d/c,PT POC, on the effects of prolonged immobility and the importance of performing OOB activity and exercises to promote healing and reduce recovery time   Updated white board with appropriate PT mobility information for medical team notification  Donned an extra gown and gripping socks  Pt encouraged to ambulate in hallways 3x/day with nursing or family assistance to improve endurance.   Pt safe to ambulate in hallway with RN or PCT assistance   Bedside table in front of patient and area set up for function, convenience, and safety. RN aware of patient's mobility needs and status. Questions/concerns addressed within PTA scope of practice; patient with no further questions. Time was provided for active listening, discussion of health disposition, and discussion of safe discharge. Pt?verbalized?agreement .      Patient left up in chair with all lines intact, call button in reach, nsg notified and spouse present..    GOALS:   Multidisciplinary Problems     Physical Therapy Goals        Problem: Physical Therapy Goal    Goal Priority Disciplines Outcome Goal Variances Interventions   Physical Therapy Goal     PT, PT/OT Ongoing (interventions implemented as appropriate)     Description:  Goals to be  met by: 19     Patient will increase functional independence with mobility by performin. Supine to sit with Contact Guard Assistance.  2. Sit to supine with Contact Guard Assistance.  3. Sit to stand transfer with Supervision.  4. Bed to chair transfer with Stand-by Assistance using Rolling Walker.  5. Gait  x 50 feet with Stand-by Assistance using Rolling Walker.   6. Ascend/Descend 6 inch curb step with Contact Guard Assistance using Rolling Walker.  7. Lower extremity exercise program x 20 reps per handout, with assistance as needed.                       Time Tracking:     PT Received On: 19  PT Start Time: 1044     PT Stop Time: 1108  PT Total Time (min): 24 min     Billable Minutes: Gait Training 13 and Therapeutic Activity 11    Treatment Type: Treatment  PT/PTA: PTA     PTA Visit Number: 4     Sreedhar Reveles PTA  2019

## 2019-08-02 NOTE — PROGRESS NOTES
"[facility]  Adult Nutrition  Progress Note    SUMMARY       Recommendations    Recommendation:   1. Continue current Regular diet + Boost Plus ONS.   -Encourage intake, meals from home  2. Continue appetite stimulant    3. RD to monitor & follow-up.    Goals: PO intake >50%  Nutrition Goal Status: progressing towards goal  Communication of RD Recs: reviewed with RN    Reason for Assessment    Reason For Assessment: RD follow-up  Diagnosis: other (see comments)(Pericardial effusion)  Relevant Medical History: Ca, HTN  Interdisciplinary Rounds: did not attend  General Information Comments: Spoke with pt and wife, reported fair appetite. PO ~25% of meals depending on food served. Receiving outside food from home to increase intake. Pt drinking Boost plus TID to increase. Wt loss due to fluid retention, UBW ~180 lbs. Pt continues on appetite stimulant, increasing intake. Continue to encourage intake. NFPE previously completed, no malnutrition at this time. RD following.   Nutrition Discharge Planning: Adequate PO intake    Nutrition Risk Screen    Nutrition Risk Screen: no indicators present    Nutrition/Diet History    Patient Reported Diet/Restrictions/Preferences: general  Spiritual, Cultural Beliefs, Yarsani Practices, Values that Affect Care: no  Factors Affecting Nutritional Intake: decreased appetite    Anthropometrics    Temp: 97.5 °F (36.4 °C)  Height: 5' 9" (175.3 cm)  Height (inches): 69 in  Weight Method: Bed Scale  Weight: 71.4 kg (157 lb 6.5 oz)  Weight (lb): 157.41 lb  Ideal Body Weight (IBW), Male: 160 lb  % Ideal Body Weight, Male (lb): 113.75 lb  BMI (Calculated): 26.9  BMI Grade: 25 - 29.9 - overweight  Usual Body Weight (UBW), k.8 kg  % Usual Body Weight: 101.19  % Weight Change From Usual Weight: 0.98 %    Lab/Procedures/Meds    Pertinent Labs Reviewed: reviewed  Pertinent Labs Comments: Na 127; Glucose 124; Ca 8.4  Pertinent Medications Reviewed: reviewed  Pertinent " Medications Comments: amiodarone; dronabinol; famotidine; metoprolol; prednisone; triamcinolone     Estimated/Assessed Needs    Weight Used For Calorie Calculations: 71.4 kg (157 lb 6.5 oz)  Energy Calorie Requirements (kcal): 2142 kcal/day  Energy Need Method: Kcal/kg(30 kcal/kg)  Protein Requirements: 93 g/day (1.3 g/kg)  Weight Used For Protein Calculations: 71.4 kg (157 lb 6.5 oz)  Fluid Requirements (mL): 1 mL/kcal or per MD  Estimated Fluid Requirement Method: other (see comments)(Per MD or 1 mL/kcal)  RDA Method (mL): 2142    Nutrition Prescription Ordered    Current Diet Order: Regular  Oral Nutrition Supplement: Boost Plus ONS    Evaluation of Received Nutrient/Fluid Intake    Energy Calories Required: not meeting needs  Protein Required: not meeting needs  Fluid Required: meeting needs  Comments: LBM 7/31  Tolerance: tolerating  % Intake of Estimated Energy Needs: 50 - 75 %  % Meal Intake: 25 - 50 %    Nutrition Risk    Level of Risk/Frequency of Follow-up: (1x/week)     Assessment and Plan    Nutrition Problem  Inadequate energy intake     Related to (etiology):   Decreased appetite      Signs and Symptoms (as evidenced by):   PO intake 50%      Interventions (treatment strategy):  Collaboration of nutrition care w/ other providers      Nutrition Diagnosis Status:   Continues    Monitor and Evaluation    Food and Nutrient Intake: energy intake, food and beverage intake  Food and Nutrient Adminstration: diet order  Physical Activity and Function: nutrition-related ADLs and IADLs  Anthropometric Measurements: weight, weight change  Biochemical Data, Medical Tests and Procedures: inflammatory profile, lipid profile, glucose/endocrine profile, gastrointestinal profile, electrolyte and renal panel  Nutrition-Focused Physical Findings: overall appearance     Nutrition Follow-Up    RD Follow-up?: Yes

## 2019-08-02 NOTE — CONSULTS
[facility]  Cardiology  Consult Note    Patient Name: Cale Harding  MRN: [id number]  Admission Date: 7/17/2019  Hospital Length of Stay: 16 days  Code Status: Full Code   Attending Provider: Mignon North MD   Primary Care Physician: Jj Escobedo MD  Principal Problem:Mesothelioma of left lung    Patient information was obtained from patient and ER records.     Consults  Subjective:   Interval History:   Pt stable overnight. Rate reduced after resuming metoprolol yesterday morning with rates consistently in the 70s starting yesterday afternoon and normal sinus rhythm. Continuing on amiodarone. Feeling similar today as yesterday, still notices SOB and VALENCIA, but denies CP, LOC, dizziness, n/v/f/c.         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    Prostate cancer 2002    Urinary tract infection        Past Surgical History:   Procedure Laterality Date    BIOPSY-DIAPHRAGM N/A 3/19/2018    Performed by [facility] at [facility] OR [address] [place] [address]    NWLPNR-VNULET-SB N/A 2/1/2018    [facility]    BRONCHOSCOPY N/A 6/3/2019    [facility]    COLONOSCOPY  10/20/2012    COLONOSCOPY  11/19/2014    dr machado ( repeat in 3 years per the pt )    CYSTOSCOPY, WITH RETROGRADE PYELOGRAM Bilateral 7/15/2019    [facility]    CYSTOSCOPY, WITH URETHRAL DILATION Bilateral 7/15/2019    [facility] by [facility] at [facility] OR    ELBOW SURGERY Left 2013    dislocation    GMZXRKOXM-QKLK-X-CATH N/A 6/20/2019    Performed by [facility] at [facility] OR 2ND FLR    LAPAROSCOPY-DIAGNOSTIC N/A 3/19/2018    [facility]    Pericardiocentesis N/A 7/18/2019    Per[facility] at  [facility] CATH LAB    PROCTECTOMY  2002    RELEASE, CONTRACTURE, BLADDER NECK N/A 7/15/2019    Performed by [facility] Philippe MD at [facility] OR    SHOULDER ARTHROSCOPY W/ ROTATOR CUFF REPAIR Right     THORACOSCOPY-VIDEO ASSISTED (VATS) W/PLEURAL BIOPSY Left 3/19/2018    Per[facility] at [facility] OR [facility]ND [facility]LR    URETHROGRAM, RETROGRADE N/A 7/15/2019    Performed by Gal[facility]ino Philippe MD at [facility] OR       Review of patient's allergies indicates:   Allergen Reactions    Bactrim [sulfamethoxazole-trimethoprim] Other (See Comments)     Joint pains       No current facility-administered medications on file prior to encounter.      Current Outpatient Medications on File Prior to Encounter   Medication Sig    dextromethorphan-guaifenesin  mg (MUCINEX DM)  mg per 12 hr tablet Take 1 tablet by mouth every 12 (twelve) hours.    HYDROcodone-acetaminophen (NORCO) 5-325 mg per tablet Take 1 tablet by mouth every 6 (six) hours as needed for Pain.    polyethylene glycol (MIRALAX) 17 gram/dose powder Take 17 g by mouth once daily.     Family History     Problem Relation (Age of Onset)    Cancer Mother    Heart attack Sister, Sister    Heart disease Father, Brother, Sister, Brother, Sister, Brother    No Known Problems Son, Son, Son    Prostate cancer Brother, Brother, Brother        Tobacco Use    Smoking status: Former Smoker     Packs/day: 2.00     Years: 15.00     Pack years: 30.00     Types: Cigarettes     Last attempt to quit: 1970     Years since quittin.6    Smokeless tobacco: Former User    Tobacco comment: pt quit 40 years ago   Substance and Sexual Activity    Alcohol use: No     Alcohol/week: 16.8 oz     Types: 28 Cans of beer per week     Comment: None since Nov 15 th 2017    Drug use: No    Sexual activity: Not Currently     ROS   Constitution: Positive for decreased appetite. Negative for chills, diaphoresis and fever.   Eyes: Negative.    Cardiovascular: Positive for dyspnea on  exertion and leg swelling. Negative for chest pain, irregular heartbeat, near-syncope, palpitations and syncope.   Respiratory: Positive for shortness of breath and wheezing. Negative for cough and hemoptysis.    Endocrine: Negative.    Skin: Positive for itching and rash ( improving, scaling).   Gastrointestinal: Negative for abdominal pain, nausea and vomiting.   Genitourinary: Negative for hematuria.   Neurological: Negative for light-headedness.   Psychiatric/Behavioral: Negative for altered mental status.   Objective:     Vital Signs (Most Recent):  Temp: 97.8 °F (36.6 °C) (08/02/19 1127)  Pulse: 72 (08/02/19 1254)  Resp: 17 (08/02/19 1127)  BP: 108/61 (08/02/19 1254)  SpO2: 98 % (08/02/19 1127) Vital Signs (24h Range):  Temp:  [97.4 °F (36.3 °C)-97.9 °F (36.6 °C)] 97.8 °F (36.6 °C)  Pulse:  [] 72  Resp:  [16-20] 17  SpO2:  [95 %-99 %] 98 %  BP: (100-119)/(55-70) 108/61     Weight: 71.4 kg (157 lb 6.5 oz)  Body mass index is 23.25 kg/m².    SpO2: 98 %  O2 Device (Oxygen Therapy): room air      Intake/Output Summary (Last 24 hours) at 8/2/2019 1357  Last data filed at 8/2/2019 1200  Gross per 24 hour   Intake 1770 ml   Output 625 ml   Net 1145 ml       Lines/Drains/Airways     Central Venous Catheter Line                 Port A Cath Single Lumen 06/20/19 right internal jugular 43 days          Drain                 Chest Tube Right Pleural -- days         Urethral Catheter 07/17/19 0920 Non-latex 16 Fr. 16 days                Physical Exam  Constitutional: He is oriented to person, place, and time. No distress.   HENT:   Head: Normocephalic and atraumatic.   Neck: JVD ( above the mandible) present.   Cardiovascular: Normal rate, regular rhythm, normal heart sounds and intact distal pulses. Exam reveals no gallop and no friction rub.   No murmur heard.  Pulmonary/Chest: Effort normal. No respiratory distress. He has wheezes. He has no rales.   Abdominal: Soft. There is no tenderness.   Musculoskeletal: He  exhibits edema ( 1+ pitting b/l lower limb ).   Neurological: He is alert and oriented to person, place, and time.   Skin: Rash ( resolving but still erythematous on face and upper body with scaling, peeling and bullae noted on the lower limbs) noted. He is not diaphoretic.   Psychiatric: He has a normal mood and affect. His behavior is normal. Judgment and thought content normal.   Vitals reviewed.    Significant Labs: All pertinent lab results from the last 24 hours have been reviewed.        Assessment and Plan:     Afib  - Currently NSR.  - Last evidence of Hematuria on 7/25  - Tolerating amiodarone 400 mg BID and metoprolol 100 mg XR, will continue.  - CHADSVASC - 3  - HASBLED - 1  - Discussed anticoagulation extensively with primary team, they believe the risk of bleeding outweighs the benefit of stroke reduction in this patient given his current situation.  - Poor long term prognosis without AC discussed with the patient/Family.     Pericardial Effusion  - last echo 7/26 sowed recurrent stable pericardial effusion (from 7/21) without tamponade  - Volume overloaded on exam with JVD above the mandible, hemodynamically stable, no concern for Cardiac tamponade. repeat echo on 8/5 if in hospital or earlier if clinical status changes.      VTE Risk Mitigation (From admission, onward)        Ordered     heparin, porcine (PF) 100 unit/mL injection flush 500 Units  Every 8 hours PRN      07/18/19 1712     IP VTE HIGH RISK PATIENT  Once      07/17/19 1930          Thank you for your consult. We will sign off. Please contact us if you have any additional questions.    Michael Soto MD  Internal Medicine Department, PGY-1  [facility]  [phone]

## 2019-08-02 NOTE — PROGRESS NOTES
[facility]  Hematology/Oncology  Progress Note    Patient Name: Cale Harding  Admission Date: 7/17/2019  Hospital Length of Stay: 16 days  Code Status: Full Code     Subjective:     HPI:  Mr. Cale Harding is a 78-year-old male with a past medical history of prostate cancer (s/p prostatectomy), mesothelioma (on chemotherapy), with recurrent pleural effusion  ( with a right PleurX) and hypertension who is a transfer for evaluation of pericardial effusion.    Patient was scheduled for elective cystography with Urology (Dr. Pihlippe) for dilation of bladder neck contracture.  On arrival to elective surgical suite, patient found to be tachycardic and heart rate was irregular.  EKG confirmed atrial fibrillation with RVR.  Surgery was cancelled and he was admitted to the ICU for a diltiazem drip. He converted to sinus but would go in and out of afib, was started on lopressor 25 BID and echo done showed moderate size of pericardial effusion with no tamponade physiology however increased in effusion from 7/5, cardiology in the OSH was planning for pericardiocentesis but requested transfer for possible window vs pericardiocentesis and was sent to [facility]    On arrival patient was hemodynamically stable  , NSR, converted in and out of afib. Cardiology was consulted for input on afib management in the setting of pericardial effusion and need for pericardiocentesis/ closer ccu monitoring.     Of note patient has a PleurX on the right that is possibly infected and he was supposed to get it removed this Friday by his pulmonologist Dr. Woodward     Hospital Course:   Admitted to hematology oncology on 7/17 for a. Fib with RVR.  The patient was found to have a possibly associated pericardial effusion we were concerned was contributing to a. Fib. CTS consulted for evaluation of pericardial effusion to evaluate need for pericardial window. Patient not strong candidate for pericardial window per CTS,  Interventional cardiology preformed paracentesis on hospital day 2 and drained 570cc from pericardial space. Patient seen by pulmonary on admission. Dr. Padilla familiar w/ patient and was able to drain 450 ccs from R sided pleurX cath.  A. Fib with RVR did not resolve following paracentesis and cardiology was consulted.  They recommended starting patient on heparin and diltiazem drip for rate control. Rate controlled and pt stepped down to PO diltiazem and lopressor but a.fib with AVR recurred and was then switched to Toprol XL 100mg.  Patient experienced improvement in shortness of breath and atrial fibrillation, but on hospital day 7 the patient developed a new rash first noticed on back and spread to chest and abdomen. Dermatology was consulted and felt this was likely drug rash from diltiazem and Cardiology was in agreement. Diltiazem discontinued and patient's Toprol XL was increased to 200mg daily for management of his atrial fibrillation.  He was started on oral prednisone taper and topical steroids for drug rash.  His rash has continued to get worse despite management and progressed caudally with increased blistering and desquamation.  Dermatology recommended starting him on prednisone 60mg daily x 5 days and topical steroids PRN for symptoms and patieint's rash has continued to improve.  TTE on 7/21 showed a reaccumulation of pericardial fluid, thoracic surgery consulted for potential pericardial window and deemed patient a poor candidate due to there likely being pericardial disease and rash on anterior chest, Interventional cardiology also felt patient was a poor candidate. Also repeat TTE on 7/26 showed stable pericardial effusion when compared to 7/26 and there was no tamponade physiology. Pulmonology continues to follow patient for bilateral malignant effusion and felt that there is nothing left to drain from left or right side. On 7/31 patient was found to have a urinary tract infection positive for  gram negative rods and he was started on Rocephin. He is unable to have haider removed due to bladder neck contracture per Urology.  On the same day, the patient missed his Toprol dose to low blood pressure and he went into a.fib with RVR again.  He was started on amiodarone drip with improvement in HR.  Cardiology recommended transitioning to PO amio + Toprol for rhythm and rate control respectively.    Interval History: No events overnight.  Rash continues to improve.  HR improved overnight since transition to oral amio. He denies shortness of breath, palpitations, nausea, vomiting, fever, chills, abdominal pain.    Oncology Treatment Plan:   OP NSCLC VINORELBINE WEEKLY    Medications:  Continuous Infusions:  Scheduled Meds:   albuterol-ipratropium  3 mL Nebulization Q4H WAKE    amiodarone  400 mg Oral BID    cefTRIAXone (ROCEPHIN) IVPB  1 g Intravenous Q24H    dronabinol  2.5 mg Oral BID    famotidine  20 mg Oral Daily    metoprolol succinate  100 mg Oral Daily    ondansetron  4 mg Intravenous Once    predniSONE  60 mg Oral Daily    triamcinolone acetonide 0.025%   Topical (Top) BID    triamcinolone acetonide 0.1%   Topical (Top) BID     PRN Meds:albuterol-ipratropium, alteplase, Dextrose 10% Bolus, Dextrose 10% Bolus, diphenhydrAMINE, glucagon (human recombinant), glucose, glucose, guaifenesin 100 mg/5 ml, heparin, porcine (PF), HYDROcodone-acetaminophen, ibuprofen, metoprolol, ondansetron, polyethylene glycol, ramelteon, sodium chloride 0.9%     Review of Systems   All other systems reviewed and are negative.    Objective:     Vital Signs (Most Recent):  Temp: 97.4 °F (36.3 °C) (08/02/19 1512)  Pulse: 70 (08/02/19 1512)  Resp: 18 (08/02/19 1512)  BP: 108/62 (08/02/19 1512)  SpO2: 95 % (08/02/19 1512) Vital Signs (24h Range):  Temp:  [97.4 °F (36.3 °C)-97.9 °F (36.6 °C)] 97.4 °F (36.3 °C)  Pulse:  [] 70  Resp:  [16-18] 18  SpO2:  [95 %-98 %] 95 %  BP: (100-119)/(55-70) 108/62     Weight: 71.4 kg  (157 lb 6.5 oz)  Body mass index is 23.25 kg/m².  Body surface area is 1.86 meters squared.      Intake/Output Summary (Last 24 hours) at 2019 1559  Last data filed at 2019 1200  Gross per 24 hour   Intake 1770 ml   Output 625 ml   Net 1145 ml       Physical Exam   Constitutional: He is oriented to person, place, and time. He appears well-developed.   HENT:   Head: Normocephalic and atraumatic.   Eyes: Pupils are equal, round, and reactive to light. Conjunctivae are normal. No scleral icterus.   Neck: Normal range of motion. Neck supple. No JVD present.   Cardiovascular: Normal heart sounds and intact distal pulses. Exam reveals no gallop and no friction rub.   No murmur heard.  Normal rate, irregularly irregular but improved    Pulmonary/Chest: Effort normal and breath sounds normal. No respiratory distress. He has no wheezes. He has no rales.   Abdominal: Soft. Bowel sounds are normal. He exhibits no distension. There is no tenderness.   Musculoskeletal: He exhibits edema (2+ lower extremity edema).   Neurological: He is alert and oriented to person, place, and time.   Skin: Skin is warm and dry.   Rash improved on face and neck, on the trunk is confluent erythema less compared to prior exam with fewer bullae and less weeping, lower extremities wrapped        Significant Labs:   CBC:   Recent Labs   Lab 199   WBC 40.45* 41.44*   HGB 9.8* 9.8*   HCT 32.9* 32.8*    256    and CMP:   Recent Labs   Lab 19  0459   * 127*   K 4.4 4.9   CL 93* 95   CO2 20* 21*   * 124*   BUN 60* 67*   CREATININE 1.4 1.4   CALCIUM 8.3* 8.4*   PROT 5.3* 5.4*   ALBUMIN 2.1* 2.1*   BILITOT 0.3 0.3   ALKPHOS 120 118   AST 15 16   ALT 12 15   ANIONGAP 12 11   EGFRNONAA 48.1* 48.1*   Phosph: 4.0  M.1  Urine culture: Klebsiella pneumonia     Diagnostic Results:  None    Assessment/Plan:   Heme/Onc  * Mesothelioma of left lung  Patient of Dr. Foster on palliative chemo  with Gemzar with plans to switch to Vinorelbine due to disease progression.  PET CT 4/2019 with increased uptake within the enlarging 1.4 cm left upper lobe pulmonary nodule, mediastinal lymph nodes, and left pleura when compared to PET 2/2019.  -pain currently controlled with current regimen: Norco 5-325 mg q6 PRN  -will need OP follow up with Dr. Foster.    -Hospice discussions to be taken place as outpatient Dr. Foster    Leukemoid reaction  Likely multifactorial ddx includes leukemoid reaction from malignancy and/or AGEP rash.  Also patient with new CAUTI which is likely contributing.  Overall WBC downtrending  -Monitor with daily CBC    Decrease in appetite  Patient w/ reported hx of poor appetite since beginning chemotherapy    Plan:  - Started dronabinol 2.5 mg BID  -Continue Ensures with every meal    History of prostate cancer  S/p prostectomy.  Haider in place for urinary retention 2/2 bladder contracture.  Urology recommends keeping the haider catheter in place.    Cardiovascular  Pericardial effusion without cardiac tamponade  [place] [facility] [place] for evaluation by cardiology/CTS for pericardial window or pericardiocentesis on 7/17/19. Pericardiocentesis was preformed by interventional cards, pt tolerated procedure well, produced 570cc serosanguinous fluid on 7/18. Repeat echo on 7/21 with recurrence of effusion and echo on 7/26 with stabilization of effusion and without tamponade. CTS consulted to evaluate patient for pericardial window but feel he is a poor candidate given likelihood of disease in pericardium.  Interventional cardiology also feel this patient is poor candidate for a drain as there is no safe place to place pericardiocentesis needle.      Plan:   -Will hold off on further management or consults at the moment as effusion is stable and without tamponade physiology  -Per cardiology consider repeat echo to monitor progression    Atrial fibrillation with RVR  First noted on ECG  7/15/19 following pericardiocentesis. History of paroxysmal atrial fibrillation from past. Ddx includes 2/2 pericardial effusion vs. Underlying malignancy. Earlier during admission patient previously not controlled with lower doses of Toprol, lopressor 50mg BID, and had drug rash with diltiazem). CHADsVASC 3 suggestive of 3.2% annual stroke risk. Not currently on heparin or lovenox due to significant hematuria experienced earlier on admission and generally patient is not a great anticoagulation candidate. Now with recurrent a. Fib with RVR after not receiving toprol dose yesterday.  Started on amiodarone gtt yesterday PM with improvement in rate. And successfully transitioned to PO amio.  However patient not tolerating Toprol 200mg due to low blood pressures.    Plan:  - Appreciate cardiology recommendations  -continue amiodarone 400mg BID for rhythm control.  Decrease Toprol XL to 100mg daily and monitor pressures with new dose.       -Continue telemetry and monitor for rate control   -TEDs and SCDs in place for DVT prophylaxis   -Patient with 3% stroke risk however not a good candidate for AC given bleeding on lovenox, frailty, and grave prognosis related to malignancy    Acute on chronic diastolic heart failure  Patient with symptoms of SOB and diffuse swelling on admission who is not on lasix at home with an admission BNP of 374. Echo 7/17/2019 with Grade I LV diastolic dysfunction.  Patient has been diuresing well with improvements in shortness of breath.  Still with Pleural effusions L>R 2/2 malignancy vs overload.  Patient approaching dry weight with significant increase in BUN/Cr.  Plan:  - hold lasix as patient not requiring  -Strict I/O with Daily Standing weights  -keep Mg >2 and K>4  -Fluid restriction <1500cc/day    Essential hypertension  Currently holding all hypertensives.  Patient has been normotensive/llow since admission.  Was previously on enalapril at home.   -discuss with cards before  d/c    Pulmonary  SOB (shortness of breath) on exertion  Patient [place] [facility] [place]/ Pericardial effusion and a hx of recurrent pleural effusions (newly diagnosed mesothelioma in 2019). R sided PleurX catheter in place, patient of Dr. Padilla (Pulm) with 450cc drained on admission. CXR during admission have shown BL pleural effusion w/ areas of pulmonary infiltrates L>R.  Continues to improve and satting well on 1L NC. Lung ultrasound by pulmonology/Dr. Padilla reveals no significant amount of fluid to drain.   Plan:  -Hold lasix given significant volume depletion since admission.  No significant signs of fluid overload.  -consider repeat BNP and/or echo should his fluid status continue to be in question   - Continue duo nebs Q4h while awake    Malignant pleural effusion  Has R sided pleurX, associated Shortness of breath; 400 cc removed 7/30.  Patient currently asymptomatic with improving SOB.    Renal/electrolytes  SKINNY (acute kidney injury)  Patient's baseline Cr is 0.7-0.8 now has been steadily climbing since admission.  Suspect pre-renal in nature due to patient's tenuous fluid status-could be cardiogenic vs volume depletion.  US negative for obstruction.  Intra-renal causes not excluded.  Creatinine stable at 1.4.    -daily BMP  -avoid nephrotoxic agents    Hyponatremia  Suspect SIADH.  Sodium has been stable over last few days and patient is asymptomatic. Improved to 127 from 125 today.    Plan:  -Continue fluid restriction     Skin  Rash, drug  Drug rash thought to be result of diltiazem treatment.  Diltiazem stopped and rash continues to improve on face and trunk, now desquamating on lower     Plan:   -continue topical TAC 0.025% BID to sensitive areas including face and groin and TAC 0.1% BID  to chest, abdomen, face   - Continue PO steroid taper-day 4 of 60mg.  Per dermatology, taper by 10mg every 3 days thereafter  -D/C home with recommendations to continue topical steroids twice daily and vaseline topically to  areas of peeling  -Biopsy results : Per dermatopathologist Dr. Meade, perivascular lymphocytic infiltrate with neutrophils. Also, subcorneal pustules can be seen which would be consistent with AGEP  -Appreciate dermatology recommendations    ID  Catheter-associated urinary tract infection  Urine culture with 100,000 cfu gram negative rods. Elevated white count. Asymptomatic otherwise without abdominal pain. Urology recommends leaving catheter in place at this time due to contracture of bladder neck.  Urine culture now growing Klebsiella pneumonia sensitive to Ertapenem, Amikacin, Meropenem, Zosyn, and Levoquin    Plan:  -Discontinue Rocephin and start Levaquin 750mg daily PO for 7 day course    Urology  Acquired contracture of bladder neck  Urology consulted in the OSH. S/p Bladder neck contracture release and urethral dilation on 7/15/19. Patient of Dr. Philippe.    Plan:  - Haider in place  - Cleared for d/c from urology perspective, to f/u w/ Dr. Philippe  - Increased leak noted from penis around haider, most likely due to lasix and anasarca    GI  Gastroesophageal reflux disease  -Continue PPI daily                  Justin Blackwell MD  Hematology/Oncology  [facility]

## 2019-08-02 NOTE — ASSESSMENT & PLAN NOTE
Has R sided pleurX, associated Shortness of breath; 400 cc removed 7/30.  Patient currently asymptomatic with improving SOB.

## 2019-08-02 NOTE — ASSESSMENT & PLAN NOTE
Suspect SIADH.  Sodium has been stable over last few days and patient is asymptomatic. Improved to 127 from 125 today.    Plan:  -Continue fluid restriction

## 2019-08-02 NOTE — ASSESSMENT & PLAN NOTE
Likely multifactorial ddx includes leukemoid reaction from malignancy and/or AGEP rash.  Also patient with new CAUTI which is likely contributing.  Overall WBC downtrending  -Monitor with daily CBC

## 2019-08-02 NOTE — PROGRESS NOTES
Received report from my coworker Ciera Pickens [facility], following her rounding with the Medical Oncology team - patient may be discharged over the weekend and updated home health orders requested. Called [facility], [phone], and spoke with Gabriele to inform him of possible weekend discharge. He confirmed that patient is listed in their system. Pending admission for home health. Sent current chart notes via [facility] to [employer]. Will continue to follow.

## 2019-08-02 NOTE — SUBJECTIVE & OBJECTIVE
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    Prostate cancer 2002    Urinary tract infection        Past Surgical History:   Procedure Laterality Date    BIOPSY-DIAPHRAGM N/A 3/19/2018    Performed by Galdino Fang MD at [facility] OR [address] [place] [address]    [id number] N/A 2/1/2018    Performed by [facility] Diagnostic Provider at [facility] OR [address] [place] [address]    BRONCHOSCOPY N/A 6/3/2019    Performed by [facility] Diagnostic Provider at [facility] OR [address] [place] [address]    COLONOSCOPY  10/20/2012    COLONOSCOPY  11/19/2014    dr machado ( repeat in 3 years per the pt )    CYSTOSCOPY, WITH RETROGRADE PYELOGRAM Bilateral 7/15/2019    Performed by Galdino Philippe MD at [facility] OR    CYSTOSCOPY, WITH URETHRAL DILATION Bilateral 7/15/2019    Performed by Galdino Philippe MD at [facility] OR    ELBOW SURGERY Left 2013    dislocation    [id number] N/A 6/20/2019    Performed by [facility] Diagnostic Provider at [facility] OR [address] [place] [address]    LAPAROSCOPY-DIAGNOSTIC N/A 3/19/2018    [facility]    Pericardiocentesis N/A 7/18/2019    Performed by Frank Javier MD at [facility] CATH LAB    PROCTECTOMY  2002    RELEASE, CONTRACTURE, BLADDER NECK N/A 7/15/2019    Performed by Galdino Philippe MD at [facility] OR    SHOULDER ARTHROSCOPY W/ ROTATOR CUFF REPAIR Right 2008    THORACOSCOPY-VIDEO ASSISTED (VATS) W/PLEURAL BIOPSY Left 3/19/2018    Performed by Galdino Fang MD at [facility] OR [address] [place] [address]    URETHROGRAM, RETROGRADE N/A 7/15/2019    Performed by Galdino Philippe MD at [facility] OR       Review of patient's allergies indicates:   Allergen Reactions    Bactrim [sulfamethoxazole-trimethoprim] Other (See Comments)     Joint pains       No current facility-administered medications on file prior to encounter.      Current Outpatient Medications on File Prior to Encounter   Medication Sig     dextromethorphan-guaifenesin  mg (MUCINEX DM)  mg per 12 hr tablet Take 1 tablet by mouth every 12 (twelve) hours.    HYDROcodone-acetaminophen (NORCO) 5-325 mg per tablet Take 1 tablet by mouth every 6 (six) hours as needed for Pain.    polyethylene glycol (MIRALAX) 17 gram/dose powder Take 17 g by mouth once daily.     Family History     Problem Relation (Age of Onset)    Cancer Mother    Heart attack Sister, Sister    Heart disease Father, Brother, Sister, Brother, Sister, Brother    No Known Problems Son, Son, Son    Prostate cancer Brother, Brother, Brother        Tobacco Use    Smoking status: Former Smoker     Packs/day: 2.00     Years: 15.00     Pack years: 30.00     Types: Cigarettes     Last attempt to quit: 1970     Years since quittin.6    Smokeless tobacco: Former User    Tobacco comment: pt quit 40 years ago   Substance and Sexual Activity    Alcohol use: No     Alcohol/week: 16.8 oz     Types: 28 Cans of beer per week     Comment: None since Nov 15 th 2017    Drug use: No    Sexual activity: Not Currently     ROS   Constitution: Positive for decreased appetite. Negative for chills, diaphoresis and fever.   Eyes: Negative.    Cardiovascular: Positive for dyspnea on exertion and leg swelling. Negative for chest pain, irregular heartbeat, near-syncope, palpitations and syncope.   Respiratory: Positive for shortness of breath and wheezing. Negative for cough and hemoptysis.    Endocrine: Negative.    Skin: Positive for itching and rash ( improving, scaling).   Gastrointestinal: Negative for abdominal pain, nausea and vomiting.   Genitourinary: Negative for hematuria.   Neurological: Negative for light-headedness.   Psychiatric/Behavioral: Negative for altered mental status.   Objective:     Vital Signs (Most Recent):  Temp: 97.8 °F (36.6 °C) (19 112)  Pulse: 72 (19 1254)  Resp: 17 (19 112)  BP: 108/61 (19 1254)  SpO2: 98 % (19) Vital Signs (24h  Range):  Temp:  [97.4 °F (36.3 °C)-97.9 °F (36.6 °C)] 97.8 °F (36.6 °C)  Pulse:  [] 72  Resp:  [16-20] 17  SpO2:  [95 %-99 %] 98 %  BP: (100-119)/(55-70) 108/61     Weight: 71.4 kg (157 lb 6.5 oz)  Body mass index is 23.25 kg/m².    SpO2: 98 %  O2 Device (Oxygen Therapy): room air      Intake/Output Summary (Last 24 hours) at 8/2/2019 1357  Last data filed at 8/2/2019 1200  Gross per 24 hour   Intake 1770 ml   Output 625 ml   Net 1145 ml       Lines/Drains/Airways     Central Venous Catheter Line                 Port A Cath Single Lumen 06/20/19 right internal jugular 43 days          Drain                 Chest Tube Right Pleural -- days         Urethral Catheter 07/17/19 0920 Non-latex 16 Fr. 16 days                Physical Exam  Constitutional: He is oriented to person, place, and time. No distress.   HENT:   Head: Normocephalic and atraumatic.   Neck: JVD ( above the mandible) present.   Cardiovascular: Normal rate, regular rhythm, normal heart sounds and intact distal pulses. Exam reveals no gallop and no friction rub.   No murmur heard.  Pulmonary/Chest: Effort normal. No respiratory distress. He has wheezes. He has no rales.   Abdominal: Soft. There is no tenderness.   Musculoskeletal: He exhibits edema ( 1+ pitting b/l lower limb ).   Neurological: He is alert and oriented to person, place, and time.   Skin: Rash ( resolving but still erythematous on face and upper body with scaling, peeling and bullae noted on the lower limbs) noted. He is not diaphoretic.   Psychiatric: He has a normal mood and affect. His behavior is normal. Judgment and thought content normal.   Vitals reviewed.    Significant Labs: All pertinent lab results from the last 24 hours have been reviewed.

## 2019-08-02 NOTE — ASSESSMENT & PLAN NOTE
Patient with symptoms of SOB and diffuse swelling on admission who is not on lasix at home with an admission BNP of 374. Echo 7/17/2019 with Grade I LV diastolic dysfunction.  Patient has been diuresing well with improvements in shortness of breath.  Still with Pleural effusions L>R 2/2 malignancy vs overload.  Patient approaching dry weight with significant increase in BUN/Cr.  Plan:  - hold lasix as patient not requiring  -Strict I/O with Daily Standing weights  -keep Mg >2 and K>4  -Fluid restriction <1500cc/day

## 2019-08-02 NOTE — MEDICAL/APP STUDENT
[facility]  Cardiology  Progress Note    Patient Name: Cale Harding  MRN: [id number]  Admission Date: 7/17/2019  Hospital Length of Stay: 16 days  Code Status: Full Code   Attending Physician: Mignon North MD   Primary Care Physician: Jj Escobedo MD  Expected Discharge Date: 8/5/2019  Principal Problem:Mesothelioma of left lung    Subjective:     Interval History: Pt stable overnight. Rate reduced after resuming metoprolol yesterday morning with rates consistently in the 70s starting yesterday afternoon and normal sinus rhythm. Continuing on amiodarone. Feeling similar today as yesterday, still notices SOB and VALENCIA, but denies CP, LOC, dizziness, n/v/f/c.     Review of Systems   Constitution: Positive for decreased appetite. Negative for chills, diaphoresis and fever.   Eyes: Negative.    Cardiovascular: Positive for dyspnea on exertion and leg swelling. Negative for chest pain, irregular heartbeat, near-syncope, palpitations and syncope.   Respiratory: Positive for shortness of breath and wheezing. Negative for cough and hemoptysis.    Endocrine: Negative.    Skin: Positive for itching and rash ( improving, scaling).   Gastrointestinal: Negative for abdominal pain, nausea and vomiting.   Genitourinary: Negative for hematuria.   Neurological: Negative for light-headedness.   Psychiatric/Behavioral: Negative for altered mental status.     Objective:     Vital Signs (Most Recent):  Temp: 97.5 °F (36.4 °C) (08/02/19 0901)  Pulse: 70 (08/02/19 1043)  Resp: 18 (08/02/19 0901)  BP: 100/61 (08/02/19 1043)  SpO2: 97 % (08/02/19 0901) Vital Signs (24h Range):  Temp:  [97 °F (36.1 °C)-97.9 °F (36.6 °C)] 97.5 °F (36.4 °C)  Pulse:  [] 70  Resp:  [16-20] 18  SpO2:  [95 %-99 %] 97 %  BP: (100-119)/(55-70) 100/61     Weight: 71.4 kg (157 lb 6.5 oz)  Body mass index is 23.25 kg/m².    SpO2: 97 %  O2 Device (Oxygen Therapy): room air      Intake/Output Summary (Last 24 hours) at 8/2/2019  1044  Last data filed at 8/2/2019 0500  Gross per 24 hour   Intake 1588.25 ml   Output 1000 ml   Net 588.25 ml       Lines/Drains/Airways     Central Venous Catheter Line                 Port A Cath Single Lumen 06/20/19 right internal jugular 43 days          Drain                 Chest Tube Right Pleural -- days         Urethral Catheter 07/17/19 0920 Non-latex 16 Fr. 16 days                Physical Exam   Constitutional: He is oriented to person, place, and time. No distress.   HENT:   Head: Normocephalic and atraumatic.   Neck: JVD ( above the mandible) present.   Cardiovascular: Normal rate, regular rhythm, normal heart sounds and intact distal pulses. Exam reveals no gallop and no friction rub.   No murmur heard.  Pulmonary/Chest: Effort normal. No respiratory distress. He has wheezes. He has no rales.   Abdominal: Soft. There is no tenderness.   Musculoskeletal: He exhibits edema ( 1+ pitting b/l lower limb ).   Neurological: He is alert and oriented to person, place, and time.   Skin: Rash ( resolving but still erythematous on face and upper body with scaling, peeling and bullae noted on the lower limbs) noted. He is not diaphoretic.   Psychiatric: He has a normal mood and affect. His behavior is normal. Judgment and thought content normal.   Vitals reviewed.      Significant Labs:   CMP   Recent Labs   Lab 08/01/19 0522 08/02/19 0459   * 127*   K 4.4 4.9   CL 93* 95   CO2 20* 21*   * 124*   BUN 60* 67*   CREATININE 1.4 1.4   CALCIUM 8.3* 8.4*   PROT 5.3* 5.4*   ALBUMIN 2.1* 2.1*   BILITOT 0.3 0.3   ALKPHOS 120 118   AST 15 16   ALT 12 15   ANIONGAP 12 11   ESTGFRAFRICA 55.6* 55.6*   EGFRNONAA 48.1* 48.1*    and CBC   Recent Labs   Lab 08/01/19 0522 08/02/19  0459   WBC 40.45* 41.44*   HGB 9.8* 9.8*   HCT 32.9* 32.8*    256       Assessment and Plan:     Brief HPI: Pt is a 77 yo with paroxysmal Afib and malignant pericardial effusion (570cc drained on 7/18) in the setting of advanced  mesothelioma and recent urological surgery (7/15) with hematuria (resolved 7/25) 2/2 anticoagulation. Currently NSR on metoprolol and amiodarone, no anticoagulation.    Active Diagnoses:    Diagnosis Date Noted POA    PRINCIPAL PROBLEM:  Mesothelioma of left lung [C45.7] 02/15/2018 Yes    Catheter-associated urinary tract infection [T83.511A, N39.0] 08/01/2019 No    Hyponatremia [E87.1] 07/30/2019 No    SKINNY (acute kidney injury) [N17.9] 07/30/2019 No    Leukemoid reaction [D72.823] 07/29/2019 Unknown    Rash, drug [L27.0] 07/28/2019 Unknown    Decrease in appetite [R63.0] 07/26/2019 Yes    Compression of vein [I87.1]  Clinically Undetermined    SOB (shortness of breath) on exertion [R06.02] 07/25/2019 Unknown    Acute on chronic diastolic heart failure [I50.33] 07/18/2019 Unknown    Pericardial effusion without cardiac tamponade [I31.3] 07/15/2019 Yes    Essential hypertension [I10] 07/15/2019 Yes    Atrial fibrillation with RVR [I48.91] 07/15/2019 Yes    Acquired contracture of bladder neck [N32.0] 07/10/2019 Yes    Malignant pleural effusion [J91.0] 02/01/2018 Yes    Gastroesophageal reflux disease [K21.9] 10/11/2017 Yes    History of prostate cancer [Z85.46] 05/25/2016 Not Applicable      Problems Resolved During this Admission:    Diagnosis Date Noted Date Resolved POA    Drug rash [L27.0] 07/31/2019 07/31/2019 Unknown    Gross hematuria [R31.0] 07/21/2019 07/26/2019 No     Afib  - Currently NSR, last evidence of Hematuria on 7/25  - Tolerating amiodarone 400 mg BID and metoprolol 200 mg XR, will continue  - CHADSVASC - 3  - HASBLED - 1  - Discussed anticoagulation extensively with primary team, they believe the risk of bleeding outweighs the benefit of stroke reduction in this patient given his current situation    Pericardial Effusion  - last echo 7/26 sowed recurrent stable pericardial effusion (from 7/21) without tamponade  - repeat echo on 8/5 if in hospital or earlier if clinical status  changes  - Volume overloaded on exam with JVD above the mandible, consider diuresis if tolerable    VTE Risk Mitigation (From admission, onward)        Ordered     heparin, porcine (PF) 100 unit/mL injection flush 500 Units  Every 8 hours PRN      07/18/19 1712     IP VTE HIGH RISK PATIENT  Once      07/17/19 1930        Will sign off at this point. Thank you for the consult.    Servando Dueñas  Cardiology  [facility]

## 2019-08-02 NOTE — PLAN OF CARE
[facility]    HOME HEALTH ORDERS  FACE TO FACE ENCOUNTER    Patient Name: Cale Harding  YOB: 1940    PCP: Jj Escobedo MD   PCP Address: [address] / [place] [address]  PCP Phone Number: [phone]  PCP Fax: [phone]    Encounter Date: 08/02/2019    Admit to Home Health    Diagnoses:  Active Hospital Problems    Diagnosis  POA    *Mesothelioma of left lung [C45.7]  Yes     Priority: 1 - High    SOB (shortness of breath) on exertion [R06.02]  Unknown     Priority: 2      Patient w/ SOB on exertion. Pericardial effusion drained. 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 Dermatology was consulted and is in agreement. They preformed a biopsy, path pending.      Atrial fibrillation with RVR [I48.91]  Yes     Priority: 5     Catheter-associated urinary tract infection [T83.511A, N39.0]  No     Priority: 6     Hyponatremia [E87.1]  No    Leukemoid reaction [D72.823]  Unknown    Decrease in appetite [R63.0]  Yes     Patient reports a decrease in his appetite since beginning chemotherapy.      Compression of vein [I87.1]  Clinically Undetermined    Acute on chronic diastolic heart failure [I50.33]  Unknown    Essential hypertension [I10]  Yes    Acquired contracture of bladder neck [N32.0]  Yes    Gastroesophageal reflux disease [K21.9]  Yes    History of prostate cancer [Z85.46]  Not Applicable      Resolved Hospital Problems    Diagnosis Date Resolved POA    Drug rash [L27.0] 07/31/2019 Unknown    Gross hematuria [R31.0] 07/26/2019 No       Future Appointments   Date Time Provider Department  [place]   8/7/2019 10:30 AM LAB, [facility] RVPH LAB [facility]   8/8/2019  9:30 AM Sintia Foster MD [facility] HEM ONC [place]   12/20/2019  3:00 PM Galdino Philippe MD [facility] URO [place]     Follow-up Information     Sintia Foster MD.    Specialties:  Hematology and Oncology, Hematology  Why:  Follow-Up Appointment as scheduled by the clinic  Contact information:  [address]  [facility] [address]  [phone]             Schedule an appointment as soon as possible for a visit with Galdino Philippe MD.    Specialty:  Urology  Why:  follow up TURBNC, with hematuria   Contact information:  [address]  [address]  [facility] [id number] [phone]                     I have seen and examined this patient face to face today. My clinical findings that support the need for the home health skilled services and home bound status are the following:  Weakness/numbness causing balance and gait disturbance due to Weakness/Debility making it taxing to leave home.  Requiring assistive device to leave home due to unsteady gait caused by  Weakness/Debility and Malignancy/Cancer.    Allergies:  Review of patient's allergies indicates:   Allergen Reactions    Bactrim [sulfamethoxazole-trimethoprim] Other (See Comments)     Joint pains       Diet: regular diet    Activities: activity as tolerated    Nursing:   SN to complete comprehensive assessment including routine vital signs. Instruct on disease process and s/s of complications to report to MD. Review/verify medication list sent home with the patient at time of discharge  and instruct patient/caregiver as needed. Frequency may be adjusted depending on start of care date.    Notify MD if SBP > 160 or < 90; DBP > 90 or < 50; HR > 120 or < 50; Temp > 101; Other:   N/A      CONSULTS:    Physical Therapy to evaluate and treat. Evaluate for home safety and equipment needs; Establish/upgrade home exercise program. Perform / instruct on therapeutic exercises, gait training,  transfer training, and Range of Motion.  Occupational Therapy to evaluate and treat. Evaluate home environment for safety and equipment needs. Perform/Instruct on transfers, ADL training, ROM, and therapeutic exercises.    MISCELLANEOUS CARE:  Cano Care: Instruct patient/caregiver to empty Cano bag.  Change Cano every month.      Medications: Review discharge medications with patient and family and provide education.      Current Discharge Medication List      START taking these medications    Details   albuterol-ipratropium (DUO-NEB) 2.5 mg-0.5 mg/3 mL nebulizer solution Inhale 3 ml's (1 vial) by nebulization every 6 (six) hours as needed for Wheezing or Shortness of Breath. Rescue  Qty: 180 mL, Refills: 0      amiodarone (PACERONE) 400 MG tablet Take 1 tablet (400 mg total) by mouth 2 (two) times daily.  Qty: 60 tablet, Refills: 11      dronabinol (MARINOL) 2.5 MG capsule Take 1 capsule (2.5 mg total) by mouth 2 (two) times daily.  Qty: 60 capsule, Refills: 0    Associated Diagnoses: Mesothelioma of left lung      famotidine (PEPCID) 20 MG tablet Take 1 tablet (20 mg total) by mouth once daily.  Qty: 30 tablet, Refills: 11      ondansetron (ZOFRAN-ODT) 8 MG TbDL Dissolve 1 tablet (8 mg total) by mouth every 12 (twelve) hours as needed.  Qty: 60 tablet, Refills: 0      triamcinolone acetonide 0.025% (KENALOG) 0.025 % Oint Apply topically 2 (two) times daily as needed. For itching  Qty: 1 Tube, Refills: 1    Associated Diagnoses: Drug rash      triamcinolone acetonide 0.1% (KENALOG) 0.1 % cream Apply topically 2 (two) times daily as needed. For itching  Qty: 200 g, Refills: 1    Associated Diagnoses: Drug rash         CONTINUE these medications which have NOT CHANGED    Details   dextromethorphan-guaifenesin  mg (MUCINEX DM)  mg per 12 hr tablet Take 1 tablet by mouth every 12 (twelve) hours.      HYDROcodone-acetaminophen (NORCO) 5-325 mg per tablet Take 1 tablet by mouth every 6 (six) hours as  needed for Pain.  Qty: 60 tablet, Refills: 0    Associated Diagnoses: Neoplasm related pain      polyethylene glycol (MIRALAX) 17 gram/dose powder Take 17 g by mouth once daily.         STOP taking these medications       amoxicillin-clavulanate 875-125mg (AUGMENTIN) 875-125 mg per tablet Comments:   Reason for Stopping:         calcium carbonate (TUMS) 200 mg calcium (500 mg) chewable tablet Comments:   Reason for Stopping:         enalapril (VASOTEC) 5 MG tablet Comments:   Reason for Stopping:         ibuprofen (ADVIL,MOTRIN) 100 MG tablet Comments:   Reason for Stopping:         LACTOBACILLUS ACIDOPHILUS (ACIDOPHILUS ORAL) Comments:   Reason for Stopping:         phenylephrine HCl/acetaminophn (SINUS PAIN-PRESSURE, PE, ORAL) Comments:   Reason for Stopping:               I certify that this patient is confined to his home and needs intermittent skilled nursing care, physical therapy and occupational therapy.    Justin Blackwell MD PGY-1  Oncology

## 2019-08-02 NOTE — ASSESSMENT & PLAN NOTE
Drug rash thought to be result of diltiazem treatment.  Diltiazem stopped and rash continues to improve on face and trunk, now desquamating on lower     Plan:   -continue topical TAC 0.025% BID to sensitive areas including face and groin and TAC 0.1% BID  to chest, abdomen, face   - Continue PO steroid taper-day 4 of 60mg.  Per dermatology, taper by 10mg every 3 days thereafter  -D/C home with recommendations to continue topical steroids twice daily and vaseline topically to areas of peeling  -Biopsy results : Per dermatopathologist Dr. Meade, perivascular lymphocytic infiltrate with neutrophils. 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

## 2019-08-02 NOTE — ASSESSMENT & PLAN NOTE
First noted on ECG 7/15/19 following pericardiocentesis. History of paroxysmal atrial fibrillation from past. Ddx includes 2/2 pericardial effusion vs. Underlying malignancy. Earlier during admission patient previously not controlled with lower doses of Toprol, lopressor 50mg BID, and had drug rash with diltiazem). CHADsVASC 3 suggestive of 3.2% annual stroke risk. Not currently on heparin or lovenox due to significant hematuria experienced earlier on admission and generally patient is not a great anticoagulation candidate. Now with recurrent a. Fib with RVR after not receiving toprol dose yesterday.  Started on amiodarone gtt yesterday PM with improvement in rate. And successfully transitioned to PO amio.  However patient not tolerating Toprol 200mg due to low blood pressures.    Plan:  - Appreciate cardiology recommendations  -continue amiodarone 400mg BID for rhythm control.  Decrease Toprol XL to 100mg daily and monitor pressures with new dose.       -Continue telemetry and monitor for rate control   -TEDs and SCDs in place for DVT prophylaxis   -Patient with 3% stroke risk however not a good candidate for AC given bleeding on lovenox, frailty, and grave prognosis related to malignancy

## 2019-08-02 NOTE — ASSESSMENT & PLAN NOTE
Patient presented to [facility] w/ Pericardial effusion and a hx of recurrent pleural effusions (newly diagnosed mesothelioma in 2019). R sided PleurX catheter in place, patient of Dr. Padilla (Pulm) with 450cc drained on admission. CXR during admission have shown BL pleural effusion w/ areas of pulmonary infiltrates L>R.  Continues to improve and satting well on 1L NC. Lung ultrasound by pulmonology/Dr. Padilla reveals no significant amount of fluid to drain.   Plan:  -Hold lasix given significant volume depletion since admission.  No significant signs of fluid overload.  -consider repeat BNP and/or echo should his fluid status continue to be in question   - Continue duo nebs Q4h while awake

## 2019-08-02 NOTE — PLAN OF CARE
Problem: Adult Inpatient Plan of Care  Goal: Plan of Care Review  Outcome: Ongoing (interventions implemented as appropriate)  Recommendations    Recommendation:   1. Continue current Regular diet + Boost Plus ONS.   -Encourage intake, meals from home  2. Continue appetite stimulant    3. RD to monitor & follow-up.    Goals: PO intake >50%  Nutrition Goal Status: progressing towards goal  Communication of RD Recs: reviewed with RN

## 2019-08-02 NOTE — PROGRESS NOTES
Received call from the Resident that the home health orders were complete and signed. Derm isn't recommending any wound care for the rash. She stated that patient may be ready for discharge tomorrow, pending final urine cx & s report, and antibiotics needed. Sent additional PT and CAR notes from today and home health orders via [facility] to [facility], and noted that patient may be discharged tomorrow. Asked the Resident to notify my coworker Lala Vick LCSW, who is the Oncology Social Worker on call this weekend, of patient's definite discharge. Gave report to Lala.

## 2019-08-02 NOTE — PHYSICIAN QUERY
PT Name: Cale Harding  MR #: [id number]    Physician Query Form - Nutrition Clarification     CDS: Sheila Hart RN   Contact information:[email or web address]     This form is a permanent document in the medical record.     Query Date: 2019    By submitting this query, we are merely seeking further clarification of documentation.. Please utilize your independent clinical judgment when addressing the question(s) below.    The Medical record contains the following:   Indicators  Supporting Clinical Findings Location in Medical Record   x % of Estimated Energy Intake over a time frame from p.o., TF, or TPN pt reports fair to poor appetite x 6 months Nutrition PN     Weight Status over a time frame      Subcutaneous Fat and/or Muscle Loss      Fluid Accumulation or Edema      Reduced  Strength     x Wt / BMI / Usual Body Weight Weight (lb): 182.1 lb  Ideal Body Weight (IBW), Male: 160 lb  % Ideal Body Weight, Male (lb): 113.81 lb  BMI (Calculated): 26.9  BMI Grade: 25 - 29.9 - overweight  Usual Body Weight (UBW), k.8 kg  % Usual Body Weight: 101.19  % Weight Change From Usual Weight: 0.98 % Nutrition PN     Delayed Wound Healing / Failure to Thrive     x Acute or Chronic Illness 78-year-old male with a past medical history of prostate cancer (s/p prostatectomy), mesothelioma (on chemotherapy), with recurrent pleural effusion  ( with a right PleurX) and hypertension who is a transfer for evaluation of pericardial effusion. H&P    x Medication dronabinol capsule 2.5 mg  Ordered Dose: 2.5 mg   Route: Oral   Frequency: 2 times daily MAR- Given 8/1 x2   x Treatment Continue current Regular diet + Boost Plus ONS Nutrition POC    x Other Hypervolemia: likely both intravascular (elevated JVD on exam) in addition to extravascular (anasarca, hypoalbuminemia 2/2 malnutrition).     Decrease in appetite  Patient reports a decrease in his appetite since beginning chemotherapy.  Plan:  -  Patient noted to have poor appetite since admission, only eats small amounts of certain things  - notes that he feels full after eating small quantities of food  - Patient interested in MARINOL for appetite stimulation  - Cardiology consulted to ensure no drug interaction w/ rate control meds prior to beginning this treatment    Spoke with pt and wife, reported fair appetite. PO ~25% of meals depending on food served. Receiving outside food from home to increase intake. Pt drinking Boost plus TID to increase. Wt loss due to fluid retention, UBW ~180 lbs. Pt continues on appetite stimulant, increasing intake. Continue to encourage intake. NFPE previously completed, no malnutrition at this time. RD following. Cards Consult 7/26        Hem/Onc PN 7/26                      Nutrition Consult 8/2     AND / ASPEN Clinical Characteristics (October 2011)  A minimum of two characteristics is recommended for diagnosing either moderate or severe malnutrition   Mild Malnutrition Moderate Malnutrition Severe Malnutrition   Energy Intake from p.o., TF or TPN. < 75% intake of estimated energy needs for less than 7 days < 75% intake of estimated energy needs for greater than 7 days < 50% intake of estimated energy needs for > 5 days   Weight Loss 1-2% in 1 month  5% in 3 months  7.5% in 6 months  10% in 1 year 1-2 % in 1 week  5% in 1 month  7.5% in 3 months  10% in 6 months  20% in 1 year > 2% in 1 week  > 5% in 1 month  > 7.5% in 3 months  > 10% in 6 months  > 20% in 1 year   Physical Findings     None *Mild subcutaneous fat and/or muscle loss  *Mild fluid accumulation  *Stage II decubitus  *Surgical wound or non-healing wound *Mod/severe subcutaneous fat and/or muscle loss  *Mod/severe fluid accumulation  *Stage III or IV decubitus  *Non-healing surgical wound     Provider, please specify diagnosis or diagnoses associated with above clinical findings.    [  XX ] Mild Protein-Calorie Malnutrition   [  ] Other Nutritional Diagnosis  (please specify):    [  ] Malnutrition Ruled Out   [  ] Other:    [  ] Clinically Undetermined       Please document in your progress notes daily for the duration of treatment until resolved and include in your discharge summary.

## 2019-08-02 NOTE — ASSESSMENT & PLAN NOTE
Patient's baseline Cr is 0.7-0.8 now has been steadily climbing since admission.  Suspect pre-renal in nature due to patient's tenuous fluid status-could be cardiogenic vs volume depletion.  US negative for obstruction.  Intra-renal causes not excluded.  Creatinine stable at 1.4.    -daily BMP  -avoid nephrotoxic agents

## 2019-08-02 NOTE — ASSESSMENT & PLAN NOTE
Patient transferred to [facility] from OSH for evaluation by cardiology/CTS for pericardial window or pericardiocentesis on 7/17/19. Pericardiocentesis was preformed by interventional cards, pt tolerated procedure well, produced 570cc serosanguinous fluid on 7/18. Repeat echo on 7/21 with recurrence of effusion and echo on 7/26 with stabilization of effusion and without tamponade. CTS consulted to evaluate patient for pericardial window but feel he is a poor candidate given likelihood of disease in pericardium.  Interventional cardiology also feel this patient is poor candidate for a drain as there is no safe place to place pericardiocentesis needle.      Plan:   -Will hold off on further management or consults at the moment as effusion is stable and without tamponade physiology  -Per cardiology consider repeat echo to monitor progression

## 2019-08-02 NOTE — ASSESSMENT & PLAN NOTE
Patient of Dr. Foster on palliative chemo with Gemzar with plans to switch to Vinorelbine due to disease progression.  PET CT 4/2019 with increased uptake within the enlarging 1.4 cm left upper lobe pulmonary nodule, mediastinal lymph nodes, and left pleura when compared to PET 2/2019.  -pain currently controlled with current regimen: Norco 5-325 mg q6 PRN  -will need OP follow up with Dr. Foster.    -Hospice discussions to be taken place as outpatient Dr. Foster

## 2019-08-02 NOTE — ASSESSMENT & PLAN NOTE
Urine culture with 100,000 cfu gram negative rods. Elevated white count. Asymptomatic otherwise without abdominal pain. Urology recommends leaving catheter in place at this time due to contracture of bladder neck.  Urine culture now growing Klebsiella pneumonia sensitive to Ertapenem, Amikacin, Meropenem, Zosyn, and Levoquin    Plan:  -Discontinue Rocephin and start Levaquin 750mg daily PO for 7 day course     Patient seen and examined at bedside. seen post op lymph node biopsy -- on BIPAP - given lasix for pulmonary edema with good response-- feeling better    atorvastatin 80milliGRAM(s) at bedtime  aspirin  chewable 81milliGRAM(s) daily  amiodarone    Tablet 200milliGRAM(s) daily  pantoprazole    Tablet 40milliGRAM(s) before breakfast  insulin lispro (HumaLOG) corrective regimen sliding scale  Before meals and at bedtime  metoprolol succinate ER 50milliGRAM(s) daily  losartan 25milliGRAM(s) daily  furosemide   Injectable 20milliGRAM(s) daily  magnesium oxide 800milliGRAM(s) once  potassium chloride    Tablet ER 40milliEquivalent(s) once  calcitonin Injectable 190International Unit(s) every 12 hours  potassium chloride  10 mEq/100 mL IVPB 10milliEquivalent(s) every 1 hour  potassium chloride    Tablet ER 40milliEquivalent(s) every 4 hours  spironolactone 25milliGRAM(s) once      Allergies    IV CONtRAST (Flushing (Skin); Rash)  No Known Drug Allergies    Intolerances        T(C): , Max: 37.4 ( @ 19:29)  T(F): , Max: 99.4 ( @ 19:29)  HR: 80  BP: 135/64  BP(mean): --  RR: 29  SpO2: 98%  Wt(kg): --  I & Os for 24h ending  @ 07:00  =============================================  IN:    sodium chloride 0.9%: 800 ml    Total IN: 800 ml  ---------------------------------------------  OUT:    Voided: 400 ml    Total OUT: 400 ml  ---------------------------------------------  Total NET: 400 ml    I & Os for current day (as of  @ 14:07)  =============================================  IN:    Solution: 200 ml    Total IN: 200 ml  ---------------------------------------------  OUT:    Intermittent Catheterization - Urethral: 1100 ml    Total OUT: 1100 ml  ---------------------------------------------  Total NET: -900 ml    Height (cm): 152.4 ( @ 07:02)  Weight (kg): 47 ( @ 07:02)  BMI (kg/m2): 20.2 ( 07:02)  BSA (m2): 1.41 ( 07:02)      PHYSICAL EXAM:  Constitutional: No acute distress on BIPAP  Neck: Supple.  JVD present  Respiratory: crackles ant  Cardiovascular: S1, S2.  Regular rate and rhythm 3/6 SM     Gastrointestinal: soft, non-tender  Extremities: Warm.  No lower extremity edema.    Neurological: No focal deficits.  Skin: Warm. Dry.    Lymph Nodes:  No cervical lymph nodes.    Psychiatric: Normal affect.    ACCESS:       LABS:                        10.6   2.1   )-----------( 167      ( 30 Dec 2016 07:06 )             33.5     30 Dec 2016 11:22    141    |  103    |  13     ----------------------------<  116    2.8     |  26     |  0.98     Ca    10.1       30 Dec 2016 11:22  Phos  2.5       30 Dec 2016 11:22  Mg     1.3       30 Dec 2016 11:22    TPro  x      /  Alb  2.9    /  TBili  x      /  DBili  x      /  AST  x      /  ALT  x      /  AlkPhos  x      30 Dec 2016 11:22    Uric Acid, Serum: 2.6 mg/dL [2.6 - 6.0] ( @ 07:09)    PT/INR - ( 30 Dec 2016 07:10 )   PT: 13.3 sec;   INR: 1.20          PTT - ( 30 Dec 2016 07:10 )  PTT:27.2 sec  Urinalysis Basic - ( 30 Dec 2016 08:12 )    Color: Yellow / Appearance: Clear / S.020 / pH: x  Gluc: x / Ketone: NEGATIVE  / Bili: NEGATIVE / Urobili: 0.2 E.U./dL   Blood: x / Protein: 100 mg/dL / Nitrite: NEGATIVE   Leuk Esterase: NEGATIVE / RBC: < 5 /HPF / WBC < 5 /HPF   Sq Epi: x / Non Sq Epi: Few /HPF / Bacteria: Present /HPF      Creatinine, Random Urine: 48.0 mg/dL ( @ 08:12)  Protein/Creatinine Ratio Calculation: 3.5 Ratio ( @ 08:12)        RADIOLOGY & ADDITIONAL STUDIES:

## 2019-08-03 PROBLEM — H01.009 BLEPHARITIS: Status: ACTIVE | Noted: 2019-01-01

## 2019-08-03 PROBLEM — R41.0 DELIRIUM: Status: ACTIVE | Noted: 2019-01-01

## 2019-08-03 PROBLEM — D72.829 LEUKOCYTOSIS: Status: ACTIVE | Noted: 2019-01-01

## 2019-08-03 NOTE — ASSESSMENT & PLAN NOTE
First noted on ECG 7/15/19 following pericardiocentesis. History of paroxysmal atrial fibrillation from past. Ddx includes 2/2 pericardial effusion vs. Underlying malignancy. Earlier during admission patient previously not controlled with lower doses of Toprol, lopressor 50mg BID, and had drug rash with diltiazem). CHADsVASC 3 suggestive of 3.2% annual stroke risk. Not currently on heparin or lovenox due to significant hematuria experienced earlier on admission and generally patient is not a great anticoagulation candidate. Now with recurrent a. Fib with RVR after not receiving toprol dose yesterday.  Started on amiodarone gtt yesterday PM with improvement in rate. And successfully transitioned to PO amio. Patient without episodes of a. Fib with RVR overnight.  Tolerating lower dose of Toprol.  Plan:  - Appreciate cardiology recommendations  -continue amiodarone 400mg BID for rhythm control and Toprol XL 100mg daily for rate control       -Continue telemetry and monitor for rate control   -TEDs and SCDs in place for DVT prophylaxis   -Patient with 3% stroke risk however not a good candidate for AC given bleeding on lovenox, frailty, and grave prognosis related to malignancy

## 2019-08-03 NOTE — ASSESSMENT & PLAN NOTE
Urine culture with 100,000 cfu gram negative rods. Elevated white count. Asymptomatic otherwise without abdominal pain. Urology recommends leaving catheter in place at this time due to contracture of bladder neck.  Urine culture now growing Klebsiella pneumonia sensitive to Ertapenem, Amikacin, Meropenem, Zosyn, and Levoquin    Plan:  -Discontinue Rocephin and start renally adjusted Levaquin 750mg q48 PO for 10 day course

## 2019-08-03 NOTE — ASSESSMENT & PLAN NOTE
Patient presented to [facility] w/ Pericardial effusion and a hx of recurrent pleural effusions (newly diagnosed mesothelioma in 2019). R sided PleurX catheter in place, patient of Dr. Padilla (Pulm) with 450cc drained on admission. CXR during admission have shown BL pleural effusion w/ areas of pulmonary infiltrates L>R.  Continues to improve and satting well on 1L NC. Lung ultrasound by pulmonology/Dr. Padilla reveals no significant amount of fluid to drain.   Plan:  -Hold lasix given significant volume depletion since admission.  No significant signs of fluid overload.  -consider repeat BNP and/or echo should his fluid status continue to be in question   - Continue duo nebs Q4h while awake

## 2019-08-03 NOTE — PROGRESS NOTES
[facility]  Hematology/Oncology  Progress Note    Patient Name: Cale Harding  Admission Date: 7/17/2019  Hospital Length of Stay: 17 days  Code Status: Full Code     Subjective:     HPI:  Mr. Cale Harding is a 78-year-old male with a past medical history of prostate cancer (s/p prostatectomy), mesothelioma (on chemotherapy), with recurrent pleural effusion  ( with a right PleurX) and hypertension who is a transfer for evaluation of pericardial effusion.       Patient was scheduled for elective cystography with Urology (Dr. Philippe) for dilation of bladder neck contracture.  On arrival to elective surgical suite, patient found to be tachycardic and heart rate was irregular.  EKG confirmed atrial fibrillation with RVR.  Surgery was cancelled and he was admitted to the ICU for a diltiazem drip. He converted to sinus but would go in and out of afib, was started on lopressor 25 BID and echo done showed moderate size of pericardial effusion with no tamponade physiology however increased in effusion from 7/5, cardiology in the OSH was planning for pericardiocentesis but requested transfer for possible window vs pericardiocentesis and was sent to [facility]    On arrival patient was hemodynamically stable  , NSR, converted in and out of afib. Cardiology was consulted for input on afib management in the setting of pericardial effusion and need for pericardiocentesis/ closer ccu monitoring.     Of note patient has a PleurX on the right that is possibly infected and he was supposed to get it removed this Friday by his pulmonologist Dr. Woodward      Hospital Course:   Admitted to hematology oncology on 7/17 for a. Fib with RVR.  The patient was found to have a possibly associated pericardial effusion we were concerned was contributing to a. Fib. CTS consulted for evaluation of pericardial effusion to evaluate need for pericardial window. Patient not strong candidate for pericardial window per CTS,  Interventional cardiology preformed paracentesis on hospital day 2 and drained 570cc from pericardial space. Patient seen by pulmonary on admission. Dr. Padilla familiar w/ patient and was able to drain 450 ccs from R sided pleurX cath.  A. Fib with RVR did not resolve following paracentesis and cardiology was consulted.  They recommended starting patient on heparin and diltiazem drip for rate control. Rate controlled and pt stepped down to PO diltiazem and lopressor but a.fib with AVR recurred and was then switched to Toprol XL 100mg.  Patient experienced improvement in shortness of breath and atrial fibrillation, but on hospital day 7 the patient developed a new rash first noticed on back and spread to chest and abdomen. Dermatology was consulted and felt this was likely drug rash from diltiazem and Cardiology was in agreement. Diltiazem discontinued and patient's Toprol XL was increased to 200mg daily for management of his atrial fibrillation.  He was started on oral prednisone taper and topical steroids for drug rash.  His rash has continued to get worse despite management and progressed caudally with increased blistering and desquamation.  Dermatology recommended starting him on prednisone 60mg daily x 5 days and topical steroids PRN for symptoms and patieint's rash has continued to improve.  TTE on 7/21 showed a reaccumulation of pericardial fluid, thoracic surgery consulted for potential pericardial window and deemed patient a poor candidate due to there likely being pericardial disease and rash on anterior chest, Interventional cardiology also felt patient was a poor candidate. Also repeat TTE on 7/26 showed stable pericardial effusion when compared to 7/26 and there was no tamponade physiology. Pulmonology continues to follow patient for bilateral malignant effusion and felt that there is nothing left to drain from left or right side. On 7/31 patient was found to have a urinary tract infection positive for  gram negative rods and he was started on Rocephin. He is unable to have haider removed due to bladder neck contracture per Urology.  On the same day, the patient missed his Toprol dose to low blood pressure and he went into a.fib with RVR again.  He was started on amiodarone drip with improvement in HR.  Cardiology recommended transitioning to PO amio + Toprol for rhythm and rate control respectively.    Interval History: No events overnight.  Per wife patient did not sleep all night, was very fidgety and confused.  On speaking with nurses, since this morning they noticed patient having some word finding difficulty and some tangential thought process. There is also new onset redness, drainage, crusting of eyelids.  Per patient eyes bothering him somewhat.  Rash continues to improve with topical and oral steroids. No episodes of a. Fib with RVR overnight. Patient denies fever, chills, chest pain, palpitations, shortness of breath, abdominal pain.    Oncology Treatment Plan:   OP NSCLC VINORELBINE WEEKLY    Medications:  Continuous Infusions:  Scheduled Meds:   albuterol-ipratropium  3 mL Nebulization Q4H WAKE    amiodarone  400 mg Oral BID    dronabinol  2.5 mg Oral BID    famotidine  20 mg Oral Daily    levoFLOXacin  750 mg Oral Every other day    metoprolol succinate  100 mg Oral Daily    ondansetron  4 mg Intravenous Once    predniSONE  60 mg Oral Daily    triamcinolone acetonide 0.025%   Topical (Top) BID    triamcinolone acetonide 0.1%   Topical (Top) BID     PRN Meds:albuterol-ipratropium, alteplase, Dextrose 10% Bolus, Dextrose 10% Bolus, diphenhydrAMINE, glucagon (human recombinant), glucose, glucose, guaifenesin 100 mg/5 ml, heparin, porcine (PF), HYDROcodone-acetaminophen, ibuprofen, metoprolol, ondansetron, polyethylene glycol, ramelteon, sodium chloride 0.9%     Review of Systems   All other systems reviewed and are negative.    Objective:     Vital Signs (Most Recent):  Temp: 97.4 °F (36.3 °C)  (08/03/19 0819)  Pulse: 70 (08/03/19 0819)  Resp: 16 (08/03/19 0819)  BP: 111/63 (08/03/19 0819)  SpO2: 96 % (08/03/19 0819) Vital Signs (24h Range):  Temp:  [97.4 °F (36.3 °C)-98 °F (36.7 °C)] 97.4 °F (36.3 °C)  Pulse:  [62-80] 70  Resp:  [16-18] 16  SpO2:  [95 %-98 %] 96 %  BP: (100-113)/(56-67) 111/63     Weight: 72.2 kg (159 lb 2.8 oz)  Body mass index is 23.51 kg/m².  Body surface area is 1.87 meters squared.      Intake/Output Summary (Last 24 hours) at 8/3/2019 0837  Last data filed at 8/3/2019 0400  Gross per 24 hour   Intake 1650 ml   Output 1100 ml   Net 550 ml       Physical Exam   Constitutional: He appears well-developed. No distress.   HENT:   Head: Normocephalic and atraumatic.   Nose: Nose normal.   Mouth/Throat: No oropharyngeal exudate.   Eyes: Pupils are equal, round, and reactive to light. Conjunctivae are normal. Left eye exhibits discharge (yellowish discharge and crust present on eyelid). No scleral icterus.   Some erythema and mild tenderness to palpation of lower lids.  Normal conjunctivae.    Neck: Normal range of motion. Neck supple. No JVD present.   Cardiovascular: Normal rate, regular rhythm, normal heart sounds and intact distal pulses.   Pulmonary/Chest: Effort normal. No respiratory distress. He has no wheezes. He has no rales.   Bilateral decreased breath sounds   Abdominal: Soft. Bowel sounds are normal. He exhibits no distension. There is no tenderness.   Musculoskeletal: He exhibits no edema.   Neurological: He is alert.   Oriented to person, place, situation, disoriented to year.  Tangential thoughts.  Follows commands appropriately.   CN II intact with pupils reactive to light  CN III,IV, VI intact with EOM  CN V with mastication muscles  CNVII intact with facial expressions  CN XI intact with shoulder shrug  CN XII intact with tongue protrusion    Strength 5/5 and symmetric in all extremities    Sensation intact and symmetric to light touch     Unable to assess cerebellar  function due to patient's confusion   Skin: Skin is warm and dry. Rash (Some dried desquamated skin present on neck down abdomen. Erythema continues to improve. Legs with desquamation but overall weeping improved from yesterday.  Distal extremities with erythematous palpules coalescing into plaques.) noted.       Significant Labs:   CBC:   Recent Labs   Lab 19   WBC 41.44* 36.10*   HGB 9.8* 9.7*   HCT 32.8* 32.0*    224    and CMP:   Recent Labs   Lab 19   * 127*   K 4.9 5.2*   CL 95 95   CO2 21* 21*   * 121*   BUN 67* 68*   CREATININE 1.4 1.3   CALCIUM 8.4* 8.3*   PROT 5.4*  --    ALBUMIN 2.1*  --    BILITOT 0.3  --    ALKPHOS 118  --    AST 16  --    ALT 15  --    ANIONGAP 11 11   EGFRNONAA 48.1* 52.6*   Phosph: 3.7  M.1    Diagnostic Results:  None    Assessment/Plan:     Cardiovascular     * Atrial fibrillation with RVR  First noted on ECG 7/15/19 following pericardiocentesis. History of paroxysmal atrial fibrillation from past. Ddx includes 2/2 pericardial effusion vs. Underlying malignancy. Earlier during admission patient previously not controlled with lower doses of Toprol, lopressor 50mg BID, and had drug rash with diltiazem). CHADsVASC 3 suggestive of 3.2% annual stroke risk. Not currently on heparin or lovenox due to significant hematuria experienced earlier on admission and generally patient is not a great anticoagulation candidate. Now with recurrent a. Fib with RVR after not receiving toprol dose yesterday.  Started on amiodarone gtt yesterday PM with improvement in rate. And successfully transitioned to PO amio. Patient without episodes of a. Fib with RVR overnight.  Tolerating lower dose of Toprol.  Plan:  - Appreciate cardiology recommendations  -continue amiodarone 400mg BID for rhythm control and Toprol XL 100mg daily for rate control       -Continue telemetry and monitor for rate control   -TEDs and SCDs in place for  DVT prophylaxis   -Patient with 3% stroke risk however not a good candidate for AC given bleeding on lovenox, frailty, and grave prognosis related to malignancy    Pericardial effusion without cardiac tamponade  [place] [facility] [place] evaluation by cardiology/CTS for pericardial window or pericardiocentesis on 7/17/19. Pericardiocentesis was preformed by interventional cards, pt tolerated procedure well, produced 570cc serosanguinous fluid on 7/18. Repeat echo on 7/21 with recurrence of effusion and echo on 7/26 with stabilization of effusion and without tamponade. CTS consulted to evaluate patient for pericardial window but feel he is a poor candidate given likelihood of disease in pericardium.  Interventional cardiology also feel this patient is poor candidate for a drain as there is no safe place to place pericardiocentesis needle.      Plan:   -Will hold off on further management or consults at the moment as effusion is stable and without tamponade physiology  -Per cardiology consider repeat echo to monitor progression    Acute on chronic diastolic heart failure  Patient with symptoms of SOB and diffuse swelling on admission who is not on lasix at home with an admission BNP of 374. Echo 7/17/2019 with Grade I LV diastolic dysfunction.  Patient has been diuresing well with improvements in shortness of breath.  Still with Pleural effusions L>R 2/2 malignancy vs overload.  Patient approaching dry weight with significant increase in BUN/Cr.  Plan:  - hold lasix as patient not requiring  -Strict I/O with Daily Standing weights  -keep Mg >2 and K>4  -Fluid restriction <1500cc/day    Essential hypertension  Currently holding all hypertensives.  Patient has been normotensive/llow since admission.  Was previously on enalapril at home.   -discuss with cards before discharge whether patient will require ACE/ARB    Heme/Onc  Mesothelioma of left lung  Patient of Dr. Foster on palliative chemo with Dylan  with plans to switch to Vinorelbine due to disease progression.  PET CT 4/2019 with increased uptake within the enlarging 1.4 cm left upper lobe pulmonary nodule, mediastinal lymph nodes, and left pleura when compared to PET 2/2019.  Plan:   -pain currently controlled with current regimen: Norco 5-325 mg q6 PRN  -will need OP follow up with Dr. Foster.    -Hospice discussions to be taken place as outpatient with Dr. Foster    Leukocytosis  Likely multifactorial ddx includes leukemoid reaction from malignancy and/or AGEP rash.  Also patient with new CAUTI which is likely contributing.  Overall WBC downtrending  -Monitor with daily CBC    Decrease in appetite  Patient w/ reported hx of poor appetite since beginning chemotherapy  Plan:  - Started dronabinol 2.5 mg BID  -Continue Ensures with every meal    History of prostate cancer  S/p prostectomy.  Haider in place for urinary retention 2/2 bladder contracture.  Urology recommends keeping the haider catheter in place.    Neuro  Delirium  New onset delirium on 8/30 characterized by disorientation, increased alertness.  No focal neurologic deficits. Ddx included from UTI vs 2/2 high dose steroids vs polypharmacy.   Plan:   -Delirium precautions in place  -Patient with poor intake this morning.  Will give 500cc bolus and encourage to eat as mental status improves  -Treatment of UTI with Levaquin  -Will decrease steroid dose from 60mg to 20mg starting tomorrow(patient already received his morning dose)  -Avoid any anticholinergic medications(d/c'd benadryl PRN although patient has not received in a few days)    Pulmonary  SOB (shortness of breath) on exertion  Patient presented to [facility] [place] w/ Pericardial effusion and a hx of recurrent pleural effusions (newly diagnosed mesothelioma in 2019). R sided PleurX catheter in place, patient of Dr. Padilla (Pulm) with 450cc drained on admission. CXR during admission have shown BL pleural effusion w/ areas of pulmonary infiltrates L>R.   Continues to improve and satting well on 1L NC. Lung ultrasound by pulmonology/Dr. Padilla reveals no significant amount of fluid to drain.   Plan:  -Hold lasix given significant volume depletion since admission.  No significant signs of fluid overload.  -consider repeat BNP and/or echo should his fluid status continue to be in question   - Continue duo nebs Q4h while awake    Malignant pleural effusion  Has R sided pleurX, associated Shortness of breath; 400 cc removed 7/30.  Patient currently asymptomatic with improving SOB.    Renal  SKINNY (acute kidney injury)  Patient's baseline Cr is 0.7-0.8 now has been steadily climbing since admission.  Suspect pre-renal in nature due to patient's tenuous fluid status-could be cardiogenic vs volume depletion.  US negative for obstruction.  Intra-renal causes not excluded.  Creatinine stable at 1.3.  -daily BMP  -avoid nephrotoxic agents  -renally dosed medications    Hyponatremia  Suspect SIADH.  Sodium has been stable over last few days and patient is asymptomatic.     Plan:  -Continue fluid restriction     Skin/Integumentary  Rash, drug  Drug rash thought to be result of diltiazem treatment.  Diltiazem stopped and rash continues to improve on face and trunk, now desquamating on lower extremities.  Continuing to improve.    Plan:   -continue topical TAC 0.025% BID to sensitive areas including face and groin and TAC 0.1% BID  to chest, abdomen, face   - Completed 5 days of prednisone 60mg. Will decrease to 20mg tomorrow due to patient's confusion.  -Vaseline topically to erosions  -Biopsy results : Per dermatopathologist Dr. Meade, perivascular lymphocytic infiltrate with neutrophils. Also, subcorneal pustules can be seen which would be consistent with AGEP  -Appreciate dermatology recommendations    Infectious Disease  Catheter-associated urinary tract infection  Urine culture with 100,000 cfu gram negative rods. Elevated white count. Asymptomatic otherwise without abdominal  pain. Urology recommends leaving catheter in place at this time due to contracture of bladder neck.  Urine culture now growing Klebsiella pneumonia sensitive to Ertapenem, Amikacin, Meropenem, Zosyn, and Levoquin    Plan:  -Discontinue Rocephin and start renally adjusted Levaquin 750mg q48 PO for 10 day course      Blepharitis  Increased redness and drainage from lower lid.  No evidence of conjunctivitis.  -Clean area daily with normal saline  -artificial tears  -consider warms compresses if symptomatic      Urology  Acquired contracture of bladder neck  Urology consulted in the OSH. S/p Bladder neck contracture release and urethral dilation on 7/15/19. Patient of Dr. Philippe.    Plan:  - Haider in place  - Cleared for d/c from urology perspective, to f/u w/ Dr. Philippe  - Increased leak noted from penis around haider, most likely due to lasix and anasarca    GI  Gastroesophageal reflux disease  -Continue PPI daily        Dispo: d/c with home health pending improvement in confusion    Justin Blackwell MD, PGY-1  Hematology/Oncology  [facility]

## 2019-08-03 NOTE — ASSESSMENT & PLAN NOTE
Patient's baseline Cr is 0.7-0.8 now has been steadily climbing since admission.  Suspect pre-renal in nature due to patient's tenuous fluid status-could be cardiogenic vs volume depletion.  US negative for obstruction.  Intra-renal causes not excluded.  Creatinine stable at 1.3.  -daily BMP  -avoid nephrotoxic agents  -renally dosed medications

## 2019-08-03 NOTE — ASSESSMENT & PLAN NOTE
New onset delirium on 8/30 characterized by disorientation, increased alertness.  No focal neurologic deficits. Ddx included from UTI vs 2/2 high dose steroids vs polypharmacy.   Plan:   -Delirium precautions in place  -Patient with poor intake this morning.  Will give 500cc bolus and encourage to eat as mental status improves  -Treatment of UTI with Levaquin  -Will decrease steroid dose from 60mg to 20mg starting tomorrow(patient already received his morning dose)  -Avoid any anticholinergic medications(d/c'd benadryl PRN although patient has not received in a few days)

## 2019-08-03 NOTE — PLAN OF CARE
Problem: Adult Inpatient Plan of Care  Goal: Plan of Care Review  Outcome: Ongoing (interventions implemented as appropriate)    Care plan explained to patient and spouse at beginning of shift and PRN; Pt remains free of fall or injury. Cano in place. Dressing changed and ointment applied to skin this shift. Denied n/v or pain,  instructed to call as needed, telemetry monitoring continued, VSS and afebrile. Bed locked in low position call light in reach.Will CTM       New copious milky eye drainage observed this morning. Care team aware. Confused but redirects this a.m. Patient awake all night. Encouraged to stay awake during day so may sleep at night.

## 2019-08-03 NOTE — SUBJECTIVE & OBJECTIVE
Interval History: No events overnight.  Per wife patient did not sleep all night, was very fidgety and confused.  On speaking with nurses, since this morning they noticed patient having some word finding difficulty and some tangential thought process. There is also new onset redness, drainage, crusting of eyelids.  Per patient eyes bothering him somewhat.  Rash continues to improve with topical and oral steroids. No episodes of a. Fib with RVR overnight. Patient denies fever, chills, chest pain, palpitations, shortness of breath, abdominal pain.    Oncology Treatment Plan:   OP NSCLC VINORELBINE WEEKLY    Medications:  Continuous Infusions:  Scheduled Meds:   albuterol-ipratropium  3 mL Nebulization Q4H WAKE    amiodarone  400 mg Oral BID    dronabinol  2.5 mg Oral BID    famotidine  20 mg Oral Daily    levoFLOXacin  750 mg Oral Every other day    metoprolol succinate  100 mg Oral Daily    ondansetron  4 mg Intravenous Once    predniSONE  60 mg Oral Daily    triamcinolone acetonide 0.025%   Topical (Top) BID    triamcinolone acetonide 0.1%   Topical (Top) BID     PRN Meds:albuterol-ipratropium, alteplase, Dextrose 10% Bolus, Dextrose 10% Bolus, diphenhydrAMINE, glucagon (human recombinant), glucose, glucose, guaifenesin 100 mg/5 ml, heparin, porcine (PF), HYDROcodone-acetaminophen, ibuprofen, metoprolol, ondansetron, polyethylene glycol, ramelteon, sodium chloride 0.9%     Review of Systems   All other systems reviewed and are negative.    Objective:     Vital Signs (Most Recent):  Temp: 97.4 °F (36.3 °C) (08/03/19 0819)  Pulse: 70 (08/03/19 0819)  Resp: 16 (08/03/19 0819)  BP: 111/63 (08/03/19 0819)  SpO2: 96 % (08/03/19 0819) Vital Signs (24h Range):  Temp:  [97.4 °F (36.3 °C)-98 °F (36.7 °C)] 97.4 °F (36.3 °C)  Pulse:  [62-80] 70  Resp:  [16-18] 16  SpO2:  [95 %-98 %] 96 %  BP: (100-113)/(56-67) 111/63     Weight: 72.2 kg (159 lb 2.8 oz)  Body mass index is 23.51 kg/m².  Body surface area is 1.87 meters  squared.      Intake/Output Summary (Last 24 hours) at 8/3/2019 0837  Last data filed at 8/3/2019 0400  Gross per 24 hour   Intake 1650 ml   Output 1100 ml   Net 550 ml       Physical Exam   Constitutional: He appears well-developed. No distress.   HENT:   Head: Normocephalic and atraumatic.   Nose: Nose normal.   Mouth/Throat: No oropharyngeal exudate.   Eyes: Pupils are equal, round, and reactive to light. Conjunctivae are normal. Left eye exhibits discharge (yellowish discharge and crust present on eyelid). No scleral icterus.   Some erythema and mild tenderness to palpation of lower lids.  Normal conjunctivae.    Neck: Normal range of motion. Neck supple. No JVD present.   Cardiovascular: Normal rate, regular rhythm, normal heart sounds and intact distal pulses.   Pulmonary/Chest: Effort normal. No respiratory distress. He has no wheezes. He has no rales.   Bilateral decreased breath sounds   Abdominal: Soft. Bowel sounds are normal. He exhibits no distension. There is no tenderness.   Musculoskeletal: He exhibits no edema.   Neurological: He is alert.   Oriented to person, place, situation, disoriented to year.  Tangential thoughts.  Follows commands appropriately.   CN II intact with pupils reactive to light  CN III,IV, VI intact with EOM  CN V with mastication muscles  CNVII intact with facial expressions  CN XI intact with shoulder shrug  CN XII intact with tongue protrusion    Strength 5/5 and symmetric in all extremities    Sensation intact and symmetric to light touch     Unable to assess cerebellar function due to patient's confusion   Skin: Skin is warm and dry. Rash (Some dried desquamated skin present on neck down abdomen. Erythema continues to improve. Legs with desquamation but overall weeping improved from yesterday.  Distal extremities with erythematous palpules coalescing into plaques.) noted.       Significant Labs:   CBC:   Recent Labs   Lab 08/02/19  0459 08/03/19  0429   WBC 41.44* 36.10*    HGB 9.8* 9.7*   HCT 32.8* 32.0*    224    and CMP:   Recent Labs   Lab 19  0459 19  0429   * 127*   K 4.9 5.2*   CL 95 95   CO2 21* 21*   * 121*   BUN 67* 68*   CREATININE 1.4 1.3   CALCIUM 8.4* 8.3*   PROT 5.4*  --    ALBUMIN 2.1*  --    BILITOT 0.3  --    ALKPHOS 118  --    AST 16  --    ALT 15  --    ANIONGAP 11 11   EGFRNONAA 48.1* 52.6*   Phosph: 3.7  M.1    Diagnostic Results:  None

## 2019-08-03 NOTE — PLAN OF CARE
Problem: Adult Inpatient Plan of Care  Goal: Plan of Care Review  Outcome: Ongoing (interventions implemented as appropriate)  Side rails up x2; call bell in place; bed in lowest, locked position; skid proof socks on; no evidence of skin breakdown; care plan explained to patient; pt remains free of injury. Tolerated po, haider secured and cleaned. Pt given a bed bath and ointment applied to skin. Denied n/v or pain, pt instructed to call as needed, telemetry monitoring in progress,  Pt is oriented to person place and time but not to situation, pt with confused conversation, Dr Navarro aware. VSS and afebrile

## 2019-08-03 NOTE — ASSESSMENT & PLAN NOTE
Patient of Dr. Foster on palliative chemo with Gemzar with plans to switch to Vinorelbine due to disease progression.  PET CT 4/2019 with increased uptake within the enlarging 1.4 cm left upper lobe pulmonary nodule, mediastinal lymph nodes, and left pleura when compared to PET 2/2019.  Plan:   -pain currently controlled with current regimen: Norco 5-325 mg q6 PRN  -will need OP follow up with Dr. Foster.    -Hospice discussions to be taken place as outpatient with Dr. Foster

## 2019-08-03 NOTE — PLAN OF CARE
Problem: Adult Inpatient Plan of Care  Goal: Plan of Care Review  Outcome: Ongoing (interventions implemented as appropriate)   Care plan explained to patient and spouse at beginning of shift and PRN; Pt remains free of fall or injury. Cano in place. Dressing changed and ointment applied to skin x2 this shift. Denied n/v or pain,  instructed to call as needed, telemetry monitoring continued, VSS and afebrile. Bed locked in low position call light in reach.Will CTM

## 2019-08-03 NOTE — PLAN OF CARE
Problem: Adult Inpatient Plan of Care  Goal: Plan of Care Review  Outcome: Ongoing (interventions implemented as appropriate)  Side rails up x2; call bell in place; bed in lowest, locked position; skid proof socks on; no evidence of skin breakdown; care plan explained to patient; pt remains free of injury. Tolerated po, haider secured, pt worked with PT ambulated and sat up in chair, Miralex per pt request. Pt given a bed bath and ointment applied to skin. Denied n/v or pain, pt instructed to call as needed, telemetry monitoring in progress, VSS and afebrile.

## 2019-08-03 NOTE — ASSESSMENT & PLAN NOTE
Drug rash thought to be result of diltiazem treatment.  Diltiazem stopped and rash continues to improve on face and trunk, now desquamating on lower extremities.  Continuing to improve.    Plan:   -continue topical TAC 0.025% BID to sensitive areas including face and groin and TAC 0.1% BID  to chest, abdomen, face   - Completed 5 days of prednisone 60mg. Will decrease to 20mg tomorrow due to patient's confusion.  -Vaseline topically to erosions  -Biopsy results : Per dermatopathologist Dr. Meade, perivascular lymphocytic infiltrate with neutrophils. 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

## 2019-08-03 NOTE — ASSESSMENT & PLAN NOTE
Increased redness and drainage from lower lid.  No evidence of conjunctivitis.  -Clean area daily with normal saline  -artificial tears  -consider warms compresses if symptomatic

## 2019-08-04 PROBLEM — R53.81 DEBILITY: Status: ACTIVE | Noted: 2019-01-01

## 2019-08-04 PROBLEM — H57.02 ANISOCORIA: Status: ACTIVE | Noted: 2019-01-01

## 2019-08-04 NOTE — ASSESSMENT & PLAN NOTE
78 y.o. male with significant medical history of prostate CA (s/p prostatectomy), mesothelioma (on chemotherapy), HTN presented to hospital for an elective cystography w/Urology and was noted to have A-fib w/RVR in the surgical suite.  He was admitted to Hospital Medicine for management.  Tonight (8/3/19) the patient was noted to have unequal pupils but no other focal neuro deficits.  He has been receiving duonebs q4hrs.  A CTH was obtained and was negative for acute findings.    -Exam negative for focal neuro deficit, NIH 0.  Pupils seem equal in the dark, unequal in the light but reactive in either circumstance.  The patient has no associated symptoms.  Symptom may be related to medication side effect (such as duoneb).  -Consider obtaining pupillometer reading for accuracy.

## 2019-08-04 NOTE — PROGRESS NOTES
"On call request received from Dr Blackwell to discuss additional assistance at home with the patient's wife as she was afraid to take the patient home with the amount of care needed. Called the patient's wife and she requested a call back at 4 pm. Spoke to her at this time and she indicated that she and the children were looking into additional assistance through [facility]. She also requested home health services. Explained to her that [facility] had a hospice program as well as nursing home placement, but she declined any further discussion as she "heard from friends" who were using their services. Agreed to notify the patient's primary oncology social worker, Marissa Marcelo LCSW on Monday for follow up for assistance with final discharge arrangements.   "

## 2019-08-04 NOTE — SUBJECTIVE & OBJECTIVE
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    Prostate cancer 2002    Urinary tract infection      Past Surgical History:   Procedure Laterality Date    BIOPSY-DIAPHRAGM N/A 3/19/2018    Performed by Galdino Fang MD at [facility] OR [address] [place] [address]    PLHPZK-YYKQLK-MM N/A 2/1/2018    Performed by [facility] Provider at [facility] OR [address] [place] [address]    BRONCHOSCOPY N/A 6/3/2019    Performed by [facility] Provider at [facility] OR [address] [place] [address]    COLONOSCOPY  10/20/2012    COLONOSCOPY  11/19/2014    dr machado ( repeat in 3 years per the pt )    CYSTOSCOPY, WITH RETROGRADE PYELOGRAM Bilateral 7/15/2019    Performed by [facility]dino Philippe MD at [facility] OR    CYSTOSCOPY, WITH URETHRAL DILATION Bilateral 7/15/2019    Performed by [facility]ino Philippe MD at [facility] OR    ELBOW SURGERY Left 2013    dislocation    FAZGTRFWF-EXZT-R-CATH N/A 6/20/2019    Performed by [facility] Provider at [facility] OR [address] [place] [address]    LAPAROSCOPY-DIAGNOSTIC N/A 3/19/2018    Performed by Galdino Fang MD at [facility] OR [address] [place] [address]    Pericardiocentesis N/A 7/18/2019    Performed by Frank Javier MD at [facility] CATH LAB    PROCTECTOMY  2002    RELEASE, CONTRACTURE, BLADDER NECK N/A 7/15/2019    Performed by [facility] Philippe [facility] at [facility] OR    SHOULDER ARTHROSCOPY W/ ROTATOR CUFF REPAIR Right 2008    THORACOSCOPY-VIDEO ASSISTED (VATS) W/PLEURAL BIOPSY Left 3/19/2018    Performed by Galdino Fang MD at [facility] OR [address] [place] [address]    URETHROGRAM, RETROGRADE N/A 7/15/2019    Performed by Galdino Philippe MD at [facility] OR     Family History   Problem Relation Age of Onset    Heart disease Father     Heart disease Brother     Prostate cancer Brother     Cancer Mother         [facility]    Heart disease Sister     Heart attack Sister     No Known Problems Son     Heart disease Brother     Prostate cancer Brother     Heart  disease Sister     Heart attack Sister     No Known Problems Son     No Known Problems Son     Heart disease Brother     Prostate cancer Brother     Kidney disease Neg Hx     Asthma Neg Hx     Emphysema Neg Hx      Social History     Tobacco Use    Smoking status: Former Smoker     Packs/day: 2.00     Years: 15.00     Pack years: 30.00     Types: Cigarettes     Last attempt to quit: 1970     Years since quittin.6    Smokeless tobacco: Former User    Tobacco comment: pt quit 40 years ago   Substance Use Topics    Alcohol use: No     Alcohol/week: 16.8 oz     Types: 28 Cans of beer per week     Comment: None since Nov 15 th 2017    Drug use: No     Review of patient's allergies indicates:   Allergen Reactions    Bactrim [sulfamethoxazole-trimethoprim] Other (See Comments)     Joint pains       Medications: I have reviewed the current medication administration record.    Medications Prior to Admission   Medication Sig Dispense Refill Last Dose    dextromethorphan-guaifenesin  mg (MUCINEX DM)  mg per 12 hr tablet Take 1 tablet by mouth every 12 (twelve) hours.   More than a month    HYDROcodone-acetaminophen (NORCO) 5-325 mg per tablet Take 1 tablet by mouth every 6 (six) hours as needed for Pain. 60 tablet 0 2019    polyethylene glycol (MIRALAX) 17 gram/dose powder Take 17 g by mouth once daily.   Past Month    [DISCONTINUED] amoxicillin-clavulanate 875-125mg (AUGMENTIN) 875-125 mg per tablet Take 1 tablet by mouth 2 (two) times daily. 14 tablet 0 2019    [DISCONTINUED] calcium carbonate (TUMS) 200 mg calcium (500 mg) chewable tablet Take 1 tablet by mouth as needed for Heartburn.   More than a month    [DISCONTINUED] enalapril (VASOTEC) 5 MG tablet Take 1 tablet (5 mg total) by mouth 2 (two) times daily. 180 tablet 3 2019    [DISCONTINUED] ibuprofen (ADVIL,MOTRIN) 100 MG tablet Take 100 mg by mouth every 6 (six) hours as needed for Temperature greater than.    7/14/2019    [DISCONTINUED] LACTOBACILLUS ACIDOPHILUS (ACIDOPHILUS ORAL) Take 1 tablet by mouth once daily.   More than a month    [DISCONTINUED] phenylephrine HCl/acetaminophn (SINUS PAIN-PRESSURE, PE, ORAL) Take by mouth.   Past Month       Review of Systems   Unable to perform ROS: Other   HENT: Negative for drooling.    Eyes: Positive for discharge. Negative for photophobia, pain and redness.   Respiratory: Negative for choking.    Cardiovascular: Negative for leg swelling.   Gastrointestinal: Negative for nausea and vomiting.   Skin: Positive for rash.   Allergic/Immunologic: Negative for environmental allergies and food allergies.   Neurological: Negative for dizziness, speech difficulty, weakness and headaches.   Psychiatric/Behavioral: Positive for confusion.     Objective:     Vital Signs (Most Recent):  Temp: 97.9 °F (36.6 °C) (08/03/19 2354)  Pulse: 76 (08/04/19 0300)  Resp: 18 (08/03/19 2354)  BP: 98/61 (08/03/19 2354)  SpO2: 95 % (08/03/19 2354)    Vital Signs Range (Last 24H):  Temp:  [97.4 °F (36.3 °C)-97.9 °F (36.6 °C)]   Pulse:  [62-83]   Resp:  [16-18]   BP: ()/(61-74)   SpO2:  [94 %-98 %]     Physical Exam   Constitutional: He appears well-developed and well-nourished. No distress.   HENT:   Head: Normocephalic and atraumatic.   Right Ear: External ear normal.   Left Ear: External ear normal.   Eyes: Conjunctivae and EOM are normal. Right eye exhibits discharge. Left eye exhibits discharge.   Ptosis of the left eye that improved during the course of the exam.  L pupil appears slightly larger in with the lights on.  In the dark, pupils appear equal.   Neck: Normal range of motion. Neck supple.   Cardiovascular: Normal rate.   Pulmonary/Chest: Effort normal. No respiratory distress.   Abdominal: Soft. Bowel sounds are normal. He exhibits no distension.   Musculoskeletal: He exhibits no edema.   Neurological: He is alert. No sensory deficit. Coordination normal. GCS eye subscore is 4. GCS  verbal subscore is 4. GCS motor subscore is 6.   Skin: Skin is warm and dry. Rash noted. He is not diaphoretic. There is erythema.   Nursing note and vitals reviewed.      Neurological Exam:   LOC: alert  Language: No aphasia  Articulation: No dysarthria  EOM (CN III, IV, VI): Full/intact  Facial Sensation (CN V): Normal  Facial Movement (CN VII): Symmetric facial expression    Cebellar: No evidence of appendicular or axial ataxia  Sensation: Intact to light touch, temperature and vibration      Laboratory:  CMP:   Recent Labs   Lab 08/04/19  0415   CALCIUM 8.2*   *   K 5.1   CO2 21*   CL 96   BUN 72*   CREATININE 1.3     CBC:   Recent Labs   Lab 08/04/19  0415   WBC 38.18*   RBC 3.88*   HGB 10.4*   HCT 34.4*      MCV 89   MCH 26.8*   MCHC 30.2*     Lipid Panel: No results for input(s): CHOL, LDLCALC, HDL, TRIG in the last 168 hours.  Coagulation: No results for input(s): PT, INR, APTT in the last 168 hours.  Hgb A1C: No results for input(s): HGBA1C in the last 168 hours.  TSH: No results for input(s): TSH in the last 168 hours.    Diagnostic Results:      Brain imaging:  CT 08/03/19 No evidence of acute intracranial pathology.  Generalized cerebral volume loss and chronic microvascular ischemic changes    Vessel Imaging:  None    Cardiac Evaluation:   Last TTE 7/26/19   Conclusion     · Limited study .  · Normal left ventricular systolic function. The estimated ejection fraction is 60%  · Circumferential pericardial effusion; unchanged from 7/21/19. No evidence of tamponade.  · Intermediate central venous pressure (8 mm Hg).

## 2019-08-04 NOTE — ASSESSMENT & PLAN NOTE
PT/OT has been seeing and recommend home health PT, however wife may not be able to take care of all of patient's needs at home by herself.   -Have SW talk to patient and family today and offer options

## 2019-08-04 NOTE — CARE UPDATE
CT head negative. [facility] neuro team reporting that this can occasionally be seen after patient receive duonebs which patient did receive prior to new ocular findings. No further findings or complaints.     Patient further reported chest discomfort at the time of the event earlier this evening 1-2/10 substernal that occurs frequently. Patient noted to be irregularly irregular on exam, HD stable. Cardiac workup took place which was negative for EKG changes and biomarkers.     [facility] neuro will see patient in AM.             Katherine Greenberg MD  Resident Physician - PGY3

## 2019-08-04 NOTE — SUBJECTIVE & OBJECTIVE
Interval History: Significant event overnight.  Patient with unequal pupils(5mm left and 3mm right) around 9pm without other focal neurologic deficit.  CT head negative for acute abnormality.  Patient also having some susbternal chest discomfort which he has had in the past.  EKG negative for ST changes or T wave abnormality.  Mentally improved yesterday but overnight with all of commotion did not sleep well and now confused and fidgety this AM again. More oriented this morning. Eyes with milky drainage and some irritation. Rash continues to improve.  Denies chest pain, SOB, abdominal pain,     Oncology Treatment Plan:   OP NSCLC VINORELBINE WEEKLY    Medications:  Continuous Infusions:  Scheduled Meds:   albuterol-ipratropium  3 mL Nebulization Q4H WAKE    amiodarone  400 mg Oral BID    artificial tears  1 drop Both Eyes TID    dronabinol  2.5 mg Oral BID    famotidine  20 mg Oral Daily    levoFLOXacin  750 mg Oral Every other day    metoprolol succinate  100 mg Oral Daily    predniSONE  20 mg Oral Daily    triamcinolone acetonide 0.025%   Topical (Top) BID    triamcinolone acetonide 0.1%   Topical (Top) BID     PRN Meds:albuterol-ipratropium, alteplase, Dextrose 10% Bolus, Dextrose 10% Bolus, glucagon (human recombinant), glucose, glucose, guaifenesin 100 mg/5 ml, heparin, porcine (PF), HYDROcodone-acetaminophen, ibuprofen, metoprolol, ondansetron, polyethylene glycol, ramelteon, sodium chloride 0.9%     Review of Systems   All other systems reviewed and are negative.    Objective:     Vital Signs (Most Recent):  Temp: 97.9 °F (36.6 °C) (08/03/19 2354)  Pulse: 76 (08/04/19 0300)  Resp: 18 (08/03/19 2354)  BP: 98/61 (08/03/19 2354)  SpO2: 95 % (08/03/19 2354) Vital Signs (24h Range):  Temp:  [97.4 °F (36.3 °C)-97.9 °F (36.6 °C)] 97.9 °F (36.6 °C)  Pulse:  [62-83] 76  Resp:  [16-18] 18  SpO2:  [94 %-98 %] 95 %  BP: ()/(61-74) 98/61     Weight: 72.2 kg (159 lb 2.8 oz)  Body mass index is 23.51  kg/m².  Body surface area is 1.87 meters squared.      Intake/Output Summary (Last 24 hours) at 2019 0658  Last data filed at 8/3/2019 1700  Gross per 24 hour   Intake 1380 ml   Output 600 ml   Net 780 ml       Physical Exam   Constitutional: He is oriented to person, place, and time. He appears well-developed.   HENT:   Head: Normocephalic and atraumatic.   Eyes: Conjunctivae are normal. Right eye exhibits discharge. Left eye exhibits discharge. No scleral icterus.   Milky discharge bilaterally. Improved erythema.     Unequal pupils, L >R both equally reactive to light   Neck: Normal range of motion. Neck supple. No JVD present.   Cardiovascular: Normal rate and intact distal pulses. Exam reveals friction rub.   Irregularly irregular rhythm   Pulmonary/Chest: Effort normal. No respiratory distress. He has no wheezes. He has no rales.   Bilateral decreased breath sounds   Abdominal: Soft. Bowel sounds are normal. He exhibits no distension. There is no tenderness.   Musculoskeletal: He exhibits edema (2+ LE edema).   Neurological: He is alert and oriented to person, place, and time. No cranial nerve deficit or sensory deficit. He exhibits normal muscle tone.   Skin: Skin is warm and dry.       Significant Labs:   CBC:   Recent Labs   Lab 19  0429 19  0415   WBC 36.10* 38.18*   HGB 9.7* 10.4*   HCT 32.0* 34.4*    273    and CMP:   Recent Labs   Lab 19  0429 19  0415   * 126*   K 5.2* 5.1   CL 95 96   CO2 21* 21*   * 113*   BUN 68* 72*   CREATININE 1.3 1.3   CALCIUM 8.3* 8.2*   ANIONGAP 11 9   EGFRNONAA 52.6* 52.6*   Phosph: 3.9  M.2    Troponin: 0.010    Diagnostic Results:  CXR  FINDINGS:  Right-sided port catheter appears unchanged.    Heart and lungs  appear unchanged when allowing for differences in technique and positioning.      Impression       No significant change from prior study.     CT Head without contrast    FINDINGS:  Intracranial  compartment:    Prominence of the ventricles and sulci compatible with generalized cerebral volume loss.  No hydrocephalus.    There is patchy and confluent periventricular white matter hypoattenuation suggestive of chronic microvascular ischemic change.  No parenchymal mass, hemorrhage, edema or major vascular distribution infarct.    No extra-axial blood or fluid collections.    Skull/extracranial contents (limited evaluation):    No fracture. Mastoid air cells and paranasal sinuses are essentially clear.      Impression       No evidence of acute intracranial pathology.    Generalized cerebral volume loss and chronic microvascular ischemic changes.

## 2019-08-04 NOTE — ASSESSMENT & PLAN NOTE
New onset delirium on 8/30 characterized by disorientation, increased alertness.  No focal neurologic deficits. Ddx included from UTI vs 2/2 high dose steroids vs polypharmacy.   Plan:   -Delirium precautions in place  -Treatment of UTI with Levaquin  -Steroid dose decreased to 20mg  -Avoid any anticholinergic medications  -Will discontinue marinol as this can contribute

## 2019-08-04 NOTE — ASSESSMENT & PLAN NOTE
Patient presented to [facility] w/ Pericardial effusion and a hx of recurrent pleural effusions (newly diagnosed mesothelioma in 2019). R sided PleurX catheter in place, patient of Dr. Padilla (Pulm) with 450cc drained on admission. CXR during admission have shown BL pleural effusion w/ areas of pulmonary infiltrates L>R.  Continues to improve and satting well on 1L NC. Lung ultrasound by pulmonology/Dr. Padilla reveals no significant amount of fluid to drain.   Plan:  -Hold lasix given significant volume depletion since admission.  No significant signs of fluid overload.  -consider repeat BNP and/or echo should his fluid status continue to be in question   -PRN james

## 2019-08-04 NOTE — PROGRESS NOTES
[facility]  Hematology/Oncology  Progress Note    Patient Name: Cale Harding  Admission Date: 7/17/2019  Hospital Length of Stay: 18 days  Code Status: Full Code     Subjective:     HPI:  Mr. Cale Harding is a 78-year-old male with a past medical history of prostate cancer (s/p prostatectomy), mesothelioma (on chemotherapy), with recurrent pleural effusion  ( with a right PleurX) and hypertension who is a transfer for evaluation of pericardial effusion.       Patient was scheduled for elective cystography with Urology (Dr. Philippe) for dilation of bladder neck contracture.  On arrival to elective surgical suite, patient found to be tachycardic and heart rate was irregular.  EKG confirmed atrial fibrillation with RVR.  Surgery was cancelled and he was admitted to the ICU for a diltiazem drip. He converted to sinus but would go in and out of afib, was started on lopressor 25 BID and echo done showed moderate size of pericardial effusion with no tamponade physiology however increased in effusion from 7/5, cardiology in the OSH was planning for pericardiocentesis but requested transfer for possible window vs pericardiocentesis and was sent to [facility]    On arrival patient was hemodynamically stable  , NSR, converted in and out of afib. Cardiology was consulted for input on afib management in the setting of pericardial effusion and need for pericardiocentesis/ closer ccu monitoring.     Of note patient has a PleurX on the right that is possibly infected and he was supposed to get it removed this Friday by his pulmonologist Dr. Woodward     Hospital Course:   Admitted to hematology oncology on 7/17 for a. Fib with RVR.  The patient was found to have a possibly associated pericardial effusion we were concerned was contributing to a. Fib. CTS consulted for evaluation of pericardial effusion to evaluate need for pericardial window. Patient not strong candidate for pericardial window per CTS,  Interventional cardiology preformed paracentesis on hospital day 2 and drained 570cc from pericardial space. Patient seen by pulmonary on admission. Dr. Padilla familiar w/ patient and was able to drain 450 ccs from R sided pleurX cath.  A. Fib with RVR did not resolve following paracentesis and cardiology was consulted.  They recommended starting patient on heparin and diltiazem drip for rate control. Rate controlled and pt stepped down to PO diltiazem and lopressor but a.fib with AVR recurred and was then switched to Toprol XL 100mg.  Patient experienced improvement in shortness of breath and atrial fibrillation, but on hospital day 7 the patient developed a new rash first noticed on back and spread to chest and abdomen. Dermatology was consulted and felt this was likely drug rash from diltiazem and Cardiology was in agreement. Diltiazem discontinued and patient's Toprol XL was increased to 200mg daily for management of his atrial fibrillation.  He was started on oral prednisone taper and topical steroids for drug rash.  His rash has continued to get worse despite management and progressed caudally with increased blistering and desquamation.  Dermatology recommended starting him on prednisone 60mg daily x 5 days and topical steroids PRN for symptoms and patieint's rash has continued to improve.  TTE on 7/21 showed a reaccumulation of pericardial fluid, thoracic surgery consulted for potential pericardial window and deemed patient a poor candidate due to there likely being pericardial disease and rash on anterior chest, Interventional cardiology also felt patient was a poor candidate. Also repeat TTE on 7/26 showed stable pericardial effusion when compared to 7/26 and there was no tamponade physiology. Pulmonology continues to follow patient for bilateral malignant effusion and felt that there is nothing left to drain from left or right side. On 7/31 patient was found to have a urinary tract infection positive for  gram negative rods and he was started on Rocephin. He is unable to have haider removed due to bladder neck contracture per Urology.  On the same day, the patient missed his Toprol dose to low blood pressure and he went into a.fib with RVR again.  He was started on amiodarone drip with improvement in HR.  Cardiology recommended transitioning to PO amio + Toprol for rhythm and rate control respectively.    Interval History: Significant event overnight.  Patient with unequal pupils(5mm left and 3mm right) around 9pm without other focal neurologic deficit.  CT head negative for acute abnormality.  Patient also having some susbternal chest discomfort which he has had in the past.  EKG negative for ST changes or T wave abnormality.  Mentally improved yesterday but overnight with all of commotion did not sleep well and now confused and fidgety this AM again. More oriented this morning. Eyes with milky drainage and some irritation. Rash continues to improve.  Denies chest pain, SOB, abdominal pain,     Oncology Treatment Plan:   OP NSCLC VINORELBINE WEEKLY    Medications:  Continuous Infusions:  Scheduled Meds:   albuterol-ipratropium  3 mL Nebulization Q4H WAKE    amiodarone  400 mg Oral BID    artificial tears  1 drop Both Eyes TID    dronabinol  2.5 mg Oral BID    famotidine  20 mg Oral Daily    levoFLOXacin  750 mg Oral Every other day    metoprolol succinate  100 mg Oral Daily    predniSONE  20 mg Oral Daily    triamcinolone acetonide 0.025%   Topical (Top) BID    triamcinolone acetonide 0.1%   Topical (Top) BID     PRN Meds:albuterol-ipratropium, alteplase, Dextrose 10% Bolus, Dextrose 10% Bolus, glucagon (human recombinant), glucose, glucose, guaifenesin 100 mg/5 ml, heparin, porcine (PF), HYDROcodone-acetaminophen, ibuprofen, metoprolol, ondansetron, polyethylene glycol, ramelteon, sodium chloride 0.9%     Review of Systems   All other systems reviewed and are negative.    Objective:     Vital Signs (Most  Recent):  Temp: 97.9 °F (36.6 °C) (08/03/19 2354)  Pulse: 76 (08/04/19 0300)  Resp: 18 (08/03/19 2354)  BP: 98/61 (08/03/19 2354)  SpO2: 95 % (08/03/19 2354) Vital Signs (24h Range):  Temp:  [97.4 °F (36.3 °C)-97.9 °F (36.6 °C)] 97.9 °F (36.6 °C)  Pulse:  [62-83] 76  Resp:  [16-18] 18  SpO2:  [94 %-98 %] 95 %  BP: ()/(61-74) 98/61     Weight: 72.2 kg (159 lb 2.8 oz)  Body mass index is 23.51 kg/m².  Body surface area is 1.87 meters squared.      Intake/Output Summary (Last 24 hours) at 8/4/2019 0658  Last data filed at 8/3/2019 1700  Gross per 24 hour   Intake 1380 ml   Output 600 ml   Net 780 ml       Physical Exam   Constitutional: He is oriented to person, place, and time. He appears well-developed.   HENT:   Head: Normocephalic and atraumatic.   Eyes: Conjunctivae are normal. Right eye exhibits discharge. Left eye exhibits discharge. No scleral icterus.   Milky discharge bilaterally. Improved erythema.     Unequal pupils, L >R both equally reactive to light   Neck: Normal range of motion. Neck supple. No JVD present.   Cardiovascular: Normal rate and intact distal pulses. Exam reveals friction rub.   Irregularly irregular rhythm   Pulmonary/Chest: Effort normal. No respiratory distress. He has no wheezes. He has no rales.   Bilateral decreased breath sounds   Abdominal: Soft. Bowel sounds are normal. He exhibits no distension. There is no tenderness.   Musculoskeletal: He exhibits edema (2+ LE edema).   Neurological: He is alert and oriented to person, place, and time. No cranial nerve deficit or sensory deficit. He exhibits normal muscle tone.   Skin: Skin is warm and dry.       Significant Labs:   CBC:   Recent Labs   Lab 08/03/19  0429 08/04/19  0415   WBC 36.10* 38.18*   HGB 9.7* 10.4*   HCT 32.0* 34.4*    273    and CMP:   Recent Labs   Lab 08/03/19  0429 08/04/19  0415   * 126*   K 5.2* 5.1   CL 95 96   CO2 21* 21*   * 113*   BUN 68* 72*   CREATININE 1.3 1.3   CALCIUM 8.3* 8.2*    ANIONGAP 11 9   EGFRNONAA 52.6* 52.6*   Phosph: 3.9  M.2    Troponin: 0.010    Diagnostic Results:  CXR  FINDINGS:  Right-sided port catheter appears unchanged.    Heart and lungs  appear unchanged when allowing for differences in technique and positioning.      Impression       No significant change from prior study.     CT Head without contrast    FINDINGS:  Intracranial compartment:    Prominence of the ventricles and sulci compatible with generalized cerebral volume loss.  No hydrocephalus.    There is patchy and confluent periventricular white matter hypoattenuation suggestive of chronic microvascular ischemic change.  No parenchymal mass, hemorrhage, edema or major vascular distribution infarct.    No extra-axial blood or fluid collections.    Skull/extracranial contents (limited evaluation):    No fracture. Mastoid air cells and paranasal sinuses are essentially clear.      Impression       No evidence of acute intracranial pathology.    Generalized cerebral volume loss and chronic microvascular ischemic changes.         Assessment/Plan:     Cardiovascular  * Atrial fibrillation with RVR  First noted on ECG 7/15/19 following pericardiocentesis. History of paroxysmal atrial fibrillation from past. Ddx includes 2/2 pericardial effusion vs. Underlying malignancy. Earlier during admission patient previously not controlled with lower doses of Toprol, lopressor 50mg BID, and had drug rash with diltiazem). CHADsVASC 3 suggestive of 3.2% annual stroke risk. Not currently on heparin or lovenox due to significant hematuria experienced earlier on admission and generally patient is not a great anticoagulation candidate. Now with recurrent a. Fib with RVR after not receiving toprol dose yesterday.  Started on amiodarone gtt yesterday PM with improvement in rate. And successfully transitioned to PO amio. Patient without episodes of a. Fib with RVR overnight.  Tolerating lower dose of Toprol.  Plan:  - Appreciate  cardiology recommendations  -continue amiodarone 400mg BID for rhythm control and Toprol XL 100mg daily for rate control       -Continue telemetry and monitor for rate control   -TEDs and SCDs in place for DVT prophylaxis   -Patient with 3% stroke risk however not a good candidate for AC given bleeding on lovenox, frailty, and grave prognosis related to malignancy    Pericardial effusion without cardiac tamponade  Patient transferred to [facility] from OSH for evaluation by cardiology/CTS for pericardial window or pericardiocentesis on 7/17/19. Pericardiocentesis was preformed by interventional cards, pt tolerated procedure well, produced 570cc serosanguinous fluid on 7/18. Repeat echo on 7/21 with recurrence of effusion and echo on 7/26 with stabilization of effusion and without tamponade. CTS consulted to evaluate patient for pericardial window but feel he is a poor candidate given likelihood of disease in pericardium.  Interventional cardiology also feel this patient is poor candidate for a drain as there is no safe place to place pericardiocentesis needle.      Plan:   -Will hold off on further management or consults at the moment as effusion is stable and without tamponade physiology  -Per cardiology consider repeat echo to monitor progression    Acute on chronic diastolic heart failure  Patient with symptoms of SOB and diffuse swelling on admission who is not on lasix at home with an admission BNP of 374. Echo 7/17/2019 with Grade I LV diastolic dysfunction.  Patient has been diuresing well with improvements in shortness of breath.  Still with Pleural effusions L>R 2/2 malignancy vs overload.  Patient approaching dry weight with significant increase in BUN/Cr.  Plan:  - hold lasix as patient not requiring  -Strict I/O with Daily Standing weights  -keep Mg >2 and K>4  -Fluid restriction <1500cc/day    Essential hypertension  Currently holding all hypertensives.  Patient has been normotensive/llow since admission.  Was  previously on enalapril at home.   -discuss with cards before discharge whether patient will require ACE/ARB    Heme/Onc  Mesothelioma of left lung  Patient of Dr. Foster on palliative chemo with Gemzar with plans to switch to Vinorelbine due to disease progression.  PET CT 4/2019 with increased uptake within the enlarging 1.4 cm left upper lobe pulmonary nodule, mediastinal lymph nodes, and left pleura when compared to PET 2/2019.  Plan:   -pain currently controlled with current regimen: Norco 5-325 mg q6 PRN  -will need OP follow up with Dr. Foster.    -Hospice discussions to be taken place as outpatient with Dr. Foster    Leukocytosis  Likely multifactorial ddx includes leukemoid reaction from malignancy and/or AGEP rash.  Also patient with new CAUTI which is likely contributing.  Overall WBC downtrending  -Monitor with daily CBC        Debility  PT/OT has been seeing and recommend home health PT, however wife may not be able to take care of all of patient's needs at home by herself.   -Have SW talk to patient and family today and offer options    History of prostate cancer  S/p prostectomy.  Haider in place for urinary retention 2/2 bladder contracture.  Urology recommends keeping the haider catheter in place.    Neurological  Delirium  New onset delirium on 8/30 characterized by disorientation, increased alertness.  No focal neurologic deficits. Ddx included from UTI vs 2/2 high dose steroids vs polypharmacy.   Plan:   -Delirium precautions in place  -Will switch antibiotics as family concerned about Levaquin causing patient's symptoms  -Steroid dose decreased to 20mg  -Avoid any anticholinergic medications  -Will discontinue marinol as this can contribute    Anisocoria  No other focal neurologic deficits. CT head without acute abnormality.  Vascular Neuro called overnight and said this can happen following albuterol administration.  They are consulted and will see patient this AM-feel like it is medication  induced  Plan:  -Follow up Vascular Neurology recs. Appreciate their recommendations  -Will make duo-nebs PRN as patient's SOB improved  -Continue to monitor     Pulmonology  SOB (shortness of breath) on exertion  Patient presented to [facility] w/ Pericardial effusion and a hx of recurrent pleural effusions (newly diagnosed mesothelioma in 2019). R sided PleurX catheter in place, patient of Dr. Padilla (Pul) with 450cc drained on admission. CXR during admission have shown BL pleural effusion w/ areas of pulmonary infiltrates L>R.  Continues to improve and satting well on 1L NC. Lung ultrasound by pulmonology/Dr. Padilla reveals no significant amount of fluid to drain.   Plan:  -Hold lasix given significant volume depletion since admission.  No significant signs of fluid overload.  -consider repeat BNP and/or echo should his fluid status continue to be in question   -PRN duonebs    Malignant pleural effusion  Has R sided pleurX, associated Shortness of breath; 400 cc removed 7/30.  Patient currently asymptomatic with improving SOB.      Renal  SKINNY (acute kidney injury)  Patient's baseline Cr is 0.7-0.8 now has been steadily climbing since admission.  Suspect pre-renal in nature due to patient's tenuous fluid status-could be cardiogenic vs volume depletion.  US negative for obstruction.  Intra-renal causes not excluded.  Creatinine stable at 1.3.  -daily BMP  -avoid nephrotoxic agents  -renally dosed medications    Hyponatremia  Suspect SIADH.  Sodium has been stable over last few days and patient is asymptomatic.     Plan:  -Continue fluid restriction     Skin  Rash, drug  Drug rash thought to be result of diltiazem treatment.  Diltiazem stopped and rash continues to improve on face and trunk, now desquamating on lower extremities.  Continuing to improve.    Plan:   -continue topical TAC 0.025% BID to sensitive areas including face and groin and TAC 0.1% BID  to chest, abdomen, face   - Completed 5 days of prednisone 60mg.  Decreased dose to 20mg daily.  -Vaseline topically to erosions  -Biopsy results : Per dermatopathologist Dr. Meade, perivascular lymphocytic infiltrate with neutrophils. Also, subcorneal pustules can be seen which would be consistent with AGEP  -Appreciate dermatology recommendations    Blepharitis  Increased redness and milky drainage from lower lidw.  No evidence of conjunctivitis.  Likely 2/2 to drug rash.  -Clean area daily with normal saline  -artificial tears  -consider warms compresses if symptomati    ID  Catheter-associated urinary tract infection  Urine culture with 100,000 cfu gram negative rods. Elevated white count. Asymptomatic otherwise without abdominal pain. Urology recommends leaving catheter in place at this time due to contracture of bladder neck.  Urine culture now growing Klebsiella pneumonia sensitive to Ertapenem, Amikacin, Meropenem, Zosyn, and Levoquin    Plan:  -Received Levaquin x 2 doses (3 days).  Will switch due to another antibiotic per family request(they are worried this is contributing to mental status).   -Start Zosyn starting tomorrow x 4 days       Urology  Acquired contracture of bladder neck  Urology consulted in the OSH. S/p Bladder neck contracture release and urethral dilation on 7/15/19. Patient of Dr. Philippe.    Plan:  - Haider in place  - Cleared for d/c from urology perspective, to f/u w/ Dr. Philippe  - Increased leak noted from penis around haider, most likely due to lasix and anasarca    GI  Gastroesophageal reflux disease  -Continue Pepcid daily    Decrease in appetite  Patient w/ reported hx of poor appetite since beginning chemotherapy  Plan:  - Continue Ensures with every meal  -appetite was improved with Marinol but have d/c'd due to delirium    Dispo: pending improved confusion, antibiotic treatment, safe home environment    Justin Blackwell MD, PGY-1  Hematology/Oncology  [facility]

## 2019-08-04 NOTE — SIGNIFICANT EVENT
Acute change to Left pupil 5mm vs 3mm R. No other deficits noted. Following commands, oriented to self, time place situation. No facial drooping or slurred speech noted.  Notified oncall Dr Greenberg and CORE Nurse Denia. Assessed at bedside by both. During assessment reported CP w pain 2-3 which he has had before. VSS stable. Applied 2L NC for comfort. Denies symptoms. Troponin sent. Awaiting CT. Spouse and son present throughout event and up to date on POC. Will CTM

## 2019-08-04 NOTE — ASSESSMENT & PLAN NOTE
No other focal neurologic deficits. CT head without acute abnormality.  Vascular Neuro called overnight and said this can happen following albuterol administration.  They are consulted and will see patient this AM.  -Follow up Vascular Neurology recs. Appreciate their recommendations  -Continue to monitor

## 2019-08-04 NOTE — CARE UPDATE
Called to bedside for newly found anisocoria of the left eye. Patient with no other focal neurological deficits.     Will order STAT head CT. Spoke with neurology who agree with placing consult to vascular neurology and continuing with scan.   Will f/u                Katherine Greenberg MD  Resident Physician - PGY3

## 2019-08-04 NOTE — CARE UPDATE
"RAPID RESPONSE NURSE PROACTIVE ROUNDING NOTE     Time of Visit:     Admit Date: 2019  LOS: 18  Code Status: Full Code   Date of Visit: 2019  : 1940  Age: 78 y.o.  Sex: male  Race: White  Bed: 823/823 A:   MRN: [id number]  Was the patient discharged from an ICU this admission? no   Was the patient discharged from a PACU within last 24 hours?  no  Did the patient receive conscious sedation/general anesthesia in last 24 hours?  no  Was the patient in the ED within the past 24 hours?  no  Was the patient started on NIPPV within the past 24 hours?  no  Attending Physician: Mignon North MD  Primary Service: [facility] MEDICAL ONCOLOGY    ASSESSMENT     Diagnosis: Atrial fibrillation with RVR    Abnormal Vital Signs: BP 98/61 (BP Location: Right arm, Patient Position: Sitting)   Pulse 75   Temp 97.9 °F (36.6 °C) (Oral)   Resp 18   Ht 5' 9" (1.753 m)   Wt 72.2 kg (159 lb 2.8 oz)   SpO2 95%   BMI 23.51 kg/m²      Clinical Issues: Neuro    Patient  has a past medical history of Disorder of kidney and ureter, Diverticulitis, Elevated PSA, Hypertension, Lung cancer, Mesothelioma, Prostate cancer, and Urinary tract infection.    Called to bedside for left pupil change. No other deficits voiced by bedside RN.     INTERVENTIONS/ RECOMMENDATIONS     Arrived to bedside at . Upon entering room, bedside RN obtaining vitals, awaiting MD arrival. Patient's wife and son at bedside. RN states when going to apply patient's night time eye drops she noticed that the left pupil was not equal to the right but that the patient was otherwise at baseline with no other deficits. At  Dr. Greenberg arrived to bedside, STAT CT ordered, call placed to vascular neurology who also agreed with scan. MD did not want stroke code called. Will await CT to call with scan time. Please call this Rapid nurse if you need assistance with transport.     Discussed plan of care with RNClement and charge RNNettie.    PHYSICIAN ESCALATION "     Yes/No  yes    Orders received and case discussed with Dr. Greenberg.    Disposition: Remain in room 823.    FOLLOW-UP     Call back the Rapid Response Nurse, Denia Harman RN at [phone] for additional questions or concerns.

## 2019-08-04 NOTE — ASSESSMENT & PLAN NOTE
Patient [place] [facility] [place] for evaluation by cardiology/CTS for pericardial window or pericardiocentesis on 7/17/19. Pericardiocentesis was preformed by interventional cards, pt tolerated procedure well, produced 570cc serosanguinous fluid on 7/18. Repeat echo on 7/21 with recurrence of effusion and echo on 7/26 with stabilization of effusion and without tamponade. CTS consulted to evaluate patient for pericardial window but feel he is a poor candidate given likelihood of disease in pericardium.  Interventional cardiology also feel this patient is poor candidate for a drain as there is no safe place to place pericardiocentesis needle.      Plan:   -Will hold off on further management or consults at the moment as effusion is stable and without tamponade physiology  -Per cardiology consider repeat echo to monitor progression

## 2019-08-04 NOTE — PLAN OF CARE
Problem: Adult Inpatient Plan of Care  Goal: Plan of Care Review  Outcome: Ongoing (interventions implemented as appropriate)  POC discussed w patient and spouse at beginning of shift and PRN; Pt remains free of fall or injury. Cano in place. Dressing changed and ointment applied by spouse this shift. Developed Anisocoria and had episode of CP all studies negative for acute event. Patient has not slept tonight and continues w transient confusion and restlessness. Family refused marinol tonight. Call light in reach. instructed to call as needed, telemetry monitoring continued, VSS and afebrile. Bed locked in low position call light in reach.Will CTM .

## 2019-08-04 NOTE — PLAN OF CARE
Problem: Adult Inpatient Plan of Care  Goal: Plan of Care Review  Outcome: Ongoing (interventions implemented as appropriate)  Patient oriented to person, place, and situation, but disoriented to time, and especially in the early morning hours appears with delirium (searching for sticks in his blankets). VSS, afebrile, and without injury. Fall precautions maintained. Patient instructed on how to contact the nurse. Family at bedside. No complaints of nausea; complaints of generalized pain. Cano catheter in place draining yellow urine. Ointment applied to skin with rash. Patient weaned to room air; O2 sats in mid 90s. Levaquin discontinued; IV zosyn initiated. Patient up to chair this afternoon. Patient on regular diet with poor appetite today. Cardiac monitoring continued; patient HR 60s-70s. Questions and concerns have been addressed; will continue to monitor.

## 2019-08-04 NOTE — ASSESSMENT & PLAN NOTE
Drug rash thought to be result of diltiazem treatment.  Diltiazem stopped and rash continues to improve on face and trunk, now desquamating on lower extremities.  Continuing to improve.    Plan:   -continue topical TAC 0.025% BID to sensitive areas including face and groin and TAC 0.1% BID  to chest, abdomen, face   - Completed 5 days of prednisone 60mg. Decreased dose to 20mg daily.  -Vaseline topically to erosions  -Biopsy results : Per dermatopathologist Dr. Meade, perivascular lymphocytic infiltrate with neutrophils. 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

## 2019-08-04 NOTE — CONSULTS
[facility]  Vascular Neurology  Comprehensive Stroke Center  Consult Note    Inpatient consult to Vascular (Stroke) Neurology  Consult performed by: Hannah Peterson DNP, NP  Consult ordered by: Katherine Greenberg MD  Reason for consult: anisocoria        Assessment/Plan:     Anisocoria  78 y.o. male with significant medical history of prostate CA (s/p prostatectomy), mesothelioma (on chemotherapy), HTN presented to hospital for an elective cystography w/Urology and was noted to have A-fib w/RVR in the surgical suite.  He was admitted to Hospital Medicine for management.  Tonight (8/3/19) the patient was noted to have unequal pupils but no other focal neuro deficits.  He has been receiving duonebs q4hrs.  A CTH was obtained and was negative for acute findings.    -Exam negative for focal neuro deficit, NIH 0.  Pupils seem equal in the dark, unequal in the light but reactive in either circumstance.  The patient has no associated symptoms.  Symptom may be related to medication side effect (such as duoneb).  -Consider obtaining pupillometer reading for accuracy.        STROKE DOCUMENTATION          NIH Scale:  Interval: baseline  1a. Level of Consciousness: 0-->Alert, keenly responsive  1b. LOC Questions: 0-->Answers both questions correctly  1c. LOC Commands: 0-->Performs both tasks correctly  2. Best Gaze: 0-->Normal  3. Visual: 0-->No visual loss  4. Facial Palsy: 0-->Normal symmetrical movements  5a. Motor Arm, Left: 0-->No drift, limb holds 90 (or 45) degrees for full 10 secs  5b. Motor Arm, Right: 0-->No drift, limb holds 90 (or 45) degrees for full 10 secs  6a. Motor Leg, Left: 0-->No drift, leg holds 30 degree position for full 5 secs  6b. Motor Leg, Right: 0-->No drift, leg holds 30 degree position for full 5 secs  7. Limb Ataxia: 0-->Absent  8. Sensory: 0-->Normal, no sensory loss  9. Best Language: 0-->No aphasia, normal  10. Dysarthria: 0-->Normal  11. Extinction and Inattention  (formerly Neglect): 0-->No abnormality  Total (NIH Stroke Scale): 0    Modified Mesa Score: 1  Roldan Coma Scale:14   ABCD2 Score:    LANH2AC3-OBK Score:3  HAS -BLED Score:   ICH Score:   Hunt & Mittal Classification:       Thrombolysis Candidate? No, Strong suspicion for stroke mimic or alternative diagnosis     Delays to Thrombolysis?  No    Interventional Revascularization Candidate?   Is the patient eligible for mechanical endovascular reperfusion (AMEENA)?  symptom not likely due to stroke      Hemorrhagic change of an Ischemic Stroke: Does this patient have an ischemic stroke with hemorrhagic changes? No     Subjective:     History of Present Illness:  Patient is a 78 y.o. male with significant medical history of prostate CA (s/p prostatectomy), mesothelioma (on chemotherapy), HTN presented to hospital for an elective cystography w/Urology and was noted to have A-fib w/RVR in the surgical suite.  The procedure was cancelled and the patient was taken to the ED where Cardiology was consulted and he was started on a diltiazem drip.  He was admitted to Hospital Medicine.  Today (8/03/19) the patient's nurse was attempting to place eye drops in the patient's eyes when she noted his pupils were unequal.  The patient had no other focal neuro deficits at that time.  A CTH was obtained and was negative for acute findings.  Of note, the patient is receiving duonebs q4 hrs.  On exam the patients pupils appear to be the same size in the dark, subtly unequal w/lights on, but reactive to light in both instances.  The patient denies HA, weakness, dizziness, worse coordination than baseline.  NIH 0.              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    Prostate cancer 2002    Urinary tract infection      Past Surgical History:   Procedure Laterality Date    BIOPSY-DIAPHRAGM N/A 3/19/2018     Performed by [facility]ino Fang [facility] at [facility] OR [address] [place] [address]    JLABZK-FDINXA-GI N/A 2/1/2018    Performed by [facility] at [facility] OR [address] [place] [address]    BRONCHOSCOPY N/A 6/3/2019    Performed by [facility] at [facility] OR [address] [place] [address]    COLONOSCOPY  10/20/2012    COLONOSCOPY  11/19/2014    dr mcahado ( repeat in 3 years per the pt )    CYSTOSCOPY, WITH RETROGRADE PYELOGRAM Bilateral 7/15/2019    Performed by [facility] at [facility]    CYSTOSCOPY, WITH URETHRAL DILATION Bilateral 7/15/2019    Performed by [facility] at [facility]    ELBOW SURGERY Left 2013    dislocation    MEUJGARYS-IGCI-P-CATH N/A 6/20/2019    Performed by [facility] at [facility]    LAPAROSCOPY-DIAGNOSTIC N/A 3/19/2018    [facility]    Pericardiocentesis N/A 7/18/2019    Performed by Frank Javier MD at [facility] CATH LAB    PROCTECTOMY  2002    RELEASE, CONTRACTURE, BLADDER NECK N/A 7/15/2019    Performed by [facility] at [facility]    SHOULDER ARTHROSCOPY W/ ROTATOR CUFF REPAIR Right 2008    THORACOSCOPY-VIDEO ASSISTED (VATS) W/PLEURAL BIOPSY Left 3/19/2018    Performed by [facility] Fang MD at [facility] OR [address] [place] [address]    URETHROGRAM, RETROGRADE N/A 7/15/2019    Performed by [facility]ino Philippe MD at [facility]     Family History   Problem Relation Age of Onset    Heart disease Father     Heart disease Brother     Prostate cancer Brother     Cancer Mother         [facility]    Heart disease Sister     Heart attack Sister     No Known Problems Son     Heart disease Brother     Prostate cancer Brother     Heart disease Sister     Heart attack Sister     No Known Problems Son     No Known Problems Son     Heart disease Brother     Prostate cancer Brother     Kidney disease Neg Hx     Asthma Neg Hx     Emphysema Neg Hx      Social History     Tobacco Use    Smoking status: Former Smoker     Packs/day: 2.00     Years: 15.00      Pack years: 30.00     Types: Cigarettes     Last attempt to quit: 1970     Years since quittin.6    Smokeless tobacco: Former User    Tobacco comment: pt quit 40 years ago   Substance Use Topics    Alcohol use: No     Alcohol/week: 16.8 oz     Types: 28 Cans of beer per week     Comment: None since Nov 15 th 2017    Drug use: No     Review of patient's allergies indicates:   Allergen Reactions    Bactrim [sulfamethoxazole-trimethoprim] Other (See Comments)     Joint pains       Medications: I have reviewed the current medication administration record.    Medications Prior to Admission   Medication Sig Dispense Refill Last Dose    dextromethorphan-guaifenesin  mg (MUCINEX DM)  mg per 12 hr tablet Take 1 tablet by mouth every 12 (twelve) hours.   More than a month    HYDROcodone-acetaminophen (NORCO) 5-325 mg per tablet Take 1 tablet by mouth every 6 (six) hours as needed for Pain. 60 tablet 0 2019    polyethylene glycol (MIRALAX) 17 gram/dose powder Take 17 g by mouth once daily.   Past Month    [DISCONTINUED] amoxicillin-clavulanate 875-125mg (AUGMENTIN) 875-125 mg per tablet Take 1 tablet by mouth 2 (two) times daily. 14 tablet 0 2019    [DISCONTINUED] calcium carbonate (TUMS) 200 mg calcium (500 mg) chewable tablet Take 1 tablet by mouth as needed for Heartburn.   More than a month    [DISCONTINUED] enalapril (VASOTEC) 5 MG tablet Take 1 tablet (5 mg total) by mouth 2 (two) times daily. 180 tablet 3 2019    [DISCONTINUED] ibuprofen (ADVIL,MOTRIN) 100 MG tablet Take 100 mg by mouth every 6 (six) hours as needed for Temperature greater than.   2019    [DISCONTINUED] LACTOBACILLUS ACIDOPHILUS (ACIDOPHILUS ORAL) Take 1 tablet by mouth once daily.   More than a month    [DISCONTINUED] phenylephrine HCl/acetaminophn (SINUS PAIN-PRESSURE, PE, ORAL) Take by mouth.   Past Month       Review of Systems   Unable to perform ROS: Other   HENT: Negative for drooling.     Eyes: Positive for discharge. Negative for photophobia, pain and redness.   Respiratory: Negative for choking.    Cardiovascular: Negative for leg swelling.   Gastrointestinal: Negative for nausea and vomiting.   Skin: Positive for rash.   Allergic/Immunologic: Negative for environmental allergies and food allergies.   Neurological: Negative for dizziness, speech difficulty, weakness and headaches.   Psychiatric/Behavioral: Positive for confusion.     Objective:     Vital Signs (Most Recent):  Temp: 97.9 °F (36.6 °C) (08/03/19 2354)  Pulse: 76 (08/04/19 0300)  Resp: 18 (08/03/19 2354)  BP: 98/61 (08/03/19 2354)  SpO2: 95 % (08/03/19 2354)    Vital Signs Range (Last 24H):  Temp:  [97.4 °F (36.3 °C)-97.9 °F (36.6 °C)]   Pulse:  [62-83]   Resp:  [16-18]   BP: ()/(61-74)   SpO2:  [94 %-98 %]     Physical Exam   Constitutional: He appears well-developed and well-nourished. No distress.   HENT:   Head: Normocephalic and atraumatic.   Right Ear: External ear normal.   Left Ear: External ear normal.   Eyes: Conjunctivae and EOM are normal. Right eye exhibits discharge. Left eye exhibits discharge.   Ptosis of the left eye that improved during the course of the exam.  L pupil appears slightly larger in with the lights on.  In the dark, pupils appear equal.   Neck: Normal range of motion. Neck supple.   Cardiovascular: Normal rate.   Pulmonary/Chest: Effort normal. No respiratory distress.   Abdominal: Soft. Bowel sounds are normal. He exhibits no distension.   Musculoskeletal: He exhibits no edema.   Neurological: He is alert. No sensory deficit. Coordination normal. GCS eye subscore is 4. GCS verbal subscore is 4. GCS motor subscore is 6.   Skin: Skin is warm and dry. Rash noted. He is not diaphoretic. There is erythema.   Nursing note and vitals reviewed.      Neurological Exam:   LOC: alert  Language: No aphasia  Articulation: No dysarthria  EOM (CN III, IV, VI): Full/intact  Facial Sensation (CN V):  Normal  Facial Movement (CN VII): Symmetric facial expression    Cebellar: No evidence of appendicular or axial ataxia  Sensation: Intact to light touch, temperature and vibration      Laboratory:  CMP:   Recent Labs   Lab 08/04/19  0415   CALCIUM 8.2*   *   K 5.1   CO2 21*   CL 96   BUN 72*   CREATININE 1.3     CBC:   Recent Labs   Lab 08/04/19  0415   WBC 38.18*   RBC 3.88*   HGB 10.4*   HCT 34.4*      MCV 89   MCH 26.8*   MCHC 30.2*     Lipid Panel: No results for input(s): CHOL, LDLCALC, HDL, TRIG in the last 168 hours.  Coagulation: No results for input(s): PT, INR, APTT in the last 168 hours.  Hgb A1C: No results for input(s): HGBA1C in the last 168 hours.  TSH: No results for input(s): TSH in the last 168 hours.    Diagnostic Results:      Brain imaging:  [facility] 08/03/19 No evidence of acute intracranial pathology.  Generalized cerebral volume loss and chronic microvascular ischemic changes    Vessel Imaging:  None    Cardiac Evaluation:   Last TTE 7/26/19   Conclusion     · Limited study .  · Normal left ventricular systolic function. The estimated ejection fraction is 60%  · Circumferential pericardial effusion; unchanged from 7/21/19. No evidence of tamponade.  · Intermediate central venous pressure (8 mm Hg).           Hannah Peterson, SHARRI, NP  [facility]  Department of Vascular Neurology   [facility]

## 2019-08-04 NOTE — ASSESSMENT & PLAN NOTE
Urine culture with 100,000 cfu gram negative rods. Elevated white count. Asymptomatic otherwise without abdominal pain. Urology recommends leaving catheter in place at this time due to contracture of bladder neck.  Urine culture now growing Klebsiella pneumonia sensitive to Ertapenem, Amikacin, Meropenem, Zosyn, and Levoquin    Plan:  -Continue renally adjusted Levaquin 750mg q48 PO for 10 day course(this is day 3/10)

## 2019-08-04 NOTE — HPI
Patient is a 78 y.o. male with significant medical history of prostate CA (s/p prostatectomy), mesothelioma (on chemotherapy), HTN presented to hospital for an elective cystography w/Urology and was noted to have A-fib w/RVR in the surgical suite.  The procedure was cancelled and the patient was taken to the ED where Cardiology was consulted and he was started on a diltiazem drip.  He was admitted to Hospital Medicine.  Today (8/03/19) the patient's nurse was attempting to place eye drops in the patient's eyes when she noted his pupils were unequal.  The patient had no other focal neuro deficits at that time.  A CTH was obtained and was negative for acute findings.  Of note, the patient is receiving duonebs q4 hrs.  On exam the patients pupils appear to be the same size in the dark, subtly unequal w/lights on, but reactive to light in both instances.  The patient denies HA, weakness, dizziness, worse coordination than baseline.  NIH 0.

## 2019-08-04 NOTE — ASSESSMENT & PLAN NOTE
Increased redness and milky drainage from lower lidw.  No evidence of conjunctivitis.  Likely 2/2 to drug rash.  -Clean area daily with normal saline  -artificial tears  -consider warms compresses if symptomatic

## 2019-08-05 PROBLEM — I10 ESSENTIAL HYPERTENSION: Status: RESOLVED | Noted: 2019-01-01 | Resolved: 2019-01-01

## 2019-08-05 PROBLEM — H01.009 BLEPHARITIS: Status: RESOLVED | Noted: 2019-01-01 | Resolved: 2019-01-01

## 2019-08-05 PROBLEM — H57.02 ANISOCORIA: Status: RESOLVED | Noted: 2019-01-01 | Resolved: 2019-01-01

## 2019-08-05 NOTE — ASSESSMENT & PLAN NOTE
PT/OT has been seeing and recommend home health PT, however wife may not be able to take care of all of patient's needs at home by herself.   -Home health PT on discharge  -family interested in additional assistance with [facility] also has hospice services

## 2019-08-05 NOTE — PLAN OF CARE
MDRs completed with Dr. North and the team. Plans for patient to discharge home today with home health services. The  is assisting patient and patient's family with home health arrangements. Follow-up appointments requested by this CM (see CM's previous note). Discharge and follow-up instructions to be completed by the bedside nurse.    Future Appointments   Date Time Provider Department Center   8/8/2019  8:20 AM LAB, HEMONC [facility] LAB HO [facility]   8/8/2019  9:30 AM Sintia Foster MD [facility] HEM ONC [facility]   8/9/2019 11:00 AM Galdino Philippe MD [facility] [place]   8/26/2019  4:00 PM Jessica Woodward MD [facility] PULMSVC [address]   12/20/2019  3:00 PM Galdino Philippe MD [facility] [place]      08/05/19 1120   Final Note   Assessment Type Final Discharge Note   Anticipated Discharge Disposition Home-Health   What phone number can be called within the next 1-3 days to see how you are doing after discharge?   ([phone])   Hospital Follow Up  Appt(s) scheduled? Yes   Discharge plans and expectations educations in teach back method with documentation complete? Yes  (per bedside nurse)

## 2019-08-05 NOTE — ASSESSMENT & PLAN NOTE
No other focal neurologic deficits. CT head without acute abnormality.  Vascular Neuro feel this is 2/2 medication(albuterol nebs).  -d/c'd albuterol nebs as patient not SOB  -Continue to monitor

## 2019-08-05 NOTE — ASSESSMENT & PLAN NOTE
Suspect SIADH.  Sodium improving over last few days and patient is asymptomatic.     Plan:  -Continue fluid restriction

## 2019-08-05 NOTE — ASSESSMENT & PLAN NOTE
First noted on ECG 7/15/19 following pericardiocentesis. History of paroxysmal atrial fibrillation from past. Ddx includes 2/2 pericardial effusion vs. Underlying malignancy. Earlier during admission patient previously not controlled with lower doses of Toprol, lopressor 50mg BID, and had drug rash with diltiazem). CHADsVASC 3 suggestive of 3.2% annual stroke risk. Not currently on heparin or lovenox due to significant hematuria experienced earlier on admission and generally patient is not a great anticoagulation candidate. Now with recurrent a. Fib with RVR after not receiving toprol dose yesterday.  Started on amiodarone gtt yesterday PM with improvement in rate. And successfully transitioned to PO amio. Patient without episodes of a. Fib with RVR since adjustment.  Tolerating lower dose of Toprol.  Plan:  - Appreciate cardiology recommendations  -continue amiodarone 400mg BID for rhythm control and Toprol XL 100mg daily for rate control       -Continue telemetry and monitor for rate control   -TEDs and SCDs in place for DVT prophylaxis   -Patient with 3% stroke risk however not a good candidate for AC given bleeding on lovenox, frailty, and grave prognosis related to malignancy

## 2019-08-05 NOTE — PLAN OF CARE
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 Stopped benadryl. Family at bedside.       SOB (shortness of breath) on exertion [R06.02]  Yes     Priority: 2      Patient w/ SOB on exertion. Pericardial effusion drained. 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 Dermatology was consulted and is in agreement. They preformed a biopsy, path pending.      Atrial fibrillation with RVR [I48.91]  Yes     Priority: 5     Anisocoria [H57.02]  Clinically Undetermined    Debility [R53.81]  Unknown    Blepharitis [H01.009]  Unknown    Hyponatremia [E87.1]  No    Leukocytosis [D72.829]  Yes    Decrease in appetite [R63.0]  Yes     Patient reports a decrease in his appetite since beginning chemotherapy.      Compression of vein [I87.1]  Yes    Acute on chronic diastolic heart failure [I50.33]  Yes    Essential hypertension [I10]  Yes    Acquired contracture of bladder neck [N32.0]  Yes    Gastroesophageal reflux disease [K21.9]  Yes    History of prostate cancer [Z85.46]  Not  Applicable      Resolved Hospital Problems    Diagnosis Date Resolved POA    Drug rash [L27.0] 07/31/2019 Unknown    Gross hematuria [R31.0] 07/26/2019 No       Future Appointments   Date Time Provider Department Center   8/8/2019  8:20 AM LAB, HEMONC CANCER [facility] LAB HO Thomas Sanchez   8/8/2019  9:30 AM Sintia Foster MD [facility] HEM ONC Thomas Saabjeremy   8/9/2019 11:00 AM Galdino Philippe MD [facility]   12/20/2019  3:00 PM Galdino Philippe MD [facility] [place]     Follow-up Information     Sintia Foster MD.    Specialties:  Hematology and Oncology, Hematology  Why:  Follow-Up Appointment as scheduled by the clinic  Contact information:  [address] [place] [id number]  [phone]             Schedule an appointment as soon as possible for a visit with Galdino Philippe MD.    Specialty:  Urology  Why:  follow up TURBNC, with hematuria   Contact information:  [address] [place] [address]  [address]  [facility] [address] [phone]                     I have seen and examined this patient face to face today. My clinical findings that support the need for the home health skilled services and home bound status are the following:  Weakness/numbness causing balance and gait disturbance due to Weakness/Debility and Malignancy/Cancer making it taxing to leave home.  Requiring assistive device to leave home due to unsteady gait caused by  Weakness/Debility and Malignancy/Cancer.    Allergies:  Review of patient's allergies indicates:   Allergen Reactions    Bactrim [sulfamethoxazole-trimethoprim] Other (See Comments)     Joint pains       Diet: regular diet    Activities: activity as tolerated    Nursing:   SN to complete comprehensive assessment including routine vital signs. Instruct on disease process and s/s of complications to report to MD. Review/verify medication list sent home with the patient at time of discharge  and instruct patient/caregiver as needed. Frequency may be adjusted depending on start of care  date.    Notify MD if SBP > 160 or < 90; DBP > 90 or < 50; HR > 120 or < 50; Temp > 101; Other:   N/A      CONSULTS:    Physical Therapy to evaluate and treat. Evaluate for home safety and equipment needs; Establish/upgrade home exercise program. Perform / instruct on therapeutic exercises, gait training, transfer training, and Range of Motion.  Occupational Therapy to evaluate and treat. Evaluate home environment for safety and equipment needs. Perform/Instruct on transfers, ADL training, ROM, and therapeutic exercises.  Aide to provide assistance with personal care, ADLs, and vital signs.    MISCELLANEOUS CARE:  Home Infusion Therapy:   SN to perform Infusion Therapy/Central Line Care.  Review Central Line Care & Central Line Flush with patient.    Administer (drug and dose): Piperacillin-tazobactam 4.5 g in sodium chloride 0.9% 100 mL IVPB every 8 hours   Last dose given: 1400 on 8/5/2019                   Home dose due: 2000 on 8/5/2019    Antibiotic end date and time of last dose: 8/8/2019 at 2000    To be administered via patient's port    Scrub the Hub: Prior to accessing the line, always perform a 30 second alcohol scrub  Each lumen of the central line is to be flushed at least daily with 10 mL Normal Saline and 3 mL Heparin flush (10 units/mL)  Skilled Nurse (SN) may draw blood from IV access  Blood Draw Procedure:   - Aspirate at least 5 mL of blood   - Discard   - Obtain specimen   - Change injection cap   - Flush with 20 mL Normal Saline followed by a                 3-5 mL Heparin flush (10 units/mL)  Central :   - Sterile dressing changes are done weekly and as needed.   - Use chlor-hexadine scrub to cleanse site, apply Biopatch to insertion site,       apply securement device dressing   - Injection caps are changed weekly and after EVERY lab draw.   - If sterile gauze is under dressing to control oozing,                 dressing change must be performed every 24 hours until gauze is  not needed.    WOUND CARE ORDERS  n/a      Medications: Review discharge medications with patient and family and provide education.      Current Discharge Medication List      START taking these medications    Details   albuterol-ipratropium (DUO-NEB) 2.5 mg-0.5 mg/3 mL nebulizer solution Inhale 3 ml's (1 vial) by nebulization every 6 (six) hours as needed for Wheezing or Shortness of Breath. Rescue  Qty: 180 mL, Refills: 0      amiodarone (PACERONE) 400 MG tablet Take 1 tablet (400 mg total) by mouth 2 (two) times daily.  Qty: 60 tablet, Refills: 0      artificial tears (ISOPTO TEARS) 0.5 % ophthalmic solution Place 1 drop into both eyes 3 (three) times daily.      famotidine (PEPCID) 20 MG tablet Take 1 tablet (20 mg total) by mouth once daily.  Qty: 30 tablet, Refills: 0      metoprolol succinate (TOPROL-XL) 100 MG 24 hr tablet Take 1 tablet (100 mg total) by mouth once daily.  Qty: 30 tablet, Refills: 0    Associated Diagnoses: Atrial fibrillation with RVR      ondansetron (ZOFRAN-ODT) 8 MG TbDL Dissolve 1 tablet (8 mg total) by mouth every 12 (twelve) hours as needed.  Qty: 60 tablet, Refills: 0      piperacillin sodium/tazobactam (PIPERACILLIN-TAZOBACTAM 4.5G/100ML SODIUM CHLORIDE 0.9%-READY TO MIX) Inject 100 mLs (4.5 g total) into the vein every 8 (eight) hours. for 3 days  Qty: 900 mL, Refills: 0      predniSONE (DELTASONE) 20 MG tablet Take 1 tablet (20 mg total) by mouth once daily. for 3 days  Qty: 3 tablet, Refills: 0      triamcinolone acetonide 0.025% (KENALOG) 0.025 % Oint Apply topically 2 (two) times daily as needed. For itching  Qty: 15 g, Refills: 1    Associated Diagnoses: Drug rash      triamcinolone acetonide 0.1% (KENALOG) 0.1 % cream Apply topically 2 (two) times daily as needed. For itching  Qty: 453.6 g, Refills: 1    Associated Diagnoses: Drug rash         CONTINUE these medications which have NOT CHANGED    Details   dextromethorphan-guaifenesin  mg (MUCINEX DM)  mg per 12 hr  tablet Take 1 tablet by mouth every 12 (twelve) hours.      HYDROcodone-acetaminophen (NORCO) 5-325 mg per tablet Take 1 tablet by mouth every 6 (six) hours as needed for Pain.  Qty: 60 tablet, Refills: 0    Associated Diagnoses: Neoplasm related pain      polyethylene glycol (MIRALAX) 17 gram/dose powder Take 17 g by mouth once daily.         STOP taking these medications       amoxicillin-clavulanate 875-125mg (AUGMENTIN) 875-125 mg per tablet Comments:   Reason for Stopping:         calcium carbonate (TUMS) 200 mg calcium (500 mg) chewable tablet Comments:   Reason for Stopping:         enalapril (VASOTEC) 5 MG tablet Comments:   Reason for Stopping:         ibuprofen (ADVIL,MOTRIN) 100 MG tablet Comments:   Reason for Stopping:         LACTOBACILLUS ACIDOPHILUS (ACIDOPHILUS ORAL) Comments:   Reason for Stopping:         phenylephrine HCl/acetaminophn (SINUS PAIN-PRESSURE, PE, ORAL) Comments:   Reason for Stopping:               I certify that this patient is confined to his home and needs intermittent skilled nursing care, physical therapy and occupational therapy.    Justin Blackwell MD  Hematology/Oncology

## 2019-08-05 NOTE — PROGRESS NOTES
[facility]  Hematology/Oncology  Progress Note    Patient Name: Cale Harding  Admission Date: 7/17/2019  Hospital Length of Stay: 19 days  Code Status: Full Code     Subjective:     HPI:  Mr. Cale Harding is a 78-year-old male with a past medical history of prostate cancer (s/p prostatectomy), mesothelioma (on chemotherapy), with recurrent pleural effusion  ( with a right PleurX) and hypertension who is a transfer for evaluation of pericardial effusion.       Patient was scheduled for elective cystography with Urology (Dr. Philippe) for dilation of bladder neck contracture.  On arrival to elective surgical suite, patient found to be tachycardic and heart rate was irregular.  EKG confirmed atrial fibrillation with RVR.  Surgery was cancelled and he was admitted to the ICU for a diltiazem drip. He converted to sinus but would go in and out of afib, was started on lopressor 25 BID and echo done showed moderate size of pericardial effusion with no tamponade physiology however increased in effusion from 7/5, cardiology in the OSH was planning for pericardiocentesis but requested transfer for possible window vs pericardiocentesis and was sent to [facility]    On arrival patient was hemodynamically stable  , NSR, converted in and out of afib. Cardiology was consulted for input on afib management in the setting of pericardial effusion and need for pericardiocentesis/ closer ccu monitoring.     Of note patient has a PleurX on the right that is possibly infected and he was supposed to get it removed this Friday by his pulmonologist Dr. Woodward     Hospital Course:   Admitted to hematology oncology on 7/17 for a. Fib with RVR.  The patient was found to have a possibly associated pericardial effusion we were concerned was contributing to a. Fib. CTS consulted for evaluation of pericardial effusion to evaluate need for pericardial window. Patient not strong candidate for pericardial window per CTS,  Interventional cardiology preformed paracentesis on hospital day 2 and drained 570cc from pericardial space. Patient seen by pulmonary on admission. Dr. Padilla familiar w/ patient and was able to drain 450 ccs from R sided pleurX cath.  A. Fib with RVR did not resolve following paracentesis and cardiology was consulted.  They recommended starting patient on heparin and diltiazem drip for rate control. Rate controlled and pt stepped down to PO diltiazem and lopressor but a.fib with AVR recurred and was then switched to Toprol XL 100mg.  Patient experienced improvement in shortness of breath and atrial fibrillation, but on hospital day 7 the patient developed a new rash first noticed on back and spread to chest and abdomen. Dermatology was consulted and felt this was likely drug rash from diltiazem and Cardiology was in agreement. Diltiazem discontinued and patient's Toprol XL was increased to 200mg daily for management of his atrial fibrillation.  He was started on oral prednisone taper and topical steroids for drug rash.  His rash has continued to get worse despite management and progressed caudally with increased blistering and desquamation.  Dermatology recommended starting him on prednisone 60mg daily x 5 days and topical steroids PRN for symptoms and patieint's rash has continued to improve.  TTE on 7/21 showed a reaccumulation of pericardial fluid, thoracic surgery consulted for potential pericardial window and deemed patient a poor candidate due to there likely being pericardial disease and rash on anterior chest, Interventional cardiology also felt patient was a poor candidate. Also repeat TTE on 7/26 showed stable pericardial effusion when compared to 7/26 and there was no tamponade physiology. Pulmonology continues to follow patient for bilateral malignant effusion and felt that there is nothing left to drain from left or right side. On 7/31 patient was found to have a urinary tract infection positive for  gram negative rods and he was started on Rocephin. He is unable to have haider removed due to bladder neck contracture per Urology.  On the same day, the patient missed his Toprol dose to low blood pressure and he went into a.fib with RVR again.  He was started on amiodarone drip with improvement in HR.  Cardiology recommended transitioning to PO amio + Toprol for rhythm and rate control respectively.  On hospital day 17 the patient developed delirium we believe 2/2 steroids, uti, and length of hospital stay.     Interval History: No events overnight.  Patient able to get good night sleep last night with some improvement in confusion.  Today he is more oriented, more appropriate in conversation and less distracted.  He also said he is feeling better overall.  Appetite still poor. Rash continues to improve.  No episodes of a.fib with RVR. Denies chest pain, shortness of breath, abdominal pain, fever, chills.     Oncology Treatment Plan:   OP NSCLC VINORELBINE WEEKLY    Medications:  Continuous Infusions:  Scheduled Meds:   amiodarone  400 mg Oral BID    artificial tears  1 drop Both Eyes TID    famotidine  20 mg Oral Daily    metoprolol succinate  100 mg Oral Daily    piperacillin-tazobactam (ZOSYN) IVPB  4.5 g Intravenous Q8H    polyethylene glycol  17 g Oral TID    predniSONE  20 mg Oral Daily    senna-docusate 8.6-50 mg  1 tablet Oral BID    triamcinolone acetonide 0.025%   Topical (Top) BID    triamcinolone acetonide 0.1%   Topical (Top) BID     PRN Meds:albuterol-ipratropium, alteplase, Dextrose 10% Bolus, Dextrose 10% Bolus, glucagon (human recombinant), glucose, glucose, guaifenesin 100 mg/5 ml, heparin, porcine (PF), HYDROcodone-acetaminophen, ibuprofen, metoprolol, ondansetron, ramelteon, sodium chloride 0.9%     Review of Systems   All other systems reviewed and are negative.    Objective:     Vital Signs (Most Recent):  Temp: 97.9 °F (36.6 °C) (08/04/19 2100)  Pulse: 71 (08/05/19 0300)  Resp: 16  (19 0140)  BP: 106/61 (19 2100)  SpO2: 95 % (19 0140) Vital Signs (24h Range):  Temp:  [97.3 °F (36.3 °C)-97.9 °F (36.6 °C)] 97.9 °F (36.6 °C)  Pulse:  [66-78] 71  Resp:  [16-18] 16  SpO2:  [93 %-98 %] 95 %  BP: (100-118)/(60-62) 106/61     Weight: 72.2 kg (159 lb 2.8 oz)  Body mass index is 23.51 kg/m².  Body surface area is 1.87 meters squared.      Intake/Output Summary (Last 24 hours) at 2019 0658  Last data filed at 2019 1342  Gross per 24 hour   Intake 480 ml   Output 500 ml   Net -20 ml       Physical Exam   Constitutional: He is oriented to person, place, and time. He appears well-developed.   HENT:   Head: Normocephalic and atraumatic.   Eyes: Pupils are equal, round, and reactive to light. Conjunctivae are normal. No scleral icterus.   Neck: Normal range of motion. Neck supple. No JVD present.   Cardiovascular: Normal rate, normal heart sounds and intact distal pulses. Exam reveals no gallop.   No murmur heard.  Distant heart sounds, irregularly irregular   Pulmonary/Chest: Effort normal. No respiratory distress. He has no wheezes. He has no rales.   Bilateral decreased BS   Abdominal: Soft. Bowel sounds are normal. He exhibits no distension. There is no tenderness.   Musculoskeletal: He exhibits edema (2+ lower extremity edema).   Neurological: He is alert and oriented to person, place, and time.   Skin: Skin is warm and dry. Rash (Improvement in redness and desquamation) noted.       Significant Labs:   BMP:   Recent Labs   Lab 19   * 139*   * 132*   K 5.1 4.6   CL 96 101   CO2 21* 20*   BUN 72* 70*   CREATININE 1.3 1.2   CALCIUM 8.2* 8.3*   MG 2.2 2.2    and CBC:   Recent Labs   Lab 19   WBC 38.18* 28.77*   HGB 10.4* 9.8*   HCT 34.4* 33.1*    202   Phosph: 3.9  M.2    Diagnostic Results:  None    Assessment/Plan:   Cardiovascular  Atrial fibrillation with RVR  First noted on ECG 7/15/19 following  pericardiocentesis. History of paroxysmal atrial fibrillation from past. Ddx includes 2/2 pericardial effusion vs. Underlying malignancy. Earlier during admission patient previously not controlled with lower doses of Toprol, lopressor 50mg BID, and had drug rash with diltiazem). CHADsVASC 3 suggestive of 3.2% annual stroke risk. Not currently on heparin or lovenox due to significant hematuria experienced earlier on admission and generally patient is not a great anticoagulation candidate. Now with recurrent a. Fib with RVR after not receiving toprol dose yesterday.  Started on amiodarone gtt yesterday PM with improvement in rate. And successfully transitioned to PO amio. Patient without episodes of a. Fib with RVR since adjustment.  Tolerating lower dose of Toprol.  Plan:  - Appreciate cardiology recommendations  -continue amiodarone 400mg BID for rhythm control and Toprol XL 100mg daily for rate control       -Continue telemetry and monitor for rate control   -TEDs and SCDs in place for DVT prophylaxis   -Patient with 3% stroke risk however not a good candidate for AC given bleeding on lovenox, frailty, and grave prognosis related to malignancy     Acute on chronic diastolic heart failure  Patient with symptoms of SOB and diffuse swelling on admission who is not on lasix at home with an admission BNP of 374. Echo 7/17/2019 with Grade I LV diastolic dysfunction.  Patient has been diuresing well with improvements in shortness of breath.  Still with Pleural effusions L>R 2/2 malignancy vs overload.  Patient approaching dry weight with significant increase in BUN/Cr.  Plan:  - hold lasix as patient not requiring  -Strict I/O with Daily Standing weights  -keep Mg >2 and K>4  -Fluid restriction <1500cc/day    Essential hypertension  Currently holding all hypertensives.  Patient has been normotensive/low since admission.  Was previously on enalapril at home.   -discuss with cards before discharge whether patient will  require ACE/ARB    Pericardial effusion without cardiac tamponade  Patient transferred to [facility] from OSH for evaluation by cardiology/CTS for pericardial window or pericardiocentesis on 7/17/19. Pericardiocentesis was preformed by interventional cards, pt tolerated procedure well, produced 570cc serosanguinous fluid on 7/18. Repeat echo on 7/21 with recurrence of effusion and echo on 7/26 with stabilization of effusion and without tamponade. CTS consulted to evaluate patient for pericardial window but feel he is a poor candidate given likelihood of disease in pericardium.  Interventional cardiology also feel this patient is poor candidate for a drain as there is no safe place to place pericardiocentesis needle.      Plan:   -Will hold off on further management or consults at the moment as effusion is stable and without tamponade physiology  -Per cardiology consider repeat echo to monitor progression    Heme/Onc  Mesothelioma of left lung  Patient of Dr. Foster on palliative chemo with Gemzar with plans to switch to Vinorelbine due to disease progression.  PET CT 4/2019 with increased uptake within the enlarging 1.4 cm left upper lobe pulmonary nodule, mediastinal lymph nodes, and left pleura when compared to PET 2/2019.  Plan:   -pain currently controlled with current regimen: Norco 5-325 mg q6 PRN  -OP appointment with Dr. Foster scheduled for 8/8  -Hospice discussions to be taken place as outpatient with Dr. Foster    History of prostate cancer  S/p prostectomy.  Haider in place for urinary retention 2/2 bladder contracture.  Urology recommends keeping the haider catheter in place.    Leukocytosis  Likely multifactorial ddx includes leukemoid reaction from malignancy and/or AGEP rash + on steroids.  Also patient with new CAUTI which is likely contributing.  Overall WBC downtrending  -Monitor with daily CBC    Debility  PT/OT has been seeing and recommend home health PT, however wife may not be able to take care of all of  patient's needs at home by herself.   -Home health PT on discharge  -family interested in additional assistance with [facility] which also has hospice services    Neuro  Delirium  New onset delirium on 8/30 characterized by disorientation, increased alertness.  No focal neurologic deficits. Ddx included from UTI vs 2/2 high dose steroids vs polypharmacy. Delirium improving.  Plan:   -Delirium precautions in place  -Treatment of UTI with Zosyn  -Steroid dose decreased to 20mg  -Avoid any anticholinergic medications    Pulmonary  SOB (shortness of breath) on exertion  Patient presented to [facility] w/ Pericardial effusion and a hx of recurrent pleural effusions (newly diagnosed mesothelioma in 2019). R sided PleurX catheter in place, patient of Dr. Padilla (Pulm) with 450cc drained on admission. CXR during admission have shown BL pleural effusion w/ areas of pulmonary infiltrates L>R.  Continues to improve and satting well on 1L NC. Lung ultrasound by pulmonology/Dr. Padilla reveals no significant amount of fluid to drain.   Plan:  -Hold lasix given significant volume depletion since admission.  No significant signs of fluid overload.  -consider repeat BNP and/or echo should his fluid status continue to be in question   -PRN dumaya    Malignant pleural effusion  Has R sided pleurX, associated Shortness of breath; 400 cc removed 7/30.  Patient currently asymptomatic with improving SOB.    Renal  SKINNY (acute kidney injury)  Patient's baseline Cr is 0.7-0.8 now has been steadily climbing since admission.  Suspect pre-renal in nature due to patient's tenuous fluid status-could be cardiogenic vs volume depletion.  US negative for obstruction.  Intra-renal causes not excluded.  Creatinine improving.  -daily BMP  -avoid nephrotoxic agents  -renally dosed medications    Hyponatremia  Suspect SIADH.  Sodium improving over last few days and patient is asymptomatic.     Plan:  -Continue fluid restriction     Derm  Rash, drug  Drug rash  thought to be result of diltiazem treatment.  Diltiazem stopped and rash continues to improve on face and trunk, now desquamating on lower extremities.  Continuing to improve.    Plan:   -continue topical TAC 0.025% BID to sensitive areas including face and groin and TAC 0.1% BID  to chest, abdomen, face   - Continue prednisone 20mg daily for 4 more days  -Vaseline topically to erosions  -monitor for secondary infection  -Biopsy results : Per dermatopathologist Dr. Meade, perivascular lymphocytic infiltrate with neutrophils. Also, subcorneal pustules can be seen which would be consistent with AGEP  -Appreciate dermatology recommendations    ID  Catheter-associated urinary tract infection  Urine culture with 100,000 cfu gram negative rods. Elevated white count. Asymptomatic otherwise without abdominal pain. Urology recommends leaving catheter in place at this time due to contracture of bladder neck.  Urine culture now growing ESBL K. Pneumoniaa. Completed 3 days Levaquin-switched to Zosyn for mental status    Plan:  -Continue Zosyn 4.5 q8(this is day 4/7 of antibiotics)    Optho  Anisocoria  No other focal neurologic deficits. CT head without acute abnormality.  Vascular Neuro feel this is 2/2 medication(albuterol nebs).  Improved since discontinuing albuterol nebs  -Continue to monitor     Blepharitis  Increased redness and milky drainage from lower lidw.  No evidence of conjunctivitis.  Likely 2/2 to drug rash.  -Clean area daily with normal saline  -artificial tears    Urology  Acquired contracture of bladder neck  Urology consulted in the OSH. S/p Bladder neck contracture release and urethral dilation on 7/15/19. Patient of Dr. Philippe.    Plan:  - Haider in place  - Cleared for d/c from urology perspective, to f/u w/ Dr. Philippe  - Increased leak noted from penis around haider, most likely due to lasix and anasarca    GI  Gastroesophageal reflux disease  -Continue Pepcid twice daily    Decrease in appetite  Patient  w/ reported hx of poor appetite since beginning chemotherapy.  Improvement in appetite with dronabinol however now with delirium    Plan:  - dronabinol d/c'd for now given delirium. May restart on discharge.  -Continue Ensures with every meal     Dispo: possible discharge today. Can receive IV antibiotics via port    Justin Blackwell MD, PGY-1  Hematology/Oncology  [facility]

## 2019-08-05 NOTE — PROGRESS NOTES
Received report from my coworker Lala Vick [facility], who is rounding with Dr. North and the Medical Oncology team. Plan is for patient's discharge home today and he will now need home IV Antibiotics in addition to home health services. Requested new orders and received these from the Resident with cosign by Staff Dr. North. Completed a new [facility] Medical Necessity Form and faxed this to [facility] at [phone] along with the orders, admission record, current MD notes, and original port placement note. [facility]'s staff will need to authorize and arrange the home IV antibiotics and send updated authorization to [facility] as well. Sent new orders and chart information via [employer] [facility] to [facility], along with a note about discharge plan for today and need for home IV Antibiotics. Will continue to follow.

## 2019-08-05 NOTE — PLAN OF CARE
Cardiology follow-up scheduled as listed below.    Future Appointments   Date Time Provider Department Center   8/21/2019  1:00 PM Tyson Shields MD [facility] CARDIO [facility]     Polina De La Garza, RN, BSN, CM  [facility]  Nurse - Med Onc/Gyn Onc

## 2019-08-05 NOTE — PLAN OF CARE
Patient is discharging home with home health today. Patient is scheduled for labs on 8/7/19 and follow-up with Dr. Foster on 8/8/19. CM messaged Dr. Foster's clinic requesting to have labs and follow-up both scheduled on 8/8/19 to minimize patient travel.    CM messaged Dr. Woodward's clinic (pulm) and requested a 2-3 week follow-up appointment.    CM message Dr. Santacruz and Dr. Blanco's clinic (cards) and requested a 2 week follow- up appointment.    CM messaged Dr. Philippe's clinic (urology) and requested a follow-up appointment since the patient is discharging home with his haider catheter.    Polina De La Garza, RN, BSN, CM [facility]  Nurse - Med Onc/Gyn Onc

## 2019-08-05 NOTE — ASSESSMENT & PLAN NOTE
Likely multifactorial ddx includes leukemoid reaction from malignancy and/or AGEP rash + on steroids.  Also patient with new CAUTI which is likely contributing.  Overall WBC downtrending  -Monitor with daily CBC

## 2019-08-05 NOTE — DISCHARGE INSTRUCTIONS
Please follow up with Dr. Foster on 8/8/2019.    Please follow up with Dr. Padilla in 2-3 weeks.    For rash:  Apply topical steroids twice daily to areas where rash is active:        -Triamcinolone 0.025 to sensitive areas such as face and groin        -Triamcinolone 0.01% to everywhere else  Apply plain Vaseline to areas of peeling twice daily and monitor for any signs of infection such as pain, erythema, crusting, pus    Take oral prednisone 20mg for 3 more days after discharge.    Rash should completely resolve in about 1 week     IV antibiotics for 3 days following discharge with end date on 8/5/2019 with last dose 8 pm.            :  [facility], [phone], to provide skilled nursing, aide, physical and occupational therapy services. Services will begin with the nurse admission assessment visit on the day after discharge from the hospital.  [facility], [phone], to provide IV Antibiotics and line access supplies; delivery to patient's home address on 8/5/19 PM.  [facility], [phone], delivering nebulizer machine to patient's hospital room on 8/5/19 prior to discharge.  Marissa Marcelo, MSW, LCSW  [phone]

## 2019-08-05 NOTE — PROGRESS NOTES
Called [facility], [phone], and spoke with Veronica SULLIVAN with the [facility] for Hospital Discharges. She confirmed that they have the referral I faxed this morning and will be working on it. I requested that staff call me as soon as the arrangements are approved and arranged, and she said they will.

## 2019-08-05 NOTE — PROGRESS NOTES
Received call back from Veronica SULLIVAN, with [facility]. She has updated the authorization for home health through [employer] and has spoken with Lisbet who has again accepted patient for services to begin on tomorrow. Veronica asked if there is an infusion company preference and patient hasn't used before. [employer] is contacting [facility] with the referral. She called me back afterwards and said that she spoke with Samantha and Jessica at [employer], and sent the authorization and referral information. Called [employer] at [phone] and spoke with Toshia, who confirmed they just received it. Sent referral information and orders via [facility] to [employer]. Agency's liason nurse Kriss will be contacted to meet with patient and wife for teaching and to schedule delivery of the supplies.  Called [facility] to follow up re: the nebulizer machine order that was entered by MD in Epic. Spoke with Ruthy who said that staff is submitting for authorization from [facility]. Informed her that MDs want to discharge patient today, and the nebulizer machine needs to be delivered to patient's room. Asked that staff keep in contact with me for update.

## 2019-08-05 NOTE — ASSESSMENT & PLAN NOTE
Currently holding all hypertensives.  Patient has been normotensive/low since admission.  Was previously on enalapril at home.   -discuss with cards before discharge whether patient will require ACE/ARB

## 2019-08-05 NOTE — ASSESSMENT & PLAN NOTE
- hold all antihypertensives he is on enalapril at home   - see cards recs   Form in Dr. Sujey Kapoor red folder for review.

## 2019-08-05 NOTE — ASSESSMENT & PLAN NOTE
Patient w/ reported hx of poor appetite since beginning chemotherapy.  Improvement in appetite with dronabinol however now with delirium    Plan:  - dronabinol d/c'd for now given delirium. May restart on discharge  -Continue Ensures with every meal

## 2019-08-05 NOTE — PROGRESS NOTES
Met with patient, his wife one of their sons, and daughter-in-law to review discharge plans and arrangements, and they are in agreement. Also spoke with nursing staff and Fellow. Patient ready for discharge as soon as the nebulizer machine arrives to his room.

## 2019-08-05 NOTE — ASSESSMENT & PLAN NOTE
Increased redness and milky drainage from lower lidw.  No evidence of conjunctivitis.  Likely 2/2 to drug rash.  -Clean area daily with normal saline  -artificial tears

## 2019-08-05 NOTE — PLAN OF CARE
Problem: Adult Inpatient Plan of Care  Goal: Plan of Care Review  Outcome: Ongoing (interventions implemented as appropriate)  POC discussed w patient and spouse at beginning of shift and PRN; Pt remains free of fall or injury. Cano in place. Dressing changed and ointment applied by spouse this shift. 5 BM's per bed maguire overnight. Delirium precautions maintained overnightCall light in reach. instructed to call as needed, telemetry monitoring continued, VSS and afebrile. Bed locked in low position call light in reach.Placed on contact isolation this am.Will CTM .

## 2019-08-05 NOTE — PLAN OF CARE
POC reviewed with patient and family. Patient had multiple soft BMs today to bedpan. Bed bath completed. Patient to discharge home however pending equipment delivery and home health setup. CRYSTAL Marcelo involved and trying to coordinate. Zosyn IV. Patient had no complaints of pain/nausea/vomiting t/o shift. Family and patient have no questions or concerns at this time. Bed low, locked, call light in reach. Will continue to monitor.

## 2019-08-05 NOTE — PLAN OF CARE
Notified by the Micro Lab the 7/31/19 urine culture is positive for ESBL-Klebsiella pneumoniae, follow CONTACT ISOLATION, utilize appropriate PPE for patient encounters and sanitize hands after PPE removed.  Call Infection Prevention dept. ([id number]) for isolation discontinuation criteria.

## 2019-08-05 NOTE — ASSESSMENT & PLAN NOTE
Urine culture with 100,000 cfu gram negative rods. Elevated white count. Asymptomatic otherwise without abdominal pain. Urology recommends leaving catheter in place at this time due to contracture of bladder neck.  Urine culture now growing Klebsiella pneumonia sensitive to Ertapenem, Amikacin, Meropenem, Zosyn, and Levoquin    Plan:  -Continue Zosyn 4.5 q8(this is day 4/7 of antibiotics)

## 2019-08-05 NOTE — ASSESSMENT & PLAN NOTE
Drug rash thought to be result of diltiazem treatment.  Diltiazem stopped and rash continues to improve on face and trunk, now desquamating on lower extremities.  Continuing to improve.    Plan:   -continue topical TAC 0.025% BID to sensitive areas including face and groin and TAC 0.1% BID  to chest, abdomen, face   - Continue prednisone 20mg daily  -Vaseline topically to erosions  -monitor for secondary infection  -Biopsy results : Per dermatopathologist Dr. Meade, perivascular lymphocytic infiltrate with neutrophils. 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

## 2019-08-05 NOTE — TELEPHONE ENCOUNTER
----- Message from Francesca Hoyt sent at 8/5/2019  9:49 AM CDT -----  Contact: Son/Timothy/[phone]  Patient's son/Timothy is requesting a call back regarding his dad having ESBL and the hospital will not do anything with is catheter. He has been in [facility] for [facility] days.

## 2019-08-05 NOTE — SUBJECTIVE & OBJECTIVE
Interval History: No events overnight.  Patient able to get good night sleep last night with some improvement in confusion.  Today he is more oriented, more appropriate in conversation and less distracted.  He also said he is feeling better overall.  Rash continues to improve. Denies chest pain, shortness of breath, abdominal pain, fever, chills.     Oncology Treatment Plan:   OP NSCLC VINORELBINE WEEKLY    Medications:  Continuous Infusions:  Scheduled Meds:   amiodarone  400 mg Oral BID    artificial tears  1 drop Both Eyes TID    famotidine  20 mg Oral Daily    metoprolol succinate  100 mg Oral Daily    piperacillin-tazobactam (ZOSYN) IVPB  4.5 g Intravenous Q8H    polyethylene glycol  17 g Oral TID    predniSONE  20 mg Oral Daily    senna-docusate 8.6-50 mg  1 tablet Oral BID    triamcinolone acetonide 0.025%   Topical (Top) BID    triamcinolone acetonide 0.1%   Topical (Top) BID     PRN Meds:albuterol-ipratropium, alteplase, Dextrose 10% Bolus, Dextrose 10% Bolus, glucagon (human recombinant), glucose, glucose, guaifenesin 100 mg/5 ml, heparin, porcine (PF), HYDROcodone-acetaminophen, ibuprofen, metoprolol, ondansetron, ramelteon, sodium chloride 0.9%     Review of Systems   All other systems reviewed and are negative.    Objective:     Vital Signs (Most Recent):  Temp: 97.9 °F (36.6 °C) (08/04/19 2100)  Pulse: 71 (08/05/19 0300)  Resp: 16 (08/05/19 0140)  BP: 106/61 (08/04/19 2100)  SpO2: 95 % (08/05/19 0140) Vital Signs (24h Range):  Temp:  [97.3 °F (36.3 °C)-97.9 °F (36.6 °C)] 97.9 °F (36.6 °C)  Pulse:  [66-78] 71  Resp:  [16-18] 16  SpO2:  [93 %-98 %] 95 %  BP: (100-118)/(60-62) 106/61     Weight: 72.2 kg (159 lb 2.8 oz)  Body mass index is 23.51 kg/m².  Body surface area is 1.87 meters squared.      Intake/Output Summary (Last 24 hours) at 8/5/2019 0658  Last data filed at 8/4/2019 1342  Gross per 24 hour   Intake 480 ml   Output 500 ml   Net -20 ml       Physical Exam   Constitutional: He is  oriented to person, place, and time. He appears well-developed.   HENT:   Head: Normocephalic and atraumatic.   Eyes: Pupils are equal, round, and reactive to light. Conjunctivae are normal. No scleral icterus.   Neck: Normal range of motion. Neck supple. No JVD present.   Cardiovascular: Normal rate, normal heart sounds and intact distal pulses. Exam reveals no gallop.   No murmur heard.  Distant heart sounds, irregularly irregular   Pulmonary/Chest: Effort normal. No respiratory distress. He has no wheezes. He has no rales.   Bilateral decreased BS   Abdominal: Soft. Bowel sounds are normal. He exhibits no distension. There is no tenderness.   Musculoskeletal: He exhibits edema (2+ lower extremity edema).   Neurological: He is alert and oriented to person, place, and time.   Skin: Skin is warm and dry. Rash (Improvement in redness and desquamation) noted.       Significant Labs:   BMP:   Recent Labs   Lab 19  0415 19  0452   * 139*   * 132*   K 5.1 4.6   CL 96 101   CO2 21* 20*   BUN 72* 70*   CREATININE 1.3 1.2   CALCIUM 8.2* 8.3*   MG 2.2 2.2    and CBC:   Recent Labs   Lab 19  0415 19  0452   WBC 38.18* 28.77*   HGB 10.4* 9.8*   HCT 34.4* 33.1*    202   Phosph: 3.9  M.2    Diagnostic Results:  None

## 2019-08-06 NOTE — TELEPHONE ENCOUNTER
----- Message from Aria Howard sent at 8/6/2019 11:11 AM CDT -----  Contact: Pt's grand daughter Libra [phone]  Pt's grand daughter Libra is requesting for the pt be seen sooner due to the catheter is leaking.    Please call and advise.

## 2019-08-08 NOTE — TELEPHONE ENCOUNTER
tried reaching out to pt spouse on today to discuss upcoming echo, but no answer,a detail message was left on v/m.

## 2019-08-08 NOTE — Clinical Note
Please schedule 2 D ECHO in [place] tomorrow afternoon. Let him know time. Schedule CBC,CMP and see me in 3 weeks

## 2019-08-08 NOTE — PROGRESS NOTES
Subjective:       Patient ID: Cale Harding is a 78 y.o. male.    Chief Complaint: Mesothelioma of left lung; Fatigue; Rash; VALENCIA increased; urinary catheter; and leg:weeping  Mr. 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  He started having left chest wall discomfort in November 2017, which prompted a chest x-ray.  He underwent a thoracentesis with 1.2 liters removed and apparently cytology was negative; although, I do not have that path in the system and then he noted increasing shortness of breath and repeat pleural effusion and fluid was removed.  He then underwent IR biopsy of the left pleural thickening, which was positive for epithelioid mesothelioma confirmed at [facility].  He underwent PET scan on 02/09/2018, which revealed three left pleural based masses with an SUV max of 7.6 and a small pleural effusion.  He has had night sweats and about 20-pound weight loss in the last three months, low-grade fevers also, occasional shortness of breath and he has chest wall pain, which is not frequent.  Plan originally was to proceed with VATS, left thoracotomy and radical pleurectomy, decortication and HIPEC.  However, after the patient complained of worsening pain, an MRI of the chest was done on 03/01/2018 and that revealed loculated complex pleural fluid collection with marked pleural thickening and internal septation.  The largest and more superior anterior of the two pleural masses measured 7 x 4.3 cm.  The smaller and more inferior one measured 3.9 x 2.2.  The mass abuts the pleural surface and the larger of the two masses abuts the pericardium and the chest wall, involvement or invasion of the structures is not excluded.  The lower mass abuts and possibly invades the diaphragm.     Since initial visit with me he underwent Diagnostic laparoscopy with biopsy of diaphragm.  "Left VATS thoracoscopy on 3/19/18. Final path is pending. Due to amount of involvemt surgery was not done so is on Carboplatin and ALimta     He completed 6 cycles of chemo with Carboplatin, Alimta and Avastin. He is now on Alimta and Avastin maintenance     He received Alimta and Avastin maintenance until 1/30/19  CTA chest reveals "No pulmonary embolism to the segmental level. Left hemithorax findings including left lower lobe collapse, mild chronic pleural effusion, subcentimeter soft tissue nodules, and diffuse pleural thickening and nodularity of the left side of the chest, worse when compared to January 2018.  These findings are consistent with history of mesothelioma.  Stable right hemithorax findings including trace pleural effusion, calcified pleural plaques, and subcentimeter pulmonary nodules. Atherosclerosis of the visualized aorta and coronary arteries"     PET scan on 2/15/19 reveals "Today's findings are concerning for progression of disease with increasing uptake within a left upper lobe pulmonary nodule, development of uptake within a pre-vascular lymph node and increasing uptake within the right inferior pleura suggesting recurrence"  Pt was then started on Gemzar                 He completed 7 cycles of Gemzar, CT scans from 7/9/19 revealed "Increased size of the nodular pleural thickening on the left consistent with the known mesothelioma.  There also appears to be enlarged mediastinal node just posterior to the left pulmonary artery.  Increase in the amount of pericardial fluid and enhancement of the pericardium concerning for increased pericardial involvement as well.  There is also increased size of an epigastric node.  Findings concerning for disease progression. Stable right middle lobe nodule. Stable right pleural effusion with pleural drain. Increase in the subcutaneous edema.  There is also some free fluid in the pelvis. Thickening of the sigmoid colon wall with multiple diverticula. " " Further evaluation as clinically indicated"  He was hospitalized between 7/17-8/5/19 with "Admitted to hematology oncology on 7/17 for a. Fib with RVR.  The patient was found to have a possibly associated pericardial effusion we were concerned was contributing to a. Fib. CTS consulted for evaluation of pericardial effusion to evaluate need for pericardial window. Patient not strong candidate for pericardial window per CTS, Interventional cardiology performed paracentesis on hospital day 2 and drained 570cc from pericardial space. TTE on 7/21 showed a reaccumulation of pericardial fluid, thoracic surgery consulted for potential pericardial window and deemed patient a poor candidate due to there likely being pericardial disease and rash on anterior chest, Interventional cardiology also felt patient was a poor candidate. Also repeat TTE on 7/26 showed stable pericardial effusion when compared to 7/26 and there was no tamponade physiology. A. Fib with RVR did not resolve following paracentesis.  They recommended starting patient on heparin and diltiazem drip for rate control. Rate controlled and pt stepped down to PO diltiazem and lopressor but a.fib with AVR recurred and was then switched from lopressor to Toprol XL 100mg.  Patient experienced improvement in shortness of breath and atrial fibrillation, but on hospital day 7 the patient developed a new rash first noticed on back and spread to chest and abdomen. Dermatology was consulted and felt this was likely AGEP from diltiazem and Cardiology was in agreement. Diltiazem discontinued and patient's Toprol XL was increased to 200mg daily for management of his atrial fibrillation. On 7/31, the patient missed his Toprol dose to low blood pressure and he went into a.fib with RVR again.  He was started on amiodarone drip with improvement in HR.  Cardiology recommended transitioning to PO amio + Toprol 100mg(lower dose 2/2 lower blood pressure)  for rhythm and rate control " "respectively.  He had no more episodes of a.fib with RVR following the start of this regimen.  Cardiology did recommend that patient be started on anticoagulation, however he had a hematuria on lovenox and heparin and patient is poor anticoagulation candidate given his frailty and poor prognosis"   Patient also with evidence of bilateral malignant effusion on admission. Patient seen by Pulmonology. Dr. Padilla familiar w/ patient and was able to drain 450 ccs from R sided pleurX cath. Pulmonology continued to follow patient for bilateral malignant effusion and felt that there is nothing left to drain from left or right side. Patient's respiratory status remained stable and he did not experience any more shortness of breath.     For his rash, Dermatology continued to follow patient. The patient completed prednisone 60mg daily x 5 days and topical steroids.  He was transitioned to prednisone 20mg daily for 4 days total.  His rash continued to improve with this regimen.  He has 3 more days of prednisone to complete following discharge and he was advised to use vaseline topically for desquamation.      On 7/31 patient was found to have a urinary tract infection positive for gram negative rods and he was started on Rocephin.  He completed 3 days of therapy, however cultures grew Klebsiella pneumonia resistant to rocephin.  He was transitioned to oral levaquin of which he completed 3 days, and then he was transtioned to zosyn per family request due to levaquin's side effect profile.He is unable to have haider removed due to bladder neck contracture per Urology.  He will follow up with Urology as outpatient to decide whether removal would be feasible in future.     On 8/3 the patient developed delirium characterized by heightened alertness and disorientation we believe 2/2 receiving high dose steroids, uti, and length of hospital stay. He improved with a lower dose of steroids, adequate antibiotics treatment of his UTI, " "delirium precautions and discontinuing marinol"    HPIHe comes in to review hospital course and decide on further course of treatment. He will complete Zosyn today and prednisone today.  He notes decreased appetite and weight loss. He has been able to walk to the bathroom, has significant edema in his legs. He still has pleurex catheter, last drainage was 450 CC week and half ago      Review of Systems   Constitutional: Positive for appetite change, diaphoresis, fatigue and unexpected weight change.   HENT: Negative for mouth sores.    Eyes: Negative for visual disturbance.   Respiratory: Positive for shortness of breath. Negative for cough.    Cardiovascular: Negative for chest pain.   Gastrointestinal: Negative for abdominal pain and diarrhea.   Genitourinary: Negative for frequency.   Musculoskeletal: Negative for back pain.   Skin: Negative for rash.   Neurological: Negative for headaches.   Hematological: Negative for adenopathy.   Psychiatric/Behavioral: The patient is not nervous/anxious.    All other systems reviewed and are negative.      Objective:      Physical Exam   Constitutional: He is oriented to person, place, and time. He appears lethargic. He appears cachectic. He appears toxic. He has a sickly appearance.   HENT:   Mouth/Throat: No oropharyngeal exudate.   Cardiovascular: Normal rate and normal heart sounds.   Pulmonary/Chest: Effort normal. He has decreased breath sounds in the right upper field, the right middle field, the right lower field, the left upper field, the left middle field and the left lower field. He has no wheezes.   Abdominal: Soft. Bowel sounds are normal. There is no tenderness.   Musculoskeletal: He exhibits no edema or tenderness.   Lymphadenopathy:     He has no cervical adenopathy.   Neurological: He is oriented to person, place, and time. He appears lethargic. Coordination normal.   Skin: Skin is warm and dry. No rash noted.   Psychiatric: He has a normal mood and affect. " Judgment and thought content normal.   Vitals reviewed.        LABS:  WBC   Date Value Ref Range Status   08/08/2019 18.92 (H) 3.90 - 12.70 K/uL Final     Hemoglobin   Date Value Ref Range Status   08/08/2019 10.4 (L) 14.0 - 18.0 g/dL Final     Hematocrit   Date Value Ref Range Status   08/08/2019 35.1 (L) 40.0 - 54.0 % Final     Platelets   Date Value Ref Range Status   08/08/2019 197 150 - 350 K/uL Final     Gran # (ANC)   Date Value Ref Range Status   08/08/2019 16.9 (H) 1.8 - 7.7 K/uL Final     Comment:     The ANC is based on a white cell differential from an   automated cell counter. It has not been microscopically   reviewed for the presence of abnormal cells. Clinical   correlation is required.         Chemistry        Component Value Date/Time     08/08/2019 0813    K 4.5 08/08/2019 0813     08/08/2019 0813    CO2 23 08/08/2019 0813    BUN 53 (H) 08/08/2019 0813    CREATININE 1.1 08/08/2019 0813     (H) 08/08/2019 0813        Component Value Date/Time    CALCIUM 8.6 (L) 08/08/2019 0813    ALKPHOS 70 08/08/2019 0813    AST 15 08/08/2019 0813    ALT 13 08/08/2019 0813    BILITOT 0.8 08/08/2019 0813    ESTGFRAFRICA >60.0 08/08/2019 0813    EGFRNONAA >60.0 08/08/2019 0813          Assessment:       1. Mesothelioma of left lung    2. Pericardial effusion    3. Malignant pleural effusion        Plan:        1,2. Mr. Harding is now at home, completes antibiotics today. Still very weak for chemo. Discussed that if he gets chemo when he is so weak, could lead to a fatal outcome. Discussed his poor prognosis and also quality of life issues. He notes that he is not ready to give up.....  We discussed that Navelbine at best will stabilize disease but with a risk of serious side effects.  Schedule 2D ECHO  3. He has a pleurex, will get drained at home today      Agrees to be DNR. He will return in about 3 weeks for a repeat assessment.    Above care plan was discussed with patient and accompanying  wife, son and grand daughter and all questions were addressed to their satisfaction

## 2019-08-08 NOTE — TELEPHONE ENCOUNTER
"----- Message from Marina Ribera sent at 8/8/2019 10:38 AM CDT -----  Contact: [facility]   Pharmacy calling to clarify a prescription   Rx:   -  HYDROcodone-acetaminophen (NORCO) 5-325 mg per tablet   -  dronabinol (MARINOL) 2.5 MG capsule   Name of caller: Pharmacist   Pharmacy name and phone number:   [facility] - [place] - [address] [facility]  What do they need to clarify:   Pharmacist requesting confirmation that dispensing Norco and Marinol to the pt, was intended.   Contact Preference: [phone]  Additional Information:      "Thank you for all that you do for our patients'"    "

## 2019-08-09 PROBLEM — Z98.890 POST-OPERATIVE STATE: Status: ACTIVE | Noted: 2019-01-01

## 2019-08-09 NOTE — TELEPHONE ENCOUNTER
Patient saw dr escobedo today in clinic.  When the patient was outside getting into his car, he lost his balance and fell to the ground.  The courrier who was coming into our office informed me that he fell getting into his car. I went outside to check on him and see what happened. His wife stated he lost his balance getting up from his wheelchair and started falling to the ground slowly and she grabbed his arm and helped him down. Myself, his wife and the courrier lady all helped him back onto his chair. The patient stated he had a controlled fall and feels fine just embaressed. He said he did not hit his head or hurt himself and he felt fine. I called the  who came down and talked with them to make a report. I waited until the report was done so myself and the  could help him into his car.

## 2019-08-09 NOTE — PROGRESS NOTES
"Cale Harding is a 78 y.o. male patient.   1. Acquired contracture of bladder neck    2. Nocturia    3. Post-operative state      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    Prostate cancer 2002    Urinary tract infection      Past Surgical History Pertinent Negatives:   Procedure Date Noted    CYSTOSCOPY 06/14/2019    LITHOTRIPSY 06/14/2019    PROSTATE SURGERY 06/14/2019    VASECTOMY 06/14/2019     Scheduled Meds:  Continuous Infusions:  PRN Meds:    Review of patient's allergies indicates:   Allergen Reactions    Bactrim [sulfamethoxazole-trimethoprim] Other (See Comments)     Joint pains     There are no hospital problems to display for this patient.    Resp. rate 17, height 5' 7" (1.702 m), weight 68 kg (150 lb), SpO2 97 %.    Subjective:   Diet: Adequate intake.    Activity level: Returning to normal.    Pain control: Well controlled.    Pt had reaction to Cardizem with blistering, swelling, edema and skin weepage.    Objective:  Vital signs (most recent): Resp. rate 17, height 5' 7" (1.702 m), weight 68 kg (150 lb), SpO2 97 %.  General appearance: Comfortable, well-appearing, in no acute distress and not in pain.    Lungs:  Normal respiratory rate and normal effort.  Breath sounds normal.    Heart: Normal rate.  Regular rhythm.  S1 normal.    Chest: Symmetric chest wall expansion.    Abdomen: Abdomen is soft.    Bowel sounds:  Bowel sounds are normal.    Tenderness: There is no abdominal tenderness tenderness.    Extremities: There is normal range of motion.  (Blistering resolved).    Neurological: The patient is alert and oriented to person, place and time.  Normal strength.  Pupils are equal, round, and reactive to light.       Assessment:   Post-op: 25 days.    Condition: In stable condition.     Plan:  Encourage ambulation.  Discontinue drain (Remove haider).  " Regular diet.           Galdino Philippe MD  8/9/2019

## 2019-08-12 NOTE — TELEPHONE ENCOUNTER
"----- Message from Marina Salazarn sent at 8/12/2019  3:21 PM CDT -----  Name Of Caller: Kathya   Provider name: Sintia Foster MD  What is the nature of the call?:    - dronabinol (MARINOL) 2.5 MG capsule  The pt has been drowsy and lack energy after pt starting taking this medication again, asking if it was fine for him to stop taking it again, for observation reasons to see if its what is causing the drowsiness .Has other questions to ask as well   Relationship to the Pt?: Grand-daughter  Contact Preference?: [phone]    Additional Notes:  "Thank you for all that you do for our patients'"      "

## 2019-08-12 NOTE — TELEPHONE ENCOUNTER
Left  for patient informing him marinol will make him drowsy, as this is a commonly experienced side effect. If he wants to stop it to trial and error the symptom, he may.  Requested him to contact clinic at his convenience.

## 2019-08-12 NOTE — TELEPHONE ENCOUNTER
Spoke with claudia.  nurse. IV abx have completed. She is calling to request orders for PORT de access.   Also, she notes patient complaining of sore lips since starting marinol.    Message routed to dr jiménez (can I give verbal for port deaccess?)

## 2019-08-12 NOTE — TELEPHONE ENCOUNTER
----- Message from Arlene Burdick sent at 8/12/2019 12:02 PM CDT -----  Contact: Andrei Perez  Ms. Perez is calling to speak with Staff regarding the pt's medication & port.    She can be reached at [phone].    Thank you.

## 2019-08-16 NOTE — TELEPHONE ENCOUNTER
----- Message from Alycia Edwards sent at 8/16/2019 10:09 AM CDT -----  Contact: Patient's Son  Rx Refill/Request     Is this a Refill or New Rx:  Refill  Rx Name and Strength:  HYDROcodone-acetaminophen (NORCO) 5-325 mg per tablet  Preferred Pharmacy with phone number:    [facility] [address] [place] [address] [address] [facility]  [address] [facility] [address]  Phone: [phone] Fax: [phone]     Communication Preference: [phone]   Additional Information:  Please contact the patient if more info is needed.    Caller also has a question for the nurse concerning the patient, as the medication rx'd for making the patient have an appitite has done nothing but make the patient sleepy. Caller is needing to know if something else can be rx'd, and he can be reached back at [phone]

## 2019-08-16 NOTE — TELEPHONE ENCOUNTER
----- Message from Tavia Harkins MA sent at 8/16/2019 11:13 AM CDT -----  Contact: [facility]/[phone]    The pharmacy is calling to get clarification on the PT medication     HYDROcodone-acetaminophen (NORCO) 5-325 mg per tablet    ..  [facility] [address] [place] [address] [place] [address] [facility]  [address] [facility] [address]  Phone: [phone] Fax: [phone]

## 2019-08-18 PROBLEM — J96.91 RESPIRATORY FAILURE WITH HYPOXIA: Status: ACTIVE | Noted: 2019-01-01

## 2019-08-18 PROBLEM — J96.91 RESPIRATORY FAILURE WITH HYPOXIA: Status: RESOLVED | Noted: 2019-01-01 | Resolved: 2019-01-01

## 2019-08-18 PROBLEM — E87.5 HYPERKALEMIA: Status: ACTIVE | Noted: 2019-01-01

## 2019-08-18 PROBLEM — I50.32 CHRONIC DIASTOLIC HEART FAILURE: Status: ACTIVE | Noted: 2019-01-01

## 2019-08-18 PROBLEM — R06.03 RESPIRATORY DISTRESS: Status: ACTIVE | Noted: 2019-01-01

## 2019-08-18 PROBLEM — J69.0 ASPIRATION PNEUMONIA: Status: ACTIVE | Noted: 2019-01-01

## 2019-08-18 PROBLEM — R06.03 RESPIRATORY DISTRESS: Status: RESOLVED | Noted: 2019-01-01 | Resolved: 2019-01-01

## 2019-08-18 PROBLEM — I48.91 ATRIAL FIBRILLATION: Status: ACTIVE | Noted: 2019-01-01

## 2019-08-18 PROBLEM — J96.01 ACUTE RESPIRATORY FAILURE WITH HYPOXIA: Status: ACTIVE | Noted: 2019-01-01

## 2019-08-18 PROBLEM — I31.39 PERICARDIAL EFFUSION: Status: ACTIVE | Noted: 2019-01-01

## 2019-08-18 PROBLEM — R65.20 SEVERE SEPSIS: Status: ACTIVE | Noted: 2019-01-01

## 2019-08-18 PROBLEM — A41.9 SEVERE SEPSIS: Status: ACTIVE | Noted: 2019-01-01

## 2019-08-18 PROBLEM — N39.0 UTI (URINARY TRACT INFECTION): Status: ACTIVE | Noted: 2019-01-01

## 2019-08-18 NOTE — ED NOTES
Pt presents to ED via ambulance with SOB since this evening. Pt awake and alert. Resp labored at this time; non-rebreather maintained. Tachypnea noted. Use of abd and neck muscles during breathing. Diminished breath sounds shelby; coarse rales noted. 4+ pitting edema noted to BLE. Unable to palpate pedal pulses shelby. Lower extremities mottled and cool to touch.  Skin sloughing noted to extremities. EKG monitoring in progress. Port to rt ant chest. Wife at bedside; states pt hx mesothelioma. Resp at bedside.

## 2019-08-18 NOTE — PT/OT/SLP EVAL
"Speech Language Pathology Evaluation/Self Care Treatment  Bedside Swallow    Patient Name:  Cale Harding   MRN:  [id number]  Admitting Diagnosis: Acute respiratory failure with hypoxia    Recommendations:                 General Recommendations:  Dysphagia therapy and Modified barium swallow study  Diet recommendations:  NPO, NPO   Aspiration Precautions: Alternate means of nutrition/hydration, Frequent oral care and Strict aspiration precautions   General Precautions: Standard, aspiration, fall, respiratory  Communication strategies:  provide increased time to answer and go to room if call light pushed    History:     History of Present Illness:  Cale Harding is a 78 y.o.  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 The patient presented to [facility] on 8/17/2019 with a primary complaint of Shortness of Breath for 3 hours.     The patient was in their usual state of health until 8:00 PM when upon drinking water while taking medications he reports swallowing the water "down the wrong pipe". Immediately after swallowing he began coughing for a prolonged period of time and per his wife his breathing changed immediately after. He became short of breath and his wife reports that his breathing was labored. He denies any fever, CP, sick contacts, sputum production, nasal congestion, sore throat, nausea, or vomiting. When his breathing did not return to baseline, his wife and son decided to call EMS. Mrs. Harding reports that at baseline he has VALENCIA, however, he is able to ambulate to the restroom and to the kitchen with some assistance.     Family also reports that over the past 3-4 days he has had minimal urine output but they state he has been resistant to drinking water or eating. Mr. Harding states that he can urinate but requires some straining. He denies any dysuria, frequency, or " hematuria.     Of note, the patient has signed a DNR but in the event of a compromised airway he wants to be intubated.    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    Prostate cancer 2002    Urinary tract infection        Past Surgical History:   Procedure Laterality Date    BIOPSY-DIAPHRAGM N/A 3/19/2018    [facility]    [id number] N/A 2/1/2018    Performed by [facility] at [facility] OR [address] [place] [address]    BRONCHOSCOPY N/A 6/3/2019    [facility]    COLONOSCOPY  10/20/2012    COLONOSCOPY  11/19/2014    dr machado ( repeat in 3 years per the pt )    CYSTOSCOPY, WITH RETROGRADE PYELOGRAM Bilateral 7/15/2019    Per[facility] at [facility]    CYSTOSCOPY, WITH URETHRAL DILATION Bilateral 7/15/2019    Per[facility] at [facility] OR    ELBOW SURGERY Left 2013    dislocation    [id number]-R-CATH N/A 6/20/2019    [facility]    LAPAROSCOPY-DIAGNOSTIC N/A 3/19/2018    Performed by [facility] at [facility] OR [address] [place] [address]    Pericardiocentesis N/A 7/18/2019    Performed by Frank Javier MD at [facility] CATH LAB    PROCTECTOMY  2002    RELEASE, CONTRACTURE, BLADDER NECK N/A 7/15/2019    Performed by [facility] at [facility] OR    SHOULDER ARTHROSCOPY W/ ROTATOR CUFF REPAIR Right 2008    THORACOSCOPY-VIDEO ASSISTED (VATS) W/PLEURAL BIOPSY Left 3/19/2018    [facility]    URETHROGRAM, RETROGRADE N/A 7/15/2019    Performed by Gal[facility]ino Philippe [facility] at [facility] OR       Social History: Patient lives with wife in [place], [place].    Prior Intubation HX:  None this admit.    Modified Barium Swallow: None in EMR.    Chest X-Rays: FINDINGS:  Single chest view is submitted,  "portable technique.  Right-sided central venous catheter again noted.  The cardiomediastinal silhouette appears stable.  There is appearance of pleural fluid on the right again noted, with mild progression in addition there is appearance of developing infiltrates on the right.  Findings on the left appear similar with mild progression.  There is no evidence for pneumothorax.  The osseous structures demonstrate chronic change.    Prior diet: Regular/thin liquids per pt report.    Subjective     Pt seen at the bedside for clinical swallow assessment. SLP did check w/ RN, Anita, who reported pt recently came off Bipap. Wife, Irma, present for evaluation. Pt awake/alert and agreeable to PO trials.  Patient goals: Pt initially denied dysphagia; however, he later stated, "yeah sometimes the water chokes me"     Pain/Comfort:  · Pain Rating 1: 0/10    Objective:     Oral Musculature Evaluation  · Oral Musculature: general weakness  · Dentition: present and adequate  · Secretion Management: problems swallowing secretions, right corner drooling, oral holding, coughing on secretions  · Mucosal Quality: adequate  · Mandibular Strength and Mobility: WFL  · Oral Labial Strength and Mobility: functional retraction, impaired seal, impaired coordination  · Lingual Strength and Mobility: impaired strength  · Velar Elevation: WFL  · Buccal Strength and Mobility: decreased tone  · Volitional Cough: (Elicited, wet cough produced)  · Volitional Swallow: (delayed)  · Voice Prior to PO Intake: (Mildly wet prior to PO intake)  · Oral Musculature Comments: (Red blisters observed along upper and lower lips)    Bedside Swallow Eval:   Consistencies Assessed:  · Thin liquids : tsp bite ice chip x2, tsp sip water x2, straw sip water x2  · Puree : tsp bite pudding x1     Oral Phase:   · Anterior loss  · Decreased closure around utensil  · Drooling  · Pocketing   Right : portion of puree bolus observed s/p swallow  · Oral residue  · Slow " oral transit time   · Bolus holding    Pharyngeal Phase:   · reduced O2 sats, pt's O2 sats dropped from 100 to 84 during thin liquid trial  · coughing/choking  · decreased hyolaryngeal excursion to palpation  · delayed swallow initation  · multiple spontaneous swallows  · throat clearing  · wet vocal quality after swallow   · Given small straw sip thin liquid, pt initially produced adequate swallow; however, upon additional dry swallow, bolus observed to rest within pharynx/larynx per palpation and audible wet/gurgly voice    Compensatory Strategies  · Yankauer suction  · Multiple swallows  · Volitional cough/throat clear    Treatment: Education provided to pt and family re: role of SLP, POC, swallow precautions, aspiration risks including aspiration PNA, NPO rec, MBSS procedure, and need for frequent and aggressive oral care. RN placed suction at the bedside. SLP demonstrated use of suction. SLP allowed time for questions which were all answered within scope of practice. MBSS to be completed next date of service.    Pt will benefit from SLP tx 3x a week while inpatient to determine least restrictive diet, complete MBSS, and provide dysphagia remediation.    Assessment:     Cale Harding is a 78 y.o. male with an admit diagnosis of SOB following aspiration event with meds. Per clinical swallow assessment, he presents with severe oropharyngeal Dysphagia c/b reduced oral bolus control, drooling, oral residue, bolus holding, delayed swallow initiation, reduced laryngeal lift, and overt s/s of aspiration. He is deemed UNSAFE for oral diet at this time and is to be downgraded to NPO. MBSS warranted to objectively rule out aspiration and determine least restrictive diet. SLP reviewed recs w/ JAJA Howard, pt, family, and MD team. Per , orders to be placed for MBSS.     Goals:   Multidisciplinary Problems     SLP Goals        Problem: SLP Goal    Goal Priority Disciplines Outcome   SLP Goal     SLP Ongoing  (interventions implemented as appropriate)   Description:  Short Term Goals:  1. Pt will participate in clinical swallow assessment to determine safest diet.  2. Pt will participate in MBSS to objectively rule out penetration/aspiration and determine least restrictive diet.  3. Pt will implement oral care 3x day given assistance to decrease risk of aspirating bacteria filled saliva.    **Further goals pending MBSS completion                    Plan:     · Patient to be seen:  3 x/week   · Plan of Care expires:  09/16/19  · Plan of Care reviewed with:  patient, spouse, son, other (see comments)(JAJA Howard and MD team)   · SLP Follow-Up:  Yes       Discharge recommendations:  other (see comments)(TBD)   Barriers to Discharge:  Nutritional status    Time Tracking:     SLP Treatment Date:   08/18/19  Speech Start Time:  0957  Speech Stop Time:  1025     Speech Total Time (min):  28 min    Billable Minutes: Eval Swallow and Oral Function 14 and Seld Care/Home Management Training 14    Deneen Mayberry CCC-SLP  08/18/2019

## 2019-08-18 NOTE — ED NOTES
Dr. Bruno at bedside reassessing pt. Pt awake and alert. rr even and unlabored on 3LNC. Family at bedside.

## 2019-08-18 NOTE — ED NOTES
Notified Dr. Bruno of pt BP of 73/40. New verbal orders for 1L LR bolus. Pt awake and alert. Pt denies any pain at this time. rr even and unlabored on 3LNC. Family at bedside.

## 2019-08-18 NOTE — H&P
"[facility] Medicine H&P Note     Admitting Team: [facility] Hospitalist Team B  Attending Physician: Guy J. Lefort, MD  Resident: Kiana  Intern: Bishop     Date of Admit: 8/17/2019    Chief Complaint     Shortness of Breath for 3 hours    Subjective:      History of Present Illness:  Cale Harding is a 78 y.o.  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 The patient presented to [facility] on 8/17/2019 with a primary complaint of Shortness of Breath for 3 hours.    The patient was in their usual state of health until 8:00 PM when upon drinking water while taking medications he reports swallowing the water "down the wrong pipe". Immediately after swallowing he began coughing for a prolonged period of time and per his wife his breathing changed immediately after. He became short of breath and his wife reports that his breathing was labored. He denies any fever, CP, sick contacts, sputum production, nasal congestion, sore throat, nausea, or vomiting. When his breathing did not return to baseline, his wife and son decided to call EMS. Mrs. Harding reports that at baseline he has VALENCIA, however, he is able to ambulate to the restroom and to the kitchen with some assistance.    Family also reports that over the past 3-4 days he has had minimal urine output but they state he has been resistant to drinking water or eating. Mr. Harding states that he can urinate but requires some straining. He denies any dysuria, frequency, or hematuria.    Of note, the patient has signed a DNR but in the event of a compromised airway he wants to be intubated.    Past Medical History:  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    Prostate cancer 2002    Urinary tract infection      Past " Surgical History:  Past Surgical History:   Procedure Laterality Date    BIOPSY-DIAPHRAGM N/A 3/19/2018    Performed by Galdino Fang MD at [facility] OR [address] [place] [address]    PUWSRP-JCJDPT-UQ N/A 2/1/2018    Performed by [facility] Diagnostic Provider at [facility] OR [address] [place] [address]    BRONCHOSCOPY N/A 6/3/2019    Performed by [facility] Diagnostic Provider at [facility] OR [address] [place] [address]    COLONOSCOPY  10/20/2012    COLONOSCOPY  11/19/2014    dr machado ( repeat in 3 years per the pt )    CYSTOSCOPY, WITH RETROGRADE PYELOGRAM Bilateral 7/15/2019    Performed by Galdino Philippe MD at [facility] OR    CYSTOSCOPY, WITH URETHRAL DILATION Bilateral 7/15/2019    Performed by Galdino Philippe MD at [facility] OR    ELBOW SURGERY Left 2013    dislocation    [id number]-[id number]-H-CATH N/A 6/20/2019    Performed by [facility] Diagnostic Provider at [facility] OR [address] [place] [address]    LAPAROSCOPY-DIAGNOSTIC N/A 3/19/2018    Performed by Galdino Fang MD at [facility] OR [address] [place] [address]    Pericardiocentesis N/A 7/18/2019    Performed by Frank Javier MD at [facility] CATH LAB    PROCTECTOMY  2002    RELEASE, CONTRACTURE, BLADDER NECK N/A 7/15/2019    Performed by Galdino Philippe MD at [facility] OR    SHOULDER ARTHROSCOPY W/ ROTATOR CUFF REPAIR Right 2008    THORACOSCOPY-VIDEO ASSISTED (VATS) W/PLEURAL BIOPSY Left 3/19/2018    Performed by Galdino Fang MD at [facility] OR [address] [place] [address]    URETHROGRAM, RETROGRADE N/A 7/15/2019    Performed by Galdino Philippe MD at [facility] OR     Allergies:  Review of patient's allergies indicates:   Allergen Reactions    Bactrim [sulfamethoxazole-trimethoprim] Other (See Comments)     Joint pains     Home Medications:  Prior to Admission medications    Medication Sig Start Date End Date Taking? Authorizing Provider   albuterol-ipratropium (DUO-NEB) 2.5 mg-0.5 mg/3 mL nebulizer solution Inhale 3 ml's (1 vial) by nebulization every 6 (six) hours as needed for Wheezing or Shortness of Breath. Rescue 7/25/19 7/24/20  Hipolito Enrique MD   amiodarone (PACERONE) 400 MG tablet Take 1  tablet (400 mg total) by mouth 2 (two) times daily. 8/5/19 9/4/19  Justin Blackwell MD   artificial tears (ISOPTO TEARS) 0.5 % ophthalmic solution Place 1 drop into both eyes 3 (three) times daily. 8/5/19   Justin Blackwell MD   dextromethorphan-guaifenesin  mg (MUCINEX DM)  mg per 12 hr tablet Take 1 tablet by mouth every 12 (twelve) hours.    Historical Provider, MD   dronabinol (MARINOL) 2.5 MG capsule Take 1 capsule (2.5 mg total) by mouth 2 (two) times daily before meals. 8/8/19   Sintia Foster MD   famotidine (PEPCID) 20 MG tablet Take 1 tablet (20 mg total) by mouth once daily. 8/5/19 9/4/19  Justin Blackwell MD   HYDROcodone-acetaminophen (NORCO) 5-325 mg per tablet Take 1 tablet by mouth every 6 (six) hours as needed for Pain. 8/16/19   Elyssa Snow NP   metoprolol succinate (TOPROL-XL) 100 MG 24 hr tablet Take 1 tablet (100 mg total) by mouth once daily. 8/6/19 9/5/19  Justin Blackwell MD   ondansetron (ZOFRAN-ODT) 8 MG TbDL Dissolve 1 tablet (8 mg total) by mouth every 12 (twelve) hours as needed. 8/2/19   Justin Blackwell MD   polyethylene glycol (MIRALAX) 17 gram/dose powder Take 17 g by mouth once daily.    Historical Provider, MD   triamcinolone acetonide 0.025% (KENALOG) 0.025 % Oint Apply topically 2 (two) times daily as needed. For itching 8/2/19   Justin Blackwell MD   triamcinolone acetonide 0.1% (KENALOG) 0.1 % cream Apply topically 2 (two) times daily as needed. For itching 8/2/19   Justin Blackwell MD     Family History:  Family History   Problem Relation Age of Onset    Heart disease Father     Heart disease Brother     Prostate cancer Brother     Cancer Mother         [facility]    Heart disease Sister     Heart attack Sister     No Known Problems Son     Heart disease Brother     Prostate cancer Brother     Heart disease Sister     Heart attack Sister     No Known Problems Son     No Known Problems Son     Heart disease Brother     Prostate cancer Brother      "Kidney disease Neg Hx     Asthma Neg Hx     Emphysema Neg Hx      Social History:  Social History     Tobacco Use    Smoking status: Former Smoker     Packs/day: 2.00     Years: 15.00     Pack years: 30.00     Types: Cigarettes     Last attempt to quit: 1970     Years since quittin.6    Smokeless tobacco: Former User    Tobacco comment: pt quit 40 years ago   Substance Use Topics    Alcohol use: No     Alcohol/week: 16.8 oz     Types: 28 Cans of beer per week     Comment: None since Nov 15 th 2017    Drug use: No     Review of Systems:  Pertinent items are noted in HPI. All other systems are reviewed and are negative.    Health Maintaince :   Primary Care Physician: Dr. Escobedo    Immunizations:   TDap UTD    Flu UTD   Pna UTD    Objective:   Last 24 Hour Vital Signs:  BP  Min: 86/50  Max: 163/63  Temp  Av.3 °F (37.4 °C)  Min: 99 °F (37.2 °C)  Max: 99.5 °F (37.5 °C)  Pulse  Av.6  Min: 59  Max: 109  Resp  Av.8  Min: 30  Max: 36  SpO2  Av.3 %  Min: 93 %  Max: 100 %  Height  Av' 7" (170.2 cm)  Min: 5' 7" (170.2 cm)  Max: 5' 7" (170.2 cm)  Weight  Av kg (150 lb)  Min: 68 kg (150 lb)  Max: 68 kg (150 lb)  Body mass index is 23.49 kg/m².  No intake/output data recorded.    Physical Examination:  BP (!) 106/58   Pulse 69   Temp 99 °F (37.2 °C) (Oral)   Resp (!) 33   Ht 5' 7" (1.702 m)   Wt 68 kg (150 lb)   SpO2 (!) 93%   BMI 23.49 kg/m²   General appearance: appears stated age, cachectic, cooperative and moderate distress  Head: Normocephalic; erythematous mucosal lesions of mouth  Eyes: erythematous conjunctiva BL with crusting; EOMI, PERRLA  Neck: no adenopathy, supple, symmetrical, trachea midline, no tenderness/mass/nodules  Lungs: expiratory wheezing heard throughout; accessory muscle use with truncation of speech; tachypnea   Chest wall: no tenderness; port placement on right side without erythema or tenderness  Heart: regular rate and rhythm, S1, S2 normal and friction " rub heard  ; distant heart sounds  Abdomen: soft, non-tender; bowel sounds normal; no masses,  no organomegaly  Extremities: 3+ edema of BL lower extremitites; cold BL upper and lower extremitites.   Pulses: 2+ and symmetric UE; unable to palpate dorsalis pedis BL  Skin: peeling noted throughout upper and lower extremities; no erythema or weeping noted.  Neurologic: Grossly normal      Laboratory:  CBC:   Lab Results   Component Value Date    WBC 13.66 (H) 08/17/2019    HGB 12.1 (L) 08/17/2019    HCT 39 08/18/2019     08/17/2019    MCV 90 08/17/2019    RDW 21.8 (H) 08/17/2019     WBC Differential: 93.8 % N, 3.1 % L, 3.1 % M, 0.0 % Eo, 0.0 % Baso    BMP:   Lab Results   Component Value Date     (L) 08/17/2019    K 6.5 (HH) 08/17/2019     08/17/2019    CO2 15 (L) 08/17/2019    BUN 94 (H) 08/17/2019    CREATININE 2.2 (H) 08/17/2019     (H) 08/17/2019    CALCIUM 7.9 (L) 08/17/2019    MG 2.2 08/05/2019    PHOS 3.9 08/05/2019     LFTs:   Lab Results   Component Value Date    PROT 5.4 (L) 08/17/2019    ALBUMIN 2.4 (L) 08/17/2019    BILITOT 0.9 08/17/2019    AST 25 08/17/2019    ALKPHOS 96 08/17/2019    ALT 30 08/17/2019     Coags:   Lab Results   Component Value Date    INR 1.2 08/17/2019     FLP:   Lab Results   Component Value Date    CHOL 214 (H) 10/11/2017    HDL 49 10/11/2017    LDLCALC 137 (H) 10/11/2017    TRIG 152 (H) 10/11/2017    CHOLHDL 4.4 10/11/2017     DM:   Lab Results   Component Value Date    HGBA1C 5.8 (H) 07/15/2019    HGBA1C 5.9 09/30/2016    LDLCALC 137 (H) 10/11/2017    CREATININE 2.2 (H) 08/17/2019     Thyroid:   Lab Results   Component Value Date    TSH 2.310 02/07/2018     Anemia: No results found for: IRON, TIBC, FERRITIN, [id number], FOLATE  Cardiac:   Lab Results   Component Value Date    TROPONINI 0.035 (H) 08/17/2019     (H) 08/17/2019     Urinalysis:   Lab Results   Component Value Date    LABURIN KLEBSIELLA PNEUMONIAE ESBL  >100,000 cfu/ml   (A) 07/31/2019     COLORU Yellow 08/18/2019    SPECGRAV >=1.030 (A) 08/18/2019    NITRITE Negative 08/18/2019    PROTEINUR 30 06/13/2019    KETONESU Negative 08/18/2019    UROBILINOGEN 1.0 08/18/2019    BILIRUBINUR small 06/13/2019    WBCUA 50 (H) 08/18/2019     Trended Lab Data:  Recent Labs   Lab 08/17/19  2306 08/18/19  0011   WBC 13.66*  --    HGB 12.1*  --    HCT 40.8 39     --    MCV 90  --    RDW 21.8*  --    *  --    K 6.5*  --      --    CO2 15*  --    BUN 94*  --    CREATININE 2.2*  --    *  --    PROT 5.4*  --    ALBUMIN 2.4*  --    BILITOT 0.9  --    AST 25  --    ALKPHOS 96  --    ALT 30  --      Trended Cardiac Data:  Recent Labs   Lab 08/17/19 2306   TROPONINI 0.035*   *     Microbiology Data:  Blood culture = pending  Urine Culture = pending  Respiratory Viral Panel = pending  Influenza A & B = negative    Other Results:  EKG (my interpretation): Low voltage. Sinus rhythm.      Radiology:  CXR (8/18)  The heart is enlarged unchanged.  Right IJ catheter terminates at the mid SVC.  There are overlying leads  There is small right pleural effusion which is increased.  There is moderate dense opacity at the left base which could be atelectasis or pneumonia probably unchanged.  No pneumothorax.  No abdominal free air.  No definite interstitial edema     Assessment:     Cale Harding is a 78 y.o. male with:  Patient Active Problem List    Diagnosis Date Noted    Post-operative state 08/09/2019    Debility 08/04/2019    Delirium 08/03/2019    Catheter-associated urinary tract infection 08/01/2019    Hyponatremia 07/30/2019    SKINNY (acute kidney injury) 07/30/2019    Leukocytosis 07/29/2019    Rash, drug 07/28/2019    Decrease in appetite 07/26/2019    Compression of vein     SOB (shortness of breath) on exertion 07/25/2019    Acute on chronic diastolic heart failure 07/18/2019    Atrial fibrillation with RVR 07/15/2019    Chest pain 07/15/2019    Pericardial effusion  without cardiac tamponade 07/15/2019    Acquired contracture of bladder neck 07/10/2019    Slow urinary stream 07/10/2019    Scrotal edema 07/10/2019    Neoplasm related pain 06/27/2019    Encounter for care related to vascular access port 06/20/2019    Swelling of the testicles 06/12/2019    Colon polyp 10/16/2018    Mesothelioma 03/19/2018    Atypical chest pain 03/08/2018    Night sweats 03/08/2018    Weight loss 03/08/2018    Postural dizziness 03/08/2018    Mesothelioma of left lung 02/15/2018    Malignant pleural effusion 02/01/2018    Pleural scarring 01/22/2018    History of asbestos exposure 01/15/2018    Nocturia 11/22/2017    Urinary frequency 11/22/2017    Gastroesophageal reflux disease 10/11/2017    History of colonic diverticulitis 10/11/2017    Prediabetes 10/11/2017    History of dislocation of elbow 10/11/2017    Pleural plaque 09/30/2016    Eczema 09/30/2016    Contact with and (suspected) exposure to other hazardous, chiefly nonmedicinal, chemicals 06/14/2016    Abnormal bladder cytology 05/25/2016    History of prostate cancer 05/25/2016        Plan:     Acute Hypoxic Respiratory Failure, possibly 2/2 aspiration pneumonia vs pleural effusion vs PE    - Diagnosed with mesothelioma in March 2018, oncologist at [facility]    - Family believes he aspirated water when taking his medications; sharp deviation from baseline with cough and increased work of breathing   - EMS reported saturation at 60% on room air   - ABG: pH 7.348, PCO2 32.7, PO2 109, HCO3 18  A-a gradient 206.6  - In ED, on 3L of O2 satting 93%, BP = 99/66, RR = 30  - CXR showing increased small R pleural effusion from CXR on 8/03/19  - Plan for BiPap as needed; wean oxygen as tolerated   - Would like to perform CTA once renal function stabilizes  - Will order venous U/S for DVT     Severe Sepsis 2/2 Pneumonia vs. UTI   - On arrival to ED, BUN/Cr 94/2.2; baseline Cr ~1.0  - 500 mL NaCl 0.9%  administered in ED  - UA = 50 WBC, moderate bacteria, occasional budding yeast   - WBC = 13.66   - Lactate = 5.4  - Procalcitonin = 0.51  - Ordered: blood culture, urine culture, Respiratory culture  - Started vancomycin and meropenem (8/18) to cover possible aspiration pneumonia and UTI (history of ESBL klebsiella)    Acute Renal Failure   - Increase in Cr from baseline of ~1.0 to 2.2   - Urine Cr 121.4, urea 571, and sodium <20  FeNa 0.3% indicating prerenal injury   - Likely etiology dehydration but cannot rule out post renal obstruction due to PMHx of prostate cancer   - Gallagher's stain pending   - Gave additional 1L LR in addition to bolus above given in ED   - Will remain cautious with fluid administration with history of possible HFpEF  - Ordered: U/S kidney    Metabolic Acidosis  - Combined anion gap and non-anion gap  - Anion gap likely due to lactic acidosis  - Non-anion gap likely due to ARF  - Lactate = 5.4  - Will trend lactate q6h    Known Pericardial Effusion  - Bedside echo done by ED staff reported as no significant pericardial effusion   - Family reported known persistent but stable pericardial effusion that has been drained in the past  - Prior Echo (8/9/19) shows small circumferential pericardial effusion relatively unchanged from previous   - Consulted cardiology    Hyperkalemia   - K on arrival 6.5   - Calcium gluconate, albuterol sulfate nebulizer, and insulin 10 U with D50W given for critical K value   - EKG showing low voltage with normal sinus rhythm   - Will follow BMP    Intermediate Troponin   - Troponin: 0.035 --> 0.043  - Likely due to ARF  - Patient denies CP, diaphoresis, nausea, or vomiting  - Will continue to trend troponin q6h    History of Mesothelioma with malignant pleural effusion s/p PleurX placement  - 450 mL drained at prior hospital stay (7/30/19) with no growth per culture results   - Consulted pulmonology    HFpeEF     - , increased from 374 in 7/2019  - Prior  ECHO (8/9/19) shows grade I diastolic dysfunction  - Ordered TTE to evaluate    Atrial Fibrillation  - Continue home amiodarone 400 mg BID  - Patient not on diltiazem due to history of full body and mucosal rash  - No anticoagulation due to risk of worsening of pericardial effusion  - CHADSVASC = 4 (CHF, HTN, age > 75)    Normocytic anemia  - Hb = 12.1 I MCV = 90  - Ordered iron studies    Code Status: DNR  Diet: NPO  DVT Prophylaxis: SCD's  Disposition: ICU monitoring with pulmonary and cardiology recommendations needed    Nargis Alas MD  [facility] Internal Medicine HO-I    [facility] Medicine Hospitalist Pager numbers:   [facility] Hospitalist Medicine Team A (Anjelica/Bishop): [phone]  [facility] Hospitalist Medicine Team B (Jose Alfredo/Gadiel):  [phone]

## 2019-08-18 NOTE — ED NOTES
Pt and family requesting to be transferred to [facility] due to pt doctors be located there. Notified Dr. Lefort.

## 2019-08-18 NOTE — PLAN OF CARE
Patient presented with acute respiratory distress after an aspiration event at home. He required Bipap initially in the ED but was weaned to nasal cannula. His family requested transfer to [facility] to be with his heme/onc physicians but he was determined to be too unstable at that point. While in the ED, patient became hypotensive. He was initially responsive to fluids but had to be placed on levophed. Antibiotics were initiated for concern for septic shock.     There was concern for possible PE as a source of shock in this patient with history of malignancy and acute respiratory distress. A CTA was unable to be done at time of admission due to worsening renal failure and V/Q scan would likely be non-diagnostic with mesothelioma and pleural effusions. There was consideration of starting full dose anti-coagulation but he had been previously taken off of his anticoagulation for atrial fibrillation due to concerns for hemorraghic contribution to his pericardial effusion. Given there was also concern for acutely worsening pericardial effusion contributing to obstructive shock, the risk was determined to be too high without confirmatory imaging.     Patient had acute renal failure on admission. Family reported decreased PO intake for the last couple of weeks. Urine studies consistent with pre-renal etiology but did not improve with fluids. Potassium has continued to rise despite shifting and patient has become increasingly acidotic. I discussed this with the family at bedside. They do not want to advance to dialysis. They would like to manage with sodium bicarb gtt at this time.     Multiple discussions occurred with the family about patient's poor prognosis. They would like to continue with Bipap and pressors but do not feel it is in the best interest for intubation and resuscitation. Patient is currently DNR.     Radha Bruno, -II  [facility]

## 2019-08-18 NOTE — CONSULTS
Consult Note  [facility]    Attending: Martha Murphy MD  Fellow: Evelyn Ellison  Admit Date: 8/17/2019  Today's Date: 08/18/2019  Reason for Consult:  Respiratory failure, history of MPE with indwelling pleurX catheter    SUBJECTIVE:     HPI: Mr. Harding is a 77 y/o gentleman admitted with acute hypoxic respiratory failure following a likely aspiration event at home. His wife reports he had progressive dyspnea at home, especially with activity. He has an indwelling pleurX since January that he drains about once per week, getting about 500cc out. He was recently hospitalized for 3 weeks for complications of his advanced stage mesothelioma. He had had progression of his disease on therapy several times and has recently not been a candidate for chemo or other supportive procedures due to frailty. His recent hospitalization was complicated by SKINNY, EGPA from diltiazem (has some residual wounds on face and peeling skin), and persistent small pericardial effusion.    Review of patient's allergies indicates:   Allergen Reactions    Diltiazem Rash    Bactrim [sulfamethoxazole-trimethoprim] Other (See Comments)     Joint pains       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    Prostate cancer 2002    Urinary tract infection      Past Surgical History:   Procedure Laterality Date    BIOPSY-DIAPHRAGM N/A 3/19/2018    Performed by [facility] at [facility] OR 2ND FLR    HMOHNE-OIOPAD-HZ N/A 2/1/2018    Performed by [facility] at [facility] OR 2ND FLR    BRONCHOSCOPY N/A 6/3/2019    Performed by [facility] at [facility] OR 2ND FLR    COLONOSCOPY  10/20/2012    COLONOSCOPY  11/19/2014    dr machado ( repeat in 3 years per the pt )    CYSTOSCOPY, WITH RETROGRADE PYELOGRAM Bilateral 7/15/2019    [facility]     CYSTOSCOPY, WITH URETHRAL DILATION Bilateral 7/15/2019    [facility] at [facility] OR    ELBOW SURGERY Left 2013    dislocation    HOSZGXHSX-ODOQ-D-CATH N/A 2019    Performed by [facility] at [facility] OR 2ND FLR    LAPAROSCOPY-DIAGNOSTIC N/A 3/19/2018    Performed by [facility] at [facility] OR 2ND FLR    Pericardiocentesis N/A 2019    Performed by Frank Javier MD at [facility] CATH LAB    PROCTECTOMY  2002    RELEASE, CONTRACTURE, BLADDER NECK N/A 7/15/2019    Performed by [facility] at [facility] OR    SHOULDER ARTHROSCOPY W/ ROTATOR CUFF REPAIR Right     THORACOSCOPY-VIDEO ASSISTED (VATS) W/PLEURAL BIOPSY Left 3/19/2018    Performed by [facility] at [facility] OR 2ND FLR    URETHROGRAM, RETROGRADE N/A 7/15/2019    Performed by [facility] at [facility] OR     Family History   Problem Relation Age of Onset    Heart disease Father     Heart disease Brother     Prostate cancer Brother     [facility]    Heart disease Sister     Heart attack Sister     No Known Problems Son     Heart disease Brother     Prostate cancer Brother     Heart disease Sister     Heart attack Sister     No Known Problems Son     No Known Problems Son     Heart disease Brother     Prostate cancer Brother     Kidney disease Neg Hx     Asthma Neg Hx     Emphysema Neg Hx      Social History     Tobacco Use    Smoking status: Former Smoker     Packs/day: 2.00     Years: 15.00     Pack years: 30.00     Types: Cigarettes     Last attempt to quit: 1970     Years since quittin.6    Smokeless tobacco: Former User    Tobacco comment: pt quit 40 years ago   Substance Use Topics    Alcohol use: No     Alcohol/week: 16.8 oz     Types: 28 Cans of beer per week     Comment: None since Nov 15 th 2017    Drug use: No       All medications reviewed.    Review of Systems   Constitutional: Positive for malaise/fatigue and weight  loss.   Respiratory: Positive for cough and shortness of breath.    Gastrointestinal:        Poor appetite       OBJECTIVE:     Vital Signs Trends/Hx Reviewed  Vitals:    08/18/19 0630 08/18/19 0631 08/18/19 0806 08/18/19 1213   BP: (!) 74/55  115/63    Pulse: (!) 51 (!) 54 69 (!) 59   Resp: (!) 29 (!) 31 (!) 29 (!) 28   Temp:       TempSrc:       SpO2:   100%  Comment: SIGNAL FADES IN AND OUT ON EAR, FINGERS, 100%   Weight:       Height:           Physical Exam:  Physical Exam   Constitutional: He is well-developed, well-nourished, and in no distress.   HENT:   Head: Normocephalic and atraumatic.   Some wounds on lips- healing   Eyes: No scleral icterus.   Neck: Neck supple. JVD present.   Cardiovascular: Regular rhythm.   bradycardic   Pulmonary/Chest:   Tachypneic, in mild respiratory distress, more comfortable on BiPAP but frequently wanting to remove it to talk.   Abdominal: Soft. He exhibits no distension. There is no tenderness.   Musculoskeletal:   Anasarca on body wall   Neurological: He is alert.   Skin: Skin is warm and dry.   Psychiatric:   Inattentive, fatigued-appearing   Vitals reviewed.    Laboratory:  CBC:   Recent Labs   Lab 08/18/19  1200   WBC 15.62*   RBC 4.53*   HGB 11.9*   HCT 41.4   *   MCV 91   MCH 26.3*   MCHC 28.7*     CMP:   Recent Labs   Lab 08/18/19  0621  08/18/19  1036   *   < > 110  110   CALCIUM 8.1*   < > 8.2*  8.2*   ALBUMIN 2.4*  --   --    PROT 5.5*  --   --    *   < > 135*  135*   K 6.4*   < > 5.8*  5.8*  5.8*   CO2 13*   < > 16*  16*      < > 102  102   BUN 92*   < > 90*  90*   CREATININE 2.4*   < > 2.5*  2.5*   ALKPHOS 93  --   --    ALT 34  --   --    AST 31  --   --    BILITOT 1.1*  --   --     < > = values in this interval not displayed.     Cardiac markers:   Recent Labs   Lab 08/18/19  1036   TROPONINI 0.040*     ABGs:   Recent Labs   Lab 08/18/19  0604   PH 7.213*   PCO2 32.6*   PO2 71*   HCO3 13.1*   POCSATURATED 90*   BE -15      Bedside US with diffuse B-lines in his lungs, small pericardial effusion, abdominal ascites, small right-sided effusion    ASSESSMENT/PLAN:     Acute on chronic respiratory failure due to progressive mesothelioma encasing his left lung and likely pericardium, pulmonary edema, and MPE. He is limited in his ability to compensate for his metabolic acidosis due to trapped lung from his malignancy and pleural effusion.  -Discussed Mr. Harding's prognosis with his family, who understand he is at the end of his life. They want him to be able to see his family and be comfortable.   -BiPAP, fentanyl, and ativan for dyspnea  -PleurX drained 500cc this morning  -recommend diuresis with his bicarb infusion  -echo- if restrictive pericarditis or a large effusion, he is unlikely to benefit from additional procedures given how advanced his disease is and his frailty    AGMA- likely due to lactic acidosis and renal failure. 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 Harding's wife and sons wish for him to have more time to see extended family, but understand his condition. They are grateful for the time he has had and want to ensure he is comfortable.     Thank you for allowing us to participate in the care of this patient. We will continue to follow. Please call with questions.    Evelyn Ellison 08/18/2019 3:23 PM  [facility]

## 2019-08-18 NOTE — CLINICAL REVIEW
Results for GEM ABRAMS (MRN [id number]) as of 8/18/2019 06:48   Ref. Range 8/18/2019 06:04   POC Sodium Latest Ref Range: 136 - 145 mmol/L 132 (L)   POC Potassium Latest Ref Range: 3.5 - 5.1 mmol/L 6.1 (H)   POC Ionized Calcium Latest Ref Range: 1.06 - 1.42 mmol/L 1.19   POC Hematocrit Latest Ref Range: 36 - 54 %PCV 40   POC HEMOGLOBIN Latest Units: g/dL 14   POC PH Latest Ref Range: 7.35 - 7.45  7.213 (LL)   POC PCO2 Latest Ref Range: 35 - 45 mmHg 32.6 (L)   POC PO2 Latest Ref Range: 80 - 100 mmHg 71 (L)   POC BE Latest Ref Range: -2 to 2 mmol/L -15   POC HCO3 Latest Ref Range: 24 - 28 mmol/L 13.1 (L)   POC SATURATED O2 Latest Ref Range: 95 - 100 % 90 (L)   POC TCO2 Latest Ref Range: 23 - 27 mmol/L 14 (L)   FiO2 Unknown 32   Flow Unknown 3   Sample Unknown ARTERIAL   DelSys Unknown Face Tent   Allens Test Unknown Pass   Site Unknown RR   Mode Unknown SPONT   ABG results reported to DR. Alas at 0605

## 2019-08-18 NOTE — ED PROVIDER NOTES
Encounter Date: 8/17/2019    SCRIBE #1 NOTE: I, Alma Brenner, am scribing for, and in the presence of,  Dr. Lefort. I have scribed the entire note.       History     Chief Complaint   Patient presents with    Shortness of Breath     x 1 hour; pt denies cp at this time.     A 78 year-old male, with history of mesothelioma, presents to the ED for shortness of breath for the last hour. EMS reports that initial SpO2 of 68% on RA. Patient placed on 15L with improvement to 100% on arrival. Wife at the bedside reports that the patient suffers with dyspnea on exertion at baseline. Patient is ambulatory to the bathroom without immediate difficulty, however he has exhibited decreased energy and activity for the last few days. Associated symptoms include non-productive cough. He denies fever, chills, chest pain, nausea, or any other associated symptoms. DNR/DNI status noted.     The history is provided by the patient.     Review of patient's allergies indicates:   Allergen Reactions    Bactrim [sulfamethoxazole-trimethoprim] Other (See Comments)     Joint pains     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    Prostate cancer 2002    Urinary tract infection      Past Surgical History:   Procedure Laterality Date    BIOPSY-DIAPHRAGM N/A 3/19/2018    [facility]    HVGMUP-CYRENV-ZX N/A 2/1/2018    Performed by [facility] Diagnostic Provider at [facility] OR 2ND FLR    BRONCHOSCOPY N/A 6/3/2019    [facility]    COLONOSCOPY  10/20/2012    COLONOSCOPY  11/19/2014    dr machado ( repeat in 3 years per the pt )    CYSTOSCOPY, WITH RETROGRADE PYELOGRAM Bilateral 7/15/2019    [facility] at [facility]    CYSTOSCOPY, WITH URETHRAL DILATION Bilateral 7/15/2019    Performed by [facility] MD at  [facility] OR    ELBOW SURGERY Left 2013    dislocation    WTOOTJBMC-PLWD-Z-CATH N/A 2019    Performed by [facility] Diagnostic Provider at [facility] OR 2ND FLR    LAPAROSCOPY-DIAGNOSTIC N/A 3/19/2018    Performed by [facility] at [facility] OR 2ND FLR    Pericardiocentesis N/A 2019    Performed by Frank Javier MD at [facility] CATH LAB    PROCTECTOMY  2002    RELEASE, CONTRACTURE, BLADDER NECK N/A 7/15/2019    Performed by [facility] at [facility] OR    SHOULDER ARTHROSCOPY W/ ROTATOR CUFF REPAIR Right     THORACOSCOPY-VIDEO ASSISTED (VATS) W/PLEURAL BIOPSY Left 3/19/2018    Performed by [facility] at [facility] OR 2ND FLR    URETHROGRAM, RETROGRADE N/A 7/15/2019    Performed by [facility] at [facility] OR     Family History   Problem Relation Age of Onset    Heart disease Father     Heart disease Brother     Prostate cancer Brother     [facility] Mother         [facility]    Heart disease Sister     Heart attack Sister     No Known Problems Son     Heart disease Brother     Prostate cancer Brother     Heart disease Sister     Heart attack Sister     No Known Problems Son     No Known Problems Son     Heart disease Brother     Prostate cancer Brother     Kidney disease Neg Hx     Asthma Neg Hx     Emphysema Neg Hx      Social History     Tobacco Use    Smoking status: Former Smoker     Packs/day: 2.00     Years: 15.00     Pack years: 30.00     Types: Cigarettes     Last attempt to quit: 1970     Years since quittin.6    Smokeless tobacco: Former User    Tobacco comment: pt quit 40 years ago   Substance Use Topics    Alcohol use: No     Alcohol/week: 16.8 oz     Types: 28 Cans of beer per week     Comment: None since Nov 15 th 2017    Drug use: No     Review of Systems   Constitutional: Positive for activity change and fatigue. Negative for chills and fever.   HENT: Negative for congestion, ear pain, rhinorrhea and sore throat.    Respiratory: Positive  for cough and shortness of breath.    Cardiovascular: Negative for chest pain.   Gastrointestinal: Negative for abdominal pain, diarrhea, nausea and vomiting.   Genitourinary: Negative for dysuria and hematuria.   Musculoskeletal: Negative for myalgias and neck pain.   Skin: Negative for rash.   Neurological: Negative for dizziness, weakness, light-headedness and headaches.   Psychiatric/Behavioral: Negative for confusion.       Physical Exam     Initial Vitals [08/17/19 2215]   BP Pulse Resp Temp SpO2   -- 64 (!) 30 -- 96 %      MAP       --         Physical Exam    Nursing note and vitals reviewed.  Constitutional: He appears well-developed. He appears distressed.   HENT:   Head: Normocephalic and atraumatic.   Cardiovascular: Normal rate, regular rhythm, normal heart sounds and intact distal pulses.   Pulmonary/Chest: Tachypnea noted. He is in respiratory distress. He has decreased breath sounds. He has rales.   Diminished breath sounds bilaterally; L > R  Coarse breath sounds throighout  CVC catheter with subcutaneous port to right anterior chest wall   Abdominal: Soft. There is no tenderness.   Neurological: He is alert and oriented to person, place, and time. GCS score is 15. GCS eye subscore is 4. GCS verbal subscore is 5. GCS motor subscore is 6.   Skin: Skin is warm and dry.   Diffuse skin slothing         ED Course   ED US Echo  Date/Time: 8/17/2019 10:01 PM  Performed by: Guy J. Lefort, MD  Authorized by: Guy J. Lefort, MD   Comments: No significant pericardial effusion. No septal bowing.         Labs Reviewed   CBC W/ AUTO DIFFERENTIAL - Abnormal; Notable for the following components:       Result Value    WBC 13.66 (*)     RBC 4.55 (*)     Hemoglobin 12.1 (*)     Mean Corpuscular Hemoglobin 26.6 (*)     Mean Corpuscular Hemoglobin Conc 29.7 (*)     RDW 21.8 (*)     Gran # (ANC) 12.8 (*)     Lymph # 0.4 (*)     Gran% 93.8 (*)     Lymph% 3.1 (*)     Mono% 3.1 (*)     All other components within normal  limits   COMPREHENSIVE METABOLIC PANEL - Abnormal; Notable for the following components:    Sodium 134 (*)     Potassium 6.5 (*)     CO2 15 (*)     Glucose 155 (*)     BUN, Bld 94 (*)     Creatinine 2.2 (*)     Calcium 7.9 (*)     Total Protein 5.4 (*)     Albumin 2.4 (*)     eGFR if  32 (*)     eGFR if non  28 (*)     All other components within normal limits    Narrative:       K critical result(s) called and verbal readback obtained from   Sujata Gleason RN. , 08/17/2019 23:53   TROPONIN I - Abnormal; Notable for the following components:    Troponin I 0.035 (*)     All other components within normal limits   B-TYPE NATRIURETIC PEPTIDE - Abnormal; Notable for the following components:     (*)     All other components within normal limits   PROTIME-INR - Abnormal; Notable for the following components:    Prothrombin Time 12.7 (*)     All other components within normal limits   URINALYSIS, REFLEX TO URINE CULTURE - Abnormal; Notable for the following components:    Appearance, UA Cloudy (*)     Specific Gravity, UA >=1.030 (*)     Protein, UA 2+ (*)     Bilirubin (UA) 1+ (*)     Occult Blood UA 1+ (*)     Leukocytes, UA 2+ (*)     All other components within normal limits    Narrative:     Preferred Collection Type->Urine, Clean Catch   URINALYSIS MICROSCOPIC - Abnormal; Notable for the following components:    WBC, UA 50 (*)     Bacteria Moderate (*)     Yeast, UA Occasional (*)     Hyaline Casts, UA 3 (*)     All other components within normal limits    Narrative:     Preferred Collection Type->Urine, Clean Catch   ISTAT PROCEDURE - Abnormal; Notable for the following components:    POC PH 7.348 (*)     POC PCO2 32.7 (*)     POC PO2 109 (*)     POC HCO3 18.0 (*)     POC Sodium 135 (*)     POC Potassium 6.2 (*)     POC TCO2 19 (*)     All other components within normal limits   POCT GLUCOSE - Abnormal; Notable for the following components:    POCT Glucose 148 (*)     All  other components within normal limits   CULTURE, URINE   CULTURE, BLOOD   CULTURE, BLOOD   BASIC METABOLIC PANEL   MAGNESIUM   CBC W/ AUTO DIFFERENTIAL   SODIUM, URINE, RANDOM   CREATININE, URINE, RANDOM   UREA NITROGEN, URINE, RANDOM   ROSAS'S STAIN, URINE RANDOM   PROCALCITONIN     EKG Readings: (Independently Interpreted)   NSR, rate 63. Low voltage QRS. No STEMI.        X-Rays:   Independently Interpreted Readings:   Other Readings:  Reviewed by myself, read by radiology.      Imaging Results          X-Ray Chest AP Portable (Final result)  Result time 08/17/19 22:58:12    Final result by Martin Banegas MD (08/17/19 22:58:12)                 Impression:      Left lung disease unchanged.  Increased small right pleural effusion.      Electronically signed by: Martin Banegas  Date:    08/17/2019  Time:    22:58             Narrative:    EXAMINATION:  XR CHEST AP PORTABLE    CLINICAL HISTORY:  shortness of breath;    TECHNIQUE:  Single frontal view of the chest was performed.    COMPARISON:  08/03/2019 chest    FINDINGS:  Single AP portable view at 22:35.    The heart is enlarged unchanged.  Right IJ catheter terminates at the mid SVC.  There are overlying leads    There is small right pleural effusion which is increased.  There is moderate dense opacity at the left base which could be atelectasis or pneumonia probably unchanged.  No pneumothorax.  No abdominal free air.  No definite interstitial edema                               Medical Decision Making:   Differential Diagnosis:   Differential Diagnosis includes, but is not limited to:  PE, MI/ACS, pneumothorax, pericardial effusion/tamonade, pneumonia, lung abscess, pericarditis/myocarditis, pleural effusion, lung mass, CHF exacerbation, asthma exacerbation, COPD exacerbation, aspirated/ingested foreign body, airway obstruction, CO poisoning, anemia, metabolic derangement, allergy/atopy, influenza, viral URI, viral syndrome.    Clinical Tests:   Lab Tests:  Ordered and Reviewed  Radiological Study: Ordered and Reviewed  Medical Tests: Ordered and Reviewed  ED Management:  78 year-old male with history of mesothelioma presents with respiratory distress. Diminished breath sounds bilaterally, left greater than right. Patient is tachypneic with RR in 30's. On arrival, SpO2 is 97% on 15L non-rebreather. Discussed benefit of BiPAP given current respiratory status. Patient placed on BiPAP.     Discussed with [facility] recommendation to admit patient to this hospital due to stability.  Discussed this with family.  When off BiPAP to non-rebreather.  Given antibiotics department.  Limited [facility] further management.              Attending Attestation:         Attending Critical Care:   Critical Care Times:   Direct Patient Care (initial evaluation, reassessments, and time considering the case)................................................................15 minutes.   Additional History from reviewing old medical records or taking additional history from the family, EMS, PCP, etc.......................5 minutes.   Ordering, Reviewing, and Interpreting Diagnostic Studies...............................................................................................................5 minutes.   Documentation..................................................................................................................................................................................5 minutes.   Consultation with other Physicians. .................................................................................................................................................5 minutes.   Consultation with the patient's family directly relating to the patient's condition, care, and DNR status (when patient unable)......5 minutes.   ==============================================================  · Total Critical Care Time - exclusive of procedural time: 40  minutes.  ==============================================================  Critical care was time spent personally by me on the following activities: obtaining history from patient or relative, evaluation of patient's response to treatment, ordering and performing treatments and interventions, development of treatment plan with patient or relative, ordering lab, x-rays, and/or EKG, review of old charts, review of x-rays / CT sent with the patient, examination of patient, discussions with primary provider, discussion with consultants, interpretation of cardiac measurements and re-evaluation of patient's conition.                  Clinical Impression:       ICD-10-CM ICD-9-CM   1. Acute respiratory failure with hypoxia J96.01 518.81   2. Shortness of breath R06.02 786.05   3. Congestive heart failure, unspecified HF chronicity, unspecified heart failure type I50.9 428.0   4. Respiratory distress R06.03 786.09   5. Mesothelioma C45.9 199.1   6. SKINNY (acute kidney injury) N17.9 584.9   7. Dehydration E86.0 276.51   8. Hyperkalemia E87.5 276.7   9. Pericardial effusion I31.3 423.9     Disposition:   Disposition: Admitted  Condition: Critical       Scribe Attestation I, Dr. Guy Lefort, personally performed the services described in this documentation. All medical record entries made by the scribe were at my direction and in my presence. I have reviewed the chart and agree that the record reflects my personal performance and is accurate and complete. Guy Lefort, MD.  2:04 AM 08/18/2019                    Guy J. Lefort, MD  08/18/19 0206

## 2019-08-18 NOTE — ED NOTES
Pt awake and alert. Resp cont with use of abd and neck muscles. No diaphoresis noted. Will cont to monitor.

## 2019-08-18 NOTE — ED NOTES
Pt lying on stretcher with neb tx in progress. Awake and alert, talking intermittently to family members and staff.

## 2019-08-18 NOTE — PROGRESS NOTES
Pharmacokinetic Initial Assessment: IV Vancomycin    Assessment/Plan:    Initiate intravenous vancomycin with loading dose of 2000 mg once with subsequent doses when random concentrations are less than 20 mcg/mL  Desired empiric serum trough concentration is 10 to 15 mcg/mL  Draw vancomycin random level on 8-19 at 03:00.  Pharmacy will continue to follow and monitor vancomycin.      Please contact pharmacy at extension [phone]   with any questions regarding this assessment.     Thank you for the consult,   Colton Pickens       Patient brief summary:  Cale Harding is a 78 y.o. male initiated on antimicrobial therapy with IV Vancomycin for treatment of suspected lower respiratory infection    Drug Allergies:   Review of patient's allergies indicates:   Allergen Reactions    Bactrim [sulfamethoxazole-trimethoprim] Other (See Comments)     Joint pains       Actual Body Weight:   68 kg    Renal Function:   Estimated Creatinine Clearance: 26.3 mL/min (A) (based on SCr of 2.2 mg/dL (H)).,     Dialysis Method (if applicable):  N/A    CBC (last 72 hours):  Recent Labs   Lab Result Units 08/17/19  2306   WBC K/uL 13.66*   Hemoglobin g/dL 12.1*   Hematocrit % 40.8   Platelets K/uL 163   Gran% % 93.8*   Lymph% % 3.1*   Mono% % 3.1*   Eosinophil% % 0.0   Basophil% % 0.0   Differential Method  Automated       Metabolic Panel (last 72 hours):  Recent Labs   Lab Result Units 08/17/19  2306 08/18/19  0101   Sodium mmol/L 134*  --    Potassium mmol/L 6.5*  --    Chloride mmol/L 105  --    CO2 mmol/L 15*  --    Glucose mg/dL 155*  --    Glucose, UA   --  Negative   BUN, Bld mg/dL 94*  --    Creatinine mg/dL 2.2*  --    Albumin g/dL 2.4*  --    Total Bilirubin mg/dL 0.9  --    Alkaline Phosphatase U/L 96  --    AST U/L 25  --    ALT U/L 30  --        Drug levels (last 3 results):  No results for input(s): VANCOMYCINRA, VANCOMYCINPE, VANCOMYCINTR in the last 72 hours.    Microbiologic Results:  Microbiology Results (last 7 days)        Procedure Component Value Units Date/Time    Blood culture [[id number]]     Order Status:  No result Specimen:  Blood     Blood culture [[id number]]     Order Status:  No result Specimen:  Blood     Urine culture [[id number]] Collected:  08/18/19 0101    Order Status:  No result Specimen:  Urine Updated:  08/18/19 0141

## 2019-08-18 NOTE — PROGRESS NOTES
"eICU Brief Admission Note       Briefly, 78 y.o.  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 The patient presented to [facility] on 8/17/2019 with a primary complaint of Shortness of Breath for 3 hours.   The patient was in their usual state of health until 8:00 PM when upon drinking water while taking medications he reports swallowing the water "down the wrong pipe". Immediately after swallowing he began coughing for a prolonged period of time and per his wife his breathing changed immediately after. He became short of breath and his wife reports that his breathing was labored. He denies any fever, CP, sick contacts, sputum production, nasal congestion, sore throat, nausea, or vomiting.    Family also reports that over the past 3-4 days he has had minimal urine output but they state he has been resistant to drinking water or eating. Mr. Harding states that he can urinate but requires some straining. He denies any dysuria, frequency, or hematuria.        Video assessment :    Vitals reviewed   Afebrile, stable vitals     LABs reviewed       Radiology reviewed   CXR  The heart is enlarged unchanged.  Right IJ catheter terminates at the mid SVC.  There are overlying leads    There is small right pleural effusion which is increased.  There is moderate dense opacity at the left base which could be atelectasis or pneumonia probably unchanged.  No pneumothorax.  No abdominal free air.  No definite interstitial edema      Impression       Left lung disease unchanged.  Increased small right pleural effusion.         TTE 8/9/2019   · Normal left ventricular systolic function. The estimated ejection fraction is 55%  · Grade I (mild) left ventricular diastolic dysfunction consistent with impaired relaxation.  · Concentric left ventricular remodeling.  · Normal right ventricular systolic function.  · Intermediate " central venous pressure (8 mm Hg).  · The estimated PA systolic pressure is 26 mm Hg  · Small circumferential pericardial effusion. Effusion is fluid. Relatively unchanged from previous echo with no echo evidence of tamponade.         Assessment / Plan :  # acute hypoxic respiratory failure . Aspiration pneumonia, vs CHF; pleural effusion ; to r/o PE   # possible septic shock -- aspiration pneumonia , possible UTI   # hyperkalemia   # SKINNY/CKD, anion gap acidosis    # elevated trops , BNP ; diatolic CHF grade I and EF 55%   # Mesothelioma with malignant pleural effusion s/p PleurX placement  # anemia   # Afib , c/o reaction to Diltiazem   - ABX : s/p Vanco + Zosyn --> meropenem ; f/u cultures   - duonebs   - BiPAP   - couldn't tolerate Lasix as BP low   - will start on pressors       DVT Px : Heparin SQ / Lovenox SQ once platelets stable   GI Px : PPI       Patient seen over video : Yes  Chart reviewed : Yes  Spoke with RN : Yes

## 2019-08-18 NOTE — ED NOTES
Pt pulled up, turned and repositioned. Pt awake and alert. Updated pt and family on plan of care. All verbalized understanding.

## 2019-08-18 NOTE — PLAN OF CARE
Discussed Mr. Harding's care with the primary team and his family. He has mesothelioma that has progressed despite treatment and has recently had a 21-day hospitalization, after which he was deemed to be too frail for chemo. Although he holds out hope for being a candidate again, his wife and sons understand how unlikely this is. They want to ensure he is comfortable and understand that he is dying. They have already called family to come into town over the next few days. Their priorities are facilitating family time and comfort. They want to continue medical management of his metabolic acidosis and renal failure but understand that dialysis requires additional procedures and has discomforts of it's own, and they do not want to subject him to these. They appreciate the time they have had over the past 2 years from his aggressive treatment, and want to keep him happy and comfortable.     Plan:  BiPAP, fentanyl, ativan PRN for SOB  Bicarb gtt for metabolic acidosis; likely needs lasix to facilitate this  Norepi for shock  Pleurx catheter drained this morning- 500cc clear yellow fluid removed on R    Evelyn Ellison 08/18/2019 10:29 AM  [facility]

## 2019-08-18 NOTE — CONSULTS
"U Nephrology Consult Note    Date of Admit: 8/17/2019    Chief Complaint/Reason for Consult     Shortness of Breath (x 1 hour; pt denies cp at this time.)   for one day    Subjective:      History of Present Illness:  Cale Harding is a 78 y.o.  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 The patient presented to [facility] on 8/17/2019 with a primary complaint of Shortness of Breath for 3 hours.     The patient was in their usual state of health until 8:00 PM when upon drinking water while taking medications he reports swallowing the water "down the wrong pipe". Immediately after swallowing he began coughing for a prolonged period of time and per his wife his breathing changed immediately after. He became short of breath and his wife reports that his breathing was labored. He denies any fever, CP, sick contacts, sputum production, nasal congestion, sore throat, nausea, or vomiting. When his breathing did not return to baseline, his wife and son decided to call EMS. Mrs. Harding reports that at baseline he has VALENCIA, however, he is able to ambulate to the restroom and to the kitchen with some assistance.     Family also reports that over the past 3-4 days he has had minimal urine output but they state he has been resistant to drinking water or eating. Mr. Harding states that he can urinate but requires some straining. He denies any dysuria, frequency, or hematuria.     Of note, the patient has signed a DNR but in the event of a compromised airway he wants to be intubated.    Nephrology was consulted for SKINNY and anion gap metabolic acidosis. Family does not want dialysis.    Past Medical History:  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    Prostate cancer 2002    " Urinary tract infection        Past Surgical History:  Past Surgical History:   Procedure Laterality Date    BIOPSY-DIAPHRAGM N/A 3/19/2018    Performed by Galdino Fang MD at [facility] OR [address] [place] [id number]    LSJRKO-FHBNXZ-PB N/A 2/1/2018    Performed by [facility] Diagnostic Provider at [facility] OR [address] [place] [address]    BRONCHOSCOPY N/A 6/3/2019    Performed by [facility] Diagnostic Provider at [facility] OR [address] [place] [address]    COLONOSCOPY  10/20/2012    COLONOSCOPY  11/19/2014    dr machado ( repeat in 3 years per the pt )    CYSTOSCOPY, WITH RETROGRADE PYELOGRAM Bilateral 7/15/2019    Performed by [facility]dino Philippe MD at [facility] OR    CYSTOSCOPY, WITH URETHRAL DILATION Bilateral 7/15/2019    Performed by Galdino Philippe MD at [facility] OR    ELBOW SURGERY Left 2013    dislocation    NMSFHFNNX-AFCN-A-CATH N/A 6/20/2019    Performed by [facility] Diagnostic Provider at [facility] OR [address] [place] [id number]    LAPAROSCOPY-DIAGNOSTIC N/A 3/19/2018    Performed by Galdino Fang MD at [facility] OR [address] [place] [address]    Pericardiocentesis N/A 7/18/2019    Performed by Frank Javier MD at [facility] CATH LAB    PROCTECTOMY  2002    RELEASE, CONTRACTURE, BLADDER NECK N/A 7/15/2019    Performed by Galdino Philippe MD at [facility] OR    SHOULDER ARTHROSCOPY W/ ROTATOR CUFF REPAIR Right 2008    THORACOSCOPY-VIDEO ASSISTED (VATS) W/PLEURAL BIOPSY Left 3/19/2018    Performed by Galdino Fang MD at [facility] OR [address] [place] [address]    URETHROGRAM, RETROGRADE N/A 7/15/2019    Performed by Galdino Philippe MD at [facility] OR       Allergies:  Review of patient's allergies indicates:   Allergen Reactions    Diltiazem Rash    Bactrim [sulfamethoxazole-trimethoprim] Other (See Comments)     Joint pains       Home Medications:  Prior to Admission medications    Medication Sig Start Date End Date Taking? Authorizing Provider   albuterol-ipratropium (DUO-NEB) 2.5 mg-0.5 mg/3 mL nebulizer solution Inhale 3 ml's (1 vial) by nebulization every 6 (six) hours as needed for Wheezing or Shortness of Breath. Rescue 7/25/19 7/24/20   Hipolito Enrique MD   amiodarone (PACERONE) 400 MG tablet Take 1 tablet (400 mg total) by mouth 2 (two) times daily. 8/5/19 9/4/19  Justin Blackwell MD   artificial tears (ISOPTO TEARS) 0.5 % ophthalmic solution Place 1 drop into both eyes 3 (three) times daily. 8/5/19   Justin Blackwell MD   dextromethorphan-guaifenesin  mg (MUCINEX DM)  mg per 12 hr tablet Take 1 tablet by mouth every 12 (twelve) hours.    Historical Provider, MD   dronabinol (MARINOL) 2.5 MG capsule Take 1 capsule (2.5 mg total) by mouth 2 (two) times daily before meals. 8/8/19   Sintia Foster MD   famotidine (PEPCID) 20 MG tablet Take 1 tablet (20 mg total) by mouth once daily. 8/5/19 9/4/19  Justin Blackwell MD   HYDROcodone-acetaminophen (NORCO) 5-325 mg per tablet Take 1 tablet by mouth every 6 (six) hours as needed for Pain. 8/16/19   Elyssa Snow NP   metoprolol succinate (TOPROL-XL) 100 MG 24 hr tablet Take 1 tablet (100 mg total) by mouth once daily. 8/6/19 9/5/19  Justin Blackwell MD   ondansetron (ZOFRAN-ODT) 8 MG TbDL Dissolve 1 tablet (8 mg total) by mouth every 12 (twelve) hours as needed. 8/2/19   Justin Blackwell MD   polyethylene glycol (MIRALAX) 17 gram/dose powder Take 17 g by mouth once daily.    Historical Provider, MD   triamcinolone acetonide 0.025% (KENALOG) 0.025 % Oint Apply topically 2 (two) times daily as needed. For itching 8/2/19   Justin Blackwell MD   triamcinolone acetonide 0.1% (KENALOG) 0.1 % cream Apply topically 2 (two) times daily as needed. For itching 8/2/19   Justin Blackwell MD       Family History:  Family History   Problem Relation Age of Onset    Heart disease Father     Heart disease Brother     Prostate cancer Brother     Cancer Mother         [facility]    Heart disease Sister     Heart attack Sister     No Known Problems Son     Heart disease Brother     Prostate cancer Brother     Heart disease Sister     Heart attack Sister     No Known Problems Son     No Known Problems  "Son     Heart disease Brother     Prostate cancer Brother     Kidney disease Neg Hx     Asthma Neg Hx     Emphysema Neg Hx        Social History:  Social History     Tobacco Use    Smoking status: Former Smoker     Packs/day: 2.00     Years: 15.00     Pack years: 30.00     Types: Cigarettes     Last attempt to quit: 1970     Years since quittin.6    Smokeless tobacco: Former User    Tobacco comment: pt quit 40 years ago   Substance Use Topics    Alcohol use: No     Alcohol/week: 16.8 oz     Types: 28 Cans of beer per week     Comment: None since Nov 15 th 2017    Drug use: No       Review of Systems:  Pertinent positives and negatives are listed in HPI.  All other systems are reviewed and are negative.     Objective:   Last 24 Hour Vital Signs:  BP  Min: 71/47  Max: 163/63  Temp  Av.3 °F (37.4 °C)  Min: 99 °F (37.2 °C)  Max: 99.5 °F (37.5 °C)  Pulse  Av  Min: 51  Max: 109  Resp  Av.6  Min: 28  Max: 39  SpO2  Av.9 %  Min: 85 %  Max: 100 %  Height  Av' 7" (170.2 cm)  Min: 5' 7" (170.2 cm)  Max: 5' 7" (170.2 cm)  Weight  Av.6 kg (155 lb 9.3 oz)  Min: 68 kg (150 lb)  Max: 73.1 kg (161 lb 2.5 oz)  Body mass index is 25.24 kg/m².  I/O last 3 completed shifts:  In: 1700 [I.V.:500; IV Piggyback:1200]  Out: -     Physical Examination:  General appearance: appears stated age, cachectic, cooperative and moderate distress  Head: Normocephalic; erythematous mucosal lesions of mouth  Eyes: erythematous conjunctiva BL with crusting; EOMI, PERRLA  Neck: no adenopathy, supple, symmetrical, trachea midline, no tenderness/mass/nodules  Lungs: expiratory wheezing heard throughout; accessory muscle use with truncation of speech; tachypnea   Chest wall: no tenderness; port placement on right side without erythema or tenderness  Heart: regular rate and rhythm, S1, S2 normal and friction rub heard  ; distant heart sounds  Abdomen: soft, non-tender; bowel sounds normal; no masses,  no " organomegaly  Extremities: 3+ edema of BL lower extremitites; cold BL upper and lower extremitites.   Pulses: 2+ and symmetric UE; unable to palpate dorsalis pedis BL  Skin: peeling noted throughout upper and lower extremities; no erythema or weeping noted.  Neurologic: Grossly normal     Laboratory:  Most Recent Data:  CBC:   Lab Results   Component Value Date    WBC 15.62 (H) 08/18/2019    HGB 11.9 (L) 08/18/2019    HCT 41.4 08/18/2019     (L) 08/18/2019    MCV 91 08/18/2019    RDW 21.4 (H) 08/18/2019     BMP:   Lab Results   Component Value Date     (L) 08/18/2019     (L) 08/18/2019    K 5.8 (H) 08/18/2019    K 5.8 (H) 08/18/2019    K 5.8 (H) 08/18/2019     08/18/2019     08/18/2019    CO2 16 (L) 08/18/2019    CO2 16 (L) 08/18/2019    BUN 90 (H) 08/18/2019    BUN 90 (H) 08/18/2019    CREATININE 2.5 (H) 08/18/2019    CREATININE 2.5 (H) 08/18/2019     08/18/2019     08/18/2019    CALCIUM 8.2 (L) 08/18/2019    CALCIUM 8.2 (L) 08/18/2019    MG 2.0 08/18/2019    PHOS 3.9 08/05/2019     LFTs:   Lab Results   Component Value Date    PROT 5.5 (L) 08/18/2019    ALBUMIN 2.4 (L) 08/18/2019    BILITOT 1.1 (H) 08/18/2019    AST 31 08/18/2019    ALKPHOS 93 08/18/2019    ALT 34 08/18/2019     Coags:   Lab Results   Component Value Date    INR 1.2 08/17/2019     Urinalysis:   Lab Results   Component Value Date    LABURIN KLEBSIELLA PNEUMONIAE ESBL  >100,000 cfu/ml   (A) 07/31/2019    COLORU Yellow 08/18/2019    SPECGRAV >=1.030 (A) 08/18/2019    NITRITE Negative 08/18/2019    PROTEINUR 30 06/13/2019    KETONESU Negative 08/18/2019    UROBILINOGEN 1.0 08/18/2019    BILIRUBINUR small 06/13/2019    WBCUA 50 (H) 08/18/2019       Trended Lab Data:  Recent Labs   Lab 08/17/19  2306  08/18/19  0429 08/18/19  0604 08/18/19  0621 08/18/19  0822 08/18/19  1036 08/18/19  1200   WBC 13.66*   < > 14.46*  --  16.22*  --   --  15.62*   HGB 12.1*   < > 11.1*  --  13.3*  --   --  11.9*   HCT 40.8   <  > 38.0* 40 44.6  --   --  41.4      < > 73*  --  153  --   --  138*   MCV 90   < > 90  --  91  --   --  91   RDW 21.8*   < > 21.7*  --  23.6*  --   --  21.4*   *  --  133*  --  134* 138  138 135*  135*  --    K 6.5*  --  5.7*  --  6.4* 5.9*  5.9*  5.9* 5.8*  5.8*  5.8*  --      --  107  --  103 102  102 102  102  --    CO2 15*  --  12*  --  13* 18*  18* 16*  16*  --    BUN 94*  --  87*  --  92* 91*  91* 90*  90*  --    CREATININE 2.2*  --  2.2*  --  2.4* 2.5*  2.5* 2.5*  2.5*  --    *  --  152*  --  143* 111*  111* 110  110  --    PROT 5.4*  --   --   --  5.5*  --   --   --    ALBUMIN 2.4*  --   --   --  2.4*  --   --   --    BILITOT 0.9  --   --   --  1.1*  --   --   --    AST 25  --   --   --  31  --   --   --    ALKPHOS 96  --   --   --  93  --   --   --    ALT 30  --   --   --  34  --   --   --     < > = values in this interval not displayed.       Trended Cardiac Data:  Recent Labs   Lab 08/17/19  2306 08/18/19  0307 08/18/19  0429 08/18/19  1036   TROPONINI 0.035* 0.043* 0.043* 0.040*   *  --   --   --            Radiology:  Imaging Results          X-Ray Chest AP Portable (Final result)  Result time 08/17/19 22:58:12    Final result by Martin Banegas MD (08/17/19 22:58:12)                 Impression:      Left lung disease unchanged.  Increased small right pleural effusion.      Electronically signed by: Martin Banegas  Date:    08/17/2019  Time:    22:58             Narrative:    EXAMINATION:  XR CHEST AP PORTABLE    CLINICAL HISTORY:  shortness of breath;    TECHNIQUE:  Single frontal view of the chest was performed.    COMPARISON:  08/03/2019 chest    FINDINGS:  Single AP portable view at 22:35.    The heart is enlarged unchanged.  Right IJ catheter terminates at the mid SVC.  There are overlying leads    There is small right pleural effusion which is increased.  There is moderate dense opacity at the left base which could be atelectasis or pneumonia  probably unchanged.  No pneumothorax.  No abdominal free air.  No definite interstitial edema                                   Assessment and Plan:      Cale Harding is a 78 y.o.male with  Patient Active Problem List    Diagnosis Date Noted    Pericardial effusion 08/18/2019    Acute respiratory failure with hypoxia 08/18/2019    Severe sepsis 08/18/2019    UTI (urinary tract infection) 08/18/2019    Hyperkalemia 08/18/2019    Atrial fibrillation 08/18/2019    Aspiration pneumonia 08/18/2019    Chronic diastolic heart failure 08/18/2019    Post-operative state 08/09/2019    Debility 08/04/2019    Delirium 08/03/2019    Catheter-associated urinary tract infection 08/01/2019    Hyponatremia 07/30/2019    SKINNY (acute kidney injury) 07/30/2019    Leukocytosis 07/29/2019    Rash, drug 07/28/2019    Decrease in appetite 07/26/2019    Compression of vein     SOB (shortness of breath) on exertion 07/25/2019    Acute on chronic diastolic heart failure 07/18/2019    Atrial fibrillation with RVR 07/15/2019    Chest pain 07/15/2019    Pericardial effusion without cardiac tamponade 07/15/2019    Acquired contracture of bladder neck 07/10/2019    Slow urinary stream 07/10/2019    Scrotal edema 07/10/2019    Neoplasm related pain 06/27/2019    Encounter for care related to vascular access port 06/20/2019    Swelling of the testicles 06/12/2019    Colon polyp 10/16/2018    Mesothelioma 03/19/2018    Atypical chest pain 03/08/2018    Night sweats 03/08/2018    Weight loss 03/08/2018    Postural dizziness 03/08/2018    Mesothelioma of left lung 02/15/2018    Malignant pleural effusion 02/01/2018    Pleural scarring 01/22/2018    History of asbestos exposure 01/15/2018    Nocturia 11/22/2017    Urinary frequency 11/22/2017    Gastroesophageal reflux disease 10/11/2017    History of colonic diverticulitis 10/11/2017    Prediabetes 10/11/2017    History of dislocation of elbow  10/11/2017    Pleural plaque 09/30/2016    Eczema 09/30/2016    Contact with and (suspected) exposure to other hazardous, chiefly nonmedicinal, chemicals 06/14/2016    Abnormal bladder cytology 05/25/2016    History of prostate cancer 05/25/2016          #) Stage II SKINNY  - Cr : 2.5 today,Cr  on admit: 2.20,  Baseline : 1.0 (> 2.5 times increase in baseline)  -Continue IV hydration (RL)  -Family doesn't want dialysis, Patient is DNR.  - dose meds for GFR < 10  - strict I/O, daily weights  - please avoid gadolinium, fleets, phos-based laxatives, NSAIDs    #) Metabolic Acidosis  - Combined anion gap and non-anion gap  - Anion gap likely due to lactirc acidosis  - Non-anion gap likely due to ARF  - Lactate = 5.4  - Will trend lactate q6h  -on HCO3 drip, HCO3 improving.  -Stop HCO3 drip, continue hydration with Ringer's lactate      #) Hypotension  -On Norepinephrine drip  -Continue current management.     #) Anemia   -H/H: 11.9/41.4    #)  Rest of the management per primary      Sammy Kamara MD  [employer]    If after 5pm please forward any questions to the [facility] Fellow/Attending on call.

## 2019-08-18 NOTE — PLAN OF CARE
Problem: Adult Inpatient Plan of Care  Goal: Plan of Care Review  Outcome: Ongoing (interventions implemented as appropriate)  Patient on oxygen with documented flow via Bipap.  Will attempt to wean per O2 order protocol.

## 2019-08-18 NOTE — ED NOTES
"Respiratory at bedside. Pt taken off bipap and placed on 3LNC. Pt tolerating well. Pt with no abdominal breathing noted at this time. Pt reports "I feel like I am breathing better now." Family at bedside.   "

## 2019-08-18 NOTE — CONSULTS
[facility]  Cardiology  Consult Note    Patient Name: Cale Harding  MRN: [id number]  Admission Date: 8/17/2019  Hospital Length of Stay: 0 days  Code Status: DNR   Attending Provider: Valentin Veliz MD   Consulting Provider: Destin Ortega MD  Primary Care Physician: Jj Escobedo MD  Principal Problem:Acute respiratory failure with hypoxia    Patient information was obtained from patient and ER records.     Consults  Subjective:     Chief Complaint:  SOB     HPI:   79 y/o male with hx of lung CA, Mesothelioma, prostate CA, HTN, pleural effusion who presented with SOB. Cardiology consulted for pericardial effusion. Hx of pericardial effusion with pericardiocentesis in the past. Last 2DE 8/9/2019 with normal EF and small circumferential effusion without evidence of tamponade. Very frail and with hypoxemic resp failure.    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    Prostate cancer 2002    Urinary tract infection        Past Surgical History:   Procedure Laterality Date    BIOPSY-DIAPHRAGM N/A 3/19/2018    Performed by [facility]dino Fang MD at [facility] OR [address] [place] [address]    RHVRKF-OUCKLZ-PC N/A 2/1/2018    Performed by [facility] Diagnostic Provider at [facility] OR [address] [place] [address]    BRONCHOSCOPY N/A 6/3/2019    Performed by [facility] Diagnostic Provider at [facility] OR [address] [place] [address]    COLONOSCOPY  10/20/2012    COLONOSCOPY  11/19/2014    dr machado ( repeat in 3 years per the pt )    CYSTOSCOPY, WITH RETROGRADE PYELOGRAM Bilateral 7/15/2019    [facility]    CYSTOSCOPY, WITH URETHRAL DILATION Bilateral 7/15/2019    [facility]    ELBOW SURGERY Left 2013    dislocation    QEAMCCJOB-JLPM-P-CATH N/A 6/20/2019    Performed by [facility] Diagnostic Provider at [facility] OR [address] [place] [address]    LAPAROSCOPY-DIAGNOSTIC N/A 3/19/2018    Performed by  G[facility]ino Fang MD at [facility] OR 2ND FLR    Pericardiocentesis N/A 7/18/2019    Performed by Frank Javier MD at [facility] CATH LAB    PROCTECTOMY  2002    RELEASE, CONTRACTURE, BLADDER NECK N/A 7/15/2019    [facility] OR    SHOULDER ARTHROSCOPY W/ ROTATOR CUFF REPAIR Right 2008    THORACOSCOPY-VIDEO ASSISTED (VATS) W/PLEURAL BIOPSY Left 3/19/2018    Performed by Gal[facility]ino Fang MD at [facility] OR 2ND FLR    URETHROGRAM, RETROGRADE N/A 7/15/2019    Performed by [facility] Philippe [facility] at [facility] OR       Review of patient's allergies indicates:   Allergen Reactions    Diltiazem Rash    Bactrim [sulfamethoxazole-trimethoprim] Other (See Comments)     Joint pains       No current facility-administered medications on file prior to encounter.      Current Outpatient Medications on File Prior to Encounter   Medication Sig    albuterol-ipratropium (DUO-NEB) 2.5 mg-0.5 mg/3 mL nebulizer solution Inhale 3 ml's (1 vial) by nebulization every 6 (six) hours as needed for Wheezing or Shortness of Breath. Rescue    amiodarone (PACERONE) 400 MG tablet Take 1 tablet (400 mg total) by mouth 2 (two) times daily.    artificial tears (ISOPTO TEARS) 0.5 % ophthalmic solution Place 1 drop into both eyes 3 (three) times daily.    dextromethorphan-guaifenesin  mg (MUCINEX DM)  mg per 12 hr tablet Take 1 tablet by mouth every 12 (twelve) hours.    dronabinol (MARINOL) 2.5 MG capsule Take 1 capsule (2.5 mg total) by mouth 2 (two) times daily before meals.    famotidine (PEPCID) 20 MG tablet Take 1 tablet (20 mg total) by mouth once daily.    HYDROcodone-acetaminophen (NORCO) 5-325 mg per tablet Take 1 tablet by mouth every 6 (six) hours as needed for Pain.    metoprolol succinate (TOPROL-XL) 100 MG 24 hr tablet Take 1 tablet (100 mg total) by mouth once daily.    ondansetron (ZOFRAN-ODT) 8 MG TbDL Dissolve 1 tablet (8 mg total) by mouth every 12 (twelve) hours as needed.    polyethylene  glycol (MIRALAX) 17 gram/dose powder Take 17 g by mouth once daily.    triamcinolone acetonide 0.025% (KENALOG) 0.025 % Oint Apply topically 2 (two) times daily as needed. For itching    triamcinolone acetonide 0.1% (KENALOG) 0.1 % cream Apply topically 2 (two) times daily as needed. For itching     Family History     Problem Relation (Age of Onset)    Cancer Mother    Heart attack Sister, Sister    Heart disease Father, Brother, Sister, Brother, Sister, Brother    No Known Problems Son, Son, Son    Prostate cancer Brother, Brother, Brother        Tobacco Use    Smoking status: Former Smoker     Packs/day: 2.00     Years: 15.00     Pack years: 30.00     Types: Cigarettes     Last attempt to quit: 1970     Years since quittin.6    Smokeless tobacco: Former User    Tobacco comment: pt quit 40 years ago   Substance and Sexual Activity    Alcohol use: No     Alcohol/week: 16.8 oz     Types: 28 Cans of beer per week     Comment: None since Nov 15 th 2017    Drug use: No    Sexual activity: Not Currently     Review of Systems   Constitution: Positive for decreased appetite and malaise/fatigue.   HENT: Negative for congestion.    Eyes: Negative for blurred vision.   Cardiovascular: Positive for chest pain, dyspnea on exertion, leg swelling and orthopnea. Negative for claudication, cyanosis, irregular heartbeat, near-syncope, palpitations, paroxysmal nocturnal dyspnea and syncope.   Respiratory: Positive for shortness of breath.    Endocrine: Negative for polyuria.   Hematologic/Lymphatic: Negative for bleeding problem.   Skin: Negative for itching and rash.   Musculoskeletal: Positive for joint pain, joint swelling and muscle weakness. Negative for muscle cramps.   Gastrointestinal: Negative for abdominal pain, hematemesis, hematochezia, melena, nausea and vomiting.   Genitourinary: Negative for dysuria and hematuria.   Neurological: Positive for weakness. Negative for dizziness, focal weakness, headaches,  light-headedness and loss of balance.   Psychiatric/Behavioral: Negative for depression. The patient is not nervous/anxious.      Objective:     Vital Signs (Most Recent):  Temp: 99.3 °F (37.4 °C) (08/18/19 0335)  Pulse: (!) 59 (08/18/19 1213)  Resp: (!) 28 (08/18/19 1213)  BP: 115/63 (08/18/19 0806)  SpO2: 100 % (08/18/19 1213) Vital Signs (24h Range):  Temp:  [99 °F (37.2 °C)-99.5 °F (37.5 °C)] 99.3 °F (37.4 °C)  Pulse:  [] 59  Resp:  [28-39] 28  SpO2:  [85 %-100 %] 100 %  BP: ()/() 115/63     Weight: 73.1 kg (161 lb 2.5 oz)  Body mass index is 25.24 kg/m².    SpO2: 100 %  O2 Device (Oxygen Therapy): BiPAP      Intake/Output Summary (Last 24 hours) at 8/18/2019 1537  Last data filed at 8/18/2019 0458  Gross per 24 hour   Intake 1700 ml   Output --   Net 1700 ml       Lines/Drains/Airways     Central Venous Catheter Line                 Port A Cath Single Lumen 06/20/19 right internal jugular 59 days         Port A Cath Single Lumen 08/17/19 2305 right internal jugular less than 1 day          Drain                 Chest Tube Right Pleural -- days         Urethral Catheter 07/17/19 0920 Non-latex 16 Fr. 32 days                Physical Exam   Constitutional: He is oriented to person, place, and time. He appears cachectic.   HENT:   Head: Normocephalic and atraumatic.   Neck: Neck supple. JVD present.   Cardiovascular: Normal rate, regular rhythm and normal heart sounds.   Pulses:       Carotid pulses are 2+ on the right side, and 2+ on the left side.       Radial pulses are 2+ on the right side, and 2+ on the left side.        Femoral pulses are 2+ on the right side, and 2+ on the left side.       Dorsalis pedis pulses are 2+ on the right side, and 2+ on the left side.        Posterior tibial pulses are 2+ on the right side, and 2+ on the left side.   Pulmonary/Chest: He is in respiratory distress. He has wheezes. He has rales.   Abdominal: Soft. Bowel sounds are normal.   Musculoskeletal: He  exhibits edema.   Neurological: He is alert and oriented to person, place, and time.   Skin: Skin is warm and dry.   Psychiatric: He has a normal mood and affect. His behavior is normal. Thought content normal.       Significant Labs:   ABG:   Recent Labs   Lab 08/18/19  0011 08/18/19  0604   PH 7.348* 7.213*   PCO2 32.7* 32.6*   HCO3 18.0* 13.1*   POCSATURATED 98 90*   BE -8 -15   , CMP   Recent Labs   Lab 08/17/19  2306  08/18/19  0621 08/18/19  0822 08/18/19  1036   *   < > 134* 138  138 135*  135*   K 6.5*   < > 6.4* 5.9*  5.9*  5.9* 5.8*  5.8*  5.8*      < > 103 102  102 102  102   CO2 15*   < > 13* 18*  18* 16*  16*   *   < > 143* 111*  111* 110  110   BUN 94*   < > 92* 91*  91* 90*  90*   CREATININE 2.2*   < > 2.4* 2.5*  2.5* 2.5*  2.5*   CALCIUM 7.9*   < > 8.1* 8.0*  8.0* 8.2*  8.2*   PROT 5.4*  --  5.5*  --   --    ALBUMIN 2.4*  --  2.4*  --   --    BILITOT 0.9  --  1.1*  --   --    ALKPHOS 96  --  93  --   --    AST 25  --  31  --   --    ALT 30  --  34  --   --    ANIONGAP 14   < > 18* 18*  18* 17*  17*   ESTGFRAFRICA 32*   < > 29* 28*  28* 28*  28*   EGFRNONAA 28*   < > 25* 24*  24* 24*  24*    < > = values in this interval not displayed.   , CBC   Recent Labs   Lab 08/18/19  0429  08/18/19  0621 08/18/19  1200   WBC 14.46*  --  16.22* 15.62*   HGB 11.1*  --  13.3* 11.9*   HCT 38.0*   < > 44.6 41.4   PLT 73*  --  153 138*    < > = values in this interval not displayed.   , INR   Recent Labs   Lab 08/17/19  2306   INR 1.2   , Lipid Panel No results for input(s): CHOL, HDL, LDLCALC, TRIG, CHOLHDL in the last 48 hours. and Troponin   Recent Labs   Lab 08/18/19  0307 08/18/19  0429 08/18/19  1036   TROPONINI 0.043* 0.043* 0.040*         Assessment and Plan:     Active Diagnoses:    Diagnosis Date Noted POA    PRINCIPAL PROBLEM:  Acute respiratory failure with hypoxia [J96.01] 08/18/2019 Yes    Pericardial effusion [I31.3] 08/18/2019 Yes    Severe sepsis [A41.9,  R65.20] 08/18/2019 Yes    UTI (urinary tract infection) [N39.0] 08/18/2019 Yes    Hyperkalemia [E87.5] 08/18/2019 Yes    Atrial fibrillation [I48.91] 08/18/2019 Yes    Aspiration pneumonia [J69.0] 08/18/2019 Yes    Chronic diastolic heart failure [I50.32] 08/18/2019 Yes    SKINNY (acute kidney injury) [N17.9] 07/30/2019 Yes    Mesothelioma of left lung [C45.7] 02/15/2018 Yes    Malignant pleural effusion [J91.0] 02/01/2018 Yes    History of prostate cancer [Z85.46] 05/25/2016 Not Applicable      Problems Resolved During this Admission:    Diagnosis Date Noted Date Resolved POA    Respiratory distress [R06.03] 08/18/2019 08/18/2019 Yes    Respiratory failure with hypoxia [J96.91] 08/18/2019 08/18/2019 Yes     Pericardial effusion  -not clinically in tamponade  -multiple co-morbidities, pt very sick  -no role currently for pericardiocentesis and likely no future role as pt is end stage and too frail    Respiratory failure  -management per Primary team and Pulmonary    VTE Risk Mitigation (From admission, onward)        Ordered     Place sequential compression device  Until discontinued      08/18/19 0523     Place RAUDEL hose  Until discontinued      08/18/19 0523     IP VTE HIGH RISK PATIENT  Once      08/18/19 0523          Thank you for your consult. I will sign off. Please contact us if you have any additional questions.    Destin Ortega MD  Cardiology   [facility]

## 2019-08-18 NOTE — PLAN OF CARE
Problem: SLP Goal  Goal: SLP Goal  Short Term Goals:  1. Pt will participate in clinical swallow assessment to determine safest diet.  2. Pt will participate in MBSS to objectively rule out penetration/aspiration and determine least restrictive diet.  3. Pt will implement oral care 3x day given assistance to decrease risk of aspirating bacteria filled saliva.    **Further goals pending MBSS completion  Outcome: Ongoing (interventions implemented as appropriate)  8/18: Bedside swallow study completed. Pt deemed UNSAFE for oral intake and is to remain NPO. Overt s/s of aspiration appreciated at the bedside given minimal PO trials (coughing, wet/gurgly voice, decreased O2 sats). Education provided to pt and family re: aspiration risks, need for MBSS to objectively rule out aspiration, and need for frequent and aggressive oral care. Wife verbalized understanding via [employer] method but pt will require reinforcement. SLP to f/u.

## 2019-08-18 NOTE — NURSING
Pt came from ED via stretcher,pt looking very ill. On 4 L NC,sat was above 90 on admission. Sinus bradycardia. Pt was put on cardiac monitoring, BP trending low. Breath sounds wet and crackly even without auscultation, pt has dependent edema +2 or +3. Skin is peeling all over his body (son stated side effect from Diltiazem 3 weeks ago)   MD was called immediately at bedside during admission, orders in and executed. Family at bedside.

## 2019-08-19 NOTE — PROGRESS NOTES
Spoke with Dr. Johnston regarding patient being anxious and constantly tries to move his bipap. Requested for the prn anxiety medication to be modified to prevent further suppression of respiratory drive. Also, lab was called to draw the q4 bmp and he was made aware of it.

## 2019-08-19 NOTE — PROGRESS NOTES
[facility] Pulmonary/Critical Care     Subjective:      Cale Harding is a 78 y.o. 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 He presented after progressive SOB and acute worsening after a possible aspiration episode when he was drinking at home. He recently had a 3-week hospitalization due to complications from his mesothelioma and has not been a candidate for additional chemotherapy due to frailty and progression on previous chemo regimens. His family desires comfort-based care to maximize quality time with family and remain comfortable. Multiple family members were able to travel to see him yesterday. Overnight he became less responsive and had decreasing saturations. They have been appreciative of his care throughout his entire illness and are happy that he seems comfortable.     Objective:   Last 24 Hour Vital Signs:  BP  Min: 63/38  Max: 201/83  Temp  Av.2 °F (36.2 °C)  Min: 96.4 °F (35.8 °C)  Max: 97.6 °F (36.4 °C)  Pulse  Av.4  Min: 0  Max: 96  Resp  Av.5  Min: 0  Max: 46  SpO2  Av.5 %  Min: 48 %  Max: 100 %  I/O last 3 completed shifts:  In: 3203.7 [I.V.:1703.7; IV Piggyback:1500]  Out: 20 [Urine:20]    Ventilator Settings:  Oxygen Concentration (%):  [35-85] 85    Physical Examination:   General: cachectic man laying in bed in NAD, minimally responsive to tactile stimulation   HEENT: sores on lips, temporal wasting  Cardiovascular: bradycardic  Respiratory: bradypneic with minimal accessory muscle use on BiPAP.   Abdomen:Soft, nontender, nondistended   Extremities: no c/c/e      Trended Lab Data:   Recent Labs   Lab 19  2306  19  0621  19  1036 19  1200 19  2238 19  0253 19  0254   WBC 13.66*   < > 16.22*  --   --  15.62*  --   --  16.86*   HGB 12.1*   < > 13.3*  --   --  11.9*  --   --  13.1*   HCT 40.8   < > 44.6  --   --  41.4  --   --  44.8      < > 153  --   --  138*   --   --  136*   MCV 90   < > 91  --   --  91  --   --  92   RDW 21.8*   < > 23.6*  --   --  21.4*  --   --  21.4*   *   < > 134*   < > 135*  135*  --  137 136  --    K 6.5*   < > 6.4*   < > 5.8*  5.8*  5.8*  --  6.3* 6.4*  --       < > 103   < > 102  102  --  100 100  --    CO2 15*   < > 13*   < > 16*  16*  --  17* 15*  --    BUN 94*   < > 92*   < > 90*  90*  --  95* 93*  --    *   < > 143*   < > 110  110  --  108 108  --    PROT 5.4*  --  5.5*  --   --   --   --  6.0  --    ALBUMIN 2.4*  --  2.4*  --   --   --   --  2.4*  --    BILITOT 0.9  --  1.1*  --   --   --   --  1.6*  --    AST 25  --  31  --   --   --   --  632*  --    ALKPHOS 96  --  93  --   --   --   --  99  --    ALT 30  --  34  --   --   --   --  441*  --     < > = values in this interval not displayed.      Recent Labs   Lab 08/18/19  0307 08/18/19  0429 08/18/19  1036   TROPONINI 0.043* 0.043* 0.040*          Current Medications:     Infusions:   norepinephrine bitartrate-D5W 2.4 mcg/kg/min (08/19/19 0741)        Scheduled:   furosemide  40 mg Intravenous BID    lorazepam        meropenem (MERREM) IVPB  1 g Intravenous Q12H    sodium bicarbonate  50 mEq Intravenous Once    sodium polystyrene  30 g Oral Once        PRN:  acetaminophen, albuterol sulfate, Dextrose 10% Bolus, fentaNYL, fentaNYL, lorazepam, ondansetron, sodium chloride 0.9%, sodium chloride 3%     Assessment & Plan:       Active Hospital Problems    Diagnosis    *Acute respiratory failure with hypoxia    Pericardial effusion    Severe sepsis    UTI (urinary tract infection)    Hyperkalemia    Atrial fibrillation    Aspiration pneumonia    Chronic diastolic heart failure    SKINNY (acute kidney injury)    Mesothelioma of left lung    Malignant pleural effusion    History of prostate cancer       End-stage mesothelioma  -Mr. Harding passed away this morning with his family by his side, comfortable.  - has been called; he has previously been  administered final rites by someone from his [employer]  -Family support offered. Primary team at bedside.      Evelyn Ellison  08/19/2019 9:46 AM  [employer] Pulmonary & Critical Care Fellow

## 2019-08-19 NOTE — PLAN OF CARE
Goals of care discussion was had with patients family, including wife (Irma) and son (Timothy). At this time, goal is to continue with treatment for electrolyte derangements, but no hemodialysis. Family is currently awaiting the arrival of patients grandson, who is believed to arrive tomorrow. We will continue scheduled BMPs and shift potassium as needed with medical management. Case discussed with nephrology fellow earlier in the day. We will discontinue bicarb drip and administer bicarb prn for acidosis.  A DNR form was re-signed with instructions to continue with all IV vasoactive agents, but will not pursue compressions or cardioversion. Additionally, we will not pursue intubation or mechanical ventilation.     Arley Johnston MD  [facility] PGY-3  8/18/2019 10:42PM

## 2019-08-19 NOTE — CONSULTS
[facility]  Cardiology  Consult Note    Patient Name: Cale Harding  MRN: [id number]  Admission Date: 8/17/2019  Hospital Length of Stay: 1 days  Code Status: DNR   Attending Provider: Valentin Veliz MD   Consulting Provider: NICO Anaya, ANGLE  Primary Care Physician: Jj Escobedo MD  Principal Problem:Acute respiratory failure with hypoxia    Inpatient consult to [facility]  Consult performed by: NICO Whiteside, ANP  Consult ordered by: Radha Bruno MD        See full consult note dated 8/18/2019 by Dr. Destin Ortega

## 2019-08-19 NOTE — SIGNIFICANT EVENT
Asystole noted on monitor. No pulses palpated. No breath sounds or heart sounds on auscultation. Pupils fixed and dilated. Time of death 8:58am.    MARY May-II

## 2019-08-19 NOTE — NURSING
Dr. Ellison at bedside. Pt given Ativan for comfort. Pt appears to be in PEA. Is a DNR. Family at bedside. Family medicine notified.

## 2019-08-20 LAB — FUNGUS SPEC CULT: NORMAL

## 2019-08-21 LAB
ENTEROVIRUS: NOT DETECTED
HUMAN BOCAVIRUS: NOT DETECTED
HUMAN CORONAVIRUS, COMMON COLD VIRUS: NOT DETECTED
INFLUENZA A - H1N1-09: NOT DETECTED
PARAINFLUENZA: NOT DETECTED
RVP - ADENOVIRUS: NOT DETECTED
RVP - HUMAN METAPNEUMOVIRUS (HMPV): NOT DETECTED
RVP - INFLUENZA A: NOT DETECTED
RVP - INFLUENZA B: NOT DETECTED
RVP - RESPIRATORY SYNCTIAL VIRUS (RSV) A: NOT DETECTED
RVP - RESPIRATORY VIRAL PANEL, SOURCE: NORMAL
RVP - RHINOVIRUS: NOT DETECTED

## 2019-08-21 NOTE — DISCHARGE SUMMARY
[facility] Medicine Discharge Summary    Primary Team: [facility] Hospitalist Team B  Attending Physician: Jose Alfredo  Resident: Kiana  Intern: Greyson     Date of Admit: 2019  Date of Discharge: 19    Condition: : 19 at 8:58am     Discharge Diagnoses     Patient Active Problem List   Diagnosis    Abnormal bladder cytology    History of prostate cancer    Contact with and (suspected) exposure to other hazardous, chiefly nonmedicinal, chemicals    Pleural plaque    Eczema    Gastroesophageal reflux disease    History of colonic diverticulitis    Prediabetes    History of dislocation of elbow    Nocturia    Urinary frequency    History of asbestos exposure    Pleural scarring    Malignant pleural effusion    Mesothelioma of left lung    Atypical chest pain    Night sweats    Weight loss    Postural dizziness    Mesothelioma    Colon polyp    Swelling of the testicles    Encounter for care related to vascular access port    Neoplasm related pain    Acquired contracture of bladder neck    Slow urinary stream    Scrotal edema    Atrial fibrillation with RVR    Chest pain    Pericardial effusion without cardiac tamponade    Acute on chronic diastolic heart failure    SOB (shortness of breath) on exertion    Compression of vein    Decrease in appetite    Rash, drug    Leukocytosis    Hyponatremia    SKINNY (acute kidney injury)    Catheter-associated urinary tract infection    Delirium    Debility    Post-operative state    Pericardial effusion    Acute respiratory failure with hypoxia    Severe sepsis    UTI (urinary tract infection)    Hyperkalemia    Atrial fibrillation    Aspiration pneumonia    Chronic diastolic heart failure       Consultants and Procedures     Consultants:  Pulm/Critical Care  Cardiology  Nephrology    Procedures:   None    Imaging:  Imaging Results          X-Ray Chest AP Portable (Final result)  Result time 19 22:58:12    Final  "result by Martin Banegas MD (08/17/19 22:58:12)                 Impression:      Left lung disease unchanged.  Increased small right pleural effusion.      Electronically signed by: Martin Banegas  Date:    08/17/2019  Time:    22:58             Narrative:    EXAMINATION:  XR CHEST AP PORTABLE    CLINICAL HISTORY:  shortness of breath;    TECHNIQUE:  Single frontal view of the chest was performed.    COMPARISON:  08/03/2019 chest    FINDINGS:  Single AP portable view at 22:35.    The heart is enlarged unchanged.  Right IJ catheter terminates at the mid SVC.  There are overlying leads    There is small right pleural effusion which is increased.  There is moderate dense opacity at the left base which could be atelectasis or pneumonia probably unchanged.  No pneumothorax.  No abdominal free air.  No definite interstitial edema                                Brief History of Present Illness      The patient was in their usual state of health until 8:00 PM when upon drinking water while taking medications he reports swallowing the water "down the wrong pipe". Immediately after swallowing he began coughing for a prolonged period of time and per his wife his breathing changed immediately after. He became short of breath and his wife reports that his breathing was labored. He denies any fever, CP, sick contacts, sputum production, nasal congestion, sore throat, nausea, or vomiting. When his breathing did not return to baseline, his wife and son decided to call EMS. Mrs. Harding reports that at baseline he has VALENCIA, however, he is able to ambulate to the restroom and to the kitchen with some assistance.     Family also reports that over the past 3-4 days he has had minimal urine output but they state he has been resistant to drinking water or eating. Mr. Harding states that he can urinate but requires some straining. He denies any dysuria, frequency, or hematuria.    For the full HPI please refer to the History & Physical " from this admission.    Hospital Course By Problem with Pertinent Findings     Acute Hypoxic Respiratory Failure, possibly 2/2 aspiration pneumonia vs pleural effusion vs PE    Patient presented with acute respiratory distress after an aspiration event at home. He required Bipap initially in the ED but was weaned to nasal cannula. His family requested transfer to [facility] to be with his heme/onc physicians but he was determined to be too unstable at that point. While in the ED, patient became hypotensive. He was initially responsive to fluids but had to be placed on levophed. Antibiotics (vanc and meropenem) were initiated for concern for septic shock.    There was concern for possible PE as a source of shock in this patient with history of malignancy and acute respiratory distress. A CTA was unable to be done at time of admission due to worsening renal failure and V/Q scan would likely be non-diagnostic with mesothelioma and pleural effusions. There was consideration of starting full dose anti-coagulation but he had been previously taken off of his anticoagulation for atrial fibrillation due to concerns for hemorraghic contribution to his pericardial effusion. Given there was also concern for acutely worsening pericardial effusion contributing to obstructive shock, the risk was determined to be too high without confirmatory imaging. During the early morning of admission, patient was placed back on bipap for increasing hypoxia. At time of admit, he discussed the possibility of wanting intubation, but the decision was made to not proceed with intubation as his condition worsened. This was reflected in his signed DNR.       Severe Sepsis 2/2 Pneumonia vs. UTI   Covered with vancomycin and meropenem given history of ESBL UTI. Blood cultures and urine cultures were drawn which remained negative.      Acute Renal Failure with Hyperkalemia and Metabolic Acidosis   Patient had acute renal failure on admission. Family  reported decreased PO intake for the last couple of weeks. Urine studies consistent with pre-renal etiology but did not improve with fluids. Potassium continued to rise despite shifting and patient was not able to received kayexalate due to his dysphagia. Patient and family denied NGT placement for kayexalate. I discussed this with the family at bedside. They do not want to advance to dialysis. Nephrology was consulted and patient was placed on sodium bicarb gtt for the acidosis and potassium was shifted based on q4hr BMPs.      Known Pericardial Effusion  Family reported known persistent but stable pericardial effusion that has been drained in the past. Prior Echo (8/9/19) showed small circumferential pericardial effusion relatively unchanged from previous. Bedside echo done by ED staff reported as no significant pericardial effusion. Cardiology was consulted as there remained some question of cardiac tamponade contributing to shock. Cardiology determined that he was not clinically in tamponade.      Intermediate Troponin   Troponin: 0.035 --> 0.043. Patient denied CP, diaphoresis, nausea, or vomiting. It was trended and remained flat. Was likely due to demand in the setting of sepsis.      History of Mesothelioma with malignant pleural effusion s/p PleurX placement  Patient's heme/onc physician Dr Foster had previously discussed that patient had a poor prognosis and was too weak for further chemotherapy. He had a pleurx placed in January that was drained about once a week. Pulmonology was consulted and 500cc was drained from pleurx.      HFpeEF     BNP on admission 639, increased from 374 in 7/2019. Prior ECHO (8/9/19) showed grade I diastolic dysfunction.     Atrial Fibrillation  Continued home amiodarone 400 mg BID. Patient was not on diltiazem due to history of full body and mucosal rash and not on anticoagulation due to risk of worsening of pericardial effusion.   - CHADSVASC =4 (CHF, HTN, age >  75)     Normocytic anemia  - Hb = 12.1 I MCV = 90  - Ordered iron studies which were consistent with anemia of chronic disease     Discharge Information:      at 19 at 8:58am     Radha Bruon MD  [facility] Internal Medicine, -II

## 2019-08-23 LAB
BACTERIA BLD CULT: NORMAL
BACTERIA BLD CULT: NORMAL

## 2019-09-05 LAB — FUNGUS SPEC CULT: NORMAL

## 2019-09-19 LAB
ACID FAST MOD KINY STN SPEC: NORMAL
MYCOBACTERIUM SPEC QL CULT: NORMAL

## 2019-09-25 ENCOUNTER — EXTERNAL HOME HEALTH (OUTPATIENT)
Dept: HOME HEALTH SERVICES | Facility: HOSPITAL | Age: 79
End: 2019-09-25
Payer: MEDICARE

## 2019-10-01 ENCOUNTER — TELEPHONE (OUTPATIENT)
Dept: HOME HEALTH SERVICES | Facility: HOSPITAL | Age: 79
End: 2019-10-01

## 2019-10-01 LAB
ACID FAST MOD KINY STN SPEC: NORMAL
MYCOBACTERIUM SPEC QL CULT: NORMAL

## 2020-01-01 NOTE — NURSING
Tolerated Neupogen injection well.  Discharged with next appointment scheduled.   Statement Selected

## 2020-05-13 NOTE — PROCEDURES
----- Message from Dawit Shi sent at 5/13/2020 11:30 AM CDT -----  Type: Needs Medical Advice  Who Called:  Patient    Best Call Back Number: [phone]  Additional Information: Patient states that he would like a callback from Stella regarding questions about his appointment on 06/08     Radiology Post-Procedure Note    Pre Op Diagnosis: pleural thickening and effusion  Post Op Diagnosis: Same    Procedure: thoracentesis and pleural mass biopsy    Procedure performed by: Alfredo Nagel MD    Written Informed Consent Obtained: Yes  Specimen Removed:  cc lorene effute, 4 core biopsy specimens  Estimated Blood Loss: Minimal    Findings:   Successful L thoracentesis and pleural mass biopsy.    Patient tolerated procedure well.    @SIG@

## 2020-08-31 NOTE — TELEPHONE ENCOUNTER
----- Message from Shirley Villatoro sent at 5/14/2019  4:11 PM CDT -----  Contact: pt   Pt called to speak with nurse lexis have some questions   Callback#[phone]  Thank You  EDU Villatoro  
Spoke with patient.  Informed him LUCA jocelyne is approved for Thursday.  If he has procedure done in clinic tomorrow with pulmonary---that will be approved as well.  He thanked nurse.  
- - -
